# Patient Record
Sex: MALE | Race: WHITE | NOT HISPANIC OR LATINO | Employment: FULL TIME | URBAN - METROPOLITAN AREA
[De-identification: names, ages, dates, MRNs, and addresses within clinical notes are randomized per-mention and may not be internally consistent; named-entity substitution may affect disease eponyms.]

---

## 2017-08-24 ENCOUNTER — ALLSCRIPTS OFFICE VISIT (OUTPATIENT)
Dept: OTHER | Facility: OTHER | Age: 47
End: 2017-08-24

## 2017-08-24 DIAGNOSIS — R82.90 ABNORMAL FINDING IN URINE: ICD-10-CM

## 2017-08-24 DIAGNOSIS — R30.0 DYSURIA: ICD-10-CM

## 2017-08-24 LAB
BILIRUB UR QL STRIP: NEGATIVE
CLARITY UR: ABNORMAL
COLOR UR: ABNORMAL
GLUCOSE (HISTORICAL): NEGATIVE
HGB UR QL STRIP.AUTO: NEGATIVE
KETONES UR STRIP-MCNC: ABNORMAL MG/DL
LEUKOCYTE ESTERASE UR QL STRIP: NEGATIVE
NITRITE UR QL STRIP: POSITIVE
PH UR STRIP.AUTO: 6 [PH]
PROT UR STRIP-MCNC: ABNORMAL MG/DL
SP GR UR STRIP.AUTO: 1.03
UROBILINOGEN UR QL STRIP.AUTO: 0.2

## 2017-08-25 ENCOUNTER — APPOINTMENT (OUTPATIENT)
Dept: LAB | Facility: HOSPITAL | Age: 47
End: 2017-08-25
Payer: COMMERCIAL

## 2017-08-25 DIAGNOSIS — R30.0 DYSURIA: ICD-10-CM

## 2017-08-25 PROCEDURE — 87186 SC STD MICRODIL/AGAR DIL: CPT

## 2017-08-25 PROCEDURE — 87077 CULTURE AEROBIC IDENTIFY: CPT

## 2017-08-25 PROCEDURE — 87086 URINE CULTURE/COLONY COUNT: CPT

## 2017-08-26 ENCOUNTER — LAB CONVERSION - ENCOUNTER (OUTPATIENT)
Dept: OTHER | Facility: OTHER | Age: 47
End: 2017-08-26

## 2017-08-26 LAB
A/G RATIO (HISTORICAL): 1.8 (CALC) (ref 1–2.5)
ALBUMIN SERPL BCP-MCNC: 4.6 G/DL (ref 3.6–5.1)
ALP SERPL-CCNC: 46 U/L (ref 40–115)
ALT SERPL W P-5'-P-CCNC: 15 U/L (ref 9–46)
AST SERPL W P-5'-P-CCNC: 15 U/L (ref 10–40)
BASOPHILS # BLD AUTO: 0.4 %
BASOPHILS # BLD AUTO: 21 CELLS/UL (ref 0–200)
BILIRUB SERPL-MCNC: 0.6 MG/DL (ref 0.2–1.2)
BUN SERPL-MCNC: 13 MG/DL (ref 7–25)
BUN/CREA RATIO (HISTORICAL): ABNORMAL (CALC) (ref 6–22)
CALCIUM SERPL-MCNC: 9.5 MG/DL (ref 8.6–10.3)
CHLORIDE SERPL-SCNC: 105 MMOL/L (ref 98–110)
CO2 SERPL-SCNC: 26 MMOL/L (ref 20–31)
CREAT SERPL-MCNC: 0.98 MG/DL (ref 0.6–1.35)
DEPRECATED RDW RBC AUTO: 12.7 % (ref 11–15)
EGFR AFRICAN AMERICAN (HISTORICAL): 108 ML/MIN/1.73M2
EGFR-AMERICAN CALC (HISTORICAL): 93 ML/MIN/1.73M2
EOSINOPHIL # BLD AUTO: 1.3 %
EOSINOPHIL # BLD AUTO: 69 CELLS/UL (ref 15–500)
GAMMA GLOBULIN (HISTORICAL): 2.6 G/DL (CALC) (ref 1.9–3.7)
GLUCOSE (HISTORICAL): 100 MG/DL (ref 65–99)
HCT VFR BLD AUTO: 45.7 % (ref 38.5–50)
HGB BLD-MCNC: 15.6 G/DL (ref 13.2–17.1)
LYMPHOCYTES # BLD AUTO: 1431 CELLS/UL (ref 850–3900)
LYMPHOCYTES # BLD AUTO: 27 %
MCH RBC QN AUTO: 33.1 PG (ref 27–33)
MCHC RBC AUTO-ENTMCNC: 34.1 G/DL (ref 32–36)
MCV RBC AUTO: 96.8 FL (ref 80–100)
MONOCYTES # BLD AUTO: 477 CELLS/UL (ref 200–950)
MONOCYTES (HISTORICAL): 9 %
NEUTROPHILS # BLD AUTO: 3302 CELLS/UL (ref 1500–7800)
NEUTROPHILS # BLD AUTO: 62.3 %
PLATELET # BLD AUTO: 201 THOUSAND/UL (ref 140–400)
PMV BLD AUTO: 11.3 FL (ref 7.5–12.5)
POTASSIUM SERPL-SCNC: 4.3 MMOL/L (ref 3.5–5.3)
RBC # BLD AUTO: 4.72 MILLION/UL (ref 4.2–5.8)
SODIUM SERPL-SCNC: 140 MMOL/L (ref 135–146)
TOTAL PROTEIN (HISTORICAL): 7.2 G/DL (ref 6.1–8.1)
WBC # BLD AUTO: 5.3 THOUSAND/UL (ref 3.8–10.8)

## 2017-08-27 LAB — BACTERIA UR CULT: NORMAL

## 2017-08-28 ENCOUNTER — GENERIC CONVERSION - ENCOUNTER (OUTPATIENT)
Dept: OTHER | Facility: OTHER | Age: 47
End: 2017-08-28

## 2017-09-01 ENCOUNTER — GENERIC CONVERSION - ENCOUNTER (OUTPATIENT)
Dept: OTHER | Facility: OTHER | Age: 47
End: 2017-09-01

## 2017-09-05 ENCOUNTER — GENERIC CONVERSION - ENCOUNTER (OUTPATIENT)
Dept: OTHER | Facility: OTHER | Age: 47
End: 2017-09-05

## 2017-09-07 ENCOUNTER — ALLSCRIPTS OFFICE VISIT (OUTPATIENT)
Dept: OTHER | Facility: OTHER | Age: 47
End: 2017-09-07

## 2017-12-01 ENCOUNTER — HOSPITAL ENCOUNTER (EMERGENCY)
Facility: HOSPITAL | Age: 47
Discharge: HOME | End: 2017-12-01
Attending: EMERGENCY MEDICINE | Admitting: EMERGENCY MEDICINE
Payer: COMMERCIAL

## 2017-12-01 ENCOUNTER — APPOINTMENT (EMERGENCY)
Dept: RADIOLOGY | Facility: HOSPITAL | Age: 47
End: 2017-12-01
Payer: COMMERCIAL

## 2017-12-01 VITALS
BODY MASS INDEX: 23.07 KG/M2 | OXYGEN SATURATION: 99 % | RESPIRATION RATE: 16 BRPM | WEIGHT: 147 LBS | TEMPERATURE: 98.4 F | SYSTOLIC BLOOD PRESSURE: 126 MMHG | HEART RATE: 81 BPM | DIASTOLIC BLOOD PRESSURE: 66 MMHG | HEIGHT: 67 IN
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
## 2017-12-01 DIAGNOSIS — S50.12XA CONTUSION OF LEFT FOREARM, INITIAL ENCOUNTER: ICD-10-CM

## 2017-12-01 DIAGNOSIS — S05.01XA ABRASION OF RIGHT CORNEA, INITIAL ENCOUNTER: Primary | ICD-10-CM

## 2017-12-01 DIAGNOSIS — S00.81XA FACIAL ABRASION, INITIAL ENCOUNTER: ICD-10-CM

## 2017-12-01 LAB
ANION GAP BLD CALC-SCNC: 16 MMOL/L (ref 4–13)
BUN BLD-MCNC: 18 MG/DL (ref 5–25)
CA-I BLD-SCNC: 1.2 MMOL/L (ref 1.12–1.32)
CHLORIDE BLD-SCNC: 106 MMOL/L (ref 100–108)
CREAT BLD-MCNC: 0.7 MG/DL (ref 0.6–1.3)
GFR SERPL CREATININE-BSD FRML MDRD: 114 ML/MIN/1.73SQ M
GLUCOSE SERPL-MCNC: 124 MG/DL (ref 65–140)
HCT VFR BLD CALC: 44 % (ref 36.5–49.3)
HGB BLDA-MCNC: 15 G/DL (ref 12–17)
PCO2 BLD: 24 MMOL/L (ref 21–32)
POTASSIUM BLD-SCNC: 3.7 MMOL/L (ref 3.5–5.3)
SODIUM BLD-SCNC: 141 MMOL/L (ref 136–145)
SPECIMEN SOURCE: ABNORMAL

## 2017-12-01 PROCEDURE — 72125 CT NECK SPINE W/O DYE: CPT

## 2017-12-01 PROCEDURE — 96372 THER/PROPH/DIAG INJ SC/IM: CPT

## 2017-12-01 PROCEDURE — 70450 CT HEAD/BRAIN W/O DYE: CPT

## 2017-12-01 PROCEDURE — 90471 IMMUNIZATION ADMIN: CPT

## 2017-12-01 PROCEDURE — 73090 X-RAY EXAM OF FOREARM: CPT

## 2017-12-01 PROCEDURE — 71260 CT THORAX DX C+: CPT

## 2017-12-01 PROCEDURE — 90715 TDAP VACCINE 7 YRS/> IM: CPT | Performed by: EMERGENCY MEDICINE

## 2017-12-01 PROCEDURE — 74177 CT ABD & PELVIS W/CONTRAST: CPT

## 2017-12-01 PROCEDURE — 99285 EMERGENCY DEPT VISIT HI MDM: CPT

## 2017-12-01 PROCEDURE — 85014 HEMATOCRIT: CPT

## 2017-12-01 PROCEDURE — 80047 BASIC METABLC PNL IONIZED CA: CPT

## 2017-12-01 RX ORDER — PROPARACAINE HYDROCHLORIDE 5 MG/ML
1 SOLUTION/ DROPS OPHTHALMIC ONCE
Status: COMPLETED | OUTPATIENT
Start: 2017-12-01 | End: 2017-12-01

## 2017-12-01 RX ORDER — KETOROLAC TROMETHAMINE 30 MG/ML
30 INJECTION, SOLUTION INTRAMUSCULAR; INTRAVENOUS ONCE
Status: COMPLETED | OUTPATIENT
Start: 2017-12-01 | End: 2017-12-01

## 2017-12-01 RX ORDER — KETOROLAC TROMETHAMINE 30 MG/ML
15 INJECTION, SOLUTION INTRAMUSCULAR; INTRAVENOUS ONCE
Status: DISCONTINUED | OUTPATIENT
Start: 2017-12-01 | End: 2017-12-01

## 2017-12-01 RX ORDER — SULFACETAMIDE SODIUM 100 MG/ML
1 SOLUTION/ DROPS OPHTHALMIC EVERY 4 HOURS
Qty: 5 ML | Refills: 0 | Status: ON HOLD | OUTPATIENT
Start: 2017-12-01 | End: 2018-01-25 | Stop reason: ALTCHOICE

## 2017-12-01 RX ADMIN — PROPARACAINE HYDROCHLORIDE 1 DROP: 5 SOLUTION/ DROPS OPHTHALMIC at 07:31

## 2017-12-01 RX ADMIN — KETOROLAC TROMETHAMINE 30 MG: 30 INJECTION, SOLUTION INTRAMUSCULAR at 10:40

## 2017-12-01 RX ADMIN — IOHEXOL 100 ML: 350 INJECTION, SOLUTION INTRAVENOUS at 09:49

## 2017-12-01 RX ADMIN — TETANUS TOXOID, REDUCED DIPHTHERIA TOXOID AND ACELLULAR PERTUSSIS VACCINE, ADSORBED 0.5 ML: 5; 2.5; 8; 8; 2.5 SUSPENSION INTRAMUSCULAR at 09:51

## 2017-12-01 RX ADMIN — FLUORESCEIN SODIUM 1 STRIP: 1 STRIP OPHTHALMIC at 07:31
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
## 2017-12-01 NOTE — DISCHARGE INSTRUCTIONS
Your CT Scan results reports:  1 ) Indeterminate hepatic lesions, further characterization via hepatic protocol MRI is recommended  2 ) Subcentimeter pulmonary nodules measuring up to 4 mm  Based on current Fleischner Society 2017 Guidelines on incidental pulmonary nodule, no routine follow-up is needed if the patient is considered low risk for lung cancer  If the patient is considered high risk for lung cancer, 12 month follow-up non-contrast chest CT is recommended  **You should follow up with your family doctor to review these findings**    Corneal Abrasion   WHAT YOU NEED TO KNOW:   A corneal abrasion is a scratch on the cornea of your eye  The cornea is the clear layer that covers the front of your eye  A small scratch may heal in 1 to 2 days  Deeper or larger scratches may take longer to heal         DISCHARGE INSTRUCTIONS:   Contact your healthcare provider if:   · Your eye pain or vision gets worse  · You have yellow or green drainage from your eye  · You have questions or concerns about your condition or care  Medicines:   · Medicines  may be given in the form of eyedrops or ointment to help prevent an eye infection  You may also be given eye drops to decrease pain  Ask how to take this medicine safely  · Take your medicine as directed  Contact your healthcare provider if you think your medicine is not helping or if you have side effects  Tell him or her if you are allergic to any medicine  Keep a list of the medicines, vitamins, and herbs you take  Include the amounts, and when and why you take them  Bring the list or the pill bottles to follow-up visits  Carry your medicine list with you in case of an emergency  Follow up with your healthcare provider as directed:  Write down your questions so you remember to ask them during your visits  Self-care:   · Do not touch or rub your eye  · Ask your healthcare provider when you can start your normal activities       · Ask your healthcare provider when you can wear your contact lenses  · Wear sunglasses in bright light until your eyes feel better  Help prevent corneal abrasions:   · Remove your contact lenses if your eyes feel dry or irritated  · Wash your hands if you need to touch your eyes or your face  · Trim your child's fingernails so he cannot scratch his eye  · Wear protective eyewear when you work with chemicals, wood, dust, or metal      Wear protective eyewear when you play sports  · Do not wear your contacts for longer than you should  · Do not wear colored lenses or lenses with shapes on them  These lenses may cause eye damage and vision loss  · Do not wear glitter makeup  Glitter can easily get into your eyes and under contact lenses  · Do not sleep with your contacts in your eyes  © 2017 [employer] Information is for End User's use only and may not be sold, redistributed or otherwise used for commercial purposes  All illustrations and images included in CareNotes® are the copyrighted property of A D A M , Inc  or Cameron Jerez  The above information is an  only  It is not intended as medical advice for individual conditions or treatments  Talk to your doctor, nurse or pharmacist before following any medical regimen to see if it is safe and effective for you  Motor Vehicle Accident   WHAT YOU NEED TO KNOW:   A motor vehicle accident (MVA) can cause injury from the impact or from being thrown around inside the car  You may have a bruise on your abdomen, chest, or neck from the seatbelt  You may also have pain in your face, neck, or back  You may have pain in your knee, hip, or thigh if your body hits the dash or the steering wheel  Muscle pain is commonly worse 1 to 2 days after an MVA  DISCHARGE INSTRUCTIONS:   Call 911 if:   · You have new or worsening chest pain or shortness of breath      Return to the emergency department if:   · You have new or worsening pain in your abdomen  · You have nausea and vomiting that does not get better  · You have a severe headache  · You have weakness, tingling, or numbness in your arms or legs  · You have new or worsening pain that makes it hard for you to move  Contact your healthcare provider if:   · You have pain that develops 2 to 3 days after the MVA  · You have questions or concerns about your condition or care  Medicines:   · Pain medicine: You may be given medicine to take away or decrease pain  Do not wait until the pain is severe before you take your medicine  · NSAIDs , such as ibuprofen, help decrease swelling, pain, and fever  This medicine is available with or without a doctor's order  NSAIDs can cause stomach bleeding or kidney problems in certain people  If you take blood thinner medicine, always ask if NSAIDs are safe for you  Always read the medicine label and follow directions  Do not give these medicines to children under 10months of age without direction from your child's healthcare provider  · Take your medicine as directed  Contact your healthcare provider if you think your medicine is not helping or if you have side effects  Tell him of her if you are allergic to any medicine  Keep a list of the medicines, vitamins, and herbs you take  Include the amounts, and when and why you take them  Bring the list or the pill bottles to follow-up visits  Carry your medicine list with you in case of an emergency  Follow up with your healthcare provider as directed:  Write down your questions so you remember to ask them during your visits  Safety tips:   · Always wear your seatbelt  This will help reduce serious injury from an MVA  · Use child safety seats  Your child needs to ride in a child safety seat made for his age, height, and weight  Ask your healthcare provider for more information about child safety seats  · Decrease speed    Drive the speed limit to reduce your risk for an MVA  · Do not drive if you are tired  You will react more slowly when you are tired  The slowed reaction time will increase your risk for an MVA  · Do not talk or text on your cell phone while you drive  You cannot respond fast enough in an emergency if you are distracted by texts or conversations  · Do not drink and drive  Use a designated   Call a taxi or get a ride home with someone if you have been drinking  Do not let your friends drive if they have been drinking alcohol  · Do not use illegal drugs and drive  You may be more tired or take risks that you normally would not take  Do not drive after you take prescription medicines that make you sleepy  Self-care:   · Use ice and heat  Ice helps decrease swelling and pain  Ice may also help prevent tissue damage  Use an ice pack, or put crushed ice in a plastic bag  Cover it with a towel and apply to your injured area for 15 to 20 minutes every hour, or as directed  After 2 days, use a heating pad on your injured area  Use heat as directed  · Gently stretch  Use gentle exercises to stretch your muscles after an MVA  Ask your healthcare provider for exercises you can do  © 2017 [employer]  Information is for End User's use only and may not be sold, redistributed or otherwise used for commercial purposes  All illustrations and images included in CareNotes® are the copyrighted property of A D A [employer]  or Cameron Jerez  The above information is an  only  It is not intended as medical advice for individual conditions or treatments  Talk to your doctor, nurse or pharmacist before following any medical regimen to see if it is safe and effective for you

## 2017-12-04 ENCOUNTER — OFFICE VISIT (OUTPATIENT)
Dept: URGENT CARE | Facility: MEDICAL CENTER | Age: 47
End: 2017-12-04
Payer: COMMERCIAL

## 2017-12-04 PROCEDURE — G0382 LEV 3 HOSP TYPE B ED VISIT: HCPCS
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
## 2017-12-11 ENCOUNTER — GENERIC CONVERSION - ENCOUNTER (OUTPATIENT)
Dept: INTERNAL MEDICINE CLINIC | Age: 47
End: 2017-12-11

## 2017-12-11 ENCOUNTER — GENERIC CONVERSION - ENCOUNTER (OUTPATIENT)
Dept: OTHER | Facility: OTHER | Age: 47
End: 2017-12-11

## 2017-12-11 DIAGNOSIS — K76.89 OTHER SPECIFIED DISEASES OF LIVER (CODE): ICD-10-CM

## 2017-12-14 ENCOUNTER — TRANSCRIBE ORDERS (OUTPATIENT)
Dept: ADMINISTRATIVE | Facility: HOSPITAL | Age: 47
End: 2017-12-14

## 2017-12-14 DIAGNOSIS — IMO0001 LIVER REGENERATION: Primary | ICD-10-CM

## 2017-12-27 ENCOUNTER — HOSPITAL ENCOUNTER (OUTPATIENT)
Dept: RADIOLOGY | Facility: HOSPITAL | Age: 47
Discharge: HOME | End: 2017-12-27
Attending: INTERNAL MEDICINE
Payer: COMMERCIAL

## 2017-12-27 DIAGNOSIS — K76.89 OTHER SPECIFIED DISEASES OF LIVER (CODE): ICD-10-CM

## 2017-12-27 PROCEDURE — A9585 GADOBUTROL INJECTION: HCPCS | Performed by: INTERNAL MEDICINE

## 2017-12-27 PROCEDURE — 74183 MRI ABD W/O CNTR FLWD CNTR: CPT

## 2017-12-27 RX ADMIN — GADOBUTROL 6 ML: 604.72 INJECTION INTRAVENOUS at 21:25

## 2017-12-28 ENCOUNTER — GENERIC CONVERSION - ENCOUNTER (OUTPATIENT)
Dept: OTHER | Facility: OTHER | Age: 47
End: 2017-12-28

## 2018-01-03 ENCOUNTER — TRANSCRIBE ORDERS (OUTPATIENT)
Dept: ADMINISTRATIVE | Facility: HOSPITAL | Age: 48
End: 2018-01-03

## 2018-01-03 DIAGNOSIS — IMO0001 LIVER REGENERATION: Primary | ICD-10-CM

## 2018-01-11 ENCOUNTER — GENERIC CONVERSION - ENCOUNTER (OUTPATIENT)
Dept: OTHER | Facility: OTHER | Age: 48
End: 2018-01-11

## 2018-01-11 ENCOUNTER — HOSPITAL ENCOUNTER (OUTPATIENT)
Dept: RADIOLOGY | Age: 48
Discharge: HOME | End: 2018-01-11
Payer: COMMERCIAL

## 2018-01-11 VITALS — BODY MASS INDEX: 23.05 KG/M2 | WEIGHT: 147.2 LBS

## 2018-01-11 DIAGNOSIS — K76.9 LESION OF RIGHT LOBE OF LIVER: ICD-10-CM

## 2018-01-11 LAB — GLUCOSE SERPL-MCNC: 98 MG/DL (ref 65–140)

## 2018-01-11 PROCEDURE — A9552 F18 FDG: HCPCS

## 2018-01-11 PROCEDURE — 82948 REAGENT STRIP/BLOOD GLUCOSE: CPT

## 2018-01-11 PROCEDURE — 78815 PET IMAGE W/CT SKULL-THIGH: CPT

## 2018-01-11 RX ADMIN — IOHEXOL 5 ML: 240 INJECTION, SOLUTION INTRATHECAL; INTRAVASCULAR; INTRAVENOUS; ORAL at 08:15

## 2018-01-12 ENCOUNTER — GENERIC CONVERSION - ENCOUNTER (OUTPATIENT)
Dept: OTHER | Facility: OTHER | Age: 48
End: 2018-01-12

## 2018-01-12 NOTE — MISCELLANEOUS
Message   Recorded as Task   Date: 08/28/2017 09:21 AM, Created By: Jarod Garg   Task Name: Call Patient with results   Assigned To: Fercho Saucedo   Regarding Patient: Edvin Taylor, Status: Active   CommentEliane Patient - 28 Aug 2017 9:21 AM     Patient Phone: [phone]    spoke with patient, his urinary sx are resolved, to complete abx course and pt instructed to sched physical exam and recheck urine at that time in 1 week or so   Da Morillo - 05 Sep 2017 6:40 AM     TASK REASSIGNED: Previously Assigned To Armond Julien - 05 Sep 2017 2:56 PM     TASK EDITED   Fercho Saucedo - 05 Sep 2017 3:17 PM     TASK EDITED  Appt scheduled on 9/7/17 @ 1130 with Dr Lorena oCllier  Active Problems    1  Bad odor of urine (791 9) (R82 90)   2  Dysuria (788 1) (R30 0)   3  GERD (gastroesophageal reflux disease) (530 81) (K21 9)   4  Urine incontinence (788 30) (R32)   5  UTI symptoms (788 99) (R39 9)    Current Meds   1  Ciprofloxacin HCl - 500 MG Oral Tablet; TAKE 1 TABLET TWICE DAILY; Therapy: 10Pgb3213 to (Evaluate:46Ilt8647)  Requested for: 33Vyw8383; Last   Rx:[id number] Ordered    Allergies    1  No Known Drug Allergies    2  No Known Environmental Allergies   3   No Known Food Allergies    Signatures   Electronically signed by : Garima Becerra RN; Sep  5 2017  3:18PM EST                       (Author)

## 2018-01-13 VITALS
DIASTOLIC BLOOD PRESSURE: 78 MMHG | SYSTOLIC BLOOD PRESSURE: 118 MMHG | HEART RATE: 70 BPM | WEIGHT: 147.13 LBS | BODY MASS INDEX: 23.65 KG/M2 | OXYGEN SATURATION: 98 % | TEMPERATURE: 98.1 F | HEIGHT: 66 IN

## 2018-01-13 VITALS
HEART RATE: 79 BPM | HEIGHT: 66 IN | SYSTOLIC BLOOD PRESSURE: 118 MMHG | WEIGHT: 146 LBS | DIASTOLIC BLOOD PRESSURE: 90 MMHG | TEMPERATURE: 98.7 F | OXYGEN SATURATION: 99 % | BODY MASS INDEX: 23.46 KG/M2

## 2018-01-15 NOTE — RESULT NOTES
Verified Results  (1) URINE CULTURE 13Tyh3284 06:21PM Sherryle Pillow    Order Number: [id number]     Test Name Result Flag Reference   CLINICAL REPORT (Report)     Test:        Urine culture  Specimen Type:   Urine  Specimen Date:   8/25/2017 6:21 PM  Result Date:    8/27/2017 11:49 AM  Result Status:   Final result  Resulting Lab:   [facility] [address] [place] [id number]            Tel: [phone]      CULTURE                                       ------------------                                   >100,000 cfu/ml Escherichia coli      SUSCEPTIBILITY                                   ------------------                                                       Escherichia coli  METHOD                 AROLDO  -------------------------------------  -------------------------  AMPICILLIN ($$)             <=8 00 ug/ml Susceptible  AZTREONAM ($$$)             <=8 ug/ml   Susceptible  CEFAZOLIN ($)              <=8 00 ug/ml Susceptible  CIPROFLOXACIN ($)            <=1 00 ug/ml Susceptible  GENTAMICIN ($$)             <=4 ug/ml   Susceptible  LEVOFLOXACIN ($)            <=2 00 ug/ml Susceptible  NITROFURANTOIN             <=32 ug/ml  Susceptible  PIPERACILLIN + TAZOBACTAM ($$$)     <=16 ug/ml  Susceptible  TETRACYCLINE              <=4 ug/ml   Susceptible  TOBRAMYCIN ($)             <=4 ug/ml   Susceptible  TRIMETHOPRIM + SULFAMETHOXAZOLE ($$$)  <=2/38 ug/ml Susceptible

## 2018-01-17 ENCOUNTER — ANESTHESIA EVENT (OUTPATIENT)
Dept: PERIOP | Facility: AMBULARY SURGERY CENTER | Age: 48
End: 2018-01-17

## 2018-01-17 NOTE — RESULT NOTES
Verified Results  (1) COMPREHENSIVE METABOLIC PANEL [id number] 06:89YO Anita Butt     Test Name Result Flag Reference   GLUCOSE 100 mg/dL H 65-99   Fasting reference interval     For someone without known diabetes, a glucose value  between 100 and 125 mg/dL is consistent with  prediabetes and should be confirmed with a  follow-up test    UREA NITROGEN (BUN) 13 mg/dL  7-25   CREATININE 0 98 mg/dL  0 60-1 35   eGFR NON-AFR  AMERICAN 93 mL/min/1 73m2  > OR = 60   eGFR AFRICAN AMERICAN 108 mL/min/1 73m2  > OR = 60   BUN/CREATININE RATIO   4-97   NOT APPLICABLE (calc)   SODIUM 140 mmol/L  135-146   POTASSIUM 4 3 mmol/L  3 5-5 3   CHLORIDE 105 mmol/L     CARBON DIOXIDE 26 mmol/L  20-31   CALCIUM 9 5 mg/dL  8 6-10 3   PROTEIN, TOTAL 7 2 g/dL  6 1-8 1   ALBUMIN 4 6 g/dL  3 6-5 1   GLOBULIN 2 6 g/dL (calc)  1 9-3 7   ALBUMIN/GLOBULIN RATIO 1 8 (calc)  1 0-2 5   BILIRUBIN, TOTAL 0 6 mg/dL  0 2-1 2   ALKALINE PHOSPHATASE 46 U/L     AST 15 U/L  10-40   ALT 15 U/L  9-46     (1) CBC/PLT/DIFF [id number] 07:00AM Anita Butt     Test Name Result Flag Reference   WHITE BLOOD CELL COUNT 5 3 Thousand/uL  3 8-10 8   RED BLOOD CELL COUNT 4 72 Million/uL  4 20-5 80   HEMOGLOBIN 15 6 g/dL  13 2-17 1   HEMATOCRIT 45 7 %  38 5-50 0   MCV 96 8 fL  80 0-100 0   MCH 33 1 pg H 27 0-33 0   MCHC 34 1 g/dL  32 0-36 0   RDW 12 7 %  11 0-15 0   PLATELET COUNT 600 Thousand/uL  140-400   ABSOLUTE NEUTROPHILS 3302 cells/uL  5596-9823   ABSOLUTE LYMPHOCYTES 1431 cells/uL  850-3900   ABSOLUTE MONOCYTES 477 cells/uL  200-950   ABSOLUTE EOSINOPHILS 69 cells/uL     ABSOLUTE BASOPHILS 21 cells/uL  0-200   NEUTROPHILS 62 3 %     LYMPHOCYTES 27 0 %     MONOCYTES 9 0 %     EOSINOPHILS 1 3 %     BASOPHILS 0 4 %     NO COLLECTION DATE RECEIVED  WE HAVE USED  THE DATE THE SPECIMEN WAS RECEIVED BY THIS  LABORATORY AS THE COLLECTION DATE  IF THIS  IS INCORRECT, PLEASE CONTACT CLIENT SERVICES    PHONE NUMBER: [phone]   MPV 11 3 fL 7  5-12 5

## 2018-01-19 ENCOUNTER — TRANSCRIBE ORDERS (OUTPATIENT)
Dept: LAB | Facility: CLINIC | Age: 48
End: 2018-01-19

## 2018-01-19 ENCOUNTER — APPOINTMENT (OUTPATIENT)
Dept: LAB | Facility: CLINIC | Age: 48
End: 2018-01-19
Payer: COMMERCIAL

## 2018-01-19 DIAGNOSIS — R93.5 ABNORMAL ABDOMINAL ULTRASOUND: ICD-10-CM

## 2018-01-19 DIAGNOSIS — C18.9 MALIGNANT NEOPLASM OF COLON, UNSPECIFIED PART OF COLON (HCC): ICD-10-CM

## 2018-01-19 DIAGNOSIS — C18.9 MALIGNANT NEOPLASM OF COLON, UNSPECIFIED PART OF COLON (HCC): Primary | ICD-10-CM

## 2018-01-19 LAB
ALBUMIN SERPL BCP-MCNC: 4 G/DL (ref 3.5–5)
ALP SERPL-CCNC: 59 U/L (ref 46–116)
ALT SERPL W P-5'-P-CCNC: 25 U/L (ref 12–78)
ANION GAP SERPL CALCULATED.3IONS-SCNC: 7 MMOL/L (ref 4–13)
AST SERPL W P-5'-P-CCNC: 16 U/L (ref 5–45)
BASOPHILS # BLD AUTO: 0.02 THOUSANDS/ΜL (ref 0–0.1)
BASOPHILS NFR BLD AUTO: 0 % (ref 0–1)
BILIRUB SERPL-MCNC: 0.2 MG/DL (ref 0.2–1)
BUN SERPL-MCNC: 19 MG/DL (ref 5–25)
CALCIUM SERPL-MCNC: 9.2 MG/DL (ref 8.3–10.1)
CEA SERPL-MCNC: 3 NG/ML (ref 0–3)
CHLORIDE SERPL-SCNC: 103 MMOL/L (ref 100–108)
CO2 SERPL-SCNC: 29 MMOL/L (ref 21–32)
CREAT SERPL-MCNC: 0.98 MG/DL (ref 0.6–1.3)
EOSINOPHIL # BLD AUTO: 0.11 THOUSAND/ΜL (ref 0–0.61)
EOSINOPHIL NFR BLD AUTO: 2 % (ref 0–6)
ERYTHROCYTE [DISTWIDTH] IN BLOOD BY AUTOMATED COUNT: 12.8 % (ref 11.6–15.1)
GFR SERPL CREATININE-BSD FRML MDRD: 93 ML/MIN/1.73SQ M
GLUCOSE SERPL-MCNC: 86 MG/DL (ref 65–140)
HCT VFR BLD AUTO: 39.7 % (ref 36.5–49.3)
HGB BLD-MCNC: 13.7 G/DL (ref 12–17)
LYMPHOCYTES # BLD AUTO: 2.01 THOUSANDS/ΜL (ref 0.6–4.47)
LYMPHOCYTES NFR BLD AUTO: 29 % (ref 14–44)
MCH RBC QN AUTO: 32.2 PG (ref 26.8–34.3)
MCHC RBC AUTO-ENTMCNC: 34.5 G/DL (ref 31.4–37.4)
MCV RBC AUTO: 93 FL (ref 82–98)
MONOCYTES # BLD AUTO: 0.58 THOUSAND/ΜL (ref 0.17–1.22)
MONOCYTES NFR BLD AUTO: 9 % (ref 4–12)
NEUTROPHILS # BLD AUTO: 4.11 THOUSANDS/ΜL (ref 1.85–7.62)
NEUTS SEG NFR BLD AUTO: 60 % (ref 43–75)
PLATELET # BLD AUTO: 218 THOUSANDS/UL (ref 149–390)
PMV BLD AUTO: 10.9 FL (ref 8.9–12.7)
POTASSIUM SERPL-SCNC: 4.3 MMOL/L (ref 3.5–5.3)
PROT SERPL-MCNC: 7.8 G/DL (ref 6.4–8.2)
RBC # BLD AUTO: 4.26 MILLION/UL (ref 3.88–5.62)
SODIUM SERPL-SCNC: 139 MMOL/L (ref 136–145)
WBC # BLD AUTO: 6.83 THOUSAND/UL (ref 4.31–10.16)

## 2018-01-19 PROCEDURE — 82378 CARCINOEMBRYONIC ANTIGEN: CPT

## 2018-01-19 PROCEDURE — 80053 COMPREHEN METABOLIC PANEL: CPT

## 2018-01-19 PROCEDURE — 36415 COLL VENOUS BLD VENIPUNCTURE: CPT

## 2018-01-19 PROCEDURE — 85025 COMPLETE CBC W/AUTO DIFF WBC: CPT

## 2018-01-22 ENCOUNTER — ANESTHESIA EVENT (OUTPATIENT)
Dept: GASTROENTEROLOGY | Facility: HOSPITAL | Age: 48
End: 2018-01-22
Payer: COMMERCIAL

## 2018-01-22 ENCOUNTER — HOSPITAL ENCOUNTER (OUTPATIENT)
Facility: HOSPITAL | Age: 48
Setting detail: OUTPATIENT SURGERY
Discharge: HOME | End: 2018-01-22
Attending: COLON & RECTAL SURGERY | Admitting: COLON & RECTAL SURGERY
Payer: COMMERCIAL

## 2018-01-22 ENCOUNTER — ANESTHESIA (OUTPATIENT)
Dept: GASTROENTEROLOGY | Facility: HOSPITAL | Age: 48
End: 2018-01-22
Payer: COMMERCIAL

## 2018-01-22 VITALS
WEIGHT: 149 LBS | DIASTOLIC BLOOD PRESSURE: 57 MMHG | BODY MASS INDEX: 23.95 KG/M2 | SYSTOLIC BLOOD PRESSURE: 107 MMHG | HEIGHT: 66 IN | RESPIRATION RATE: 16 BRPM | HEART RATE: 75 BPM | OXYGEN SATURATION: 96 % | TEMPERATURE: 98.6 F

## 2018-01-22 PROCEDURE — 88341 IMHCHEM/IMCYTCHM EA ADD ANTB: CPT | Performed by: COLON & RECTAL SURGERY

## 2018-01-22 PROCEDURE — 88342 IMHCHEM/IMCYTCHM 1ST ANTB: CPT | Performed by: COLON & RECTAL SURGERY

## 2018-01-22 PROCEDURE — 88305 TISSUE EXAM BY PATHOLOGIST: CPT | Performed by: COLON & RECTAL SURGERY

## 2018-01-22 RX ORDER — OMEPRAZOLE 40 MG/1
40 CAPSULE, DELAYED RELEASE ORAL DAILY
COMMUNITY
End: 2020-06-10 | Stop reason: HOSPADM

## 2018-01-22 RX ORDER — PROPOFOL 10 MG/ML
INJECTION, EMULSION INTRAVENOUS AS NEEDED
Status: DISCONTINUED | OUTPATIENT
Start: 2018-01-22 | End: 2018-01-22 | Stop reason: SURG

## 2018-01-22 RX ORDER — SILDENAFIL CITRATE 20 MG/1
20 TABLET ORAL 3 TIMES DAILY
COMMUNITY
End: 2018-02-28 | Stop reason: SDUPTHER

## 2018-01-22 RX ORDER — ONDANSETRON 2 MG/ML
INJECTION INTRAMUSCULAR; INTRAVENOUS AS NEEDED
Status: DISCONTINUED | OUTPATIENT
Start: 2018-01-22 | End: 2018-01-22 | Stop reason: SURG

## 2018-01-22 RX ORDER — PROPOFOL 10 MG/ML
INJECTION, EMULSION INTRAVENOUS CONTINUOUS PRN
Status: DISCONTINUED | OUTPATIENT
Start: 2018-01-22 | End: 2018-01-22 | Stop reason: SURG

## 2018-01-22 RX ORDER — SODIUM CHLORIDE, SODIUM LACTATE, POTASSIUM CHLORIDE, CALCIUM CHLORIDE 600; 310; 30; 20 MG/100ML; MG/100ML; MG/100ML; MG/100ML
125 INJECTION, SOLUTION INTRAVENOUS CONTINUOUS
Status: DISCONTINUED | OUTPATIENT
Start: 2018-01-22 | End: 2018-01-22 | Stop reason: HOSPADM

## 2018-01-22 RX ADMIN — PROPOFOL 80 MCG/KG/MIN: 10 INJECTION, EMULSION INTRAVENOUS at 10:17

## 2018-01-22 RX ADMIN — PROPOFOL 70 MG: 10 INJECTION, EMULSION INTRAVENOUS at 10:17

## 2018-01-22 RX ADMIN — ONDANSETRON 4 MG: 2 INJECTION INTRAMUSCULAR; INTRAVENOUS at 10:17

## 2018-01-22 RX ADMIN — SODIUM CHLORIDE, SODIUM LACTATE, POTASSIUM CHLORIDE, AND CALCIUM CHLORIDE: .6; .31; .03; .02 INJECTION, SOLUTION INTRAVENOUS at 10:10

## 2018-01-22 NOTE — ANESTHESIA POSTPROCEDURE EVALUATION
Post-Op Assessment Note      CV Status:  Stable    Mental Status:  Alert and awake    Hydration Status:  Stable    PONV Controlled:  None    Post Op Vitals Reviewed: Yes          Staff: Anesthesiologist           BP      Temp      Pulse     Resp      SpO2

## 2018-01-22 NOTE — ANESTHESIA PREPROCEDURE EVALUATION
Review of Systems/Medical History  Patient summary reviewed        Cardiovascular   Pulmonary       GI/Hepatic      Comment: Colorectal CA, BRBPR          Endo/Other     GYN       Hematology   Musculoskeletal       Neurology   Psychology           Physical Exam    Airway    Mallampati score: II  TM Distance: >3 FB  Neck ROM: full     Dental       Cardiovascular      Pulmonary      Other Findings        Anesthesia Plan  ASA Score- 2     Anesthesia Type- IV sedation with anesthesia with ASA Monitors  Additional Monitors:   Airway Plan:         Plan Factors-    Induction- intravenous  Postoperative Plan-     Informed Consent- Anesthetic plan and risks discussed with patient
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
## 2018-01-23 VITALS
WEIGHT: 147 LBS | OXYGEN SATURATION: 98 % | RESPIRATION RATE: 16 BRPM | HEART RATE: 76 BPM | HEIGHT: 66 IN | TEMPERATURE: 98.9 F | SYSTOLIC BLOOD PRESSURE: 120 MMHG | BODY MASS INDEX: 23.63 KG/M2 | DIASTOLIC BLOOD PRESSURE: 70 MMHG

## 2018-01-24 ENCOUNTER — ANESTHESIA EVENT (OUTPATIENT)
Dept: PERIOP | Facility: HOSPITAL | Age: 48
End: 2018-01-24
Payer: COMMERCIAL

## 2018-01-24 ENCOUNTER — ANESTHESIA (OUTPATIENT)
Dept: PERIOP | Facility: AMBULARY SURGERY CENTER | Age: 48
End: 2018-01-24

## 2018-01-24 VITALS
TEMPERATURE: 98.8 F | HEIGHT: 66 IN | SYSTOLIC BLOOD PRESSURE: 124 MMHG | HEART RATE: 80 BPM | DIASTOLIC BLOOD PRESSURE: 78 MMHG | WEIGHT: 148 LBS | OXYGEN SATURATION: 98 % | BODY MASS INDEX: 23.78 KG/M2

## 2018-01-24 VITALS
WEIGHT: 146 LBS | BODY MASS INDEX: 23.46 KG/M2 | DIASTOLIC BLOOD PRESSURE: 82 MMHG | HEIGHT: 66 IN | SYSTOLIC BLOOD PRESSURE: 130 MMHG | HEART RATE: 86 BPM

## 2018-01-24 VITALS
WEIGHT: 144.5 LBS | SYSTOLIC BLOOD PRESSURE: 110 MMHG | DIASTOLIC BLOOD PRESSURE: 70 MMHG | BODY MASS INDEX: 23.22 KG/M2 | TEMPERATURE: 98.8 F | OXYGEN SATURATION: 98 % | HEIGHT: 66 IN | HEART RATE: 81 BPM

## 2018-01-24 NOTE — H&P
No changes to H&P as listed, plan for port a cath after biopsies colon adenocarcinoma, liver metastatic disease supected on staging imaging  We discussed Port-A-Cath and risks including not limited to bleeding infection/bacteremia requiring removal/delay of therapy, risks of anesthesia, damage to great vessels, pneumothorax requiring chest tube he understood these risks and wishes to proceed  He signed informed consent

## 2018-01-24 NOTE — MISCELLANEOUS
Message  Return to work or school:   Cornelia Garner is under my professional care  He was seen in my office on 12/4/17   He is able to return to work on  12/4/17    He can perform work without limitations           Future Appointments    Signatures   Electronically signed by : Nicci Wild, [facility]; Dec  4 2017  3:28PM EST                       (Author)

## 2018-01-24 NOTE — ANESTHESIA PREPROCEDURE EVALUATION
Review of Systems/Medical History  Patient summary reviewed  Chart reviewed  No history of anesthetic complications     Cardiovascular  Negative cardio ROS Exercise tolerance: good,     Pulmonary  Smoker (Quit 3-4 yrs ago) ex-smoker  ,        GI/Hepatic    GERD well controlled, GI malignancy (colorectal ca w/ liver mets),        Negative  ROS        Endo/Other  Negative endo/other ROS      GYN       Hematology  Negative hematology ROS      Musculoskeletal  Negative musculoskeletal ROS        Neurology  Negative neurology ROS      Psychology   Negative psychology ROS              Physical Exam    Airway    Mallampati score: II  TM Distance: >3 FB  Neck ROM: full     Dental   No notable dental hx     Cardiovascular  Comment: Negative ROS, Rhythm: regular, Rate: normal, Cardiovascular exam normal    Pulmonary  Pulmonary exam normal Breath sounds clear to auscultation,     Other Findings        Anesthesia Plan  ASA Score- 3     Anesthesia Type- IV sedation with anesthesia with ASA Monitors  Additional Monitors:   Airway Plan:         Plan Factors-    Induction- intravenous  Postoperative Plan-     Informed Consent- Anesthetic plan and risks discussed with patient  NPO verified  NKDA  Pt took omperazole this AM    Plan: IV sedation/MAC; GA as backup    Risks and benefits discussed with pt  Questions answered  Pt consented

## 2018-01-25 ENCOUNTER — APPOINTMENT (OUTPATIENT)
Dept: RADIOLOGY | Facility: HOSPITAL | Age: 48
End: 2018-01-25
Attending: COLON & RECTAL SURGERY
Payer: COMMERCIAL

## 2018-01-25 ENCOUNTER — ANESTHESIA (OUTPATIENT)
Dept: PERIOP | Facility: HOSPITAL | Age: 48
End: 2018-01-25
Payer: COMMERCIAL

## 2018-01-25 ENCOUNTER — HOSPITAL ENCOUNTER (OUTPATIENT)
Facility: HOSPITAL | Age: 48
Setting detail: OUTPATIENT SURGERY
Discharge: HOME | End: 2018-01-25
Attending: COLON & RECTAL SURGERY | Admitting: COLON & RECTAL SURGERY
Payer: COMMERCIAL

## 2018-01-25 ENCOUNTER — HOSPITAL ENCOUNTER (OUTPATIENT)
Dept: RADIOLOGY | Facility: HOSPITAL | Age: 48
Setting detail: OUTPATIENT SURGERY
Discharge: HOME | End: 2018-01-25
Payer: COMMERCIAL

## 2018-01-25 VITALS
HEIGHT: 66 IN | BODY MASS INDEX: 23.95 KG/M2 | OXYGEN SATURATION: 100 % | DIASTOLIC BLOOD PRESSURE: 60 MMHG | RESPIRATION RATE: 20 BRPM | TEMPERATURE: 98.6 F | WEIGHT: 149 LBS | SYSTOLIC BLOOD PRESSURE: 117 MMHG | HEART RATE: 60 BPM

## 2018-01-25 DIAGNOSIS — C18.7 CANCER OF SIGMOID COLON (HCC): Primary | ICD-10-CM

## 2018-01-25 DIAGNOSIS — C18.9 MALIGNANT NEOPLASM OF COLON (HCC): ICD-10-CM

## 2018-01-25 PROCEDURE — 71045 X-RAY EXAM CHEST 1 VIEW: CPT

## 2018-01-25 PROCEDURE — C1788 PORT, INDWELLING, IMP: HCPCS | Performed by: COLON & RECTAL SURGERY

## 2018-01-25 PROCEDURE — 77001 FLUOROGUIDE FOR VEIN DEVICE: CPT

## 2018-01-25 DEVICE — PORT HEMODIAL INTERMED PROFILE 8FR KIT: Type: IMPLANTABLE DEVICE | Site: CHEST | Status: FUNCTIONAL

## 2018-01-25 RX ORDER — PROPOFOL 10 MG/ML
INJECTION, EMULSION INTRAVENOUS AS NEEDED
Status: DISCONTINUED | OUTPATIENT
Start: 2018-01-25 | End: 2018-01-25 | Stop reason: SURG

## 2018-01-25 RX ORDER — HYDROCODONE BITARTRATE AND ACETAMINOPHEN 5; 325 MG/1; MG/1
1 TABLET ORAL EVERY 6 HOURS PRN
Qty: 8 TABLET | Refills: 0 | Status: SHIPPED | OUTPATIENT
Start: 2018-01-25 | End: 2018-02-04

## 2018-01-25 RX ORDER — FENTANYL CITRATE/PF 50 MCG/ML
25 SYRINGE (ML) INJECTION
Status: DISCONTINUED | OUTPATIENT
Start: 2018-01-25 | End: 2018-01-25 | Stop reason: HOSPADM

## 2018-01-25 RX ORDER — PROPOFOL 10 MG/ML
INJECTION, EMULSION INTRAVENOUS CONTINUOUS PRN
Status: DISCONTINUED | OUTPATIENT
Start: 2018-01-25 | End: 2018-01-25 | Stop reason: SURG

## 2018-01-25 RX ORDER — SODIUM CHLORIDE, SODIUM LACTATE, POTASSIUM CHLORIDE, CALCIUM CHLORIDE 600; 310; 30; 20 MG/100ML; MG/100ML; MG/100ML; MG/100ML
100 INJECTION, SOLUTION INTRAVENOUS CONTINUOUS
Status: DISCONTINUED | OUTPATIENT
Start: 2018-01-25 | End: 2018-01-25 | Stop reason: HOSPADM

## 2018-01-25 RX ORDER — ONDANSETRON 2 MG/ML
4 INJECTION INTRAMUSCULAR; INTRAVENOUS ONCE AS NEEDED
Status: DISCONTINUED | OUTPATIENT
Start: 2018-01-25 | End: 2018-01-25 | Stop reason: HOSPADM

## 2018-01-25 RX ORDER — LIDOCAINE HYDROCHLORIDE 10 MG/ML
INJECTION, SOLUTION INFILTRATION; PERINEURAL AS NEEDED
Status: DISCONTINUED | OUTPATIENT
Start: 2018-01-25 | End: 2018-01-25 | Stop reason: HOSPADM

## 2018-01-25 RX ADMIN — PROPOFOL 30 MG: 10 INJECTION, EMULSION INTRAVENOUS at 13:26

## 2018-01-25 RX ADMIN — CEFAZOLIN SODIUM 1000 MG: 1 SOLUTION INTRAVENOUS at 13:35

## 2018-01-25 RX ADMIN — SODIUM CHLORIDE, SODIUM LACTATE, POTASSIUM CHLORIDE, AND CALCIUM CHLORIDE 100 ML/HR: .6; .31; .03; .02 INJECTION, SOLUTION INTRAVENOUS at 13:04

## 2018-01-25 RX ADMIN — PROPOFOL 150 MCG/KG/MIN: 10 INJECTION, EMULSION INTRAVENOUS at 13:26

## 2018-01-25 RX ADMIN — PROPOFOL 30 MG: 10 INJECTION, EMULSION INTRAVENOUS at 13:44

## 2018-01-25 RX ADMIN — PROPOFOL 30 MG: 10 INJECTION, EMULSION INTRAVENOUS at 13:32

## 2018-01-25 NOTE — ANESTHESIA POSTPROCEDURE EVALUATION
Post-Op Assessment Note      CV Status:  Stable    Post-procedure mental status: Sleepy  Hydration Status:  Euvolemic and stable    PONV Controlled:  None    Airway Patency:  Patent    Post Op Vitals Reviewed: Yes          Staff: Anesthesiologist           BP 97/60 (01/25/18 1432)    Temp (!) 96 9 °F (36 1 °C) (01/25/18 1432)    Pulse (!) 54 (01/25/18 1432)   Resp 16 (01/25/18 1432)    SpO2 98 % (01/25/18 1432)      Pt transported to PACU on supplemental O2  VSS  Signout given to PACU RN

## 2018-01-25 NOTE — OP NOTE
OPERATIVE REPORT  PATIENT NAME: Joaquina Raphael    :  1970  MRN: [id number]  Pt Location:  OR ROOM 09    SURGERY DATE: 2018    Surgeon(s) and Role:     * Leonie Cristobal MD - Primary     * Collin Corey MD - Assisting    Preop Diagnosis:  Malignant neoplasm of colon ([facility] [address] ) [C18 9]    Post-Op Diagnosis Codes:     * Malignant neoplasm of colon ([facility] [address] ) [C18 9]    Procedure(s) (LRB):  PORT-A-CATHETER PLACEMENT  Fluoroscopy for performance/interpretation (N/A)    Specimen(s):  * No specimens in log *    Estimated Blood Loss:   Minimal    Anesthesia Type:   IV Sedation with Anesthesia    Operative Indications:  Malignant neoplasm of colon ([facility] [address] ) [C18 9]    Operative Findings:  Right subclavian 8F midsize Dignity CT port placed without event  Complications:   None    Procedure and Technique:  Hai Ceballos is a 41yo male with rectosigmoid adenocarcinoma, PET/CT concerning for liver metastatic disease, recommended for chemotherapy requiring Port-A-Cath placement  We discussed Port-A-Cath and risks including not limited to bleeding infection/bacteremia requiring removal/delay of therapy, risks of anesthesia, damage to great vessels, pneumothorax requiring chest tube he understood these risks and wishes to proceed  He signed informed consent  He was brought to the operating room where Mac anesthesia was induced without event  He received cefazolin prior to skin incision  He was placed in Trendelenburg position with right arm tucked  He was chlorhexidine sterile prepped  After a full and appropriate drying time he was sterile draped and with Ramiro Gramajo as well  After timeout was taken per protocol procedure began with local infiltration with 1% lidocaine at the periosteum of the right clavicle as well as over the proposed port site and tunneling area   The large gauge finder needle was then used to access the right subclavian vein at the lateral genu of clavicle towards the sternal notch and a venous flash with no arterial sticks was performed  The wire was placed through this and easily without resistance was placed via Seldinger technique and confirmed by fluoroscopy, initially traveling cephalad, easily maneuvered into the SVC under fluoro  After this the wire was clamped off to the side, the sternal angle had been previously marked with x-ray for the length of tubing  The proposed Port-A-Cath site over the right chest wall was incised with 15 blade and using electrocautery to proceed through the subcutaneous tissues to the clavipectoral fascia  Weitlaner retraction was used for exposure  Hemostasis was assured  3 x 2-0 Prolene sutures were placed for later securing of the port  The tubing was tunneled from that pocket to the site of stick  Immediately prior to this a dilator had been placed over top with dilating sheath which was easily placed and dilating the vein under fluoroscopic views  The tubing was measured out to approximately 22 5 cm  The proximal end of the tubing was attached with locking clip to port and the port was secured with the Prolene sutures and secured off to the side prior to tying them down  The port was flushed through to eliminate any bubbles  The wire was removed engaging the one-way valve to prevent any air embolus  The tubing was then placed with 2 DeBakey forceps through the crack away sheath and once this was through without redundancy the crack away sheath was then engaged and pulled away  There was no kinking or additional slack in the tubing  A final shot showed good placement without any kinking of the tube and the distal end at the cavoatrial junction  The Prolene sutures were tied down a final aspiration of venous blood and easy flush through a 10 mL of saline was performed with Deng [place] needle  Wound was irrigated hemostasis was assured and the wound was closed with interrupted 3-0 Vicryl ×3 followed by 4-0 Monocryl and histocryl glue   The stick site was also closed with a U stitch 4-0 Monocryl avoiding the tubing  This was also dressed with histocryl glue and both of these incisions were covered with Tegaderm  All sponge needle and instrument/RF Wand counts were correct I was present and scrubbed for the entirety of the procedure  Patient was awakened and returned to recovery with chest x-ray pending prior to discharge      Patient Disposition:  PACU     SIGNATURE: Selina Alba MD  DATE: January 25, 2018  TIME: 2:31 PM

## 2018-01-25 NOTE — DISCHARGE INSTRUCTIONS
May shower beginning Sunday, leave plastic dressings on until curl up or fall off, no tub bathing  Call if any fevers, redness, increasing pain, drainage or other concerns  Pain medication as prescribed for pain not to be combined with Tylenol  Continue oncology follow-up as instructed  Office followup Dr Pushpa Vela [phone], 6 weeks

## 2018-02-01 ENCOUNTER — OFFICE VISIT (OUTPATIENT)
Dept: HEMATOLOGY ONCOLOGY | Facility: CLINIC | Age: 48
End: 2018-02-01
Payer: COMMERCIAL

## 2018-02-01 VITALS
DIASTOLIC BLOOD PRESSURE: 86 MMHG | WEIGHT: 147 LBS | TEMPERATURE: 97.8 F | HEIGHT: 66 IN | BODY MASS INDEX: 23.63 KG/M2 | RESPIRATION RATE: 16 BRPM | HEART RATE: 81 BPM | SYSTOLIC BLOOD PRESSURE: 126 MMHG

## 2018-02-01 DIAGNOSIS — C18.7 MALIGNANT NEOPLASM OF SIGMOID COLON (HCC): Primary | ICD-10-CM

## 2018-02-01 PROCEDURE — 99205 OFFICE O/P NEW HI 60 MIN: CPT | Performed by: INTERNAL MEDICINE
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
## 2018-02-02 ENCOUNTER — TELEPHONE (OUTPATIENT)
Dept: HEMATOLOGY ONCOLOGY | Facility: CLINIC | Age: 48
End: 2018-02-02

## 2018-02-02 NOTE — TELEPHONE ENCOUNTER
Please inform patient he is to come to infusion as scheduled to have nurses disconnect his pump and flush his port

## 2018-02-02 NOTE — TELEPHONE ENCOUNTER
Could you please let patient know he can come to infusion for his CADD disconnect as scheduled  [facility] will be taking care of his pump

## 2018-02-02 NOTE — TELEPHONE ENCOUNTER
PT SUPPOSED TO HAVE HIS PUMP REMOVED NEXT Thursday AFTER HIS TREATMENT NEXT Tuesday     PLEASE CALL TO ADVISE, ALFREDO
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
## 2018-02-05 ENCOUNTER — APPOINTMENT (OUTPATIENT)
Dept: LAB | Facility: CLINIC | Age: 48
End: 2018-02-05
Payer: COMMERCIAL

## 2018-02-05 LAB
ALBUMIN SERPL BCP-MCNC: 3.7 G/DL (ref 3.5–5)
ALP SERPL-CCNC: 63 U/L (ref 46–116)
ALT SERPL W P-5'-P-CCNC: 22 U/L (ref 12–78)
ANION GAP SERPL CALCULATED.3IONS-SCNC: 10 MMOL/L (ref 4–13)
AST SERPL W P-5'-P-CCNC: 17 U/L (ref 5–45)
BASOPHILS # BLD AUTO: 0.02 THOUSANDS/ΜL (ref 0–0.1)
BASOPHILS NFR BLD AUTO: 0 % (ref 0–1)
BILIRUB SERPL-MCNC: 0.1 MG/DL (ref 0.2–1)
BUN SERPL-MCNC: 19 MG/DL (ref 5–25)
CALCIUM SERPL-MCNC: 9 MG/DL (ref 8.3–10.1)
CHLORIDE SERPL-SCNC: 105 MMOL/L (ref 100–108)
CO2 SERPL-SCNC: 26 MMOL/L (ref 21–32)
CREAT SERPL-MCNC: 1.02 MG/DL (ref 0.6–1.3)
EOSINOPHIL # BLD AUTO: 0.17 THOUSAND/ΜL (ref 0–0.61)
EOSINOPHIL NFR BLD AUTO: 2 % (ref 0–6)
ERYTHROCYTE [DISTWIDTH] IN BLOOD BY AUTOMATED COUNT: 12.6 % (ref 11.6–15.1)
GFR SERPL CREATININE-BSD FRML MDRD: 88 ML/MIN/1.73SQ M
GLUCOSE SERPL-MCNC: 116 MG/DL (ref 65–140)
HCT VFR BLD AUTO: 39.5 % (ref 36.5–49.3)
HGB BLD-MCNC: 13.5 G/DL (ref 12–17)
LYMPHOCYTES # BLD AUTO: 2.01 THOUSANDS/ΜL (ref 0.6–4.47)
LYMPHOCYTES NFR BLD AUTO: 28 % (ref 14–44)
MCH RBC QN AUTO: 31.9 PG (ref 26.8–34.3)
MCHC RBC AUTO-ENTMCNC: 34.2 G/DL (ref 31.4–37.4)
MCV RBC AUTO: 93 FL (ref 82–98)
MONOCYTES # BLD AUTO: 0.56 THOUSAND/ΜL (ref 0.17–1.22)
MONOCYTES NFR BLD AUTO: 8 % (ref 4–12)
NEUTROPHILS # BLD AUTO: 4.39 THOUSANDS/ΜL (ref 1.85–7.62)
NEUTS SEG NFR BLD AUTO: 62 % (ref 43–75)
PLATELET # BLD AUTO: 215 THOUSANDS/UL (ref 149–390)
PMV BLD AUTO: 10.5 FL (ref 8.9–12.7)
POTASSIUM SERPL-SCNC: 3.8 MMOL/L (ref 3.5–5.3)
PROT SERPL-MCNC: 7.4 G/DL (ref 6.4–8.2)
RBC # BLD AUTO: 4.23 MILLION/UL (ref 3.88–5.62)
SODIUM SERPL-SCNC: 141 MMOL/L (ref 136–145)
WBC # BLD AUTO: 7.15 THOUSAND/UL (ref 4.31–10.16)

## 2018-02-05 PROCEDURE — 85025 COMPLETE CBC W/AUTO DIFF WBC: CPT | Performed by: INTERNAL MEDICINE

## 2018-02-05 PROCEDURE — 80053 COMPREHEN METABOLIC PANEL: CPT | Performed by: INTERNAL MEDICINE

## 2018-02-05 PROCEDURE — 36415 COLL VENOUS BLD VENIPUNCTURE: CPT | Performed by: INTERNAL MEDICINE

## 2018-02-05 RX ORDER — FLUOROURACIL 50 MG/ML
704 INJECTION, SOLUTION INTRAVENOUS ONCE
Status: COMPLETED | OUTPATIENT
Start: 2018-02-06 | End: 2018-02-06

## 2018-02-05 RX ORDER — DEXTROSE MONOHYDRATE 50 MG/ML
20 INJECTION, SOLUTION INTRAVENOUS CONTINUOUS
Status: DISCONTINUED | OUTPATIENT
Start: 2018-02-06 | End: 2018-02-09 | Stop reason: HOSPADM

## 2018-02-06 ENCOUNTER — HOSPITAL ENCOUNTER (OUTPATIENT)
Dept: INFUSION CENTER | Facility: CLINIC | Age: 48
Discharge: HOME | End: 2018-02-06
Payer: COMMERCIAL

## 2018-02-06 VITALS
WEIGHT: 152.56 LBS | BODY MASS INDEX: 24.52 KG/M2 | TEMPERATURE: 98.9 F | RESPIRATION RATE: 18 BRPM | SYSTOLIC BLOOD PRESSURE: 122 MMHG | HEART RATE: 74 BPM | HEIGHT: 66 IN | DIASTOLIC BLOOD PRESSURE: 76 MMHG

## 2018-02-06 PROCEDURE — 96411 CHEMO IV PUSH ADDL DRUG: CPT

## 2018-02-06 PROCEDURE — 96368 THER/DIAG CONCURRENT INF: CPT

## 2018-02-06 PROCEDURE — 96367 TX/PROPH/DG ADDL SEQ IV INF: CPT

## 2018-02-06 PROCEDURE — 96413 CHEMO IV INFUSION 1 HR: CPT

## 2018-02-06 PROCEDURE — 96415 CHEMO IV INFUSION ADDL HR: CPT

## 2018-02-06 PROCEDURE — G0498 CHEMO EXTEND IV INFUS W/PUMP: HCPCS

## 2018-02-06 RX ADMIN — DEXTROSE 20 ML/HR: 5 SOLUTION INTRAVENOUS at 10:23

## 2018-02-06 RX ADMIN — LEUCOVORIN CALCIUM 704 MG: 200 INJECTION, POWDER, LYOPHILIZED, FOR SOLUTION INTRAMUSCULAR; INTRAVENOUS at 10:47

## 2018-02-06 RX ADMIN — OXALIPLATIN 150 MG: 5 INJECTION, SOLUTION, CONCENTRATE INTRAVENOUS at 10:46

## 2018-02-06 RX ADMIN — FLUOROURACIL 704 MG: 50 INJECTION, SOLUTION INTRAVENOUS at 12:44

## 2018-02-06 RX ADMIN — DEXAMETHASONE SODIUM PHOSPHATE: 10 INJECTION, SOLUTION INTRAMUSCULAR; INTRAVENOUS at 09:53

## 2018-02-06 NOTE — PROGRESS NOTES
Pt tolerated tx well today, offered no complaints  CADD connected per protocol, pt to be d/c'd at [facility]  Appt already made

## 2018-02-06 NOTE — PLAN OF CARE
Problem: Potential for Falls  Goal: Patient will remain free of falls  INTERVENTIONS:  - Assess patient frequently for physical needs  -  Identify cognitive and physical deficits and behaviors that affect risk of falls    -  [place] fall precautions as indicated by assessment   - Educate patient/family on patient safety including physical limitations  - Instruct patient to call for assistance with activity based on assessment  - Modify environment to reduce risk of injury  - Consider OT/PT consult to assist with strengthening/mobility   Outcome: Progressing

## 2018-02-08 ENCOUNTER — HOSPITAL ENCOUNTER (OUTPATIENT)
Dept: INFUSION CENTER | Facility: CLINIC | Age: 48
Discharge: HOME | End: 2018-02-08
Payer: COMMERCIAL

## 2018-02-08 DIAGNOSIS — C18.9 MALIGNANT NEOPLASM OF COLON, UNSPECIFIED PART OF COLON (HCC): Primary | ICD-10-CM

## 2018-02-08 RX ORDER — PROCHLORPERAZINE MALEATE 10 MG
10 TABLET ORAL EVERY 6 HOURS PRN
Qty: 30 TABLET | Refills: 6 | Status: SHIPPED | OUTPATIENT
Start: 2018-02-08 | End: 2020-06-10 | Stop reason: HOSPADM

## 2018-02-08 RX ADMIN — Medication 300 UNITS: at 16:20

## 2018-02-08 NOTE — PROGRESS NOTES
Pt tolerated treatment well  Res vol 0mls  Cadd d/c'd and port hep-locked without any issue   Pt aware of next appt; declined AVS

## 2018-02-09 ENCOUNTER — TELEPHONE (OUTPATIENT)
Dept: HEMATOLOGY ONCOLOGY | Facility: HOSPITAL | Age: 48
End: 2018-02-09

## 2018-02-09 NOTE — TELEPHONE ENCOUNTER
Pt called and indicated he saw you yesterday had some additional questions that he want to speak to you about regarding the Immunity Boost Therapy and would like you to call    [phone]

## 2018-02-15 ENCOUNTER — OFFICE VISIT (OUTPATIENT)
Dept: HEMATOLOGY ONCOLOGY | Facility: CLINIC | Age: 48
End: 2018-02-15
Payer: COMMERCIAL

## 2018-02-15 VITALS
WEIGHT: 151.5 LBS | SYSTOLIC BLOOD PRESSURE: 114 MMHG | BODY MASS INDEX: 24.35 KG/M2 | HEIGHT: 66 IN | OXYGEN SATURATION: 98 % | DIASTOLIC BLOOD PRESSURE: 80 MMHG | TEMPERATURE: 98.4 F | RESPIRATION RATE: 14 BRPM | HEART RATE: 78 BPM

## 2018-02-15 DIAGNOSIS — C18.7 MALIGNANT NEOPLASM OF SIGMOID COLON (HCC): Primary | ICD-10-CM

## 2018-02-15 PROCEDURE — 99215 OFFICE O/P EST HI 40 MIN: CPT | Performed by: PHYSICIAN ASSISTANT
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
## 2018-02-19 RX ORDER — SODIUM CHLORIDE 9 MG/ML
20 INJECTION, SOLUTION INTRAVENOUS ONCE
Status: COMPLETED | OUTPATIENT
Start: 2018-02-20 | End: 2018-02-20

## 2018-02-19 RX ORDER — DEXTROSE MONOHYDRATE 50 MG/ML
20 INJECTION, SOLUTION INTRAVENOUS CONTINUOUS
Status: DISCONTINUED | OUTPATIENT
Start: 2018-02-20 | End: 2018-02-23 | Stop reason: HOSPADM

## 2018-02-19 RX ORDER — FLUOROURACIL 50 MG/ML
712 INJECTION, SOLUTION INTRAVENOUS ONCE
Status: COMPLETED | OUTPATIENT
Start: 2018-02-20 | End: 2018-02-20

## 2018-02-20 ENCOUNTER — HOSPITAL ENCOUNTER (OUTPATIENT)
Dept: INFUSION CENTER | Facility: HOSPITAL | Age: 48
Discharge: HOME | End: 2018-02-20
Payer: COMMERCIAL

## 2018-02-20 VITALS
WEIGHT: 152.78 LBS | DIASTOLIC BLOOD PRESSURE: 61 MMHG | TEMPERATURE: 97.9 F | RESPIRATION RATE: 18 BRPM | HEIGHT: 67 IN | HEART RATE: 91 BPM | BODY MASS INDEX: 23.98 KG/M2 | SYSTOLIC BLOOD PRESSURE: 117 MMHG

## 2018-02-20 LAB
ALBUMIN SERPL BCP-MCNC: 3.9 G/DL (ref 3.5–5)
ALP SERPL-CCNC: 64 U/L (ref 46–116)
ALT SERPL W P-5'-P-CCNC: 28 U/L (ref 12–78)
ANION GAP SERPL CALCULATED.3IONS-SCNC: 8 MMOL/L (ref 4–13)
AST SERPL W P-5'-P-CCNC: 17 U/L (ref 5–45)
BASOPHILS # BLD AUTO: 0.02 THOUSANDS/ΜL (ref 0–0.1)
BASOPHILS NFR BLD AUTO: 0 % (ref 0–1)
BILIRUB SERPL-MCNC: 0.25 MG/DL (ref 0.2–1)
BUN SERPL-MCNC: 15 MG/DL (ref 5–25)
CALCIUM SERPL-MCNC: 8.9 MG/DL (ref 8.3–10.1)
CHLORIDE SERPL-SCNC: 108 MMOL/L (ref 100–108)
CO2 SERPL-SCNC: 25 MMOL/L (ref 21–32)
CREAT SERPL-MCNC: 0.98 MG/DL (ref 0.6–1.3)
EOSINOPHIL # BLD AUTO: 0.09 THOUSAND/ΜL (ref 0–0.61)
EOSINOPHIL NFR BLD AUTO: 2 % (ref 0–6)
ERYTHROCYTE [DISTWIDTH] IN BLOOD BY AUTOMATED COUNT: 13.3 % (ref 11.6–15.1)
GFR SERPL CREATININE-BSD FRML MDRD: 93 ML/MIN/1.73SQ M
GLUCOSE SERPL-MCNC: 100 MG/DL (ref 65–140)
HCT VFR BLD AUTO: 40.2 % (ref 36.5–49.3)
HGB BLD-MCNC: 14.5 G/DL (ref 12–17)
LYMPHOCYTES # BLD AUTO: 1.39 THOUSANDS/ΜL (ref 0.6–4.47)
LYMPHOCYTES NFR BLD AUTO: 31 % (ref 14–44)
MCH RBC QN AUTO: 33.3 PG (ref 26.8–34.3)
MCHC RBC AUTO-ENTMCNC: 36.1 G/DL (ref 31.4–37.4)
MCV RBC AUTO: 92 FL (ref 82–98)
MONOCYTES # BLD AUTO: 0.44 THOUSAND/ΜL (ref 0.17–1.22)
MONOCYTES NFR BLD AUTO: 10 % (ref 4–12)
NEUTROPHILS # BLD AUTO: 2.53 THOUSANDS/ΜL (ref 1.85–7.62)
NEUTS SEG NFR BLD AUTO: 57 % (ref 43–75)
NRBC BLD AUTO-RTO: 0 /100 WBCS
PLATELET # BLD AUTO: 153 THOUSANDS/UL (ref 149–390)
PMV BLD AUTO: 10.7 FL (ref 8.9–12.7)
POTASSIUM SERPL-SCNC: 4.2 MMOL/L (ref 3.5–5.3)
PROT SERPL-MCNC: 8 G/DL (ref 6.4–8.2)
RBC # BLD AUTO: 4.36 MILLION/UL (ref 3.88–5.62)
SODIUM SERPL-SCNC: 141 MMOL/L (ref 136–145)
WBC # BLD AUTO: 4.47 THOUSAND/UL (ref 4.31–10.16)

## 2018-02-20 PROCEDURE — 96411 CHEMO IV PUSH ADDL DRUG: CPT

## 2018-02-20 PROCEDURE — 96368 THER/DIAG CONCURRENT INF: CPT

## 2018-02-20 PROCEDURE — 96415 CHEMO IV INFUSION ADDL HR: CPT

## 2018-02-20 PROCEDURE — 80053 COMPREHEN METABOLIC PANEL: CPT | Performed by: INTERNAL MEDICINE

## 2018-02-20 PROCEDURE — 85025 COMPLETE CBC W/AUTO DIFF WBC: CPT | Performed by: INTERNAL MEDICINE

## 2018-02-20 PROCEDURE — G0498 CHEMO EXTEND IV INFUS W/PUMP: HCPCS

## 2018-02-20 PROCEDURE — 96413 CHEMO IV INFUSION 1 HR: CPT

## 2018-02-20 PROCEDURE — 96367 TX/PROPH/DG ADDL SEQ IV INF: CPT

## 2018-02-20 RX ADMIN — DEXTROSE MONOHYDRATE 150 MG: 50 INJECTION, SOLUTION INTRAVENOUS at 13:19

## 2018-02-20 RX ADMIN — DEXAMETHASONE SODIUM PHOSPHATE: 10 INJECTION, SOLUTION INTRAMUSCULAR; INTRAVENOUS at 12:47

## 2018-02-20 RX ADMIN — FLUOROURACIL 712 MG: 50 INJECTION, SOLUTION INTRAVENOUS at 15:20

## 2018-02-20 RX ADMIN — SODIUM CHLORIDE 20 ML/HR: 0.9 INJECTION, SOLUTION INTRAVENOUS at 12:46

## 2018-02-20 RX ADMIN — DEXTROSE MONOHYDRATE 700 MG: 50 INJECTION, SOLUTION INTRAVENOUS at 13:18

## 2018-02-20 RX ADMIN — DEXTROSE 20 ML/HR: 5 SOLUTION INTRAVENOUS at 13:07

## 2018-02-20 NOTE — PLAN OF CARE
Problem: Potential for Falls  Goal: Patient will remain free of falls  INTERVENTIONS:  - Assess patient frequently for physical needs  -  Identify cognitive and physical deficits and behaviors that affect risk of falls    -  [place] fall precautions as indicated by assessment   - Educate patient/family on patient safety including physical limitations  - Instruct patient to call for assistance with activity based on assessment  - Modify environment to reduce risk of injury  - Consider OT/PT consult to assist with strengthening/mobility   Outcome: Progressing

## 2018-02-22 ENCOUNTER — HOSPITAL ENCOUNTER (OUTPATIENT)
Dept: INFUSION CENTER | Facility: CLINIC | Age: 48
Discharge: HOME | End: 2018-02-22
Payer: COMMERCIAL

## 2018-02-22 PROCEDURE — 96372 THER/PROPH/DIAG INJ SC/IM: CPT

## 2018-02-22 RX ADMIN — HEPARIN 300 UNITS: 100 SYRINGE at 13:40

## 2018-02-22 RX ADMIN — PEGFILGRASTIM 6 MG: KIT SUBCUTANEOUS at 13:51

## 2018-02-22 NOTE — PROGRESS NOTES
Pt arrived for CADD d/c, res vol=0  Good blood return noted, flushed per protocol  Neulasta on-pro placed on AMELIA  Video showed prior to placement  Pt had no further question  Offers no complaints   Left ambulatory in stable condition

## 2018-02-28 ENCOUNTER — OFFICE VISIT (OUTPATIENT)
Dept: UROLOGY | Facility: AMBULATORY SURGERY CENTER | Age: 48
End: 2018-02-28
Payer: COMMERCIAL

## 2018-02-28 VITALS
BODY MASS INDEX: 24.49 KG/M2 | WEIGHT: 152.4 LBS | SYSTOLIC BLOOD PRESSURE: 130 MMHG | DIASTOLIC BLOOD PRESSURE: 78 MMHG | HEART RATE: 60 BPM | HEIGHT: 66 IN

## 2018-02-28 DIAGNOSIS — N52.8 OTHER MALE ERECTILE DYSFUNCTION: Primary | ICD-10-CM

## 2018-02-28 PROBLEM — N52.9 ED (ERECTILE DYSFUNCTION): Status: ACTIVE | Noted: 2018-02-28

## 2018-02-28 PROCEDURE — 99213 OFFICE O/P EST LOW 20 MIN: CPT | Performed by: PHYSICIAN ASSISTANT

## 2018-02-28 RX ORDER — SILDENAFIL CITRATE 20 MG/1
20 TABLET ORAL 3 TIMES DAILY
Qty: 90 TABLET | Refills: 3 | Status: SHIPPED | OUTPATIENT
Start: 2018-02-28 | End: 2018-11-29 | Stop reason: SDDI

## 2018-02-28 NOTE — PROGRESS NOTES
2/28/2018      Chief Complaint   Patient presents with    Erectile Dysfunction       Assessment and Plan    41 y o  male managed by Dr Nova Zaragoza    1  Erectile dysfunction  - patient doing well on sildenafil 20mg x 3 tabs as needed  - refills sent to [facility]  - FU 1 year      History of Present Illness  Maria De Jesus Ang is a 41 y o  male here for follow up evaluation of erectile dysfunction  Was started on sildenafil 20 mg  Is doing well on this and states the medication is working well  Has no lower urinary tract symptoms or urinary complaints today  Review of Systems   Constitutional: Negative for activity change, chills and fever  Gastrointestinal: Negative for abdominal distention and abdominal pain  Musculoskeletal: Negative for back pain and gait problem  Psychiatric/Behavioral: Negative for behavioral problems and confusion  Urinary Incontinence Screening    Flowsheet Row Most Recent Value   Urinary Incontinence   Urinary Incontinence? No   Incomplete emptying? No   Urinary frequency? No   Urinary urgency? No   Urinary hesitancy? No   Dysuria (painful difficult urination)? No   Nocturia (waking up to use the bathroom)? No   Straining (having to push to go)? No   Weak stream?  No   Intermittent stream?  No   Post void dribbling? No        Past Medical History  Past Medical History:   Diagnosis Date    Cancer [facility])        Past Social History  Past Surgical History:   Procedure Laterality Date    COLONOSCOPY N/A 1/22/2018    Procedure: COLONOSCOPY;  Surgeon: Corrina Real MD;  [facility];   Service: Colorectal    [place] INSERT PICC W/ SUB-Q PORT N/A 1/25/2018    Procedure: PORT-A-CATHETER PLACEMENT  Fluoroscopy for performance/interpretation;  Surgeon: Corrina Real MD;  [facility];  Service: Colorectal     History   Smoking Status    Former Smoker    Types: E-Cigarettes   Smokeless Tobacco    Never Used     Comment: quit 4 years ago / smoked for 20 years        Past Family History  Family History   Problem Relation Age of Onset    Cancer Mother        Past Social history  Social History     Social History    Marital status: Single     Spouse name: N/A    Number of children: N/A    Years of education: N/A     Occupational History    Not on file  Social History Main Topics    Smoking status: Former Smoker     Types: E-Cigarettes    Smokeless tobacco: Never Used      Comment: quit 4 years ago / smoked for 20 years     Alcohol use Yes      Comment: socially     Drug use: No    Sexual activity: Not on file     Other Topics Concern    Not on file     Social History Narrative    No narrative on file       Current Medications  Current Outpatient Prescriptions   Medication Sig Dispense Refill    omeprazole (PriLOSEC) 40 MG capsule Take 40 mg by mouth daily      prochlorperazine (COMPAZINE) 10 mg tablet Take 1 tablet (10 mg total) by mouth every 6 (six) hours as needed for nausea or vomiting 30 tablet 6    sildenafil (REVATIO) 20 mg tablet Take 20 mg by mouth 3 (three) times a day       No current facility-administered medications for this visit  Allergies  No Known Allergies      The following portions of the patient's history were reviewed and updated as appropriate: allergies, current medications, past medical history, past social history, past surgical history and problem list       Vitals  Vitals:    02/28/18 1410   BP: 130/78   Pulse: 60   Weight: 69 1 kg (152 lb 6 4 oz)   Height: 5' 6" (1 676 m)         Physical Exam    Constitutional   General appearance: Patient is seated and in no acute distress, well appearing and well nourished  Head and Face   Head and face: Normal     Eyes   Conjunctiva and lids: No erythema, swelling or discharge  Ears, Nose, Mouth, and Throat   Hearing: Normal     Pulmonary   Respiratory effort: No increased work of breathing or signs of respiratory distress      Cardiovascular   Examination of extremities for edema and/or varicosities: Normal     Abdomen   Abdomen: Non-tender, no masses  Musculoskeletal   Gait and station: Normal   Skin   Skin and subcutaneous tissue: Warm, dry, and intact  No visible lesions or rashes  Psychiatric   Judgment and insight: Normal  Recent and remote memory:  Normal  Mood and affect: Normal        Results  No results found for this or any previous visit (from the past 1 hour(s))  ]  No results found for: PSA  Lab Results   Component Value Date    GLUCOSE 100 02/20/2018    CALCIUM 8 9 02/20/2018     02/20/2018    K 4 2 02/20/2018    CO2 25 02/20/2018     02/20/2018    BUN 15 02/20/2018    CREATININE 0 98 02/20/2018     Lab Results   Component Value Date    WBC 4 47 02/20/2018    HGB 14 5 02/20/2018    HCT 40 2 02/20/2018    MCV 92 02/20/2018     02/20/2018           Orders  No orders of the defined types were placed in this encounter

## 2018-03-02 LAB — SCAN RESULT: NORMAL

## 2018-03-05 ENCOUNTER — APPOINTMENT (OUTPATIENT)
Dept: LAB | Facility: CLINIC | Age: 48
End: 2018-03-05
Payer: COMMERCIAL

## 2018-03-05 PROCEDURE — 85025 COMPLETE CBC W/AUTO DIFF WBC: CPT | Performed by: INTERNAL MEDICINE

## 2018-03-05 PROCEDURE — 36415 COLL VENOUS BLD VENIPUNCTURE: CPT | Performed by: INTERNAL MEDICINE

## 2018-03-05 PROCEDURE — 80053 COMPREHEN METABOLIC PANEL: CPT | Performed by: INTERNAL MEDICINE

## 2018-03-05 RX ORDER — SODIUM CHLORIDE 9 MG/ML
20 INJECTION, SOLUTION INTRAVENOUS CONTINUOUS
Status: DISCONTINUED | OUTPATIENT
Start: 2018-03-06 | End: 2018-03-09 | Stop reason: HOSPADM

## 2018-03-05 RX ORDER — FLUOROURACIL 50 MG/ML
712 INJECTION, SOLUTION INTRAVENOUS ONCE
Status: COMPLETED | OUTPATIENT
Start: 2018-03-06 | End: 2018-03-06

## 2018-03-05 RX ORDER — DEXTROSE MONOHYDRATE 50 MG/ML
20 INJECTION, SOLUTION INTRAVENOUS CONTINUOUS
Status: DISCONTINUED | OUTPATIENT
Start: 2018-03-06 | End: 2018-03-09 | Stop reason: HOSPADM

## 2018-03-06 ENCOUNTER — HOSPITAL ENCOUNTER (OUTPATIENT)
Dept: INFUSION CENTER | Facility: CLINIC | Age: 48
Discharge: HOME | End: 2018-03-06
Payer: COMMERCIAL

## 2018-03-06 VITALS
RESPIRATION RATE: 18 BRPM | SYSTOLIC BLOOD PRESSURE: 120 MMHG | HEART RATE: 80 BPM | HEIGHT: 66 IN | DIASTOLIC BLOOD PRESSURE: 72 MMHG | OXYGEN SATURATION: 99 % | WEIGHT: 151.5 LBS | TEMPERATURE: 97.6 F | BODY MASS INDEX: 24.35 KG/M2

## 2018-03-06 PROCEDURE — 96367 TX/PROPH/DG ADDL SEQ IV INF: CPT

## 2018-03-06 PROCEDURE — 96368 THER/DIAG CONCURRENT INF: CPT

## 2018-03-06 PROCEDURE — 96415 CHEMO IV INFUSION ADDL HR: CPT

## 2018-03-06 PROCEDURE — G0498 CHEMO EXTEND IV INFUS W/PUMP: HCPCS

## 2018-03-06 PROCEDURE — 96413 CHEMO IV INFUSION 1 HR: CPT

## 2018-03-06 PROCEDURE — 96411 CHEMO IV PUSH ADDL DRUG: CPT

## 2018-03-06 RX ADMIN — OXALIPLATIN 151 MG: 5 INJECTION, SOLUTION, CONCENTRATE INTRAVENOUS at 13:06

## 2018-03-06 RX ADMIN — FLUOROURACIL 712 MG: 50 INJECTION, SOLUTION INTRAVENOUS at 15:14

## 2018-03-06 RX ADMIN — DEXTROSE 20 ML/HR: 5 SOLUTION INTRAVENOUS at 12:54

## 2018-03-06 RX ADMIN — DEXAMETHASONE SODIUM PHOSPHATE: 10 INJECTION, SOLUTION INTRAMUSCULAR; INTRAVENOUS at 12:30

## 2018-03-06 RX ADMIN — SODIUM CHLORIDE 20 ML/HR: 0.9 INJECTION, SOLUTION INTRAVENOUS at 12:30

## 2018-03-06 RX ADMIN — LEUCOVORIN CALCIUM 712 MG: 200 INJECTION, POWDER, LYOPHILIZED, FOR SOLUTION INTRAMUSCULAR; INTRAVENOUS at 13:09

## 2018-03-06 NOTE — PROGRESS NOTES
Pt here for chemotherapy  Offers no complaints  Port accessed without difficulty  Positive blood return  Labs checked  Within parameters to treat

## 2018-03-06 NOTE — PROGRESS NOTES
Pt tolerated infusion well  Offers no complaints  Cadd pump connected  All connections clamp protected  Positive blood return and patency confirmed to port  Pt aware of equipment use and scheduled disconnection of pump in 46 hours  Cadd pump noted infusing    Pt declined AVS

## 2018-03-08 ENCOUNTER — HOSPITAL ENCOUNTER (OUTPATIENT)
Dept: INFUSION CENTER | Facility: CLINIC | Age: 48
Discharge: HOME | End: 2018-03-08
Payer: COMMERCIAL

## 2018-03-08 VITALS — TEMPERATURE: 98 F

## 2018-03-08 RX ADMIN — PEGFILGRASTIM 6 MG: KIT SUBCUTANEOUS at 14:05

## 2018-03-08 RX ADMIN — Medication 300 UNITS: at 14:04

## 2018-03-08 NOTE — PROGRESS NOTES
Pt has no c/o -- feels well  Cadd DC / Medical Equipment Request Check    pt res volume is zero  Pump disconnected without incident    Pt's port flushed and de accessed after heparinized  Pt tolerated Neulasta ON PRO application on AMELIA per orders , pt verb understanding of the indications and delivery time, pt verb understaninf of removal   pt has AVS  dc'd stable and ambulatory   Confirmed return appt

## 2018-03-15 ENCOUNTER — OFFICE VISIT (OUTPATIENT)
Dept: HEMATOLOGY ONCOLOGY | Facility: CLINIC | Age: 48
End: 2018-03-15
Payer: COMMERCIAL

## 2018-03-15 VITALS
TEMPERATURE: 98.7 F | WEIGHT: 150 LBS | SYSTOLIC BLOOD PRESSURE: 110 MMHG | DIASTOLIC BLOOD PRESSURE: 70 MMHG | HEIGHT: 66 IN | HEART RATE: 92 BPM | BODY MASS INDEX: 24.11 KG/M2 | OXYGEN SATURATION: 99 %

## 2018-03-15 DIAGNOSIS — C18.7 MALIGNANT NEOPLASM OF SIGMOID COLON (HCC): Primary | ICD-10-CM

## 2018-03-15 DIAGNOSIS — T50.905A DRUG SIDE EFFECTS, INITIAL ENCOUNTER: ICD-10-CM

## 2018-03-15 PROCEDURE — 99215 OFFICE O/P EST HI 40 MIN: CPT | Performed by: PHYSICIAN ASSISTANT

## 2018-03-15 RX ORDER — MINOCYCLINE HYDROCHLORIDE 100 MG/1
100 TABLET ORAL 2 TIMES DAILY
Qty: 60 TABLET | Refills: 3 | Status: SHIPPED | OUTPATIENT
Start: 2018-03-15 | End: 2018-04-14

## 2018-03-15 RX ORDER — CLINDAMYCIN PHOSPHATE 10 MG/G
GEL TOPICAL 2 TIMES DAILY
Qty: 30 G | Refills: 0 | Status: SHIPPED | OUTPATIENT
Start: 2018-03-15 | End: 2018-05-24 | Stop reason: SDUPTHER
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
## 2018-03-19 ENCOUNTER — APPOINTMENT (OUTPATIENT)
Dept: LAB | Facility: CLINIC | Age: 48
End: 2018-03-19
Payer: COMMERCIAL

## 2018-03-19 LAB
ALBUMIN SERPL BCP-MCNC: 4 G/DL (ref 3.5–5)
ALP SERPL-CCNC: 130 U/L (ref 46–116)
ALT SERPL W P-5'-P-CCNC: 101 U/L (ref 12–78)
ANION GAP SERPL CALCULATED.3IONS-SCNC: 7 MMOL/L (ref 4–13)
AST SERPL W P-5'-P-CCNC: 46 U/L (ref 5–45)
BASOPHILS # BLD AUTO: 0.03 THOUSANDS/ΜL (ref 0–0.1)
BASOPHILS NFR BLD AUTO: 0 % (ref 0–1)
BILIRUB SERPL-MCNC: 0.2 MG/DL (ref 0.2–1)
BUN SERPL-MCNC: 12 MG/DL (ref 5–25)
CALCIUM SERPL-MCNC: 9 MG/DL (ref 8.3–10.1)
CHLORIDE SERPL-SCNC: 105 MMOL/L (ref 100–108)
CO2 SERPL-SCNC: 29 MMOL/L (ref 21–32)
CREAT SERPL-MCNC: 1.09 MG/DL (ref 0.6–1.3)
EOSINOPHIL # BLD AUTO: 0.09 THOUSAND/ΜL (ref 0–0.61)
EOSINOPHIL NFR BLD AUTO: 1 % (ref 0–6)
ERYTHROCYTE [DISTWIDTH] IN BLOOD BY AUTOMATED COUNT: 14.8 % (ref 11.6–15.1)
GFR SERPL CREATININE-BSD FRML MDRD: 82 ML/MIN/1.73SQ M
GLUCOSE SERPL-MCNC: 110 MG/DL (ref 65–140)
HCT VFR BLD AUTO: 39.1 % (ref 36.5–49.3)
HGB BLD-MCNC: 13.8 G/DL (ref 12–17)
LYMPHOCYTES # BLD AUTO: 2.13 THOUSANDS/ΜL (ref 0.6–4.47)
LYMPHOCYTES NFR BLD AUTO: 23 % (ref 14–44)
MAGNESIUM SERPL-MCNC: 2 MG/DL (ref 1.6–2.6)
MCH RBC QN AUTO: 33 PG (ref 26.8–34.3)
MCHC RBC AUTO-ENTMCNC: 35.3 G/DL (ref 31.4–37.4)
MCV RBC AUTO: 94 FL (ref 82–98)
MONOCYTES # BLD AUTO: 0.83 THOUSAND/ΜL (ref 0.17–1.22)
MONOCYTES NFR BLD AUTO: 9 % (ref 4–12)
NEUTROPHILS # BLD AUTO: 6.22 THOUSANDS/ΜL (ref 1.85–7.62)
NEUTS SEG NFR BLD AUTO: 67 % (ref 43–75)
PLATELET # BLD AUTO: 146 THOUSANDS/UL (ref 149–390)
PMV BLD AUTO: 10.8 FL (ref 8.9–12.7)
POTASSIUM SERPL-SCNC: 4.1 MMOL/L (ref 3.5–5.3)
PROT SERPL-MCNC: 7.8 G/DL (ref 6.4–8.2)
RBC # BLD AUTO: 4.18 MILLION/UL (ref 3.88–5.62)
SODIUM SERPL-SCNC: 141 MMOL/L (ref 136–145)
WBC # BLD AUTO: 9.3 THOUSAND/UL (ref 4.31–10.16)

## 2018-03-19 PROCEDURE — 83735 ASSAY OF MAGNESIUM: CPT | Performed by: PHYSICIAN ASSISTANT

## 2018-03-19 PROCEDURE — 85025 COMPLETE CBC W/AUTO DIFF WBC: CPT | Performed by: PHYSICIAN ASSISTANT

## 2018-03-19 PROCEDURE — 80053 COMPREHEN METABOLIC PANEL: CPT | Performed by: PHYSICIAN ASSISTANT

## 2018-03-19 PROCEDURE — 36415 COLL VENOUS BLD VENIPUNCTURE: CPT | Performed by: PHYSICIAN ASSISTANT

## 2018-03-19 RX ORDER — DEXTROSE MONOHYDRATE 50 MG/ML
20 INJECTION, SOLUTION INTRAVENOUS CONTINUOUS
Status: DISCONTINUED | OUTPATIENT
Start: 2018-03-20 | End: 2018-03-23 | Stop reason: HOSPADM

## 2018-03-19 RX ORDER — FLUOROURACIL 50 MG/ML
708 INJECTION, SOLUTION INTRAVENOUS ONCE
Status: COMPLETED | OUTPATIENT
Start: 2018-03-20 | End: 2018-03-20

## 2018-03-19 RX ORDER — SODIUM CHLORIDE 9 MG/ML
20 INJECTION, SOLUTION INTRAVENOUS CONTINUOUS
Status: DISCONTINUED | OUTPATIENT
Start: 2018-03-20 | End: 2018-03-23 | Stop reason: HOSPADM

## 2018-03-20 ENCOUNTER — HOSPITAL ENCOUNTER (OUTPATIENT)
Dept: INFUSION CENTER | Facility: CLINIC | Age: 48
Discharge: HOME | End: 2018-03-20
Payer: COMMERCIAL

## 2018-03-20 VITALS
RESPIRATION RATE: 18 BRPM | DIASTOLIC BLOOD PRESSURE: 58 MMHG | BODY MASS INDEX: 24.8 KG/M2 | OXYGEN SATURATION: 97 % | TEMPERATURE: 98.3 F | HEIGHT: 66 IN | SYSTOLIC BLOOD PRESSURE: 97 MMHG | HEART RATE: 70 BPM | WEIGHT: 154.32 LBS

## 2018-03-20 PROCEDURE — 96375 TX/PRO/DX INJ NEW DRUG ADDON: CPT

## 2018-03-20 PROCEDURE — G0498 CHEMO EXTEND IV INFUS W/PUMP: HCPCS

## 2018-03-20 PROCEDURE — 96411 CHEMO IV PUSH ADDL DRUG: CPT

## 2018-03-20 PROCEDURE — 96368 THER/DIAG CONCURRENT INF: CPT

## 2018-03-20 PROCEDURE — 96415 CHEMO IV INFUSION ADDL HR: CPT

## 2018-03-20 PROCEDURE — 96413 CHEMO IV INFUSION 1 HR: CPT

## 2018-03-20 PROCEDURE — 96417 CHEMO IV INFUS EACH ADDL SEQ: CPT

## 2018-03-20 RX ADMIN — DEXAMETHASONE SODIUM PHOSPHATE: 10 INJECTION, SOLUTION INTRAMUSCULAR; INTRAVENOUS at 12:01

## 2018-03-20 RX ADMIN — SODIUM CHLORIDE 20 ML/HR: 0.9 INJECTION, SOLUTION INTRAVENOUS at 11:55

## 2018-03-20 RX ADMIN — DEXTROSE 20 ML/HR: 5 SOLUTION INTRAVENOUS at 14:26

## 2018-03-20 RX ADMIN — Medication 885 MG: at 12:30

## 2018-03-20 RX ADMIN — LEUCOVORIN CALCIUM 708 MG: 500 INJECTION, POWDER, LYOPHILIZED, FOR SOLUTION INTRAMUSCULAR; INTRAVENOUS at 14:30

## 2018-03-20 RX ADMIN — DIPHENHYDRAMINE HYDROCHLORIDE 50 MG: 50 INJECTION, SOLUTION INTRAMUSCULAR; INTRAVENOUS at 12:15

## 2018-03-20 RX ADMIN — FLUOROURACIL 708 MG: 50 INJECTION, SOLUTION INTRAVENOUS at 16:21

## 2018-03-20 RX ADMIN — OXALIPLATIN 150 MG: 5 INJECTION, SOLUTION, CONCENTRATE INTRAVENOUS at 14:30

## 2018-03-20 NOTE — PROGRESS NOTES
Tolerated treatment today  Printed AVS and reviewed  Connected cadd pump after 2 Rn's checked dosages and settings  Reviewed plan to return thurs at 14:30

## 2018-03-22 ENCOUNTER — HOSPITAL ENCOUNTER (OUTPATIENT)
Dept: INFUSION CENTER | Facility: CLINIC | Age: 48
Discharge: HOME | End: 2018-03-22
Payer: COMMERCIAL

## 2018-03-22 PROCEDURE — 96372 THER/PROPH/DIAG INJ SC/IM: CPT

## 2018-03-22 RX ADMIN — PEGFILGRASTIM 6 MG: KIT SUBCUTANEOUS at 15:39

## 2018-03-22 RX ADMIN — Medication 300 UNITS: at 15:40

## 2018-03-22 NOTE — PROGRESS NOTES
Patient offers no complaints  Cadd pump at [facility]  Discontinued cadd pump and flushed per protocol  Neulasta on pro applied to right arm, applied jolantatte   Reviewed AVS

## 2018-04-02 ENCOUNTER — APPOINTMENT (OUTPATIENT)
Dept: LAB | Facility: CLINIC | Age: 48
End: 2018-04-02
Payer: COMMERCIAL

## 2018-04-02 PROCEDURE — 36415 COLL VENOUS BLD VENIPUNCTURE: CPT | Performed by: PHYSICIAN ASSISTANT

## 2018-04-02 PROCEDURE — 85025 COMPLETE CBC W/AUTO DIFF WBC: CPT | Performed by: PHYSICIAN ASSISTANT

## 2018-04-02 PROCEDURE — 83735 ASSAY OF MAGNESIUM: CPT | Performed by: PHYSICIAN ASSISTANT

## 2018-04-02 PROCEDURE — 80053 COMPREHEN METABOLIC PANEL: CPT | Performed by: PHYSICIAN ASSISTANT

## 2018-04-02 RX ORDER — DEXTROSE MONOHYDRATE 50 MG/ML
20 INJECTION, SOLUTION INTRAVENOUS CONTINUOUS
Status: DISCONTINUED | OUTPATIENT
Start: 2018-04-03 | End: 2018-04-06 | Stop reason: HOSPADM

## 2018-04-02 RX ORDER — SODIUM CHLORIDE 9 MG/ML
20 INJECTION, SOLUTION INTRAVENOUS CONTINUOUS
Status: DISCONTINUED | OUTPATIENT
Start: 2018-04-03 | End: 2018-04-06 | Stop reason: HOSPADM

## 2018-04-02 RX ORDER — FLUOROURACIL 50 MG/ML
708 INJECTION, SOLUTION INTRAVENOUS ONCE
Status: COMPLETED | OUTPATIENT
Start: 2018-04-03 | End: 2018-04-03

## 2018-04-03 ENCOUNTER — HOSPITAL ENCOUNTER (OUTPATIENT)
Dept: INFUSION CENTER | Facility: CLINIC | Age: 48
Discharge: HOME | End: 2018-04-03
Payer: COMMERCIAL

## 2018-04-03 VITALS
HEIGHT: 67 IN | BODY MASS INDEX: 23.81 KG/M2 | TEMPERATURE: 98.6 F | OXYGEN SATURATION: 95 % | HEART RATE: 71 BPM | DIASTOLIC BLOOD PRESSURE: 72 MMHG | RESPIRATION RATE: 18 BRPM | WEIGHT: 151.68 LBS | SYSTOLIC BLOOD PRESSURE: 104 MMHG

## 2018-04-03 PROCEDURE — 96413 CHEMO IV INFUSION 1 HR: CPT

## 2018-04-03 PROCEDURE — 96367 TX/PROPH/DG ADDL SEQ IV INF: CPT

## 2018-04-03 PROCEDURE — 96415 CHEMO IV INFUSION ADDL HR: CPT

## 2018-04-03 PROCEDURE — G0498 CHEMO EXTEND IV INFUS W/PUMP: HCPCS

## 2018-04-03 PROCEDURE — 96368 THER/DIAG CONCURRENT INF: CPT

## 2018-04-03 PROCEDURE — 96411 CHEMO IV PUSH ADDL DRUG: CPT

## 2018-04-03 PROCEDURE — 96417 CHEMO IV INFUS EACH ADDL SEQ: CPT

## 2018-04-03 RX ADMIN — DEXAMETHASONE SODIUM PHOSPHATE: 10 INJECTION, SOLUTION INTRAMUSCULAR; INTRAVENOUS at 10:49

## 2018-04-03 RX ADMIN — DEXTROSE 20 ML/HR: 5 SOLUTION INTRAVENOUS at 10:48

## 2018-04-03 RX ADMIN — Medication 885 MG: at 11:42

## 2018-04-03 RX ADMIN — FLUOROURACIL 708 MG: 50 INJECTION, SOLUTION INTRAVENOUS at 15:34

## 2018-04-03 RX ADMIN — OXALIPLATIN 150 MG: 5 INJECTION, SOLUTION, CONCENTRATE INTRAVENOUS at 13:06

## 2018-04-03 RX ADMIN — DEXTROSE 20 ML/HR: 5 SOLUTION INTRAVENOUS at 12:58

## 2018-04-03 RX ADMIN — LEUCOVORIN CALCIUM 708 MG: 500 INJECTION, POWDER, LYOPHILIZED, FOR SOLUTION INTRAMUSCULAR; INTRAVENOUS at 13:06

## 2018-04-03 RX ADMIN — DIPHENHYDRAMINE HYDROCHLORIDE 50 MG: 50 INJECTION, SOLUTION INTRAMUSCULAR; INTRAVENOUS at 11:14

## 2018-04-03 RX ADMIN — SODIUM CHLORIDE 20 ML/HR: 0.9 INJECTION, SOLUTION INTRAVENOUS at 11:40

## 2018-04-03 NOTE — PROGRESS NOTES
Pt tolerated all chemotherapy infusions well without any complications  cadd pump patent at discharge

## 2018-04-05 ENCOUNTER — HOSPITAL ENCOUNTER (OUTPATIENT)
Dept: INFUSION CENTER | Facility: CLINIC | Age: 48
Discharge: HOME | End: 2018-04-05
Payer: COMMERCIAL

## 2018-04-05 PROCEDURE — 96372 THER/PROPH/DIAG INJ SC/IM: CPT

## 2018-04-05 RX ADMIN — Medication 300 UNITS: at 14:14

## 2018-04-05 RX ADMIN — PEGFILGRASTIM 6 MG: KIT SUBCUTANEOUS at 14:17

## 2018-04-05 NOTE — PLAN OF CARE
Problem: Potential for Falls  Goal: Patient will remain free of falls  INTERVENTIONS:  - Assess patient frequently for physical needs  -  Identify cognitive and physical deficits and behaviors that affect risk of falls    -  Scott fall precautions as indicated by assessment   - Educate patient/family on patient safety including physical limitations  - Instruct patient to call for assistance with activity based on assessment  - Modify environment to reduce risk of injury  - Consider OT/PT consult to assist with strengthening/mobility   Outcome: Progressing

## 2018-04-05 NOTE — PROGRESS NOTES
To clinic for CADD pump d/c and neulasta OBI placement  Pt reports that he has some dry mouth and some irritation at the corners of his mouth  He has started to experience some acne-like lesions on his face and chest likely secondary to EGFR treatment  He was educated to keep these areas cleaned and use emollient creams to the affected areas  He states comfort in working with the [address] [place] [address]  He will return to clinic as scheduled

## 2018-04-16 ENCOUNTER — APPOINTMENT (OUTPATIENT)
Dept: LAB | Facility: CLINIC | Age: 48
End: 2018-04-16
Payer: COMMERCIAL

## 2018-04-16 PROCEDURE — 36415 COLL VENOUS BLD VENIPUNCTURE: CPT | Performed by: PHYSICIAN ASSISTANT

## 2018-04-16 PROCEDURE — 85025 COMPLETE CBC W/AUTO DIFF WBC: CPT | Performed by: PHYSICIAN ASSISTANT

## 2018-04-16 PROCEDURE — 80053 COMPREHEN METABOLIC PANEL: CPT | Performed by: PHYSICIAN ASSISTANT

## 2018-04-16 PROCEDURE — 83735 ASSAY OF MAGNESIUM: CPT | Performed by: PHYSICIAN ASSISTANT

## 2018-04-16 RX ORDER — DEXTROSE MONOHYDRATE 50 MG/ML
20 INJECTION, SOLUTION INTRAVENOUS CONTINUOUS
Status: DISCONTINUED | OUTPATIENT
Start: 2018-04-17 | End: 2018-04-20 | Stop reason: HOSPADM

## 2018-04-16 RX ORDER — SODIUM CHLORIDE 9 MG/ML
20 INJECTION, SOLUTION INTRAVENOUS CONTINUOUS
Status: DISCONTINUED | OUTPATIENT
Start: 2018-04-17 | End: 2018-04-20 | Stop reason: HOSPADM

## 2018-04-16 RX ORDER — FLUOROURACIL 50 MG/ML
708 INJECTION, SOLUTION INTRAVENOUS ONCE
Status: COMPLETED | OUTPATIENT
Start: 2018-04-17 | End: 2018-04-17

## 2018-04-17 ENCOUNTER — HOSPITAL ENCOUNTER (OUTPATIENT)
Dept: INFUSION CENTER | Facility: CLINIC | Age: 48
Discharge: HOME | End: 2018-04-17
Payer: COMMERCIAL

## 2018-04-17 VITALS
BODY MASS INDEX: 23.23 KG/M2 | DIASTOLIC BLOOD PRESSURE: 60 MMHG | OXYGEN SATURATION: 96 % | SYSTOLIC BLOOD PRESSURE: 104 MMHG | WEIGHT: 148 LBS | HEART RATE: 76 BPM | TEMPERATURE: 98.1 F | HEIGHT: 67 IN | RESPIRATION RATE: 16 BRPM

## 2018-04-17 DIAGNOSIS — E87.6 HYPOKALEMIA: Primary | ICD-10-CM

## 2018-04-17 PROCEDURE — G0498 CHEMO EXTEND IV INFUS W/PUMP: HCPCS

## 2018-04-17 PROCEDURE — 96415 CHEMO IV INFUSION ADDL HR: CPT

## 2018-04-17 PROCEDURE — 96367 TX/PROPH/DG ADDL SEQ IV INF: CPT

## 2018-04-17 PROCEDURE — 96411 CHEMO IV PUSH ADDL DRUG: CPT

## 2018-04-17 PROCEDURE — 96417 CHEMO IV INFUS EACH ADDL SEQ: CPT

## 2018-04-17 PROCEDURE — 96375 TX/PRO/DX INJ NEW DRUG ADDON: CPT

## 2018-04-17 PROCEDURE — 96368 THER/DIAG CONCURRENT INF: CPT

## 2018-04-17 PROCEDURE — 96413 CHEMO IV INFUSION 1 HR: CPT

## 2018-04-17 RX ORDER — POTASSIUM CHLORIDE 20 MEQ/1
20 TABLET, EXTENDED RELEASE ORAL ONCE
Status: COMPLETED | OUTPATIENT
Start: 2018-04-17 | End: 2018-04-17

## 2018-04-17 RX ADMIN — OXALIPLATIN 150 MG: 5 INJECTION, SOLUTION, CONCENTRATE INTRAVENOUS at 13:03

## 2018-04-17 RX ADMIN — LEUCOVORIN CALCIUM 708 MG: 500 INJECTION, POWDER, LYOPHILIZED, FOR SOLUTION INTRAMUSCULAR; INTRAVENOUS at 13:03

## 2018-04-17 RX ADMIN — FLUOROURACIL 708 MG: 50 INJECTION, SOLUTION INTRAVENOUS at 15:13

## 2018-04-17 RX ADMIN — Medication 885 MG: at 11:43

## 2018-04-17 RX ADMIN — POTASSIUM CHLORIDE 20 MEQ: 1500 TABLET, EXTENDED RELEASE ORAL at 12:57

## 2018-04-17 RX ADMIN — DEXAMETHASONE SODIUM PHOSPHATE: 10 INJECTION, SOLUTION INTRAMUSCULAR; INTRAVENOUS at 10:58

## 2018-04-17 RX ADMIN — DEXTROSE 20 ML/HR: 5 SOLUTION INTRAVENOUS at 10:41

## 2018-04-17 RX ADMIN — DIPHENHYDRAMINE HYDROCHLORIDE 50 MG: 50 INJECTION, SOLUTION INTRAMUSCULAR; INTRAVENOUS at 11:19

## 2018-04-17 RX ADMIN — POTASSIUM CHLORIDE 20 MEQ: 1500 TABLET, EXTENDED RELEASE ORAL at 15:12

## 2018-04-17 RX ADMIN — SODIUM CHLORIDE 20 ML/HR: 0.9 INJECTION, SOLUTION INTRAVENOUS at 11:38

## 2018-04-17 NOTE — PROGRESS NOTES
Pt arrived for chemo infusion  K+=2 9 on 4/16/19  Pt c/o diarrhea for 2days which ended yesterday  Notified Mikayla Quintanilla, RN  Dr Goyo Ordaz ordered oral K+ 20mEq X2  Given to pt 2 hrs apart while here  Tolerated infusion w/out any adverse   Returning Thursday for cadd d/c

## 2018-04-19 ENCOUNTER — HOSPITAL ENCOUNTER (OUTPATIENT)
Dept: INFUSION CENTER | Facility: CLINIC | Age: 48
Discharge: HOME | End: 2018-04-19
Payer: COMMERCIAL

## 2018-04-19 DIAGNOSIS — E87.6 HYPOKALEMIA: ICD-10-CM

## 2018-04-19 LAB — POTASSIUM SERPL-SCNC: 3.2 MMOL/L (ref 3.5–5.3)

## 2018-04-19 PROCEDURE — 96372 THER/PROPH/DIAG INJ SC/IM: CPT

## 2018-04-19 PROCEDURE — 84132 ASSAY OF SERUM POTASSIUM: CPT

## 2018-04-19 RX ADMIN — Medication 300 UNITS: at 13:54

## 2018-04-19 RX ADMIN — PEGFILGRASTIM 6 MG: KIT SUBCUTANEOUS at 13:58

## 2018-04-20 ENCOUNTER — HOSPITAL ENCOUNTER (OUTPATIENT)
Dept: CT IMAGING | Facility: HOSPITAL | Age: 48
Discharge: HOME | End: 2018-04-20
Payer: COMMERCIAL

## 2018-04-20 DIAGNOSIS — C18.7 MALIGNANT NEOPLASM OF SIGMOID COLON (HCC): ICD-10-CM

## 2018-04-20 PROCEDURE — 74177 CT ABD & PELVIS W/CONTRAST: CPT

## 2018-04-20 PROCEDURE — 71260 CT THORAX DX C+: CPT

## 2018-04-20 RX ADMIN — IOHEXOL 100 ML: 350 INJECTION, SOLUTION INTRAVENOUS at 08:28

## 2018-04-26 ENCOUNTER — OFFICE VISIT (OUTPATIENT)
Dept: HEMATOLOGY ONCOLOGY | Facility: CLINIC | Age: 48
End: 2018-04-26
Payer: COMMERCIAL

## 2018-04-26 VITALS
HEART RATE: 81 BPM | HEIGHT: 66 IN | RESPIRATION RATE: 16 BRPM | TEMPERATURE: 98.5 F | BODY MASS INDEX: 23.3 KG/M2 | SYSTOLIC BLOOD PRESSURE: 112 MMHG | OXYGEN SATURATION: 98 % | DIASTOLIC BLOOD PRESSURE: 72 MMHG | WEIGHT: 145 LBS

## 2018-04-26 DIAGNOSIS — C18.7 MALIGNANT NEOPLASM OF SIGMOID COLON (HCC): Primary | ICD-10-CM

## 2018-04-26 PROCEDURE — 99214 OFFICE O/P EST MOD 30 MIN: CPT | Performed by: INTERNAL MEDICINE
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
## 2018-04-30 ENCOUNTER — APPOINTMENT (OUTPATIENT)
Dept: LAB | Facility: CLINIC | Age: 48
End: 2018-04-30
Payer: COMMERCIAL

## 2018-04-30 ENCOUNTER — TRANSCRIBE ORDERS (OUTPATIENT)
Dept: LAB | Facility: CLINIC | Age: 48
End: 2018-04-30

## 2018-04-30 LAB
ALBUMIN SERPL BCP-MCNC: 3.4 G/DL (ref 3.5–5)
ALP SERPL-CCNC: 162 U/L (ref 46–116)
ALT SERPL W P-5'-P-CCNC: 54 U/L (ref 12–78)
ANION GAP SERPL CALCULATED.3IONS-SCNC: 10 MMOL/L (ref 4–13)
AST SERPL W P-5'-P-CCNC: 47 U/L (ref 5–45)
BASOPHILS # BLD AUTO: 0.03 THOUSANDS/ΜL (ref 0–0.1)
BASOPHILS NFR BLD AUTO: 0 % (ref 0–1)
BILIRUB SERPL-MCNC: 0.5 MG/DL (ref 0.2–1)
BUN SERPL-MCNC: 5 MG/DL (ref 5–25)
CALCIUM SERPL-MCNC: 8.7 MG/DL (ref 8.3–10.1)
CHLORIDE SERPL-SCNC: 105 MMOL/L (ref 100–108)
CO2 SERPL-SCNC: 23 MMOL/L (ref 21–32)
CREAT SERPL-MCNC: 0.85 MG/DL (ref 0.6–1.3)
EOSINOPHIL # BLD AUTO: 0.17 THOUSAND/ΜL (ref 0–0.61)
EOSINOPHIL NFR BLD AUTO: 1 % (ref 0–6)
ERYTHROCYTE [DISTWIDTH] IN BLOOD BY AUTOMATED COUNT: 19.1 % (ref 11.6–15.1)
GFR SERPL CREATININE-BSD FRML MDRD: 105 ML/MIN/1.73SQ M
GLUCOSE SERPL-MCNC: 113 MG/DL (ref 65–140)
HCT VFR BLD AUTO: 38.8 % (ref 36.5–49.3)
HGB BLD-MCNC: 13.5 G/DL (ref 12–17)
LYMPHOCYTES # BLD AUTO: 1.63 THOUSANDS/ΜL (ref 0.6–4.47)
LYMPHOCYTES NFR BLD AUTO: 13 % (ref 14–44)
MAGNESIUM SERPL-MCNC: 1.6 MG/DL (ref 1.6–2.6)
MCH RBC QN AUTO: 34.4 PG (ref 26.8–34.3)
MCHC RBC AUTO-ENTMCNC: 34.8 G/DL (ref 31.4–37.4)
MCV RBC AUTO: 99 FL (ref 82–98)
MONOCYTES # BLD AUTO: 0.78 THOUSAND/ΜL (ref 0.17–1.22)
MONOCYTES NFR BLD AUTO: 6 % (ref 4–12)
NEUTROPHILS # BLD AUTO: 10.11 THOUSANDS/ΜL (ref 1.85–7.62)
NEUTS SEG NFR BLD AUTO: 80 % (ref 43–75)
PLATELET # BLD AUTO: 63 THOUSANDS/UL (ref 149–390)
PMV BLD AUTO: 12.9 FL (ref 8.9–12.7)
POTASSIUM SERPL-SCNC: 3 MMOL/L (ref 3.5–5.3)
PROT SERPL-MCNC: 7.2 G/DL (ref 6.4–8.2)
RBC # BLD AUTO: 3.93 MILLION/UL (ref 3.88–5.62)
SODIUM SERPL-SCNC: 138 MMOL/L (ref 136–145)
WBC # BLD AUTO: 12.72 THOUSAND/UL (ref 4.31–10.16)

## 2018-04-30 PROCEDURE — 85025 COMPLETE CBC W/AUTO DIFF WBC: CPT | Performed by: INTERNAL MEDICINE

## 2018-04-30 PROCEDURE — 80053 COMPREHEN METABOLIC PANEL: CPT | Performed by: INTERNAL MEDICINE

## 2018-04-30 PROCEDURE — 83735 ASSAY OF MAGNESIUM: CPT | Performed by: INTERNAL MEDICINE

## 2018-04-30 PROCEDURE — 36415 COLL VENOUS BLD VENIPUNCTURE: CPT | Performed by: INTERNAL MEDICINE

## 2018-04-30 RX ORDER — FLUOROURACIL 50 MG/ML
708 INJECTION, SOLUTION INTRAVENOUS ONCE
Status: DISCONTINUED | OUTPATIENT
Start: 2018-05-01 | End: 2018-05-01

## 2018-04-30 RX ORDER — SODIUM CHLORIDE 9 MG/ML
20 INJECTION, SOLUTION INTRAVENOUS CONTINUOUS
Status: DISCONTINUED | OUTPATIENT
Start: 2018-05-01 | End: 2018-05-04 | Stop reason: HOSPADM

## 2018-05-01 ENCOUNTER — HOSPITAL ENCOUNTER (OUTPATIENT)
Dept: INFUSION CENTER | Facility: CLINIC | Age: 48
Discharge: HOME | End: 2018-05-01
Payer: COMMERCIAL

## 2018-05-01 VITALS
WEIGHT: 148 LBS | TEMPERATURE: 97.9 F | OXYGEN SATURATION: 96 % | HEART RATE: 72 BPM | HEIGHT: 67 IN | DIASTOLIC BLOOD PRESSURE: 60 MMHG | BODY MASS INDEX: 23.23 KG/M2 | RESPIRATION RATE: 18 BRPM | SYSTOLIC BLOOD PRESSURE: 110 MMHG

## 2018-05-01 PROCEDURE — 96413 CHEMO IV INFUSION 1 HR: CPT

## 2018-05-01 PROCEDURE — 96367 TX/PROPH/DG ADDL SEQ IV INF: CPT

## 2018-05-01 RX ADMIN — Medication 300 UNITS: at 11:36

## 2018-05-01 RX ADMIN — SODIUM CHLORIDE 20 ML/HR: 0.9 INJECTION, SOLUTION INTRAVENOUS at 09:33

## 2018-05-01 RX ADMIN — Medication 885 MG: at 10:29

## 2018-05-01 RX ADMIN — DIPHENHYDRAMINE HYDROCHLORIDE 50 MG: 50 INJECTION, SOLUTION INTRAMUSCULAR; INTRAVENOUS at 09:50

## 2018-05-01 NOTE — PROGRESS NOTES
Pt offers no new complaints, resting comfortably  Vitals stable  Labs reviewed  Plt 63  Pt will only be getting benadryl and Erbitux today  Pt educated about what to look for with a low platelet count, he states understanding  Call bell in reach, will continue to monitor

## 2018-05-07 ENCOUNTER — RADIATION ONCOLOGY CONSULT (OUTPATIENT)
Dept: RADIATION ONCOLOGY | Facility: HOSPITAL | Age: 48
End: 2018-05-07

## 2018-05-07 ENCOUNTER — APPOINTMENT (OUTPATIENT)
Dept: RADIATION ONCOLOGY | Facility: HOSPITAL | Age: 48
End: 2018-05-07
Attending: RADIOLOGY
Payer: COMMERCIAL

## 2018-05-07 VITALS
BODY MASS INDEX: 23.37 KG/M2 | SYSTOLIC BLOOD PRESSURE: 102 MMHG | DIASTOLIC BLOOD PRESSURE: 64 MMHG | RESPIRATION RATE: 18 BRPM | HEART RATE: 68 BPM | TEMPERATURE: 97.5 F | OXYGEN SATURATION: 97 % | WEIGHT: 145.4 LBS | HEIGHT: 66 IN

## 2018-05-07 DIAGNOSIS — C78.7 LIVER METASTASIS (HCC): Primary | ICD-10-CM

## 2018-05-07 PROCEDURE — 99215 OFFICE O/P EST HI 40 MIN: CPT | Performed by: RADIOLOGY
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
## 2018-05-08 ENCOUNTER — TRANSCRIBE ORDERS (OUTPATIENT)
Dept: LAB | Facility: CLINIC | Age: 48
End: 2018-05-08

## 2018-05-08 ENCOUNTER — LAB (OUTPATIENT)
Dept: LAB | Facility: CLINIC | Age: 48
End: 2018-05-08
Payer: COMMERCIAL

## 2018-05-08 ENCOUNTER — OFFICE VISIT (OUTPATIENT)
Dept: SURGICAL ONCOLOGY | Facility: CLINIC | Age: 48
End: 2018-05-08
Payer: COMMERCIAL

## 2018-05-08 VITALS
BODY MASS INDEX: 23.54 KG/M2 | HEART RATE: 77 BPM | TEMPERATURE: 98.5 F | HEIGHT: 66 IN | RESPIRATION RATE: 18 BRPM | WEIGHT: 146.5 LBS | DIASTOLIC BLOOD PRESSURE: 78 MMHG | SYSTOLIC BLOOD PRESSURE: 118 MMHG

## 2018-05-08 DIAGNOSIS — C78.7 METASTATIC COLON CANCER TO LIVER (HCC): ICD-10-CM

## 2018-05-08 DIAGNOSIS — C78.7 METASTATIC COLON CANCER TO LIVER (HCC): Primary | ICD-10-CM

## 2018-05-08 DIAGNOSIS — C78.7 LIVER METASTASIS (HCC): ICD-10-CM

## 2018-05-08 DIAGNOSIS — C18.9 METASTATIC COLON CANCER TO LIVER (HCC): ICD-10-CM

## 2018-05-08 DIAGNOSIS — C18.9 METASTATIC COLON CANCER TO LIVER (HCC): Primary | ICD-10-CM

## 2018-05-08 PROBLEM — R07.81 RIB PAIN ON LEFT SIDE: Status: ACTIVE | Noted: 2017-12-04

## 2018-05-08 PROBLEM — R91.1 PULMONARY NODULE, RIGHT: Status: ACTIVE | Noted: 2017-12-11

## 2018-05-08 PROBLEM — R39.9 UTI SYMPTOMS: Status: ACTIVE | Noted: 2017-08-24
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
## 2018-05-08 PROBLEM — R07.81 RIB PAIN ON LEFT SIDE: Status: RESOLVED | Noted: 2017-12-04 | Resolved: 2018-05-08

## 2018-05-08 PROBLEM — K21.9 GERD (GASTROESOPHAGEAL REFLUX DISEASE): Status: ACTIVE | Noted: 2017-08-24

## 2018-05-08 PROBLEM — R39.9 UTI SYMPTOMS: Status: RESOLVED | Noted: 2017-08-24 | Resolved: 2018-05-08
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
## 2018-05-08 LAB
BUN SERPL-MCNC: 9 MG/DL (ref 5–25)
CREAT SERPL-MCNC: 0.8 MG/DL (ref 0.6–1.3)
GFR SERPL CREATININE-BSD FRML MDRD: 108 ML/MIN/1.73SQ M

## 2018-05-08 PROCEDURE — 82565 ASSAY OF CREATININE: CPT

## 2018-05-08 PROCEDURE — 84520 ASSAY OF UREA NITROGEN: CPT

## 2018-05-08 PROCEDURE — 99245 OFF/OP CONSLTJ NEW/EST HI 55: CPT | Performed by: SURGERY

## 2018-05-08 PROCEDURE — 36415 COLL VENOUS BLD VENIPUNCTURE: CPT

## 2018-05-08 NOTE — LETTER
May 8, 2018     Krissy Barrow MD  [address]  3rd [address]  [address] [facility]    Patient: Zoltan Shipley   YOB: 1970   Date of Visit: 5/8/2018       Dear   [facility]:    Thank you for referring Haider Len to me for evaluation  Below are my notes for this consultation  If you have questions, please do not hesitate to call me  I look forward to following your patient along with you  Sincerely,        Susie Joe MD        CC:  MD Charito Feldman MD Luane Hanger, MD
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
## 2018-05-14 ENCOUNTER — APPOINTMENT (OUTPATIENT)
Dept: LAB | Facility: CLINIC | Age: 48
End: 2018-05-14
Payer: COMMERCIAL

## 2018-05-14 RX ORDER — SODIUM CHLORIDE 9 MG/ML
20 INJECTION, SOLUTION INTRAVENOUS CONTINUOUS
Status: DISCONTINUED | OUTPATIENT
Start: 2018-05-15 | End: 2018-05-18 | Stop reason: HOSPADM

## 2018-05-14 RX ORDER — FLUOROURACIL 50 MG/ML
696 INJECTION, SOLUTION INTRAVENOUS ONCE
Status: COMPLETED | OUTPATIENT
Start: 2018-05-15 | End: 2018-05-15

## 2018-05-15 ENCOUNTER — HOSPITAL ENCOUNTER (OUTPATIENT)
Dept: INFUSION CENTER | Facility: CLINIC | Age: 48
Discharge: HOME | End: 2018-05-15
Payer: COMMERCIAL

## 2018-05-15 VITALS
HEART RATE: 81 BPM | OXYGEN SATURATION: 97 % | HEIGHT: 66 IN | BODY MASS INDEX: 23.78 KG/M2 | TEMPERATURE: 98.7 F | WEIGHT: 148 LBS | DIASTOLIC BLOOD PRESSURE: 60 MMHG | SYSTOLIC BLOOD PRESSURE: 98 MMHG | RESPIRATION RATE: 18 BRPM

## 2018-05-15 PROCEDURE — 96411 CHEMO IV PUSH ADDL DRUG: CPT

## 2018-05-15 PROCEDURE — 96413 CHEMO IV INFUSION 1 HR: CPT

## 2018-05-15 PROCEDURE — 96367 TX/PROPH/DG ADDL SEQ IV INF: CPT

## 2018-05-15 PROCEDURE — G0498 CHEMO EXTEND IV INFUS W/PUMP: HCPCS

## 2018-05-15 PROCEDURE — 96366 THER/PROPH/DIAG IV INF ADDON: CPT

## 2018-05-15 RX ADMIN — SODIUM CHLORIDE 20 ML/HR: 0.9 INJECTION, SOLUTION INTRAVENOUS at 10:07

## 2018-05-15 RX ADMIN — FLUOROURACIL 696 MG: 50 INJECTION, SOLUTION INTRAVENOUS at 14:27

## 2018-05-15 RX ADMIN — Medication 900 MG: at 11:07

## 2018-05-15 RX ADMIN — DIPHENHYDRAMINE HYDROCHLORIDE 50 MG: 50 INJECTION, SOLUTION INTRAMUSCULAR; INTRAVENOUS at 10:30

## 2018-05-15 RX ADMIN — DEXAMETHASONE SODIUM PHOSPHATE: 10 INJECTION, SOLUTION INTRAMUSCULAR; INTRAVENOUS at 10:00

## 2018-05-15 RX ADMIN — LEUCOVORIN CALCIUM 696 MG: 500 INJECTION, POWDER, LYOPHILIZED, FOR SOLUTION INTRAMUSCULAR; INTRAVENOUS at 12:17

## 2018-05-15 NOTE — PROGRESS NOTES
Patient tolerated infusion without incident  No adverse effects noted  Patient connected to CADD pump as per MD order  Patient aware of return time for Thursday  AVS given to patient

## 2018-05-15 NOTE — PROGRESS NOTES
Patient here for chemo infusion  Currently without complaints  Labs within parameters to treat  Port accessed with 20g needle without incident  Pre-meds infusing

## 2018-05-17 ENCOUNTER — HOSPITAL ENCOUNTER (OUTPATIENT)
Dept: INFUSION CENTER | Facility: CLINIC | Age: 48
Discharge: HOME | End: 2018-05-17
Payer: COMMERCIAL

## 2018-05-17 RX ADMIN — HEPARIN 300 UNITS: 100 SYRINGE at 13:27

## 2018-05-18 ENCOUNTER — HOSPITAL ENCOUNTER (OUTPATIENT)
Dept: RADIOLOGY | Facility: HOSPITAL | Age: 48
Discharge: HOME | End: 2018-05-18
Payer: COMMERCIAL

## 2018-05-18 DIAGNOSIS — C18.9 METASTATIC COLON CANCER TO LIVER (HCC): ICD-10-CM

## 2018-05-18 DIAGNOSIS — C78.7 METASTATIC COLON CANCER TO LIVER (HCC): ICD-10-CM

## 2018-05-18 PROCEDURE — A9585 GADOBUTROL INJECTION: HCPCS | Performed by: SURGERY

## 2018-05-18 PROCEDURE — 74183 MRI ABD W/O CNTR FLWD CNTR: CPT

## 2018-05-18 RX ADMIN — GADOBUTROL 7 ML: 604.72 INJECTION INTRAVENOUS at 11:09
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
## 2018-05-24 ENCOUNTER — OFFICE VISIT (OUTPATIENT)
Dept: HEMATOLOGY ONCOLOGY | Facility: CLINIC | Age: 48
End: 2018-05-24
Payer: COMMERCIAL

## 2018-05-24 VITALS
RESPIRATION RATE: 18 BRPM | SYSTOLIC BLOOD PRESSURE: 110 MMHG | HEART RATE: 93 BPM | HEIGHT: 66 IN | BODY MASS INDEX: 23.63 KG/M2 | WEIGHT: 147 LBS | TEMPERATURE: 98.4 F | OXYGEN SATURATION: 97 % | DIASTOLIC BLOOD PRESSURE: 70 MMHG

## 2018-05-24 DIAGNOSIS — C18.9 METASTATIC COLON CANCER TO LIVER (HCC): Primary | ICD-10-CM

## 2018-05-24 DIAGNOSIS — T50.905A DRUG SIDE EFFECTS, INITIAL ENCOUNTER: ICD-10-CM

## 2018-05-24 DIAGNOSIS — C78.7 METASTATIC COLON CANCER TO LIVER (HCC): Primary | ICD-10-CM

## 2018-05-24 DIAGNOSIS — C18.7 MALIGNANT NEOPLASM OF SIGMOID COLON (HCC): ICD-10-CM

## 2018-05-24 PROCEDURE — 99214 OFFICE O/P EST MOD 30 MIN: CPT | Performed by: PHYSICIAN ASSISTANT

## 2018-05-25 ENCOUNTER — OFFICE VISIT (OUTPATIENT)
Dept: SURGICAL ONCOLOGY | Facility: CLINIC | Age: 48
End: 2018-05-25
Payer: COMMERCIAL

## 2018-05-25 VITALS
BODY MASS INDEX: 23.46 KG/M2 | TEMPERATURE: 98.4 F | WEIGHT: 146 LBS | DIASTOLIC BLOOD PRESSURE: 76 MMHG | RESPIRATION RATE: 14 BRPM | HEIGHT: 66 IN | HEART RATE: 74 BPM | SYSTOLIC BLOOD PRESSURE: 112 MMHG

## 2018-05-25 DIAGNOSIS — C18.9 METASTATIC COLON CANCER TO LIVER (HCC): Primary | ICD-10-CM

## 2018-05-25 DIAGNOSIS — C78.7 METASTATIC COLON CANCER TO LIVER (HCC): Primary | ICD-10-CM

## 2018-05-25 PROCEDURE — 99215 OFFICE O/P EST HI 40 MIN: CPT | Performed by: SURGERY

## 2018-05-25 RX ORDER — SODIUM CHLORIDE 9 MG/ML
20 INJECTION, SOLUTION INTRAVENOUS CONTINUOUS
Status: DISCONTINUED | OUTPATIENT
Start: 2018-05-29 | End: 2018-06-01 | Stop reason: HOSPADM

## 2018-05-25 RX ORDER — FLUOROURACIL 50 MG/ML
696 INJECTION, SOLUTION INTRAVENOUS ONCE
Status: COMPLETED | OUTPATIENT
Start: 2018-05-29 | End: 2018-05-29

## 2018-05-25 NOTE — LETTER
May 25, 2018     [address]  [address] [address]    Patient: Kiko Winston   YOB: 1970   Date of Visit: 5/25/2018       Dear Dr Keyshawn Tan:    Thank you for referring Jd Tijerina to me for evaluation  Below are my notes for this consultation  If you have questions, please do not hesitate to call me  I look forward to following your patient along with you  Sincerely,        Yulissa Del Real MD        CC: MD Annita Monique MD Merceda Lacy, MD Ronold Perkins, MD  5/25/2018 10:41 AM  Sign at close encounter               Surgical Oncology Follow Up       [address] [place] [address] 98  Parveenmarian Alexandraritt  1970  [id number]  [address] [facility],[address]  [facility] [place] [id number]    Diagnoses and all orders for this visit:    Metastatic colon cancer to liver [facility])        Chief Complaint   Patient presents with    Follow-up     Pt is here after MRI to discuss treatment plan  No Follow-up on file  Metastatic colon cancer to liver ([facility] [address] )    1/2018 Initial Diagnosis     Malignant neoplasm of sigmoid colon ([facility] [address] )         1/22/2018 Biopsy     Large Intestine, Sigmoid Colon, Recto-sigmoid tumor bx:    - Invasive colonic adenocarcinoma, moderately differentiated         2/6/2018 -  Chemotherapy     Modified FOLFOX6   Added Erbitux on 3/20/18             Staging:  Metastatic rectosigmoid cancer  Treatment history:   Modified FOLFOX 6   Erbitux added 03/20/2018   Current treatment:  Modified FOLFOX 6   Erbitux added 03/20/2018   Disease status:   Stable    History of Present Illness:   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  MRI from May 18, 2018 revealed that there was normalization of diffusion restriction on his MRI  There were no new lesion seen    The dominant lesion in segment 7 near the dome measures 1 4 x 0 9 cm  The additional lesion seen on PET in segment 5 was not visualized  Post treatment changes were seen  I personally reviewed the films  He comes in now to discuss further therapy  He is feeling well  Review of Systems  Complete ROS Surg Onc:   Complete ROS Surg Onc:   Constitutional: The patient denies new or recent history of general fatigue, no recent weight loss, no change in appetite  Eyes: No complaints of visual problems, no scleral icterus  ENT: no complaints of ear pain, no hoarseness, no difficulty swallowing,  no tinnitus and no new masses in head, oral cavity, or neck  Cardiovascular: No complaints of chest pain, no palpitations, no ankle edema  Respiratory: No complaints of shortness of breath, no cough  Gastrointestinal: No complaints of jaundice, no bloody stools, no pale stools  Genitourinary: No complaints of dysuria, no hematuria, no nocturia, no frequent urination, no urethral discharge  Musculoskeletal: No complaints of weakness, paralysis, joint stiffness or arthralgias  Integumentary: No complaints of rash, no new lesions  Neurological: No complaints of convulsions, no seizures, no dizziness  Hematologic/Lymphatic: No complaints of easy bruising  Endocrine:  No hot or cold intolerance  No polydipsia, polyphagia, or polyuria  Allergy/immunology:  No environmental allergies  No food allergies  Not immunocompromised  Skin:  No pallor or rash  No wound          Patient Active Problem List   Diagnosis    ED (erectile dysfunction)    Metastatic colon cancer to liver (HCC)    GERD (gastroesophageal reflux disease)    Pulmonary nodule, right     Past Medical History:   Diagnosis Date    Colorectal cancer, stage IV ([address] )     Dysuria     Last Assessed:8/24/17    GERD (gastroesophageal reflux disease)     Liver nodule     Pulmonary nodule, right      Past Surgical History:   Procedure Laterality Date    COLONOSCOPY N/A 1/22/2018 Procedure: COLONOSCOPY;  Surgeon: Waqar Owens MD;  Location: [facility]; Service: Colorectal   Macel Samuel DENTAL SURGERY  2016    Crown with bridge work    IA INSERT PICC W/ SUB-Q PORT N/A 1/25/2018    Procedure: PORT-A-CATHETER PLACEMENT  Fluoroscopy for performance/interpretation;  Surgeon: Waqar Owens MD;  Location: [facility];  Service: Colorectal    ROOT CANAL  2015    TESTICLE SURGERY      Exploration of Undescended Testis rIght, age 8 had surgery for undescended testicle; Last Assessed:8/24/17    TOOTH EXTRACTION  2015     Family History   Problem Relation Age of Onset    No Known Problems Father     Cancer Mother     Vaginal cancer Mother      malignant neoplasm of vagina    Cancer Brother      pt  reports brother was diagnosed with stage 4 throat cancer     Social History     Social History    Marital status: Single     Spouse name: N/A    Number of children: N/A    Years of education: N/A     Occupational History    Not on file       Social History Main Topics    Smoking status: Former Smoker     Types: E-Cigarettes    Smokeless tobacco: Current User      Comment: quit 4 years ago / smoked for 20 years     Alcohol use Yes      Comment: socially     Drug use: No      Comment: per [employer]: occasional recreation drug use    Sexual activity: Not on file     Other Topics Concern    Not on file     Social History Narrative    No narrative on file       Current Outpatient Prescriptions:     clindamycin (CLINDAGEL) 1 % gel, Apply topically 2 (two) times a day PRN skin lesions, Disp: 30 g, Rfl: 0    omeprazole (PriLOSEC) 40 MG capsule, Take 40 mg by mouth daily, Disp: , Rfl:     prochlorperazine (COMPAZINE) 10 mg tablet, Take 1 tablet (10 mg total) by mouth every 6 (six) hours as needed for nausea or vomiting, Disp: 30 tablet, Rfl: 6    sildenafil (REVATIO) 20 mg tablet, Take 1 tablet (20 mg total) by mouth 3 (three) times a day, Disp: 90 tablet, Rfl: 3  No current facility-administered medications for this visit  Facility-Administered Medications Ordered in Other Visits:     [START ON 5/29/2018] alteplase (CATHFLO) injection 2 mg, 2 mg, Intracatheter, PRN, MD Sandra Alfredo  [START ON 5/29/2018] cetuximab (ERBITUX) 900 mg in IVPB 450 mL, 900 mg, Intravenous, Once, MD Sandra Alfredo  [START ON 5/29/2018] diphenhydrAMINE (BENADRYL) 50 mg in sodium chloride 0 9 % 50 mL IVPB, 50 mg, Intravenous, Once, MD Sandra Alfredo  [START ON 5/29/2018] fluorouracil (ADRUCIL) injection 696 mg, 696 mg, Intravenous, Once, MD Sandra Alfredo  [START ON 5/29/2018] heparin lock flush 100 units/mL injection 300 Units, 300 Units, Intracatheter, PRN, MD Sandra Alfredo  [START ON 5/29/2018] leucovorin 700 mg in dextrose 5 % 250 mL IVPB, 700 mg, Intravenous, Once, MD Sandra Alfredo  [START ON 5/29/2018] ondansetron (ZOFRAN) 16 mg, dexamethasone (DECADRON) 10 mg in sodium chloride 0 9 % 50 mL IVPB, , Intravenous, Once, MD Sandra Alfredo  [START ON 5/29/2018] sodium chloride 0 9 % infusion, 20 mL/hr, Intravenous, Continuous, Jacinto Russell MD  No Known Allergies  Vitals:    05/25/18 1014   BP: 112/76   Pulse: 74   Resp: 14   Temp: 98 4 °F (36 9 °C)       Physical Exam  Constitutional: General appearance: The Patient is well-developed and well-nourished who appears the stated age in no acute distress  Patient is pleasant and talkative  HEENT:  Normocephalic  Sclerae are anicteric  Mucous membranes are moist  Neck is supple without adenopathy  No JVD  Chest: The lungs are clear to auscultation  Cardiac: Heart is regular rate  Abdomen: Abdomen is soft, non-tender, non-distended and without masses  Extremities: There is no clubbing or cyanosis  There is no edema  Symmetric  Neuro: Grossly nonfocal  Gait is normal      Lymphatic: No evidence of cervical adenopathy bilaterally  No evidence of axillary adenopathy bilaterally  No evidence of inguinal adenopathy bilaterally  Skin: Warm, anicteric  Psych:  Patient is pleasant and talkative  Breasts:        Pathology:      Labs:      Imaging  Mri Abdomen W Wo Contrast    Result Date: 5/18/2018  Narrative: MRI - ABDOMEN - WITH AND WITHOUT CONTRAST INDICATION:  Liver lesion COMPARISON: Previous MRI from December 27, 2017, previous CT from April 20, 2018 and previous PET scan TECHNIQUE:  The following pulse sequences were obtained on a 1 5 T scanner prior to and following the administration of intravenous contrast:     Coronal and axial T2 with TE of 90 and 180 respectively, axial T2 with fat saturation, axial FIESTA fat-sat,  axial T1-weighted in-and-out-of phase, axial DWI/ADC, precontrast axial T1 with fat saturation, post-contrast dynamic axial T1 with fat saturation at 20, 70, and 180 seconds, coronal T1  with fat saturation and 7 minute delayed axial T1 with fat saturation  IV Contrast:  7 mL of Gadobutrol injection (SINGLE-DOSE) FINDINGS: LIVER: The previously noted lesion in the right hepatic lobe near the dome of the liver has become smaller and is seen in segment 7  The hyperintensity noted in this lesion has markedly decreased with the normalization of the diffusion restriction  The  residual lesion is faintly seen measuring about 1 4 x 0 9 cm  On the previous study this lesion was measuring 3 x 2 8 cm Additional small lesions are seen on the previous study  A lesion was seen in segment 4 in image 39 of series 9 on the previous study  This lesion is not identified on the current study  There is visualization of a new T2 hyperintense area within the right hepatic lobe at the junction of the segment 7 and 8 in image 45  These foci do not not demonstrate any diffusion restriction and does not demonstrate any contrast enhancement Additional  T2 hyperintense focus seen in the segment 6 measuring about 3 mm in image 46 of series 8 without diffusion restriction   A lesion seen in segment 6 laterally in image 43 series 9 on the previous study is demonstrating normalization of diffusion  The lesion which was hypermetabolic on the PET scan, seen in image 181, seen in segment 5 has resolved A small focus of hyperintensity noted in image 111 of series 8, not seen on the previous study, measuring about 6 mm likely artifactual  BILIARY TREE:  There is no intra-hepatic biliary ductal dilatation  The common bile duct is normal in caliber  There is no gross evidence of mass or choledocholithiasis  GALLBLADDER:  The gallbladder is normal in size and morphology  There is no evidence of sludge or stones  There is no gallbladder wall thickening or pericholecystic fluid  No gallbladder mass is identified  PANCREAS: The pancreas is normal in morphology and signal intensity  No cystic or solid mass is identified  The pancreatic duct is normal in caliber  ADRENAL GLANDS:  The adrenal glands are normal in morphology without thickening or focal mass  SPLEEN:  The spleen is normal in size, morphology and signal intensity  No focal mass is identified  KIDNEYS:  The kidneys enhance symmetrically and are normal in morphology  There is no hydronephrosis  Parapelvic cyst is seen on the left site LOWER CHEST:   No gross evidence of mass or infiltrate  ABDOMINAL CAVITY: No lymphadenopathy or mass  No Ascites  Mesenteric fat is normal in signal without evidence of stranding or edema  No mesenteric nodularity or focal mass is identified  ABDOMINAL WALL:  No mass or hernia identified  BOWEL:   Limited evaluation of the hollow viscera demonstrates no gross obstruction or mass  OSSEOUS STRUCTURES:  There is no evidence of destructive process  VASCULAR STRUCTURES:  The visualized vascular structures are patent without evidence of thrombosis or external compression  No aneurysm is identified  Impression: Treatment response On the previous study there were multiple small diffusion restricting liver lesions within the liver parenchyma involving the left and right hepatic lobe  These have resolved and demonstrate normalization of diffusion restriction There is no new definite diffusion restricting lesion seen A dominant lesion which is seen in segment 7 near the dome of the diaphragm is markedly smaller and measures 1 4 x 0 9 cm  Additional lesion which was hypermetabolic on the PET scan in segment 5 is not visualized  Additional smaller subcentimeter the mildly hyperintense foci are noted in the liver parenchyma on the DWI images without diffusion restriction seen in image 38,  image 42  and in image 46 of series 8, not seen on the previous study  These are probably sequela of posttreatment changes The study was marked in EPIC for significant notification  Workstation performed: [id number]     I reviewed the above laboratory and imaging data  Discussion/Summary:   68-year-old male with metastatic rectosigmoid cancer  I suspect the lung nodule is benign since this is not changed with chemotherapy  His most recent MRI shows only 1 lesion and post treatment changes  I have recommended that he consider intraoperative ultrasound of the liver with ablation/resection of any lesions that are seen  I explained the risks including bleeding, infection, recurrence, need for further surgery, wound complications and bile leak  Informed consent was obtained  Would also recommend that he have his primary site removed at the same sitting  We will try to coordinate this with Dr Anders Simental  He will need to be off chemotherapy for at least 2 weeks  We will schedule this at our earliest mutual convenience  He is agreeable to this  All his questions were answered

## 2018-05-25 NOTE — PROGRESS NOTES
Surgical Oncology Follow Up       [address] [facility],[address] [place] [address]  [facility] [address]  [address]  Fco Gallego  1970  [id number]  [address] [place] [address],[address] [place] [address]  [facility] SURGICAL ONCOLOGY [place] [phone]    Diagnoses and all orders for this visit:    Metastatic colon cancer to liver [facility])  -     Ambulatory referral to Colorectal Surgery; Future        Chief Complaint   Patient presents with    Follow-up     Pt is here after MRI to discuss treatment plan  No Follow-up on file  Metastatic colon cancer to liver ([facility] [address] )    1/2018 Initial Diagnosis     Malignant neoplasm of sigmoid colon ([facility] [address] )         1/22/2018 Biopsy     Large Intestine, Sigmoid Colon, Recto-sigmoid tumor bx:    - Invasive colonic adenocarcinoma, moderately differentiated         2/6/2018 -  Chemotherapy     Modified FOLFOX6   Added Erbitux on 3/20/18             Staging:  Metastatic rectosigmoid cancer  Treatment history:   Modified FOLFOX 6   Erbitux added 03/20/2018   Current treatment:  Modified FOLFOX 6   Erbitux added 03/20/2018   Disease status:   Stable    History of Present Illness:   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  MRI from May 18, 2018 revealed that there was normalization of diffusion restriction on his MRI  There were no new lesion seen  The dominant lesion in segment 7 near the dome measures 1 4 x 0 9 cm  The additional lesion seen on PET in segment 5 was not visualized  Post treatment changes were seen  I personally reviewed the films  He comes in now to discuss further therapy  He is feeling well  Review of Systems  Complete ROS Surg Onc:   Complete ROS Surg Onc:   Constitutional: The patient denies new or recent history of general fatigue, no recent weight loss, no change in appetite  Eyes: No complaints of visual problems, no scleral icterus     ENT: no complaints of ear pain, no hoarseness, no difficulty swallowing,  no tinnitus and no new masses in head, oral cavity, or neck  Cardiovascular: No complaints of chest pain, no palpitations, no ankle edema  Respiratory: No complaints of shortness of breath, no cough  Gastrointestinal: No complaints of jaundice, no bloody stools, no pale stools  Genitourinary: No complaints of dysuria, no hematuria, no nocturia, no frequent urination, no urethral discharge  Musculoskeletal: No complaints of weakness, paralysis, joint stiffness or arthralgias  Integumentary: No complaints of rash, no new lesions  Neurological: No complaints of convulsions, no seizures, no dizziness  Hematologic/Lymphatic: No complaints of easy bruising  Endocrine:  No hot or cold intolerance  No polydipsia, polyphagia, or polyuria  Allergy/immunology:  No environmental allergies  No food allergies  Not immunocompromised  Skin:  No pallor or rash  No wound  Patient Active Problem List   Diagnosis    ED (erectile dysfunction)    Metastatic colon cancer to liver (HCC)    GERD (gastroesophageal reflux disease)    Pulmonary nodule, right     Past Medical History:   Diagnosis Date    Colorectal cancer, stage IV ([address] )     Dysuria     Last Assessed:8/24/17    GERD (gastroesophageal reflux disease)     Liver nodule     Pulmonary nodule, right      Past Surgical History:   Procedure Laterality Date    COLONOSCOPY N/A 1/22/2018    Procedure: COLONOSCOPY;  Surgeon: lAessia Henao MD;  Location: [facility]; Service: Colorectal   [facility] DENTAL SURGERY  2016    Crown with bridge work    MT INSERT PICC W/ SUB-Q PORT N/A 1/25/2018    Procedure: PORT-A-CATHETER PLACEMENT  Fluoroscopy for performance/interpretation;  Surgeon: Alessia eHnao MD;  Location: [facility];  Service: Colorectal    ROOT CANAL  2015    TESTICLE SURGERY      Exploration of Undescended Testis rIght, age 8 had surgery for undescended testicle;  Last Assessed:8/24/17    TOOTH EXTRACTION  2015     Family History   Problem Relation Age of Onset    No Known Problems Father     Cancer Mother     Vaginal cancer Mother      malignant neoplasm of vagina    Cancer Brother      pt  reports brother was diagnosed with stage 4 throat cancer     Social History     Social History    Marital status: Single     Spouse name: N/A    Number of children: N/A    Years of education: N/A     Occupational History    Not on file  Social History Main Topics    Smoking status: Former Smoker     Types: E-Cigarettes    Smokeless tobacco: Current User      Comment: quit 4 years ago / smoked for 20 years     Alcohol use Yes      Comment: socially     Drug use: No      Comment: per [employer]: occasional recreation drug use    Sexual activity: Not on file     Other Topics Concern    Not on file     Social History Narrative    No narrative on file       Current Outpatient Prescriptions:     clindamycin (CLINDAGEL) 1 % gel, Apply topically 2 (two) times a day PRN skin lesions, Disp: 30 g, Rfl: 0    omeprazole (PriLOSEC) 40 MG capsule, Take 40 mg by mouth daily, Disp: , Rfl:     prochlorperazine (COMPAZINE) 10 mg tablet, Take 1 tablet (10 mg total) by mouth every 6 (six) hours as needed for nausea or vomiting, Disp: 30 tablet, Rfl: 6    sildenafil (REVATIO) 20 mg tablet, Take 1 tablet (20 mg total) by mouth 3 (three) times a day, Disp: 90 tablet, Rfl: 3  No current facility-administered medications for this visit       Facility-Administered Medications Ordered in Other Visits:     [START ON 5/29/2018] alteplase (CATHFLO) injection 2 mg, 2 mg, Intracatheter, PRN, MD Agustín Albrecht  [START ON 5/29/2018] cetuximab (ERBITUX) 900 mg in IVPB 450 mL, 900 mg, Intravenous, Once, MD Agustín Albrecht  [START ON 5/29/2018] diphenhydrAMINE (BENADRYL) 50 mg in sodium chloride 0 9 % 50 mL IVPB, 50 mg, Intravenous, Once, MD Agustín Albrecht  [START ON 5/29/2018] fluorouracil (ADRUCIL) injection 696 mg, 696 mg, Intravenous, Once, MD Naveed Urban  [START ON 5/29/2018] heparin lock flush 100 units/mL injection 300 Units, 300 Units, Intracatheter, PRN, MD Naveed Urban  [START ON 5/29/2018] leucovorin 700 mg in dextrose 5 % 250 mL IVPB, 700 mg, Intravenous, Once, MD Naveed Urban  [START ON 5/29/2018] ondansetron (ZOFRAN) 16 mg, dexamethasone (DECADRON) 10 mg in sodium chloride 0 9 % 50 mL IVPB, , Intravenous, Once, MD Naveed Urban  [START ON 5/29/2018] sodium chloride 0 9 % infusion, 20 mL/hr, Intravenous, Continuous, Radha Daily MD  No Known Allergies  Vitals:    05/25/18 1014   BP: 112/76   Pulse: 74   Resp: 14   Temp: 98 4 °F (36 9 °C)       Physical Exam  Constitutional: General appearance: The Patient is well-developed and well-nourished who appears the stated age in no acute distress  Patient is pleasant and talkative  HEENT:  Normocephalic  Sclerae are anicteric  Mucous membranes are moist  Neck is supple without adenopathy  No JVD  Chest: The lungs are clear to auscultation  Cardiac: Heart is regular rate  Abdomen: Abdomen is soft, non-tender, non-distended and without masses  Extremities: There is no clubbing or cyanosis  There is no edema  Symmetric  Neuro: Grossly nonfocal  Gait is normal      Lymphatic: No evidence of cervical adenopathy bilaterally  No evidence of axillary adenopathy bilaterally  No evidence of inguinal adenopathy bilaterally  Skin: Warm, anicteric  Psych:  Patient is pleasant and talkative    Breasts:        Pathology:      Labs:      Imaging  Mri Abdomen W Wo Contrast    Result Date: 5/18/2018  Narrative: MRI - ABDOMEN - WITH AND WITHOUT CONTRAST INDICATION:  Liver lesion COMPARISON: Previous MRI from December 27, 2017, previous CT from April 20, 2018 and previous PET scan TECHNIQUE:  The following pulse sequences were obtained on a 1 5 T scanner prior to and following the administration of intravenous contrast:     Coronal and axial T2 with TE of 90 and 180 respectively, axial T2 with fat saturation, axial FIESTA fat-sat,  axial T1-weighted in-and-out-of phase, axial DWI/ADC, precontrast axial T1 with fat saturation, post-contrast dynamic axial T1 with fat saturation at 20, 70, and 180 seconds, coronal T1  with fat saturation and 7 minute delayed axial T1 with fat saturation  IV Contrast:  7 mL of Gadobutrol injection (SINGLE-DOSE) FINDINGS: LIVER: The previously noted lesion in the right hepatic lobe near the dome of the liver has become smaller and is seen in segment 7  The hyperintensity noted in this lesion has markedly decreased with the normalization of the diffusion restriction  The  residual lesion is faintly seen measuring about 1 4 x 0 9 cm  On the previous study this lesion was measuring 3 x 2 8 cm Additional small lesions are seen on the previous study  A lesion was seen in segment 4 in image 39 of series 9 on the previous study  This lesion is not identified on the current study  There is visualization of a new T2 hyperintense area within the right hepatic lobe at the junction of the segment 7 and 8 in image 45  These foci do not not demonstrate any diffusion restriction and does not demonstrate any contrast enhancement Additional  T2 hyperintense focus seen in the segment 6 measuring about 3 mm in image 46 of series 8 without diffusion restriction  A lesion seen in segment 6 laterally in image 43 series 9 on the previous study is demonstrating normalization of diffusion  The lesion which was hypermetabolic on the PET scan, seen in image 181, seen in segment 5 has resolved A small focus of hyperintensity noted in image 111 of series 8, not seen on the previous study, measuring about 6 mm likely artifactual  BILIARY TREE:  There is no intra-hepatic biliary ductal dilatation  The common bile duct is normal in caliber  There is no gross evidence of mass or choledocholithiasis  GALLBLADDER:  The gallbladder is normal in size and morphology    There is no evidence of sludge or stones  There is no gallbladder wall thickening or pericholecystic fluid  No gallbladder mass is identified  PANCREAS: The pancreas is normal in morphology and signal intensity  No cystic or solid mass is identified  The pancreatic duct is normal in caliber  ADRENAL GLANDS:  The adrenal glands are normal in morphology without thickening or focal mass  SPLEEN:  The spleen is normal in size, morphology and signal intensity  No focal mass is identified  KIDNEYS:  The kidneys enhance symmetrically and are normal in morphology  There is no hydronephrosis  Parapelvic cyst is seen on the left site LOWER CHEST:   No gross evidence of mass or infiltrate  ABDOMINAL CAVITY: No lymphadenopathy or mass  No Ascites  Mesenteric fat is normal in signal without evidence of stranding or edema  No mesenteric nodularity or focal mass is identified  ABDOMINAL WALL:  No mass or hernia identified  BOWEL:   Limited evaluation of the hollow viscera demonstrates no gross obstruction or mass  OSSEOUS STRUCTURES:  There is no evidence of destructive process  VASCULAR STRUCTURES:  The visualized vascular structures are patent without evidence of thrombosis or external compression  No aneurysm is identified  Impression: Treatment response On the previous study there were multiple small diffusion restricting liver lesions within the liver parenchyma involving the left and right hepatic lobe  These have resolved and demonstrate normalization of diffusion restriction There is no new definite diffusion restricting lesion seen A dominant lesion which is seen in segment 7 near the dome of the diaphragm is markedly smaller and measures 1 4 x 0 9 cm   Additional lesion which was hypermetabolic on the PET scan in segment 5 is not visualized  Additional smaller subcentimeter the mildly hyperintense foci are noted in the liver parenchyma on the DWI images without diffusion restriction seen in image 38,  image 42  and in image 46 of series 8, not seen on the previous study  These are probably sequela of posttreatment changes The study was marked in EPIC for significant notification  Workstation performed: [id number]     I reviewed the above laboratory and imaging data  Discussion/Summary:   61-year-old male with metastatic rectosigmoid cancer  I suspect the lung nodule is benign since this is not changed with chemotherapy  His most recent MRI shows only 1 lesion and post treatment changes  I have recommended that he consider intraoperative ultrasound of the liver with ablation/resection of any lesions that are seen  I explained the risks including bleeding, infection, recurrence, need for further surgery, wound complications and bile leak  Informed consent was obtained  Would also recommend that he have his primary site removed at the same sitting  We will try to coordinate this with Dr Brandi Guerra  He will need to be off chemotherapy for at least 2 weeks  We will schedule this at our earliest mutual convenience  He is agreeable to this  All his questions were answered

## 2018-05-29 ENCOUNTER — APPOINTMENT (OUTPATIENT)
Dept: LAB | Facility: CLINIC | Age: 48
End: 2018-05-29
Payer: COMMERCIAL

## 2018-05-29 ENCOUNTER — OFFICE VISIT (OUTPATIENT)
Dept: LAB | Facility: CLINIC | Age: 48
End: 2018-05-29
Payer: COMMERCIAL

## 2018-05-29 ENCOUNTER — HOSPITAL ENCOUNTER (OUTPATIENT)
Dept: INFUSION CENTER | Facility: CLINIC | Age: 48
Discharge: HOME | End: 2018-05-29
Payer: COMMERCIAL

## 2018-05-29 VITALS
BODY MASS INDEX: 23.63 KG/M2 | WEIGHT: 147 LBS | HEIGHT: 66 IN | OXYGEN SATURATION: 98 % | DIASTOLIC BLOOD PRESSURE: 70 MMHG | HEART RATE: 76 BPM | TEMPERATURE: 98.2 F | RESPIRATION RATE: 16 BRPM | SYSTOLIC BLOOD PRESSURE: 118 MMHG

## 2018-05-29 DIAGNOSIS — C18.9 METASTATIC COLON CANCER TO LIVER (HCC): ICD-10-CM

## 2018-05-29 DIAGNOSIS — C78.7 METASTATIC COLON CANCER TO LIVER (HCC): ICD-10-CM

## 2018-05-29 LAB
APTT PPP: 27 SECONDS (ref 24–36)
ATRIAL RATE: 66 BPM
EST. AVERAGE GLUCOSE BLD GHB EST-MCNC: 100 MG/DL
HBA1C MFR BLD: 5.1 % (ref 4.2–6.3)
INR PPP: 0.99 (ref 0.86–1.17)
P AXIS: 38 DEGREES
PR INTERVAL: 120 MS
PROTHROMBIN TIME: 12.8 SECONDS (ref 11.8–14.2)
QRS AXIS: 46 DEGREES
QRSD INTERVAL: 86 MS
QT INTERVAL: 390 MS
QTC INTERVAL: 408 MS
T WAVE AXIS: 39 DEGREES
VENTRICULAR RATE: 66 BPM

## 2018-05-29 PROCEDURE — 83036 HEMOGLOBIN GLYCOSYLATED A1C: CPT | Performed by: SURGERY

## 2018-05-29 PROCEDURE — 85610 PROTHROMBIN TIME: CPT | Performed by: SURGERY

## 2018-05-29 PROCEDURE — 96413 CHEMO IV INFUSION 1 HR: CPT

## 2018-05-29 PROCEDURE — 85730 THROMBOPLASTIN TIME PARTIAL: CPT | Performed by: SURGERY

## 2018-05-29 PROCEDURE — 96366 THER/PROPH/DIAG IV INF ADDON: CPT

## 2018-05-29 PROCEDURE — 96411 CHEMO IV PUSH ADDL DRUG: CPT

## 2018-05-29 PROCEDURE — 93010 ELECTROCARDIOGRAM REPORT: CPT | Performed by: INTERNAL MEDICINE

## 2018-05-29 PROCEDURE — 96367 TX/PROPH/DG ADDL SEQ IV INF: CPT

## 2018-05-29 PROCEDURE — G0498 CHEMO EXTEND IV INFUS W/PUMP: HCPCS

## 2018-05-29 PROCEDURE — 93005 ELECTROCARDIOGRAM TRACING: CPT

## 2018-05-29 RX ORDER — CLINDAMYCIN PHOSPHATE 10 MG/G
GEL TOPICAL
Qty: 30 G | Refills: 0 | Status: SHIPPED | OUTPATIENT
Start: 2018-05-29 | End: 2018-08-07 | Stop reason: SDUPTHER

## 2018-05-29 RX ADMIN — LEUCOVORIN CALCIUM 700 MG: 500 INJECTION, POWDER, LYOPHILIZED, FOR SOLUTION INTRAMUSCULAR; INTRAVENOUS at 11:40

## 2018-05-29 RX ADMIN — SODIUM CHLORIDE 20 ML/HR: 9 INJECTION, SOLUTION INTRAVENOUS at 09:30

## 2018-05-29 RX ADMIN — FLUOROURACIL 696 MG: 50 INJECTION, SOLUTION INTRAVENOUS at 13:48

## 2018-05-29 RX ADMIN — Medication 900 MG: at 10:33

## 2018-05-29 RX ADMIN — DIPHENHYDRAMINE HYDROCHLORIDE 50 MG: 50 INJECTION, SOLUTION INTRAMUSCULAR; INTRAVENOUS at 09:59

## 2018-05-29 RX ADMIN — DEXAMETHASONE SODIUM PHOSPHATE: 10 INJECTION, SOLUTION INTRAMUSCULAR; INTRAVENOUS at 09:40

## 2018-05-29 NOTE — PROGRESS NOTES
Pt offers no complaints, pt had labs drawn at outpatient lab this am, awaiting results for treatment clearance

## 2018-05-29 NOTE — PROGRESS NOTES
Treatment completed without adverse event,  5 FU CADD pump verified and connected per protocol    Pt to return Thursday at 12N for Disconnect

## 2018-05-31 ENCOUNTER — HOSPITAL ENCOUNTER (OUTPATIENT)
Dept: INFUSION CENTER | Facility: CLINIC | Age: 48
Discharge: HOME | End: 2018-05-31
Payer: COMMERCIAL

## 2018-05-31 RX ADMIN — Medication 300 UNITS: at 13:10

## 2018-05-31 NOTE — PROGRESS NOTES
[facility] [place] / Catheter maintenance: Offers no complaints at present time: Tolerated infusion without incident: Res volume - 0 0 ml: No adverse reactions noted: Verified follow up appt with patient: Declined AVS

## 2018-06-06 PROBLEM — C78.7 COLON CANCER METASTASIZED TO LIVER (HCC): Status: ACTIVE | Noted: 2018-06-06

## 2018-06-06 PROBLEM — C18.9 COLON CANCER METASTASIZED TO LIVER (HCC): Status: ACTIVE | Noted: 2018-06-06

## 2018-06-12 ENCOUNTER — HOSPITAL ENCOUNTER (OUTPATIENT)
Dept: INFUSION CENTER | Facility: CLINIC | Age: 48
End: 2018-06-12

## 2018-06-15 ENCOUNTER — TRANSCRIBE ORDERS (OUTPATIENT)
Dept: LAB | Facility: CLINIC | Age: 48
End: 2018-06-15

## 2018-06-15 ENCOUNTER — HOSPITAL ENCOUNTER (OUTPATIENT)
Facility: HOSPITAL | Age: 48
Setting detail: OUTPATIENT SURGERY
Discharge: HOME | End: 2018-06-15
Attending: COLON & RECTAL SURGERY | Admitting: COLON & RECTAL SURGERY
Payer: COMMERCIAL

## 2018-06-15 ENCOUNTER — APPOINTMENT (OUTPATIENT)
Dept: LAB | Facility: CLINIC | Age: 48
End: 2018-06-15
Payer: COMMERCIAL

## 2018-06-15 VITALS
WEIGHT: 147 LBS | DIASTOLIC BLOOD PRESSURE: 66 MMHG | BODY MASS INDEX: 23.63 KG/M2 | HEART RATE: 62 BPM | RESPIRATION RATE: 16 BRPM | OXYGEN SATURATION: 99 % | SYSTOLIC BLOOD PRESSURE: 112 MMHG | HEIGHT: 66 IN | TEMPERATURE: 97.4 F

## 2018-06-15 DIAGNOSIS — Z01.818 PREOP EXAMINATION: ICD-10-CM

## 2018-06-15 DIAGNOSIS — Z01.818 PREOP EXAMINATION: Primary | ICD-10-CM

## 2018-06-15 PROCEDURE — 36415 COLL VENOUS BLD VENIPUNCTURE: CPT | Performed by: SURGERY

## 2018-06-15 PROCEDURE — 86850 RBC ANTIBODY SCREEN: CPT | Performed by: SURGERY

## 2018-06-15 PROCEDURE — 86901 BLOOD TYPING SEROLOGIC RH(D): CPT | Performed by: SURGERY

## 2018-06-15 PROCEDURE — 86900 BLOOD TYPING SEROLOGIC ABO: CPT | Performed by: SURGERY

## 2018-06-15 NOTE — OP NOTE
OPERATIVE REPORT  PATIENT NAME: Jose Huerta    :  1970  MRN: [id number]  Pt Location: [facility] 01    SURGERY DATE: 6/15/2018    Surgeon(s) and Role:     * Ilana Benoit MD - Primary    Operative Findings:  -Sigmoid/rectosigmoid tumor site at ~17-20cm as previous with dramatic reduction to only scar/telangiectasia, tattooed 4 positions distally for operative identification  Recommendations:   Continue plan for combined primary colon and liver resection with myself and Dr Graciela Montero next week  Preop Diagnosis:  Malignant neoplasm of colon, unspecified part of colon ([address] ) [C18 9]    Post-Op Diagnosis Codes:     * Malignant neoplasm of colon, unspecified part of colon ([address] ) [C18 9]    Procedure(s) (LRB):  SIGMOIDOSCOPY FLEXIBLE w/o sedation w/ tattoo (N/A)    Specimen(s):  * No specimens in log *    Estimated Blood Loss:   None    Anesthesia Type:   None    Operative Indications:  Malignant neoplasm of colon, unspecified part of colon ([address] ) [I55 8]    Complications:   None    Procedure and Technique:  After appropriate identification, patient was placed in left lateral decubitus position, timeout was taken per protocol and digital rectal examination revealed normal rectal examination  Scope was introduced and advanced without difficulty descending/sigmoid junction, ~40cm  Scope was then removed with careful mucosal evaluation and visualization  Prep was adequate       I was present for the entire procedure    Patient Disposition:  PACU     SIGNATURE: Ilana Benoit MD  DATE: Sangeeta 15, 2018  TIME: 8:44 AM

## 2018-06-16 LAB
ABO GROUP BLD: NORMAL
BLD GP AB SCN SERPL QL: NEGATIVE
RH BLD: NEGATIVE
SPECIMEN EXPIRATION DATE: NORMAL

## 2018-06-18 NOTE — PRE-PROCEDURE INSTRUCTIONS
Pre-Surgery Instructions:   Medication Instructions    clindamycin (CLINDAGEL) 1 % gel Instructed patient per Anesthesia Guidelines   omeprazole (PriLOSEC) 40 MG capsule Instructed patient per Anesthesia Guidelines   prochlorperazine (COMPAZINE) 10 mg tablet Instructed patient per Anesthesia Guidelines   sildenafil (REVATIO) 20 mg tablet Instructed patient per Anesthesia Guidelines  REVIEWED  PRINTED SURGICAL INSTRUCTIONS WITH PATIENT , PATIENT VERBALIZED UNDERSTANDING   MEDICATIONS REVIEWED

## 2018-06-20 ENCOUNTER — ANESTHESIA EVENT (OUTPATIENT)
Dept: PERIOP | Facility: HOSPITAL | Age: 48
DRG: 330 | End: 2018-06-20
Payer: COMMERCIAL

## 2018-06-21 ENCOUNTER — ANESTHESIA (OUTPATIENT)
Dept: PERIOP | Facility: HOSPITAL | Age: 48
DRG: 330 | End: 2018-06-21
Payer: COMMERCIAL

## 2018-06-21 ENCOUNTER — HOSPITAL ENCOUNTER (INPATIENT)
Facility: HOSPITAL | Age: 48
LOS: 7 days | Discharge: HOME | DRG: 330 | End: 2018-06-28
Attending: SURGERY | Admitting: COLON & RECTAL SURGERY
Payer: COMMERCIAL

## 2018-06-21 DIAGNOSIS — C18.9 COLON CANCER METASTASIZED TO LIVER (HCC): ICD-10-CM

## 2018-06-21 DIAGNOSIS — C78.7 COLON CANCER METASTASIZED TO LIVER (HCC): ICD-10-CM

## 2018-06-21 LAB
GLUCOSE SERPL-MCNC: 119 MG/DL (ref 65–140)
GLUCOSE SERPL-MCNC: 125 MG/DL (ref 65–140)
PLATELET # BLD AUTO: 116 THOUSANDS/UL (ref 149–390)
PMV BLD AUTO: 11.3 FL (ref 8.9–12.7)

## 2018-06-21 PROCEDURE — 88342 IMHCHEM/IMCYTCHM 1ST ANTB: CPT | Performed by: PATHOLOGY

## 2018-06-21 PROCEDURE — 0FB10ZZ EXCISION OF RIGHT LOBE LIVER, OPEN APPROACH: ICD-10-PCS | Performed by: SURGERY

## 2018-06-21 PROCEDURE — 88307 TISSUE EXAM BY PATHOLOGIST: CPT | Performed by: PATHOLOGY

## 2018-06-21 PROCEDURE — 88304 TISSUE EXAM BY PATHOLOGIST: CPT | Performed by: PATHOLOGY

## 2018-06-21 PROCEDURE — 88309 TISSUE EXAM BY PATHOLOGIST: CPT | Performed by: PATHOLOGY

## 2018-06-21 PROCEDURE — 0DJD8ZZ INSPECTION OF LOWER INTESTINAL TRACT, VIA NATURAL OR ARTIFICIAL OPENING ENDOSCOPIC: ICD-10-PCS | Performed by: COLON & RECTAL SURGERY

## 2018-06-21 PROCEDURE — C1886 CATHETER, ABLATION: HCPCS | Performed by: SURGERY

## 2018-06-21 PROCEDURE — 85049 AUTOMATED PLATELET COUNT: CPT | Performed by: SURGERY

## 2018-06-21 PROCEDURE — 0DBP0ZZ EXCISION OF RECTUM, OPEN APPROACH: ICD-10-PCS | Performed by: COLON & RECTAL SURGERY

## 2018-06-21 PROCEDURE — 94762 N-INVAS EAR/PLS OXIMTRY CONT: CPT

## 2018-06-21 PROCEDURE — 4A1BXSH MONITORING OF GASTROINTESTINAL VASCULAR PERFUSION USING INDOCYANINE GREEN DYE, EXTERNAL APPROACH: ICD-10-PCS | Performed by: COLON & RECTAL SURGERY

## 2018-06-21 PROCEDURE — 76998 US GUIDE INTRAOP: CPT | Performed by: SURGERY

## 2018-06-21 PROCEDURE — 88341 IMHCHEM/IMCYTCHM EA ADD ANTB: CPT | Performed by: PATHOLOGY

## 2018-06-21 PROCEDURE — 0DTG0ZZ RESECTION OF LEFT LARGE INTESTINE, OPEN APPROACH: ICD-10-PCS | Performed by: COLON & RECTAL SURGERY

## 2018-06-21 PROCEDURE — 86920 COMPATIBILITY TEST SPIN: CPT

## 2018-06-21 PROCEDURE — 47120 PARTIAL REMOVAL OF LIVER: CPT | Performed by: SURGERY

## 2018-06-21 PROCEDURE — 82948 REAGENT STRIP/BLOOD GLUCOSE: CPT

## 2018-06-21 RX ORDER — NEOMYCIN SULFATE 500 MG/1
TABLET ORAL
COMMUNITY
Start: 2018-06-06 | End: 2018-06-28 | Stop reason: HOSPADM

## 2018-06-21 RX ORDER — METOCLOPRAMIDE HYDROCHLORIDE 5 MG/ML
5 INJECTION INTRAMUSCULAR; INTRAVENOUS EVERY 6 HOURS PRN
Status: DISCONTINUED | OUTPATIENT
Start: 2018-06-21 | End: 2018-06-28 | Stop reason: HOSPADM

## 2018-06-21 RX ORDER — SODIUM CHLORIDE 9 MG/ML
INJECTION, SOLUTION INTRAVENOUS CONTINUOUS PRN
Status: DISCONTINUED | OUTPATIENT
Start: 2018-06-21 | End: 2018-06-21 | Stop reason: SURG

## 2018-06-21 RX ORDER — ROCURONIUM BROMIDE 10 MG/ML
INJECTION, SOLUTION INTRAVENOUS AS NEEDED
Status: DISCONTINUED | OUTPATIENT
Start: 2018-06-21 | End: 2018-06-21 | Stop reason: SURG

## 2018-06-21 RX ORDER — DOCUSATE SODIUM 100 MG/1
100 CAPSULE, LIQUID FILLED ORAL 2 TIMES DAILY PRN
Status: DISCONTINUED | OUTPATIENT
Start: 2018-06-21 | End: 2018-06-25

## 2018-06-21 RX ORDER — ONDANSETRON 2 MG/ML
4 INJECTION INTRAMUSCULAR; INTRAVENOUS ONCE AS NEEDED
Status: DISCONTINUED | OUTPATIENT
Start: 2018-06-21 | End: 2018-06-21 | Stop reason: HOSPADM

## 2018-06-21 RX ORDER — ONDANSETRON 2 MG/ML
INJECTION INTRAMUSCULAR; INTRAVENOUS AS NEEDED
Status: DISCONTINUED | OUTPATIENT
Start: 2018-06-21 | End: 2018-06-21 | Stop reason: SURG

## 2018-06-21 RX ORDER — ONDANSETRON 2 MG/ML
4 INJECTION INTRAMUSCULAR; INTRAVENOUS EVERY 4 HOURS PRN
Status: DISCONTINUED | OUTPATIENT
Start: 2018-06-21 | End: 2018-06-21

## 2018-06-21 RX ORDER — SODIUM CHLORIDE, SODIUM LACTATE, POTASSIUM CHLORIDE, CALCIUM CHLORIDE 600; 310; 30; 20 MG/100ML; MG/100ML; MG/100ML; MG/100ML
INJECTION, SOLUTION INTRAVENOUS CONTINUOUS PRN
Status: DISCONTINUED | OUTPATIENT
Start: 2018-06-21 | End: 2018-06-21 | Stop reason: SURG

## 2018-06-21 RX ORDER — METRONIDAZOLE 500 MG/1
TABLET ORAL
COMMUNITY
Start: 2018-06-06 | End: 2018-06-28 | Stop reason: HOSPADM

## 2018-06-21 RX ORDER — FENTANYL CITRATE 50 UG/ML
INJECTION, SOLUTION INTRAMUSCULAR; INTRAVENOUS AS NEEDED
Status: DISCONTINUED | OUTPATIENT
Start: 2018-06-21 | End: 2018-06-21 | Stop reason: SURG

## 2018-06-21 RX ORDER — FENTANYL CITRATE/PF 50 MCG/ML
50 SYRINGE (ML) INJECTION
Status: DISCONTINUED | OUTPATIENT
Start: 2018-06-21 | End: 2018-06-21 | Stop reason: HOSPADM

## 2018-06-21 RX ORDER — EPHEDRINE SULFATE 50 MG/ML
INJECTION, SOLUTION INTRAVENOUS AS NEEDED
Status: DISCONTINUED | OUTPATIENT
Start: 2018-06-21 | End: 2018-06-21 | Stop reason: SURG

## 2018-06-21 RX ORDER — METOCLOPRAMIDE HYDROCHLORIDE 5 MG/ML
10 INJECTION INTRAMUSCULAR; INTRAVENOUS ONCE AS NEEDED
Status: DISCONTINUED | OUTPATIENT
Start: 2018-06-21 | End: 2018-06-21 | Stop reason: HOSPADM

## 2018-06-21 RX ORDER — DIPHENHYDRAMINE HYDROCHLORIDE 50 MG/ML
12.5 INJECTION INTRAMUSCULAR; INTRAVENOUS ONCE AS NEEDED
Status: DISCONTINUED | OUTPATIENT
Start: 2018-06-21 | End: 2018-06-21 | Stop reason: HOSPADM

## 2018-06-21 RX ORDER — HEPARIN SODIUM 5000 [USP'U]/ML
5000 INJECTION, SOLUTION INTRAVENOUS; SUBCUTANEOUS ONCE
Status: DISCONTINUED | OUTPATIENT
Start: 2018-06-21 | End: 2018-06-21

## 2018-06-21 RX ORDER — SODIUM CHLORIDE, SODIUM LACTATE, POTASSIUM CHLORIDE, CALCIUM CHLORIDE 600; 310; 30; 20 MG/100ML; MG/100ML; MG/100ML; MG/100ML
75 INJECTION, SOLUTION INTRAVENOUS CONTINUOUS
Status: DISCONTINUED | OUTPATIENT
Start: 2018-06-21 | End: 2018-06-21

## 2018-06-21 RX ORDER — BUPIVACAINE HYDROCHLORIDE 2.5 MG/ML
INJECTION, SOLUTION INFILTRATION; PERINEURAL AS NEEDED
Status: DISCONTINUED | OUTPATIENT
Start: 2018-06-21 | End: 2018-06-21 | Stop reason: SURG

## 2018-06-21 RX ORDER — ONDANSETRON 2 MG/ML
4 INJECTION INTRAMUSCULAR; INTRAVENOUS EVERY 4 HOURS PRN
Status: DISCONTINUED | OUTPATIENT
Start: 2018-06-21 | End: 2018-06-28 | Stop reason: HOSPADM

## 2018-06-21 RX ORDER — CLONIDINE 100 UG/ML
INJECTION, SOLUTION EPIDURAL AS NEEDED
Status: DISCONTINUED | OUTPATIENT
Start: 2018-06-21 | End: 2018-06-21 | Stop reason: SURG

## 2018-06-21 RX ORDER — GLYCOPYRROLATE 0.2 MG/ML
INJECTION INTRAMUSCULAR; INTRAVENOUS AS NEEDED
Status: DISCONTINUED | OUTPATIENT
Start: 2018-06-21 | End: 2018-06-21 | Stop reason: SURG

## 2018-06-21 RX ORDER — PROPOFOL 10 MG/ML
INJECTION, EMULSION INTRAVENOUS AS NEEDED
Status: DISCONTINUED | OUTPATIENT
Start: 2018-06-21 | End: 2018-06-21 | Stop reason: SURG

## 2018-06-21 RX ORDER — PANTOPRAZOLE SODIUM 20 MG/1
20 TABLET, DELAYED RELEASE ORAL
Status: DISCONTINUED | OUTPATIENT
Start: 2018-06-22 | End: 2018-06-25

## 2018-06-21 RX ORDER — MIDAZOLAM HYDROCHLORIDE 1 MG/ML
INJECTION INTRAMUSCULAR; INTRAVENOUS AS NEEDED
Status: DISCONTINUED | OUTPATIENT
Start: 2018-06-21 | End: 2018-06-21 | Stop reason: SURG

## 2018-06-21 RX ORDER — HYDROMORPHONE HYDROCHLORIDE 2 MG/ML
INJECTION, SOLUTION INTRAMUSCULAR; INTRAVENOUS; SUBCUTANEOUS AS NEEDED
Status: DISCONTINUED | OUTPATIENT
Start: 2018-06-21 | End: 2018-06-21 | Stop reason: SURG

## 2018-06-21 RX ORDER — SODIUM CHLORIDE 9 MG/ML
125 INJECTION, SOLUTION INTRAVENOUS CONTINUOUS
Status: DISCONTINUED | OUTPATIENT
Start: 2018-06-21 | End: 2018-06-22

## 2018-06-21 RX ORDER — HEPARIN SODIUM 5000 [USP'U]/ML
5000 INJECTION, SOLUTION INTRAVENOUS; SUBCUTANEOUS EVERY 12 HOURS SCHEDULED
Status: DISCONTINUED | OUTPATIENT
Start: 2018-06-21 | End: 2018-06-25

## 2018-06-21 RX ORDER — LIDOCAINE HYDROCHLORIDE AND EPINEPHRINE 15; 5 MG/ML; UG/ML
INJECTION, SOLUTION EPIDURAL AS NEEDED
Status: DISCONTINUED | OUTPATIENT
Start: 2018-06-21 | End: 2018-06-21 | Stop reason: SURG

## 2018-06-21 RX ORDER — DIPHENHYDRAMINE HYDROCHLORIDE 50 MG/ML
25 INJECTION INTRAMUSCULAR; INTRAVENOUS EVERY 6 HOURS PRN
Status: DISCONTINUED | OUTPATIENT
Start: 2018-06-21 | End: 2018-06-28 | Stop reason: HOSPADM

## 2018-06-21 RX ADMIN — FENTANYL CITRATE 50 MCG: 50 INJECTION, SOLUTION INTRAMUSCULAR; INTRAVENOUS at 12:50

## 2018-06-21 RX ADMIN — ROPIVACAINE HYDROCHLORIDE: 5 INJECTION, SOLUTION EPIDURAL; INFILTRATION; PERINEURAL at 16:30

## 2018-06-21 RX ADMIN — DEXAMETHASONE SODIUM PHOSPHATE 10 MG: 10 INJECTION INTRAMUSCULAR; INTRAVENOUS at 13:33

## 2018-06-21 RX ADMIN — SODIUM CHLORIDE: 0.9 INJECTION, SOLUTION INTRAVENOUS at 12:10

## 2018-06-21 RX ADMIN — ROCURONIUM BROMIDE 50 MG: 10 INJECTION INTRAVENOUS at 12:02

## 2018-06-21 RX ADMIN — FENTANYL CITRATE 50 MCG: 50 INJECTION, SOLUTION INTRAMUSCULAR; INTRAVENOUS at 12:40

## 2018-06-21 RX ADMIN — GLYCOPYRROLATE 0.8 MG: 0.2 INJECTION, SOLUTION INTRAMUSCULAR; INTRAVENOUS at 15:10

## 2018-06-21 RX ADMIN — FENTANYL CITRATE 100 MCG: 50 INJECTION, SOLUTION INTRAMUSCULAR; INTRAVENOUS at 12:02

## 2018-06-21 RX ADMIN — METRONIDAZOLE 500 MG: 500 INJECTION, SOLUTION INTRAVENOUS at 12:20

## 2018-06-21 RX ADMIN — NEOSTIGMINE METHYLSULFATE 4 MG: 1 INJECTION, SOLUTION INTRAMUSCULAR; INTRAVENOUS; SUBCUTANEOUS at 15:10

## 2018-06-21 RX ADMIN — EPHEDRINE SULFATE 5 MG: 50 INJECTION, SOLUTION INTRAMUSCULAR; INTRAVENOUS; SUBCUTANEOUS at 12:02

## 2018-06-21 RX ADMIN — BUPIVACAINE HYDROCHLORIDE 6 ML: 2.5 INJECTION, SOLUTION INFILTRATION; PERINEURAL at 15:50

## 2018-06-21 RX ADMIN — SODIUM CHLORIDE, SODIUM LACTATE, POTASSIUM CHLORIDE, AND CALCIUM CHLORIDE: .6; .31; .03; .02 INJECTION, SOLUTION INTRAVENOUS at 11:30

## 2018-06-21 RX ADMIN — SODIUM CHLORIDE 125 ML/HR: 0.9 INJECTION, SOLUTION INTRAVENOUS at 18:33

## 2018-06-21 RX ADMIN — PROPOFOL 200 MG: 10 INJECTION, EMULSION INTRAVENOUS at 12:02

## 2018-06-21 RX ADMIN — ONDANSETRON 4 MG: 2 INJECTION INTRAMUSCULAR; INTRAVENOUS at 14:58

## 2018-06-21 RX ADMIN — CLONIDINE 100 MCG: 100 INJECTION, SOLUTION EPIDURAL at 15:30

## 2018-06-21 RX ADMIN — BUPIVACAINE HYDROCHLORIDE 5 ML: 2.5 INJECTION, SOLUTION INFILTRATION; PERINEURAL at 15:30

## 2018-06-21 RX ADMIN — MIDAZOLAM 2 MG: 1 INJECTION INTRAMUSCULAR; INTRAVENOUS at 11:56

## 2018-06-21 RX ADMIN — SODIUM CHLORIDE: 0.9 INJECTION, SOLUTION INTRAVENOUS at 12:59

## 2018-06-21 RX ADMIN — CEFAZOLIN SODIUM 2000 MG: 1 SOLUTION INTRAVENOUS at 12:15

## 2018-06-21 RX ADMIN — SODIUM CHLORIDE, SODIUM LACTATE, POTASSIUM CHLORIDE, AND CALCIUM CHLORIDE: .6; .31; .03; .02 INJECTION, SOLUTION INTRAVENOUS at 12:58

## 2018-06-21 RX ADMIN — LIDOCAINE HYDROCHLORIDE AND EPINEPHRINE 5 ML: 15; 5 INJECTION, SOLUTION EPIDURAL at 11:42

## 2018-06-21 RX ADMIN — HYDROMORPHONE HYDROCHLORIDE 1 MG: 2 INJECTION, SOLUTION INTRAMUSCULAR; INTRAVENOUS; SUBCUTANEOUS at 15:42

## 2018-06-21 RX ADMIN — HEPARIN SODIUM 5000 UNITS: 5000 INJECTION, SOLUTION INTRAVENOUS; SUBCUTANEOUS at 22:35

## 2018-06-21 NOTE — ANESTHESIA POSTPROCEDURE EVALUATION
Post-Op Assessment Note      CV Status:  Stable    Mental Status:  Alert and awake    Hydration Status:  Euvolemic    PONV Controlled:  Controlled    Airway Patency:  Patent    Post Op Vitals Reviewed: Yes          Staff: Anesthesiologist, CRNA           /57 (06/21/18 1548)    Temp (!) 97 3 °F (36 3 °C) (06/21/18 1548)    Pulse 77 (06/21/18 1548)   Resp 14 (06/21/18 1548)    SpO2 100 % (06/21/18 1548)

## 2018-06-21 NOTE — OR NURSING
Pt has red rash noted on B/L arms, chest, back, face, B/L lower legs, and small amt on thighs    Dr Brii Garcia aware

## 2018-06-21 NOTE — H&P (VIEW-ONLY)
Surgical Oncology Follow Up       [address] [facility]  [facility] [address]  Iraj Hint  1970  [id number]  [address]  [facility] [address]    Diagnoses and all orders for this visit:    Metastatic colon cancer to liver [facility])  -     Ambulatory referral to Colorectal Surgery; Future        Chief Complaint   Patient presents with    Follow-up     Pt is here after MRI to discuss treatment plan  No Follow-up on file  Metastatic colon cancer to liver ([facility] [address] )    1/2018 Initial Diagnosis     Malignant neoplasm of sigmoid colon ([facility] [address] )         1/22/2018 Biopsy     Large Intestine, Sigmoid Colon, Recto-sigmoid tumor bx:    - Invasive colonic adenocarcinoma, moderately differentiated         2/6/2018 -  Chemotherapy     Modified FOLFOX6   Added Erbitux on 3/20/18             Staging:  Metastatic rectosigmoid cancer  Treatment history:   Modified FOLFOX 6   Erbitux added 03/20/2018   Current treatment:  Modified FOLFOX 6   Erbitux added 03/20/2018   Disease status:   Stable    History of Present Illness:   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  MRI from May 18, 2018 revealed that there was normalization of diffusion restriction on his MRI  There were no new lesion seen  The dominant lesion in segment 7 near the dome measures 1 4 x 0 9 cm  The additional lesion seen on PET in segment 5 was not visualized  Post treatment changes were seen  I personally reviewed the films  He comes in now to discuss further therapy  He is feeling well  Review of Systems  Complete ROS Surg Onc:   Complete ROS Surg Onc:   Constitutional: The patient denies new or recent history of general fatigue, no recent weight loss, no change in appetite  Eyes: No complaints of visual problems, no scleral icterus     ENT: no complaints of ear pain, no hoarseness, no difficulty swallowing,  no tinnitus and no new masses in head, oral cavity, or neck  Cardiovascular: No complaints of chest pain, no palpitations, no ankle edema  Respiratory: No complaints of shortness of breath, no cough  Gastrointestinal: No complaints of jaundice, no bloody stools, no pale stools  Genitourinary: No complaints of dysuria, no hematuria, no nocturia, no frequent urination, no urethral discharge  Musculoskeletal: No complaints of weakness, paralysis, joint stiffness or arthralgias  Integumentary: No complaints of rash, no new lesions  Neurological: No complaints of convulsions, no seizures, no dizziness  Hematologic/Lymphatic: No complaints of easy bruising  Endocrine:  No hot or cold intolerance  No polydipsia, polyphagia, or polyuria  Allergy/immunology:  No environmental allergies  No food allergies  Not immunocompromised  Skin:  No pallor or rash  No wound  Patient Active Problem List   Diagnosis    ED (erectile dysfunction)    Metastatic colon cancer to liver (HCC)    GERD (gastroesophageal reflux disease)    Pulmonary nodule, right     Past Medical History:   Diagnosis Date    Colorectal cancer, stage IV ([address] )     Dysuria     Last Assessed:8/24/17    GERD (gastroesophageal reflux disease)     Liver nodule     Pulmonary nodule, right      Past Surgical History:   Procedure Laterality Date    COLONOSCOPY N/A 1/22/2018    Procedure: COLONOSCOPY;  Surgeon: Rick Davila MD;  Location: [facility]; Service: Colorectal   [facility] DENTAL SURGERY  2016    Crown with bridge work    TX INSERT PICC W/ SUB-Q PORT N/A 1/25/2018    Procedure: PORT-A-CATHETER PLACEMENT  Fluoroscopy for performance/interpretation;  Surgeon: Rick Davila MD;  Location: [facility];  Service: Colorectal    ROOT CANAL  2015    TESTICLE SURGERY      Exploration of Undescended Testis rIght, age 8 had surgery for undescended testicle;  Last Assessed:8/24/17    TOOTH EXTRACTION  2015     Family History   Problem Relation Age of Onset    No Known Problems Father     Cancer Mother     Vaginal cancer Mother      malignant neoplasm of vagina    Cancer Brother      pt  reports brother was diagnosed with stage 4 throat cancer     Social History     Social History    Marital status: Single     Spouse name: N/A    Number of children: N/A    Years of education: N/A     Occupational History    Not on file  Social History Main Topics    Smoking status: Former Smoker     Types: E-Cigarettes    Smokeless tobacco: Current User      Comment: quit 4 years ago / smoked for 20 years     Alcohol use Yes      Comment: socially     Drug use: No      Comment: per [employer]: occasional recreation drug use    Sexual activity: Not on file     Other Topics Concern    Not on file     Social History Narrative    No narrative on file       Current Outpatient Prescriptions:     clindamycin (CLINDAGEL) 1 % gel, Apply topically 2 (two) times a day PRN skin lesions, Disp: 30 g, Rfl: 0    omeprazole (PriLOSEC) 40 MG capsule, Take 40 mg by mouth daily, Disp: , Rfl:     prochlorperazine (COMPAZINE) 10 mg tablet, Take 1 tablet (10 mg total) by mouth every 6 (six) hours as needed for nausea or vomiting, Disp: 30 tablet, Rfl: 6    sildenafil (REVATIO) 20 mg tablet, Take 1 tablet (20 mg total) by mouth 3 (three) times a day, Disp: 90 tablet, Rfl: 3  No current facility-administered medications for this visit       Facility-Administered Medications Ordered in Other Visits:     [START ON 5/29/2018] alteplase (CATHFLO) injection 2 mg, 2 mg, Intracatheter, PRN, Ronal Owen MD  [facility]  [START ON 5/29/2018] cetuximab (ERBITUX) 900 mg in IVPB 450 mL, 900 mg, Intravenous, Once, Ronal Owen MD  [facility]  [START ON 5/29/2018] diphenhydrAMINE (BENADRYL) 50 mg in sodium chloride 0 9 % 50 mL IVPB, 50 mg, Intravenous, Once, Ronal Owen MD  [facility]  [START ON 5/29/2018] fluorouracil (ADRUCIL) injection 696 mg, 696 mg, Intravenous, Once, MD Clara Cheatham  [START ON 5/29/2018] heparin lock flush 100 units/mL injection 300 Units, 300 Units, Intracatheter, PRN, MD Clara Cheatham  [START ON 5/29/2018] leucovorin 700 mg in dextrose 5 % 250 mL IVPB, 700 mg, Intravenous, Once, MD Clara Cheatham  [START ON 5/29/2018] ondansetron (ZOFRAN) 16 mg, dexamethasone (DECADRON) 10 mg in sodium chloride 0 9 % 50 mL IVPB, , Intravenous, Once, MD Clara Cheatham  [START ON 5/29/2018] sodium chloride 0 9 % infusion, 20 mL/hr, Intravenous, Continuous, John Thakkar MD  No Known Allergies  Vitals:    05/25/18 1014   BP: 112/76   Pulse: 74   Resp: 14   Temp: 98 4 °F (36 9 °C)       Physical Exam  Constitutional: General appearance: The Patient is well-developed and well-nourished who appears the stated age in no acute distress  Patient is pleasant and talkative  HEENT:  Normocephalic  Sclerae are anicteric  Mucous membranes are moist  Neck is supple without adenopathy  No JVD  Chest: The lungs are clear to auscultation  Cardiac: Heart is regular rate  Abdomen: Abdomen is soft, non-tender, non-distended and without masses  Extremities: There is no clubbing or cyanosis  There is no edema  Symmetric  Neuro: Grossly nonfocal  Gait is normal      Lymphatic: No evidence of cervical adenopathy bilaterally  No evidence of axillary adenopathy bilaterally  No evidence of inguinal adenopathy bilaterally  Skin: Warm, anicteric  Psych:  Patient is pleasant and talkative    Breasts:        Pathology:      Labs:      Imaging  Mri Abdomen W Wo Contrast    Result Date: 5/18/2018  Narrative: MRI - ABDOMEN - WITH AND WITHOUT CONTRAST INDICATION:  Liver lesion COMPARISON: Previous MRI from December 27, 2017, previous CT from April 20, 2018 and previous PET scan TECHNIQUE:  The following pulse sequences were obtained on a 1 5 T scanner prior to and following the administration of intravenous contrast:     Coronal and axial T2 with TE of 90 and 180 respectively, axial T2 with fat saturation, axial FIESTA fat-sat,  axial T1-weighted in-and-out-of phase, axial DWI/ADC, precontrast axial T1 with fat saturation, post-contrast dynamic axial T1 with fat saturation at 20, 70, and 180 seconds, coronal T1  with fat saturation and 7 minute delayed axial T1 with fat saturation  IV Contrast:  7 mL of Gadobutrol injection (SINGLE-DOSE) FINDINGS: LIVER: The previously noted lesion in the right hepatic lobe near the dome of the liver has become smaller and is seen in segment 7  The hyperintensity noted in this lesion has markedly decreased with the normalization of the diffusion restriction  The  residual lesion is faintly seen measuring about 1 4 x 0 9 cm  On the previous study this lesion was measuring 3 x 2 8 cm Additional small lesions are seen on the previous study  A lesion was seen in segment 4 in image 39 of series 9 on the previous study  This lesion is not identified on the current study  There is visualization of a new T2 hyperintense area within the right hepatic lobe at the junction of the segment 7 and 8 in image 45  These foci do not not demonstrate any diffusion restriction and does not demonstrate any contrast enhancement Additional  T2 hyperintense focus seen in the segment 6 measuring about 3 mm in image 46 of series 8 without diffusion restriction  A lesion seen in segment 6 laterally in image 43 series 9 on the previous study is demonstrating normalization of diffusion  The lesion which was hypermetabolic on the PET scan, seen in image 181, seen in segment 5 has resolved A small focus of hyperintensity noted in image 111 of series 8, not seen on the previous study, measuring about 6 mm likely artifactual  BILIARY TREE:  There is no intra-hepatic biliary ductal dilatation  The common bile duct is normal in caliber  There is no gross evidence of mass or choledocholithiasis  GALLBLADDER:  The gallbladder is normal in size and morphology    There is no evidence of sludge or stones  There is no gallbladder wall thickening or pericholecystic fluid  No gallbladder mass is identified  PANCREAS: The pancreas is normal in morphology and signal intensity  No cystic or solid mass is identified  The pancreatic duct is normal in caliber  ADRENAL GLANDS:  The adrenal glands are normal in morphology without thickening or focal mass  SPLEEN:  The spleen is normal in size, morphology and signal intensity  No focal mass is identified  KIDNEYS:  The kidneys enhance symmetrically and are normal in morphology  There is no hydronephrosis  Parapelvic cyst is seen on the left site LOWER CHEST:   No gross evidence of mass or infiltrate  ABDOMINAL CAVITY: No lymphadenopathy or mass  No Ascites  Mesenteric fat is normal in signal without evidence of stranding or edema  No mesenteric nodularity or focal mass is identified  ABDOMINAL WALL:  No mass or hernia identified  BOWEL:   Limited evaluation of the hollow viscera demonstrates no gross obstruction or mass  OSSEOUS STRUCTURES:  There is no evidence of destructive process  VASCULAR STRUCTURES:  The visualized vascular structures are patent without evidence of thrombosis or external compression  No aneurysm is identified  Impression: Treatment response On the previous study there were multiple small diffusion restricting liver lesions within the liver parenchyma involving the left and right hepatic lobe  These have resolved and demonstrate normalization of diffusion restriction There is no new definite diffusion restricting lesion seen A dominant lesion which is seen in segment 7 near the dome of the diaphragm is markedly smaller and measures 1 4 x 0 9 cm   Additional lesion which was hypermetabolic on the PET scan in segment 5 is not visualized  Additional smaller subcentimeter the mildly hyperintense foci are noted in the liver parenchyma on the DWI images without diffusion restriction seen in image 38,  image 42  and in image 46 of series 8, not seen on the previous study  These are probably sequela of posttreatment changes The study was marked in EPIC for significant notification  Workstation performed: [id number]     I reviewed the above laboratory and imaging data  Discussion/Summary:   63-year-old male with metastatic rectosigmoid cancer  I suspect the lung nodule is benign since this is not changed with chemotherapy  His most recent MRI shows only 1 lesion and post treatment changes  I have recommended that he consider intraoperative ultrasound of the liver with ablation/resection of any lesions that are seen  I explained the risks including bleeding, infection, recurrence, need for further surgery, wound complications and bile leak  Informed consent was obtained  Would also recommend that he have his primary site removed at the same sitting  We will try to coordinate this with Dr Cristina Null  He will need to be off chemotherapy for at least 2 weeks  We will schedule this at our earliest mutual convenience  He is agreeable to this  All his questions were answered

## 2018-06-21 NOTE — ANESTHESIA PROCEDURE NOTES
Arterial Line Insertion  Date/Time: 6/21/2018 12:09 PM  Performed by: Tacho Maharaj  Authorized by: Christy RAWLS   Consent: Written consent obtained  Risks and benefits: risks, benefits and alternatives were discussed  Consent given by: patient  Patient understanding: patient states understanding of the procedure being performed  Patient consent: the patient's understanding of the procedure matches consent given  Procedure consent: procedure consent matches procedure scheduled  Relevant documents: relevant documents present and verified  Test results: test results available and properly labeled  Site marked: the operative site was marked  Radiology Images: Radiology Images displayed and confirmed  If images not available, report reviewed  Required items: required blood products, implants, devices, and special equipment available  Patient identity confirmed: hospital-assigned identification number  Time out: Immediately prior to procedure a "time out" was called to verify the correct patient, procedure, equipment, support staff and site/side marked as required  Preparation: Patient was prepped and draped in the usual sterile fashion  Indications: multiple ABGs and hemodynamic monitoring  Orientation:  Right  Location: radial artery  Anesthesia: see MAR for details  Sedation:  Patient sedated: yes  Sedation type: (GA)  Analgesia: see MAR for details  Vitals: Vital signs were monitored during sedation      Procedure Details:  Needle gauge: 20  Number of attempts: 1    Post-procedure:  Post-procedure: dressing applied  Waveform: good waveform  Patient tolerance: Patient tolerated the procedure well with no immediate complications

## 2018-06-21 NOTE — OP NOTE
OPERATIVE REPORT  PATIENT NAME: Kristin Gilliam    :  1970  MRN: [id number]  Pt Location: [facility] OR ROOM 07    SURGERY DATE: 2018    Surgeon(s) and Role:  Panel 1:     * Parveen Parker MD - Primary    Panel 2:     * Rao Thao MD - Primary    Preop Diagnosis:  Colon cancer metastasized to liver ([address] ) [C18 9, C78 7]    Post-Op Diagnosis Codes:     * Colon cancer metastasized to liver ([address] ) [C18 9, C78 7]    Procedure(s) (LRB):  liver resection/ablation, intraoperative ultrasound of liver (N/A)  hemicolectomy, possible low anterior resection (open) (Left)    Specimen(s):  ID Type Source Tests Collected by Time Destination   1 : Segment 7 lesion  Tissue Liver TISSUE EXAM Shikha Chavez MD 2018 1334        Estimated Blood Loss:   Minimal    Drains:  Urethral Catheter Latex 16 Fr  (Active)   Number of days: 0       Anesthesia Type:   General w/ Epidural    Operative Indications:  Colon cancer metastasized to liver ([address] ) [C18 9, C667]  79-year-old male with metastatic colon cancer  It was recommended that he undergo resection/ablation of his liver lesions  Risks and benefits were explained  Informed consent was obtained  Patient was brought to the operating room  Operative Findings:  Single lesion seen with intraoperative ultrasound in segment 7  Gross margins were negative  Complications:   None    Procedure and Technique:  After identifying the patient, general anesthesia was achieved  A Nickerson catheter was placed  Lines were placed by Anesthesia  Patient was placed in the lithotomy position and then prepped and draped in the usual sterile fashion  Time-out was performed  An upper midline incision was made  Using sharp dissection this was taken down through the skin, subcutaneous tissue and through the fascia and into the peritoneal cavity  Peritoneal cavity was explored and there was no obvious peritoneal carcinomatosis    Falciform ligament was identified, and then clamped, divided and ligated with 2 0 silk suture  This was then divided down to the diaphragm to the level of the cava  We now rotated the liver away from the diaphragm by dividing the triangular ligament  This way we were able to visualize the 1 lesion seen and segment 7  Intraoperative ultrasound was now performed  The initial lesions that were seen on his pre chemotherapy in segment 5 as well as in segment 6 laterally in the liver were not palpated nor visualized by intraoperative ultrasound  The only lesion seen by ultrasound was in segment 7  Consequently, we elected to resect the lesion that was seen in segment 7  Using microwave energy we pre coagulated around this tumor using intraoperative ultrasound  Once this was performed, a 0 chromic liver suture was then placed through the the tumor for traction  Using a combination of sharp dissection and the Bovie electrocautery we were able to completely dissect around this  Once we were sure that we were deep to the tumor, we used microwave energy again to ablate the posterior margin and the mass was completely excised  There was excellent hemostasis achieved with the Bovie electrocautery  And direct pressure was held  Ultimately the wound was irrigated there was excellent hemostasis  There was no evidence of bile leak  FloSeal was placed on the resection bed there continued to be excellent hemostasis  The liver was then returned to its normal anatomic position  Sponge and needle counts were correct  Gross margins the tumor were negative  The procedure was now turned over to Dr Arturo Thakkar for colectomy in hemodynamically stable condition  I was present and participated in all aspects of the above procedure  I was present for the entire procedure    Patient Disposition:   Hemodynamically stable condition to Dr Arturo Thakkar of Colorectal surgery      SIGNATURE: Timbo Morgan MD  DATE: June 21, 2018  TIME: 1:56 PM

## 2018-06-21 NOTE — ANESTHESIA PREPROCEDURE EVALUATION
Review of Systems/Medical History  Patient summary reviewed  Chart reviewed  No history of anesthetic complications     Cardiovascular  Negative cardio ROS    Pulmonary  Negative pulmonary ROS        GI/Hepatic    GERD , Liver disease , GI malignancy,   Comment: Metastatic colon cancer with liver metastases     Negative  ROS        Endo/Other  Negative endo/other ROS      GYN       Hematology  Negative hematology ROS      Musculoskeletal  Negative musculoskeletal ROS        Neurology  Negative neurology ROS      Psychology   Negative psychology ROS              Physical Exam    Airway    Mallampati score: II  TM Distance: >3 FB  Neck ROM: full     Dental   No notable dental hx     Cardiovascular  Comment: Negative ROS, Rhythm: regular, Rate: normal, Cardiovascular exam normal    Pulmonary  Pulmonary exam normal Breath sounds clear to auscultation,     Other Findings        Anesthesia Plan  ASA Score- 3     Anesthesia Type- general and epidural with ASA Monitors  Additional Monitors: arterial line  Airway Plan: ETT  Plan Factors-    Induction- intravenous  Postoperative Plan- Plan for postoperative opioid use  Informed Consent- Anesthetic plan and risks discussed with patient  I personally reviewed this patient with the CRNA  Discussed and agreed on the Anesthesia Plan with the CRNA  Tammy Osborne Recent labs personally reviewed:  Lab Results   Component Value Date    WBC 4 48 06/15/2018    HGB 13 5 06/15/2018     (L) 06/15/2018     Lab Results   Component Value Date     06/15/2018    K 4 1 06/15/2018    BUN 10 06/15/2018    CREATININE 0 88 06/15/2018    GLUCOSE 109 05/29/2018     Lab Results   Component Value Date    PTT 27 05/29/2018      Lab Results   Component Value Date    INR 0 99 05/29/2018       Blood type A    Lab Results   Component Value Date    HGBA1C 5 1 05/29/2018       I, Gilberto Jha MD, have personally seen and evaluated the patient prior to anesthetic care  I have reviewed the pre-anesthetic record, and other medical records if appropriate to the anesthetic care  If a CRNA is involved in the case, I have reviewed the CRNA assessment, if present, and agree  Risks/benefits and alternatives discussed with patient including possible PONV, sore throat, and possibility of rare anesthetic and surgical emergencies

## 2018-06-21 NOTE — PERIOPERATIVE NURSING NOTE
Dr Kelby Bull md, aware of patient's epidural dressing having a moderate amount of sero-sang fluid underneath the dressing  md at pacu bedside to evaluate  Dressing ok as per md   Dr Kelby Bull md, also aware that patient c/o arm numbness and right shoulder pain  As per md, findings are to be expected post-procedure due to positioning and due to the surgery performed  As per md, feelings will resolve over time  Will continue to monitor

## 2018-06-21 NOTE — OP NOTE
OPERATIVE REPORT  PATIENT NAME: Kiko Winston    :  1970  MRN: [id number]  Pt Location: BE OR ROOM 07    SURGERY DATE: 2018    Surgeon(s) and Role:  Panel 1-  liver resection/ablation, intraoperative ultrasound of liver (N/A)       * Zion Francisco MD - Primary    Panel 2-  hemicolectomy, low anterior resection (open) SPY fluorescence angiography (Left)  SIGMOIDOSCOPY FLEXIBLE (N/A)  LAPAROTOMY EXPLORATORY (N/A)  Partial proctectomy  (N/A)      * Barbie Short MD - Primary     * Hugh Syed - Assisting    Preop Diagnosis:  Colon cancer metastasized to liver ([address] ) [C18 9, C78 7]    Post-Op Diagnosis Codes:     * Colon cancer metastasized to liver ([address] ) [C18 9, C78 7]    Procedure(s) (LRB):  liver resection/ablation, intraoperative ultrasound of liver (N/A)  hemicolectomy, low anterior resection (open) SPY fluorescence angiography (Left)  SIGMOIDOSCOPY FLEXIBLE (N/A)  LAPAROTOMY EXPLORATORY (N/A)  Partial proctectomy  (N/A)     Specimen(s):  ID Type Source Tests Collected by Time Destination   1 : Segment 7 lesion  Tissue Liver TISSUE Dinora Bland MD 2018 1334    2 : Left hemicolectomy partial proctectomy  Tissue Colon TISSUE EXAM Yulissa Del Real MD 2018 1434    3 : anastamotic donut (proximal)  Tissue Colon TISSUE Dinora Bland MD 2018 1455    4 : anastamotic donut (DISTAL) Tissue Colon TISSUE Dinora Bland MD 2018 1501        Estimated Blood Loss:   250 mL    Drains:  Closed/Suction Drain Right RLQ Bulb 19 Fr  (Active)   Number of days: 0       Urethral Catheter Latex 14 Fr  (Active)   Number of days: 0       Anesthesia Type:   General w/ Epidural    Operative Indications:  Colon cancer metastasized to liver ([address] ) [C18 9, C78 7]    Operative Findings:  -EEA 29 colorectal anastomosis to ~10cm low anterior resection/partial proctectomy    -Excellent SPY fluorescence angiography, intact anastomotic rings(thinner on rectal side), negative bubble leak test with sigmoidoscopy  Complications:   None    Procedure and Technique:  Enrique Alarcon is a 41yo with 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, good response to chemotherapy at liver and primary site, discussed combined procedure after adjuvant therapy hold per Dr Edgardo Driver      We discussed left hemicolectomy, possible anterior resection and risks including not limited to bleeding, infection, risks of anesthesia, open surgery, DVT/PE, heart attack, stroke, death, damage to local structures including ureter and duodenum, and anastomotic leak requiring reoperation, temporary versus permanent stoma  He understood these in a face-to-face, personal, informed consent process detailing the benefits, alternatives, and risks, signed informed consent wished to proceed      He was brought to the operating room where general endotracheal anesthesia was induced without event  Nickerson was placed under sterile conditions,placed in modified lithotomy position with attention to joints and bony extremities,subcutaneous heparin held due to epidural placement and per liver team request for liver resection, Venodynes were on and running throughout the procedure,received cefazolin and Flagyl prior to skin incision      Abdomen chlorhexidine sterile prepped after electric clipping, and after appropriate drying time sterile draped  See Dr Samir Galindo report for liver resection which began his procedure  I was scrubbed for the resection portion of the liver procedure prior to the case being handed back to me by Dr Samir Galindo  After timeout was taken per protocol my procedure began with extension of the upper midline incision to suprapubic to complete laparotomy incision  Exploration did not reveal any additional abdominal lesions obvious  [facility] wound protector was placed      Bookwalter retractor was placed to give retraction, small bowel was packed cephalad    Sigmoid was raised and bilateral ureters were identified prior to dissection of the sigmoid off the left abdominal wall  The dissection was continued cephalad along the white line of Toldt, tumor was noted at the tattooed rectosigmoid in the pelvis  Pelvic dissection was undertaken with attention to bilateral ureters  Superior rectal artery was raised and skeletonized at its margin and after again bilateral ureters identified clamped with Villasenor Carmalt clamps, double tied on the stay side, in a high/central ligation  Presacral plane was entered posteriorly and taken in a tailored mesorectal excision to approximately 5 cm below the distal tattoo, proximal side at the descending/sigmoid junction was taken with blue load of contour followed by partial proctectomy with contour green load       The SPY fluorescence angiography was also brought onto the field after being sterilely prepped  The descending colon for proposed anastomosis was prepped with focused lasers and indocyanine green injected by anesthesia  Fluorescence angiography was then performed with lights off and showed good blood flow to the distal segment  This was incised 5cm proximal to the distal staple line which had expected perfusion defect, good bleeding points      The 2-0 Prolene pursestring suture was then placed and the EEA 29 anvil was placed and before being doubly tied      I then went below as below perineal  placed the EEA 29 handle after placing sizers to the proximal rectum  Prep was fair here and dilute Betadine irrigation was undertaken until the rectum was clean  There was some bleeding at the corners of the staple line and I rescrubbed to over sew the corners with 2-0 Vicryl figure of 8 this was hemostatic  Went back below to continue placement of stapler      Once this was deployed with pin through the middle of the staple line with orange flash showing, above  with hand assistance placed anvil with audible click and without twist   Closed and fired after appropriate compression time, anastomotic rings passed off as specimen, intact proximal and distal thin on the rectal side  The above  then placed irrigation and proximal colon finger clamp in pelvis while I placed flexible sigmoidoscope while pelvis was filled with irrigation  The anastomosis was visualized at approximately 10 cm with good circumferential bleeding points and no hemorrhage  This was insufflated and desufflated and 3 cycles with no bubbles and good insufflation and desufflation on laparoscopic view  I then went above after changing gown and gloves as per the colon bundle, irrigation undertaken and ran clean  A 19 round drain placed through the right lower quadrant, looped in pelvis, secured with 3-0 nylon      Redraped the bottom, changed gown and gloves to provide a clean field for closure which was undertaken      Fascia was then closed after correct counts by #1 PDS from either side of the fascial closure   4-0 Monocryl on the skin with Histoacryl for the incision after additional glove change      All sponge needle instrument, RF Wand counts were correct I was present and scrubbed for the entirety of the procedure     Patient Disposition:  PACU     SIGNATURE: Waqar Owens MD  DATE: June 21, 2018  TIME: 3:39 PM

## 2018-06-21 NOTE — ANESTHESIA PROCEDURE NOTES
Epidural Block    Patient location during procedure: holding area  Start time: 6/21/2018 11:41 AM  Reason for block: procedure for pain, at surgeon's request and post-op pain management  Staffing  Anesthesiologist: Ashkan Parker  Performed: anesthesiologist   Preanesthetic Checklist  Completed: patient identified, site marked, surgical consent, pre-op evaluation, timeout performed, IV checked, risks and benefits discussed and monitors and equipment checked  Epidural  Patient position: sitting  Prep: ChloraPrep  Patient monitoring: cardiac monitor and frequent blood pressure checks  Approach: midline  Location: thoracic (1-12)  Injection technique: SHONDA saline  Needle  Needle type: Tuohy   Needle gauge: 18 G  Catheter type: side hole  Catheter size: 20 G  Catheter at skin depth: 12 cm  Test dose: negative and lidocaine 1 5% with epinephrine 1-to-200,000negative aspiration for CSF, negative aspiration for heme and no paresthesia on injection  patient tolerated the procedure well with no immediate complications

## 2018-06-22 LAB
ABO GROUP BLD BPU: NORMAL
ABO GROUP BLD BPU: NORMAL
ANION GAP SERPL CALCULATED.3IONS-SCNC: 9 MMOL/L (ref 4–13)
BASOPHILS # BLD AUTO: 0.01 THOUSANDS/ΜL (ref 0–0.1)
BASOPHILS NFR BLD AUTO: 0 % (ref 0–1)
BPU ID: NORMAL
BPU ID: NORMAL
BUN SERPL-MCNC: 7 MG/DL (ref 5–25)
CALCIUM SERPL-MCNC: 8.1 MG/DL (ref 8.3–10.1)
CHLORIDE SERPL-SCNC: 108 MMOL/L (ref 100–108)
CO2 SERPL-SCNC: 23 MMOL/L (ref 21–32)
CREAT SERPL-MCNC: 0.91 MG/DL (ref 0.6–1.3)
CROSSMATCH: NORMAL
CROSSMATCH: NORMAL
EOSINOPHIL # BLD AUTO: 0 THOUSAND/ΜL (ref 0–0.61)
EOSINOPHIL NFR BLD AUTO: 0 % (ref 0–6)
ERYTHROCYTE [DISTWIDTH] IN BLOOD BY AUTOMATED COUNT: 12.6 % (ref 11.6–15.1)
GFR SERPL CREATININE-BSD FRML MDRD: 101 ML/MIN/1.73SQ M
GLUCOSE SERPL-MCNC: 142 MG/DL (ref 65–140)
HCT VFR BLD AUTO: 36.8 % (ref 36.5–49.3)
HGB BLD-MCNC: 12.6 G/DL (ref 12–17)
IMM GRANULOCYTES # BLD AUTO: 0.05 THOUSAND/UL (ref 0–0.2)
IMM GRANULOCYTES NFR BLD AUTO: 0 % (ref 0–2)
LYMPHOCYTES # BLD AUTO: 0.69 THOUSANDS/ΜL (ref 0.6–4.47)
LYMPHOCYTES NFR BLD AUTO: 6 % (ref 14–44)
MAGNESIUM SERPL-MCNC: 1.7 MG/DL (ref 1.6–2.6)
MCH RBC QN AUTO: 35.9 PG (ref 26.8–34.3)
MCHC RBC AUTO-ENTMCNC: 34.2 G/DL (ref 31.4–37.4)
MCV RBC AUTO: 105 FL (ref 82–98)
MONOCYTES # BLD AUTO: 0.8 THOUSAND/ΜL (ref 0.17–1.22)
MONOCYTES NFR BLD AUTO: 6 % (ref 4–12)
NEUTROPHILS # BLD AUTO: 11.11 THOUSANDS/ΜL (ref 1.85–7.62)
NEUTS SEG NFR BLD AUTO: 88 % (ref 43–75)
NRBC BLD AUTO-RTO: 0 /100 WBCS
PHOSPHATE SERPL-MCNC: 3.6 MG/DL (ref 2.7–4.5)
PLATELET # BLD AUTO: 115 THOUSANDS/UL (ref 149–390)
PMV BLD AUTO: 11.6 FL (ref 8.9–12.7)
POTASSIUM SERPL-SCNC: 4.1 MMOL/L (ref 3.5–5.3)
RBC # BLD AUTO: 3.51 MILLION/UL (ref 3.88–5.62)
SODIUM SERPL-SCNC: 140 MMOL/L (ref 136–145)
UNIT DISPENSE STATUS: NORMAL
UNIT DISPENSE STATUS: NORMAL
UNIT PRODUCT CODE: NORMAL
UNIT PRODUCT CODE: NORMAL
UNIT RH: NORMAL
UNIT RH: NORMAL
WBC # BLD AUTO: 12.66 THOUSAND/UL (ref 4.31–10.16)

## 2018-06-22 PROCEDURE — 99024 POSTOP FOLLOW-UP VISIT: CPT | Performed by: SURGERY

## 2018-06-22 PROCEDURE — 97163 PT EVAL HIGH COMPLEX 45 MIN: CPT

## 2018-06-22 PROCEDURE — 85025 COMPLETE CBC W/AUTO DIFF WBC: CPT | Performed by: SURGERY

## 2018-06-22 PROCEDURE — 80048 BASIC METABOLIC PNL TOTAL CA: CPT | Performed by: SURGERY

## 2018-06-22 PROCEDURE — G8978 MOBILITY CURRENT STATUS: HCPCS

## 2018-06-22 PROCEDURE — 94762 N-INVAS EAR/PLS OXIMTRY CONT: CPT

## 2018-06-22 PROCEDURE — G8979 MOBILITY GOAL STATUS: HCPCS

## 2018-06-22 PROCEDURE — G8988 SELF CARE GOAL STATUS: HCPCS

## 2018-06-22 PROCEDURE — 83735 ASSAY OF MAGNESIUM: CPT | Performed by: SURGERY

## 2018-06-22 PROCEDURE — 84100 ASSAY OF PHOSPHORUS: CPT | Performed by: SURGERY

## 2018-06-22 PROCEDURE — 97166 OT EVAL MOD COMPLEX 45 MIN: CPT

## 2018-06-22 PROCEDURE — G8987 SELF CARE CURRENT STATUS: HCPCS

## 2018-06-22 PROCEDURE — G8989 SELF CARE D/C STATUS: HCPCS

## 2018-06-22 RX ORDER — CALCIUM CARBONATE 200(500)MG
500 TABLET,CHEWABLE ORAL 2 TIMES DAILY PRN
Status: DISCONTINUED | OUTPATIENT
Start: 2018-06-22 | End: 2018-06-23

## 2018-06-22 RX ORDER — DEXTROSE, SODIUM CHLORIDE, AND POTASSIUM CHLORIDE 5; .45; .15 G/100ML; G/100ML; G/100ML
75 INJECTION INTRAVENOUS CONTINUOUS
Status: DISCONTINUED | OUTPATIENT
Start: 2018-06-22 | End: 2018-06-24

## 2018-06-22 RX ORDER — NALBUPHINE HCL 10 MG/ML
5 AMPUL (ML) INJECTION
Status: DISCONTINUED | OUTPATIENT
Start: 2018-06-22 | End: 2018-06-28 | Stop reason: HOSPADM

## 2018-06-22 RX ORDER — MAGNESIUM SULFATE HEPTAHYDRATE 40 MG/ML
2 INJECTION, SOLUTION INTRAVENOUS ONCE
Status: COMPLETED | OUTPATIENT
Start: 2018-06-22 | End: 2018-06-22

## 2018-06-22 RX ADMIN — MAGNESIUM SULFATE HEPTAHYDRATE 2 G: 40 INJECTION, SOLUTION INTRAVENOUS at 10:17

## 2018-06-22 RX ADMIN — HEPARIN SODIUM 5000 UNITS: 5000 INJECTION, SOLUTION INTRAVENOUS; SUBCUTANEOUS at 21:02

## 2018-06-22 RX ADMIN — ROPIVACAINE HYDROCHLORIDE: 5 INJECTION, SOLUTION EPIDURAL; INFILTRATION; PERINEURAL at 11:36

## 2018-06-22 RX ADMIN — PANTOPRAZOLE SODIUM 20 MG: 20 TABLET, DELAYED RELEASE ORAL at 05:26

## 2018-06-22 RX ADMIN — NICOTINE 1 PATCH: 7 PATCH, EXTENDED RELEASE TRANSDERMAL at 10:15

## 2018-06-22 RX ADMIN — HEPARIN SODIUM 5000 UNITS: 5000 INJECTION, SOLUTION INTRAVENOUS; SUBCUTANEOUS at 10:15

## 2018-06-22 RX ADMIN — DEXTROSE, SODIUM CHLORIDE, AND POTASSIUM CHLORIDE 75 ML/HR: 5; .45; .15 INJECTION INTRAVENOUS at 21:01

## 2018-06-22 RX ADMIN — DEXTROSE, SODIUM CHLORIDE, AND POTASSIUM CHLORIDE 75 ML/HR: 5; .45; .15 INJECTION INTRAVENOUS at 07:51

## 2018-06-22 RX ADMIN — SODIUM CHLORIDE 125 ML/HR: 0.9 INJECTION, SOLUTION INTRAVENOUS at 02:38

## 2018-06-22 RX ADMIN — NALBUPHINE HYDROCHLORIDE 5 MG: 10 INJECTION, SOLUTION INTRAMUSCULAR; INTRAVENOUS; SUBCUTANEOUS at 10:29

## 2018-06-22 RX ADMIN — NALBUPHINE HYDROCHLORIDE 5 MG: 10 INJECTION, SOLUTION INTRAMUSCULAR; INTRAVENOUS; SUBCUTANEOUS at 21:09

## 2018-06-22 NOTE — PROGRESS NOTES
Post-op check - Colorectal Surgery/Surgical Oncology  Jaclyn Aviles 41 y o  male MRN: [id number]  Unit/Bed#: [facility] 615-42 Encounter: [id number]    Assessment:  41 M with rectal cancer and liver metastasis s/p LAR and segment 7 liver resection    Plan:  NPO/IVF  Epidural/ Nickerson  SQH    Subjective/Objective     Subjective: Feels well  Complaining of some right shoulder pain but tolerable  No abdominal pain  Objective:    Blood pressure 117/58, pulse 78, temperature (!) 97 4 °F (36 3 °C), temperature source Oral, resp  rate 18, height 5' 6" (1 676 m), weight 66 7 kg (147 lb), SpO2 100 %  ,Body mass index is 23 73 kg/m²        Intake/Output Summary (Last 24 hours) at 06/21/18 2023  Last data filed at 06/21/18 1918   Gross per 24 hour   Intake             4200 ml   Output             1075 ml   Net             3125 ml       Invasive Devices     Central Venous Catheter Line            Port A Cath 01/25/18 Right Chest 147 days    Port A Cath 03/06/18 Right Chest 107 days          Peripheral Intravenous Line            Peripheral IV 06/21/18 Left Arm less than 1 day    Peripheral IV 06/21/18 Right Hand less than 1 day          Epidural Line            Epidural Catheter 06/21/18 less than 1 day          Line            Pump Device Continuous ambulatory delivery device Right Chest 107 days    Pump Device Continuous ambulatory delivery device Right Chest 93 days          Drain            Closed/Suction Drain Right RLQ Bulb 19 Fr  less than 1 day    Urethral Catheter Latex 14 Fr  less than 1 day                Physical Exam:   General: NAD, AAOx3  CV: RRR +S1/S2  Chest: breath sounds bilaterally  Abdomen: soft, NT ND incision c/d/i, YVAN serosang  Extremities: atraumatic, no edema

## 2018-06-22 NOTE — PROGRESS NOTES
Patient ambulated approximately 400 feet around hallways, tolerating oob to chair, encouraged incentive spirometer 10x an hour

## 2018-06-22 NOTE — CONSULTS
Progress Note - Anesthesia Acute Pain Management    Ferny Rojas 41 y o  male MRN: [id number]  Unit/Bed#: [facility] 079-18 Encounter: [id number]      SURGERY DATE: 6/21/2018  Post-Op Diagnosis Codes:     * Colon cancer metastasized to liver ([address] ) [C18 9, C78 7]    Assessment:   41 y o  male status post Procedure(s):  liver resection/ablation, intraoperative ultrasound of liver  hemicolectomy, low anterior resection (open) SPY fluorescence angiography  SIGMOIDOSCOPY FLEXIBLE  LAPAROTOMY EXPLORATORY  Partial proctectomy  POD# 1    Principal Problem:    Colon cancer metastasized to liver [facility])    Patient has a thoracic epidural for postop pain control (0 1% ropiv with 2 mcg/cc fentanyl at 6 cc/hr, 4 cc bolus, 10 min lockout, 4 doses max/hr  Overnight, he admits to having good pain control with the epidural  He uses the PCEA button for severe breakthrough pain with good relief  He denies HA, N/V, paresthesias of lower extremities or upper extremities  Upon inspection of backside, epidural is well secured with statlock, tegaderm, and two inch tape  Patient does c/o pruritis, he has a rash of her trunk and arms which he attributes to chemotherapy agents, but I mentioned that his pruritis may be related to epidural fentanyl  I will order nubain for pruritis PRN  Plan:   1  Continue epidural at its current settings  2  Order IV nubain PRN pruritis    Will discuss with surgical team, may plan for epidural in for 3-4 days time           Pain Course:  Current pain location(s): no pain at surgical incision site at rest  Pain Scale:  0/10  24 hour history:  Patient states (see above assessment section)    Meds/Allergies   all current active meds have been reviewed and current meds:   Current Facility-Administered Medications   Medication Dose Route Frequency    dextrose 5 % and sodium chloride 0 45 % with KCl 20 mEq/L infusion  75 mL/hr Intravenous Continuous    diphenhydrAMINE (BENADRYL) injection 25 mg  25 mg Intravenous Q6H PRN    docusate sodium (COLACE) capsule 100 mg  100 mg Oral BID PRN    heparin (porcine) subcutaneous injection 5,000 Units  5,000 Units Subcutaneous Q12H [facility]    metoclopramide (REGLAN) injection 5 mg  5 mg Intravenous Q6H PRN    nalbuphine (NUBAIN) injection 5 mg  5 mg Intravenous Q3H PRN    nicotine (NICODERM CQ) 7 mg/24hr TD 24 hr patch 1 patch  1 patch Transdermal Daily    ondansetron (ZOFRAN) injection 4 mg  4 mg Intravenous Q4H PRN    pantoprazole (PROTONIX) EC tablet 20 mg  20 mg Oral Early Morning    ropivacaine 0 1% and fentaNYL 2 mcg/mL PCEA   Epidural Continuous       No Known Allergies    Objective     Physical Exam: BP 90/53   Pulse 62   Temp 99 4 °F (37 4 °C)   Resp 18   Ht 5' 6" (1 676 m)   Wt 68 1 kg (150 lb 2 1 oz)   SpO2 99%   BMI 24 23 kg/m²   Gen: Well appearing and well nourished, NAD  Skin: Warm, Dry   HEENT:  PERRLA, EOMI  Pul: Lungs CTAB  Ext:  No cyanosis or edema  Neuro: AAOx3; CN II-XII grossly intact; no gross focal neurologic deficits      Labs:  CBC - WBC/Hgb/Hct/Plts: 12 66*/12 6/36 8/115* (06/22 0533)  CHEM - BUN/Cr/glu/ALT/AST/amyl/lip:7/0 91/--/--/--/--/-- (06/22 0533)     LYTES - Na/K/Cl/CO2: 140/4 1/108/23 (06/22 0533)      SIGNATURE: Pretty Narayan MD  DATE: June 22, 2018  TIME: 11:25 AM    Please note that the [facility] provides consultative services regarding pain management only  With the exception of ketamine and epidural infusions and except when indicated, final decisions regarding starting or changing doses of analgesic medications are at the discretion of the consulting service  Off hours consultation and/or medication management is generally not available

## 2018-06-22 NOTE — PROGRESS NOTES
Progress Note - Colorectal Surgery   Joanie Zamora 41 y o  male MRN: [id number]  Unit/Bed#: [facility] 614-01 Encounter: [id number]    Assessment:  41 M with sigmoid cancer with liver metastasis, s/p ex-lap LAR, segment 7 liver resection    Plan:  Clears  IV fluids  Continue Nickerson  Continue epidural  OOB and ambulate  SQH    Subjective/Objective     Subjective: No acute events  Pain is controlled  Feels well  Objective:    Blood pressure 90/53, pulse 62, temperature 99 4 °F (37 4 °C), resp  rate 18, height 5' 6" (1 676 m), weight 68 1 kg (150 lb 2 1 oz), SpO2 99 %  ,Body mass index is 24 23 kg/m²        Intake/Output Summary (Last 24 hours) at 06/22/18 0820  Last data filed at 06/22/18 0730   Gross per 24 hour   Intake          6288 02 ml   Output             3930 ml   Net          2358 02 ml       Invasive Devices     Central Venous Catheter Line            Port A Cath 01/25/18 Right Chest 147 days    Port A Cath 03/06/18 Right Chest 107 days          Peripheral Intravenous Line            Peripheral IV 06/21/18 Left Arm less than 1 day    Peripheral IV 06/21/18 Right Hand less than 1 day          Epidural Line            Epidural Catheter 06/21/18 less than 1 day          Line            Pump Device Continuous ambulatory delivery device Right Chest 107 days    Pump Device Continuous ambulatory delivery device Right Chest 93 days          Drain            Closed/Suction Drain Right RLQ Bulb 19 Fr  less than 1 day    Urethral Catheter Latex 14 Fr  less than 1 day                Physical Exam:   General: NAD, AAOx3  CV: RRR +S1/S2  Chest: breath sounds bilaterally  Abdomen: soft, NT ND  Incision c/d/i  YVAN serosang  Extremities: atraumatic, no edema        Results from last 7 days  Lab Units 06/22/18  0533 06/21/18  2204 06/15/18  1019   WBC Thousand/uL 12 66*  --  4 48   HEMOGLOBIN g/dL 12 6  --  13 5   HEMATOCRIT % 36 8  --  39 7   PLATELETS Thousands/uL 115* 116* 132*       Results from last 7 days  Lab Units 06/22/18  0533 06/15/18  1019   SODIUM mmol/L 140 138   POTASSIUM mmol/L 4 1 4 1   CHLORIDE mmol/L 108 105   CO2 mmol/L 23 22   BUN mg/dL 7 10   CREATININE mg/dL 0 91 0 88   GLUCOSE RANDOM mg/dL 142*  --    CALCIUM mg/dL 8 1* 8 9

## 2018-06-22 NOTE — PHYSICAL THERAPY NOTE
PHYSICAL THERAPY EVALUATION  NAME: Delfino Holden  AGE:   41 y o  MRN:  [id number]  ADMIT DX: Colon cancer metastasized to liver ([address] ) [C18 9, C78 7]    PMH:   Past Medical History:   Diagnosis Date    [facility]     chemo rx scheduled for surgery next week    Dysuria     Last Assessed:17    GERD (gastroesophageal reflux disease)     Liver nodule     Pulmonary nodule, right      LENGTH OF STAY: 1     18 1054   Pain Assessment   Pain Assessment 0-10   Pain Score 4   Pain Type Acute pain   Pain Location Abdomen   Hospital Pain Intervention(s) Ambulation/increased activity;Repositioned   Response to Interventions tolerated   Home Living   Type of Home House   Home Layout Multi-level   Additional Comments Pt reports he will be staying with his girlfriend in a 1 SH with 4 MARGOT  Prior Function   Level of [place] Independent with ADLs and functional mobility   Lives With Alone  (plans to stay with girlfriend at time of discharge)   Receives Help From Family;Friend(s)   ADL Assistance Independent   IADLs Independent   Falls in the last 6 months 0   Comments Reports he will have support to assist as needed  Restrictions/Precautions   Weight Bearing Precautions Per Order No   Other Precautions Multiple lines;Telemetry;O2;Fall Risk;Pain  (YVAN drain x1; epidural)   General   Family/Caregiver Present Yes   Cognition   Overall Cognitive Status WFL   Arousal/Participation Cooperative   Orientation Level Oriented X4   Memory Within functional limits   Following Commands Follows multistep commands with increased time or repetition   Comments Pt identified by name and   RLE Assessment   RLE Assessment X   Strength RLE   RLE Overall Strength 4/5  (functionally )   LLE Assessment   LLE Assessment X   Strength LLE   LLE Overall Strength 4/5  (functionally)   Bed Mobility   Supine to Sit 4  Minimal assistance   Additional items Assist x 1; Increased time required;Verbal cues   Sit to Supine Unable to assess  (left OOB in chair post session with all needs met)   Transfers   Sit to Stand 4  Minimal assistance   Additional items Assist x 1; Increased time required;Verbal cues   Stand to Sit 4  Minimal assistance   Additional items Assist x 1; Increased time required;Verbal cues   Stand pivot 4  Minimal assistance   Additional items Assist x 1; Increased time required;Verbal cues  (with hand held assist)   Ambulation/Elevation   Gait pattern Improper Weight shift;Decreased foot clearance;Narrow RAJESH; Short stride; Excessively slow   Gait Assistance 4  Minimal assist   Additional items Assist x 1;Verbal cues  (2nd assist for line management)   Assistive Device None  (hand held assist)   Distance 130` x1   Balance   Static Sitting Fair +   Dynamic Sitting Fair   Static Standing Fair -   Dynamic Standing Fair -   Ambulatory Poor +   Endurance Deficit   Endurance Deficit Yes   Endurance Deficit Description limited ambulation distance   Activity Tolerance   Activity Tolerance Patient limited by fatigue   Nurse Made Aware Per RN Jayla Rodriguez, pt appropriate to evaluate   Assessment   Prognosis Good   Problem List Decreased strength;Decreased endurance; Impaired balance;Decreased mobility; Decreased safety awareness;Pain   Goals   Patient Goals Pt reports he wants to go home and get moving  STG Expiration Date 07/01/18   Short Term Goal #1 Pt will be able to: (1) perform bed mobility with mod I (2) perform sit to stand with mod I (3) ambulate at least 300` with mod I and least restrictive AD (4) initiate HEP (5) increase standing balance by 1 grade (6) negotiate at least 4 stairs with SBA and use of hand rail   Treatment Day 0   Plan   Treatment/Interventions Functional transfer training;LE strengthening/ROM; Elevations; Therapeutic exercise; Endurance training;Patient/family training;Equipment eval/education; Bed mobility;Gait training   PT Frequency (3-5x/week)   Recommendation   Recommendation Home with family support  (home PT pending progress)   Equipment Recommended (trial with RW next session if needed)   Barthel Index   Feeding 10   Bathing 0   Grooming Score 5   Dressing Score 5   Bladder Score 0   Bowels Score 10   Toilet Use Score 5   Transfers (Bed/Chair) Score 10   Mobility (Level Surface) Score 10   Stairs Score 0   Barthel Index Score 55       Assessment: Pt is a 41 y o  male seen for PT evaluation s/p admit to [facility] on 6/21/2018 w/ Colon cancer metastasized to liver [facility])  Order placed for PT  Comorbidities affecting pt's physical performance at time of assessment listed above  Personal factors affecting pt at time of IE include: inability to perform IADLs and inability to perform ADLs  Prior to admission, pt was was independent w/ all functional mobility w/ out AD, lived in one floor environment, had 4 MARGOT 1 railing and plans to stay with girlfriend after discharge  Upon evaluation: Pt requires min A for bed mobility, min A for sit to stand, and min A for ambulation with hand held assist    (Please find full objective findings from PT assessment regarding body systems outlined above)  Impairments and limitations also listed above, especially due to  weakness, impaired balance, decreased endurance, gait deviations, pain, decreased activity tolerance, decreased safety awareness and fall risk  The following objective measures performed on IE also reveal limitations: Barthel Index 55/100  Pt's clinical presentation is currently unstable/unpredictable seen in pt's presentation of decreased safety awareness, poor line management, and fall risk  Pt to benefit from continued skilled PT tx while in hospital and upon DC to address deficits as defined above and maximize level of functional mobility   From PT/mobility standpoint, recommendation at time of d/c would be home with family support and possible home PT pending progress in order to maximize pt's functional independence and consistency w/ mobility in order to facilitate return to PLOF  Recommend trial with AD if needed next session        Wilver November, PT,DPT

## 2018-06-22 NOTE — SOCIAL WORK
CM met with patient independent lives alone in a apartment 1 step to entrance  No dme's  Fills prescription at [facility] in [place]  No advance directives  Denies ETOH, mental health dx or any mental health  inpatient stays  PCP Rosalind Davenport MD   Still drives and is employed Girlfriend or girlfriend daughter will  patient pending discharge readiness  CM reviewed d/c planning process including the following: identifying help at home, patient preference for d/c planning needs, Discharge Lounge, [facility] Meds to Bed program, availability of treatment team to discuss questions or concerns patient and/or family may have regarding understanding medications and recognizing signs and symptoms once discharged  CM also encouraged patient to follow up with all recommended appointments after discharge  Patient advised of importance for patient and family to participate in managing patients medical well being  Patient/caregiver received discharge checklist  Content reviewed  Patient/caregiver encouraged to participate in discharge plan of care prior to discharge home

## 2018-06-22 NOTE — PLAN OF CARE
Problem: PHYSICAL THERAPY ADULT  Goal: Performs mobility at highest level of function for planned discharge setting  See evaluation for individualized goals  Treatment/Interventions: Functional transfer training, LE strengthening/ROM, Elevations, Therapeutic exercise, Endurance training, Patient/family training, Equipment eval/education, Bed mobility, Gait training  Equipment Recommended:  (trial with RW next session if needed)       See flowsheet documentation for full assessment, interventions and recommendations  Prognosis: Good  Problem List: Decreased strength, Decreased endurance, Impaired balance, Decreased mobility, Decreased safety awareness, Pain  Assessment: Pt is a 41 y o  male seen for PT evaluation s/p admit to [facility] on 6/21/2018 w/ Colon cancer metastasized to liver [facility])  Order placed for PT  Comorbidities affecting pt's physical performance at time of assessment listed above  Personal factors affecting pt at time of IE include: inability to perform IADLs and inability to perform ADLs  Prior to admission, pt was was independent w/ all functional mobility w/ out AD, lived in one floor environment, had 4 MARGOT 1 railing and plans to stay with girlfriend after discharge  Upon evaluation: Pt requires min A for bed mobility, min A for sit to stand, and min A for ambulation with hand held assist    (Please find full objective findings from PT assessment regarding body systems outlined above)  Impairments and limitations also listed above, especially due to  weakness, impaired balance, decreased endurance, gait deviations, pain, decreased activity tolerance, decreased safety awareness and fall risk  The following objective measures performed on IE also reveal limitations: Barthel Index 55/100  Pt's clinical presentation is currently unstable/unpredictable seen in pt's presentation of decreased safety awareness, poor line management, and fall risk    Pt to benefit from continued skilled PT tx while in hospital and upon DC to address deficits as defined above and maximize level of functional mobility  From PT/mobility standpoint, recommendation at time of d/c would be home with family support and possible home PT pending progress in order to maximize pt's functional independence and consistency w/ mobility in order to facilitate return to PLOF  Recommend trial with AD if needed next session  Recommendation: Home with family support (home PT pending progress)          See flowsheet documentation for full assessment

## 2018-06-22 NOTE — PROGRESS NOTES
Notified Dr Celis Current c/o acid reflux requesting medication  Per physician will order medication  Advised scant amount of drainage from incision, covered with dry dressing   No new orders continue to monitor

## 2018-06-22 NOTE — PLAN OF CARE
CARDIOVASCULAR - ADULT     Absence of cardiac dysrhythmias or at baseline rhythm Progressing        GASTROINTESTINAL - ADULT     Minimal or absence of nausea and/or vomiting Progressing     Maintains or returns to baseline bowel function Progressing     Maintains adequate nutritional intake Progressing        GENITOURINARY - ADULT     Maintains or returns to baseline urinary function Progressing     Absence of urinary retention Progressing     Urinary catheter remains patent Progressing        METABOLIC, FLUID AND ELECTROLYTES - ADULT     Electrolytes maintained within normal limits Progressing     Fluid balance maintained Progressing        MUSCULOSKELETAL - ADULT     Maintain or return mobility to safest level of function Progressing        Potential for Falls     Patient will remain free of falls Progressing        Prexisting or High Potential for Compromised Skin Integrity     Skin integrity is maintained or improved Progressing        SKIN/TISSUE INTEGRITY - ADULT     Skin integrity remains intact Progressing     Incision(s), wounds(s) or drain site(s) healing without S/S of infection Progressing

## 2018-06-22 NOTE — OCCUPATIONAL THERAPY NOTE
[address] Evaluation     Patient Name: Renny COLE Date: 6/22/2018  Problem List  Patient Active Problem List   Diagnosis    ED (erectile dysfunction)    Metastatic colon cancer to liver (HCC)    GERD (gastroesophageal reflux disease)    Pulmonary nodule, right    Colon cancer metastasized to liver [facility])     Past Medical History  Past Medical History:   Diagnosis Date    Colorectal cancer, stage IV ([address] )     chemo rx scheduled for surgery next week    Dysuria     Last Assessed:8/24/17    GERD (gastroesophageal reflux disease)     Liver nodule     Pulmonary nodule, right      Past Surgical History  Past Surgical History:   Procedure Laterality Date    COLONOSCOPY N/A 1/22/2018    Procedure: COLONOSCOPY;  Surgeon: Marcela Gusman MD;  [facility]: [facility]; Service: Colorectal   [facility] DENTAL SURGERY  2016    [place] with bridge work    FLEXIBLE SIGMOIDOSCOPY N/A 6/15/2018    Procedure: SIGMOIDOSCOPY FLEXIBLE w/o sedation w/ tattoo;  Surgeon: Marcela Gusman MD;  [facility];   Service: Colorectal    LIVER LOBECTOMY N/A 6/21/2018    Procedure: liver resection/ablation, intraoperative ultrasound of liver;  Surgeon: Luigi Rice MD;  [facility];  Service: Surgical Oncology    NE EXPLORATORY OF ABDOMEN N/A 6/21/2018    Procedure: LAPAROTOMY EXPLORATORY;  Surgeon: Marcela Gusman MD;  [facility];  Service: Colorectal    NE INSERT PICC W/ SUB-Q PORT N/A 1/25/2018    Procedure: PORT-A-CATHETER PLACEMENT  Fluoroscopy for performance/interpretation;  Surgeon: Marcela Gusman MD;  [facility];  Service: Colorectal    NE PART REMOVAL COLON W ANASTOMOSIS Left 6/21/2018    Procedure: hemicolectomy, low anterior resection (open) SPY fluorescence angiography;  Surgeon: Marcela Gusman MD;  [facility];  Service: Colorectal    NE PROCTECTOMY,PARTIAL N/A 6/21/2018    Procedure: Partial proctectomy ;  Surgeon: Marcela Gusman MD; [facility];  Service: Colorectal    OK SIGMOIDOSCOPY FLX DX W/COLLJ SPEC BR/WA IF PFRMD N/A 6/21/2018    Procedure: Jonathan Amaya;  Surgeon: Selvin Cormier MD;  [facility];  Service: Colorectal    ROOT CANAL  2015    TESTICLE SURGERY      Exploration of Undescended Testis rIght, age 8 had surgery for undescended testicle; Last Assessed:8/24/17    TOOTH EXTRACTION  2015 06/22/18 1055   Note Type   Note type Eval only   Restrictions/Precautions   Weight Bearing Precautions Per Order No   Other Precautions O2;Multiple lines;Telemetry;Pain   Pain Assessment   Pain Assessment 0-10   Pain Score 4   Pain Type Acute pain   Pain Location Abdomen   Hospital Pain Intervention(s) Repositioned; Ambulation/increased activity; Emotional support   Home Living   Type of Home House   Home Layout Multi-level  (PLANS TO STAY WITH GF POST D/C IN 1 STORY HOME)   Prior Function   Level of [place] Independent with ADLs and functional mobility   Lives With Alone   Receives Help From Family;Friend(s)   ADL Assistance Independent   IADLs Independent   Falls in the last 6 months 0   Vocational (NOT WORKING SINCE FEBRUARY)   Lifestyle   Autonomy I ADLS AND MOBILIT - I IADLS    Reciprocal Relationships SUPPORTIVE FAMILY   Service to Others NOT PRESENTLY WORKING   Intrinsic Gratification ACTIVE PTA - ENJOYS GARDENING   Subjective   Subjective OFFERS NO C/O    ADL   Eating Assistance 7  Independent   Grooming Assistance 5  Supervision/Setup   UB Bathing Assistance 5  Supervision/Setup   LB Bathing Assistance 4  Minimal Assistance   UB Dressing Assistance 5  Supervision/Setup   LB Dressing Assistance 4  Minimal Assistance   Bed Mobility   Supine to Sit 4  Minimal assistance   Transfers   Sit to Stand 4  Minimal assistance   Stand to Sit 4  Minimal assistance   Stand pivot 4  Minimal assistance   Functional Mobility   Functional Mobility 4  Minimal assistance   Additional items Hand hold assistance Balance   Static Sitting Fair +   Dynamic Sitting Fair   Static Standing Fair -   Dynamic Standing Fair -   Ambulatory Fair -   Activity Tolerance   Activity Tolerance Patient limited by fatigue;Patient limited by pain;Treatment limited secondary to medical complications (Comment)   RUE Assessment   RUE Assessment WFL   LUE Assessment   LUE Assessment WFL   Cognition   Overall Cognitive Status WFL   Assessment   Limitation Decreased ADL status; Decreased endurance;Decreased self-care trans;Decreased high-level ADLs   Prognosis Good   Assessment Pt is a 41 y o  male who was admitted to [facility] on 6/21/2018 with Colon cancer metastasized to liver [facility]) s/p liver resection/ablation, hemicolectomy, low anterior resection, exp lap, flexible sigmoidoscopy, partial proctectomy   Pt's problem list also includes PMH of stg iv colorectal cancer, dysuria, gerd, liver nodule, pulmonary nodule  At baseline pt was completing adls and mobility independently w/o ad  Pt lives alone however plans to stay with GF and her dtr who will be available to assist t/o the day  Currently pt requires min assist for overall ADLS and min assist for functional mobility/transfers  Pt currently presents with impairments in the following categories -difficulty performing ADLS and difficulty performing IADLS  activity tolerance, endurance and standing balance/tolerance  These impairments, as well as pt's fatigue and pain  limit pt's ability to safely engage in all baseline areas of occupation, includingbathing, dressing, toileting, functional mobility/transfers, community mobility, house maintenance, meal prep, social participation  and leisure activities  however has supportive family/friends who are able to provide assist prn From OT standpoint, recommend home with family support  upon D/C   No further acute OT needs indicated at this time- recommend continued OOB for meals, ambulate to/from br prn, setup for adls/self care tasks and ambulation on unit with RN/restorative - d/c from caseload with above recommendations   Goals   Patient Goals go home    Plan   Treatment Interventions ADL retraining;Functional transfer training; Endurance training;Patient/family training;Equipment evaluation/education; Compensatory technique education; Activityengagement   OT Frequency Eval only   Recommendation   OT Discharge Recommendation Home with family support   OT - OK to Discharge Yes   Barthel Index   Feeding 10   Bathing 0   Grooming Score 5   Dressing Score 5   Bladder Score 0   Bowels Score 10   Toilet Use Score 5   Transfers (Bed/Chair) Score 10   Mobility (Level Surface) Score 10   Stairs Score 0   Barthel Index Score 55     [place], [place]

## 2018-06-22 NOTE — PROGRESS NOTES
Progress Note - Surgical Oncology   Lizabeth Vee 41 y o  male MRN: [id number]  Unit/Bed#: [facility] 614-01 Encounter: [id number]    Assessment:  41 M with sigmoid cancer and liver mets, s/p ex-lap LAR and segment 7 liver resection    Plan:  Clear liquids  IV fluids  Continue Nickerson and epidural  Continue YVAN  OOB and ambulate  SQH    Subjective/Objective     Subjective: No acute events  Feels well  Pain is controlled  Objective:    Blood pressure 90/53, pulse 62, temperature 99 4 °F (37 4 °C), resp  rate 18, height 5' 6" (1 676 m), weight 68 1 kg (150 lb 2 1 oz), SpO2 99 %  ,Body mass index is 24 23 kg/m²        Intake/Output Summary (Last 24 hours) at 06/22/18 4655  Last data filed at 06/22/18 0730   Gross per 24 hour   Intake          6288 02 ml   Output             3930 ml   Net          2358 02 ml       Invasive Devices     Central Venous Catheter Line            Port A Cath 01/25/18 Right Chest 147 days    Port A Cath 03/06/18 Right Chest 107 days          Peripheral Intravenous Line            Peripheral IV 06/21/18 Left Arm less than 1 day    Peripheral IV 06/21/18 Right Hand less than 1 day          Epidural Line            Epidural Catheter 06/21/18 less than 1 day          Line            Pump Device Continuous ambulatory delivery device Right Chest 107 days    Pump Device Continuous ambulatory delivery device Right Chest 93 days          Drain            Closed/Suction Drain Right RLQ Bulb 19 Fr  less than 1 day    Urethral Catheter Latex 14 Fr  less than 1 day                Physical Exam:   General: NAD, AAOx3  CV: RRR +S1/S2  Chest: breath sounds bilaterally  Abdomen: soft, NT ND  Incision c/d/i  YVAN serosanguinous  Extremities: atraumatic, no edema        Results from last 7 days  Lab Units 06/22/18  0533 06/21/18  2204 06/15/18  1019   WBC Thousand/uL 12 66*  --  4 48   HEMOGLOBIN g/dL 12 6  --  13 5   HEMATOCRIT % 36 8  --  39 7   PLATELETS Thousands/uL 115* 116* 132*       Results from last 7 days  Lab Units 06/22/18  0533 06/15/18  1019   SODIUM mmol/L 140 138   POTASSIUM mmol/L 4 1 4 1   CHLORIDE mmol/L 108 105   CO2 mmol/L 23 22   BUN mg/dL 7 10   CREATININE mg/dL 0 91 0 88   GLUCOSE RANDOM mg/dL 142*  --    CALCIUM mg/dL 8 1* 8 9

## 2018-06-22 NOTE — RESPIRATORY THERAPY NOTE
RT Protocol Note  Paxton Esparza 41 y o  male MRN: [id number]  Unit/Bed#: [facility] 146-10 Encounter: [id number]    Assessment    Principal Problem:    Colon cancer metastasized to liver [facility])      Home Pulmonary Medications:  n/a       Past Medical History:   Diagnosis Date    [address], stage IV [address] [address] )     chemo rx scheduled for surgery next week    Dysuria     Last Assessed:8/24/17    GERD (gastroesophageal reflux disease)     Liver nodule     Pulmonary nodule, right      Social History     Social History    Marital status: Single     Spouse name: N/A    Number of children: N/A    Years of education: N/A     Social History Main Topics    Smoking status: Former Smoker     Types: E-Cigarettes    Smokeless tobacco: Current User      Comment: quit 4 years ago / smoked for 20 years     Alcohol use Yes      Comment: socially - 2 month    Drug use: No      Comment: per [employer]: occasional recreation drug use    Sexual activity: Not Asked     Other Topics Concern    None     Social History Narrative    None       Subjective         Objective    Physical Exam:   Assessment Type: (P) Assess only  General Appearance: (P) Alert, Awake  Respiratory Pattern: (P) Normal  Chest Assessment: (P) Chest expansion symmetrical  Bilateral Breath Sounds: (P) Clear    Vitals:  Blood pressure 117/58, pulse 78, temperature (!) 97 4 °F (36 3 °C), temperature source Oral, resp  rate 18, height 5' 6" (1 676 m), weight 66 7 kg (147 lb), SpO2 99 %  Imaging and other studies: I have personally reviewed pertinent reports  Plan    Respiratory Plan: (P) Discontinue Protocol        Resp Comments: (P) Pt in no distress at this time  Pt has no pulmonary hx and takes no meds at home  Protocol d/cd

## 2018-06-23 LAB
ANION GAP SERPL CALCULATED.3IONS-SCNC: 7 MMOL/L (ref 4–13)
BASOPHILS # BLD AUTO: 0.01 THOUSANDS/ΜL (ref 0–0.1)
BASOPHILS NFR BLD AUTO: 0 % (ref 0–1)
BUN SERPL-MCNC: 7 MG/DL (ref 5–25)
CALCIUM SERPL-MCNC: 8.4 MG/DL (ref 8.3–10.1)
CHLORIDE SERPL-SCNC: 106 MMOL/L (ref 100–108)
CO2 SERPL-SCNC: 26 MMOL/L (ref 21–32)
CREAT SERPL-MCNC: 0.73 MG/DL (ref 0.6–1.3)
EOSINOPHIL # BLD AUTO: 0.02 THOUSAND/ΜL (ref 0–0.61)
EOSINOPHIL NFR BLD AUTO: 0 % (ref 0–6)
ERYTHROCYTE [DISTWIDTH] IN BLOOD BY AUTOMATED COUNT: 12.7 % (ref 11.6–15.1)
GFR SERPL CREATININE-BSD FRML MDRD: 112 ML/MIN/1.73SQ M
GLUCOSE SERPL-MCNC: 132 MG/DL (ref 65–140)
HCT VFR BLD AUTO: 40.1 % (ref 36.5–49.3)
HGB BLD-MCNC: 13.7 G/DL (ref 12–17)
IMM GRANULOCYTES # BLD AUTO: 0.01 THOUSAND/UL (ref 0–0.2)
IMM GRANULOCYTES NFR BLD AUTO: 0 % (ref 0–2)
LYMPHOCYTES # BLD AUTO: 0.78 THOUSANDS/ΜL (ref 0.6–4.47)
LYMPHOCYTES NFR BLD AUTO: 10 % (ref 14–44)
MCH RBC QN AUTO: 35.7 PG (ref 26.8–34.3)
MCHC RBC AUTO-ENTMCNC: 34.2 G/DL (ref 31.4–37.4)
MCV RBC AUTO: 104 FL (ref 82–98)
MONOCYTES # BLD AUTO: 0.68 THOUSAND/ΜL (ref 0.17–1.22)
MONOCYTES NFR BLD AUTO: 9 % (ref 4–12)
NEUTROPHILS # BLD AUTO: 6.43 THOUSANDS/ΜL (ref 1.85–7.62)
NEUTS SEG NFR BLD AUTO: 81 % (ref 43–75)
NRBC BLD AUTO-RTO: 0 /100 WBCS
PLATELET # BLD AUTO: 130 THOUSANDS/UL (ref 149–390)
PMV BLD AUTO: 11.9 FL (ref 8.9–12.7)
POTASSIUM SERPL-SCNC: 3.9 MMOL/L (ref 3.5–5.3)
RBC # BLD AUTO: 3.84 MILLION/UL (ref 3.88–5.62)
SODIUM SERPL-SCNC: 139 MMOL/L (ref 136–145)
WBC # BLD AUTO: 7.93 THOUSAND/UL (ref 4.31–10.16)

## 2018-06-23 PROCEDURE — 99024 POSTOP FOLLOW-UP VISIT: CPT | Performed by: SURGERY

## 2018-06-23 PROCEDURE — 80048 BASIC METABOLIC PNL TOTAL CA: CPT | Performed by: PHYSICIAN ASSISTANT

## 2018-06-23 PROCEDURE — 85025 COMPLETE CBC W/AUTO DIFF WBC: CPT | Performed by: PHYSICIAN ASSISTANT

## 2018-06-23 RX ORDER — POTASSIUM CHLORIDE 20 MEQ/1
20 TABLET, EXTENDED RELEASE ORAL ONCE
Status: COMPLETED | OUTPATIENT
Start: 2018-06-23 | End: 2018-06-23

## 2018-06-23 RX ORDER — CALCIUM CARBONATE 200(500)MG
500 TABLET,CHEWABLE ORAL 4 TIMES DAILY PRN
Status: DISCONTINUED | OUTPATIENT
Start: 2018-06-23 | End: 2018-06-25

## 2018-06-23 RX ADMIN — PANTOPRAZOLE SODIUM 20 MG: 20 TABLET, DELAYED RELEASE ORAL at 06:22

## 2018-06-23 RX ADMIN — DEXTROSE, SODIUM CHLORIDE, AND POTASSIUM CHLORIDE 75 ML/HR: 5; .45; .15 INJECTION INTRAVENOUS at 21:19

## 2018-06-23 RX ADMIN — Medication 500 MG: at 21:37

## 2018-06-23 RX ADMIN — ONDANSETRON 4 MG: 2 INJECTION, SOLUTION INTRAMUSCULAR; INTRAVENOUS at 06:55

## 2018-06-23 RX ADMIN — HEPARIN SODIUM 5000 UNITS: 5000 INJECTION, SOLUTION INTRAVENOUS; SUBCUTANEOUS at 21:17

## 2018-06-23 RX ADMIN — DEXTROSE, SODIUM CHLORIDE, AND POTASSIUM CHLORIDE 75 ML/HR: 5; .45; .15 INJECTION INTRAVENOUS at 08:43

## 2018-06-23 RX ADMIN — ROPIVACAINE HYDROCHLORIDE: 5 INJECTION, SOLUTION EPIDURAL; INFILTRATION; PERINEURAL at 01:31

## 2018-06-23 RX ADMIN — DIPHENHYDRAMINE HYDROCHLORIDE 25 MG: 50 INJECTION, SOLUTION INTRAMUSCULAR; INTRAVENOUS at 08:43

## 2018-06-23 RX ADMIN — ROPIVACAINE HYDROCHLORIDE: 5 INJECTION, SOLUTION EPIDURAL; INFILTRATION; PERINEURAL at 19:41

## 2018-06-23 RX ADMIN — HEPARIN SODIUM 5000 UNITS: 5000 INJECTION, SOLUTION INTRAVENOUS; SUBCUTANEOUS at 08:43

## 2018-06-23 RX ADMIN — POTASSIUM CHLORIDE 20 MEQ: 1500 TABLET, EXTENDED RELEASE ORAL at 08:43

## 2018-06-23 RX ADMIN — Medication 500 MG: at 18:19

## 2018-06-23 NOTE — PROGRESS NOTES
Progress Note - Anesthesia Acute Pain Management    Bala Berger 41 y o  male MRN: [id number]  Unit/Bed#: [facility] 660-68 Encounter: [id number]      SURGERY DATE: 6/21/2018  Post-Op Diagnosis Codes:     * Colon cancer metastasized to liver ([address] ) [C18 9, C78 7]    Assessment:   41 y o  male status post Procedure(s):  liver resection/ablation, intraoperative ultrasound of liver  hemicolectomy, low anterior resection (open) SPY fluorescence angiography  SIGMOIDOSCOPY FLEXIBLE  LAPAROTOMY EXPLORATORY  Partial proctectomy  POD# 2    Principal Problem:    Colon cancer metastasized to liver [facility])      Nursing staff called regarding dressing over epidural site and appearance of heme  Upon inspection of backside, there is dry heme present and part of the statlock (left upper corner) folded inward  Epidural catheter still intact, 10-11 cm at skin  I removed the dry heme around the epidural catheter site and redressed it with tegaderm and two inch tape  Part of statlock still folded inward and cannot unfold it  statlock area reinforced with tegaderm and two inch tape  Patient states he is still receiving great pain control overnight and this morning  He pushes the PCEA button for breakthrough pain  He has pruritis this morning, and would like to try the nubain again for relief  He was able to ambulate well yesterday and looking forward to getting up again today  Plan:   1  Continue epidural at its current settings  2  Will order dilaudid 0 5 mg IV Q 2 hours for severe breakthrough pain  3  Continue nubain for pruritis      Anesthesia will continue to follow      Pain Course:  Current pain location(s): abdomen  Pain Scale:   3/10 at rest  24 hour history:  Patient states he continues to receive good pain control with epidural      Meds/Allergies   all current active meds have been reviewed, current meds:   Current Facility-Administered Medications   Medication Dose Route Frequency    calcium carbonate (TUMS) chewable tablet 500 mg  500 mg Oral BID PRN    dextrose 5 % and sodium chloride 0 45 % with KCl 20 mEq/L infusion  75 mL/hr Intravenous Continuous    diphenhydrAMINE (BENADRYL) injection 25 mg  25 mg Intravenous Q6H PRN    docusate sodium (COLACE) capsule 100 mg  100 mg Oral BID PRN    heparin (porcine) subcutaneous injection 5,000 Units  5,000 Units Subcutaneous Q12H [address]    HYDROmorphone (DILAUDID) injection 0 5 mg  0 5 mg Intravenous Q2H PRN    metoclopramide (REGLAN) injection 5 mg  5 mg Intravenous Q6H PRN    nalbuphine (NUBAIN) injection 5 mg  5 mg Intravenous Q3H PRN    nicotine (NICODERM CQ) 7 mg/24hr TD 24 hr patch 1 patch  1 patch Transdermal Daily    ondansetron (ZOFRAN) injection 4 mg  4 mg Intravenous Q4H PRN    pantoprazole (PROTONIX) EC tablet 20 mg  20 mg Oral Early Morning    potassium chloride (K-DUR,KLOR-CON) CR tablet 20 mEq  20 mEq Oral Once    ropivacaine 0 1% and fentaNYL 2 mcg/mL PCEA   Epidural Continuous    and PTA meds:   Prior to Admission Medications   Prescriptions Last Dose Informant Patient Reported? Taking?    clindamycin (CLINDAGEL) 1 % gel 6/19/2018  No Yes   Sig: apply TOPICALLY to SKIN LESIONS twice a day if needed   metroNIDAZOLE (FLAGYL) 500 mg tablet 6/20/2018 at Unknown time  Yes Yes   neomycin (MYCIFRADIN) 500 mg tablet 6/20/2018 at Unknown time  Yes Yes   omeprazole (PriLOSEC) 40 MG capsule Past Week at Unknown time Self Yes Yes   Sig: Take 40 mg by mouth daily   prochlorperazine (COMPAZINE) 10 mg tablet More than a month at Unknown time Self No No   Sig: Take 1 tablet (10 mg total) by mouth every 6 (six) hours as needed for nausea or vomiting   sildenafil (REVATIO) 20 mg tablet 6/20/2018 at Unknown time Self No Yes   Sig: Take 1 tablet (20 mg total) by mouth 3 (three) times a day      Facility-Administered Medications: None       No Known Allergies    Objective     Physical Exam: /76   Pulse 71   Temp 97 9 °F (36 6 °C)   Resp 20   Ht 5' 6" (1 676 m)   Wt 71 6 kg (157 lb 12 8 oz)   SpO2 96%   BMI 25 47 kg/m²   Gen: Well appearing and well nourished, NAD  Skin: Warm, Dry   HEENT:  PERRLA, EOMI  Pul: Lungs CTAB  Ext:  No cyanosis or edema  Neuro: AAOx3; CN II-XII grossly intact; no gross focal neurologic deficits    Labs:  CBC - WBC/Hgb/Hct/Plts: 7 93/13 7/40 1/130* (06/23 0630)  CHEM - BUN/Cr/glu/ALT/AST/amyl/lip:7/0 73/--/--/--/--/-- (06/23 0630)     LYTES - Na/K/Cl/CO2: 139/3 9/106/26 (06/23 0630)      SIGNATURE: Ladonna Link MD  DATE: June 23, 2018  TIME: 8:06 AM    Please note that the APS provides consultative services regarding pain management only  With the exception of ketamine and epidural infusions and except when indicated, final decisions regarding starting or changing doses of analgesic medications are at the discretion of the consulting service  Off hours consultation and/or medication management is generally not available

## 2018-06-23 NOTE — PLAN OF CARE
CARDIOVASCULAR - ADULT     Absence of cardiac dysrhythmias or at baseline rhythm Progressing        DISCHARGE PLANNING - CARE MANAGEMENT     Discharge to post-acute care or home with appropriate resources Progressing        GASTROINTESTINAL - ADULT     Minimal or absence of nausea and/or vomiting Progressing     Maintains or returns to baseline bowel function Progressing     Maintains adequate nutritional intake Progressing        GENITOURINARY - ADULT     Maintains or returns to baseline urinary function Progressing     Absence of urinary retention Progressing     Urinary catheter remains patent Progressing        METABOLIC, FLUID AND ELECTROLYTES - ADULT     Electrolytes maintained within normal limits Progressing     Fluid balance maintained Progressing        MUSCULOSKELETAL - ADULT     Maintain or return mobility to safest level of function Progressing        Potential for Falls     Patient will remain free of falls Progressing        Prexisting or High Potential for Compromised Skin Integrity     Skin integrity is maintained or improved Progressing        SKIN/TISSUE INTEGRITY - ADULT     Skin integrity remains intact Progressing     Incision(s), wounds(s) or drain site(s) healing without S/S of infection Progressing

## 2018-06-23 NOTE — PROGRESS NOTES
Progress Note - Surgical Oncology   Robert Colindres 41 y o  male MRN: [id number]  Unit/Bed#: [facility] 614-01 Encounter: [id number]    Assessment:  41 M with sigmoid cancer and liver mets, s/p ex-lap LAR and segment 7 liver resection    Plan:  Clear liquids, toast/crackers  IV fluids  epidural  IS/OOB/ambulate  YVAN  SQH/SCDs  PT/OT-->home w/ family    Subjective/Objective     Subjective: OMEGA  No BF  Having some burping  No N/V  Objective:    Blood pressure 100/60, pulse 70, temperature 98 9 °F (37 2 °C), temperature source Oral, resp  rate 18, height 5' 6" (1 676 m), weight 68 1 kg (150 lb 2 1 oz), SpO2 98 %  ,Body mass index is 24 23 kg/m²        Intake/Output Summary (Last 24 hours) at 06/23/18 0026  Last data filed at 06/22/18 2100   Gross per 24 hour   Intake          3216 44 ml   Output             2803 ml   Net           413 44 ml       Invasive Devices     Central Venous Catheter Line            Port A Cath 01/25/18 Right Chest 148 days    Port A Cath 03/06/18 Right Chest 108 days          Peripheral Intravenous Line            Peripheral IV 06/21/18 Left Arm 1 day    Peripheral IV 06/21/18 Right Hand 1 day          Epidural Line            Epidural Catheter 06/21/18 1 day          Line            Pump Device Continuous ambulatory delivery device Right Chest 108 days    Pump Device Continuous ambulatory delivery device Right Chest 94 days          Drain            Closed/Suction Drain Right RLQ Bulb 19 Fr  1 day                Physical Exam:   NAD  AAOx3  normal respiratory effort  soft, NT, mildly distended  Incision c/d/i minimal midline drainage  no c/c/e        Results from last 7 days  Lab Units 06/22/18  0533 06/21/18  2204   WBC Thousand/uL 12 66*  --    HEMOGLOBIN g/dL 12 6  --    HEMATOCRIT % 36 8  --    PLATELETS Thousands/uL 115* 116*       Results from last 7 days  Lab Units 06/22/18  0533   SODIUM mmol/L 140   POTASSIUM mmol/L 4 1   CHLORIDE mmol/L 108   CO2 mmol/L 23   BUN mg/dL 7   CREATININE mg/dL 0 91   GLUCOSE RANDOM mg/dL 142*   CALCIUM mg/dL 8 1*

## 2018-06-23 NOTE — PROGRESS NOTES
Progress Note - Surgical Oncology   Marina Grief 41 y o  male MRN: [id number]  Unit/Bed#: [facility] 614-01 Encounter: [id number]    Assessment:  41 M with sigmoid cancer and liver mets, s/p ex-lap LAR and segment 7 liver resection    Plan:  Diet per colorectal sx  IV fluids  epidural  Continue YVAN  IS/OOB/ambulate  SQH/SCDs  PT/OT    Subjective/Objective     Subjective: OMEGA  Pain controlled  No N/V  No flatus/BM  Objective:    Blood pressure 100/60, pulse 70, temperature 98 9 °F (37 2 °C), temperature source Oral, resp  rate 18, height 5' 6" (1 676 m), weight 68 1 kg (150 lb 2 1 oz), SpO2 98 %  ,Body mass index is 24 23 kg/m²        Intake/Output Summary (Last 24 hours) at 06/23/18 0028  Last data filed at 06/22/18 2100   Gross per 24 hour   Intake          3216 44 ml   Output             2803 ml   Net           413 44 ml       Invasive Devices     Central Venous Catheter Line            Port A Cath 01/25/18 Right Chest 148 days    Port A Cath 03/06/18 Right Chest 108 days          Peripheral Intravenous Line            Peripheral IV 06/21/18 Left Arm 1 day    Peripheral IV 06/21/18 Right Hand 1 day          Epidural Line            Epidural Catheter 06/21/18 1 day          Line            Pump Device Continuous ambulatory delivery device Right Chest 108 days    Pump Device Continuous ambulatory delivery device Right Chest 94 days          Drain            Closed/Suction Drain Right RLQ Bulb 19 Fr  1 day                Physical Exam:   NAD  AAOx3  normal respiratory effort  soft, NT, mildly distended  Incision c/d/i except for minimal midline drainage  no c/c/e        Results from last 7 days  Lab Units 06/22/18  0533 06/21/18  2204   WBC Thousand/uL 12 66*  --    HEMOGLOBIN g/dL 12 6  --    HEMATOCRIT % 36 8  --    PLATELETS Thousands/uL 115* 116*       Results from last 7 days  Lab Units 06/22/18  0533   SODIUM mmol/L 140   POTASSIUM mmol/L 4 1   CHLORIDE mmol/L 108   CO2 mmol/L 23   BUN mg/dL 7   CREATININE mg/dL 0 91   GLUCOSE RANDOM mg/dL 142*   CALCIUM mg/dL 8 1*

## 2018-06-24 LAB
ANION GAP SERPL CALCULATED.3IONS-SCNC: 6 MMOL/L (ref 4–13)
BASOPHILS # BLD AUTO: 0.01 THOUSANDS/ΜL (ref 0–0.1)
BASOPHILS NFR BLD AUTO: 0 % (ref 0–1)
BUN SERPL-MCNC: 6 MG/DL (ref 5–25)
CALCIUM SERPL-MCNC: 8.5 MG/DL (ref 8.3–10.1)
CHLORIDE SERPL-SCNC: 105 MMOL/L (ref 100–108)
CO2 SERPL-SCNC: 25 MMOL/L (ref 21–32)
CREAT SERPL-MCNC: 0.8 MG/DL (ref 0.6–1.3)
EOSINOPHIL # BLD AUTO: 0.13 THOUSAND/ΜL (ref 0–0.61)
EOSINOPHIL NFR BLD AUTO: 3 % (ref 0–6)
ERYTHROCYTE [DISTWIDTH] IN BLOOD BY AUTOMATED COUNT: 12.6 % (ref 11.6–15.1)
GFR SERPL CREATININE-BSD FRML MDRD: 108 ML/MIN/1.73SQ M
GLUCOSE SERPL-MCNC: 108 MG/DL (ref 65–140)
HCT VFR BLD AUTO: 35.1 % (ref 36.5–49.3)
HGB BLD-MCNC: 12 G/DL (ref 12–17)
IMM GRANULOCYTES # BLD AUTO: 0.01 THOUSAND/UL (ref 0–0.2)
IMM GRANULOCYTES NFR BLD AUTO: 0 % (ref 0–2)
LYMPHOCYTES # BLD AUTO: 0.95 THOUSANDS/ΜL (ref 0.6–4.47)
LYMPHOCYTES NFR BLD AUTO: 24 % (ref 14–44)
MCH RBC QN AUTO: 35.5 PG (ref 26.8–34.3)
MCHC RBC AUTO-ENTMCNC: 34.2 G/DL (ref 31.4–37.4)
MCV RBC AUTO: 104 FL (ref 82–98)
MONOCYTES # BLD AUTO: 0.59 THOUSAND/ΜL (ref 0.17–1.22)
MONOCYTES NFR BLD AUTO: 15 % (ref 4–12)
NEUTROPHILS # BLD AUTO: 2.29 THOUSANDS/ΜL (ref 1.85–7.62)
NEUTS SEG NFR BLD AUTO: 58 % (ref 43–75)
NRBC BLD AUTO-RTO: 0 /100 WBCS
PLATELET # BLD AUTO: 117 THOUSANDS/UL (ref 149–390)
PMV BLD AUTO: 11.4 FL (ref 8.9–12.7)
POTASSIUM SERPL-SCNC: 3.9 MMOL/L (ref 3.5–5.3)
RBC # BLD AUTO: 3.38 MILLION/UL (ref 3.88–5.62)
SODIUM SERPL-SCNC: 136 MMOL/L (ref 136–145)
WBC # BLD AUTO: 3.98 THOUSAND/UL (ref 4.31–10.16)

## 2018-06-24 PROCEDURE — 99024 POSTOP FOLLOW-UP VISIT: CPT | Performed by: SURGERY

## 2018-06-24 PROCEDURE — 80048 BASIC METABOLIC PNL TOTAL CA: CPT | Performed by: STUDENT IN AN ORGANIZED HEALTH CARE EDUCATION/TRAINING PROGRAM

## 2018-06-24 PROCEDURE — 85025 COMPLETE CBC W/AUTO DIFF WBC: CPT | Performed by: STUDENT IN AN ORGANIZED HEALTH CARE EDUCATION/TRAINING PROGRAM

## 2018-06-24 RX ORDER — POTASSIUM CHLORIDE 20 MEQ/1
20 TABLET, EXTENDED RELEASE ORAL ONCE
Status: COMPLETED | OUTPATIENT
Start: 2018-06-24 | End: 2018-06-24

## 2018-06-24 RX ADMIN — HEPARIN SODIUM 5000 UNITS: 5000 INJECTION, SOLUTION INTRAVENOUS; SUBCUTANEOUS at 09:04

## 2018-06-24 RX ADMIN — HEPARIN SODIUM 5000 UNITS: 5000 INJECTION, SOLUTION INTRAVENOUS; SUBCUTANEOUS at 20:01

## 2018-06-24 RX ADMIN — PANTOPRAZOLE SODIUM 20 MG: 20 TABLET, DELAYED RELEASE ORAL at 05:34

## 2018-06-24 RX ADMIN — POTASSIUM CHLORIDE 20 MEQ: 1500 TABLET, EXTENDED RELEASE ORAL at 09:04

## 2018-06-24 RX ADMIN — DEXTROSE, SODIUM CHLORIDE, AND POTASSIUM CHLORIDE 75 ML/HR: 5; .45; .15 INJECTION INTRAVENOUS at 11:29

## 2018-06-24 RX ADMIN — ROPIVACAINE HYDROCHLORIDE: 5 INJECTION, SOLUTION EPIDURAL; INFILTRATION; PERINEURAL at 12:35

## 2018-06-24 NOTE — PROGRESS NOTES
Anesthesia acute pain  POD#3  Pt is awake and alert  He is satisfied with epidural pain control  He is able to ambulate in hallway  Epidural site is dry and intact with no swelling or erythema        Plan: Continue current Rx        Bhupinder MATTHEW

## 2018-06-24 NOTE — PROGRESS NOTES
Progress Note - Colorectal Surgical   Jose Huerta 41 y o  male MRN: [id number]  Unit/Bed#: [facility] 380-10 Encounter: [id number]    Assessment:  41 M with sigmoid cancer and liver mets, s/p ex-lap LAR and segment 7 liver resection    Plan:  Diet per colorectal surgery  D/c IVF  Epidural- d/c tomorrow  SQH/SCDs-hold heparin tonight  Continue YVAN  Is/OOB/ambulate  PT/OT-->home w/ family    Subjective/Objective     Subjective: OMEGA  No flatus, BM  Some burping  Tolerating clears  Objective:    Blood pressure 116/63, pulse 103, temperature 98 9 °F (37 2 °C), temperature source Oral, resp  rate 16, height 5' 6" (1 676 m), weight 71 6 kg (157 lb 12 8 oz), SpO2 94 %  ,Body mass index is 25 47 kg/m²        Intake/Output Summary (Last 24 hours) at 06/24/18 0617  Last data filed at 06/24/18 0533   Gross per 24 hour   Intake          1781 35 ml   Output             2415 ml   Net          -633 65 ml       Invasive Devices     Central Venous Catheter Line            Port A Cath 03/06/18 Right Chest 109 days          Peripheral Intravenous Line            Peripheral IV 06/21/18 Left Arm 2 days    Peripheral IV 06/21/18 Right Hand 2 days          Epidural Line            Epidural Catheter 06/21/18 2 days          Line            Pump Device Continuous ambulatory delivery device Right Chest 109 days    Pump Device Continuous ambulatory delivery device Right Chest 95 days          Drain            Closed/Suction Drain Right RLQ Bulb 19 Fr  2 days                Physical Exam:   NAD  AAOx3  YVAN serosang  normal respiratory effort  soft, NT, mildly distended  Incision c/d/i  no c/c/e          Results from last 7 days  Lab Units 06/23/18  0630 06/22/18  0533 06/21/18  2204   WBC Thousand/uL 7 93 12 66*  --    HEMOGLOBIN g/dL 13 7 12 6  --    HEMATOCRIT % 40 1 36 8  --    PLATELETS Thousands/uL 130* 115* 116*       Results from last 7 days  Lab Units 06/23/18  0630 06/22/18  0533   SODIUM mmol/L 139 140   POTASSIUM mmol/L 3 9 4 1 CHLORIDE mmol/L 106 108   CO2 mmol/L 26 23   BUN mg/dL 7 7   CREATININE mg/dL 0 73 0 91   GLUCOSE RANDOM mg/dL 132 142*   CALCIUM mg/dL 8 4 8 1*

## 2018-06-24 NOTE — NURSING NOTE
Patient alert and oriented times four  IV sites patent and flushed  PCEA site at back intact  Up walking with family during the evening  Patient continues to burp and have epigastric discomfort  Tums given to patient  Chest decreased  Bowl sounds audible  YVAN draining serosanguinous drainage  Abdominal incision open to air and intact  Voiding in urinal  Urine yellow and clear

## 2018-06-24 NOTE — PROGRESS NOTES
Progress Note - Colorectal Surgical   Radha  41 y o  male MRN: [id number]  Unit/Bed#: [facility] 614-01 Encounter: [id number]    Assessment:  41 M with sigmoid cancer and liver mets, s/p ex-lap LAR and segment 7 liver resection    Plan:  Clears, toast/crackers  D/c IVF  Epidural- d/c tomorrow  IS/OOB/ambulate  YVAN  SQH/SCDs-hold heparin tonight  PT/OT-->home w/ family    Subjective/Objective     Subjective: No flatus, BM  Tolerating clears  Having some burping still  Objective:    Blood pressure 116/63, pulse 103, temperature 98 9 °F (37 2 °C), temperature source Oral, resp  rate 16, height 5' 6" (1 676 m), weight 71 6 kg (157 lb 12 8 oz), SpO2 94 %  ,Body mass index is 25 47 kg/m²        Intake/Output Summary (Last 24 hours) at 06/24/18 0614  Last data filed at 06/24/18 0533   Gross per 24 hour   Intake          1781 35 ml   Output             2415 ml   Net          -633 65 ml       Invasive Devices     Central Venous Catheter Line            Port A Cath 03/06/18 Right Chest 109 days          Peripheral Intravenous Line            Peripheral IV 06/21/18 Left Arm 2 days    Peripheral IV 06/21/18 Right Hand 2 days          Epidural Line            Epidural Catheter 06/21/18 2 days          Line            Pump Device Continuous ambulatory delivery device Right Chest 109 days    Pump Device Continuous ambulatory delivery device Right Chest 95 days          Drain            Closed/Suction Drain Right RLQ Bulb 19 Fr  2 days                Physical Exam:   NAD  AAOx3  normal respiratory effort  soft, NT, mildly distended  Incision c/d/i  YVAN 273 serosang  no c/c/e          Results from last 7 days  Lab Units 06/23/18  0630 06/22/18  0533 06/21/18  2204   WBC Thousand/uL 7 93 12 66*  --    HEMOGLOBIN g/dL 13 7 12 6  --    HEMATOCRIT % 40 1 36 8  --    PLATELETS Thousands/uL 130* 115* 116*       Results from last 7 days  Lab Units 06/23/18  0630 06/22/18  0533   SODIUM mmol/L 139 140   POTASSIUM mmol/L 3 9 4 1   CHLORIDE mmol/L 106 108   CO2 mmol/L 26 23   BUN mg/dL 7 7   CREATININE mg/dL 0 73 0 91   GLUCOSE RANDOM mg/dL 132 142*   CALCIUM mg/dL 8 4 8 1*

## 2018-06-25 LAB
ANION GAP SERPL CALCULATED.3IONS-SCNC: 8 MMOL/L (ref 4–13)
BASOPHILS # BLD AUTO: 0.01 THOUSANDS/ΜL (ref 0–0.1)
BASOPHILS NFR BLD AUTO: 0 % (ref 0–1)
BUN SERPL-MCNC: 8 MG/DL (ref 5–25)
CALCIUM SERPL-MCNC: 8.4 MG/DL (ref 8.3–10.1)
CHLORIDE SERPL-SCNC: 101 MMOL/L (ref 100–108)
CO2 SERPL-SCNC: 24 MMOL/L (ref 21–32)
CREAT SERPL-MCNC: 0.7 MG/DL (ref 0.6–1.3)
EOSINOPHIL # BLD AUTO: 0.02 THOUSAND/ΜL (ref 0–0.61)
EOSINOPHIL NFR BLD AUTO: 0 % (ref 0–6)
ERYTHROCYTE [DISTWIDTH] IN BLOOD BY AUTOMATED COUNT: 12.3 % (ref 11.6–15.1)
GFR SERPL CREATININE-BSD FRML MDRD: 114 ML/MIN/1.73SQ M
GLUCOSE SERPL-MCNC: 102 MG/DL (ref 65–140)
HCT VFR BLD AUTO: 36.9 % (ref 36.5–49.3)
HGB BLD-MCNC: 12.7 G/DL (ref 12–17)
IMM GRANULOCYTES # BLD AUTO: 0.01 THOUSAND/UL (ref 0–0.2)
IMM GRANULOCYTES NFR BLD AUTO: 0 % (ref 0–2)
LYMPHOCYTES # BLD AUTO: 0.82 THOUSANDS/ΜL (ref 0.6–4.47)
LYMPHOCYTES NFR BLD AUTO: 17 % (ref 14–44)
MCH RBC QN AUTO: 35 PG (ref 26.8–34.3)
MCHC RBC AUTO-ENTMCNC: 34.4 G/DL (ref 31.4–37.4)
MCV RBC AUTO: 102 FL (ref 82–98)
MONOCYTES # BLD AUTO: 0.63 THOUSAND/ΜL (ref 0.17–1.22)
MONOCYTES NFR BLD AUTO: 13 % (ref 4–12)
NEUTROPHILS # BLD AUTO: 3.36 THOUSANDS/ΜL (ref 1.85–7.62)
NEUTS SEG NFR BLD AUTO: 70 % (ref 43–75)
NRBC BLD AUTO-RTO: 0 /100 WBCS
PLATELET # BLD AUTO: 147 THOUSANDS/UL (ref 149–390)
PMV BLD AUTO: 11.4 FL (ref 8.9–12.7)
POTASSIUM SERPL-SCNC: 3.8 MMOL/L (ref 3.5–5.3)
RBC # BLD AUTO: 3.63 MILLION/UL (ref 3.88–5.62)
SODIUM SERPL-SCNC: 133 MMOL/L (ref 136–145)
WBC # BLD AUTO: 4.85 THOUSAND/UL (ref 4.31–10.16)

## 2018-06-25 PROCEDURE — 80048 BASIC METABOLIC PNL TOTAL CA: CPT | Performed by: STUDENT IN AN ORGANIZED HEALTH CARE EDUCATION/TRAINING PROGRAM

## 2018-06-25 PROCEDURE — 99024 POSTOP FOLLOW-UP VISIT: CPT | Performed by: SURGERY

## 2018-06-25 PROCEDURE — 85025 COMPLETE CBC W/AUTO DIFF WBC: CPT | Performed by: STUDENT IN AN ORGANIZED HEALTH CARE EDUCATION/TRAINING PROGRAM

## 2018-06-25 RX ORDER — POTASSIUM CHLORIDE 14.9 MG/ML
20 INJECTION INTRAVENOUS
Status: COMPLETED | OUTPATIENT
Start: 2018-06-25 | End: 2018-06-25

## 2018-06-25 RX ORDER — HEPARIN SODIUM 5000 [USP'U]/ML
5000 INJECTION, SOLUTION INTRAVENOUS; SUBCUTANEOUS EVERY 8 HOURS SCHEDULED
Status: DISCONTINUED | OUTPATIENT
Start: 2018-06-25 | End: 2018-06-28 | Stop reason: HOSPADM

## 2018-06-25 RX ORDER — PANTOPRAZOLE SODIUM 40 MG/1
40 INJECTION, POWDER, FOR SOLUTION INTRAVENOUS
Status: DISCONTINUED | OUTPATIENT
Start: 2018-06-26 | End: 2018-06-28

## 2018-06-25 RX ORDER — DEXTROSE, SODIUM CHLORIDE, AND POTASSIUM CHLORIDE 5; .45; .15 G/100ML; G/100ML; G/100ML
100 INJECTION INTRAVENOUS CONTINUOUS
Status: DISCONTINUED | OUTPATIENT
Start: 2018-06-25 | End: 2018-06-27

## 2018-06-25 RX ADMIN — ONDANSETRON 4 MG: 2 INJECTION, SOLUTION INTRAMUSCULAR; INTRAVENOUS at 02:34

## 2018-06-25 RX ADMIN — DEXTROSE, SODIUM CHLORIDE, AND POTASSIUM CHLORIDE 100 ML/HR: 5; .45; .15 INJECTION INTRAVENOUS at 07:49

## 2018-06-25 RX ADMIN — ONDANSETRON 4 MG: 2 INJECTION, SOLUTION INTRAMUSCULAR; INTRAVENOUS at 07:49

## 2018-06-25 RX ADMIN — HYDROMORPHONE HYDROCHLORIDE 0.5 MG: 1 INJECTION, SOLUTION INTRAMUSCULAR; INTRAVENOUS; SUBCUTANEOUS at 13:35

## 2018-06-25 RX ADMIN — ROPIVACAINE HYDROCHLORIDE: 5 INJECTION, SOLUTION EPIDURAL; INFILTRATION; PERINEURAL at 05:52

## 2018-06-25 RX ADMIN — HYDROMORPHONE HYDROCHLORIDE 0.5 MG: 1 INJECTION, SOLUTION INTRAMUSCULAR; INTRAVENOUS; SUBCUTANEOUS at 21:48

## 2018-06-25 RX ADMIN — HYDROMORPHONE HYDROCHLORIDE 0.5 MG: 1 INJECTION, SOLUTION INTRAMUSCULAR; INTRAVENOUS; SUBCUTANEOUS at 17:49

## 2018-06-25 RX ADMIN — POTASSIUM CHLORIDE 20 MEQ: 200 INJECTION, SOLUTION INTRAVENOUS at 13:02

## 2018-06-25 RX ADMIN — HEPARIN SODIUM 5000 UNITS: 5000 INJECTION, SOLUTION INTRAVENOUS; SUBCUTANEOUS at 13:02

## 2018-06-25 RX ADMIN — POTASSIUM CHLORIDE 20 MEQ: 200 INJECTION, SOLUTION INTRAVENOUS at 10:12

## 2018-06-25 RX ADMIN — HYDROMORPHONE HYDROCHLORIDE 1 MG: 1 INJECTION, SOLUTION INTRAMUSCULAR; INTRAVENOUS; SUBCUTANEOUS at 14:31

## 2018-06-25 RX ADMIN — DEXTROSE, SODIUM CHLORIDE, AND POTASSIUM CHLORIDE 100 ML/HR: 5; .45; .15 INJECTION INTRAVENOUS at 18:16

## 2018-06-25 RX ADMIN — PANTOPRAZOLE SODIUM 20 MG: 20 TABLET, DELAYED RELEASE ORAL at 05:01

## 2018-06-25 RX ADMIN — HYDROMORPHONE HYDROCHLORIDE 0.5 MG: 1 INJECTION, SOLUTION INTRAMUSCULAR; INTRAVENOUS; SUBCUTANEOUS at 10:11

## 2018-06-25 RX ADMIN — HEPARIN SODIUM 5000 UNITS: 5000 INJECTION, SOLUTION INTRAVENOUS; SUBCUTANEOUS at 21:42

## 2018-06-25 RX ADMIN — METOCLOPRAMIDE 5 MG: 5 INJECTION, SOLUTION INTRAMUSCULAR; INTRAVENOUS at 05:01

## 2018-06-25 RX ADMIN — HYDROMORPHONE HYDROCHLORIDE 1 MG: 1 INJECTION, SOLUTION INTRAMUSCULAR; INTRAVENOUS; SUBCUTANEOUS at 11:38

## 2018-06-25 RX ADMIN — Medication 500 MG: at 01:43

## 2018-06-25 NOTE — PROGRESS NOTES
Anesthesia Progress Note - Epidural Pain Management    Jaleesa Foster MRN: [id number]  Unit/Bed#: [facility] 920-20 Encounter: [id number]      Pertinent labs and anticoagulants reviewed  Epidural catheter removed and catheter tip intact  Site of epidural clean, dry without erythema or pus  No DVT PPX for 4 hours after removal  RN notified      Primary service to resume pain control   (please call  with any questions)      SIGNATURE: Russ Jackson MD  DATE: June 25, 2018  TIME: 9:57 AM

## 2018-06-25 NOTE — PROGRESS NOTES
Progress Note - Oncologic Surgical   Eliu Oconnor 41 y o  male MRN: [id number]  Unit/Bed#: [facility] 614-01 Encounter: [id number]    Assessment:  41 M with sigmoid cancer and liver mets, s/p ex-lap LAR and segment 7 liver resection    Plan:  Diet per colorectal surgery  Epidural- d/c today  SQH/SCDs-hold heparin this am  Continue YVAN-poss d/c  Is/OOB/ambulate  PT/OT-->home w/ family    Subjective/Objective     Subjective: Vomited, more distended, hiccups/burping  OOB/ambulating  Objective:    Blood pressure 107/77, pulse 89, temperature 98 2 °F (36 8 °C), temperature source Oral, resp  rate 18, height 5' 6" (1 676 m), weight 71 6 kg (157 lb 12 8 oz), SpO2 90 %  ,Body mass index is 25 47 kg/m²        Intake/Output Summary (Last 24 hours) at 06/25/18 0300  Last data filed at 06/25/18 0245   Gross per 24 hour   Intake          2869 65 ml   Output             2425 ml   Net           444 65 ml       Invasive Devices     Central Venous Catheter Line            Port A Cath 03/06/18 Right Chest 110 days          Peripheral Intravenous Line            Peripheral IV 06/21/18 Left Arm 3 days          Epidural Line            Epidural Catheter 06/21/18 3 days          Line            Pump Device Continuous ambulatory delivery device Right Chest 110 days    Pump Device Continuous ambulatory delivery device Right Chest 96 days          Drain            Closed/Suction Drain Right RLQ Bulb 19 Fr  3 days                Physical Exam:   NAD  AAOx3  normal respiratory effort  soft, TTP over incision distended  YVAN serosang  Incision c/d/i  no c/c/e          Results from last 7 days  Lab Units 06/24/18  0613 06/23/18  0630 06/22/18  0533   WBC Thousand/uL 3 98* 7 93 12 66*   HEMOGLOBIN g/dL 12 0 13 7 12 6   HEMATOCRIT % 35 1* 40 1 36 8   PLATELETS Thousands/uL 117* 130* 115*       Results from last 7 days  Lab Units 06/24/18  0613 06/23/18  0630 06/22/18  0533   SODIUM mmol/L 136 139 140   POTASSIUM mmol/L 3 9 3 9 4 1   CHLORIDE mmol/L 105 106 108   CO2 mmol/L 25 26 23   BUN mg/dL 6 7 7   CREATININE mg/dL 0 80 0 73 0 91   GLUCOSE RANDOM mg/dL 108 132 142*   CALCIUM mg/dL 8 5 8 4 8 1*

## 2018-06-25 NOTE — PROGRESS NOTES
Progress Note - Colorectal Surgical   Sebastien Fleeting 41 y o  male MRN: [id number]  Unit/Bed#: [facility] 614-01 Encounter: [id number]    Assessment:  41 M with sigmoid cancer and liver mets, s/p ex-lap LAR and segment 7 liver resection    Plan:  Back diet down to clears  Epidural- d/c today  IS/OOB/ambulate  YVAN-poss d/c today  SQH/SCDs-hold heparin this am  PT/OT-->home w/ family    Subjective/Objective     Subjective: Vomited  More distended  Hiccuping and burping  OOB/ambulating  Objective:    Blood pressure 107/77, pulse 89, temperature 98 2 °F (36 8 °C), temperature source Oral, resp  rate 18, height 5' 6" (1 676 m), weight 71 6 kg (157 lb 12 8 oz), SpO2 90 %  ,Body mass index is 25 47 kg/m²        Intake/Output Summary (Last 24 hours) at 06/25/18 0302  Last data filed at 06/25/18 0245   Gross per 24 hour   Intake          2869 65 ml   Output             2425 ml   Net           444 65 ml       Invasive Devices     Central Venous Catheter Line            Port A Cath 03/06/18 Right Chest 110 days          Peripheral Intravenous Line            Peripheral IV 06/21/18 Left Arm 3 days          Epidural Line            Epidural Catheter 06/21/18 3 days          Line            Pump Device Continuous ambulatory delivery device Right Chest 110 days    Pump Device Continuous ambulatory delivery device Right Chest 96 days          Drain            Closed/Suction Drain Right RLQ Bulb 19 Fr  3 days                Physical Exam:   NAD  AAOx3  normal respiratory effort  soft, TTP over incision, distended  YVAN serosang  Incision c/d/i  no c/c/e        Results from last 7 days  Lab Units 06/24/18  0613 06/23/18  0630 06/22/18  0533   WBC Thousand/uL 3 98* 7 93 12 66*   HEMOGLOBIN g/dL 12 0 13 7 12 6   HEMATOCRIT % 35 1* 40 1 36 8   PLATELETS Thousands/uL 117* 130* 115*       Results from last 7 days  Lab Units 06/24/18  0613 06/23/18  0630 06/22/18  0533   SODIUM mmol/L 136 139 140   POTASSIUM mmol/L 3 9 3 9 4 1   CHLORIDE mmol/L 105 106 108   CO2 mmol/L 25 26 23   BUN mg/dL 6 7 7   CREATININE mg/dL 0 80 0 73 0 91   GLUCOSE RANDOM mg/dL 108 132 142*   CALCIUM mg/dL 8 5 8 4 8 1*

## 2018-06-26 LAB
ANION GAP SERPL CALCULATED.3IONS-SCNC: 7 MMOL/L (ref 4–13)
BUN SERPL-MCNC: 4 MG/DL (ref 5–25)
CALCIUM SERPL-MCNC: 8 MG/DL (ref 8.3–10.1)
CHLORIDE SERPL-SCNC: 103 MMOL/L (ref 100–108)
CO2 SERPL-SCNC: 26 MMOL/L (ref 21–32)
CREAT SERPL-MCNC: 0.64 MG/DL (ref 0.6–1.3)
GFR SERPL CREATININE-BSD FRML MDRD: 118 ML/MIN/1.73SQ M
GLUCOSE SERPL-MCNC: 121 MG/DL (ref 65–140)
MAGNESIUM SERPL-MCNC: 1.8 MG/DL (ref 1.6–2.6)
POTASSIUM SERPL-SCNC: 3.8 MMOL/L (ref 3.5–5.3)
SODIUM SERPL-SCNC: 136 MMOL/L (ref 136–145)

## 2018-06-26 PROCEDURE — 99024 POSTOP FOLLOW-UP VISIT: CPT | Performed by: SURGERY

## 2018-06-26 PROCEDURE — 80048 BASIC METABOLIC PNL TOTAL CA: CPT | Performed by: PHYSICIAN ASSISTANT

## 2018-06-26 PROCEDURE — C9113 INJ PANTOPRAZOLE SODIUM, VIA: HCPCS | Performed by: PHYSICIAN ASSISTANT

## 2018-06-26 PROCEDURE — 83735 ASSAY OF MAGNESIUM: CPT | Performed by: PHYSICIAN ASSISTANT

## 2018-06-26 RX ORDER — POTASSIUM CHLORIDE 14.9 MG/ML
20 INJECTION INTRAVENOUS
Status: COMPLETED | OUTPATIENT
Start: 2018-06-26 | End: 2018-06-26

## 2018-06-26 RX ORDER — MAGNESIUM SULFATE 1 G/100ML
1 INJECTION INTRAVENOUS ONCE
Status: COMPLETED | OUTPATIENT
Start: 2018-06-26 | End: 2018-06-26

## 2018-06-26 RX ADMIN — MAGNESIUM SULFATE HEPTAHYDRATE 1 G: 1 INJECTION, SOLUTION INTRAVENOUS at 10:41

## 2018-06-26 RX ADMIN — DEXTROSE, SODIUM CHLORIDE, AND POTASSIUM CHLORIDE 100 ML/HR: 5; .45; .15 INJECTION INTRAVENOUS at 04:29

## 2018-06-26 RX ADMIN — POTASSIUM CHLORIDE 20 MEQ: 200 INJECTION, SOLUTION INTRAVENOUS at 10:34

## 2018-06-26 RX ADMIN — POTASSIUM CHLORIDE 20 MEQ: 200 INJECTION, SOLUTION INTRAVENOUS at 12:58

## 2018-06-26 RX ADMIN — DEXTROSE, SODIUM CHLORIDE, AND POTASSIUM CHLORIDE 100 ML/HR: 5; .45; .15 INJECTION INTRAVENOUS at 23:59

## 2018-06-26 RX ADMIN — PANTOPRAZOLE SODIUM 40 MG: 40 INJECTION, POWDER, FOR SOLUTION INTRAVENOUS at 05:01

## 2018-06-26 RX ADMIN — HYDROMORPHONE HYDROCHLORIDE 0.5 MG: 1 INJECTION, SOLUTION INTRAMUSCULAR; INTRAVENOUS; SUBCUTANEOUS at 04:30

## 2018-06-26 RX ADMIN — DEXTROSE, SODIUM CHLORIDE, AND POTASSIUM CHLORIDE 100 ML/HR: 5; .45; .15 INJECTION INTRAVENOUS at 14:14

## 2018-06-26 RX ADMIN — HEPARIN SODIUM 5000 UNITS: 5000 INJECTION, SOLUTION INTRAVENOUS; SUBCUTANEOUS at 14:16

## 2018-06-26 RX ADMIN — HEPARIN SODIUM 5000 UNITS: 5000 INJECTION, SOLUTION INTRAVENOUS; SUBCUTANEOUS at 22:47

## 2018-06-26 RX ADMIN — HEPARIN SODIUM 5000 UNITS: 5000 INJECTION, SOLUTION INTRAVENOUS; SUBCUTANEOUS at 05:00

## 2018-06-26 NOTE — RESTORATIVE TECHNICIAN NOTE
Restorative Specialist Mobility Note       Activity: Ambulate in cordon, Ambulate in room, Bathroom privileges, Dangle, Stand at bedside (Educated/encouraged pt to ambulate with assistance 3-4 x's/day  Pt callbell, phone/tray within reach )     Assistive Device: None           Anti-Embolism Device On:  Bilateral, Sequential compression devices, below knee       Augusto GARZA, Restorative Technician, [employer]

## 2018-06-26 NOTE — PLAN OF CARE

## 2018-06-26 NOTE — PROGRESS NOTES
Progress Note - Colorectal Surgical   Noman Ohs 41 y o  male MRN: [id number]  Unit/Bed#: [facility] 614-01 Encounter: [id number]    Assessment:  41 M with sigmoid cancer and liver mets, s/p ex-lap LAR and segment 7 liver resection    Plan:  Hemoglobin stable  Pain control  Diet as per Colorectal surgery     Subjective/Objective     Subjective: OMEGA  No longer nauseous, no vomiting  Ambulating  No flatus/BF  Objective:    Blood pressure 116/78, pulse 84, temperature 98 3 °F (36 8 °C), temperature source Oral, resp  rate 18, height 5' 6" (1 676 m), weight 66 9 kg (147 lb 7 8 oz), SpO2 97 %  ,Body mass index is 23 81 kg/m²        Intake/Output Summary (Last 24 hours) at 06/26/18 1727  Last data filed at 06/26/18 1500   Gross per 24 hour   Intake          3338 33 ml   Output             4120 ml   Net          -781 67 ml       Invasive Devices     Central Venous Catheter Line            Port A Cath 03/06/18 Right Chest 112 days    Port A Cath 06/25/18 Right Chest 1 day          Line            Pump Device Continuous ambulatory delivery device Right Chest 112 days    Pump Device Continuous ambulatory delivery device Right Chest 98 days          Drain            Closed/Suction Drain Right RLQ Bulb 19 Fr  5 days                Physical Exam: NAD  AAOx3  Normal respiratory effort  soft, NT, distended  Incision c/d/i  No c/c/e        Results from last 7 days  Lab Units 06/25/18  0750 06/24/18  0613 06/23/18  0630   WBC Thousand/uL 4 85 3 98* 7 93   HEMOGLOBIN g/dL 12 7 12 0 13 7   HEMATOCRIT % 36 9 35 1* 40 1   PLATELETS Thousands/uL 147* 117* 130*       Results from last 7 days  Lab Units 06/26/18  0507 06/25/18  0750 06/24/18  0613   SODIUM mmol/L 136 133* 136   POTASSIUM mmol/L 3 8 3 8 3 9   CHLORIDE mmol/L 103 101 105   CO2 mmol/L 26 24 25   BUN mg/dL 4* 8 6   CREATININE mg/dL 0 64 0 70 0 80   GLUCOSE RANDOM mg/dL 121 102 108   CALCIUM mg/dL 8 0* 8 4 8 5

## 2018-06-26 NOTE — PROGRESS NOTES
Progress Note - Colorectal Surgical   Betha Gum 41 y o  male MRN: [id number]  Unit/Bed#: [facility] 433-05 Encounter: [id number]    Assessment:  41 M with sigmoid cancer and liver mets, s/p ex-lap LAR and segment 7 liver resection    Plan:  NPO, could try clears   Prn pain control  IS/OOB/ambulate  YVAN  SQH/SCDs  PT/OT-->home w/ family    Subjective/Objective     Subjective: OMEGA  No longer nauseous, no vomiting  Ambulating  No flatus/BF  Objective:    Blood pressure 116/62, pulse 68, temperature 98 4 °F (36 9 °C), temperature source Oral, resp  rate 20, height 5' 6" (1 676 m), weight 71 6 kg (157 lb 12 8 oz), SpO2 94 %  ,Body mass index is 25 47 kg/m²        Intake/Output Summary (Last 24 hours) at 06/26/18 0518  Last data filed at 06/26/18 0428   Gross per 24 hour   Intake             2065 ml   Output             1565 ml   Net              500 ml       Invasive Devices     Central Venous Catheter Line            Port A Cath 03/06/18 Right Chest 111 days    Port A Cath 06/25/18 Right Chest less than 1 day          Line            Pump Device Continuous ambulatory delivery device Right Chest 111 days    Pump Device Continuous ambulatory delivery device Right Chest 97 days          Drain            Closed/Suction Drain Right RLQ Bulb 19 Fr  4 days                Physical Exam: NAD  AAOx3  Normal respiratory effort  soft, NT, distended  Incision c/d/i  No c/c/e        Results from last 7 days  Lab Units 06/25/18  0750 06/24/18  0613 06/23/18  0630   WBC Thousand/uL 4 85 3 98* 7 93   HEMOGLOBIN g/dL 12 7 12 0 13 7   HEMATOCRIT % 36 9 35 1* 40 1   PLATELETS Thousands/uL 147* 117* 130*       Results from last 7 days  Lab Units 06/25/18  0750 06/24/18  0613 06/23/18  0630   SODIUM mmol/L 133* 136 139   POTASSIUM mmol/L 3 8 3 9 3 9   CHLORIDE mmol/L 101 105 106   CO2 mmol/L 24 25 26   BUN mg/dL 8 6 7   CREATININE mg/dL 0 70 0 80 0 73   GLUCOSE RANDOM mg/dL 102 108 132   CALCIUM mg/dL 8 4 8 5 8 4

## 2018-06-27 ENCOUNTER — TELEPHONE (OUTPATIENT)
Dept: HEMATOLOGY ONCOLOGY | Facility: CLINIC | Age: 48
End: 2018-06-27

## 2018-06-27 LAB
ANION GAP SERPL CALCULATED.3IONS-SCNC: 7 MMOL/L (ref 4–13)
BASOPHILS # BLD AUTO: 0.02 THOUSANDS/ΜL (ref 0–0.1)
BASOPHILS NFR BLD AUTO: 0 % (ref 0–1)
BUN SERPL-MCNC: 3 MG/DL (ref 5–25)
CALCIUM SERPL-MCNC: 8.7 MG/DL (ref 8.3–10.1)
CHLORIDE SERPL-SCNC: 102 MMOL/L (ref 100–108)
CO2 SERPL-SCNC: 26 MMOL/L (ref 21–32)
CREAT SERPL-MCNC: 0.68 MG/DL (ref 0.6–1.3)
EOSINOPHIL # BLD AUTO: 0.2 THOUSAND/ΜL (ref 0–0.61)
EOSINOPHIL NFR BLD AUTO: 3 % (ref 0–6)
ERYTHROCYTE [DISTWIDTH] IN BLOOD BY AUTOMATED COUNT: 11.9 % (ref 11.6–15.1)
GFR SERPL CREATININE-BSD FRML MDRD: 115 ML/MIN/1.73SQ M
GLUCOSE SERPL-MCNC: 120 MG/DL (ref 65–140)
HCT VFR BLD AUTO: 36.3 % (ref 36.5–49.3)
HGB BLD-MCNC: 12.6 G/DL (ref 12–17)
IMM GRANULOCYTES # BLD AUTO: 0.02 THOUSAND/UL (ref 0–0.2)
IMM GRANULOCYTES NFR BLD AUTO: 0 % (ref 0–2)
LYMPHOCYTES # BLD AUTO: 1.22 THOUSANDS/ΜL (ref 0.6–4.47)
LYMPHOCYTES NFR BLD AUTO: 17 % (ref 14–44)
MAGNESIUM SERPL-MCNC: 2 MG/DL (ref 1.6–2.6)
MCH RBC QN AUTO: 35.1 PG (ref 26.8–34.3)
MCHC RBC AUTO-ENTMCNC: 34.7 G/DL (ref 31.4–37.4)
MCV RBC AUTO: 101 FL (ref 82–98)
MONOCYTES # BLD AUTO: 0.65 THOUSAND/ΜL (ref 0.17–1.22)
MONOCYTES NFR BLD AUTO: 9 % (ref 4–12)
NEUTROPHILS # BLD AUTO: 5.03 THOUSANDS/ΜL (ref 1.85–7.62)
NEUTS SEG NFR BLD AUTO: 71 % (ref 43–75)
NRBC BLD AUTO-RTO: 0 /100 WBCS
PLATELET # BLD AUTO: 183 THOUSANDS/UL (ref 149–390)
PMV BLD AUTO: 10.6 FL (ref 8.9–12.7)
POTASSIUM SERPL-SCNC: 3.8 MMOL/L (ref 3.5–5.3)
RBC # BLD AUTO: 3.59 MILLION/UL (ref 3.88–5.62)
SODIUM SERPL-SCNC: 135 MMOL/L (ref 136–145)
WBC # BLD AUTO: 7.14 THOUSAND/UL (ref 4.31–10.16)

## 2018-06-27 PROCEDURE — 83735 ASSAY OF MAGNESIUM: CPT | Performed by: PHYSICIAN ASSISTANT

## 2018-06-27 PROCEDURE — 85025 COMPLETE CBC W/AUTO DIFF WBC: CPT | Performed by: PHYSICIAN ASSISTANT

## 2018-06-27 PROCEDURE — 80048 BASIC METABOLIC PNL TOTAL CA: CPT | Performed by: PHYSICIAN ASSISTANT

## 2018-06-27 PROCEDURE — C9113 INJ PANTOPRAZOLE SODIUM, VIA: HCPCS | Performed by: PHYSICIAN ASSISTANT

## 2018-06-27 RX ORDER — DEXTROSE, SODIUM CHLORIDE, AND POTASSIUM CHLORIDE 5; .45; .15 G/100ML; G/100ML; G/100ML
50 INJECTION INTRAVENOUS CONTINUOUS
Status: DISCONTINUED | OUTPATIENT
Start: 2018-06-27 | End: 2018-06-27

## 2018-06-27 RX ORDER — POTASSIUM CHLORIDE 20 MEQ/1
20 TABLET, EXTENDED RELEASE ORAL ONCE
Status: COMPLETED | OUTPATIENT
Start: 2018-06-27 | End: 2018-06-27

## 2018-06-27 RX ADMIN — HEPARIN SODIUM 5000 UNITS: 5000 INJECTION, SOLUTION INTRAVENOUS; SUBCUTANEOUS at 05:08

## 2018-06-27 RX ADMIN — HEPARIN SODIUM 5000 UNITS: 5000 INJECTION, SOLUTION INTRAVENOUS; SUBCUTANEOUS at 22:50

## 2018-06-27 RX ADMIN — PANTOPRAZOLE SODIUM 40 MG: 40 INJECTION, POWDER, FOR SOLUTION INTRAVENOUS at 05:08

## 2018-06-27 RX ADMIN — HEPARIN SODIUM 5000 UNITS: 5000 INJECTION, SOLUTION INTRAVENOUS; SUBCUTANEOUS at 13:55

## 2018-06-27 RX ADMIN — POTASSIUM CHLORIDE 20 MEQ: 1500 TABLET, EXTENDED RELEASE ORAL at 10:27

## 2018-06-27 NOTE — TELEPHONE ENCOUNTER
Calling about his disability paperwork that was brought in to [facility] on 6/15/2018  Employer has not received approval yet, so he is not getting paid  Please call to let him know a progress update

## 2018-06-27 NOTE — CASE MANAGEMENT
Continued Stay Review    Thank you,  [address] [facility] in the [facility] [address] for 2017  Network Utilization Review Department  Phone: [phone]; Fax [phone]  ATTENTION: The Network Utilization Review Department is now centralized for our 7 Facilities  Make a note that we have a new phone and fax numbers for our Department  Please call with any questions or concerns to [phone] and carefully follow the prompts so that you are directed to the right person  All voicemails are confidential  Fax any determinations, approvals, denials, and requests for initial or continue stay review clinical to [phone]  Due to HIGH CALL volume, it would be easier if you could please send faxed requests to expedite your requests and in part, help us provide discharge notifications faster      Date: 6/27/2018    Vital Signs: /63 (BP Location: Right arm)   Pulse 65   Temp 99 °F (37 2 °C) (Oral)   Resp 14   Ht 5' 6" (1 676 m)   Wt 65 5 kg (144 lb 6 4 oz)   SpO2 97%   BMI 23 31 kg/m²     Medications:   Scheduled Meds:   Current Facility-Administered Medications:  diphenhydrAMINE 25 mg Intravenous Q6H PRN    heparin (porcine) 5,000 Units Subcutaneous Q8H [facility]    HYDROmorphone 0 5 mg Intravenous Q2H PRN 6/25 x 5  6/26 x 1    HYDROmorphone 1 mg Intravenous Q3H PRN 6/25 x 2    metoclopramide 5 mg Intravenous Q6H PRN 6/25 x 1    nalbuphine 5 mg Intravenous Q3H PRN    ondansetron 4 mg Intravenous Q4H PRN 6/25 x 2    pantoprazole 40 mg Intravenous Early Morning      D5 0 45 NS w/ 20 meq KCL @ 100 ml/hr - d/c''d on 6/27 @ 08:53    Abnormal Labs/Diagnostic Results:       Ref Range & Units 6/27/18 0523 6/25/18 0750 6/24/18 0613 6/23/18 0630 6/22/18 0533   WBC 4 31 - 10 16 Thousand/uL 7 14  4 85  3 98   7 93  12 66     RBC 3 88 - 5 62 Million/uL 3 59   3 63   3 38   3 84   3 51     Hemoglobin 12 0 - 17 0 g/dL 12 6  12 7  12 0  13 7  12 6    Hematocrit 36 5 - 49 3 % 36 3 36 9  35 1   40 1  36 8    MCV 82 - 98 fL 101   102   104   104   105     MCH 26 8 - 34 3 pg 35 1   35 0   35 5   35 7   35 9     MCHC 31 4 - 37 4 g/dL 34 7  34 4  34 2  34 2  34 2    RDW 11 6 - 15 1 % 11 9  12 3  12 6  12 7  12 6    MPV 8 9 - 12 7 fL 10 6  11 4  11 4  11 9  11 6    Platelets 199 - 625 Thousands/uL 183  147   117   130   115     nRBC /100 WBCs 0  0  0  0  0    Neutrophils Relative 43 - 75 % 71  70  58  81   88     Immat GRANS % 0 - 2 % 0  0  0  0  0    Lymphocytes Relative 14 - 44 % 17  17  24  10   6     Monocytes Relative 4 - 12 % 9  13   15   9  6    Eosinophils Relative 0 - 6 % 3  0  3  0  0    Basophils Relative 0 - 1 % 0  0  0  0  0    Neutrophils Absolute 1 85 - 7 62 Thousands/µL 5 03  3 36  2 29  6 43  11 11     Immature Grans Absolute 0 00 - 0 20 Thousand/uL 0 02  0 01  0 01  0 01  0 05    Lymphocytes Absolute 0 60 - 4 47 Thousands/µL 1 22  0 82  0 95  0 78  0 69    Monocytes Absolute 0 17 - 1 22 Thousand/µL 0 65  0 63  0 59  0 68  0 80    Eosinophils Absolute 0 00 - 0 61 Thousand/µL 0 20  0 02  0 13  0 02  0 00    Basophils Absolute 0 00 - 0 10 Thousands/µL 0 02  0 01  0 01  0 01  0 01               Ref Range & Units 6/27/18 0523 6/26/18 0507 6/25/18 0750 6/24/18 0613 6/23/18 0630   Sodium 136 - 145 mmol/L 135   136  133   136  139    Potassium 3 5 - 5 3 mmol/L 3 8  3 8  3 8  3 9  3 9    Chloride 100 - 108 mmol/L 102  103  101  105  106    CO2 21 - 32 mmol/L 26  26  24  25  26    Anion Gap 4 - 13 mmol/L 7  7  8  6  7    BUN 5 - 25 mg/dL 3   4   8  6  7    Creatinine 0 60 - 1 30 mg/dL 0 68  0 64CM  0 70CM  0 80CM  0 73CM    Glucose 65 - 140 mg/dL 120  121CM  102CM  108CM  132CM    Calcium 8 3 - 10 1 mg/dL 8 7  8 0   8 4  8 5  8 4    eGFR ml/min/1 73sq m 115  118  114  108  112              Age/Sex: 41 y o  male     Assessment/Plan:   Progress note 6/27  Assessment:  POD # 6 Exploratory laparotomy, low anterior resection, segment 7 liver resection     Plan:  1   Continue ambulating the halls 5-6 times daily, OOB for most of the day  2  Continue incentive spirometry  IV fluids to 50 ml/hr since urine output excellent  3 ? Clear/toast  diet  4   Check am labs       Discharge Plan: TBD

## 2018-06-28 VITALS
OXYGEN SATURATION: 97 % | SYSTOLIC BLOOD PRESSURE: 114 MMHG | WEIGHT: 145.28 LBS | BODY MASS INDEX: 23.35 KG/M2 | TEMPERATURE: 98.3 F | DIASTOLIC BLOOD PRESSURE: 55 MMHG | RESPIRATION RATE: 20 BRPM | HEIGHT: 66 IN | HEART RATE: 70 BPM

## 2018-06-28 LAB
ANION GAP SERPL CALCULATED.3IONS-SCNC: 8 MMOL/L (ref 4–13)
BASOPHILS # BLD AUTO: 0.02 THOUSANDS/ΜL (ref 0–0.1)
BASOPHILS NFR BLD AUTO: 0 % (ref 0–1)
BUN SERPL-MCNC: 7 MG/DL (ref 5–25)
CALCIUM SERPL-MCNC: 8.9 MG/DL (ref 8.3–10.1)
CHLORIDE SERPL-SCNC: 102 MMOL/L (ref 100–108)
CO2 SERPL-SCNC: 27 MMOL/L (ref 21–32)
CREAT SERPL-MCNC: 0.68 MG/DL (ref 0.6–1.3)
EOSINOPHIL # BLD AUTO: 0.24 THOUSAND/ΜL (ref 0–0.61)
EOSINOPHIL NFR BLD AUTO: 3 % (ref 0–6)
ERYTHROCYTE [DISTWIDTH] IN BLOOD BY AUTOMATED COUNT: 11.9 % (ref 11.6–15.1)
GFR SERPL CREATININE-BSD FRML MDRD: 115 ML/MIN/1.73SQ M
GLUCOSE SERPL-MCNC: 94 MG/DL (ref 65–140)
HCT VFR BLD AUTO: 36.7 % (ref 36.5–49.3)
HGB BLD-MCNC: 12.8 G/DL (ref 12–17)
IMM GRANULOCYTES # BLD AUTO: 0.03 THOUSAND/UL (ref 0–0.2)
IMM GRANULOCYTES NFR BLD AUTO: 0 % (ref 0–2)
LYMPHOCYTES # BLD AUTO: 1.74 THOUSANDS/ΜL (ref 0.6–4.47)
LYMPHOCYTES NFR BLD AUTO: 22 % (ref 14–44)
MCH RBC QN AUTO: 35.3 PG (ref 26.8–34.3)
MCHC RBC AUTO-ENTMCNC: 34.9 G/DL (ref 31.4–37.4)
MCV RBC AUTO: 101 FL (ref 82–98)
MONOCYTES # BLD AUTO: 0.75 THOUSAND/ΜL (ref 0.17–1.22)
MONOCYTES NFR BLD AUTO: 9 % (ref 4–12)
NEUTROPHILS # BLD AUTO: 5.16 THOUSANDS/ΜL (ref 1.85–7.62)
NEUTS SEG NFR BLD AUTO: 66 % (ref 43–75)
NRBC BLD AUTO-RTO: 0 /100 WBCS
PLATELET # BLD AUTO: 218 THOUSANDS/UL (ref 149–390)
PMV BLD AUTO: 10.6 FL (ref 8.9–12.7)
POTASSIUM SERPL-SCNC: 3.7 MMOL/L (ref 3.5–5.3)
RBC # BLD AUTO: 3.63 MILLION/UL (ref 3.88–5.62)
SODIUM SERPL-SCNC: 137 MMOL/L (ref 136–145)
WBC # BLD AUTO: 7.94 THOUSAND/UL (ref 4.31–10.16)

## 2018-06-28 PROCEDURE — C9113 INJ PANTOPRAZOLE SODIUM, VIA: HCPCS | Performed by: PHYSICIAN ASSISTANT

## 2018-06-28 PROCEDURE — 80048 BASIC METABOLIC PNL TOTAL CA: CPT | Performed by: STUDENT IN AN ORGANIZED HEALTH CARE EDUCATION/TRAINING PROGRAM

## 2018-06-28 PROCEDURE — 85025 COMPLETE CBC W/AUTO DIFF WBC: CPT | Performed by: STUDENT IN AN ORGANIZED HEALTH CARE EDUCATION/TRAINING PROGRAM

## 2018-06-28 RX ORDER — POTASSIUM CHLORIDE 20 MEQ/1
40 TABLET, EXTENDED RELEASE ORAL ONCE
Status: DISCONTINUED | OUTPATIENT
Start: 2018-06-28 | End: 2018-06-28

## 2018-06-28 RX ORDER — HYDROCODONE BITARTRATE AND ACETAMINOPHEN 5; 325 MG/1; MG/1
1 TABLET ORAL EVERY 6 HOURS PRN
Status: DISCONTINUED | OUTPATIENT
Start: 2018-06-28 | End: 2018-06-28

## 2018-06-28 RX ORDER — POTASSIUM CHLORIDE 20 MEQ/1
20 TABLET, EXTENDED RELEASE ORAL ONCE
Status: COMPLETED | OUTPATIENT
Start: 2018-06-28 | End: 2018-06-28

## 2018-06-28 RX ORDER — HYDROCODONE BITARTRATE AND ACETAMINOPHEN 5; 325 MG/1; MG/1
1 TABLET ORAL EVERY 6 HOURS PRN
Status: DISCONTINUED | OUTPATIENT
Start: 2018-06-28 | End: 2018-06-28 | Stop reason: HOSPADM

## 2018-06-28 RX ORDER — PANTOPRAZOLE SODIUM 40 MG/1
40 TABLET, DELAYED RELEASE ORAL
Status: DISCONTINUED | OUTPATIENT
Start: 2018-06-28 | End: 2018-06-28 | Stop reason: HOSPADM

## 2018-06-28 RX ORDER — HYDROCODONE BITARTRATE AND ACETAMINOPHEN 5; 325 MG/1; MG/1
2 TABLET ORAL EVERY 6 HOURS PRN
Status: DISCONTINUED | OUTPATIENT
Start: 2018-06-28 | End: 2018-06-28 | Stop reason: HOSPADM

## 2018-06-28 RX ORDER — HYDROCODONE BITARTRATE AND ACETAMINOPHEN 5; 325 MG/1; MG/1
1 TABLET ORAL EVERY 6 HOURS PRN
Qty: 26 TABLET | Refills: 0 | Status: SHIPPED | OUTPATIENT
Start: 2018-06-28 | End: 2018-07-08

## 2018-06-28 RX ADMIN — POTASSIUM CHLORIDE 20 MEQ: 1500 TABLET, EXTENDED RELEASE ORAL at 12:37

## 2018-06-28 RX ADMIN — PANTOPRAZOLE SODIUM 40 MG: 40 INJECTION, POWDER, FOR SOLUTION INTRAVENOUS at 05:05

## 2018-06-28 RX ADMIN — HEPARIN SODIUM 5000 UNITS: 5000 INJECTION, SOLUTION INTRAVENOUS; SUBCUTANEOUS at 14:48

## 2018-06-28 RX ADMIN — Medication 300 UNITS: at 14:51

## 2018-06-28 RX ADMIN — HEPARIN SODIUM 5000 UNITS: 5000 INJECTION, SOLUTION INTRAVENOUS; SUBCUTANEOUS at 05:05

## 2018-06-28 RX ADMIN — PANTOPRAZOLE SODIUM 40 MG: 40 TABLET, DELAYED RELEASE ORAL at 09:38

## 2018-06-28 NOTE — TELEPHONE ENCOUNTER
Spoke with pt, he stated that his HR department called him today to let him know that he was approved for his time off  Form is no longer needed

## 2018-06-28 NOTE — DISCHARGE SUMMARY
Discharge Summary - Josefa Alberto 41 y o  male MRN: [id number]    Unit/Bed#: [facility] 397-95 Encounter: [id number]    Admission Date:   Admission Orders     Ordered        06/21/18 1552  Inpatient Admission  Once               Admitting Diagnosis: Colon cancer metastasized to liver ([facility] [address] ) [C18 9, C78 7]    HPI: Pt is a 47y o   M admitted for a joint operation with colorectal surgery and surgical oncology for sigmoid colorectal cancer with liver metastasis  Procedures Performed:   6/21/18 Exploratory laparotomy, low anterior resection, partial proctectomy,SPY angiography, flexible sigmoidoscopy - Dr Amisha Parsons  6/21/18 Segment seven liver resection - Dr Astrid Yarbrough    Summary of Hospital Course:  Patient went to the OR 6/21 for an exploratory laparotomy, segment seven liver wedge resection, low anterior resection with spy angiography with Dr Astrid Yarbrough and Dr Amisha Parsons  Postop day one his Nickerson was discontinued, his advanced to clear liquid diet, subdural remained in place  Epidural was removed POD 3  He did develop an ileus, is made NPO and did not require an NG tube  He is again slowly advance to a regular diet, and regain bowel function  His YVAN drain remained serosanguineous and was removed before discharge  PT OT cleared him for home  He is now tolerating regular diet, able to ambulate, pain is controlled oral medication he was fit for discharge home  A script for Vicodin 5/325 every 6-8 hours was given to the patient  # 26 dispensed and no refills  He will follow up with Dr Amisha Parsons in 4 weeks  He will see Dr Astrid Yarbrough at the [facility] on 7/10/18 at 9:45 am       Significant Findings, Care, Treatment and Services Provided: 6/21 Exploratory laparotomy, segment 7 liver wedge resection, LAR with SPY angiography   TIFF Justice/ Maryana     Complications: none    Discharge Diagnosis: Colon cancer metastasized to liver ([facility] [address] ) [C18 9, C78 7]    Resolved Problems  Date Reviewed: 6/21/2018    None Condition at Discharge: good       Discharge instructions/Information to patient and family:   See after visit summary for information provided to patient and family  Provisions for Follow-Up Care:  See after visit summary for information related to follow-up care and any pertinent home health orders  PCP: Lashonda Bauer MD    Disposition: Home    Planned Readmission: No    Discharge Statement   I spent 30 minutes discharging the patient  This time was spent on the day of discharge  I had direct contact with the patient on the day of discharge  Additional documentation is required if more than 30 minutes were spent on discharge  Discharge Medications:  See after visit summary for reconciled discharge medications provided to patient and family

## 2018-06-28 NOTE — RESTORATIVE TECHNICIAN NOTE
Restorative Specialist Mobility Note       Activity: Other (Comment) (Educated/encouraged pt to ambulate with assistance 3-4 x's/day, pt states he is ambulating Independently in the halls nursing aware   Pt callbell, phone/tray within reach )        Aidan GARZA, Restorative Technician, [employer]

## 2018-06-28 NOTE — PROGRESS NOTES
06/28/18 1100   Plan of Care   Comments Introduced self and began to establish a caring relationship with pt  Provided a safe space for pt to express his thoughts and  feelings  Pt expressed excitment for possible discharge tomorrow     Assessment Completed by: Unit visit

## 2018-06-28 NOTE — PROGRESS NOTES
Progress Note - General Surgery   Macho Coker 41 y o  male MRN: [id number]  Unit/Bed#: [facility] 614-01 Encounter: [id number]       Assessment:  41 M with sigmoid cancer and liver mets, s/p ex-lap LAR and segment 7 liver resection    - passing gas and bowel movements   Feeling improved   YVAN drain discontinued yesterday      Plan:  Advance to a regular diet   Prn pain control  IS/OOB/ambulate  SQH/SCDs  PT/OT-->home w/ family    Subjective/Objective   Chief Complaint:     Subjective: no overnight events  Passing gas and bowel movements     Objective:     Blood pressure 110/59, pulse 84, temperature 98 2 °F (36 8 °C), temperature source Oral, resp  rate 16, height 5' 6" (1 676 m), weight 65 9 kg (145 lb 4 5 oz), SpO2 97 %  ,Body mass index is 23 45 kg/m²  Intake/Output Summary (Last 24 hours) at 06/28/18 0604  Last data filed at 06/28/18 0510   Gross per 24 hour   Intake           609 17 ml   Output              860 ml   Net          -250 83 ml       Invasive Devices     Central Venous Catheter Line            Port A Cath 06/25/18 Right Chest 2 days          Line            Pump Device Continuous ambulatory delivery device Right Chest 113 days    Pump Device Continuous ambulatory delivery device Right Chest 99 days                Physical Exam: General: AAOx3  Respiratory: BS b/l  Abdomen: Soft, NT, no rebound/guarding  Incision c/d/i  Heart: RRR, S1s2  Ext: Warm no cyanosis       Lab, Imaging and other studies:  I have personally reviewed pertinent lab results    , CBC:   Lab Results   Component Value Date    WBC 7 94 06/28/2018    HGB 12 8 06/28/2018    HCT 36 7 06/28/2018     (H) 06/28/2018     06/28/2018    MCH 35 3 (H) 06/28/2018    MCHC 34 9 06/28/2018    RDW 11 9 06/28/2018    MPV 10 6 06/28/2018    NRBC 0 06/28/2018   , CMP: No results found for: NA, K, CL, CO2, ANIONGAP, BUN, CREATININE, GLUCOSE, CALCIUM, AST, ALT, ALKPHOS, PROT, ALBUMIN, BILITOT, EGFR  VTE Pharmacologic Prophylaxis: Sequential compression device (Venodyne)  and Heparin  VTE Mechanical Prophylaxis: sequential compression device

## 2018-06-29 ENCOUNTER — DOCUMENTATION (OUTPATIENT)
Dept: INTERNAL MEDICINE CLINIC | Facility: CLINIC | Age: 48
End: 2018-06-29

## 2018-06-29 ENCOUNTER — TELEPHONE (OUTPATIENT)
Dept: INTERNAL MEDICINE CLINIC | Facility: CLINIC | Age: 48
End: 2018-06-29

## 2018-06-29 ENCOUNTER — TRANSITIONAL CARE MANAGEMENT (OUTPATIENT)
Dept: INTERNAL MEDICINE CLINIC | Facility: CLINIC | Age: 48
End: 2018-06-29

## 2018-06-29 NOTE — TELEPHONE ENCOUNTER
Patient was admitted to [facility] on 6/21/18 for colon cancer metastasized to liver  He had an exploratory laparotomy, low anterior resection, partial proctectomy, SPY angiography, flex Sigmoid by Dr Africa Wilson and segment seven liver resection by Dr Manuela Paris on 6/21/18  He was discharged on 6/28/18 to home and he reports that he is "doing well "  He does not wish to schedule a TCM appt at this time  He will call for any needs  He has many follow up appts with his specialists and cancer treatments  No TCM scheduled

## 2018-07-09 ENCOUNTER — TELEPHONE (OUTPATIENT)
Dept: HEMATOLOGY ONCOLOGY | Facility: CLINIC | Age: 48
End: 2018-07-09

## 2018-07-09 RX ORDER — MINOCYCLINE HYDROCHLORIDE 100 MG/1
TABLET ORAL
COMMUNITY
Start: 2018-05-24 | End: 2018-08-08 | Stop reason: SDUPTHER

## 2018-07-09 NOTE — TELEPHONE ENCOUNTER
Pt had surgery on the 21st of June and wondering when he should start Chemo up again  If you can call pt

## 2018-07-09 NOTE — TELEPHONE ENCOUNTER
Could you please call patient and confirm that he is aware he has a f/u appt this Thursday to discuss restarting treatment

## 2018-07-10 ENCOUNTER — OFFICE VISIT (OUTPATIENT)
Dept: SURGICAL ONCOLOGY | Facility: CLINIC | Age: 48
End: 2018-07-10

## 2018-07-10 VITALS
RESPIRATION RATE: 18 BRPM | HEART RATE: 74 BPM | SYSTOLIC BLOOD PRESSURE: 122 MMHG | WEIGHT: 147 LBS | TEMPERATURE: 98.6 F | BODY MASS INDEX: 23.07 KG/M2 | HEIGHT: 67 IN | DIASTOLIC BLOOD PRESSURE: 78 MMHG

## 2018-07-10 DIAGNOSIS — C78.7 METASTATIC COLON CANCER TO LIVER (HCC): Primary | ICD-10-CM

## 2018-07-10 DIAGNOSIS — C18.9 METASTATIC COLON CANCER TO LIVER (HCC): Primary | ICD-10-CM

## 2018-07-10 PROCEDURE — 99024 POSTOP FOLLOW-UP VISIT: CPT | Performed by: SURGERY

## 2018-07-10 NOTE — PROGRESS NOTES
Surgical Oncology Follow Up       [address]  [facility] [address]  Evelin Joyner  1970  [id number]  [address] [facility]  [facility] [address] [id number]    Diagnoses and all orders for this visit:    Metastatic colon cancer to liver [facility])    Other orders  -     minocycline (DYNACIN) 100 MG tablet; Chief Complaint   Patient presents with    Post-op     Pt is here for post-op visit following recent resection of bowel and liver  Incision is healing well, pt has no complaints  Return in about 3 months (around 10/10/2018)  Metastatic colon cancer to liver ([facility] [address] )    1/2018 Initial Diagnosis     Malignant neoplasm of sigmoid colon ([facility] [address] )         1/22/2018 Biopsy     Large Intestine, Sigmoid Colon, Recto-sigmoid tumor bx:    - Invasive colonic adenocarcinoma, moderately differentiated         2/6/2018 -  Chemotherapy     Modified FOLFOX6   Added Erbitux on 3/20/18             Staging:  Metastatic rectosigmoid cancer  [id number]  Treatment history:   Modified FOLFOX 6   Erbitux added 03/20/2018   Segment 7 liver resection, June 2018  Current treatment:  Modified FOLFOX 6   Erbitux added 03/20/2018   Disease status:   Stable    History of Present Illness:   Patient returns in follow-up of his segment 7 liver resection and colon resection  He is doing well at this time with no complaints  His appetite is good  No nausea or vomiting  No fevers or chills  Margins on the liver resection were negative  No residual tumor was seen in his colon  Review of Systems  Complete ROS Surg Onc:   Complete ROS Surg Onc:   Constitutional: The patient denies new or recent history of general fatigue, no recent weight loss, no change in appetite  Eyes: No complaints of visual problems, no scleral icterus     ENT: no complaints of ear pain, no hoarseness, no difficulty swallowing,  no tinnitus and no new masses in head, oral cavity, or neck  Cardiovascular: No complaints of chest pain, no palpitations, no ankle edema  Respiratory: No complaints of shortness of breath, no cough  Gastrointestinal: No complaints of jaundice, no bloody stools, no pale stools  Genitourinary: No complaints of dysuria, no hematuria, no nocturia, no frequent urination, no urethral discharge  Musculoskeletal: No complaints of weakness, paralysis, joint stiffness or arthralgias  Integumentary: No complaints of rash, no new lesions  Neurological: No complaints of convulsions, no seizures, no dizziness  Hematologic/Lymphatic: No complaints of easy bruising  Endocrine:  No hot or cold intolerance  No polydipsia, polyphagia, or polyuria  Allergy/immunology:  No environmental allergies  No food allergies  Not immunocompromised  Skin:  No pallor or rash  No wound  Patient Active Problem List   Diagnosis    ED (erectile dysfunction)    Metastatic colon cancer to liver (HCC)    GERD (gastroesophageal reflux disease)    Pulmonary nodule, right     Past Medical History:   Diagnosis Date    Dysuria     Last Assessed:8/24/17    GERD (gastroesophageal reflux disease)     Liver nodule     Pulmonary nodule, right      Past Surgical History:   Procedure Laterality Date    COLONOSCOPY N/A 1/22/2018    Procedure: COLONOSCOPY;  Surgeon: Marcela Gusman MD;  [facility]; Service: Colorectal   [place] [facility]ENT[facility]GERY  2016    Crown with bridge work    FLEXIBLE SIGMOIDOSCOPY N/A 6/15/2018    Procedure: SIGMOIDOSCOPY FLEXIBLE w/o sedation w/ tattoo;  Surgeon: Marcela Gusman MD;  [facility];   Service: Colorectal    LIVER LOBECTOMY N/A 6/21/2018    Procedure: liver resection/ablation, intraoperative ultrasound of liver;  Surgeon: Luigi Rice MD;  [facility];  Service: Surgical Oncology    [place] EXPLORATORY OF ABDOMEN N/A 6/21/2018    Procedure: LAPAROTOMY EXPLORATORY;  Surgeon: Luis Eduardo Corona MD;  [facility];  Service: Colorectal    MS INSERT PICC W/ SUB-Q PORT N/A 1/25/2018    Procedure: PORT-A-CATHETER PLACEMENT  Fluoroscopy for performance/interpretation;  Surgeon: Luis Eduardo Corona MD;  [facility];  Service: Colorectal    MS PART REMOVAL COLON W ANASTOMOSIS Left 6/21/2018    Procedure: hemicolectomy, low anterior resection (open) SPY fluorescence angiography;  Surgeon: Luis Eduardo Corona MD;  [facility];  Service: Colorectal    MS PROCTECTOMY,PARTIAL N/A 6/21/2018    Procedure: Partial proctectomy ;  Surgeon: Luis Eduardo Corona MD;  [facility];  Service: Colorectal    MS SIGMOIDOSCOPY FLX DX W/COLLJ SPEC BR/WA IF PFRMD N/A 6/21/2018    Procedure: Maybelle Matter;  Surgeon: Luis Eduardo Corona MD;  [facility];  Service: Colorectal    ROOT CANAL  2015    TESTICLE SURGERY      Exploration of Undescended Testis rIght, age 8 had surgery for undescended testicle; Last Assessed:8/24/17    TOOTH EXTRACTION  2015     Family History   Problem Relation Age of Onset    No Known Problems Father     Cancer Mother     Vaginal cancer Mother         malignant neoplasm of vagina    Cancer Brother         pt  reports brother was diagnosed with stage 4 throat cancer     Social History     Social History    Marital status: Single     Spouse name: N/A    Number of children: N/A    Years of education: N/A     Occupational History    Not on file       Social History Main Topics    Smoking status: Former Smoker     Types: E-Cigarettes    Smokeless tobacco: Current User      Comment: quit 4 years ago / smoked for 20 years     Alcohol use Yes      Comment: socially - 2 month    Drug use: No      Comment: per Allscripts: occasional recreation drug use    Sexual activity: Not on file     Other Topics Concern    Not on file     Social History Narrative    No narrative on file       Current Outpatient Prescriptions:     clindamycin (CLINDAGEL) 1 % gel, apply TOPICALLY to SKIN LESIONS twice a day if needed, Disp: 30 g, Rfl: 0    minocycline (DYNACIN) 100 MG tablet, , Disp: , Rfl:     omeprazole (PriLOSEC) 40 MG capsule, Take 40 mg by mouth daily, Disp: , Rfl:     prochlorperazine (COMPAZINE) 10 mg tablet, Take 1 tablet (10 mg total) by mouth every 6 (six) hours as needed for nausea or vomiting, Disp: 30 tablet, Rfl: 6    sildenafil (REVATIO) 20 mg tablet, Take 1 tablet (20 mg total) by mouth 3 (three) times a day, Disp: 90 tablet, Rfl: 3  No Known Allergies  Vitals:    07/10/18 0948   BP: 122/78   Pulse: 74   Resp: 18   Temp: 98 6 °F (37 °C)       Physical Exam  Constitutional: General appearance: The Patient is well-developed and well-nourished who appears the stated age in no acute distress  Patient is pleasant and talkative  HEENT:  Normocephalic  Sclerae are anicteric  Mucous membranes are moist    Abdomen: Abdomen is soft, non-tender, non-distended and without masses  incision is C/D/I  Extremities: There is no clubbing or cyanosis  There is no edema  Symmetric  Neuro: Grossly nonfocal  Gait is normal       Skin: Warm, anicteric  Psych:  Patient is pleasant and talkative  Breasts:        Pathology:  Addendum   A  CDX2 stains tumor cells which display weak focal CK20 expression with absent CK7 staining  The original diagnosis is UNCHANGED  Addendum electronically signed by Harini Benjamin MD on 6/28/2018 at  7:08 AM   Final Diagnosis   A  Liver, segment 7 (excision):  - Metastatic adenocarcinoma with clear margins (ypM1a)    B  Left colon (left hemicolectomy and partial proctectomy):  - No residual adenocarcinoma in bowel wall  - Tattoo ink identified  - Twenty-five (25) lymph nodes negative for carcinoma (0/25)  - Margins are negative for carcinoma ([id number])     Comment: The entire area of tattoo ink was submitted for microscopic examination       C   Colon, proximal donut (excision):  - Benign colon  - No carcinoma identified     D  Colon, distal donut (excision):   - Benign colon  - No carcinoma identified     MOLECULAR TESTING AND CONSULT SLIDES:      Molecular testing:  Blocks A1-A2 are available for molecular testing      Consult slides:  Slide A1 is available for consultation           Electronically signed by Kristin Osborne MD on 6/27/2018 at  9:53 AM         Labs:      Imaging  No results found  I reviewed the above laboratory and imaging data  Discussion/Summary:   79-year-old male with metastatic rectosigmoid cancer  Now he tolerated segment 7 liver resection well  No other lesions were seen  I would recommend repeating his MRI in the future  The timing of this will be based on further chemotherapy  If he has further chemotherapy I would obtain his MRI once the chemotherapy has been completed  If he does not have further chemotherapy, I will repeat this in 3 months  We will await the further chemotherapy recommendations from Dr Matthew Juarez  I will tentatively see him in 3 months  He is agreeable to this  All his questions were answered

## 2018-07-10 NOTE — LETTER
July 10, 2018     [address]  [address] [address]    Patient: Bala Berger   YOB: 1970   Date of Visit: 7/10/2018       Dear Dr Lucas Marker:    Thank you for referring Bindu Cousins to me for evaluation  Below are my notes for this consultation  If you have questions, please do not hesitate to call me  I look forward to following your patient along with you  Sincerely,        Elsa De Oliveira MD        CC: MD Alen Douglass MD Nikki Shearer, MD  7/10/2018 10:05 AM  Sign at close encounter               Surgical Oncology Follow Up       [address] [place] [address]  Jacqualin Every  1970  [id number]  [address] [facility],[address]  [facility] [address] [address]    Diagnoses and all orders for this visit:    Metastatic colon cancer to liver [facility])    Other orders  -     minocycline (DYNACIN) 100 MG tablet; Chief Complaint   Patient presents with    Post-op     Pt is here for post-op visit following recent resection of bowel and liver  Incision is healing well, pt has no complaints  Return in about 3 months (around 10/10/2018)           Metastatic colon cancer to liver ([facility] [address] )    1/2018 Initial Diagnosis     Malignant neoplasm of sigmoid colon ([facility] [address] )         1/22/2018 Biopsy     Large Intestine, Sigmoid Colon, Recto-sigmoid tumor bx:    - Invasive colonic adenocarcinoma, moderately differentiated         2/6/2018 -  Chemotherapy     Modified FOLFOX6   Added Erbitux on 3/20/18             Staging:  Metastatic rectosigmoid cancer  [id number]  Treatment history:   Modified FOLFOX 6   Erbitux added 03/20/2018    Segment 7 liver resection, June 2018  Current treatment:  Modified FOLFOX 6   Erbitux added 03/20/2018   Disease status:   Stable    History of Present Illness:   Patient returns in follow-up of his segment 7 liver resection and colon resection  He is doing well at this time with no complaints  His appetite is good  No nausea or vomiting  No fevers or chills  Margins on the liver resection were negative  No residual tumor was seen in his colon  Review of Systems  Complete ROS Surg Onc:   Complete ROS Surg Onc:   Constitutional: The patient denies new or recent history of general fatigue, no recent weight loss, no change in appetite  Eyes: No complaints of visual problems, no scleral icterus  ENT: no complaints of ear pain, no hoarseness, no difficulty swallowing,  no tinnitus and no new masses in head, oral cavity, or neck  Cardiovascular: No complaints of chest pain, no palpitations, no ankle edema  Respiratory: No complaints of shortness of breath, no cough  Gastrointestinal: No complaints of jaundice, no bloody stools, no pale stools  Genitourinary: No complaints of dysuria, no hematuria, no nocturia, no frequent urination, no urethral discharge  Musculoskeletal: No complaints of weakness, paralysis, joint stiffness or arthralgias  Integumentary: No complaints of rash, no new lesions  Neurological: No complaints of convulsions, no seizures, no dizziness  Hematologic/Lymphatic: No complaints of easy bruising  Endocrine:  No hot or cold intolerance  No polydipsia, polyphagia, or polyuria  Allergy/immunology:  No environmental allergies  No food allergies  Not immunocompromised  Skin:  No pallor or rash  No wound          Patient Active Problem List   Diagnosis    ED (erectile dysfunction)    Metastatic colon cancer to liver (HCC)    GERD (gastroesophageal reflux disease)    Pulmonary nodule, right     Past Medical History:   Diagnosis Date    Dysuria     Last Assessed:8/24/17    GERD (gastroesophageal reflux disease)     Liver nodule     Pulmonary nodule, right      Past Surgical History:   Procedure Laterality Date    COLONOSCOPY N/A 1/22/2018    Procedure: COLONOSCOPY;  Surgeon: Claribel Vasques MD;  [facility]; Service: Colorectal   [facility] DENTAL SURGERY  2016    Crown with bridge work    FLEXIBLE SIGMOIDOSCOPY N/A 6/15/2018    Procedure: SIGMOIDOSCOPY FLEXIBLE w/o sedation w/ tattoo;  Surgeon: Claribel Vasques MD;  [facility]; Service: Colorectal    LIVER LOBECTOMY N/A 6/21/2018    Procedure: liver resection/ablation, intraoperative ultrasound of liver;  Surgeon: Gurmeet Bello MD;  [facility];  Service: Surgical Oncology    [place] EXPLORATORY OF ABDOMEN N/A 6/21/2018    Procedure: LAPAROTOMY EXPLORATORY;  Surgeon: Claribel Vasques MD;  [facility];  Service: Colorectal    [place] INSERT PICC W/ SUB-Q PORT N/A 1/25/2018    Procedure: PORT-A-CATHETER PLACEMENT  Fluoroscopy for performance/interpretation;  Surgeon: Claribel Vasques MD;  [facility];  Service: Colorectal    [place] PART REMOVAL COLON W ANASTOMOSIS Left 6/21/2018    Procedure: hemicolectomy, low anterior resection (open) SPY fluorescence angiography;  Surgeon: Claribel Vasques MD;  [facility];  Service: Colorectal    [place] PROCTECTOMY,PARTIAL N/A 6/21/2018    Procedure: Partial proctectomy ;  Surgeon: Claribel Vasques MD;  [facility];  Service: Colorectal    [place] SIGMOIDOSCOPY FLX DX W/COLLJ SPEC BR/WA IF PFRMD N/A 6/21/2018    Procedure: Camelia Dos Santos;  Surgeon: Claribel Vasques MD;  [facility];  Service: Colorectal    ROOT CANAL  2015    TESTICLE SURGERY      Exploration of Undescended Testis rIght, age 8 had surgery for undescended testicle;  Last Assessed:8/24/17    TOOTH EXTRACTION  2015     Family History   Problem Relation Age of Onset    No Known Problems Father     Cancer Mother     Vaginal cancer Mother         malignant neoplasm of vagina    Cancer Brother         pt  reports brother was diagnosed with stage 4 throat cancer     Social History     Social History    Marital status: Single     Spouse name: N/A    Number of children: N/A    Years of education: N/A     Occupational History    Not on file  Social History Main Topics    Smoking status: Former Smoker     Types: E-Cigarettes    Smokeless tobacco: Current User      Comment: quit 4 years ago / smoked for 20 years     Alcohol use Yes      Comment: socially - 2 month    Drug use: No      Comment: per [employer]: occasional recreation drug use    Sexual activity: Not on file     Other Topics Concern    Not on file     Social History Narrative    No narrative on file       Current Outpatient Prescriptions:     clindamycin (CLINDAGEL) 1 % gel, apply TOPICALLY to SKIN LESIONS twice a day if needed, Disp: 30 g, Rfl: 0    minocycline (DYNACIN) 100 MG tablet, , Disp: , Rfl:     omeprazole (PriLOSEC) 40 MG capsule, Take 40 mg by mouth daily, Disp: , Rfl:     prochlorperazine (COMPAZINE) 10 mg tablet, Take 1 tablet (10 mg total) by mouth every 6 (six) hours as needed for nausea or vomiting, Disp: 30 tablet, Rfl: 6    sildenafil (REVATIO) 20 mg tablet, Take 1 tablet (20 mg total) by mouth 3 (three) times a day, Disp: 90 tablet, Rfl: 3  No Known Allergies  Vitals:    07/10/18 0948   BP: 122/78   Pulse: 74   Resp: 18   Temp: 98 6 °F (37 °C)       Physical Exam  Constitutional: General appearance: The Patient is well-developed and well-nourished who appears the stated age in no acute distress  Patient is pleasant and talkative  HEENT:  Normocephalic  Sclerae are anicteric  Mucous membranes are moist    Abdomen: Abdomen is soft, non-tender, non-distended and without masses  incision is C/D/I  Extremities: There is no clubbing or cyanosis  There is no edema  Symmetric  Neuro: Grossly nonfocal  Gait is normal       Skin: Warm, anicteric  Psych:  Patient is pleasant and talkative  Breasts:        Pathology:  Addendum   A  CDX2 stains tumor cells which display weak focal CK20 expression with absent CK7 staining   The original diagnosis is UNCHANGED  Addendum electronically signed by Ottis Sever, MD on 6/28/2018 at  7:08 AM   Final Diagnosis   A  Liver, segment 7 (excision):  - Metastatic adenocarcinoma with clear margins (ypM1a)    B  Left colon (left hemicolectomy and partial proctectomy):  - No residual adenocarcinoma in bowel wall  - Tattoo ink identified  - Twenty-five (25) lymph nodes negative for carcinoma (0/25)  - Margins are negative for carcinoma ([id number])     Comment: The entire area of tattoo ink was submitted for microscopic examination       C  Colon, proximal donut (excision):  - Benign colon  - No carcinoma identified     D  Colon, distal donut (excision):   - Benign colon  - No carcinoma identified     MOLECULAR TESTING AND CONSULT SLIDES:      Molecular testing:  Blocks A1-A2 are available for molecular testing      Consult slides:  Slide A1 is available for consultation           Electronically signed by Ottis Sever, MD on 6/27/2018 at  9:53 AM         Labs:      Imaging  No results found  I reviewed the above laboratory and imaging data  Discussion/Summary:   45-year-old male with metastatic rectosigmoid cancer  Now he tolerated segment 7 liver resection well  No other lesions were seen  I would recommend repeating his MRI in the future  The timing of this will be based on further chemotherapy  If he has further chemotherapy I would obtain his MRI once the chemotherapy has been completed  If he does not have further chemotherapy, I will repeat this in 3 months  We will await the further chemotherapy recommendations from Dr Lor Duarte  I will tentatively see him in 3 months  He is agreeable to this  All his questions were answered

## 2018-07-12 ENCOUNTER — OFFICE VISIT (OUTPATIENT)
Dept: HEMATOLOGY ONCOLOGY | Facility: CLINIC | Age: 48
End: 2018-07-12
Payer: COMMERCIAL

## 2018-07-12 VITALS
DIASTOLIC BLOOD PRESSURE: 68 MMHG | HEIGHT: 67 IN | OXYGEN SATURATION: 98 % | RESPIRATION RATE: 14 BRPM | WEIGHT: 144 LBS | SYSTOLIC BLOOD PRESSURE: 104 MMHG | BODY MASS INDEX: 22.6 KG/M2 | HEART RATE: 76 BPM | TEMPERATURE: 100.3 F

## 2018-07-12 DIAGNOSIS — C18.9 METASTATIC COLON CANCER TO LIVER (HCC): Primary | ICD-10-CM

## 2018-07-12 DIAGNOSIS — C78.7 METASTATIC COLON CANCER TO LIVER (HCC): Primary | ICD-10-CM

## 2018-07-12 DIAGNOSIS — C18.7 MALIGNANT NEOPLASM OF SIGMOID COLON (HCC): ICD-10-CM

## 2018-07-12 PROCEDURE — 99214 OFFICE O/P EST MOD 30 MIN: CPT | Performed by: INTERNAL MEDICINE
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
## 2018-07-23 ENCOUNTER — APPOINTMENT (OUTPATIENT)
Dept: LAB | Facility: CLINIC | Age: 48
End: 2018-07-23
Payer: COMMERCIAL

## 2018-07-23 ENCOUNTER — TRANSCRIBE ORDERS (OUTPATIENT)
Dept: LAB | Facility: CLINIC | Age: 48
End: 2018-07-23

## 2018-07-23 RX ORDER — SODIUM CHLORIDE 9 MG/ML
20 INJECTION, SOLUTION INTRAVENOUS CONTINUOUS
Status: DISCONTINUED | OUTPATIENT
Start: 2018-07-24 | End: 2018-07-27 | Stop reason: HOSPADM

## 2018-07-23 RX ORDER — FLUOROURACIL 50 MG/ML
696 INJECTION, SOLUTION INTRAVENOUS ONCE
Status: COMPLETED | OUTPATIENT
Start: 2018-07-24 | End: 2018-07-24

## 2018-07-24 ENCOUNTER — HOSPITAL ENCOUNTER (OUTPATIENT)
Dept: INFUSION CENTER | Facility: CLINIC | Age: 48
Discharge: HOME | End: 2018-07-24
Payer: COMMERCIAL

## 2018-07-24 VITALS
HEART RATE: 89 BPM | DIASTOLIC BLOOD PRESSURE: 73 MMHG | RESPIRATION RATE: 18 BRPM | OXYGEN SATURATION: 97 % | TEMPERATURE: 98.3 F | SYSTOLIC BLOOD PRESSURE: 127 MMHG | WEIGHT: 143 LBS | BODY MASS INDEX: 22.44 KG/M2 | HEIGHT: 67 IN

## 2018-07-24 PROCEDURE — G0498 CHEMO EXTEND IV INFUS W/PUMP: HCPCS

## 2018-07-24 PROCEDURE — 96366 THER/PROPH/DIAG IV INF ADDON: CPT

## 2018-07-24 PROCEDURE — 96367 TX/PROPH/DG ADDL SEQ IV INF: CPT

## 2018-07-24 PROCEDURE — 96411 CHEMO IV PUSH ADDL DRUG: CPT

## 2018-07-24 PROCEDURE — 96413 CHEMO IV INFUSION 1 HR: CPT

## 2018-07-24 PROCEDURE — 96375 TX/PRO/DX INJ NEW DRUG ADDON: CPT

## 2018-07-24 RX ADMIN — SODIUM CHLORIDE 20 ML/HR: 0.9 INJECTION, SOLUTION INTRAVENOUS at 11:13

## 2018-07-24 RX ADMIN — DIPHENHYDRAMINE HYDROCHLORIDE 50 MG: 50 INJECTION, SOLUTION INTRAMUSCULAR; INTRAVENOUS at 11:33

## 2018-07-24 RX ADMIN — Medication 900 MG: at 11:49

## 2018-07-24 RX ADMIN — DEXAMETHASONE SODIUM PHOSPHATE: 10 INJECTION, SOLUTION INTRAMUSCULAR; INTRAVENOUS at 11:13

## 2018-07-24 RX ADMIN — LEUCOVORIN CALCIUM 700 MG: 500 INJECTION, POWDER, LYOPHILIZED, FOR SOLUTION INTRAMUSCULAR; INTRAVENOUS at 12:51

## 2018-07-24 RX ADMIN — FLUOROURACIL 696 MG: 50 INJECTION, SOLUTION INTRAVENOUS at 14:46

## 2018-07-24 NOTE — PROGRESS NOTES
Pt tolerated infusion well  Offers no complaints  Cadd pump connected with 5FU infusing  Pt verbalized understanding of use of device  Aware of future appointments  AVS given

## 2018-07-24 NOTE — PROGRESS NOTES
Pt here for chemo modified FOLFOX, ERBITUX  Offers no complaints  Labs checked, within parameters  Port accessed without complication  Brisk blood return, flushes well

## 2018-07-26 ENCOUNTER — HOSPITAL ENCOUNTER (OUTPATIENT)
Dept: INFUSION CENTER | Facility: CLINIC | Age: 48
Discharge: HOME | End: 2018-07-26
Payer: COMMERCIAL

## 2018-07-26 RX ADMIN — HEPARIN 300 UNITS: 100 SYRINGE at 13:12

## 2018-08-02 ENCOUNTER — OFFICE VISIT (OUTPATIENT)
Dept: HEMATOLOGY ONCOLOGY | Facility: CLINIC | Age: 48
End: 2018-08-02
Payer: COMMERCIAL

## 2018-08-02 VITALS
HEART RATE: 78 BPM | BODY MASS INDEX: 23.63 KG/M2 | OXYGEN SATURATION: 98 % | SYSTOLIC BLOOD PRESSURE: 106 MMHG | WEIGHT: 147 LBS | DIASTOLIC BLOOD PRESSURE: 68 MMHG | HEIGHT: 66 IN | RESPIRATION RATE: 16 BRPM | TEMPERATURE: 97 F

## 2018-08-02 DIAGNOSIS — C18.7 MALIGNANT NEOPLASM OF SIGMOID COLON (HCC): ICD-10-CM

## 2018-08-02 DIAGNOSIS — C18.9 MALIGNANT NEOPLASM OF COLON, UNSPECIFIED PART OF COLON (HCC): ICD-10-CM

## 2018-08-02 DIAGNOSIS — C18.9 METASTATIC COLON CANCER TO LIVER (HCC): Primary | ICD-10-CM

## 2018-08-02 DIAGNOSIS — C78.7 METASTATIC COLON CANCER TO LIVER (HCC): Primary | ICD-10-CM

## 2018-08-02 PROCEDURE — 99214 OFFICE O/P EST MOD 30 MIN: CPT | Performed by: INTERNAL MEDICINE
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
## 2018-08-06 ENCOUNTER — APPOINTMENT (OUTPATIENT)
Dept: LAB | Facility: CLINIC | Age: 48
End: 2018-08-06
Payer: COMMERCIAL

## 2018-08-06 DIAGNOSIS — C78.7 METASTATIC COLON CANCER TO LIVER (HCC): ICD-10-CM

## 2018-08-06 DIAGNOSIS — C18.7 MALIGNANT NEOPLASM OF SIGMOID COLON (HCC): ICD-10-CM

## 2018-08-06 DIAGNOSIS — C18.9 METASTATIC COLON CANCER TO LIVER (HCC): ICD-10-CM

## 2018-08-06 DIAGNOSIS — C18.9 MALIGNANT NEOPLASM OF COLON, UNSPECIFIED PART OF COLON (HCC): ICD-10-CM

## 2018-08-06 LAB — CEA SERPL-MCNC: <0.5 NG/ML (ref 0–3)

## 2018-08-06 PROCEDURE — 82378 CARCINOEMBRYONIC ANTIGEN: CPT

## 2018-08-06 RX ORDER — FLUOROURACIL 50 MG/ML
696 INJECTION, SOLUTION INTRAVENOUS ONCE
Status: COMPLETED | OUTPATIENT
Start: 2018-08-07 | End: 2018-08-07

## 2018-08-06 RX ORDER — SODIUM CHLORIDE 9 MG/ML
20 INJECTION, SOLUTION INTRAVENOUS CONTINUOUS
Status: DISCONTINUED | OUTPATIENT
Start: 2018-08-07 | End: 2018-08-07

## 2018-08-07 ENCOUNTER — HOSPITAL ENCOUNTER (OUTPATIENT)
Dept: INFUSION CENTER | Facility: CLINIC | Age: 48
Discharge: HOME | End: 2018-08-07
Payer: COMMERCIAL

## 2018-08-07 VITALS
BODY MASS INDEX: 22.44 KG/M2 | WEIGHT: 143 LBS | RESPIRATION RATE: 14 BRPM | TEMPERATURE: 98.5 F | HEART RATE: 71 BPM | DIASTOLIC BLOOD PRESSURE: 72 MMHG | HEIGHT: 67 IN | SYSTOLIC BLOOD PRESSURE: 104 MMHG

## 2018-08-07 DIAGNOSIS — T50.905A DRUG SIDE EFFECTS, INITIAL ENCOUNTER: ICD-10-CM

## 2018-08-07 DIAGNOSIS — C18.7 MALIGNANT NEOPLASM OF SIGMOID COLON (HCC): ICD-10-CM

## 2018-08-07 PROCEDURE — G0498 CHEMO EXTEND IV INFUS W/PUMP: HCPCS

## 2018-08-07 PROCEDURE — 96411 CHEMO IV PUSH ADDL DRUG: CPT

## 2018-08-07 PROCEDURE — 96366 THER/PROPH/DIAG IV INF ADDON: CPT

## 2018-08-07 PROCEDURE — 96367 TX/PROPH/DG ADDL SEQ IV INF: CPT

## 2018-08-07 PROCEDURE — 96413 CHEMO IV INFUSION 1 HR: CPT

## 2018-08-07 RX ORDER — SODIUM CHLORIDE 9 MG/ML
20 INJECTION, SOLUTION INTRAVENOUS CONTINUOUS
Status: DISCONTINUED | OUTPATIENT
Start: 2018-08-07 | End: 2018-08-10 | Stop reason: HOSPADM

## 2018-08-07 RX ORDER — CLINDAMYCIN PHOSPHATE 10 MG/G
GEL TOPICAL 2 TIMES DAILY
Qty: 30 G | Refills: 0 | Status: SHIPPED | OUTPATIENT
Start: 2018-08-07 | End: 2020-06-10 | Stop reason: HOSPADM

## 2018-08-07 RX ADMIN — Medication 900 MG: at 09:54

## 2018-08-07 RX ADMIN — LEUCOVORIN CALCIUM 700 MG: 500 INJECTION, POWDER, LYOPHILIZED, FOR SOLUTION INTRAMUSCULAR; INTRAVENOUS at 11:09

## 2018-08-07 RX ADMIN — FLUOROURACIL 696 MG: 50 INJECTION, SOLUTION INTRAVENOUS at 13:16

## 2018-08-07 RX ADMIN — DEXAMETHASONE SODIUM PHOSPHATE: 10 INJECTION, SOLUTION INTRAMUSCULAR; INTRAVENOUS at 08:39

## 2018-08-07 RX ADMIN — DIPHENHYDRAMINE HYDROCHLORIDE 50 MG: 50 INJECTION, SOLUTION INTRAMUSCULAR; INTRAVENOUS at 09:01

## 2018-08-07 RX ADMIN — SODIUM CHLORIDE 20 ML/HR: 0.9 INJECTION, SOLUTION INTRAVENOUS at 08:35

## 2018-08-07 NOTE — PLAN OF CARE
Problem: Potential for Falls  Goal: Patient will remain free of falls  INTERVENTIONS:  - Assess patient frequently for physical needs  -  Identify cognitive and physical deficits and behaviors that affect risk of falls    -  [place] fall precautions as indicated by assessment   - Educate patient/family on patient safety including physical limitations  - Instruct patient to call for assistance with activity based on assessment  - Modify environment to reduce risk of injury  - Consider OT/PT consult to assist with strengthening/mobility   Outcome: Progressing

## 2018-08-07 NOTE — PROGRESS NOTES
Pt  Tolerated treatment w/out adverse reaction  Pt  Connected to cadd pump & scheduled for pump disconnect on 8/9/18 @ 1124  Pt  Aware & verbalized understanding  Confirmed pts  Next appt   Pt  Declined AVS

## 2018-08-08 DIAGNOSIS — L27.0 DRUG-INDUCED SKIN RASH: Primary | ICD-10-CM

## 2018-08-08 RX ORDER — MINOCYCLINE HYDROCHLORIDE 100 MG/1
100 TABLET ORAL 2 TIMES DAILY
Qty: 60 TABLET | Refills: 2 | OUTPATIENT
Start: 2018-08-08 | End: 2018-09-07

## 2018-08-08 NOTE — TELEPHONE ENCOUNTER
Pt requested refill of clindamycin gel and minocycline yesterday  At pharmacy but only clindamycin refilled  Called in minocycline

## 2018-08-09 ENCOUNTER — HOSPITAL ENCOUNTER (OUTPATIENT)
Dept: INFUSION CENTER | Facility: CLINIC | Age: 48
Discharge: HOME | End: 2018-08-09
Payer: COMMERCIAL

## 2018-08-09 RX ADMIN — HEPARIN 300 UNITS: 100 SYRINGE at 12:04

## 2018-08-20 ENCOUNTER — APPOINTMENT (OUTPATIENT)
Dept: LAB | Facility: CLINIC | Age: 48
End: 2018-08-20
Payer: COMMERCIAL

## 2018-08-20 DIAGNOSIS — C78.7 METASTATIC COLON CANCER TO LIVER (HCC): ICD-10-CM

## 2018-08-20 DIAGNOSIS — C18.9 METASTATIC COLON CANCER TO LIVER (HCC): ICD-10-CM

## 2018-08-20 DIAGNOSIS — C18.9 MALIGNANT NEOPLASM OF COLON, UNSPECIFIED PART OF COLON (HCC): ICD-10-CM

## 2018-08-20 DIAGNOSIS — C18.7 MALIGNANT NEOPLASM OF SIGMOID COLON (HCC): ICD-10-CM

## 2018-08-20 LAB — CEA SERPL-MCNC: <0.5 NG/ML (ref 0–3)

## 2018-08-20 PROCEDURE — 82378 CARCINOEMBRYONIC ANTIGEN: CPT

## 2018-08-20 RX ORDER — SODIUM CHLORIDE 9 MG/ML
20 INJECTION, SOLUTION INTRAVENOUS CONTINUOUS
Status: DISCONTINUED | OUTPATIENT
Start: 2018-08-21 | End: 2018-08-24 | Stop reason: HOSPADM

## 2018-08-20 RX ORDER — FLUOROURACIL 50 MG/ML
696 INJECTION, SOLUTION INTRAVENOUS ONCE
Status: COMPLETED | OUTPATIENT
Start: 2018-08-21 | End: 2018-08-21

## 2018-08-21 ENCOUNTER — HOSPITAL ENCOUNTER (OUTPATIENT)
Dept: INFUSION CENTER | Facility: CLINIC | Age: 48
Discharge: HOME | End: 2018-08-21
Payer: COMMERCIAL

## 2018-08-21 VITALS
HEART RATE: 78 BPM | OXYGEN SATURATION: 98 % | TEMPERATURE: 98.2 F | WEIGHT: 144 LBS | DIASTOLIC BLOOD PRESSURE: 70 MMHG | RESPIRATION RATE: 16 BRPM | SYSTOLIC BLOOD PRESSURE: 106 MMHG | HEIGHT: 67 IN | BODY MASS INDEX: 22.6 KG/M2

## 2018-08-21 PROCEDURE — 96367 TX/PROPH/DG ADDL SEQ IV INF: CPT

## 2018-08-21 PROCEDURE — 96411 CHEMO IV PUSH ADDL DRUG: CPT

## 2018-08-21 PROCEDURE — 96366 THER/PROPH/DIAG IV INF ADDON: CPT

## 2018-08-21 PROCEDURE — 96413 CHEMO IV INFUSION 1 HR: CPT

## 2018-08-21 PROCEDURE — G0498 CHEMO EXTEND IV INFUS W/PUMP: HCPCS

## 2018-08-21 RX ADMIN — DEXAMETHASONE SODIUM PHOSPHATE: 10 INJECTION, SOLUTION INTRAMUSCULAR; INTRAVENOUS at 10:20

## 2018-08-21 RX ADMIN — LEUCOVORIN CALCIUM 700 MG: 500 INJECTION, POWDER, LYOPHILIZED, FOR SOLUTION INTRAMUSCULAR; INTRAVENOUS at 12:17

## 2018-08-21 RX ADMIN — SODIUM CHLORIDE 20 ML/HR: 0.9 INJECTION, SOLUTION INTRAVENOUS at 10:16

## 2018-08-21 RX ADMIN — DIPHENHYDRAMINE HYDROCHLORIDE 50 MG: 50 INJECTION, SOLUTION INTRAMUSCULAR; INTRAVENOUS at 10:42

## 2018-08-21 RX ADMIN — Medication 900 MG: at 11:06

## 2018-08-21 RX ADMIN — FLUOROURACIL 696 MG: 50 INJECTION, SOLUTION INTRAVENOUS at 14:20

## 2018-08-21 NOTE — PROGRESS NOTES
Call placed to jr Abreu again today to make aware of pending transport back to [facility], message left with information with number to return to call if questions, made aware pt will return to [facility]iltiy by [facility] at 1230 today, pt will be returning to room [address] with report number [phone]. Patient here for cycle 8 modified folfox and erbitux  He is doing well post surgery, no c/o offered

## 2018-08-21 NOTE — PROGRESS NOTES
Patient tolerated treatment without incident and discharged with 5FU running via CADD over 46 hours  All clamps ensured open and connections taped secure prior to discharge  Good blood return prior to CADD connect and also prior to, during and post 5FU bolus  Patient offers no c/o and will RTO 8/23/18 for CADD disconnect

## 2018-08-21 NOTE — PLAN OF CARE
Problem: Potential for Falls  Goal: Patient will remain free of falls  INTERVENTIONS:  - Assess patient frequently for physical needs  -  Identify cognitive and physical deficits and behaviors that affect risk of falls    -  [place] fall precautions as indicated by assessment   - Educate patient/family on patient safety including physical limitations  - Instruct patient to call for assistance with activity based on assessment  - Modify environment to reduce risk of injury  - Consider OT/PT consult to assist with strengthening/mobility   Outcome: Progressing      Problem: PAIN - ADULT  Goal: Verbalizes/displays adequate comfort level or baseline comfort level  Interventions:  - Encourage patient to monitor pain and request assistance  - Assess pain using appropriate pain scale  - Administer analgesics based on type and severity of pain and evaluate response  - Implement non-pharmacological measures as appropriate and evaluate response  - Consider cultural and social influences on pain and pain management  - Notify physician/advanced practitioner if interventions unsuccessful or patient reports new pain  Outcome: Progressing      Problem: INFECTION - ADULT  Goal: Absence or prevention of progression during hospitalization  INTERVENTIONS:  - Assess and monitor for signs and symptoms of infection  - Monitor lab/diagnostic results  - Monitor all insertion sites, i e  indwelling lines, tubes, and drains  - Monitor endotracheal (as able) and nasal secretions for changes in amount and color  - [place] appropriate cooling/warming therapies per order  - Administer medications as ordered  - Instruct and encourage patient and family to use good hand hygiene technique  - Identify and instruct in appropriate isolation precautions for identified infection/condition  Outcome: Progressing

## 2018-08-23 ENCOUNTER — HOSPITAL ENCOUNTER (OUTPATIENT)
Dept: INFUSION CENTER | Facility: CLINIC | Age: 48
Discharge: HOME | End: 2018-08-23
Payer: COMMERCIAL

## 2018-08-23 RX ADMIN — HEPARIN 300 UNITS: 100 SYRINGE at 12:31

## 2018-08-23 NOTE — PROGRESS NOTES
To clinic for  CADD pump d/c  Pt feeling well and tolerated this cycle without ill effect  Pump d/c and port a cath flushed as per protocol  Pt confirms next appointments
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
## 2018-08-30 ENCOUNTER — OFFICE VISIT (OUTPATIENT)
Dept: HEMATOLOGY ONCOLOGY | Facility: CLINIC | Age: 48
End: 2018-08-30
Payer: COMMERCIAL

## 2018-08-30 VITALS
HEART RATE: 85 BPM | OXYGEN SATURATION: 95 % | RESPIRATION RATE: 16 BRPM | DIASTOLIC BLOOD PRESSURE: 72 MMHG | HEIGHT: 66 IN | WEIGHT: 142 LBS | SYSTOLIC BLOOD PRESSURE: 112 MMHG | TEMPERATURE: 98.2 F | BODY MASS INDEX: 22.82 KG/M2

## 2018-08-30 DIAGNOSIS — C18.9 METASTATIC COLON CANCER TO LIVER (HCC): Primary | ICD-10-CM

## 2018-08-30 DIAGNOSIS — C78.7 METASTATIC COLON CANCER TO LIVER (HCC): Primary | ICD-10-CM

## 2018-08-30 PROCEDURE — 99214 OFFICE O/P EST MOD 30 MIN: CPT | Performed by: PHYSICIAN ASSISTANT

## 2018-08-30 RX ORDER — MINOCYCLINE HYDROCHLORIDE 100 MG/1
CAPSULE ORAL
COMMUNITY
Start: 2018-08-08 | End: 2020-06-10 | Stop reason: HOSPADM

## 2018-08-31 RX ORDER — SODIUM CHLORIDE 9 MG/ML
20 INJECTION, SOLUTION INTRAVENOUS CONTINUOUS
Status: DISCONTINUED | OUTPATIENT
Start: 2018-09-04 | End: 2018-09-07 | Stop reason: HOSPADM

## 2018-08-31 RX ORDER — FLUOROURACIL 50 MG/ML
696 INJECTION, SOLUTION INTRAVENOUS ONCE
Status: COMPLETED | OUTPATIENT
Start: 2018-09-04 | End: 2018-09-04

## 2018-09-04 ENCOUNTER — APPOINTMENT (OUTPATIENT)
Dept: LAB | Facility: CLINIC | Age: 48
End: 2018-09-04
Payer: COMMERCIAL

## 2018-09-04 ENCOUNTER — HOSPITAL ENCOUNTER (OUTPATIENT)
Dept: INFUSION CENTER | Facility: CLINIC | Age: 48
Discharge: HOME | End: 2018-09-04
Payer: COMMERCIAL

## 2018-09-04 VITALS
BODY MASS INDEX: 23.15 KG/M2 | TEMPERATURE: 97.7 F | DIASTOLIC BLOOD PRESSURE: 64 MMHG | OXYGEN SATURATION: 97 % | SYSTOLIC BLOOD PRESSURE: 106 MMHG | HEIGHT: 67 IN | WEIGHT: 147.5 LBS | HEART RATE: 60 BPM | RESPIRATION RATE: 16 BRPM

## 2018-09-04 DIAGNOSIS — C18.9 MALIGNANT NEOPLASM OF COLON, UNSPECIFIED PART OF COLON (HCC): ICD-10-CM

## 2018-09-04 DIAGNOSIS — C18.9 METASTATIC COLON CANCER TO LIVER (HCC): ICD-10-CM

## 2018-09-04 DIAGNOSIS — C78.7 METASTATIC COLON CANCER TO LIVER (HCC): ICD-10-CM

## 2018-09-04 DIAGNOSIS — C18.7 MALIGNANT NEOPLASM OF SIGMOID COLON (HCC): ICD-10-CM

## 2018-09-04 LAB — CEA SERPL-MCNC: 0.6 NG/ML (ref 0–3)

## 2018-09-04 PROCEDURE — 96375 TX/PRO/DX INJ NEW DRUG ADDON: CPT

## 2018-09-04 PROCEDURE — 96413 CHEMO IV INFUSION 1 HR: CPT

## 2018-09-04 PROCEDURE — 96367 TX/PROPH/DG ADDL SEQ IV INF: CPT

## 2018-09-04 PROCEDURE — 96366 THER/PROPH/DIAG IV INF ADDON: CPT

## 2018-09-04 PROCEDURE — 96411 CHEMO IV PUSH ADDL DRUG: CPT

## 2018-09-04 PROCEDURE — 82378 CARCINOEMBRYONIC ANTIGEN: CPT

## 2018-09-04 PROCEDURE — G0498 CHEMO EXTEND IV INFUS W/PUMP: HCPCS

## 2018-09-04 RX ADMIN — DEXAMETHASONE SODIUM PHOSPHATE: 10 INJECTION, SOLUTION INTRAMUSCULAR; INTRAVENOUS at 12:36

## 2018-09-04 RX ADMIN — DIPHENHYDRAMINE HYDROCHLORIDE 50 MG: 50 INJECTION, SOLUTION INTRAMUSCULAR; INTRAVENOUS at 12:22

## 2018-09-04 RX ADMIN — SODIUM CHLORIDE 20 ML/HR: 0.9 INJECTION, SOLUTION INTRAVENOUS at 12:00

## 2018-09-04 RX ADMIN — LEUCOVORIN CALCIUM 700 MG: 500 INJECTION, POWDER, LYOPHILIZED, FOR SOLUTION INTRAMUSCULAR; INTRAVENOUS at 14:02

## 2018-09-04 RX ADMIN — FLUOROURACIL 696 MG: 50 INJECTION, SOLUTION INTRAVENOUS at 15:54

## 2018-09-04 RX ADMIN — Medication 900 MG: at 12:55

## 2018-09-04 NOTE — PROGRESS NOTES
To clinic for cycle 9 mFOLFOX (oxal d/c) with Erbitux  Pt tolerating this very well  He continues with the acneiform rash on his face/chest/back and arms  He feels that it is slightly improved since last visit  He confirms using the PO antibiotic as well as the topical application

## 2018-09-05 ENCOUNTER — TELEPHONE (OUTPATIENT)
Dept: HEMATOLOGY ONCOLOGY | Facility: CLINIC | Age: 48
End: 2018-09-05

## 2018-09-05 NOTE — TELEPHONE ENCOUNTER
Tyshawn Carmichael brought in the return to work note today  Per Tyshawn Carmichael, his return date is to be 10/1/2018  That is when the majority of his chemo will be finished  The note he brought does not have a date and looks like he is released to return now  Please send a note with the date of 10/1/2018 as the date released to return to work  Fax number [phone]  Call with questions

## 2018-09-06 ENCOUNTER — TELEPHONE (OUTPATIENT)
Dept: HEMATOLOGY ONCOLOGY | Facility: CLINIC | Age: 48
End: 2018-09-06

## 2018-09-06 ENCOUNTER — HOSPITAL ENCOUNTER (OUTPATIENT)
Dept: INFUSION CENTER | Facility: CLINIC | Age: 48
Discharge: HOME | End: 2018-09-06
Payer: COMMERCIAL

## 2018-09-06 RX ADMIN — HEPARIN 300 UNITS: 100 SYRINGE at 14:10

## 2018-09-06 NOTE — TELEPHONE ENCOUNTER
Faxed new letter to Brooke waite @[phone] on 9/6/2018   It was James Yung letter saying he can return to work 10/1

## 2018-09-17 ENCOUNTER — APPOINTMENT (OUTPATIENT)
Dept: LAB | Facility: CLINIC | Age: 48
End: 2018-09-17
Payer: COMMERCIAL

## 2018-09-17 ENCOUNTER — TRANSCRIBE ORDERS (OUTPATIENT)
Dept: LAB | Facility: CLINIC | Age: 48
End: 2018-09-17

## 2018-09-17 DIAGNOSIS — C18.9 MALIGNANT NEOPLASM OF COLON, UNSPECIFIED PART OF COLON (HCC): ICD-10-CM

## 2018-09-17 DIAGNOSIS — C18.9 METASTATIC COLON CANCER TO LIVER (HCC): ICD-10-CM

## 2018-09-17 DIAGNOSIS — C78.7 METASTATIC COLON CANCER TO LIVER (HCC): ICD-10-CM

## 2018-09-17 DIAGNOSIS — C18.7 MALIGNANT NEOPLASM OF SIGMOID COLON (HCC): ICD-10-CM

## 2018-09-17 LAB — CEA SERPL-MCNC: <0.5 NG/ML (ref 0–3)

## 2018-09-17 PROCEDURE — 82378 CARCINOEMBRYONIC ANTIGEN: CPT

## 2018-09-17 RX ORDER — FLUOROURACIL 50 MG/ML
700 INJECTION, SOLUTION INTRAVENOUS ONCE
Status: COMPLETED | OUTPATIENT
Start: 2018-09-18 | End: 2018-09-18

## 2018-09-17 RX ORDER — SODIUM CHLORIDE 9 MG/ML
20 INJECTION, SOLUTION INTRAVENOUS CONTINUOUS
Status: DISCONTINUED | OUTPATIENT
Start: 2018-09-18 | End: 2018-09-21 | Stop reason: HOSPADM

## 2018-09-18 ENCOUNTER — HOSPITAL ENCOUNTER (OUTPATIENT)
Dept: INFUSION CENTER | Facility: CLINIC | Age: 48
Discharge: HOME | End: 2018-09-18
Payer: COMMERCIAL

## 2018-09-18 VITALS
HEIGHT: 67 IN | RESPIRATION RATE: 16 BRPM | TEMPERATURE: 97.9 F | BODY MASS INDEX: 23.07 KG/M2 | DIASTOLIC BLOOD PRESSURE: 78 MMHG | WEIGHT: 147 LBS | SYSTOLIC BLOOD PRESSURE: 106 MMHG | OXYGEN SATURATION: 97 % | HEART RATE: 82 BPM

## 2018-09-18 PROCEDURE — 96411 CHEMO IV PUSH ADDL DRUG: CPT

## 2018-09-18 PROCEDURE — 96367 TX/PROPH/DG ADDL SEQ IV INF: CPT

## 2018-09-18 PROCEDURE — 96375 TX/PRO/DX INJ NEW DRUG ADDON: CPT

## 2018-09-18 PROCEDURE — 96413 CHEMO IV INFUSION 1 HR: CPT

## 2018-09-18 PROCEDURE — 96366 THER/PROPH/DIAG IV INF ADDON: CPT

## 2018-09-18 PROCEDURE — G0498 CHEMO EXTEND IV INFUS W/PUMP: HCPCS

## 2018-09-18 RX ADMIN — DEXAMETHASONE SODIUM PHOSPHATE: 10 INJECTION, SOLUTION INTRAMUSCULAR; INTRAVENOUS at 09:32

## 2018-09-18 RX ADMIN — FLUOROURACIL 700 MG: 50 INJECTION, SOLUTION INTRAVENOUS at 13:20

## 2018-09-18 RX ADMIN — Medication 900 MG: at 10:21

## 2018-09-18 RX ADMIN — DIPHENHYDRAMINE HYDROCHLORIDE 50 MG: 50 INJECTION, SOLUTION INTRAMUSCULAR; INTRAVENOUS at 09:48

## 2018-09-18 RX ADMIN — LEUCOVORIN CALCIUM 700 MG: 500 INJECTION, POWDER, LYOPHILIZED, FOR SOLUTION INTRAMUSCULAR; INTRAVENOUS at 11:27

## 2018-09-18 RX ADMIN — SODIUM CHLORIDE 20 ML/HR: 0.9 INJECTION, SOLUTION INTRAVENOUS at 09:25

## 2018-09-20 ENCOUNTER — HOSPITAL ENCOUNTER (OUTPATIENT)
Dept: INFUSION CENTER | Facility: CLINIC | Age: 48
Discharge: HOME | End: 2018-09-20
Payer: COMMERCIAL

## 2018-09-20 RX ADMIN — HEPARIN 300 UNITS: 100 SYRINGE at 12:10

## 2018-09-20 NOTE — PROGRESS NOTES
Patient tolerated 46 hour infusion of 5FU without issue  CADD D/C'd and port flushed per protocol  Verified patient upcoming appointments   Declined AVS

## 2018-10-01 ENCOUNTER — APPOINTMENT (OUTPATIENT)
Dept: LAB | Facility: CLINIC | Age: 48
End: 2018-10-01
Payer: COMMERCIAL

## 2018-10-01 DIAGNOSIS — C18.9 MALIGNANT NEOPLASM OF COLON, UNSPECIFIED PART OF COLON (HCC): ICD-10-CM

## 2018-10-01 DIAGNOSIS — C18.7 MALIGNANT NEOPLASM OF SIGMOID COLON (HCC): ICD-10-CM

## 2018-10-01 DIAGNOSIS — C78.7 METASTATIC COLON CANCER TO LIVER (HCC): ICD-10-CM

## 2018-10-01 DIAGNOSIS — C18.9 METASTATIC COLON CANCER TO LIVER (HCC): ICD-10-CM

## 2018-10-01 LAB — CEA SERPL-MCNC: 0.7 NG/ML (ref 0–3)

## 2018-10-01 PROCEDURE — 82378 CARCINOEMBRYONIC ANTIGEN: CPT

## 2018-10-01 RX ORDER — SODIUM CHLORIDE 9 MG/ML
20 INJECTION, SOLUTION INTRAVENOUS CONTINUOUS
Status: DISCONTINUED | OUTPATIENT
Start: 2018-10-02 | End: 2018-10-05 | Stop reason: HOSPADM

## 2018-10-01 RX ORDER — FLUOROURACIL 50 MG/ML
700 INJECTION, SOLUTION INTRAVENOUS ONCE
Status: COMPLETED | OUTPATIENT
Start: 2018-10-02 | End: 2018-10-02

## 2018-10-02 ENCOUNTER — HOSPITAL ENCOUNTER (OUTPATIENT)
Dept: INFUSION CENTER | Facility: CLINIC | Age: 48
Discharge: HOME | End: 2018-10-02
Payer: COMMERCIAL

## 2018-10-02 VITALS
HEIGHT: 66 IN | TEMPERATURE: 98.2 F | DIASTOLIC BLOOD PRESSURE: 56 MMHG | BODY MASS INDEX: 23.14 KG/M2 | OXYGEN SATURATION: 98 % | HEART RATE: 71 BPM | RESPIRATION RATE: 18 BRPM | WEIGHT: 144 LBS | SYSTOLIC BLOOD PRESSURE: 96 MMHG

## 2018-10-02 PROCEDURE — 96411 CHEMO IV PUSH ADDL DRUG: CPT

## 2018-10-02 PROCEDURE — 96366 THER/PROPH/DIAG IV INF ADDON: CPT

## 2018-10-02 PROCEDURE — G0498 CHEMO EXTEND IV INFUS W/PUMP: HCPCS

## 2018-10-02 PROCEDURE — 96413 CHEMO IV INFUSION 1 HR: CPT

## 2018-10-02 PROCEDURE — 96367 TX/PROPH/DG ADDL SEQ IV INF: CPT

## 2018-10-02 RX ADMIN — DEXAMETHASONE SODIUM PHOSPHATE: 10 INJECTION, SOLUTION INTRAMUSCULAR; INTRAVENOUS at 11:26

## 2018-10-02 RX ADMIN — FLUOROURACIL 700 MG: 50 INJECTION, SOLUTION INTRAVENOUS at 15:37

## 2018-10-02 RX ADMIN — DIPHENHYDRAMINE HYDROCHLORIDE 50 MG: 50 INJECTION, SOLUTION INTRAMUSCULAR; INTRAVENOUS at 11:45

## 2018-10-02 RX ADMIN — LEUCOVORIN CALCIUM 700 MG: 200 INJECTION, POWDER, LYOPHILIZED, FOR SOLUTION INTRAMUSCULAR; INTRAVENOUS at 13:26

## 2018-10-02 RX ADMIN — Medication 900 MG: at 12:16

## 2018-10-02 RX ADMIN — SODIUM CHLORIDE 20 ML/HR: 0.9 INJECTION, SOLUTION INTRAVENOUS at 11:10

## 2018-10-02 NOTE — PROGRESS NOTES
Pt tolerated all chemotherapy infusions well, cadd pump patent on discharge home  Pt aware of d/c date for pump and time

## 2018-10-04 ENCOUNTER — HOSPITAL ENCOUNTER (OUTPATIENT)
Dept: INFUSION CENTER | Facility: CLINIC | Age: 48
Discharge: HOME | End: 2018-10-04
Payer: COMMERCIAL

## 2018-10-04 RX ADMIN — HEPARIN 300 UNITS: 100 SYRINGE at 14:04

## 2018-10-08 ENCOUNTER — HOSPITAL ENCOUNTER (OUTPATIENT)
Dept: CT IMAGING | Facility: HOSPITAL | Age: 48
Discharge: HOME | End: 2018-10-08
Payer: COMMERCIAL

## 2018-10-08 DIAGNOSIS — C18.9 METASTATIC COLON CANCER TO LIVER (HCC): ICD-10-CM

## 2018-10-08 DIAGNOSIS — C78.7 METASTATIC COLON CANCER TO LIVER (HCC): ICD-10-CM

## 2018-10-08 PROCEDURE — 74177 CT ABD & PELVIS W/CONTRAST: CPT

## 2018-10-08 PROCEDURE — 71260 CT THORAX DX C+: CPT

## 2018-10-08 RX ADMIN — IOHEXOL 100 ML: 350 INJECTION, SOLUTION INTRAVENOUS at 18:17

## 2018-10-15 ENCOUNTER — APPOINTMENT (OUTPATIENT)
Dept: LAB | Facility: CLINIC | Age: 48
End: 2018-10-15
Payer: COMMERCIAL

## 2018-10-15 DIAGNOSIS — C18.9 METASTATIC COLON CANCER TO LIVER (HCC): ICD-10-CM

## 2018-10-15 DIAGNOSIS — C18.9 MALIGNANT NEOPLASM OF COLON, UNSPECIFIED PART OF COLON (HCC): ICD-10-CM

## 2018-10-15 DIAGNOSIS — C78.7 METASTATIC COLON CANCER TO LIVER (HCC): ICD-10-CM

## 2018-10-15 DIAGNOSIS — C18.7 MALIGNANT NEOPLASM OF SIGMOID COLON (HCC): ICD-10-CM

## 2018-10-15 LAB — CEA SERPL-MCNC: 0.8 NG/ML (ref 0–3)

## 2018-10-15 PROCEDURE — 82378 CARCINOEMBRYONIC ANTIGEN: CPT

## 2018-10-15 RX ORDER — SODIUM CHLORIDE 9 MG/ML
20 INJECTION, SOLUTION INTRAVENOUS CONTINUOUS
Status: DISCONTINUED | OUTPATIENT
Start: 2018-10-16 | End: 2018-10-19 | Stop reason: HOSPADM

## 2018-10-15 RX ORDER — FLUOROURACIL 50 MG/ML
700 INJECTION, SOLUTION INTRAVENOUS ONCE
Status: COMPLETED | OUTPATIENT
Start: 2018-10-16 | End: 2018-10-16

## 2018-10-16 ENCOUNTER — HOSPITAL ENCOUNTER (OUTPATIENT)
Dept: INFUSION CENTER | Facility: CLINIC | Age: 48
Discharge: HOME | End: 2018-10-16
Payer: COMMERCIAL

## 2018-10-16 ENCOUNTER — OFFICE VISIT (OUTPATIENT)
Dept: SURGICAL ONCOLOGY | Facility: CLINIC | Age: 48
End: 2018-10-16
Payer: COMMERCIAL

## 2018-10-16 VITALS
HEART RATE: 64 BPM | WEIGHT: 149 LBS | SYSTOLIC BLOOD PRESSURE: 106 MMHG | BODY MASS INDEX: 23.95 KG/M2 | HEIGHT: 66 IN | TEMPERATURE: 98.2 F | DIASTOLIC BLOOD PRESSURE: 74 MMHG | RESPIRATION RATE: 12 BRPM

## 2018-10-16 VITALS
SYSTOLIC BLOOD PRESSURE: 108 MMHG | TEMPERATURE: 97.7 F | WEIGHT: 146 LBS | DIASTOLIC BLOOD PRESSURE: 77 MMHG | HEIGHT: 66 IN | OXYGEN SATURATION: 100 % | HEART RATE: 66 BPM | RESPIRATION RATE: 18 BRPM | BODY MASS INDEX: 23.46 KG/M2

## 2018-10-16 DIAGNOSIS — C78.7 METASTATIC COLON CANCER TO LIVER (HCC): Primary | ICD-10-CM

## 2018-10-16 DIAGNOSIS — C18.9 METASTATIC COLON CANCER TO LIVER (HCC): Primary | ICD-10-CM

## 2018-10-16 PROCEDURE — 99215 OFFICE O/P EST HI 40 MIN: CPT | Performed by: SURGERY

## 2018-10-16 PROCEDURE — 96413 CHEMO IV INFUSION 1 HR: CPT

## 2018-10-16 PROCEDURE — 96366 THER/PROPH/DIAG IV INF ADDON: CPT

## 2018-10-16 PROCEDURE — 96367 TX/PROPH/DG ADDL SEQ IV INF: CPT

## 2018-10-16 PROCEDURE — G0498 CHEMO EXTEND IV INFUS W/PUMP: HCPCS

## 2018-10-16 PROCEDURE — 96411 CHEMO IV PUSH ADDL DRUG: CPT

## 2018-10-16 RX ADMIN — FLUOROURACIL 700 MG: 50 INJECTION, SOLUTION INTRAVENOUS at 15:57

## 2018-10-16 RX ADMIN — LEUCOVORIN CALCIUM 700 MG: 500 INJECTION, POWDER, LYOPHILIZED, FOR SOLUTION INTRAMUSCULAR; INTRAVENOUS at 13:53

## 2018-10-16 RX ADMIN — DIPHENHYDRAMINE HYDROCHLORIDE 50 MG: 50 INJECTION, SOLUTION INTRAMUSCULAR; INTRAVENOUS at 12:19

## 2018-10-16 RX ADMIN — DEXAMETHASONE SODIUM PHOSPHATE: 10 INJECTION, SOLUTION INTRAMUSCULAR; INTRAVENOUS at 11:55

## 2018-10-16 RX ADMIN — Medication 900 MG: at 12:39

## 2018-10-16 RX ADMIN — SODIUM CHLORIDE 20 ML/HR: 0.9 INJECTION, SOLUTION INTRAVENOUS at 11:27

## 2018-10-16 NOTE — PROGRESS NOTES
Surgical Oncology Follow Up       [address],[address] [place] [address]  [facility] [address]  Laura Stephens  1970  [id number]  [address] [place] [address],[address] [place] [address]  [facility] [address] [address]    Diagnoses and all orders for this visit:    Metastatic colon cancer to liver [facility])  -     MRI abdomen w wo contrast; Future        Chief Complaint   Patient presents with    Follow-up     Pt is here for 3 mo colon ca f/u  Return in about 2 weeks (around 10/30/2018)  Metastatic colon cancer to liver ([facility] [address] )    1/2018 Initial Diagnosis     Malignant neoplasm of sigmoid colon ([facility] [address] )         1/22/2018 Biopsy     Large Intestine, Sigmoid Colon, Recto-sigmoid tumor bx:    - Invasive colonic adenocarcinoma, moderately differentiated         2/6/2018 -  Chemotherapy     Modified FOLFOX6   Added Erbitux on 3/20/18             Staging:  Metastatic rectosigmoid cancer  [id number]  Treatment history:   Modified FOLFOX 6   Erbitux added 03/20/2018   Segment 7 liver resection, June 2018  Current treatment:  Modified FOLFOX 6   Erbitux added 03/20/2018   Disease status:   Stable    History of Present Illness:   Patient returns in follow-up of his metastatic colon cancer  He is doing well at this time with no complaints  He is still continuing chemotherapy  CT from October 8, 2018 revealed an increase in the size of a metastatic lesion that measured 3 8 cm  Previously measured 1 3 cm  Pulmonary nodules were stable  I personally reviewed the films  Review of Systems  Complete ROS Surg Onc:   Complete ROS Surg Onc:   Constitutional: The patient denies new or recent history of general fatigue, no recent weight loss, no change in appetite  Eyes: No complaints of visual problems, no scleral icterus     ENT: no complaints of ear pain, no hoarseness, no difficulty swallowing,  no tinnitus and no new masses in head, oral cavity, or neck  Cardiovascular: No complaints of chest pain, no palpitations, no ankle edema  Respiratory: No complaints of shortness of breath, no cough  Gastrointestinal: No complaints of jaundice, no bloody stools, no pale stools  Genitourinary: No complaints of dysuria, no hematuria, no nocturia, no frequent urination, no urethral discharge  Musculoskeletal: No complaints of weakness, paralysis, joint stiffness or arthralgias  Integumentary: No complaints of rash, no new lesions  Neurological: No complaints of convulsions, no seizures, no dizziness  Hematologic/Lymphatic: No complaints of easy bruising  Endocrine:  No hot or cold intolerance  No polydipsia, polyphagia, or polyuria  Allergy/immunology:  No environmental allergies  No food allergies  Not immunocompromised  Skin:  No pallor or rash  No wound  Patient Active Problem List   Diagnosis    ED (erectile dysfunction)    Metastatic colon cancer to liver (HCC)    GERD (gastroesophageal reflux disease)    Pulmonary nodule, right     Past Medical History:   Diagnosis Date    Dysuria     Last Assessed:8/24/17    GERD (gastroesophageal reflux disease)     Liver nodule     Pulmonary nodule, right      Past Surgical History:   Procedure Laterality Date    COLONOSCOPY N/A 1/22/2018    Procedure: COLONOSCOPY;  Surgeon: Evi Govea MD;  [facility]; Service: Colorectal   [facility] DENTAL SURGERY  2016    Crown with bridge work    FLEXIBLE SIGMOIDOSCOPY N/A 6/15/2018    Procedure: SIGMOIDOSCOPY FLEXIBLE w/o sedation w/ tattoo;  Surgeon: Evi Govea MD;  [facility];   Service: Colorectal    LIVER LOBECTOMY N/A 6/21/2018    Procedure: liver resection/ablation, intraoperative ultrasound of liver;  Surgeon: Js Patrick MD;  [facility];  Service: Surgical Oncology    UT EXPLORATORY OF ABDOMEN N/A 6/21/2018    Procedure: LAPAROTOMY EXPLORATORY;  Surgeon: Evi Govea MD; [facility];  Service: Colorectal    PA INSERT PICC W/ SUB-Q PORT N/A 1/25/2018    Procedure: PORT-A-CATHETER PLACEMENT  Fluoroscopy for performance/interpretation;  Surgeon: Ham Menard MD;  [facility];  Service: Colorectal    PA PART REMOVAL COLON W ANASTOMOSIS Left 6/21/2018    Procedure: hemicolectomy, low anterior resection (open) SPY fluorescence angiography;  Surgeon: Ham Menard MD;  [facility];  Service: Colorectal    PA PROCTECTOMY,PARTIAL N/A 6/21/2018    Procedure: Partial proctectomy ;  Surgeon: Ham Menard MD;  [facility];  Service: Colorectal    PA SIGMOIDOSCOPY FLX DX W/COLLJ SPEC BR/WA IF PFRMD N/A 6/21/2018    Procedure: Guerry Deal;  Surgeon: Ham Meanrd MD;  [facility];  Service: Colorectal    ROOT CANAL  2015    TESTICLE SURGERY      Exploration of Undescended Testis rIght, age 8 had surgery for undescended testicle; Last Assessed:8/24/17    TOOTH EXTRACTION  2015     Family History   Problem Relation Age of Onset    No Known Problems Father     Cancer Mother     Vaginal cancer Mother         malignant neoplasm of vagina    Cancer Brother         pt  reports brother was diagnosed with stage 4 throat cancer     Social History     Social History    Marital status: Single     Spouse name: N/A    Number of children: N/A    Years of education: N/A     Occupational History    Not on file       Social History Main Topics    Smoking status: Former Smoker     Types: E-Cigarettes    Smokeless tobacco: Current User      Comment: quit 4 years ago / smoked for 20 years     Alcohol use Yes      Comment: socially - 2 month    Drug use: No      Comment: per [employer]: occasional recreation drug use    Sexual activity: Not on file     Other Topics Concern    Not on file     Social History Narrative    No narrative on file       Current Outpatient Prescriptions:     clindamycin (CLINDAGEL) 1 % gel, Apply topically 2 (two) times a day, Disp: 30 g, Rfl: 0    minocycline (MINOCIN) 100 mg capsule, , Disp: , Rfl:     omeprazole (PriLOSEC) 40 MG capsule, Take 40 mg by mouth daily, Disp: , Rfl:     prochlorperazine (COMPAZINE) 10 mg tablet, Take 1 tablet (10 mg total) by mouth every 6 (six) hours as needed for nausea or vomiting, Disp: 30 tablet, Rfl: 6    sildenafil (REVATIO) 20 mg tablet, Take 1 tablet (20 mg total) by mouth 3 (three) times a day, Disp: 90 tablet, Rfl: 3  No current facility-administered medications for this visit  Facility-Administered Medications Ordered in Other Visits:     alteplase (CATHFLO) injection 2 mg, 2 mg, Intracatheter, PRN, Sabine Clark MD    heparin lock flush 100 units/mL injection 300 Units, 300 Units, Intracatheter, PRN, Sabine Clark MD    sodium chloride 0 9 % infusion, 20 mL/hr, Intravenous, Continuous, Sabine Clark MD, Stopped at 10/16/18 1610  No Known Allergies  Vitals:    10/16/18 1619   BP: 106/74   Pulse: 64   Resp: 12   Temp: 98 2 °F (36 8 °C)       Physical Exam  Constitutional: General appearance: The Patient is well-developed and well-nourished who appears the stated age in no acute distress  Patient is pleasant and talkative  HEENT:  Normocephalic  Sclerae are anicteric  Mucous membranes are moist  Neck is supple without adenopathy  No JVD  Chest: The lungs are clear to auscultation  Cardiac: Heart is regular rate  Abdomen: Abdomen is soft, non-tender, non-distended and without masses  Extremities: There is no clubbing or cyanosis  There is no edema  Symmetric  Neuro: Grossly nonfocal  Gait is normal      Lymphatic: No evidence of cervical adenopathy bilaterally  No evidence of axillary adenopathy bilaterally  No evidence of inguinal adenopathy bilaterally  Skin: Warm, anicteric  Psych:  Patient is pleasant and talkative    Breasts:        Pathology:  [unfilled]    Labs:  Lab Results   Component Value Date    CEA 0 8 10/15/2018 Imaging  Ct Chest Abdomen Pelvis W Contrast    Result Date: 10/11/2018  Narrative: CT CHEST, ABDOMEN AND PELVIS WITH IV CONTRAST INDICATION:   C18 9: Malignant neoplasm of colon, unspecified C78 7: Secondary malignant neoplasm of liver and intrahepatic bile duct  21-year-old male with metastatic colon cancer diagnosed in January 2018, status post rectosigmoid resection followed by chemotherapy  COMPARISON:  CT chest abdomen pelvis dated April 20, 2018 and the baseline CT of December 1, 2017  TECHNIQUE: CT examination of the chest, abdomen and pelvis was performed  Axial, sagittal, and coronal 2D reformatted images were created from the source data and submitted for interpretation  Radiation dose length product (DLP) for this visit:  584 mGy-cm   This examination, like all CT scans performed in the [facility], was performed utilizing techniques to minimize radiation dose exposure, including the use of iterative reconstruction and automated exposure control  IV Contrast:  100 mL of iohexol (OMNIPAQUE) Enteric Contrast: Enteric contrast was administered  FINDINGS: CHEST LUNGS/PLEURA:  The central airways are patent  There is mild biapical paraseptal emphysema  No airspace consolidation, pleural effusion or pneumothorax  There are stable scattered pulmonary nodules including: -Stable 3 mm right middle lobe nodule (series 4 image 25) -Stable 4 mm right lower lobe subpleural nodule (series 4 image 48) -3 mm right lower lobe nodule (series 4 image 42) -Stable 3 mm right lower lobe nodule (series 4 image 39) -Stable 2 mm left lower lobe pleural-based nodule (series 4 image 47) -Stable 3 mm left lower lobe nodule (series 4 image 45) -Stable 3 mm left lower lobe nodule (series 4 image 40)  No new or enlarging pulmonary nodules are seen  HEART/GREAT VESSELS:  Normal cardiac size  No pericardial effusion  Coronary artery calcifications are present  Normal caliber and course of the great vessels  MEDIASTINUM AND MAHENDRA:  Unremarkable  CHEST WALL AND LOWER NECK:   Stable 3 mm right thyroid lobe nodule  Right subclavian chest port in place with its tip at the cavoatrial junction  ABDOMEN LIVER/BILIARY TREE:  Normal size and configuration  There has been interval increase in the size of the metastatic liver lesion at the dome of segment 7/8, currently measuring 3 8 x 3 1 cm (series 2 image 54, previously 1 3 x 1 0 cm  No intrahepatic or  extrahepatic bile duct dilation  The portal and hepatic veins are patent  GALLBLADDER:  No calcified gallstones  No pericholecystic inflammatory change  SPLEEN:  Unremarkable  PANCREAS:  Unremarkable  ADRENAL GLANDS:  Unremarkable  KIDNEYS/URETERS:  No hydronephrosis  No suspicious mass  Redemonstrated are left side renal sinus cysts, unchanged from the prior exam  STOMACH AND BOWEL:  Staple lines from prior rectosigmoid resection and anastomosis  No mass or enhancing soft tissue is seen to suggest locally recurrent tumor  Moderate volume retained stool in the left colon  No bowel obstruction  APPENDIX:  A normal appendix was visualized  ABDOMINOPELVIC CAVITY:  No ascites or free intraperitoneal air  No lymphadenopathy  VESSELS:  Unremarkable for patient's age  PELVIS REPRODUCTIVE ORGANS:  Unremarkable for patient's age  URINARY BLADDER:  Unremarkable  ABDOMINAL WALL/INGUINAL REGIONS:  Postsurgical scarring in the midline anterior abdominal wall  OSSEOUS STRUCTURES:  No acute fracture or destructive osseous lesion  Scattered small sclerotic foci in the bony pelvis, likely small bone islands  Impression: 1  Interval increase in the size of metastatic liver lesion currently measuring 3 8 cm, previously 1 3 cm  2   Stable pulmonary nodules as described  3   Stable postsurgical changes from prior rectosigmoid resection and anastomosis  No findings of locally recurrent tumor  The study was marked in EPIC for significant notification   Workstation performed: [id number] I reviewed the above laboratory and imaging data  Discussion/Summary: 42-year-old male with metastatic rectosigmoid cancer  He tolerated segment 7 liver resection well  No other lesions were seen  His CEA is normal    I suspect the imaging is just postsurgical changes related to his segment 7 resection  I will obtain an MRI now to make sure there is no enhancement in this region  If this is negative, we will repeat his imaging in the next 3 or 4 months  I will see him again in 2 weeks after the MRI  He is agreeable to this  All his questions were answered

## 2018-10-16 NOTE — PLAN OF CARE
Problem: Knowledge Deficit  Goal: Patient/family/caregiver demonstrates understanding of disease process, treatment plan, medications, and discharge instructions  Complete learning assessment and assess knowledge base    Interventions:  - Provide teaching at level of understanding  - Provide teaching via preferred learning methods  Outcome: Adequate for Discharge

## 2018-10-16 NOTE — PROGRESS NOTES
Patient tolerated treatment without difficulty  Offers no complaints upon discharge  Refused AVS at discharge  CADD pump infusing upon discharge

## 2018-10-16 NOTE — PROGRESS NOTES
Patient states that he thought he was done with 5-FU treatments  Patient is scheduled for this regimen  today  Spoke with eB Barraza at Dr Gibran De Souza office  At this time we are going to continue with currently treatment plan until patient see's Dr Tej Donis  Patient is agreeable to plan of care

## 2018-10-16 NOTE — LETTER
October 16, 2018     Donovankaitlin Kohli, [facility]    Patient: Sherin Thompson   YOB: 1970   Date of Visit: 10/16/2018       Dear Dr Robin Barrera:    Thank you for referring Alex Agustin to me for evaluation  Below are my notes for this consultation  If you have questions, please do not hesitate to call me  I look forward to following your patient along with you           Sincerely,        Vernon Watt MD        CC: Fortino Litten, MD Kennth Overland, MD

## 2018-10-18 ENCOUNTER — HOSPITAL ENCOUNTER (OUTPATIENT)
Dept: INFUSION CENTER | Facility: CLINIC | Age: 48
Discharge: HOME | End: 2018-10-18
Payer: COMMERCIAL

## 2018-10-18 ENCOUNTER — OFFICE VISIT (OUTPATIENT)
Dept: HEMATOLOGY ONCOLOGY | Facility: CLINIC | Age: 48
End: 2018-10-18
Payer: COMMERCIAL

## 2018-10-18 VITALS
HEART RATE: 74 BPM | TEMPERATURE: 97 F | RESPIRATION RATE: 16 BRPM | BODY MASS INDEX: 23.3 KG/M2 | HEIGHT: 66 IN | SYSTOLIC BLOOD PRESSURE: 122 MMHG | WEIGHT: 145 LBS | DIASTOLIC BLOOD PRESSURE: 74 MMHG | OXYGEN SATURATION: 98 %

## 2018-10-18 DIAGNOSIS — C18.9 METASTATIC COLON CANCER TO LIVER (HCC): Primary | ICD-10-CM

## 2018-10-18 DIAGNOSIS — C78.7 METASTATIC COLON CANCER TO LIVER (HCC): Primary | ICD-10-CM

## 2018-10-18 PROCEDURE — 99214 OFFICE O/P EST MOD 30 MIN: CPT | Performed by: INTERNAL MEDICINE

## 2018-10-18 RX ADMIN — HEPARIN 300 UNITS: 100 SYRINGE at 14:50
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
## 2018-10-18 NOTE — PROGRESS NOTES
Patient arrived for CADD pump D/C  Offers no complaints  Patient tolerated 46 hour infusion of 5FU without complications  CADD d/c'd and port flushed per protocol  Verified patients upcoming appointment   Patient declined AVS

## 2018-10-19 ENCOUNTER — HOSPITAL ENCOUNTER (OUTPATIENT)
Dept: RADIOLOGY | Facility: HOSPITAL | Age: 48
Discharge: HOME | End: 2018-10-19
Attending: SURGERY
Payer: COMMERCIAL

## 2018-10-19 DIAGNOSIS — C78.7 METASTATIC COLON CANCER TO LIVER (HCC): ICD-10-CM

## 2018-10-19 DIAGNOSIS — C18.9 METASTATIC COLON CANCER TO LIVER (HCC): ICD-10-CM

## 2018-10-19 PROCEDURE — A9581 GADOXETATE DISODIUM INJ: HCPCS | Performed by: SURGERY

## 2018-10-19 PROCEDURE — 74183 MRI ABD W/O CNTR FLWD CNTR: CPT

## 2018-10-19 RX ADMIN — GADOXETATE DISODIUM 9 ML: 181.43 INJECTION, SOLUTION INTRAVENOUS at 18:27

## 2018-10-22 ENCOUNTER — TELEPHONE (OUTPATIENT)
Dept: HEMATOLOGY ONCOLOGY | Facility: CLINIC | Age: 48
End: 2018-10-22

## 2018-10-29 ENCOUNTER — APPOINTMENT (OUTPATIENT)
Dept: LAB | Facility: CLINIC | Age: 48
End: 2018-10-29
Payer: COMMERCIAL

## 2018-10-29 RX ORDER — SODIUM CHLORIDE 9 MG/ML
20 INJECTION, SOLUTION INTRAVENOUS CONTINUOUS
Status: DISCONTINUED | OUTPATIENT
Start: 2018-10-30 | End: 2018-11-02 | Stop reason: HOSPADM

## 2018-10-30 ENCOUNTER — OFFICE VISIT (OUTPATIENT)
Dept: SURGICAL ONCOLOGY | Facility: CLINIC | Age: 48
End: 2018-10-30
Payer: COMMERCIAL

## 2018-10-30 ENCOUNTER — HOSPITAL ENCOUNTER (OUTPATIENT)
Dept: INFUSION CENTER | Facility: CLINIC | Age: 48
Discharge: HOME | End: 2018-10-30
Payer: COMMERCIAL

## 2018-10-30 VITALS
TEMPERATURE: 98.2 F | HEIGHT: 66 IN | RESPIRATION RATE: 20 BRPM | HEART RATE: 87 BPM | WEIGHT: 144.5 LBS | OXYGEN SATURATION: 96 % | SYSTOLIC BLOOD PRESSURE: 96 MMHG | DIASTOLIC BLOOD PRESSURE: 60 MMHG | BODY MASS INDEX: 23.22 KG/M2

## 2018-10-30 VITALS
SYSTOLIC BLOOD PRESSURE: 108 MMHG | DIASTOLIC BLOOD PRESSURE: 80 MMHG | RESPIRATION RATE: 14 BRPM | WEIGHT: 147 LBS | HEIGHT: 66 IN | BODY MASS INDEX: 23.63 KG/M2 | HEART RATE: 64 BPM | TEMPERATURE: 97.4 F

## 2018-10-30 DIAGNOSIS — C78.7 METASTATIC COLON CANCER TO LIVER (HCC): Primary | ICD-10-CM

## 2018-10-30 DIAGNOSIS — C18.9 METASTATIC COLON CANCER TO LIVER (HCC): Primary | ICD-10-CM

## 2018-10-30 PROCEDURE — 99214 OFFICE O/P EST MOD 30 MIN: CPT | Performed by: SURGERY

## 2018-10-30 PROCEDURE — 96413 CHEMO IV INFUSION 1 HR: CPT

## 2018-10-30 PROCEDURE — 96367 TX/PROPH/DG ADDL SEQ IV INF: CPT

## 2018-10-30 RX ADMIN — SODIUM CHLORIDE 20 ML/HR: 0.9 INJECTION, SOLUTION INTRAVENOUS at 10:46

## 2018-10-30 RX ADMIN — Medication 900 MG: at 11:28

## 2018-10-30 RX ADMIN — DIPHENHYDRAMINE HYDROCHLORIDE 50 MG: 50 INJECTION, SOLUTION INTRAMUSCULAR; INTRAVENOUS at 10:55

## 2018-10-30 RX ADMIN — HEPARIN 300 UNITS: 100 SYRINGE at 12:33

## 2018-10-30 NOTE — PLAN OF CARE
Problem: Potential for Falls  Goal: Patient will remain free of falls  INTERVENTIONS:  - Assess patient frequently for physical needs  -  Identify cognitive and physical deficits and behaviors that affect risk of falls    -  [place] fall precautions as indicated by assessment   - Educate patient/family on patient safety including physical limitations  - Instruct patient to call for assistance with activity based on assessment  - Modify environment to reduce risk of injury  - Consider OT/PT consult to assist with strengthening/mobility   Outcome: Progressing      Problem: PAIN - ADULT  Goal: Verbalizes/displays adequate comfort level or baseline comfort level  Interventions:  - Encourage patient to monitor pain and request assistance  - Assess pain using appropriate pain scale  - Administer analgesics based on type and severity of pain and evaluate response  - Implement non-pharmacological measures as appropriate and evaluate response  - Consider cultural and social influences on pain and pain management  - Notify physician/advanced practitioner if interventions unsuccessful or patient reports new pain  Outcome: Progressing      Problem: INFECTION - ADULT  Goal: Absence or prevention of progression during hospitalization  INTERVENTIONS:  - Assess and monitor for signs and symptoms of infection  - Monitor lab/diagnostic results  - Monitor all insertion sites, i e  indwelling lines, tubes, and drains  - Monitor endotracheal (as able) and nasal secretions for changes in amount and color  - [place] appropriate cooling/warming therapies per order  - Administer medications as ordered  - Instruct and encourage patient and family to use good hand hygiene technique  - Identify and instruct in appropriate isolation precautions for identified infection/condition  Outcome: Progressing

## 2018-10-30 NOTE — PROGRESS NOTES
Surgical Oncology Follow Up       [address]  [facility] [address]  Solange Day  1970  [id number]  [address] [facility]  [facility] [address] [address]    Diagnoses and all orders for this visit:    Metastatic colon cancer to liver [facility])  -     CT abdomen pelvis w contrast; Future  -     BUN; Future  -     Creatinine, serum; Future        Chief Complaint   Patient presents with    Follow-up     Pt is here to f/u after imaging  Return in about 3 months (around 1/30/2019) for Office Visit, Imaging - See orders  Metastatic colon cancer to liver ([facility] [address] )    1/2018 Initial Diagnosis     Malignant neoplasm of sigmoid colon ([facility] [address] )         1/22/2018 Biopsy     Large Intestine, Sigmoid Colon, Recto-sigmoid tumor bx:    - Invasive colonic adenocarcinoma, moderately differentiated         2/6/2018 -  Chemotherapy     Modified FOLFOX6   Added Erbitux on 3/20/18             Staging:  Metastatic rectosigmoid cancer  [id number]  Treatment history:   Modified FOLFOX 6   Erbitux added 03/20/2018   Segment 7 liver resection, June 2018  Current treatment:  Modified FOLFOX 6   Erbitux added 03/20/2018   Disease status:   Stable    History of Present Illness:  Patient returns in follow-up of his MRI  This was performed on October 19, 2018  This revealed a 3 8 cm hepatic dome lesion  This was called metastatic  I personally reviewed the films  Patient is feeling well and he continues his chemotherapy  Review of Systems  Complete ROS Surg Onc:   Complete ROS Surg Onc:   Constitutional: The patient denies new or recent history of general fatigue, no recent weight loss, no change in appetite  Eyes: No complaints of visual problems, no scleral icterus     ENT: no complaints of ear pain, no hoarseness, no difficulty swallowing,  no tinnitus and no new masses in head, oral cavity, or neck  Cardiovascular: No complaints of chest pain, no palpitations, no ankle edema  Respiratory: No complaints of shortness of breath, no cough  Gastrointestinal: No complaints of jaundice, no bloody stools, no pale stools  Genitourinary: No complaints of dysuria, no hematuria, no nocturia, no frequent urination, no urethral discharge  Musculoskeletal: No complaints of weakness, paralysis, joint stiffness or arthralgias  Integumentary: No complaints of rash, no new lesions  Neurological: No complaints of convulsions, no seizures, no dizziness  Hematologic/Lymphatic: No complaints of easy bruising  Endocrine:  No hot or cold intolerance  No polydipsia, polyphagia, or polyuria  Allergy/immunology:  No environmental allergies  No food allergies  Not immunocompromised  Skin:  No pallor or rash  No wound  Patient Active Problem List   Diagnosis    ED (erectile dysfunction)    Metastatic colon cancer to liver (HCC)    GERD (gastroesophageal reflux disease)    Pulmonary nodule, right     Past Medical History:   Diagnosis Date    Dysuria     Last Assessed:8/24/17    GERD (gastroesophageal reflux disease)     Liver nodule     Pulmonary nodule, right      Past Surgical History:   Procedure Laterality Date    COLONOSCOPY N/A 1/22/2018    Procedure: COLONOSCOPY;  Surgeon: Kathy Riojas MD;  [facility]; Service: Colorectal   [facility] DENTAL SURGERY  2016    Crown with bridge work    FLEXIBLE SIGMOIDOSCOPY N/A 6/15/2018    Procedure: SIGMOIDOSCOPY FLEXIBLE w/o sedation w/ tattoo;  Surgeon: Kathy Riojas MD;  [facility];   Service: Colorectal    LIVER LOBECTOMY N/A 6/21/2018    Procedure: liver resection/ablation, intraoperative ultrasound of liver;  Surgeon: Slade Rodriguez MD;  [facility];  Service: Surgical Oncology    MD EXPLORATORY OF ABDOMEN N/A 6/21/2018    Procedure: LAPAROTOMY EXPLORATORY;  Surgeon: Kathy Riojas MD;  [facility];  Service: Colorectal    OR INSERT PICC W/ SUB-Q PORT N/A 1/25/2018    Procedure: PORT-A-CATHETER PLACEMENT  Fluoroscopy for performance/interpretation;  Surgeon: Zhao Guerrero MD;  [facility];  Service: Colorectal    OR PART REMOVAL COLON W ANASTOMOSIS Left 6/21/2018    Procedure: hemicolectomy, low anterior resection (open) SPY fluorescence angiography;  Surgeon: Zaho Guerrero MD;  [facility];  Service: Colorectal    OR PROCTECTOMY,PARTIAL N/A 6/21/2018    Procedure: Partial proctectomy ;  Surgeon: Zhao Guerrero MD;  [facility];  Service: Colorectal    OR SIGMOIDOSCOPY FLX DX W/COLLJ SPEC BR/WA IF PFRMD N/A 6/21/2018    Procedure: Andrés Zhong;  Surgeon: Zhao Guerrero MD;  [facility];  Service: Colorectal    ROOT CANAL  2015    TESTICLE SURGERY      Exploration of Undescended Testis rIght, age 8 had surgery for undescended testicle; Last Assessed:8/24/17    TOOTH EXTRACTION  2015     Family History   Problem Relation Age of Onset    No Known Problems Father     Cancer Mother     Vaginal cancer Mother         malignant neoplasm of vagina    Cancer Brother         pt  reports brother was diagnosed with stage 4 throat cancer     Social History     Social History    Marital status: Single     Spouse name: N/A    Number of children: N/A    Years of education: N/A     Occupational History    Not on file       Social History Main Topics    Smoking status: Former Smoker     Types: E-Cigarettes    Smokeless tobacco: Current User      Comment: quit 4 years ago / smoked for 20 years     Alcohol use Yes      Comment: socially - 2 month    Drug use: No      Comment: per [employer]: occasional recreation drug use    Sexual activity: Not on file     Other Topics Concern    Not on file     Social History Narrative    No narrative on file       Current Outpatient Prescriptions:     clindamycin (CLINDAGEL) 1 % gel, Apply topically 2 (two) times a day, Disp: 30 g, Rfl: 0    minocycline (MINOCIN) 100 mg capsule, , Disp: , Rfl:     omeprazole (PriLOSEC) 40 MG capsule, Take 40 mg by mouth daily, Disp: , Rfl:     prochlorperazine (COMPAZINE) 10 mg tablet, Take 1 tablet (10 mg total) by mouth every 6 (six) hours as needed for nausea or vomiting, Disp: 30 tablet, Rfl: 6    sildenafil (REVATIO) 20 mg tablet, Take 1 tablet (20 mg total) by mouth 3 (three) times a day, Disp: 90 tablet, Rfl: 3  No current facility-administered medications for this visit  Facility-Administered Medications Ordered in Other Visits:     alteplase (CATHFLO) injection 2 mg, 2 mg, Intracatheter, Once PRN, Pro Wells MD    sodium chloride 0 9 % infusion, 20 mL/hr, Intravenous, Continuous, Pro Wells MD, Stopped at 10/30/18 1233  No Known Allergies  Vitals:    10/30/18 1253   BP: 108/80   Pulse: 64   Resp: 14   Temp: (!) 97 4 °F (36 3 °C)       Physical Exam  Constitutional: General appearance: The Patient is well-developed and well-nourished who appears the stated age in no acute distress  Patient is pleasant and talkative  HEENT:  Normocephalic  Sclerae are anicteric  Mucous membranes are moist  Neck is supple without adenopathy  No JVD  Chest: The lungs are clear to auscultation  Cardiac: Heart is regular rate  Abdomen: Abdomen is soft, non-tender, non-distended and without masses  Extremities: There is no clubbing or cyanosis  There is no edema  Symmetric  Neuro: Grossly nonfocal  Gait is normal      Lymphatic: No evidence of cervical adenopathy bilaterally  Skin: Warm, anicteric  Psych:  Patient is pleasant and talkative  Breasts:        Pathology:  [unfilled]    Labs:      Imaging  Mri Abdomen W Wo Contrast    Addendum Date: 10/23/2018 Addendum:   ADDENDUM: The above-described 38 mm hepatic dome lesion is at the site of recent surgery/ablation and is likely entirely be on a postsurgical basis    Close follow-up is recommended to exclude residual tumor  Result Date: 10/23/2018  Narrative: MRI OF THE ABDOMEN (LIVER) WITH AND WITHOUT CONTRAST INDICATION: Excerpt from Surgical Oncology progress note dated 10/16/2018: "Patient returns in follow-up of his metastatic colon cancer  He is doing well at this time with no complaints  He is still continuing chemotherapy  CT from October 8, 2018 revealed an increase in the size of a metastatic lesion that measured 3 8 cm  Previously measured 1 3 cm  Pulmonary nodules were stable " COMPARISON:  MRI abdomen 5/18/2018  CT chest on pelvis 10/8/2018  TECHNIQUE:  The following pulse sequences were obtained on a 1 5 T scanner:  Coronal and axial T2 with TE of 90 and 180 respectively, axial T2 with fat saturation, axial FIESTA fat-sat, axial T1-weighted in-and-out-of phase, axial DWI/ADC, pre-contrast axial T1 with fat saturation, post-contrast dynamic axial T1 with fat saturation at 20, 70, and 180 seconds, followed by coronal and 7 minute delayed axial T1 with fat saturation  IV Contrast:  9 mL of gadoxetate disodium SOLN FINDINGS: LIVER:  General:  Normal in size and contour  No loss of signal on out-of-phase images to suggest hepatic steatosis  Lesions:  Peripherally enhancing hepatic dome lesion measuring 38 x 34 mm with central high T2, necrotic component unchanged from the prior CT but increased in size from the prior MRI in keeping with this patient's known metastatic lesion  Vasculature:  Portal and hepatic veins patent without evidence of thrombosis  BILIARY TREE:  Normal   GALLBLADDER:  [Normal ] PANCREAS:  Normal  ADRENAL GLANDS:  Normal  SPLEEN:  Normal  KIDNEYS:  No hydronephrosis  Stable simple left renal parapelvic cyst  ABDOMINAL CAVITY:  No lymphadenopathy or mass  No ascites  BOWEL:  Unremarkable MRI appearance  OSSEOUS STRUCTURES:  No osseous destruction   EXTRAHEPATIC VASCULAR STRUCTURES:  Visualized vasculature is normal  ABDOMINAL WALL:  Normal  LOWER CHEST:  Unremarkable MRI appearance  Impression: 38 mm hepatic dome metastasis unchanged from the recent CT abdomen increased in size from the prior MRI from 5/18/2018 when it measured 14 mm  No new hepatic lesions  Workstation performed: [id number]     Ct Chest Abdomen Pelvis W Contrast    Result Date: 10/11/2018  Narrative: CT CHEST, ABDOMEN AND PELVIS WITH IV CONTRAST INDICATION:   C18 9: Malignant neoplasm of colon, unspecified C78 7: Secondary malignant neoplasm of liver and intrahepatic bile duct  30-year-old male with metastatic colon cancer diagnosed in January 2018, status post rectosigmoid resection followed by chemotherapy  COMPARISON:  CT chest abdomen pelvis dated April 20, 2018 and the baseline CT of December 1, 2017  TECHNIQUE: CT examination of the chest, abdomen and pelvis was performed  Axial, sagittal, and coronal 2D reformatted images were created from the source data and submitted for interpretation  Radiation dose length product (DLP) for this visit:  584 mGy-cm   This examination, like all CT scans performed in the [facility], was performed utilizing techniques to minimize radiation dose exposure, including the use of iterative reconstruction and automated exposure control  IV Contrast:  100 mL of iohexol (OMNIPAQUE) Enteric Contrast: Enteric contrast was administered  FINDINGS: CHEST LUNGS/PLEURA:  The central airways are patent  There is mild biapical paraseptal emphysema  No airspace consolidation, pleural effusion or pneumothorax    There are stable scattered pulmonary nodules including: -Stable 3 mm right middle lobe nodule (series 4 image 25) -Stable 4 mm right lower lobe subpleural nodule (series 4 image 48) -3 mm right lower lobe nodule (series 4 image 42) -Stable 3 mm right lower lobe nodule (series 4 image 39) -Stable 2 mm left lower lobe pleural-based nodule (series 4 image 47) -Stable 3 mm left lower lobe nodule (series 4 image 45) -Stable 3 mm left lower lobe nodule (series 4 image 40)  No new or enlarging pulmonary nodules are seen  HEART/GREAT VESSELS:  Normal cardiac size  No pericardial effusion  Coronary artery calcifications are present  Normal caliber and course of the great vessels  MEDIASTINUM AND MAHENDRA:  Unremarkable  CHEST WALL AND LOWER NECK:   Stable 3 mm right thyroid lobe nodule  Right subclavian chest port in place with its tip at the cavoatrial junction  ABDOMEN LIVER/BILIARY TREE:  Normal size and configuration  There has been interval increase in the size of the metastatic liver lesion at the dome of segment 7/8, currently measuring 3 8 x 3 1 cm (series 2 image 54, previously 1 3 x 1 0 cm  No intrahepatic or  extrahepatic bile duct dilation  The portal and hepatic veins are patent  GALLBLADDER:  No calcified gallstones  No pericholecystic inflammatory change  SPLEEN:  Unremarkable  PANCREAS:  Unremarkable  ADRENAL GLANDS:  Unremarkable  KIDNEYS/URETERS:  No hydronephrosis  No suspicious mass  Redemonstrated are left side renal sinus cysts, unchanged from the prior exam  STOMACH AND BOWEL:  Staple lines from prior rectosigmoid resection and anastomosis  No mass or enhancing soft tissue is seen to suggest locally recurrent tumor  Moderate volume retained stool in the left colon  No bowel obstruction  APPENDIX:  A normal appendix was visualized  ABDOMINOPELVIC CAVITY:  No ascites or free intraperitoneal air  No lymphadenopathy  VESSELS:  Unremarkable for patient's age  PELVIS REPRODUCTIVE ORGANS:  Unremarkable for patient's age  URINARY BLADDER:  Unremarkable  ABDOMINAL WALL/INGUINAL REGIONS:  Postsurgical scarring in the midline anterior abdominal wall  OSSEOUS STRUCTURES:  No acute fracture or destructive osseous lesion  Scattered small sclerotic foci in the bony pelvis, likely small bone islands  Impression: 1    Interval increase in the size of metastatic liver lesion currently measuring 3 8 cm, previously 1 3 cm  2   Stable pulmonary nodules as described  3   Stable postsurgical changes from prior rectosigmoid resection and anastomosis  No findings of locally recurrent tumor  The study was marked in EPIC for significant notification  Workstation performed: [id number]     I reviewed the above laboratory and imaging data  Discussion/Summary: 16-year-old male with metastatic rectosigmoid cancer   He tolerated segment 7 liver resection well  No other lesions were seen  His CEA is normal    I suspect the imaging is just postsurgical changes related to his segment 7 resection  The MRI shows the lesion is stable  I did discuss the findings with Radiology and it is likely that these are postsurgical   A repeat CT was recommended by Radiology in 3 months to make sure this is not otherwise worrisome  We will schedule this  I will see him again at that time for another clinical exam   He is agreeable to this    All his questions were answered

## 2018-10-30 NOTE — LETTER
October 30, 2018     Raquel Pena, [facility]    Patient: Alexandria Diaz   YOB: 1970   Date of Visit: 10/30/2018       Dear Dr Lieutenant Booker:    Thank you for referring Torisonal Paula to me for evaluation  Below are my notes for this consultation  If you have questions, please do not hesitate to call me  I look forward to following your patient along with you  Sincerely,        Ruthann Echols MD        CC: MD Nella Green MD Rosebud Genera, MD  10/30/2018  1:05 PM  Sign at close encounter               Surgical Oncology Follow Up       [address] [place] [address] 98  Darian Lyle  1970  [id number]  [address] [facility],[address]  [facility] [address] [address]    Diagnoses and all orders for this visit:    Metastatic colon cancer to liver [facility])  -     CT abdomen pelvis w contrast; Future  -     BUN; Future  -     Creatinine, serum; Future        Chief Complaint   Patient presents with    Follow-up     Pt is here to f/u after imaging  Return in about 3 months (around 1/30/2019) for Office Visit, Imaging - See orders  Metastatic colon cancer to liver ([facility] [address] )    1/2018 Initial Diagnosis     Malignant neoplasm of sigmoid colon ([facility] [address] )         1/22/2018 Biopsy     Large Intestine, Sigmoid Colon, Recto-sigmoid tumor bx:    - Invasive colonic adenocarcinoma, moderately differentiated         2/6/2018 -  Chemotherapy     Modified FOLFOX6   Added Erbitux on 3/20/18             Staging:  Metastatic rectosigmoid cancer  [id number]  Treatment history:   Modified FOLFOX 6   Erbitux added 03/20/2018   Segment 7 liver resection, June 2018  Current treatment:  Modified FOLFOX 6   Erbitux added 03/20/2018   Disease status:   Stable    History of Present Illness:  Patient returns in follow-up of his MRI    This was performed on October 19, 2018  This revealed a 3 8 cm hepatic dome lesion  This was called metastatic  I personally reviewed the films  Patient is feeling well and he continues his chemotherapy  Review of Systems  Complete ROS Surg Onc:   Complete ROS Surg Onc:   Constitutional: The patient denies new or recent history of general fatigue, no recent weight loss, no change in appetite  Eyes: No complaints of visual problems, no scleral icterus  ENT: no complaints of ear pain, no hoarseness, no difficulty swallowing,  no tinnitus and no new masses in head, oral cavity, or neck  Cardiovascular: No complaints of chest pain, no palpitations, no ankle edema  Respiratory: No complaints of shortness of breath, no cough  Gastrointestinal: No complaints of jaundice, no bloody stools, no pale stools  Genitourinary: No complaints of dysuria, no hematuria, no nocturia, no frequent urination, no urethral discharge  Musculoskeletal: No complaints of weakness, paralysis, joint stiffness or arthralgias  Integumentary: No complaints of rash, no new lesions  Neurological: No complaints of convulsions, no seizures, no dizziness  Hematologic/Lymphatic: No complaints of easy bruising  Endocrine:  No hot or cold intolerance  No polydipsia, polyphagia, or polyuria  Allergy/immunology:  No environmental allergies  No food allergies  Not immunocompromised  Skin:  No pallor or rash  No wound  Patient Active Problem List   Diagnosis    ED (erectile dysfunction)    Metastatic colon cancer to liver (HCC)    GERD (gastroesophageal reflux disease)    Pulmonary nodule, right     Past Medical History:   Diagnosis Date    Dysuria     Last Assessed:8/24/17    GERD (gastroesophageal reflux disease)     Liver nodule     Pulmonary nodule, right      Past Surgical History:   Procedure Laterality Date    COLONOSCOPY N/A 1/22/2018    Procedure: COLONOSCOPY;  Surgeon: Pushpa Vela MD;  [facility];   Service: Colorectal   Jermaine Lemme DENTAL SURGERY  2016    Crown with bridge work    FLEXIBLE SIGMOIDOSCOPY N/A 6/15/2018    Procedure: SIGMOIDOSCOPY FLEXIBLE w/o sedation w/ tattoo;  Surgeon: Alexis Frank MD;  [facility]; Service: Colorectal    LIVER LOBECTOMY N/A 6/21/2018    Procedure: liver resection/ablation, intraoperative ultrasound of liver;  Surgeon: Alta Koroma MD;  [facility];  Service: Surgical Oncology    NM EXPLORATORY OF ABDOMEN N/A 6/21/2018    Procedure: LAPAROTOMY EXPLORATORY;  Surgeon: Alexis Frank MD;  [facility];  Service: Colorectal    NM INSERT PICC W/ SUB-Q PORT N/A 1/25/2018    Procedure: PORT-A-CATHETER PLACEMENT  Fluoroscopy for performance/interpretation;  Surgeon: Alexis Frank MD;  [facility];  Service: Colorectal    NM PART REMOVAL COLON W ANASTOMOSIS Left 6/21/2018    Procedure: hemicolectomy, low anterior resection (open) SPY fluorescence angiography;  Surgeon: Alexis Frank MD;  [facility];  Service: Colorectal    NM PROCTECTOMY,PARTIAL N/A 6/21/2018    Procedure: Partial proctectomy ;  Surgeon: Alexis Frank MD;  [facility];  Service: Colorectal    NM SIGMOIDOSCOPY FLX DX W/COLLJ SPEC BR/WA IF PFRMD N/A 6/21/2018    Procedure: [facility];  Surgeon: Alexis Frank MD;  [facility];  Service: Colorectal    ROOT CANAL  2015    TESTICLE SURGERY      Exploration of Undescended Testis rIght, age 8 had surgery for undescended testicle;  Last Assessed:8/24/17    TOOTH EXTRACTION  2015     Family History   Problem Relation Age of Onset    No Known Problems Father     Cancer Mother     Vaginal cancer Mother         malignant neoplasm of vagina    Cancer Brother         pt  reports brother was diagnosed with stage 4 throat cancer     Social History     Social History    Marital status: Single     Spouse name: N/A    Number of children: N/A    Years of education: N/A     Occupational History    Not on file  Social History Main Topics    Smoking status: Former Smoker     Types: E-Cigarettes    Smokeless tobacco: Current User      Comment: quit 4 years ago / smoked for 20 years     Alcohol use Yes      Comment: socially - 2 month    Drug use: No      Comment: per [employer]: occasional recreation drug use    Sexual activity: Not on file     Other Topics Concern    Not on file     Social History Narrative    No narrative on file       Current Outpatient Prescriptions:     clindamycin (CLINDAGEL) 1 % gel, Apply topically 2 (two) times a day, Disp: 30 g, Rfl: 0    minocycline (MINOCIN) 100 mg capsule, , Disp: , Rfl:     omeprazole (PriLOSEC) 40 MG capsule, Take 40 mg by mouth daily, Disp: , Rfl:     prochlorperazine (COMPAZINE) 10 mg tablet, Take 1 tablet (10 mg total) by mouth every 6 (six) hours as needed for nausea or vomiting, Disp: 30 tablet, Rfl: 6    sildenafil (REVATIO) 20 mg tablet, Take 1 tablet (20 mg total) by mouth 3 (three) times a day, Disp: 90 tablet, Rfl: 3  No current facility-administered medications for this visit  Facility-Administered Medications Ordered in Other Visits:     alteplase (CATHFLO) injection 2 mg, 2 mg, Intracatheter, Once PRN, Sylvie Joy MD    sodium chloride 0 9 % infusion, 20 mL/hr, Intravenous, Continuous, Sylvie Joy MD, Stopped at 10/30/18 1233  No Known Allergies  Vitals:    10/30/18 1253   BP: 108/80   Pulse: 64   Resp: 14   Temp: (!) 97 4 °F (36 3 °C)       Physical Exam  Constitutional: General appearance: The Patient is well-developed and well-nourished who appears the stated age in no acute distress  Patient is pleasant and talkative  HEENT:  Normocephalic  Sclerae are anicteric  Mucous membranes are moist  Neck is supple without adenopathy  No JVD  Chest: The lungs are clear to auscultation  Cardiac: Heart is regular rate  Abdomen: Abdomen is soft, non-tender, non-distended and without masses  Extremities:  There is no clubbing or cyanosis  There is no edema  Symmetric  Neuro: Grossly nonfocal  Gait is normal      Lymphatic: No evidence of cervical adenopathy bilaterally  Skin: Warm, anicteric  Psych:  Patient is pleasant and talkative  Breasts:        Pathology:  [unfilled]    Labs:      Imaging  Mri Abdomen W Wo Contrast    Addendum Date: 10/23/2018 Addendum:   ADDENDUM: The above-described 38 mm hepatic dome lesion is at the site of recent surgery/ablation and is likely entirely be on a postsurgical basis  Close follow-up is recommended to exclude residual tumor  Result Date: 10/23/2018  Narrative: MRI OF THE ABDOMEN (LIVER) WITH AND WITHOUT CONTRAST INDICATION: Excerpt from Surgical Oncology progress note dated 10/16/2018: "Patient returns in follow-up of his metastatic colon cancer  He is doing well at this time with no complaints  He is still continuing chemotherapy  CT from October 8, 2018 revealed an increase in the size of a metastatic lesion that measured 3 8 cm  Previously measured 1 3 cm  Pulmonary nodules were stable " COMPARISON:  MRI abdomen 5/18/2018  CT chest on pelvis 10/8/2018  TECHNIQUE:  The following pulse sequences were obtained on a 1 5 T scanner:  Coronal and axial T2 with TE of 90 and 180 respectively, axial T2 with fat saturation, axial FIESTA fat-sat, axial T1-weighted in-and-out-of phase, axial DWI/ADC, pre-contrast axial T1 with fat saturation, post-contrast dynamic axial T1 with fat saturation at 20, 70, and 180 seconds, followed by coronal and 7 minute delayed axial T1 with fat saturation  IV Contrast:  9 mL of gadoxetate disodium SOLN FINDINGS: LIVER:  General:  Normal in size and contour  No loss of signal on out-of-phase images to suggest hepatic steatosis   Lesions:  Peripherally enhancing hepatic dome lesion measuring 38 x 34 mm with central high T2, necrotic component unchanged from the prior CT but increased in size from the prior MRI in keeping with this patient's known metastatic lesion  Vasculature:  Portal and hepatic veins patent without evidence of thrombosis  BILIARY TREE:  Normal   GALLBLADDER:  [Normal ] PANCREAS:  Normal  ADRENAL GLANDS:  Normal  SPLEEN:  Normal  KIDNEYS:  No hydronephrosis  Stable simple left renal parapelvic cyst  ABDOMINAL CAVITY:  No lymphadenopathy or mass  No ascites  BOWEL:  Unremarkable MRI appearance  OSSEOUS STRUCTURES:  No osseous destruction  EXTRAHEPATIC VASCULAR STRUCTURES:  Visualized vasculature is normal  ABDOMINAL WALL:  Normal  LOWER CHEST:  Unremarkable MRI appearance  Impression: 38 mm hepatic dome metastasis unchanged from the recent CT abdomen increased in size from the prior MRI from 5/18/2018 when it measured 14 mm  No new hepatic lesions  Workstation performed: [id number]     Ct Chest Abdomen Pelvis W Contrast    Result Date: 10/11/2018  Narrative: CT CHEST, ABDOMEN AND PELVIS WITH IV CONTRAST INDICATION:   C18 9: Malignant neoplasm of colon, unspecified C78 7: Secondary malignant neoplasm of liver and intrahepatic bile duct  42-year-old male with metastatic colon cancer diagnosed in January 2018, status post rectosigmoid resection followed by chemotherapy  COMPARISON:  CT chest abdomen pelvis dated April 20, 2018 and the baseline CT of December 1, 2017  TECHNIQUE: CT examination of the chest, abdomen and pelvis was performed  Axial, sagittal, and coronal 2D reformatted images were created from the source data and submitted for interpretation  Radiation dose length product (DLP) for this visit:  584 mGy-cm   This examination, like all CT scans performed in the [facility], was performed utilizing techniques to minimize radiation dose exposure, including the use of iterative reconstruction and automated exposure control  IV Contrast:  100 mL of iohexol (OMNIPAQUE) Enteric Contrast: Enteric contrast was administered  FINDINGS: CHEST LUNGS/PLEURA:  The central airways are patent    There is mild biapical paraseptal emphysema  No airspace consolidation, pleural effusion or pneumothorax  There are stable scattered pulmonary nodules including: -Stable 3 mm right middle lobe nodule (series 4 image 25) -Stable 4 mm right lower lobe subpleural nodule (series 4 image 48) -3 mm right lower lobe nodule (series 4 image 42) -Stable 3 mm right lower lobe nodule (series 4 image 39) -Stable 2 mm left lower lobe pleural-based nodule (series 4 image 47) -Stable 3 mm left lower lobe nodule (series 4 image 45) -Stable 3 mm left lower lobe nodule (series 4 image 40)  No new or enlarging pulmonary nodules are seen  HEART/GREAT VESSELS:  Normal cardiac size  No pericardial effusion  Coronary artery calcifications are present  Normal caliber and course of the great vessels  MEDIASTINUM AND MAHENDRA:  Unremarkable  CHEST WALL AND LOWER NECK:   Stable 3 mm right thyroid lobe nodule  Right subclavian chest port in place with its tip at the cavoatrial junction  ABDOMEN LIVER/BILIARY TREE:  Normal size and configuration  There has been interval increase in the size of the metastatic liver lesion at the dome of segment 7/8, currently measuring 3 8 x 3 1 cm (series 2 image 54, previously 1 3 x 1 0 cm  No intrahepatic or  extrahepatic bile duct dilation  The portal and hepatic veins are patent  GALLBLADDER:  No calcified gallstones  No pericholecystic inflammatory change  SPLEEN:  Unremarkable  PANCREAS:  Unremarkable  ADRENAL GLANDS:  Unremarkable  KIDNEYS/URETERS:  No hydronephrosis  No suspicious mass  Redemonstrated are left side renal sinus cysts, unchanged from the prior exam  STOMACH AND BOWEL:  Staple lines from prior rectosigmoid resection and anastomosis  No mass or enhancing soft tissue is seen to suggest locally recurrent tumor  Moderate volume retained stool in the left colon  No bowel obstruction  APPENDIX:  A normal appendix was visualized  ABDOMINOPELVIC CAVITY:  No ascites or free intraperitoneal air   No lymphadenopathy  VESSELS:  Unremarkable for patient's age  PELVIS REPRODUCTIVE ORGANS:  Unremarkable for patient's age  URINARY BLADDER:  Unremarkable  ABDOMINAL WALL/INGUINAL REGIONS:  Postsurgical scarring in the midline anterior abdominal wall  OSSEOUS STRUCTURES:  No acute fracture or destructive osseous lesion  Scattered small sclerotic foci in the bony pelvis, likely small bone islands  Impression: 1  Interval increase in the size of metastatic liver lesion currently measuring 3 8 cm, previously 1 3 cm  2   Stable pulmonary nodules as described  3   Stable postsurgical changes from prior rectosigmoid resection and anastomosis  No findings of locally recurrent tumor  The study was marked in EPIC for significant notification  Workstation performed: [id number]     I reviewed the above laboratory and imaging data  Discussion/Summary: 57-year-old male with metastatic rectosigmoid cancer   He tolerated segment 7 liver resection well  No other lesions were seen  His CEA is normal    I suspect the imaging is just postsurgical changes related to his segment 7 resection  The MRI shows the lesion is stable  I did discuss the findings with Radiology and it is likely that these are postsurgical   A repeat CT was recommended by Radiology in 3 months to make sure this is not otherwise worrisome  We will schedule this  I will see him again at that time for another clinical exam   He is agreeable to this    All his questions were answered

## 2018-11-12 ENCOUNTER — APPOINTMENT (OUTPATIENT)
Dept: LAB | Facility: CLINIC | Age: 48
End: 2018-11-12
Payer: COMMERCIAL

## 2018-11-12 DIAGNOSIS — C78.7 METASTATIC COLON CANCER TO LIVER (HCC): ICD-10-CM

## 2018-11-12 DIAGNOSIS — C18.9 MALIGNANT NEOPLASM OF COLON, UNSPECIFIED PART OF COLON (HCC): ICD-10-CM

## 2018-11-12 DIAGNOSIS — C18.9 METASTATIC COLON CANCER TO LIVER (HCC): ICD-10-CM

## 2018-11-12 DIAGNOSIS — C18.7 MALIGNANT NEOPLASM OF SIGMOID COLON (HCC): ICD-10-CM

## 2018-11-12 LAB — CEA SERPL-MCNC: 0.6 NG/ML (ref 0–3)

## 2018-11-12 PROCEDURE — 82378 CARCINOEMBRYONIC ANTIGEN: CPT | Performed by: INTERNAL MEDICINE

## 2018-11-12 PROCEDURE — 36415 COLL VENOUS BLD VENIPUNCTURE: CPT | Performed by: INTERNAL MEDICINE

## 2018-11-12 RX ORDER — SODIUM CHLORIDE 9 MG/ML
20 INJECTION, SOLUTION INTRAVENOUS CONTINUOUS
Status: DISCONTINUED | OUTPATIENT
Start: 2018-11-13 | End: 2018-11-16 | Stop reason: HOSPADM

## 2018-11-13 ENCOUNTER — HOSPITAL ENCOUNTER (OUTPATIENT)
Dept: INFUSION CENTER | Facility: CLINIC | Age: 48
Discharge: HOME | End: 2018-11-13
Payer: COMMERCIAL

## 2018-11-13 VITALS
HEART RATE: 75 BPM | SYSTOLIC BLOOD PRESSURE: 109 MMHG | HEIGHT: 66 IN | TEMPERATURE: 98.2 F | RESPIRATION RATE: 18 BRPM | DIASTOLIC BLOOD PRESSURE: 53 MMHG | OXYGEN SATURATION: 96 % | WEIGHT: 147 LBS | BODY MASS INDEX: 23.63 KG/M2

## 2018-11-13 PROCEDURE — 96413 CHEMO IV INFUSION 1 HR: CPT

## 2018-11-13 PROCEDURE — 96367 TX/PROPH/DG ADDL SEQ IV INF: CPT

## 2018-11-13 RX ADMIN — DIPHENHYDRAMINE HYDROCHLORIDE 50 MG: 50 INJECTION, SOLUTION INTRAMUSCULAR; INTRAVENOUS at 11:02

## 2018-11-13 RX ADMIN — SODIUM CHLORIDE 20 ML/HR: 0.9 INJECTION, SOLUTION INTRAVENOUS at 10:55

## 2018-11-13 RX ADMIN — Medication 900 MG: at 11:33

## 2018-11-13 RX ADMIN — Medication 300 UNITS: at 12:54

## 2018-11-26 ENCOUNTER — TRANSCRIBE ORDERS (OUTPATIENT)
Dept: LAB | Facility: CLINIC | Age: 48
End: 2018-11-26

## 2018-11-26 ENCOUNTER — APPOINTMENT (OUTPATIENT)
Dept: LAB | Facility: CLINIC | Age: 48
End: 2018-11-26
Payer: COMMERCIAL

## 2018-11-26 RX ORDER — SODIUM CHLORIDE 9 MG/ML
20 INJECTION, SOLUTION INTRAVENOUS CONTINUOUS
Status: DISCONTINUED | OUTPATIENT
Start: 2018-11-27 | End: 2018-11-30 | Stop reason: HOSPADM

## 2018-11-27 ENCOUNTER — HOSPITAL ENCOUNTER (OUTPATIENT)
Dept: INFUSION CENTER | Facility: CLINIC | Age: 48
Discharge: HOME | End: 2018-11-27
Payer: COMMERCIAL

## 2018-11-27 VITALS — WEIGHT: 148.5 LBS | HEIGHT: 66 IN | BODY MASS INDEX: 23.87 KG/M2

## 2018-11-27 PROCEDURE — 96375 TX/PRO/DX INJ NEW DRUG ADDON: CPT

## 2018-11-27 PROCEDURE — 96413 CHEMO IV INFUSION 1 HR: CPT

## 2018-11-27 RX ADMIN — HEPARIN 300 UNITS: 100 SYRINGE at 12:55

## 2018-11-27 RX ADMIN — SODIUM CHLORIDE 20 ML/HR: 0.9 INJECTION, SOLUTION INTRAVENOUS at 11:24

## 2018-11-27 RX ADMIN — Medication 900 MG: at 11:53

## 2018-11-27 RX ADMIN — ALTEPLASE 2 MG: 2.2 INJECTION, POWDER, LYOPHILIZED, FOR SOLUTION INTRAVENOUS at 10:55

## 2018-11-27 RX ADMIN — DIPHENHYDRAMINE HYDROCHLORIDE 50 MG: 50 INJECTION, SOLUTION INTRAMUSCULAR; INTRAVENOUS at 11:24

## 2018-11-27 NOTE — PROGRESS NOTES
Pt here for Erbitux for Colon CA  Vitals stable; labs within parameters for treatment  Pt has a generalized "Erbitux rash" that does not bother him  Pt does report having very dry, cracked skin on his feet  Instructed to use a thick, alcohol-free cream such as Eucerin  [place] within reach; will continue to monitor

## 2018-11-29 ENCOUNTER — OFFICE VISIT (OUTPATIENT)
Dept: HEMATOLOGY ONCOLOGY | Facility: CLINIC | Age: 48
End: 2018-11-29
Payer: COMMERCIAL

## 2018-11-29 VITALS
HEART RATE: 85 BPM | OXYGEN SATURATION: 99 % | HEIGHT: 66 IN | DIASTOLIC BLOOD PRESSURE: 88 MMHG | SYSTOLIC BLOOD PRESSURE: 124 MMHG | RESPIRATION RATE: 16 BRPM | WEIGHT: 146 LBS | BODY MASS INDEX: 23.46 KG/M2 | TEMPERATURE: 97.7 F

## 2018-11-29 DIAGNOSIS — R91.1 PULMONARY NODULE, RIGHT: ICD-10-CM

## 2018-11-29 DIAGNOSIS — C18.9 METASTATIC COLON CANCER TO LIVER (HCC): Primary | ICD-10-CM

## 2018-11-29 DIAGNOSIS — L70.8 ACNEIFORM RASH: ICD-10-CM

## 2018-11-29 DIAGNOSIS — C78.7 METASTATIC COLON CANCER TO LIVER (HCC): Primary | ICD-10-CM

## 2018-11-29 PROCEDURE — 99214 OFFICE O/P EST MOD 30 MIN: CPT | Performed by: PHYSICIAN ASSISTANT
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
## 2018-11-29 NOTE — PATIENT INSTRUCTIONS
Please follow with Blood work every 2 weeks prior to treatment  Will check tumor marker every month  Dr Julissa Goldsmith in 1 month    Chemo is scheduled through January 2019
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
## 2018-11-29 NOTE — PROGRESS NOTES
Patient did not start this med as diagnosed with cancer in interim  He will consider starting it when chemo complete  He will further discuss with Urology

## 2018-12-10 ENCOUNTER — APPOINTMENT (OUTPATIENT)
Dept: LAB | Facility: CLINIC | Age: 48
End: 2018-12-10
Payer: COMMERCIAL

## 2018-12-10 RX ORDER — SODIUM CHLORIDE 9 MG/ML
20 INJECTION, SOLUTION INTRAVENOUS CONTINUOUS
Status: DISCONTINUED | OUTPATIENT
Start: 2018-12-11 | End: 2018-12-14 | Stop reason: HOSPADM

## 2018-12-11 ENCOUNTER — HOSPITAL ENCOUNTER (OUTPATIENT)
Dept: INFUSION CENTER | Facility: CLINIC | Age: 48
Discharge: HOME | End: 2018-12-11
Payer: COMMERCIAL

## 2018-12-11 VITALS
HEART RATE: 80 BPM | SYSTOLIC BLOOD PRESSURE: 98 MMHG | TEMPERATURE: 98 F | RESPIRATION RATE: 20 BRPM | DIASTOLIC BLOOD PRESSURE: 62 MMHG | BODY MASS INDEX: 23.7 KG/M2 | HEIGHT: 66 IN | WEIGHT: 147.5 LBS

## 2018-12-11 PROCEDURE — 96375 TX/PRO/DX INJ NEW DRUG ADDON: CPT

## 2018-12-11 PROCEDURE — 96413 CHEMO IV INFUSION 1 HR: CPT

## 2018-12-11 RX ADMIN — SODIUM CHLORIDE 20 ML/HR: 0.9 INJECTION, SOLUTION INTRAVENOUS at 10:45

## 2018-12-11 RX ADMIN — Medication 900 MG: at 11:21

## 2018-12-11 RX ADMIN — HEPARIN 300 UNITS: 100 SYRINGE at 12:25

## 2018-12-11 RX ADMIN — DIPHENHYDRAMINE HYDROCHLORIDE 50 MG: 50 INJECTION, SOLUTION INTRAMUSCULAR; INTRAVENOUS at 11:01

## 2018-12-11 NOTE — PROGRESS NOTES
Pt here for Erbitux for treatment of her colon ca  Labs drawn prior to treatment  Vitals stable  [place] within reach; will continue to monitor

## 2018-12-21 ENCOUNTER — OFFICE VISIT (OUTPATIENT)
Dept: HEMATOLOGY ONCOLOGY | Facility: CLINIC | Age: 48
End: 2018-12-21
Payer: COMMERCIAL

## 2018-12-21 ENCOUNTER — APPOINTMENT (OUTPATIENT)
Dept: LAB | Facility: CLINIC | Age: 48
End: 2018-12-21
Payer: COMMERCIAL

## 2018-12-21 VITALS
BODY MASS INDEX: 23.95 KG/M2 | SYSTOLIC BLOOD PRESSURE: 124 MMHG | WEIGHT: 149 LBS | OXYGEN SATURATION: 98 % | RESPIRATION RATE: 19 BRPM | HEIGHT: 66 IN | HEART RATE: 97 BPM | DIASTOLIC BLOOD PRESSURE: 78 MMHG | TEMPERATURE: 97 F

## 2018-12-21 DIAGNOSIS — C78.7 METASTATIC COLON CANCER TO LIVER (HCC): Primary | ICD-10-CM

## 2018-12-21 DIAGNOSIS — C18.7 MALIGNANT NEOPLASM OF SIGMOID COLON (HCC): ICD-10-CM

## 2018-12-21 DIAGNOSIS — C18.9 METASTATIC COLON CANCER TO LIVER (HCC): Primary | ICD-10-CM

## 2018-12-21 DIAGNOSIS — C18.9 METASTATIC COLON CANCER TO LIVER (HCC): ICD-10-CM

## 2018-12-21 DIAGNOSIS — C78.7 METASTATIC COLON CANCER TO LIVER (HCC): ICD-10-CM

## 2018-12-21 DIAGNOSIS — C18.9 MALIGNANT NEOPLASM OF COLON, UNSPECIFIED PART OF COLON (HCC): ICD-10-CM

## 2018-12-21 PROCEDURE — 99214 OFFICE O/P EST MOD 30 MIN: CPT | Performed by: INTERNAL MEDICINE

## 2018-12-21 RX ORDER — SODIUM CHLORIDE 9 MG/ML
20 INJECTION, SOLUTION INTRAVENOUS CONTINUOUS
Status: DISCONTINUED | OUTPATIENT
Start: 2018-12-24 | End: 2018-12-27 | Stop reason: HOSPADM
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
## 2018-12-24 ENCOUNTER — HOSPITAL ENCOUNTER (OUTPATIENT)
Dept: INFUSION CENTER | Facility: CLINIC | Age: 48
Discharge: HOME | End: 2018-12-24
Payer: COMMERCIAL

## 2018-12-24 VITALS
HEART RATE: 81 BPM | DIASTOLIC BLOOD PRESSURE: 67 MMHG | BODY MASS INDEX: 22.84 KG/M2 | OXYGEN SATURATION: 96 % | SYSTOLIC BLOOD PRESSURE: 130 MMHG | TEMPERATURE: 97.6 F | RESPIRATION RATE: 19 BRPM | WEIGHT: 145.5 LBS | HEIGHT: 67 IN

## 2018-12-24 PROCEDURE — 96367 TX/PROPH/DG ADDL SEQ IV INF: CPT

## 2018-12-24 PROCEDURE — 96413 CHEMO IV INFUSION 1 HR: CPT

## 2018-12-24 RX ADMIN — DIPHENHYDRAMINE HYDROCHLORIDE 50 MG: 50 INJECTION, SOLUTION INTRAMUSCULAR; INTRAVENOUS at 11:02

## 2018-12-24 RX ADMIN — HEPARIN 300 UNITS: 100 SYRINGE at 12:42

## 2018-12-24 RX ADMIN — Medication 900 MG: at 11:35

## 2018-12-24 RX ADMIN — SODIUM CHLORIDE 20 ML/HR: 0.9 INJECTION, SOLUTION INTRAVENOUS at 10:50

## 2019-01-07 ENCOUNTER — APPOINTMENT (OUTPATIENT)
Dept: LAB | Facility: CLINIC | Age: 49
End: 2019-01-07
Payer: COMMERCIAL

## 2019-01-07 DIAGNOSIS — E83.42 HYPOMAGNESEMIA: Primary | ICD-10-CM

## 2019-01-07 RX ORDER — SODIUM CHLORIDE 9 MG/ML
20 INJECTION, SOLUTION INTRAVENOUS CONTINUOUS
Status: DISCONTINUED | OUTPATIENT
Start: 2019-01-08 | End: 2019-01-11 | Stop reason: HOSPADM

## 2019-01-08 ENCOUNTER — HOSPITAL ENCOUNTER (OUTPATIENT)
Dept: INFUSION CENTER | Facility: CLINIC | Age: 49
Discharge: HOME | End: 2019-01-08
Payer: COMMERCIAL

## 2019-01-08 VITALS
OXYGEN SATURATION: 94 % | RESPIRATION RATE: 18 BRPM | DIASTOLIC BLOOD PRESSURE: 69 MMHG | HEART RATE: 76 BPM | TEMPERATURE: 98.2 F | HEIGHT: 67 IN | SYSTOLIC BLOOD PRESSURE: 116 MMHG | WEIGHT: 149 LBS | BODY MASS INDEX: 23.39 KG/M2

## 2019-01-08 PROCEDURE — 96367 TX/PROPH/DG ADDL SEQ IV INF: CPT

## 2019-01-08 PROCEDURE — 96413 CHEMO IV INFUSION 1 HR: CPT

## 2019-01-08 PROCEDURE — 96375 TX/PRO/DX INJ NEW DRUG ADDON: CPT

## 2019-01-08 RX ORDER — MAGNESIUM SULFATE HEPTAHYDRATE 40 MG/ML
2 INJECTION, SOLUTION INTRAVENOUS ONCE
Status: COMPLETED | OUTPATIENT
Start: 2019-01-08 | End: 2019-01-08

## 2019-01-08 RX ADMIN — SODIUM CHLORIDE 20 ML/HR: 0.9 INJECTION, SOLUTION INTRAVENOUS at 10:53

## 2019-01-08 RX ADMIN — Medication 900 MG: at 12:29

## 2019-01-08 RX ADMIN — DIPHENHYDRAMINE HYDROCHLORIDE 50 MG: 50 INJECTION, SOLUTION INTRAMUSCULAR; INTRAVENOUS at 12:04

## 2019-01-08 RX ADMIN — MAGNESIUM SULFATE HEPTAHYDRATE 2 G: 40 INJECTION, SOLUTION INTRAVENOUS at 10:53

## 2019-01-08 NOTE — PROGRESS NOTES
Pt to clinic for last dose of erbitux, magnesium added on for today, pt offers no complaints at this time, will continue to monitor, pt aware of next appointment, declines avs

## 2019-01-10 ENCOUNTER — HOSPITAL ENCOUNTER (OUTPATIENT)
Dept: CT IMAGING | Facility: HOSPITAL | Age: 49
Discharge: HOME | End: 2019-01-10
Attending: SURGERY
Payer: COMMERCIAL

## 2019-01-10 DIAGNOSIS — C78.7 METASTATIC COLON CANCER TO LIVER (HCC): ICD-10-CM

## 2019-01-10 DIAGNOSIS — C18.9 METASTATIC COLON CANCER TO LIVER (HCC): ICD-10-CM

## 2019-01-10 PROCEDURE — 74177 CT ABD & PELVIS W/CONTRAST: CPT

## 2019-01-10 RX ADMIN — IOHEXOL 100 ML: 350 INJECTION, SOLUTION INTRAVENOUS at 19:59

## 2019-01-10 RX ADMIN — IOHEXOL 50 ML: 240 INJECTION, SOLUTION INTRATHECAL; INTRAVASCULAR; INTRAVENOUS; ORAL at 19:59

## 2019-01-15 ENCOUNTER — OFFICE VISIT (OUTPATIENT)
Dept: SURGICAL ONCOLOGY | Facility: CLINIC | Age: 49
End: 2019-01-15
Payer: COMMERCIAL

## 2019-01-15 ENCOUNTER — APPOINTMENT (OUTPATIENT)
Dept: LAB | Facility: CLINIC | Age: 49
End: 2019-01-15
Payer: COMMERCIAL

## 2019-01-15 ENCOUNTER — TRANSCRIBE ORDERS (OUTPATIENT)
Dept: LAB | Facility: CLINIC | Age: 49
End: 2019-01-15

## 2019-01-15 VITALS
HEART RATE: 72 BPM | SYSTOLIC BLOOD PRESSURE: 112 MMHG | TEMPERATURE: 98 F | HEIGHT: 67 IN | BODY MASS INDEX: 23.54 KG/M2 | RESPIRATION RATE: 16 BRPM | DIASTOLIC BLOOD PRESSURE: 78 MMHG | WEIGHT: 150 LBS

## 2019-01-15 DIAGNOSIS — C78.7 METASTATIC COLON CANCER TO LIVER (HCC): Primary | ICD-10-CM

## 2019-01-15 DIAGNOSIS — E83.42 HYPOMAGNESEMIA: ICD-10-CM

## 2019-01-15 DIAGNOSIS — C18.9 METASTATIC COLON CANCER TO LIVER (HCC): Primary | ICD-10-CM

## 2019-01-15 LAB — MAGNESIUM SERPL-MCNC: 1.6 MG/DL (ref 1.6–2.6)

## 2019-01-15 PROCEDURE — 99213 OFFICE O/P EST LOW 20 MIN: CPT | Performed by: SURGERY

## 2019-01-15 PROCEDURE — 36415 COLL VENOUS BLD VENIPUNCTURE: CPT

## 2019-01-15 PROCEDURE — 83735 ASSAY OF MAGNESIUM: CPT

## 2019-01-15 NOTE — LETTER
January 15, 2019     Su Phillips  [address]    Patient: Laurence Easton   YOB: 1970   Date of Visit: 1/15/2019       Dear Dr Bill Munoz:    Thank you for referring Abigail Caceres to me for evaluation  Below are my notes for this consultation  If you have questions, please do not hesitate to call me  I look forward to following your patient along with you  Sincerely,        Nick Kevin MD        CC: MD Marleny Ambrocio MD Elnor Bandy, MD Grey Cumber, MD  1/15/2019  4:21 PM  Sign at close encounter               Surgical Oncology Follow Up       [address] [place] [address] 98  Laura Stephens  1970  [id number]  [address] [facility],[address]  [facility] [address] [address]    Diagnoses and all orders for this visit:    Metastatic colon cancer to liver [facility])  -     CT abdomen pelvis w contrast; Future  -     BUN; Future  -     Creatinine, serum; Future        Chief Complaint   Patient presents with    Follow-up     Pt here for a 3 MO metastatic Colon Cancer to liver F/U  Return in about 6 months (around 7/15/2019) for Office Visit, Imaging - See orders  Metastatic colon cancer to liver ([facility] [address] )    1/2018 Initial Diagnosis     Malignant neoplasm of sigmoid colon ([facility] [address] )         1/22/2018 Biopsy     Large Intestine, Sigmoid Colon, Recto-sigmoid tumor bx:    - Invasive colonic adenocarcinoma, moderately differentiated         2/6/2018 - 10/16/2018 Chemotherapy     Modified FOLFOX6 x 12 cycles  Added Erbitux on 3/20/18          6/21/2018 Surgery     Segment 7 liver resection/ablation, hemicolectomy         10/30/2018 -  Chemotherapy     Continuation of Single agent Erbitux  With 10/30/18 chemo, it was Cycle #14  Plan was for 6 additional cycles of Erbitux alone              Staging:  Metastatic rectosigmoid cancer  [id number]  Treatment history:   Modified FOLFOX 6   Erbitux added 03/20/2018   Segment 7 liver resection, June 2018  Current treatment:  Observation  Disease status:   Stable    History of Present Illness:  Patient returns in follow-up of his CT  He is doing well at this time with no complaints  He has completed chemotherapy  The the lesion in segment 7 of his liver is stable measuring 3 7 cm  Previously was 3 8 cm  I personally reviewed the films  He is feeling well  No abdominal pain, nausea or vomiting  Review of Systems  Complete ROS Surg Onc:   Complete ROS Surg Onc:   Constitutional: The patient denies new or recent history of general fatigue, no recent weight loss, no change in appetite  Eyes: No complaints of visual problems, no scleral icterus  ENT: no complaints of ear pain, no hoarseness, no difficulty swallowing,  no tinnitus and no new masses in head, oral cavity, or neck  Cardiovascular: No complaints of chest pain, no palpitations, no ankle edema  Respiratory: No complaints of shortness of breath, no cough  Gastrointestinal: No complaints of jaundice, no bloody stools, no pale stools  Genitourinary: No complaints of dysuria, no hematuria, no nocturia, no frequent urination, no urethral discharge  Musculoskeletal: No complaints of weakness, paralysis, joint stiffness or arthralgias  Integumentary: No complaints of rash, no new lesions  Neurological: No complaints of convulsions, no seizures, no dizziness  Hematologic/Lymphatic: No complaints of easy bruising  Endocrine:  No hot or cold intolerance  No polydipsia, polyphagia, or polyuria  Allergy/immunology:  No environmental allergies  No food allergies  Not immunocompromised  Skin:  No pallor or rash  No wound          Patient Active Problem List   Diagnosis    ED (erectile dysfunction)    Metastatic colon cancer to liver (HCC)    GERD (gastroesophageal reflux disease)    Pulmonary nodule, right    Acneiform rash     Past Medical History:   Diagnosis Date    Dysuria     Last Assessed:8/24/17    GERD (gastroesophageal reflux disease)     Liver nodule     Pulmonary nodule, right      Past Surgical History:   Procedure Laterality Date    COLONOSCOPY N/A 1/22/2018    Procedure: COLONOSCOPY;  Surgeon: Artur Banks MD;  [facility]; Service: Colorectal   [facility] DENTAL SURGERY  2016    Crown with bridge work    FLEXIBLE SIGMOIDOSCOPY N/A 6/15/2018    Procedure: SIGMOIDOSCOPY FLEXIBLE w/o sedation w/ tattoo;  Surgeon: Artur Banks MD;  [facility]; Service: Colorectal    LIVER LOBECTOMY N/A 6/21/2018    Procedure: liver resection/ablation, intraoperative ultrasound of liver;  Surgeon: Dempsey Fabry, MD;  [facility];  Service: Surgical Oncology    ID EXPLORATORY OF ABDOMEN N/A 6/21/2018    Procedure: LAPAROTOMY EXPLORATORY;  Surgeon: Artur Banks MD;  [facility];  Service: Colorectal    ID INSERT PICC W/ SUB-Q PORT N/A 1/25/2018    Procedure: PORT-A-CATHETER PLACEMENT  Fluoroscopy for performance/interpretation;  Surgeon: Artur Banks MD;  [facility];  Service: Colorectal    ID PART REMOVAL COLON W ANASTOMOSIS Left 6/21/2018    Procedure: hemicolectomy, low anterior resection (open) SPY fluorescence angiography;  Surgeon: Artur Banks MD;  [facility];  Service: Colorectal    ID PROCTECTOMY,PARTIAL N/A 6/21/2018    Procedure: Partial proctectomy ;  Surgeon: Artur Banks MD;  [facility];  Service: Colorectal    ID SIGMOIDOSCOPY FLX DX W/COLLJ SPEC BR/WA IF PFRMD N/A 6/21/2018    Procedure: Manfred Junior;  Surgeon: Artur Banks MD;  [facility];  Service: Colorectal    ROOT CANAL  2015    TESTICLE SURGERY      Exploration of Undescended Testis rIght, age 8 had surgery for undescended testicle;  Last Assessed:8/24/17    TOOTH EXTRACTION  2015     Family History   Problem Relation Age of Onset    No Known Problems Father     Cancer Mother     Cancer Brother         pt  reports brother was diagnosed with stage 4 throat cancer     Social History     Social History    Marital status: Single     Spouse name: N/A    Number of children: N/A    Years of education: N/A     Occupational History    Not on file  Social History Main Topics    Smoking status: Former Smoker     Types: E-Cigarettes    Smokeless tobacco: Current User      Comment: quit 4 years ago / smoked for 20 years     Alcohol use Yes      Comment: socially - 2 month    Drug use: No      Comment: per Allscripts: occasional recreation drug use    Sexual activity: Not on file      Comment: Resides alone     Other Topics Concern    Not on file     Social History Narrative    No narrative on file       Current Outpatient Prescriptions:     clindamycin (CLINDAGEL) 1 % gel, Apply topically 2 (two) times a day, Disp: 30 g, Rfl: 0    minocycline (MINOCIN) 100 mg capsule, , Disp: , Rfl:     omeprazole (PriLOSEC) 40 MG capsule, Take 40 mg by mouth daily, Disp: , Rfl:     prochlorperazine (COMPAZINE) 10 mg tablet, Take 1 tablet (10 mg total) by mouth every 6 (six) hours as needed for nausea or vomiting, Disp: 30 tablet, Rfl: 6  No Known Allergies  Vitals:    01/15/19 1557   BP: 112/78   Pulse: 72   Resp: 16   Temp: 98 °F (36 7 °C)       Physical Exam  Constitutional: General appearance: The Patient is well-developed and well-nourished who appears the stated age in no acute distress  Patient is pleasant and talkative  HEENT:  Normocephalic  Sclerae are anicteric  Mucous membranes are moist  Neck is supple without adenopathy  No JVD  Chest: The lungs are clear to auscultation  Cardiac: Heart is regular rate  Abdomen: Abdomen is soft, non-tender, non-distended and without masses  Extremities: There is no clubbing or cyanosis  There is no edema  Symmetric    Neuro: Grossly nonfocal  Gait is normal      Lymphatic: No evidence of cervical adenopathy bilaterally  No evidence of axillary adenopathy bilaterally  No evidence of inguinal adenopathy bilaterally  Skin: Warm, anicteric  Psych:  Patient is pleasant and talkative  Breasts:        Pathology:  [unfilled]    Labs:  Lab Results   Component Value Date    CEA <0 5 12/21/2018         Imaging  Ct Abdomen Pelvis W Contrast    Result Date: 1/15/2019  Narrative: CT ABDOMEN AND PELVIS WITH IV CONTRAST INDICATION:   C18 9: Malignant neoplasm of colon, unspecified C78 7: Secondary malignant neoplasm of liver and intrahepatic bile duct  COMPARISON:  October 8, 2018 December 1, 2017 January 11, 2018  TECHNIQUE:  CT examination of the abdomen and pelvis was performed  Axial, sagittal, and coronal 2D reformatted images were created from the source data and submitted for interpretation  Radiation dose length product (DLP) for this visit:  547 mGy-cm   This examination, like all CT scans performed in the [facility], was performed utilizing techniques to minimize radiation dose exposure, including the use of iterative reconstruction and automated exposure control  IV Contrast:  100 mL of iohexol (OMNIPAQUE)  Enteric Contrast:  Enteric contrast was not administered  FINDINGS: ABDOMEN LOWER CHEST:  Stable 3 mm nodule in the right middle lobe image 2/1  Stable 3 mm nodule in the right lower lobe laterally image 2/5  Stable 3 mm nodule lateral left lower lobe image 2/1  No lobar consolidation or significant effusion  LIVER/BILIARY TREE:  Previous metastatic liver lesion in the dome of the right hepatic lobe posteriorly currently measures 3 7 x 3 0 cm, previously 3 8 x 3 1 cm  Additional scattered subcentimeter low-attenuation lesions in the liver are much too small to characterize but are stable   )  GALLBLADDER:  No calcified gallstones  No pericholecystic inflammatory change  SPLEEN:  Unremarkable  PANCREAS:  Unremarkable  ADRENAL GLANDS:  Unremarkable  KIDNEYS/URETERS:  Stable parapelvic cyst left kidney with dilatation of upper and lower pole calyces on delayed imaging likely due to mass effect from the hilum  No delay in the left-sided nephrogram STOMACH AND BOWEL:  Limited evaluation with segmental opacification  Postop changes sigmoid colon without obstruction  Moderate amounts of retained stool in the right colon  APPENDIX:  No findings to suggest appendicitis  ABDOMINOPELVIC CAVITY:  No ascites or free intraperitoneal air  No lymphadenopathy  VESSELS:  Unremarkable for patient's age  PELVIS REPRODUCTIVE ORGANS:  Unremarkable for patient's age  URINARY BLADDER:  Unremarkable  ABDOMINAL WALL/INGUINAL REGIONS:  Postop changes  Ventral hernia containing abdominal fat above the umbilicus  Umbilical hernia containing intra-abdominal fat measuring 2 4 cm  OSSEOUS STRUCTURES:  No acute fracture or destructive osseous lesion  Impression: 1  Diminishing dominant metastatic lesion in liver: currently 3 7 cm, previously 3 8 cm  Other subcentimeter hepatic lesions are stable compared to October (treated metastases based on prior CT, PET, and MRI examinations)  Continued Interval surveillance as clinically warranted  2   Stable pulmonary nodules compared to December 2017 -follow-up recommended in December 2019  3   Stable postop changes  No new metastatic lesions are seen  Workstation performed: [id number]     I reviewed the above laboratory and imaging data  Discussion/Summary: 57-year-old male with metastatic rectosigmoid cancer   He tolerated segment 7 liver resection well  No other lesions were seen    His CEA is normal    I suspect the imaging is just postsurgical changes related to his segment 7 resection  With the CT reveals this is stable  I have recommended observation  I will repeat his CT in 6 months  He is seeing Medical Oncology next week  If they wish to move the scans up sooner this can always be canceled if needed    I will see him again in 6 months     I will see him again at that time for another clinical exam  Kelly Paulson is agreeable to this   All his questions were answered

## 2019-01-15 NOTE — PROGRESS NOTES
Surgical Oncology Follow Up       [address]  [facility] [address]  98  Darryl Guillory  1970  [id number]  [address] [facility]  [facility] [address] [address]    Diagnoses and all orders for this visit:    Metastatic colon cancer to liver [facility])  -     CT abdomen pelvis w contrast; Future  -     BUN; Future  -     Creatinine, serum; Future        Chief Complaint   Patient presents with    Follow-up     Pt here for a 3 MO metastatic Colon Cancer to liver F/U  Return in about 6 months (around 7/15/2019) for Office Visit, Imaging - See orders  Metastatic colon cancer to liver ([facility] [address] )    1/2018 Initial Diagnosis     Malignant neoplasm of sigmoid colon ([facility] [address] )         1/22/2018 Biopsy     Large Intestine, Sigmoid Colon, Recto-sigmoid tumor bx:    - Invasive colonic adenocarcinoma, moderately differentiated         2/6/2018 - 10/16/2018 Chemotherapy     Modified FOLFOX6 x 12 cycles  Added Erbitux on 3/20/18          6/21/2018 Surgery     Segment 7 liver resection/ablation, hemicolectomy         10/30/2018 -  Chemotherapy     Continuation of Single agent Erbitux  With 10/30/18 chemo, it was Cycle #14  Plan was for 6 additional cycles of Erbitux alone  Staging:  Metastatic rectosigmoid cancer  [id number]  Treatment history:   Modified FOLFOX 6   Erbitux added 03/20/2018   Segment 7 liver resection, June 2018  Current treatment:  Observation  Disease status:   Stable    History of Present Illness:  Patient returns in follow-up of his CT  He is doing well at this time with no complaints  He has completed chemotherapy  The the lesion in segment 7 of his liver is stable measuring 3 7 cm  Previously was 3 8 cm  I personally reviewed the films  He is feeling well  No abdominal pain, nausea or vomiting      Review of Systems  Complete ROS Surg Onc: Complete ROS Surg Onc:   Constitutional: The patient denies new or recent history of general fatigue, no recent weight loss, no change in appetite  Eyes: No complaints of visual problems, no scleral icterus  ENT: no complaints of ear pain, no hoarseness, no difficulty swallowing,  no tinnitus and no new masses in head, oral cavity, or neck  Cardiovascular: No complaints of chest pain, no palpitations, no ankle edema  Respiratory: No complaints of shortness of breath, no cough  Gastrointestinal: No complaints of jaundice, no bloody stools, no pale stools  Genitourinary: No complaints of dysuria, no hematuria, no nocturia, no frequent urination, no urethral discharge  Musculoskeletal: No complaints of weakness, paralysis, joint stiffness or arthralgias  Integumentary: No complaints of rash, no new lesions  Neurological: No complaints of convulsions, no seizures, no dizziness  Hematologic/Lymphatic: No complaints of easy bruising  Endocrine:  No hot or cold intolerance  No polydipsia, polyphagia, or polyuria  Allergy/immunology:  No environmental allergies  No food allergies  Not immunocompromised  Skin:  No pallor or rash  No wound  Patient Active Problem List   Diagnosis    ED (erectile dysfunction)    Metastatic colon cancer to liver (HCC)    GERD (gastroesophageal reflux disease)    Pulmonary nodule, right    Acneiform rash     Past Medical History:   Diagnosis Date    Dysuria     Last Assessed:8/24/17    GERD (gastroesophageal reflux disease)     Liver nodule     Pulmonary nodule, right      Past Surgical History:   Procedure Laterality Date    COLONOSCOPY N/A 1/22/2018    Procedure: COLONOSCOPY;  Surgeon: Mj Foreman MD;  [facility];   Service: Colorectal   [facility] DENTAL SURGERY  2016    Crown with bridge work    FLEXIBLE SIGMOIDOSCOPY N/A 6/15/2018    Procedure: SIGMOIDOSCOPY FLEXIBLE w/o sedation w/ tattoo;  Surgeon: Mj Foreman MD;  [facility]; Service: Colorectal    LIVER LOBECTOMY N/A 6/21/2018    Procedure: liver resection/ablation, intraoperative ultrasound of liver;  Surgeon: Haydee Nunn MD;  [facility];  Service: Surgical Oncology    MS EXPLORATORY OF ABDOMEN N/A 6/21/2018    Procedure: LAPAROTOMY EXPLORATORY;  Surgeon: Hoa Lorenzo MD;  [facility];  Service: Colorectal    MS INSERT PICC W/ SUB-Q PORT N/A 1/25/2018    Procedure: PORT-A-CATHETER PLACEMENT  Fluoroscopy for performance/interpretation;  Surgeon: Hoa Lorenzo MD;  [facility];  Service: Colorectal    MS PART REMOVAL COLON W ANASTOMOSIS Left 6/21/2018    Procedure: hemicolectomy, low anterior resection (open) SPY fluorescence angiography;  Surgeon: Hoa Lorenzo MD;  [facility];  Service: Colorectal    MS PROCTECTOMY,PARTIAL N/A 6/21/2018    Procedure: Partial proctectomy ;  Surgeon: Hoa Lorenzo MD;  [facility];  Service: Colorectal    MS SIGMOIDOSCOPY FLX DX W/COLLJ SPEC BR/WA IF PFRMD N/A 6/21/2018    Procedure: Ml Marr;  Surgeon: Hoa Lorenzo MD;  [facility];  Service: Colorectal    ROOT CANAL  2015    TESTICLE SURGERY      Exploration of Undescended Testis rIght, age 8 had surgery for undescended testicle; Last Assessed:8/24/17    TOOTH EXTRACTION  2015     Family History   Problem Relation Age of Onset    No Known Problems Father     Cancer Mother     Cancer Brother         pt  reports brother was diagnosed with stage 4 throat cancer     Social History     Social History    Marital status: Single     Spouse name: N/A    Number of children: N/A    Years of education: N/A     Occupational History    Not on file  Social History Main Topics    Smoking status: Former Smoker     Types: E-Cigarettes    Smokeless tobacco: Current User      Comment: quit 4 years ago / smoked for 20 years     Alcohol use Yes      Comment: socially - 2 month    Drug use:  No Comment: per Allscripts: occasional recreation drug use    Sexual activity: Not on file      Comment: Resides alone     Other Topics Concern    Not on file     Social History Narrative    No narrative on file       Current Outpatient Prescriptions:     clindamycin (CLINDAGEL) 1 % gel, Apply topically 2 (two) times a day, Disp: 30 g, Rfl: 0    minocycline (MINOCIN) 100 mg capsule, , Disp: , Rfl:     omeprazole (PriLOSEC) 40 MG capsule, Take 40 mg by mouth daily, Disp: , Rfl:     prochlorperazine (COMPAZINE) 10 mg tablet, Take 1 tablet (10 mg total) by mouth every 6 (six) hours as needed for nausea or vomiting, Disp: 30 tablet, Rfl: 6  No Known Allergies  Vitals:    01/15/19 1557   BP: 112/78   Pulse: 72   Resp: 16   Temp: 98 °F (36 7 °C)       Physical Exam  Constitutional: General appearance: The Patient is well-developed and well-nourished who appears the stated age in no acute distress  Patient is pleasant and talkative  HEENT:  Normocephalic  Sclerae are anicteric  Mucous membranes are moist  Neck is supple without adenopathy  No JVD  Chest: The lungs are clear to auscultation  Cardiac: Heart is regular rate  Abdomen: Abdomen is soft, non-tender, non-distended and without masses  Extremities: There is no clubbing or cyanosis  There is no edema  Symmetric  Neuro: Grossly nonfocal  Gait is normal      Lymphatic: No evidence of cervical adenopathy bilaterally  No evidence of axillary adenopathy bilaterally  No evidence of inguinal adenopathy bilaterally  Skin: Warm, anicteric  Psych:  Patient is pleasant and talkative    Breasts:        Pathology:  [unfilled]    Labs:  Lab Results   Component Value Date    CEA <0 5 12/21/2018         Imaging  Ct Abdomen Pelvis W Contrast    Result Date: 1/15/2019  Narrative: CT ABDOMEN AND PELVIS WITH IV CONTRAST INDICATION:   C18 9: Malignant neoplasm of colon, unspecified C78 7: Secondary malignant neoplasm of liver and intrahepatic bile duct  COMPARISON:  October 8, 2018 December 1, 2017 January 11, 2018  TECHNIQUE:  CT examination of the abdomen and pelvis was performed  Axial, sagittal, and coronal 2D reformatted images were created from the source data and submitted for interpretation  Radiation dose length product (DLP) for this visit:  547 mGy-cm   This examination, like all CT scans performed in the [facility], was performed utilizing techniques to minimize radiation dose exposure, including the use of iterative reconstruction and automated exposure control  IV Contrast:  100 mL of iohexol (OMNIPAQUE)  Enteric Contrast:  Enteric contrast was not administered  FINDINGS: ABDOMEN LOWER CHEST:  Stable 3 mm nodule in the right middle lobe image 2/1  Stable 3 mm nodule in the right lower lobe laterally image 2/5  Stable 3 mm nodule lateral left lower lobe image 2/1  No lobar consolidation or significant effusion  LIVER/BILIARY TREE:  Previous metastatic liver lesion in the dome of the right hepatic lobe posteriorly currently measures 3 7 x 3 0 cm, previously 3 8 x 3 1 cm  Additional scattered subcentimeter low-attenuation lesions in the liver are much too small to characterize but are stable   )  GALLBLADDER:  No calcified gallstones  No pericholecystic inflammatory change  SPLEEN:  Unremarkable  PANCREAS:  Unremarkable  ADRENAL GLANDS:  Unremarkable  KIDNEYS/URETERS:  Stable parapelvic cyst left kidney with dilatation of upper and lower pole calyces on delayed imaging likely due to mass effect from the hilum  No delay in the left-sided nephrogram STOMACH AND BOWEL:  Limited evaluation with segmental opacification  Postop changes sigmoid colon without obstruction  Moderate amounts of retained stool in the right colon  APPENDIX:  No findings to suggest appendicitis  ABDOMINOPELVIC CAVITY:  No ascites or free intraperitoneal air  No lymphadenopathy  VESSELS:  Unremarkable for patient's age   PELVIS REPRODUCTIVE ORGANS: Unremarkable for patient's age  URINARY BLADDER:  Unremarkable  ABDOMINAL WALL/INGUINAL REGIONS:  Postop changes  Ventral hernia containing abdominal fat above the umbilicus  Umbilical hernia containing intra-abdominal fat measuring 2 4 cm  OSSEOUS STRUCTURES:  No acute fracture or destructive osseous lesion  Impression: 1  Diminishing dominant metastatic lesion in liver: currently 3 7 cm, previously 3 8 cm  Other subcentimeter hepatic lesions are stable compared to October (treated metastases based on prior CT, PET, and MRI examinations)  Continued Interval surveillance as clinically warranted  2   Stable pulmonary nodules compared to December 2017 -follow-up recommended in December 2019  3   Stable postop changes  No new metastatic lesions are seen  Workstation performed: [id number]     I reviewed the above laboratory and imaging data  Discussion/Summary: 53-year-old male with metastatic rectosigmoid cancer   He tolerated segment 7 liver resection well  No other lesions were seen    His CEA is normal    I suspect the imaging is just postsurgical changes related to his segment 7 resection  With the CT reveals this is stable  I have recommended observation  I will repeat his CT in 6 months  He is seeing Medical Oncology next week  If they wish to move the scans up sooner this can always be canceled if needed  I will see him again in 6 months     I will see him again at that time for another clinical exam  Saima Alvarez is agreeable to this   All his questions were answered

## 2019-01-17 ENCOUNTER — OFFICE VISIT (OUTPATIENT)
Dept: HEMATOLOGY ONCOLOGY | Facility: CLINIC | Age: 49
End: 2019-01-17
Payer: COMMERCIAL

## 2019-01-17 VITALS
WEIGHT: 150 LBS | BODY MASS INDEX: 23.54 KG/M2 | DIASTOLIC BLOOD PRESSURE: 78 MMHG | RESPIRATION RATE: 16 BRPM | TEMPERATURE: 97.7 F | SYSTOLIC BLOOD PRESSURE: 118 MMHG | HEIGHT: 67 IN | OXYGEN SATURATION: 98 % | HEART RATE: 73 BPM

## 2019-01-17 DIAGNOSIS — C78.7 METASTATIC COLON CANCER TO LIVER (HCC): Primary | ICD-10-CM

## 2019-01-17 DIAGNOSIS — C20 MALIGNANT NEOPLASM OF RECTUM (HCC): Primary | ICD-10-CM

## 2019-01-17 DIAGNOSIS — C18.9 METASTATIC COLON CANCER TO LIVER (HCC): Primary | ICD-10-CM

## 2019-01-17 PROCEDURE — 99214 OFFICE O/P EST MOD 30 MIN: CPT | Performed by: INTERNAL MEDICINE
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
## 2019-01-24 ENCOUNTER — HOSPITAL ENCOUNTER (EMERGENCY)
Facility: HOSPITAL | Age: 49
Discharge: HOME | End: 2019-01-24
Attending: EMERGENCY MEDICINE | Admitting: EMERGENCY MEDICINE
Payer: COMMERCIAL

## 2019-01-24 ENCOUNTER — TELEPHONE (OUTPATIENT)
Dept: HEMATOLOGY ONCOLOGY | Facility: CLINIC | Age: 49
End: 2019-01-24

## 2019-01-24 VITALS
OXYGEN SATURATION: 99 % | HEART RATE: 78 BPM | RESPIRATION RATE: 16 BRPM | BODY MASS INDEX: 23.89 KG/M2 | WEIGHT: 150.4 LBS | DIASTOLIC BLOOD PRESSURE: 75 MMHG | SYSTOLIC BLOOD PRESSURE: 116 MMHG | TEMPERATURE: 98.8 F

## 2019-01-24 DIAGNOSIS — N39.0 URINARY TRACT INFECTION: Primary | ICD-10-CM

## 2019-01-24 LAB
ALBUMIN SERPL BCP-MCNC: 3.5 G/DL (ref 3.5–5)
ALP SERPL-CCNC: 91 U/L (ref 46–116)
ALT SERPL W P-5'-P-CCNC: 32 U/L (ref 12–78)
ANION GAP SERPL CALCULATED.3IONS-SCNC: 9 MMOL/L (ref 4–13)
APTT PPP: 28 SECONDS (ref 26–38)
AST SERPL W P-5'-P-CCNC: 21 U/L (ref 5–45)
BACTERIA UR QL AUTO: ABNORMAL /HPF
BASOPHILS # BLD AUTO: 0.02 THOUSANDS/ΜL (ref 0–0.1)
BASOPHILS NFR BLD AUTO: 0 % (ref 0–1)
BILIRUB SERPL-MCNC: 0.3 MG/DL (ref 0.2–1)
BILIRUB UR QL STRIP: NEGATIVE
BUN SERPL-MCNC: 9 MG/DL (ref 5–25)
CALCIUM SERPL-MCNC: 8.6 MG/DL (ref 8.3–10.1)
CHLORIDE SERPL-SCNC: 106 MMOL/L (ref 100–108)
CLARITY UR: CLEAR
CO2 SERPL-SCNC: 26 MMOL/L (ref 21–32)
COLOR UR: YELLOW
CREAT SERPL-MCNC: 0.78 MG/DL (ref 0.6–1.3)
EOSINOPHIL # BLD AUTO: 0.09 THOUSAND/ΜL (ref 0–0.61)
EOSINOPHIL NFR BLD AUTO: 2 % (ref 0–6)
ERYTHROCYTE [DISTWIDTH] IN BLOOD BY AUTOMATED COUNT: 12.6 % (ref 11.6–15.1)
GFR SERPL CREATININE-BSD FRML MDRD: 108 ML/MIN/1.73SQ M
GLUCOSE SERPL-MCNC: 102 MG/DL (ref 65–140)
GLUCOSE UR STRIP-MCNC: NEGATIVE MG/DL
HCT VFR BLD AUTO: 40.9 % (ref 36.5–49.3)
HGB BLD-MCNC: 14.7 G/DL (ref 12–17)
HGB UR QL STRIP.AUTO: ABNORMAL
IMM GRANULOCYTES # BLD AUTO: 0.02 THOUSAND/UL (ref 0–0.2)
IMM GRANULOCYTES NFR BLD AUTO: 0 % (ref 0–2)
INR PPP: 1 (ref 0.86–1.17)
KETONES UR STRIP-MCNC: NEGATIVE MG/DL
LEUKOCYTE ESTERASE UR QL STRIP: ABNORMAL
LYMPHOCYTES # BLD AUTO: 1.81 THOUSANDS/ΜL (ref 0.6–4.47)
LYMPHOCYTES NFR BLD AUTO: 30 % (ref 14–44)
MCH RBC QN AUTO: 34.3 PG (ref 26.8–34.3)
MCHC RBC AUTO-ENTMCNC: 35.9 G/DL (ref 31.4–37.4)
MCV RBC AUTO: 95 FL (ref 82–98)
MONOCYTES # BLD AUTO: 0.4 THOUSAND/ΜL (ref 0.17–1.22)
MONOCYTES NFR BLD AUTO: 7 % (ref 4–12)
NEUTROPHILS # BLD AUTO: 3.73 THOUSANDS/ΜL (ref 1.85–7.62)
NEUTS SEG NFR BLD AUTO: 61 % (ref 43–75)
NITRITE UR QL STRIP: NEGATIVE
NON-SQ EPI CELLS URNS QL MICRO: ABNORMAL /HPF
NRBC BLD AUTO-RTO: 0 /100 WBCS
PH UR STRIP.AUTO: 7 [PH] (ref 4.5–8)
PLATELET # BLD AUTO: 164 THOUSANDS/UL (ref 149–390)
PMV BLD AUTO: 11.3 FL (ref 8.9–12.7)
POTASSIUM SERPL-SCNC: 3.7 MMOL/L (ref 3.5–5.3)
PROT SERPL-MCNC: 7 G/DL (ref 6.4–8.2)
PROT UR STRIP-MCNC: NEGATIVE MG/DL
PROTHROMBIN TIME: 12.9 SECONDS (ref 11.8–14.2)
RBC # BLD AUTO: 4.29 MILLION/UL (ref 3.88–5.62)
RBC #/AREA URNS AUTO: ABNORMAL /HPF
SODIUM SERPL-SCNC: 141 MMOL/L (ref 136–145)
SP GR UR STRIP.AUTO: 1.02 (ref 1–1.03)
UROBILINOGEN UR QL STRIP.AUTO: 0.2 E.U./DL
WBC # BLD AUTO: 6.07 THOUSAND/UL (ref 4.31–10.16)
WBC #/AREA URNS AUTO: ABNORMAL /HPF

## 2019-01-24 PROCEDURE — 87086 URINE CULTURE/COLONY COUNT: CPT | Performed by: EMERGENCY MEDICINE

## 2019-01-24 PROCEDURE — 80053 COMPREHEN METABOLIC PANEL: CPT | Performed by: EMERGENCY MEDICINE

## 2019-01-24 PROCEDURE — 36415 COLL VENOUS BLD VENIPUNCTURE: CPT | Performed by: EMERGENCY MEDICINE

## 2019-01-24 PROCEDURE — 85025 COMPLETE CBC W/AUTO DIFF WBC: CPT | Performed by: EMERGENCY MEDICINE

## 2019-01-24 PROCEDURE — 87591 N.GONORRHOEAE DNA AMP PROB: CPT | Performed by: EMERGENCY MEDICINE

## 2019-01-24 PROCEDURE — 87147 CULTURE TYPE IMMUNOLOGIC: CPT | Performed by: EMERGENCY MEDICINE

## 2019-01-24 PROCEDURE — 85730 THROMBOPLASTIN TIME PARTIAL: CPT | Performed by: EMERGENCY MEDICINE

## 2019-01-24 PROCEDURE — 87491 CHLMYD TRACH DNA AMP PROBE: CPT | Performed by: EMERGENCY MEDICINE

## 2019-01-24 PROCEDURE — 81001 URINALYSIS AUTO W/SCOPE: CPT

## 2019-01-24 PROCEDURE — 85610 PROTHROMBIN TIME: CPT | Performed by: EMERGENCY MEDICINE

## 2019-01-24 PROCEDURE — 99283 EMERGENCY DEPT VISIT LOW MDM: CPT

## 2019-01-24 RX ORDER — CEPHALEXIN 500 MG/1
500 CAPSULE ORAL 4 TIMES DAILY
Qty: 27 CAPSULE | Refills: 0 | Status: SHIPPED | OUTPATIENT
Start: 2019-01-24 | End: 2019-01-31

## 2019-01-24 RX ORDER — CEPHALEXIN 250 MG/1
500 CAPSULE ORAL ONCE
Status: COMPLETED | OUTPATIENT
Start: 2019-01-24 | End: 2019-01-24

## 2019-01-24 RX ADMIN — CEPHALEXIN 500 MG: 250 CAPSULE ORAL at 13:22

## 2019-01-24 NOTE — TELEPHONE ENCOUNTER
Patient states he has blood in urine and would like to speak to [facility] to find out what he should do       Patient can be reached at [phone]

## 2019-01-24 NOTE — DISCHARGE INSTRUCTIONS
Urinary Tract Infection in Men   WHAT YOU NEED TO KNOW:   A urinary tract infection (UTI) is caused by bacteria that get inside your urinary tract  Most bacteria that enter your urinary tract come out when you urinate  If the bacteria stay in your urinary tract, you may get an infection  Your urinary tract includes your kidneys, ureters, bladder, and urethra  Urine is made in your kidneys, and it flows from the ureters to the bladder  Urine leaves the bladder through the urethra  A UTI is more common in your lower urinary tract, which includes your bladder and urethra  DISCHARGE INSTRUCTIONS:   Seek care immediately if:   · You are urinating very little or not at all  · You have a high fever with shaking chills  · You have side or back pain that gets worse  Contact your healthcare provider if:   · You have a mild fever  · You do not feel better after 2 days of taking antibiotics  · You are vomiting  · You have new symptoms, such as blood or pus in your urine  · You have questions or concerns about your condition or care  Medicines:   · Antibiotics  help fight a bacterial infection  · Medicines  may be given to decrease pain and burning when you urinate  They will also help decrease the feeling that you need to urinate often  These medicines will make your urine orange or red  · Take your medicine as directed  Contact your healthcare provider if you think your medicine is not helping or if you have side effects  Tell him or her if you are allergic to any medicine  Keep a list of the medicines, vitamins, and herbs you take  Include the amounts, and when and why you take them  Bring the list or the pill bottles to follow-up visits  Carry your medicine list with you in case of an emergency  Prevent another UTI:   · Empty your bladder often  Urinate and empty your bladder as soon as you feel the need  Do not hold your urine for long periods of time      · Drink liquids as directed  Ask how much liquid to drink each day and which liquids are best for you  You may need to drink more liquids than usual to help flush out the bacteria  Do not drink alcohol, caffeine, or citrus juices  These can irritate your bladder and increase your symptoms  Your healthcare provider may recommend cranberry juice to help prevent a UTI  · Urinate after you have sex  This can help flush out bacteria passed during sex  · Do pelvic muscle exercises often  Pelvic muscle exercises may help you start and stop urinating  Strong pelvic muscles may help you empty your bladder easier  Squeeze these muscles tightly for 5 seconds like you are trying to hold back urine  Then relax for 5 seconds  Gradually work up to squeezing for 10 seconds  Do 3 sets of 15 repetitions a day, or as directed  Follow up with your healthcare provider as directed:  Write down your questions so you remember to ask them during your visits  © 2017 [employer]  Information is for End User's use only and may not be sold, redistributed or otherwise used for commercial purposes  All illustrations and images included in CareNotes® are the copyrighted property of [employer]  or Cameron Jerez  The above information is an  only  It is not intended as medical advice for individual conditions or treatments  Talk to your doctor, nurse or pharmacist before following any medical regimen to see if it is safe and effective for you

## 2019-01-24 NOTE — TELEPHONE ENCOUNTER
Blood in urine  He just noticed dripping blood at end of urination  He did not see it when it happens, but is seeing blood stains on underwear, with burning sensation  He denies fever or otherwise feeling ill  He had recent CT scan performed, with no issue with urinary bladder  We discussed as not treating right now, he should follow with PCP for possible UTI  We do not have ability to dip urine, but PCP may have ability in office for more instantaneous results  He notes he hasn't seen PCP in a year, and he plans on going to ED  At end of conversation, he stated he will follow first with PCP to see if any appointments available

## 2019-01-24 NOTE — ED PROVIDER NOTES
History  Chief Complaint   Patient presents with    Blood in Urine     Pt reports blood in urine starting this morning  Also notes buring with urination since yesterday  81-year-old male presents to the emergency department relating he saw blood dripping from the penis this morning following void  He relates that this occurred as he was having a bowel movement  He did not appreciate any blood in the urine as was passing  He did have significant urinary frequency yesterday as well as today and dysuria over this same time frame  He has not had any abdominal or back pain  No fevers, nausea or vomiting  He otherwise generally feels well  He has not appreciated any lesions on the penis  Does have history UTI couple of years ago  No history kidney stones  Last year patient was diagnosed with metastatic colon cancer  He had his last chemotherapy treatment on January 8th and relates that he came in with concern for possible infection in the setting of immune compromise  Prior to Admission Medications   Prescriptions Last Dose Informant Patient Reported? Taking? clindamycin (CLINDAGEL) 1 % gel  Self No No   Sig: Apply topically 2 (two) times a day   minocycline (MINOCIN) 100 mg capsule  Self Yes No   omeprazole (PriLOSEC) 40 MG capsule  Self Yes No   Sig: Take 40 mg by mouth daily   prochlorperazine (COMPAZINE) 10 mg tablet  Self No No   Sig: Take 1 tablet (10 mg total) by mouth every 6 (six) hours as needed for nausea or vomiting      Facility-Administered Medications: None       Past Medical History:   Diagnosis Date    [facility]     Dysuria     Last Assessed:8/24/17    GERD (gastroesophageal reflux disease)     Liver nodule     Pulmonary nodule, right        Past Surgical History:   Procedure Laterality Date    COLONOSCOPY N/A 1/22/2018    Procedure: COLONOSCOPY;  Surgeon: Iman Munoz MD;  [facility];   Service: Colorectal   [employer] DENTAL SURGERY  2016    [place] with bridge work    FLEXIBLE SIGMOIDOSCOPY N/A 6/15/2018    Procedure: SIGMOIDOSCOPY FLEXIBLE w/o sedation w/ tattoo;  Surgeon: Leonie Cristobal MD;  [facility]; Service: Colorectal    LIVER LOBECTOMY N/A 6/21/2018    Procedure: liver resection/ablation, intraoperative ultrasound of liver;  Surgeon: Mohini Bartlett MD;  [facility];  Service: Surgical Oncology    KS EXPLORATORY OF ABDOMEN N/A 6/21/2018    Procedure: LAPAROTOMY EXPLORATORY;  Surgeon: Leonie Cristobal MD;  [facility];  Service: Colorectal    KS INSERT PICC W/ SUB-Q PORT N/A 1/25/2018    Procedure: PORT-A-CATHETER PLACEMENT  Fluoroscopy for performance/interpretation;  Surgeon: Leonie Cristobal MD;  [facility];  Service: Colorectal    KS PART REMOVAL COLON W ANASTOMOSIS Left 6/21/2018    Procedure: hemicolectomy, low anterior resection (open) SPY fluorescence angiography;  Surgeon: Leonie Cristobal MD;  [facility];  Service: Colorectal    KS PROCTECTOMY,PARTIAL N/A 6/21/2018    Procedure: Partial proctectomy ;  Surgeon: Leonie Cristobal MD;  [facility];  Service: Colorectal    KS SIGMOIDOSCOPY FLX DX W/COLLJ SPEC BR/WA IF PFRMD N/A 6/21/2018    Procedure: Carlos Win;  Surgeon: Leonie Cristobal MD;  [facility];  Service: Colorectal    ROOT CANAL  2015    TESTICLE SURGERY      Exploration of Undescended Testis rIght, age 8 had surgery for undescended testicle; Last Assessed:8/24/17    TOOTH EXTRACTION  2015       Family History   Problem Relation Age of Onset    No Known Problems Father     Cancer Mother     Cancer Brother         pt  reports brother was diagnosed with stage 4 throat cancer     I have reviewed and agree with the history as documented      Social History   Substance Use Topics    Smoking status: Former Smoker     Types: E-Cigarettes    Smokeless tobacco: Current User      Comment: quit 4 years ago / smoked for 20 years     Alcohol use Yes Comment: socially - 2 month        Review of Systems   All other systems reviewed and are negative  Physical Exam  Physical Exam   Constitutional: He is oriented to person, place, and time  He appears well-developed and well-nourished  HENT:   Head: Normocephalic and atraumatic  Eyes: Conjunctivae are normal    Cardiovascular: Normal rate and regular rhythm  Pulmonary/Chest: Effort normal and breath sounds normal    Abdominal: Soft  Bowel sounds are normal  He exhibits no distension  There is no tenderness  No CVA tenderness   Musculoskeletal: Normal range of motion  Neurological: He is alert and oriented to person, place, and time  Skin: Skin is warm and dry  Psychiatric: He has a normal mood and affect  His behavior is normal    Nursing note and vitals reviewed        Vital Signs  ED Triage Vitals [01/24/19 1058]   Temperature Pulse Respirations Blood Pressure SpO2   98 8 °F (37 1 °C) 78 16 116/75 99 %      Temp Source Heart Rate Source Patient Position - Orthostatic VS BP Location FiO2 (%)   Oral -- -- -- --      Pain Score       No Pain           Vitals:    01/24/19 1058   BP: 116/75   Pulse: 78       Visual Acuity      ED Medications  Medications   cephalexin (KEFLEX) capsule 500 mg (500 mg Oral Given 1/24/19 1322)       Diagnostic Studies  Results Reviewed     Procedure Component Value Units Date/Time    Protime-INR [[id number]]  (Normal) Collected:  01/24/19 1214    Lab Status:  Final result Specimen:  Blood from Arm, Left Updated:  01/24/19 1241     Protime 12 9 seconds      INR 1 00    APTT [[id number]]  (Normal) Collected:  01/24/19 1214    Lab Status:  Final result Specimen:  Blood from Arm, Left Updated:  01/24/19 1241     PTT 28 seconds     Comprehensive metabolic panel [[id number]] Collected:  01/24/19 1214    Lab Status:  Final result Specimen:  Blood from Arm, Left Updated:  01/24/19 1239     Sodium 141 mmol/L      Potassium 3 7 mmol/L      Chloride 106 mmol/L      CO2 26 mmol/L ANION GAP 9 mmol/L      BUN 9 mg/dL      Creatinine 0 78 mg/dL      Glucose 102 mg/dL      Calcium 8 6 mg/dL      AST 21 U/L      ALT 32 U/L      Alkaline Phosphatase 91 U/L      Total Protein 7 0 g/dL      Albumin 3 5 g/dL      Total Bilirubin 0 30 mg/dL      eGFR 108 ml/min/1 73sq m     Narrative:         National Kidney Disease Education Program recommendations are as follows:  GFR calculation is accurate only with a steady state creatinine  Chronic Kidney disease less than 60 ml/min/1 73 sq  meters  Kidney failure less than 15 ml/min/1 73 sq  meters  CBC and differential [[id number]] Collected:  01/24/19 1214    Lab Status:  Final result Specimen:  Blood from Arm, Left Updated:  01/24/19 1227     WBC 6 07 Thousand/uL      RBC 4 29 Million/uL      Hemoglobin 14 7 g/dL      Hematocrit 40 9 %      MCV 95 fL      MCH 34 3 pg      MCHC 35 9 g/dL      RDW 12 6 %      MPV 11 3 fL      Platelets 924 Thousands/uL      nRBC 0 /100 WBCs      Neutrophils Relative 61 %      Immat GRANS % 0 %      Lymphocytes Relative 30 %      Monocytes Relative 7 %      Eosinophils Relative 2 %      Basophils Relative 0 %      Neutrophils Absolute 3 73 Thousands/µL      Immature Grans Absolute 0 02 Thousand/uL      Lymphocytes Absolute 1 81 Thousands/µL      Monocytes Absolute 0 40 Thousand/µL      Eosinophils Absolute 0 09 Thousand/µL      Basophils Absolute 0 02 Thousands/µL     Chlamydia/GC amplified DNA by PCR [[id number]] Collected:  01/24/19 1216    Lab Status: In process Specimen:  Urine from Urine, Other Updated:  01/24/19 1224    Urine culture [[id number]] Collected:  01/24/19 1107    Lab Status:   In process Specimen:  Urine from Urine, Clean Catch Updated:  01/24/19 1210    Urine Microscopic [[id number]]  (Abnormal) Collected:  01/24/19 1107    Lab Status:  Final result Specimen:  Urine from Urine, Clean Catch Updated:  01/24/19 1130     RBC, UA 4-10 (A) /hpf      WBC, UA 2-4 (A) /hpf      Epithelial Cells Occasional /hpf Bacteria, UA Occasional /hpf     ED Urine Macroscopic [[id number]]  (Abnormal) Collected:  01/24/19 1107    Lab Status:  Final result Specimen:  Urine Updated:  01/24/19 1107     Color, UA Yellow     Clarity, UA Clear     pH, UA 7 0     Leukocytes, UA Moderate (A)     Nitrite, UA Negative     Protein, UA Negative mg/dl      Glucose, UA Negative mg/dl      Ketones, UA Negative mg/dl      Urobilinogen, UA 0 2 E U /dl      Bilirubin, UA Negative     Blood, UA Moderate (A)     Specific [place], UA 1 020    Narrative:       CLINITEK RESULT                 No orders to display              Procedures  Procedures       Phone Contacts  ED Phone Contact    ED Course                               MDM  CritCare Time    Disposition  Final diagnoses:   Urinary tract infection     Time reflects when diagnosis was documented in both MDM as applicable and the Disposition within this note     Time User Action Codes Description Comment    1/24/2019  1:05 PM Ivelisse HEART Add [N39 0] Urinary tract infection       ED Disposition     ED Disposition Condition Comment    Discharge  [place] discharge to home/self care      Condition at discharge: Good        Follow-up Information     Follow up With Specialties Details Why Contact Info Additional Information    Sunny Sullivan MD Internal Medicine Schedule an appointment as soon as possible for a visit  [address] [place] [address] Emergency Department Emergency Medicine Go to As needed, If symptoms worsen [address]  [phone] [facility], [address], [place], [address] [facility], [address]          Discharge Medication List as of 1/24/2019  1:10 PM      START taking these medications    Details   cephalexin (KEFLEX) 500 mg capsule Take 1 capsule (500 mg total) by mouth 4 (four) times a day for 7 days, Starting Thu 1/24/2019, Until Thu 1/31/2019, Normal         CONTINUE these medications which have NOT CHANGED    Details   clindamycin (CLINDAGEL) 1 % gel Apply topically 2 (two) times a day, Starting Tue 8/7/2018, Normal      minocycline (MINOCIN) 100 mg capsule Starting Wed 8/8/2018, Historical Med      omeprazole (PriLOSEC) 40 MG capsule Take 40 mg by mouth daily, Historical Med      prochlorperazine (COMPAZINE) 10 mg tablet Take 1 tablet (10 mg total) by mouth every 6 (six) hours as needed for nausea or vomiting, Starting Thu 2/8/2018, Normal           No discharge procedures on file      ED Provider  Electronically Signed by           Isa Pleitez MD  01/24/19 2030

## 2019-01-25 LAB
BACTERIA UR CULT: ABNORMAL
C TRACH DNA SPEC QL NAA+PROBE: NEGATIVE
N GONORRHOEA DNA SPEC QL NAA+PROBE: NEGATIVE

## 2019-01-28 ENCOUNTER — TELEPHONE (OUTPATIENT)
Dept: INTERNAL MEDICINE CLINIC | Facility: CLINIC | Age: 49
End: 2019-01-28

## 2019-03-12 ENCOUNTER — OFFICE VISIT (OUTPATIENT)
Dept: UROLOGY | Facility: AMBULATORY SURGERY CENTER | Age: 49
End: 2019-03-12
Payer: COMMERCIAL

## 2019-03-12 VITALS
HEIGHT: 67 IN | BODY MASS INDEX: 23.23 KG/M2 | DIASTOLIC BLOOD PRESSURE: 76 MMHG | HEART RATE: 71 BPM | WEIGHT: 148 LBS | SYSTOLIC BLOOD PRESSURE: 128 MMHG

## 2019-03-12 DIAGNOSIS — N52.8 OTHER MALE ERECTILE DYSFUNCTION: Primary | ICD-10-CM

## 2019-03-12 PROCEDURE — 99214 OFFICE O/P EST MOD 30 MIN: CPT | Performed by: NURSE PRACTITIONER

## 2019-03-12 RX ORDER — SILDENAFIL CITRATE 20 MG/1
20 TABLET ORAL AS NEEDED
Qty: 90 TABLET | Refills: 2 | Status: SHIPPED | OUTPATIENT
Start: 2019-03-12 | End: 2020-07-21 | Stop reason: SDUPTHER

## 2019-03-12 NOTE — PROGRESS NOTES
3/12/2019    Valery Osuna  1970  [id number]      Assessment  -Erectile dysfunction    Discussion/Plan  Sepideh Bates is a 57 y o  male being managed by Dr Margi Meeks        -Provided patient with new script for sildenafil 20 mg  He was instructed to take 1-5 tablets as needed for ED  Prescription was electronically sent to [address] [place] [address]  We discussed starting routine prostate cancer screening in the next several years  Follow-up in 1 year for re-evaluation  He was instructed to call with any issues  -All questions answered, patient agrees with plan      History of Present Illness  57 y o  male with a history of ED presents today for follow up  Patient last seen in office 1 year ago  Since that time, he has been treated for colon cancer and is status post hemicolectomy and chemotherapy treatment  Patient is doing well postoperatively and continues to follow under hematology oncology  He states that since his diagnosis he has been unable to try sildenafil which was prescribed to him at last visit  He continues to report difficulties obtaining erection  Patient denies any lower urinary tract symptoms, gross hematuria, or dysuria  He does state that he was treated for a UTI on 01/24/2019  Patient had reported urinary frequency and dysuria  Final urine culture identified beta-hemolytic strep  He denies any episodes of gross hematuria  Patient denies any strong family history of prostate cancer  Review of Systems  Review of Systems   Constitutional: Negative  HENT: Negative  Respiratory: Negative  Cardiovascular: Negative  Gastrointestinal: Negative  Genitourinary: Negative for decreased urine volume, difficulty urinating, dysuria, flank pain, frequency, hematuria and urgency  Musculoskeletal: Negative  Skin: Negative  Neurological: Negative  Psychiatric/Behavioral: Negative          Past Medical History  Past Medical History:   Diagnosis Date    [facility] )     Dysuria Last Assessed:8/24/17    GERD (gastroesophageal reflux disease)     Liver nodule     Pulmonary nodule, right        Past Social History  Past Surgical History:   Procedure Laterality Date    COLONOSCOPY N/A 1/22/2018    Procedure: COLONOSCOPY;  Surgeon: Louis Bar MD;  [facility]; Service: Colorectal   [facility] DENT[facility] SURGERY  2016    [place] with bridge work    FLEXIBLE SIGMOIDOSCOPY N/A 6/15/2018    Procedure: SIGMOIDOSCOPY FLEXIBLE w/o sedation w/ tattoo;  Surgeon: Louis Bar MD;  [facility]; Service: Colorectal    LIVER LOBECTOMY N/A 6/21/2018    Procedure: liver resection/ablation, intraoperative ultrasound of liver;  Surgeon: Alex Jacobo MD;  [facility];  Service: Surgical Oncology    IN EXPLORATORY OF ABDOMEN N/A 6/21/2018    Procedure: LAPAROTOMY EXPLORATORY;  Surgeon: Louis Bar MD;  [facility];  Service: Colorectal    IN INSERT PICC W/ SUB-Q PORT N/A 1/25/2018    Procedure: PORT-A-CATHETER PLACEMENT  Fluoroscopy for performance/interpretation;  Surgeon: Louis Bar MD;  [facility];  Service: Colorectal    IN PART REMOVAL COLON W ANASTOMOSIS Left 6/21/2018    Procedure: hemicolectomy, low anterior resection (open) SPY fluorescence angiography;  Surgeon: Louis Bar MD;  [facility];  Service: Colorectal    IN PROCTECTOMY,PARTIAL N/A 6/21/2018    Procedure: Partial proctectomy ;  Surgeon: Louis Bar MD;  [facility];  Service: Colorectal    IN SIGMOIDOSCOPY FLX DX W/COLLJ SPEC BR/WA IF PFRMD N/A 6/21/2018    Procedure: Ermelinda Ruiz;  Surgeon: Louis Bar MD;  [facility];  Service: Colorectal    ROOT CANAL  2015    TESTICLE SURGERY      Exploration of Undescended Testis rIght, age 8 had surgery for undescended testicle;  Last Assessed:8/24/17    TOOTH EXTRACTION  2015       Past Family History  Family History   Problem Relation Age of Onset    No Known Problems Father     Cancer Mother     Cancer Brother         pt  reports brother was diagnosed with stage 4 throat cancer       Past Social history  Social History     Socioeconomic History    Marital status: Single     Spouse name: Not on file    Number of children: Not on file    Years of education: Not on file    Highest education level: Not on file   Occupational History    Not on file   Social Needs    Financial resource strain: Not on file    Food insecurity:     Worry: Not on file     Inability: Not on file    Transportation needs:     Medical: Not on file     Non-medical: Not on file   Tobacco Use    Smoking status: Former Smoker     Types: E-Cigarettes    Smokeless tobacco: Current User    Tobacco comment: quit 4 years ago / smoked for 20 years    Substance and Sexual Activity    Alcohol use: Yes     Comment: socially - 2 month    Drug use: Yes     Types: Marijuana     Comment: per [employer]: occasional recreation drug use    Sexual activity: Not on file     Comment: Resides alone   Lifestyle    Physical activity:     Days per week: Not on file     Minutes per session: Not on file    Stress: Not on file   Relationships    Social connections:     Talks on phone: Not on file     Gets together: Not on file     Attends Yazidi service: Not on file     Active member of club or organization: Not on file     Attends meetings of clubs or organizations: Not on file     Relationship status: Not on file    Intimate partner violence:     Fear of current or ex partner: Not on file     Emotionally abused: Not on file     Physically abused: Not on file     Forced sexual activity: Not on file   Other Topics Concern    Not on file   Social History Narrative    Not on file       Current Medications  Current Outpatient Medications   Medication Sig Dispense Refill    clindamycin (CLINDAGEL) 1 % gel Apply topically 2 (two) times a day 30 g 0    minocycline (MINOCIN) 100 mg capsule       omeprazole (PriLOSEC) 40 MG capsule Take 40 mg by mouth daily      prochlorperazine (COMPAZINE) 10 mg tablet Take 1 tablet (10 mg total) by mouth every 6 (six) hours as needed for nausea or vomiting 30 tablet 6     No current facility-administered medications for this visit  Allergies  No Known Allergies    Past Medical History, Social History, Family History, medications and allergies were reviewed  Vitals  There were no vitals filed for this visit  Physical Exam  Physical Exam   Constitutional: He is oriented to person, place, and time  He appears well-developed and well-nourished  HENT:   Head: Normocephalic  Eyes: Pupils are equal, round, and reactive to light  Neck: Normal range of motion  Cardiovascular: Normal rate and regular rhythm  Pulmonary/Chest: Effort normal    Abdominal: Soft  Normal appearance  There is no CVA tenderness  Musculoskeletal: Normal range of motion  Neurological: He is alert and oriented to person, place, and time  Skin: Skin is warm and dry  Psychiatric: He has a normal mood and affect  His behavior is normal  Judgment and thought content normal        Results    I have personally reviewed all pertinent lab results and reviewed with patient  No results found for: PSA  Lab Results   Component Value Date    GLUCOSE 124 12/01/2017    CALCIUM 8 6 01/24/2019     08/26/2017    K 3 7 01/24/2019    CO2 26 01/24/2019     01/24/2019    BUN 9 01/24/2019    CREATININE 0 78 01/24/2019     Lab Results   Component Value Date    WBC 6 07 01/24/2019    HGB 14 7 01/24/2019    HCT 40 9 01/24/2019    MCV 95 01/24/2019     01/24/2019     No results found for this or any previous visit (from the past 1 hour(s))

## 2019-03-15 ENCOUNTER — HOSPITAL ENCOUNTER (OUTPATIENT)
Dept: INFUSION CENTER | Facility: CLINIC | Age: 49
Discharge: HOME | End: 2019-03-15
Payer: COMMERCIAL

## 2019-03-15 PROCEDURE — 96523 IRRIG DRUG DELIVERY DEVICE: CPT

## 2019-03-15 NOTE — PROGRESS NOTES
Patient to [facility] for [facility] maintenance: Offers no complaints at present time: Right PAC accessed without difficulty: Good blood return noted

## 2019-04-16 ENCOUNTER — TRANSCRIBE ORDERS (OUTPATIENT)
Dept: LAB | Facility: CLINIC | Age: 49
End: 2019-04-16

## 2019-04-16 ENCOUNTER — APPOINTMENT (OUTPATIENT)
Dept: LAB | Facility: CLINIC | Age: 49
End: 2019-04-16
Payer: COMMERCIAL

## 2019-04-16 DIAGNOSIS — C78.7 METASTATIC COLON CANCER TO LIVER (HCC): ICD-10-CM

## 2019-04-16 DIAGNOSIS — C18.9 METASTATIC COLON CANCER TO LIVER (HCC): ICD-10-CM

## 2019-04-16 LAB
ALBUMIN SERPL BCP-MCNC: 3.9 G/DL (ref 3.5–5)
ALP SERPL-CCNC: 70 U/L (ref 46–116)
ALT SERPL W P-5'-P-CCNC: 22 U/L (ref 12–78)
ANION GAP SERPL CALCULATED.3IONS-SCNC: 9 MMOL/L (ref 4–13)
AST SERPL W P-5'-P-CCNC: 19 U/L (ref 5–45)
BASOPHILS # BLD AUTO: 0.02 THOUSANDS/ΜL (ref 0–0.1)
BASOPHILS NFR BLD AUTO: 0 % (ref 0–1)
BILIRUB SERPL-MCNC: 0.2 MG/DL (ref 0.2–1)
BUN SERPL-MCNC: 14 MG/DL (ref 5–25)
CALCIUM SERPL-MCNC: 8.7 MG/DL (ref 8.3–10.1)
CEA SERPL-MCNC: 0.7 NG/ML (ref 0–3)
CHLORIDE SERPL-SCNC: 106 MMOL/L (ref 100–108)
CO2 SERPL-SCNC: 26 MMOL/L (ref 21–32)
CREAT SERPL-MCNC: 0.96 MG/DL (ref 0.6–1.3)
EOSINOPHIL # BLD AUTO: 0.08 THOUSAND/ΜL (ref 0–0.61)
EOSINOPHIL NFR BLD AUTO: 2 % (ref 0–6)
ERYTHROCYTE [DISTWIDTH] IN BLOOD BY AUTOMATED COUNT: 12.8 % (ref 11.6–15.1)
GFR SERPL CREATININE-BSD FRML MDRD: 94 ML/MIN/1.73SQ M
GLUCOSE SERPL-MCNC: 103 MG/DL (ref 65–140)
HCT VFR BLD AUTO: 39.6 % (ref 36.5–49.3)
HGB BLD-MCNC: 14 G/DL (ref 12–17)
IMM GRANULOCYTES # BLD AUTO: 0.03 THOUSAND/UL (ref 0–0.2)
IMM GRANULOCYTES NFR BLD AUTO: 1 % (ref 0–2)
LYMPHOCYTES # BLD AUTO: 1.85 THOUSANDS/ΜL (ref 0.6–4.47)
LYMPHOCYTES NFR BLD AUTO: 38 % (ref 14–44)
MCH RBC QN AUTO: 33.2 PG (ref 26.8–34.3)
MCHC RBC AUTO-ENTMCNC: 35.4 G/DL (ref 31.4–37.4)
MCV RBC AUTO: 94 FL (ref 82–98)
MONOCYTES # BLD AUTO: 0.46 THOUSAND/ΜL (ref 0.17–1.22)
MONOCYTES NFR BLD AUTO: 9 % (ref 4–12)
NEUTROPHILS # BLD AUTO: 2.45 THOUSANDS/ΜL (ref 1.85–7.62)
NEUTS SEG NFR BLD AUTO: 50 % (ref 43–75)
NRBC BLD AUTO-RTO: 0 /100 WBCS
PLATELET # BLD AUTO: 161 THOUSANDS/UL (ref 149–390)
PMV BLD AUTO: 11.3 FL (ref 8.9–12.7)
POTASSIUM SERPL-SCNC: 3.9 MMOL/L (ref 3.5–5.3)
PROT SERPL-MCNC: 7.4 G/DL (ref 6.4–8.2)
RBC # BLD AUTO: 4.22 MILLION/UL (ref 3.88–5.62)
SODIUM SERPL-SCNC: 141 MMOL/L (ref 136–145)
WBC # BLD AUTO: 4.89 THOUSAND/UL (ref 4.31–10.16)

## 2019-04-16 PROCEDURE — 85025 COMPLETE CBC W/AUTO DIFF WBC: CPT | Performed by: PHYSICIAN ASSISTANT

## 2019-04-16 PROCEDURE — 36415 COLL VENOUS BLD VENIPUNCTURE: CPT | Performed by: PHYSICIAN ASSISTANT

## 2019-04-16 PROCEDURE — 80053 COMPREHEN METABOLIC PANEL: CPT | Performed by: PHYSICIAN ASSISTANT

## 2019-04-16 PROCEDURE — 82378 CARCINOEMBRYONIC ANTIGEN: CPT | Performed by: PHYSICIAN ASSISTANT

## 2019-04-18 ENCOUNTER — OFFICE VISIT (OUTPATIENT)
Dept: HEMATOLOGY ONCOLOGY | Facility: CLINIC | Age: 49
End: 2019-04-18
Payer: COMMERCIAL

## 2019-04-18 VITALS
OXYGEN SATURATION: 98 % | BODY MASS INDEX: 24.01 KG/M2 | RESPIRATION RATE: 16 BRPM | SYSTOLIC BLOOD PRESSURE: 108 MMHG | DIASTOLIC BLOOD PRESSURE: 70 MMHG | WEIGHT: 153 LBS | HEART RATE: 67 BPM | TEMPERATURE: 99 F | HEIGHT: 67 IN

## 2019-04-18 DIAGNOSIS — C78.7 METASTATIC COLON CANCER TO LIVER (HCC): Primary | ICD-10-CM

## 2019-04-18 DIAGNOSIS — C18.9 METASTATIC COLON CANCER TO LIVER (HCC): Primary | ICD-10-CM

## 2019-04-18 PROCEDURE — 99214 OFFICE O/P EST MOD 30 MIN: CPT | Performed by: INTERNAL MEDICINE

## 2019-04-30 ENCOUNTER — HOSPITAL ENCOUNTER (OUTPATIENT)
Dept: INFUSION CENTER | Facility: CLINIC | Age: 49
Discharge: HOME | End: 2019-04-30
Payer: COMMERCIAL

## 2019-04-30 PROCEDURE — 96523 IRRIG DRUG DELIVERY DEVICE: CPT

## 2019-07-15 ENCOUNTER — HOSPITAL ENCOUNTER (OUTPATIENT)
Dept: CT IMAGING | Facility: HOSPITAL | Age: 49
Discharge: HOME | End: 2019-07-15
Attending: SURGERY
Payer: COMMERCIAL

## 2019-07-15 DIAGNOSIS — C18.9 METASTATIC COLON CANCER TO LIVER (HCC): ICD-10-CM

## 2019-07-15 DIAGNOSIS — C78.7 METASTATIC COLON CANCER TO LIVER (HCC): ICD-10-CM

## 2019-07-15 DIAGNOSIS — C20 MALIGNANT NEOPLASM OF RECTUM (HCC): ICD-10-CM

## 2019-07-15 PROCEDURE — 71260 CT THORAX DX C+: CPT

## 2019-07-15 PROCEDURE — 74177 CT ABD & PELVIS W/CONTRAST: CPT

## 2019-07-15 RX ADMIN — IOHEXOL 100 ML: 350 INJECTION, SOLUTION INTRAVENOUS at 18:22

## 2019-07-22 PROBLEM — Z08 ENCOUNTER FOR FOLLOW-UP EXAMINATION AFTER COMPLETED TREATMENT FOR MALIGNANT NEOPLASM: Status: ACTIVE | Noted: 2019-07-22

## 2019-07-22 PROBLEM — Z85.038 PERSONAL HISTORY OF COLON CANCER, STAGE IV: Status: ACTIVE | Noted: 2018-03-15

## 2019-07-23 ENCOUNTER — OFFICE VISIT (OUTPATIENT)
Dept: SURGICAL ONCOLOGY | Facility: CLINIC | Age: 49
End: 2019-07-23
Payer: COMMERCIAL

## 2019-07-23 VITALS
HEART RATE: 65 BPM | TEMPERATURE: 97.9 F | SYSTOLIC BLOOD PRESSURE: 122 MMHG | BODY MASS INDEX: 22.66 KG/M2 | HEIGHT: 66 IN | WEIGHT: 141 LBS | DIASTOLIC BLOOD PRESSURE: 84 MMHG | RESPIRATION RATE: 15 BRPM

## 2019-07-23 DIAGNOSIS — Z08 ENCOUNTER FOR FOLLOW-UP EXAMINATION AFTER COMPLETED TREATMENT FOR MALIGNANT NEOPLASM: Primary | ICD-10-CM

## 2019-07-23 DIAGNOSIS — Z85.038 PERSONAL HISTORY OF COLON CANCER, STAGE IV: ICD-10-CM

## 2019-07-23 PROCEDURE — 99213 OFFICE O/P EST LOW 20 MIN: CPT | Performed by: SURGERY

## 2019-07-23 NOTE — LETTER
July 23, 2019     Kerrie Westbrook, [address]    Patient: Paxton Esparza   YOB: 1970   Date of Visit: 7/23/2019       Dear Dr Donnia Leyden:    Thank you for referring Radha Peña to me for evaluation  Below are my notes for this consultation  If you have questions, please do not hesitate to call me  I look forward to following your patient along with you  Sincerely,        Gregoria Lee MD        CC: MD Douglas Cheatham MD Steen Clutter, MD  7/23/2019  4:32 PM  Sign at close encounter               Surgical Oncology Follow Up       [address] [place] [address]  [address]  Trenda Aures  1970  [id number]  [address] [facility] SURGICAL ONCOLOGY [place]  [address] [place] [address]    Diagnoses and all orders for this visit:    Encounter for follow-up examination after completed treatment for malignant neoplasm  -     CT chest abdomen pelvis w contrast; Future  -     BUN; Future  -     Creatinine, serum; Future    Personal history of colon cancer, stage IV  -     CT chest abdomen pelvis w contrast; Future  -     BUN; Future  -     Creatinine, serum; Future        Chief Complaint   Patient presents with    Follow-up     6 MO Metastatic colon cancer to liver  F/U       Return in about 6 months (around 1/23/2020) for Office Visit, Imaging - See orders, with Shirley Toledo           Personal history of colon cancer, stage IV    1/2018 Initial Diagnosis     Malignant neoplasm of sigmoid colon ([facility] [address] )      1/22/2018 Biopsy     Large Intestine, Sigmoid Colon, Recto-sigmoid tumor bx:    - Invasive colonic adenocarcinoma, moderately differentiated      2/6/2018 - 10/16/2018 Chemotherapy     Modified FOLFOX6 x 12 cycles  Added Erbitux on 3/20/18       6/21/2018 Surgery     Segment 7 liver resection/ablation, hemicolectomy      10/30/2018 -  Chemotherapy Continuation of Single agent Erbitux  With 10/30/18 chemo, it was Cycle #14  Plan was for 6 additional cycles of Erbitux alone  Staging:  Metastatic rectosigmoid cancer  [id number]  Treatment history:   Modified FOLFOX 6   Erbitux added 03/20/2018   Segment 7 liver resection, June 2018  Current treatment:  Observation  Disease status:   Stable    History of Present Illness:  Patient returns in follow-up of his CT  He is doing well at this time with no complaints  He just had a negative colonoscopy  He is feeling well  He denies any abdominal pain, nausea or vomiting  His appetite is good  CT of his chest, abdomen and pelvis from July 15, 2019 reveals no evidence of recurrence  There are stable pulmonary nodules  The ablation site and now measures 3 4 cm from 3 8 cm  I personally reviewed the films  His CEA is normal at 0 7  Review of Systems  Complete ROS Surg Onc:   Complete ROS Surg Onc:   Constitutional: The patient denies new or recent history of general fatigue, no recent weight loss, no change in appetite  Eyes: No complaints of visual problems, no scleral icterus  ENT: no complaints of ear pain, no hoarseness, no difficulty swallowing,  no tinnitus and no new masses in head, oral cavity, or neck  Cardiovascular: No complaints of chest pain, no palpitations, no ankle edema  Respiratory: No complaints of shortness of breath, no cough  Gastrointestinal: No complaints of jaundice, no bloody stools, no pale stools  Genitourinary: No complaints of dysuria, no hematuria, no nocturia, no frequent urination, no urethral discharge  Musculoskeletal: No complaints of weakness, paralysis, joint stiffness or arthralgias  Integumentary: No complaints of rash, no new lesions  Neurological: No complaints of convulsions, no seizures, no dizziness  Hematologic/Lymphatic: No complaints of easy bruising  Endocrine:  No hot or cold intolerance    No polydipsia, polyphagia, or polyuria  Allergy/immunology:  No environmental allergies  No food allergies  Not immunocompromised  Skin:  No pallor or rash  No wound  Patient Active Problem List   Diagnosis    ED (erectile dysfunction)    Personal history of colon cancer, stage IV    GERD (gastroesophageal reflux disease)    Pulmonary nodule, right    Acneiform rash    Encounter for follow-up examination after completed treatment for malignant neoplasm     Past Medical History:   Diagnosis Date    Cancer ([facility] [address] )     Dysuria     Last Assessed:8/24/17    GERD (gastroesophageal reflux disease)     Liver nodule     Pulmonary nodule, right      Past Surgical History:   Procedure Laterality Date    COLONOSCOPY N/A 1/22/2018    Procedure: COLONOSCOPY;  Surgeon: Selvin Cormier MD;  [facility]: [facility]; Service: Colorectal   [facility] DENTAL SURGERY  2016    Crown with bridge work    FLEXIBLE SIGMOIDOSCOPY N/A 6/15/2018    Procedure: SIGMOIDOSCOPY FLEXIBLE w/o sedation w/ tattoo;  Surgeon: Selvin Cormier MD;  [facility];   Service: Colorectal    LIVER LOBECTOMY N/A 6/21/2018    Procedure: liver resection/ablation, intraoperative ultrasound of liver;  Surgeon: Reinaldo Hood MD;  [facility];  Service: Surgical Oncology    VT EXPLORATORY OF ABDOMEN N/A 6/21/2018    Procedure: LAPAROTOMY EXPLORATORY;  Surgeon: Selvin Cormier MD;  [facility];  Service: Colorectal    VT INSERT PICC W/ SUB-Q PORT N/A 1/25/2018    Procedure: PORT-A-CATHETER PLACEMENT  Fluoroscopy for performance/interpretation;  Surgeon: Selvin Cormier MD;  [facility];  Service: Colorectal    VT PART REMOVAL COLON W ANASTOMOSIS Left 6/21/2018    Procedure: hemicolectomy, low anterior resection (open) SPY fluorescence angiography;  Surgeon: Selvin Cormier MD;  [facility];  Service: Colorectal    VT PROCTECTOMY,PARTIAL N/A 6/21/2018    Procedure: Partial proctectomy ;  Surgeon: Selvin Cormier MD; [facility];  Service: Colorectal    IA SIGMOIDOSCOPY FLX DX W/COLLJ SPEC BR/WA IF PFRMD N/A 6/21/2018    Procedure: Parveen Plane;  Surgeon: Prateek Hall MD;  [facility];  Service: Colorectal    ROOT CANAL  2015    TESTICLE SURGERY      Exploration of Undescended Testis rIght, age 8 had surgery for undescended testicle;  Last Assessed:8/24/17    TOOTH EXTRACTION  2015     Family History   Problem Relation Age of Onset    No Known Problems Father     Cancer Mother     Cancer Brother         pt  reports brother was diagnosed with stage 4 throat cancer     Social History     Socioeconomic History    Marital status: Single     Spouse name: Not on file    Number of children: Not on file    Years of education: Not on file    Highest education level: Not on file   Occupational History    Not on file   Social Needs    Financial resource strain: Not on file    Food insecurity:     Worry: Not on file     Inability: Not on file    Transportation needs:     Medical: Not on file     Non-medical: Not on file   Tobacco Use    Smoking status: Former Smoker     Types: E-Cigarettes    Smokeless tobacco: Current User    Tobacco comment: quit 4 years ago / smoked for 20 years    Substance and Sexual Activity    Alcohol use: Yes     Comment: socially - 2 month    Drug use: Yes     Types: Marijuana     Comment: per [employer]: occasional recreation drug use    Sexual activity: Not on file     Comment: Resides alone   Lifestyle    Physical activity:     Days per week: Not on file     Minutes per session: Not on file    Stress: Not on file   Relationships    Social connections:     Talks on phone: Not on file     Gets together: Not on file     Attends Yarsanism service: Not on file     Active member of club or organization: Not on file     Attends meetings of clubs or organizations: Not on file     Relationship status: Not on file    Intimate partner violence:     Fear of current or ex partner: Not on file     Emotionally abused: Not on file     Physically abused: Not on file     Forced sexual activity: Not on file   Other Topics Concern    Not on file   Social History Narrative    Not on file       Current Outpatient Medications:     clindamycin (CLINDAGEL) 1 % gel, Apply topically 2 (two) times a day, Disp: 30 g, Rfl: 0    minocycline (MINOCIN) 100 mg capsule, , Disp: , Rfl:     omeprazole (PriLOSEC) 40 MG capsule, Take 40 mg by mouth daily, Disp: , Rfl:     prochlorperazine (COMPAZINE) 10 mg tablet, Take 1 tablet (10 mg total) by mouth every 6 (six) hours as needed for nausea or vomiting, Disp: 30 tablet, Rfl: 6    sildenafil (REVATIO) 20 mg tablet, Take 1 tablet (20 mg total) by mouth as needed (PRN) Take 1-5 tablets as needed, Disp: 90 tablet, Rfl: 2  No Known Allergies  Vitals:    07/23/19 1615   BP: 122/84   Pulse: 65   Resp: 15   Temp: 97 9 °F (36 6 °C)       Physical Exam  Constitutional: General appearance: The Patient is well-developed and well-nourished who appears the stated age in no acute distress  Patient is pleasant and talkative  HEENT:  Normocephalic  Sclerae are anicteric  Mucous membranes are moist  Neck is supple without adenopathy  No JVD  Chest: The lungs are clear to auscultation  Cardiac: Heart is regular rate  Abdomen: Abdomen is soft, non-tender, non-distended and without masses  Extremities: There is no clubbing or cyanosis  There is no edema  Symmetric  Neuro: Grossly nonfocal  Gait is normal      Lymphatic: No evidence of cervical adenopathy bilaterally  No evidence of axillary adenopathy bilaterally  No evidence of inguinal adenopathy bilaterally  Skin: Warm, anicteric  Psych:  Patient is pleasant and talkative    Breasts:        Pathology:  [unfilled]    Labs:  Lab Results   Component Value Date    CEA 0 7 04/16/2019         Imaging  Ct Chest Abdomen Pelvis W Contrast    Result Date: 7/18/2019  Narrative: CT CHEST, ABDOMEN AND PELVIS WITH IV CONTRAST INDICATION:   C18 9: Malignant neoplasm of colon, unspecified C78 7: Secondary malignant neoplasm of liver and intrahepatic bile duct  COMPARISON:  CT abdomen /pelvis 1/10/2019  MRI abdomen 10/19/2018  CT chest abdomen pelvis 10/8/2018  PET CT 1/11/2018  TECHNIQUE: CT examination of the chest, abdomen and pelvis was performed  Axial, sagittal, and coronal 2D reformatted images were created from the source data and submitted for interpretation  Radiation dose length product (DLP) for this visit:  798 mGy-cm   This examination, like all CT scans performed in the [facility], was performed utilizing techniques to minimize radiation dose exposure, including the use of iterative reconstruction and automated exposure control  IV Contrast:  100 mL of iohexol (OMNIPAQUE) Enteric Contrast: Enteric contrast was administered  FINDINGS: CHEST LUNGS:  Stable upper lobe paraseptal emphysema  Stable pulmonary nodules: 4 mm subpleural right lower lobe nodule #4/93  2 mm right lower lobe subpleural nodule #4/80  4 mm right lower lobe nodule #4/75  3 mm left lower lobe nodule #4/75  3 mm left lower lobe subpleural nodule #4/84  No new pulmonary findings  No endotracheal or endobronchial lesion  PLEURA:  Mild biapical pleural/parenchymal scarring  Otherwise unremarkable  HEART/GREAT VESSELS:  Coronary calcifications  MEDIASTINUM AND MAHENDRA:  Unremarkable  CHEST WALL AND LOWER NECK:   Known 3 mm right thyroid nodule  Right anterior chest wall port remains in place  ABDOMEN LIVER/BILIARY TREE:  The previously seen 38 mm hepatic dome lesion now measures 34 mm  GALLBLADDER:  No calcified gallstones  No pericholecystic inflammatory change  SPLEEN:  Unremarkable  PANCREAS:  Unremarkable  ADRENAL GLANDS:  Unremarkable  KIDNEYS/URETERS:  No hydronephrosis  Stable left renal cysts  STOMACH AND BOWEL:  Moderate fecal retention  No bowel obstruction  Distal colonic anastomosis   No apparent complications  APPENDIX:  Noninflamed  ABDOMINOPELVIC CAVITY:  No ascites or free intraperitoneal air  No lymphadenopathy  VESSELS:  Unremarkable for patient's age  PELVIS REPRODUCTIVE ORGANS:  Unremarkable for patient's age  URINARY BLADDER:  Unremarkable  ABDOMINAL WALL/INGUINAL REGIONS:  Stable postsurgical appearance OSSEOUS STRUCTURES:  No acute fracture or destructive osseous lesion  Stable scattered focal areas throughout the pelvis are likely bone islands     Impression: Stable scattered 4 mm and smaller pulmonary nodules  No new findings in the chest  Decreased size of the hepatic dome lesion which previously measured 38 mm, now measures 34 mm  Workstation performed: [id number]     I reviewed the above laboratory and imaging data  Discussion/Summary: 28-year-old male with metastatic rectosigmoid cancer  His imaging is stable  The ablation site continues to decrease in size slowly   His CEA is normal    I have recommended observation  I will repeat his CT in 6 months  I will see him again in 6 months   Tiburcio Funk will see him again at that time for another clinical exam  Marline Rudolph is agreeable to this   All his questions were answered

## 2019-07-23 NOTE — PROGRESS NOTES
Surgical Oncology Follow Up       [address] [facility] SURGICAL ONCOLOGY [place]  [address] [facility] [address]  [place] [address]    Surya Miller  1970  [id number]  [facility] SURGICAL ONCOLOGY [place]  [address]   [facility] [place] [address]    Diagnoses and all orders for this visit:    Encounter for follow-up examination after completed treatment for malignant neoplasm  -     CT chest abdomen pelvis w contrast; Future  -     BUN; Future  -     Creatinine, serum; Future    Personal history of colon cancer, stage IV  -     CT chest abdomen pelvis w contrast; Future  -     BUN; Future  -     Creatinine, serum; Future        Chief Complaint   Patient presents with    Follow-up     6 MO Metastatic colon cancer to liver  F/U       Return in about 6 months (around 1/23/2020) for Office Visit, Imaging - See orders, with Dylon Primus  Personal history of colon cancer, stage IV    1/2018 Initial Diagnosis     Malignant neoplasm of sigmoid colon ([facility] [address] )      1/22/2018 Biopsy     Large Intestine, Sigmoid Colon, Recto-sigmoid tumor bx:    - Invasive colonic adenocarcinoma, moderately differentiated      2/6/2018 - 10/16/2018 Chemotherapy     Modified FOLFOX6 x 12 cycles  Added Erbitux on 3/20/18       6/21/2018 Surgery     Segment 7 liver resection/ablation, hemicolectomy      10/30/2018 -  Chemotherapy     Continuation of Single agent Erbitux  With 10/30/18 chemo, it was Cycle #14  Plan was for 6 additional cycles of Erbitux alone  Staging:  Metastatic rectosigmoid cancer  [id number]  Treatment history:   Modified FOLFOX 6   Erbitux added 03/20/2018   Segment 7 liver resection, June 2018  Current treatment:  Observation  Disease status:   Stable    History of Present Illness:  Patient returns in follow-up of his CT  He is doing well at this time with no complaints  He just had a negative colonoscopy  He is feeling well    He denies any abdominal pain, nausea or vomiting  His appetite is good  CT of his chest, abdomen and pelvis from July 15, 2019 reveals no evidence of recurrence  There are stable pulmonary nodules  The ablation site and now measures 3 4 cm from 3 8 cm  I personally reviewed the films  His CEA is normal at 0 7  Review of Systems  Complete ROS Surg Onc:   Complete ROS Surg Onc:   Constitutional: The patient denies new or recent history of general fatigue, no recent weight loss, no change in appetite  Eyes: No complaints of visual problems, no scleral icterus  ENT: no complaints of ear pain, no hoarseness, no difficulty swallowing,  no tinnitus and no new masses in head, oral cavity, or neck  Cardiovascular: No complaints of chest pain, no palpitations, no ankle edema  Respiratory: No complaints of shortness of breath, no cough  Gastrointestinal: No complaints of jaundice, no bloody stools, no pale stools  Genitourinary: No complaints of dysuria, no hematuria, no nocturia, no frequent urination, no urethral discharge  Musculoskeletal: No complaints of weakness, paralysis, joint stiffness or arthralgias  Integumentary: No complaints of rash, no new lesions  Neurological: No complaints of convulsions, no seizures, no dizziness  Hematologic/Lymphatic: No complaints of easy bruising  Endocrine:  No hot or cold intolerance  No polydipsia, polyphagia, or polyuria  Allergy/immunology:  No environmental allergies  No food allergies  Not immunocompromised  Skin:  No pallor or rash  No wound          Patient Active Problem List   Diagnosis    ED (erectile dysfunction)    Personal history of colon cancer, stage IV    GERD (gastroesophageal reflux disease)    Pulmonary nodule, right    Acneiform rash    Encounter for follow-up examination after completed treatment for malignant neoplasm     Past Medical History:   Diagnosis Date    [facility]     Dysuria     Last Assessed:8/24/17    GERD (gastroesophageal reflux disease)     Liver nodule     Pulmonary nodule, right      Past Surgical History:   Procedure Laterality Date    COLONOSCOPY N/A 1/22/2018    Procedure: COLONOSCOPY;  Surgeon: Marcela Gusman MD;  [facility]: [facility]; Service: Colorectal   Tiana Sherri DENTAL SURGERY  2016    Crown with bridge work    FLEXIBLE SIGMOIDOSCOPY N/A 6/15/2018    Procedure: SIGMOIDOSCOPY FLEXIBLE w/o sedation w/ tattoo;  Surgeon: Marcela Gusman MD;  [facility]; Service: Colorectal    LIVER LOBECTOMY N/A 6/21/2018    Procedure: liver resection/ablation, intraoperative ultrasound of liver;  Surgeon: Luigi Rice MD;  [facility];  Service: Surgical Oncology    MO EXPLORATORY OF ABDOMEN N/A 6/21/2018    Procedure: LAPAROTOMY EXPLORATORY;  Surgeon: Marcela Gusman MD;  [facility];  Service: Colorectal    MO INSERT PICC W/ SUB-Q PORT N/A 1/25/2018    Procedure: PORT-A-CATHETER PLACEMENT  Fluoroscopy for performance/interpretation;  Surgeon: Marcela Gusman MD;  [facility];  Service: Colorectal    MO PART REMOVAL COLON W ANASTOMOSIS Left 6/21/2018    Procedure: hemicolectomy, low anterior resection (open) SPY fluorescence angiography;  Surgeon: Marcela Gusman MD;  [facility];  Service: Colorectal    MO PROCTECTOMY,PARTIAL N/A 6/21/2018    Procedure: Partial proctectomy ;  Surgeon: Marcela Gusman MD;  [facility];  Service: Colorectal    MO SIGMOIDOSCOPY FLX DX W/COLLJ SPEC BR/WA IF PFRMD N/A 6/21/2018    Procedure: Hugh Jha;  Surgeon: Marcela Gusman MD;  [facility];  Service: Colorectal    ROOT CANAL  2015    TESTICLE SURGERY      Exploration of Undescended Testis rIght, age 8 had surgery for undescended testicle;  Last Assessed:8/24/17    TOOTH EXTRACTION  2015     Family History   Problem Relation Age of Onset    No Known Problems Father     Cancer Mother     Cancer Brother         pt  reports brother was diagnosed with stage 4 throat cancer     Social History     Socioeconomic History    Marital status: Single     Spouse name: Not on file    Number of children: Not on file    Years of education: Not on file    Highest education level: Not on file   Occupational History    Not on file   Social Needs    Financial resource strain: Not on file    Food insecurity:     Worry: Not on file     Inability: Not on file    Transportation needs:     Medical: Not on file     Non-medical: Not on file   Tobacco Use    Smoking status: Former Smoker     Types: E-Cigarettes    Smokeless tobacco: Current User    Tobacco comment: quit 4 years ago / smoked for 20 years    Substance and Sexual Activity    Alcohol use: Yes     Comment: socially - 2 month    Drug use: Yes     Types: Marijuana     Comment: per [employer]: occasional recreation drug use    Sexual activity: Not on file     Comment: Resides alone   Lifestyle    Physical activity:     Days per week: Not on file     Minutes per session: Not on file    Stress: Not on file   Relationships    Social connections:     Talks on phone: Not on file     Gets together: Not on file     Attends Protestant service: Not on file     Active member of club or organization: Not on file     Attends meetings of clubs or organizations: Not on file     Relationship status: Not on file    Intimate partner violence:     Fear of current or ex partner: Not on file     Emotionally abused: Not on file     Physically abused: Not on file     Forced sexual activity: Not on file   Other Topics Concern    Not on file   Social History Narrative    Not on file       Current Outpatient Medications:     clindamycin (CLINDAGEL) 1 % gel, Apply topically 2 (two) times a day, Disp: 30 g, Rfl: 0    minocycline (MINOCIN) 100 mg capsule, , Disp: , Rfl:     omeprazole (PriLOSEC) 40 MG capsule, Take 40 mg by mouth daily, Disp: , Rfl:     prochlorperazine (COMPAZINE) 10 mg tablet, Take 1 tablet (10 mg total) by mouth every 6 (six) hours as needed for nausea or vomiting, Disp: 30 tablet, Rfl: 6    sildenafil (REVATIO) 20 mg tablet, Take 1 tablet (20 mg total) by mouth as needed (PRN) Take 1-5 tablets as needed, Disp: 90 tablet, Rfl: 2  No Known Allergies  Vitals:    07/23/19 1615   BP: 122/84   Pulse: 65   Resp: 15   Temp: 97 9 °F (36 6 °C)       Physical Exam  Constitutional: General appearance: The Patient is well-developed and well-nourished who appears the stated age in no acute distress  Patient is pleasant and talkative  HEENT:  Normocephalic  Sclerae are anicteric  Mucous membranes are moist  Neck is supple without adenopathy  No JVD  Chest: The lungs are clear to auscultation  Cardiac: Heart is regular rate  Abdomen: Abdomen is soft, non-tender, non-distended and without masses  Extremities: There is no clubbing or cyanosis  There is no edema  Symmetric  Neuro: Grossly nonfocal  Gait is normal      Lymphatic: No evidence of cervical adenopathy bilaterally  No evidence of axillary adenopathy bilaterally  No evidence of inguinal adenopathy bilaterally  Skin: Warm, anicteric  Psych:  Patient is pleasant and talkative  Breasts:        Pathology:  [unfilled]    Labs:  Lab Results   Component Value Date    CEA 0 7 04/16/2019         Imaging  Ct Chest Abdomen Pelvis W Contrast    Result Date: 7/18/2019  Narrative: CT CHEST, ABDOMEN AND PELVIS WITH IV CONTRAST INDICATION:   C18 9: Malignant neoplasm of colon, unspecified C78 7: Secondary malignant neoplasm of liver and intrahepatic bile duct  COMPARISON:  CT abdomen /pelvis 1/10/2019  MRI abdomen 10/19/2018  CT chest abdomen pelvis 10/8/2018  PET CT 1/11/2018  TECHNIQUE: CT examination of the chest, abdomen and pelvis was performed  Axial, sagittal, and coronal 2D reformatted images were created from the source data and submitted for interpretation  Radiation dose length product (DLP) for this visit:  798 mGy-cm     This examination, like all CT scans performed in the [facility], was performed utilizing techniques to minimize radiation dose exposure, including the use of iterative reconstruction and automated exposure control  IV Contrast:  100 mL of iohexol (OMNIPAQUE) Enteric Contrast: Enteric contrast was administered  FINDINGS: CHEST LUNGS:  Stable upper lobe paraseptal emphysema  Stable pulmonary nodules: 4 mm subpleural right lower lobe nodule #4/93  2 mm right lower lobe subpleural nodule #4/80  4 mm right lower lobe nodule #4/75  3 mm left lower lobe nodule #4/75  3 mm left lower lobe subpleural nodule #4/84  No new pulmonary findings  No endotracheal or endobronchial lesion  PLEURA:  Mild biapical pleural/parenchymal scarring  Otherwise unremarkable  HEART/GREAT VESSELS:  Coronary calcifications  MEDIASTINUM AND MAHENDRA:  Unremarkable  CHEST WALL AND LOWER NECK:   Known 3 mm right thyroid nodule  Right anterior chest wall port remains in place  ABDOMEN LIVER/BILIARY TREE:  The previously seen 38 mm hepatic dome lesion now measures 34 mm  GALLBLADDER:  No calcified gallstones  No pericholecystic inflammatory change  SPLEEN:  Unremarkable  PANCREAS:  Unremarkable  ADRENAL GLANDS:  Unremarkable  KIDNEYS/URETERS:  No hydronephrosis  Stable left renal cysts  STOMACH AND BOWEL:  Moderate fecal retention  No bowel obstruction  Distal colonic anastomosis  No apparent complications  APPENDIX:  Noninflamed  ABDOMINOPELVIC CAVITY:  No ascites or free intraperitoneal air  No lymphadenopathy  VESSELS:  Unremarkable for patient's age  PELVIS REPRODUCTIVE ORGANS:  Unremarkable for patient's age  URINARY BLADDER:  Unremarkable  ABDOMINAL WALL/INGUINAL REGIONS:  Stable postsurgical appearance OSSEOUS STRUCTURES:  No acute fracture or destructive osseous lesion  Stable scattered focal areas throughout the pelvis are likely bone islands     Impression: Stable scattered 4 mm and smaller pulmonary nodules   No new findings in the chest  Decreased size of the hepatic dome lesion which previously measured 38 mm, now measures 34 mm  Workstation performed: [id number]     I reviewed the above laboratory and imaging data  Discussion/Summary: 46-year-old male with metastatic rectosigmoid cancer  His imaging is stable  The ablation site continues to decrease in size slowly   His CEA is normal    I have recommended observation  I will repeat his CT in 6 months  I will see him again in 6 months   Mckayla Harrison will see him again at that time for another clinical exam  [facility] is agreeable to this   All his questions were answered

## 2019-07-25 ENCOUNTER — TELEPHONE (OUTPATIENT)
Dept: HEMATOLOGY ONCOLOGY | Facility: CLINIC | Age: 49
End: 2019-07-25

## 2019-07-25 NOTE — TELEPHONE ENCOUNTER
Called pt but there was no answer and the mailbox was full so I couldn't leave a vm  I will try him again later

## 2019-07-25 NOTE — TELEPHONE ENCOUNTER
Scheduled pt for tomorrow at 330pm at [facility]  Pt is aware  Pt is also aware to get labs done before he comes back in to see Dr Sebastián Abdul

## 2019-07-25 NOTE — TELEPHONE ENCOUNTER
----- Message from Ileana Burgess RN sent at 7/25/2019  2:10 PM EDT -----  Regarding: FW: Adama ZURITA  Contact: [phone]  Please schedule patient for port flush  Patient will need labs prior to seeing Dr Quiana Barker   ----- Message -----  From: Palmira Booker  Sent: 7/25/2019   1:20 PM EDT  To: Ileana Burgess RN  Subject: FW: Adama ZURITA                                          ----- Message -----  From: Michaela Potter  Sent: 7/23/2019   4:52 PM EDT  To: Darling Souza  Subject: Pt  K F  Pt  Is requesting a port flush to be scheduled ASAP  Was probably due the end of June  He was also wondering if his blood work should be done before his appt  With Dr Quiana Barker?

## 2019-07-26 ENCOUNTER — HOSPITAL ENCOUNTER (OUTPATIENT)
Dept: INFUSION CENTER | Facility: CLINIC | Age: 49
Discharge: HOME | End: 2019-07-26
Payer: COMMERCIAL

## 2019-07-26 DIAGNOSIS — Z85.038 PERSONAL HISTORY OF COLON CANCER, STAGE IV: Primary | ICD-10-CM

## 2019-07-26 PROCEDURE — 96523 IRRIG DRUG DELIVERY DEVICE: CPT

## 2019-07-26 NOTE — PROGRESS NOTES
Patient arrived for port flush  Offers no complaints  Port flushed per protocol  Patient verified upcoming appointment and schedule provided  Patient states he needs to have blood work drawn before his appointment with Dr Laure Cota in October  No lab orders in [facility]   Emailed Ector Wahl to have blood work entered into [facility]

## 2019-07-29 DIAGNOSIS — C18.9 MALIGNANT NEOPLASM OF COLON, UNSPECIFIED PART OF COLON (HCC): Primary | ICD-10-CM

## 2019-08-20 ENCOUNTER — HOSPITAL ENCOUNTER (OUTPATIENT)
Dept: INFUSION CENTER | Facility: CLINIC | Age: 49
Discharge: HOME | End: 2019-08-20
Payer: COMMERCIAL

## 2019-08-20 PROCEDURE — 96523 IRRIG DRUG DELIVERY DEVICE: CPT

## 2019-09-18 ENCOUNTER — HOSPITAL ENCOUNTER (OUTPATIENT)
Dept: INFUSION CENTER | Facility: CLINIC | Age: 49
Discharge: HOME | End: 2019-09-18
Payer: COMMERCIAL

## 2019-09-18 PROCEDURE — 96523 IRRIG DRUG DELIVERY DEVICE: CPT

## 2019-09-18 NOTE — PROGRESS NOTES
Pt resting with no complaints, Port accessed, flushed, saline-locked and deaccessed without issue  Declined AVS  Aware of next appt

## 2019-10-01 ENCOUNTER — TELEPHONE (OUTPATIENT)
Dept: HEMATOLOGY ONCOLOGY | Facility: CLINIC | Age: 49
End: 2019-10-01

## 2019-10-14 ENCOUNTER — APPOINTMENT (OUTPATIENT)
Dept: LAB | Facility: CLINIC | Age: 49
End: 2019-10-14
Payer: COMMERCIAL

## 2019-10-14 ENCOUNTER — TRANSCRIBE ORDERS (OUTPATIENT)
Dept: LAB | Facility: CLINIC | Age: 49
End: 2019-10-14

## 2019-10-14 DIAGNOSIS — C18.9 MALIGNANT NEOPLASM OF COLON, UNSPECIFIED PART OF COLON (HCC): ICD-10-CM

## 2019-10-14 LAB
ALBUMIN SERPL BCP-MCNC: 4 G/DL (ref 3.5–5)
ALP SERPL-CCNC: 49 U/L (ref 46–116)
ALT SERPL W P-5'-P-CCNC: 21 U/L (ref 12–78)
ANION GAP SERPL CALCULATED.3IONS-SCNC: 9 MMOL/L (ref 4–13)
AST SERPL W P-5'-P-CCNC: 19 U/L (ref 5–45)
BASOPHILS # BLD AUTO: 0.01 THOUSANDS/ΜL (ref 0–0.1)
BASOPHILS NFR BLD AUTO: 0 % (ref 0–1)
BILIRUB SERPL-MCNC: 0.4 MG/DL (ref 0.2–1)
BUN SERPL-MCNC: 15 MG/DL (ref 5–25)
CALCIUM SERPL-MCNC: 8.9 MG/DL (ref 8.3–10.1)
CEA SERPL-MCNC: 1.6 NG/ML (ref 0–3)
CHLORIDE SERPL-SCNC: 104 MMOL/L (ref 100–108)
CO2 SERPL-SCNC: 26 MMOL/L (ref 21–32)
CREAT SERPL-MCNC: 0.91 MG/DL (ref 0.6–1.3)
EOSINOPHIL # BLD AUTO: 0.04 THOUSAND/ΜL (ref 0–0.61)
EOSINOPHIL NFR BLD AUTO: 1 % (ref 0–6)
ERYTHROCYTE [DISTWIDTH] IN BLOOD BY AUTOMATED COUNT: 12.3 % (ref 11.6–15.1)
GFR SERPL CREATININE-BSD FRML MDRD: 101 ML/MIN/1.73SQ M
GLUCOSE SERPL-MCNC: 94 MG/DL (ref 65–140)
HCT VFR BLD AUTO: 40.7 % (ref 36.5–49.3)
HGB BLD-MCNC: 14.2 G/DL (ref 12–17)
IMM GRANULOCYTES # BLD AUTO: 0.01 THOUSAND/UL (ref 0–0.2)
IMM GRANULOCYTES NFR BLD AUTO: 0 % (ref 0–2)
LYMPHOCYTES # BLD AUTO: 1.75 THOUSANDS/ΜL (ref 0.6–4.47)
LYMPHOCYTES NFR BLD AUTO: 35 % (ref 14–44)
MCH RBC QN AUTO: 33.6 PG (ref 26.8–34.3)
MCHC RBC AUTO-ENTMCNC: 34.9 G/DL (ref 31.4–37.4)
MCV RBC AUTO: 96 FL (ref 82–98)
MONOCYTES # BLD AUTO: 0.43 THOUSAND/ΜL (ref 0.17–1.22)
MONOCYTES NFR BLD AUTO: 9 % (ref 4–12)
NEUTROPHILS # BLD AUTO: 2.74 THOUSANDS/ΜL (ref 1.85–7.62)
NEUTS SEG NFR BLD AUTO: 55 % (ref 43–75)
NRBC BLD AUTO-RTO: 0 /100 WBCS
PLATELET # BLD AUTO: 158 THOUSANDS/UL (ref 149–390)
PMV BLD AUTO: 11 FL (ref 8.9–12.7)
POTASSIUM SERPL-SCNC: 3.8 MMOL/L (ref 3.5–5.3)
PROT SERPL-MCNC: 7.6 G/DL (ref 6.4–8.2)
RBC # BLD AUTO: 4.23 MILLION/UL (ref 3.88–5.62)
SODIUM SERPL-SCNC: 139 MMOL/L (ref 136–145)
WBC # BLD AUTO: 4.98 THOUSAND/UL (ref 4.31–10.16)

## 2019-10-14 PROCEDURE — 82378 CARCINOEMBRYONIC ANTIGEN: CPT

## 2019-10-14 PROCEDURE — 85025 COMPLETE CBC W/AUTO DIFF WBC: CPT

## 2019-10-14 PROCEDURE — 36415 COLL VENOUS BLD VENIPUNCTURE: CPT

## 2019-10-14 PROCEDURE — 80053 COMPREHEN METABOLIC PANEL: CPT

## 2019-10-18 ENCOUNTER — OFFICE VISIT (OUTPATIENT)
Dept: HEMATOLOGY ONCOLOGY | Facility: CLINIC | Age: 49
End: 2019-10-18
Payer: COMMERCIAL

## 2019-10-18 VITALS
DIASTOLIC BLOOD PRESSURE: 76 MMHG | OXYGEN SATURATION: 97 % | WEIGHT: 152 LBS | HEIGHT: 67 IN | BODY MASS INDEX: 23.86 KG/M2 | TEMPERATURE: 97 F | RESPIRATION RATE: 16 BRPM | HEART RATE: 72 BPM | SYSTOLIC BLOOD PRESSURE: 112 MMHG

## 2019-10-18 DIAGNOSIS — C18.9 METASTATIC COLON CANCER TO LIVER (HCC): Primary | ICD-10-CM

## 2019-10-18 DIAGNOSIS — C78.7 METASTATIC COLON CANCER TO LIVER (HCC): Primary | ICD-10-CM

## 2019-10-18 PROCEDURE — 99214 OFFICE O/P EST MOD 30 MIN: CPT | Performed by: NURSE PRACTITIONER
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
## 2019-11-13 ENCOUNTER — HOSPITAL ENCOUNTER (OUTPATIENT)
Dept: INFUSION CENTER | Facility: CLINIC | Age: 49
Discharge: HOME | End: 2019-11-13
Payer: COMMERCIAL

## 2019-11-13 DIAGNOSIS — Z85.038 PERSONAL HISTORY OF COLON CANCER, STAGE IV: Primary | ICD-10-CM

## 2019-11-13 PROCEDURE — 96523 IRRIG DRUG DELIVERY DEVICE: CPT

## 2020-01-08 ENCOUNTER — HOSPITAL ENCOUNTER (OUTPATIENT)
Dept: INFUSION CENTER | Facility: CLINIC | Age: 50
Discharge: HOME | End: 2020-01-08

## 2020-01-08 NOTE — PROGRESS NOTES
Patient here for port flush  Port flushed without issue  Blood return noted  No issues with port at this time  Patient given AVS  Reviewed upcoming appointment with patient

## 2020-01-23 ENCOUNTER — HOSPITAL ENCOUNTER (OUTPATIENT)
Dept: CT IMAGING | Facility: HOSPITAL | Age: 50
Discharge: HOME | End: 2020-01-23
Attending: SURGERY
Payer: COMMERCIAL

## 2020-01-23 DIAGNOSIS — Z85.038 PERSONAL HISTORY OF COLON CANCER, STAGE IV: ICD-10-CM

## 2020-01-23 DIAGNOSIS — Z08 ENCOUNTER FOR FOLLOW-UP EXAMINATION AFTER COMPLETED TREATMENT FOR MALIGNANT NEOPLASM: ICD-10-CM

## 2020-01-23 PROCEDURE — 74177 CT ABD & PELVIS W/CONTRAST: CPT

## 2020-01-23 PROCEDURE — 71260 CT THORAX DX C+: CPT

## 2020-01-23 RX ADMIN — IOHEXOL 100 ML: 350 INJECTION, SOLUTION INTRAVENOUS at 18:18

## 2020-01-29 ENCOUNTER — OFFICE VISIT (OUTPATIENT)
Dept: SURGICAL ONCOLOGY | Facility: CLINIC | Age: 50
End: 2020-01-29
Payer: COMMERCIAL

## 2020-01-29 ENCOUNTER — TELEPHONE (OUTPATIENT)
Dept: UROLOGY | Facility: AMBULATORY SURGERY CENTER | Age: 50
End: 2020-01-29

## 2020-01-29 ENCOUNTER — TELEPHONE (OUTPATIENT)
Dept: HEMATOLOGY ONCOLOGY | Facility: CLINIC | Age: 50
End: 2020-01-29

## 2020-01-29 VITALS
RESPIRATION RATE: 16 BRPM | TEMPERATURE: 98.8 F | BODY MASS INDEX: 24.01 KG/M2 | HEART RATE: 78 BPM | WEIGHT: 153 LBS | OXYGEN SATURATION: 97 % | DIASTOLIC BLOOD PRESSURE: 88 MMHG | SYSTOLIC BLOOD PRESSURE: 122 MMHG | HEIGHT: 67 IN

## 2020-01-29 DIAGNOSIS — N13.39 OTHER HYDRONEPHROSIS: ICD-10-CM

## 2020-01-29 DIAGNOSIS — Z85.038 PERSONAL HISTORY OF COLON CANCER, STAGE IV: Primary | ICD-10-CM

## 2020-01-29 DIAGNOSIS — N13.30 HYDRONEPHROSIS, UNSPECIFIED HYDRONEPHROSIS TYPE: Primary | ICD-10-CM

## 2020-01-29 DIAGNOSIS — Z08 ENCOUNTER FOR FOLLOW-UP EXAMINATION AFTER COMPLETED TREATMENT FOR MALIGNANT NEOPLASM: ICD-10-CM

## 2020-01-29 PROBLEM — N13.5 URETEROPELVIC JUNCTION (UPJ) OBSTRUCTION: Status: ACTIVE | Noted: 2020-01-29

## 2020-01-29 PROCEDURE — 99214 OFFICE O/P EST MOD 30 MIN: CPT | Performed by: NURSE PRACTITIONER

## 2020-01-29 NOTE — TELEPHONE ENCOUNTER
Genetics New Patient Intake Form   Patient Details:     Brian Garner    1970    [id number]    Background Information:       Who is calling to schedule? If not self, relationship to patient? Provider office   Referring Provider Shaun Delatorre   Is this a personal or family history? personal   If personal history, what age were you at diagnosis? 41   What is the type of tumor? Colon ca   Did the patient schedule an appointment? Yes   Date and Provider Name  [facility] 2/28   Scheduling Information:   Preferred [place]:   [facility], located within [facility]   Are there any dates/time the patient cannot be seen? pref afternoon   Miscellaneous:    After completing the above information, please route to Oncology Genetics

## 2020-01-30 NOTE — TELEPHONE ENCOUNTER
Received message from [place], [address]  Patient found to have mild left sided hydronephrosis and left renal cyst   Image was reviewed at his office visit for colon cancer follow up  Recommend renal ultrasound prior to upcoming visit in March 2020

## 2020-01-30 NOTE — TELEPHONE ENCOUNTER
Call placed to patient, advised of recommendation for renal US prior to upcoming visit  Patient prefers to schedule himself, provided patient with central scheduling number  Patient verbalized understanding and agrees with plan

## 2020-01-30 NOTE — TELEPHONE ENCOUNTER
Patient managed by Dr Johnathon Browne seen in in [facility] with history of erectile dysfunction and colon cancer, stage IV  Received message from Danyelle Mason  Patient found to have mild left sided hydronephrosis and left renal cyst   Image was reviewed at his office visit for colon cancer follow up  Recommended renal ultrasound prior to upcoming visit in March 2020  Attempted to call Patient with no answer and unable to leave message due to "mail box full"  Will attempt to call again later

## 2020-01-30 NOTE — PROGRESS NOTES
Surgical Oncology Follow Up       [address] [facility],[address]  [facility]  [address] [address]    Hernesto Motley  1970  [id number]      Chief Complaint   Patient presents with    Follow-up     colon cancer       Assessment/Plan:  1  Personal history of colon cancer, stage IV  - Ambulatory Referral to Oncology Genetics; Future  - BUN; Future  - Creatinine, serum; Future  - CT chest abdomen pelvis w contrast; Future  - 6 mo f/u visit with Dr Narciso Ferrer  - CEA per medical oncology  - colonoscopy due 1/2022    2  Encounter for follow-up examination after completed treatment for malignant neoplasm    3  Other hydronephrosis  -f/u with Urology       Discussion/Summary: Patient is a 57-year-old male who presents today for a six-month follow-up visit for metastatic colon cancer  He was diagnosed with rectosigmoid cancer in January of 2018  He underwent neoadjuvant chemotherapy and then underwent a left hemicolectomy, low anterior resection, liver resection/ablation with Dr Narciso Ferrer and Dr Lionel Pepe in 6/2018  He completed chemoradiation therapy in January 2019  He had a colonoscopy in 2019- repeat recommended 2022  His CEA is normal at 1 6  He had a CT of the chest abdomen and pelvis performed 1/23/2020 which was essentially stable  There are tiny stable lung nodules and post treatment changes to the liver which are stable  There is a 13 mm liver lesion in the left lobe which is believed to represent an atypical hemangioma, will continue to monitor  There is mild left hydronephrosis secondary to a left parapelvic cyst- I instructed him to f/u with urology and he is already scheduled to see them in mid March  He has no new complaints today  He feels well, denies abdominal pain and his weight is stable  We did discuss genetic testing today   Given his young age at diagnosis and family history (mother with metastatic cancer at diagnosis- unknown primary- possible endometrial and brother with metastatic throat cancer at young age) I have encouraged him to meet with our genetics counselor for a discussion about testing  He is agreeable  We will plan to see him back in 6 months with a repeat CT scan  His medical oncologist has been following his CEA  I did instruct him to call with any new concerns or symptoms prior to his next visit  He will continue to follow with his other specialists as well  All of his questions were answered today  History of Present Illness:        Personal history of colon cancer, stage IV    1/2018 Initial Diagnosis     Malignant neoplasm of sigmoid colon ([facility] [address] )      1/22/2018 Biopsy     Large Intestine, Sigmoid Colon, Recto-sigmoid tumor bx:    - Invasive colonic adenocarcinoma, moderately differentiated      2/6/2018 - 10/16/2018 Chemotherapy     Modified FOLFOX6 x 12 cycles  Added Erbitux on 3/20/18       6/21/2018 Surgery     Segment 7 liver resection/ablation, hemicolectomy      10/30/2018 -  Chemotherapy     Continuation of Single agent Erbitux  With 10/30/18 chemo, it was Cycle #14  Plan was for 6 additional cycles of Erbitux alone           -Interval History: Patient presents today for a six-month follow-up visit from metastatic colon cancer  His CEA is normal at 1 6  He had a CT of the chest abdomen and pelvis performed 1/23/2020 which revealed small bilateral pulmonology nodules- largest 3 mm, a stable 33 mm lesion in the right hepatic dome consistent with post treatment changes  There is also a 13 mm hypodensity in the left lobe most likely representing a hemangioma  No other evidence of metastatic disease  There is mild left hydronephrosis secondary to a left parapelvic cyst  He denies abdominal pain, nausea, vomiting, weight loss  Denies headaches, back pain, bone pain, cough, SOB  He reports an occasional left sided mid back ache   He continues to follow with colorectal surgery and medical and radiation oncology        Review of Systems:  Review of Systems   Constitutional: Negative for appetite change, chills, diaphoresis, fever and unexpected weight change  Respiratory: Negative for cough, shortness of breath and wheezing  Cardiovascular: Negative for chest pain, palpitations and leg swelling  Gastrointestinal: Negative for abdominal pain, diarrhea, nausea and vomiting  Genitourinary: Positive for flank pain (occasional left flank ache)  Musculoskeletal: Negative for arthralgias, back pain and myalgias  Skin: Negative for rash  Neurological: Negative for light-headedness and headaches  Hematological: Negative for adenopathy  Psychiatric/Behavioral: Negative for agitation and confusion  Patient Active Problem List   Diagnosis    ED (erectile dysfunction)    Personal history of colon cancer, stage IV    GERD (gastroesophageal reflux disease)    Pulmonary nodule, right    Acneiform rash    Encounter for follow-up examination after completed treatment for malignant neoplasm    Other hydronephrosis     Past Medical History:   Diagnosis Date    Cancer ([facility] [address] )     Dysuria     Last Assessed:8/24/17    GERD (gastroesophageal reflux disease)     Liver nodule     Pulmonary nodule, right     Ureteropelvic junction (UPJ) obstruction 1/29/2020     Past Surgical History:   Procedure Laterality Date    COLONOSCOPY N/A 1/22/2018    Procedure: COLONOSCOPY;  Surgeon: Dot Benjamin MD;  [facility]; Service: Colorectal   Julieann Frankel DENTAL SURGERY  2016    Crown with bridge work    FLEXIBLE SIGMOIDOSCOPY N/A 6/15/2018    Procedure: SIGMOIDOSCOPY FLEXIBLE w/o sedation w/ tattoo;  Surgeon: Dot Benjamin MD;  [facility];   Service: Colorectal    LIVER LOBECTOMY N/A 6/21/2018    Procedure: liver resection/ablation, intraoperative ultrasound of liver;  Surgeon: Denver Romance, MD;  [facility];  Service: Surgical Oncology    IL EXPLORATORY OF ABDOMEN N/A 6/21/2018    Procedure: Luis Alberto Villanueva EXPLORATORY;  Surgeon: Ria Howard MD;  [facility];  Service: Colorectal    RI INSERT PICC W/ SUB-Q PORT N/A 1/25/2018    Procedure: PORT-A-CATHETER PLACEMENT  Fluoroscopy for performance/interpretation;  Surgeon: Ria Howard MD;  [facility];  Service: Colorectal    RI PART REMOVAL COLON W ANASTOMOSIS Left 6/21/2018    Procedure: hemicolectomy, low anterior resection (open) SPY fluorescence angiography;  Surgeon: Ria Howard MD;  [facility];  Service: Colorectal    RI PROCTECTOMY,PARTIAL N/A 6/21/2018    Procedure: Partial proctectomy ;  Surgeon: Ria Howard MD;  [facility];  Service: Colorectal    RI SIGMOIDOSCOPY FLX DX W/COLLJ SPEC BR/WA IF PFRMD N/A 6/21/2018    Procedure: Izabela Bogata;  Surgeon: Ria Howard MD;  [facility];  Service: Colorectal    ROOT CANAL  2015    TESTICLE SURGERY      Exploration of Undescended Testis rIght, age 8 had surgery for undescended testicle;  Last Assessed:8/24/17    TOOTH EXTRACTION  2015     Family History   Problem Relation Age of Onset    No Known Problems Father     Cancer Mother     Cancer Brother         pt  reports brother was diagnosed with stage 4 throat cancer     Social History     Socioeconomic History    Marital status: Single     Spouse name: Not on file    Number of children: Not on file    Years of education: Not on file    Highest education level: Not on file   Occupational History    Not on file   Social Needs    Financial resource strain: Not on file    Food insecurity:     Worry: Not on file     Inability: Not on file    Transportation needs:     Medical: Not on file     Non-medical: Not on file   Tobacco Use    Smoking status: Former Smoker     Types: E-Cigarettes    Smokeless tobacco: Current User    Tobacco comment: quit 4 years ago / smoked for 20 years    Substance and Sexual Activity    Alcohol use: Yes     Comment: socially - 2 month    Drug use: Yes     Types: Marijuana     Comment: per [employer]: occasional recreation drug use    Sexual activity: Not on file     Comment: Resides alone   Lifestyle    Physical activity:     Days per week: Not on file     Minutes per session: Not on file    Stress: Not on file   Relationships    Social connections:     Talks on phone: Not on file     Gets together: Not on file     Attends Tenriism service: Not on file     Active member of club or organization: Not on file     Attends meetings of clubs or organizations: Not on file     Relationship status: Not on file    Intimate partner violence:     Fear of current or ex partner: Not on file     Emotionally abused: Not on file     Physically abused: Not on file     Forced sexual activity: Not on file   Other Topics Concern    Not on file   Social History Narrative    Not on file       Current Outpatient Medications:     clindamycin (CLINDAGEL) 1 % gel, Apply topically 2 (two) times a day, Disp: 30 g, Rfl: 0    minocycline (MINOCIN) 100 mg capsule, , Disp: , Rfl:     omeprazole (PriLOSEC) 40 MG capsule, Take 40 mg by mouth daily, Disp: , Rfl:     prochlorperazine (COMPAZINE) 10 mg tablet, Take 1 tablet (10 mg total) by mouth every 6 (six) hours as needed for nausea or vomiting, Disp: 30 tablet, Rfl: 6    sildenafil (REVATIO) 20 mg tablet, Take 1 tablet (20 mg total) by mouth as needed (PRN) Take 1-5 tablets as needed, Disp: 90 tablet, Rfl: 2  No Known Allergies  Vitals:    01/29/20 1523   BP: 122/88   Pulse: 78   Resp: 16   Temp: 98 8 °F (37 1 °C)   SpO2: 97%       Physical Exam   Constitutional: He is oriented to person, place, and time  He appears well-developed and well-nourished  No distress  HENT:   Head: Normocephalic and atraumatic  Neck: Normal range of motion  Cardiovascular: Normal rate, regular rhythm and normal heart sounds  Pulmonary/Chest: Effort normal and breath sounds normal    Abdominal: Soft  Normal appearance   He exhibits no distension and no mass  There is no hepatosplenomegaly  There is no tenderness  Well healed abdominal incisions   Musculoskeletal: Normal range of motion  He exhibits no edema  Lymphadenopathy:     He has no axillary adenopathy  Right: No supraclavicular adenopathy present  Left: No supraclavicular adenopathy present  Neurological: He is alert and oriented to person, place, and time  Skin: Skin is warm and dry  No rash noted  Psychiatric: He has a normal mood and affect  Vitals reviewed  Results:    Last Colonoscopy: 7/2019- due 7/2022 (Dr Cristina Monson)    Labs  Lab Results   Component Value Date    CEA 1 6 10/14/2019       Imaging  Ct Chest Abdomen Pelvis W Contrast    Result Date: 1/29/2020  Narrative: CT CHEST, ABDOMEN AND PELVIS WITH IV CONTRAST INDICATION:   Z08: Encounter for follow-up examination after completed treatment for malignant neoplasm Z85 038: Personal history of other malignant neoplasm of large intestine  COMPARISON:  7/15/2019 TECHNIQUE: CT examination of the chest, abdomen and pelvis was performed  Axial, sagittal, and coronal 2D reformatted images were created from the source data and submitted for interpretation  Radiation dose length product (DLP) for this visit:  783 mGy-cm   This examination, like all CT scans performed in the [facility], was performed utilizing techniques to minimize radiation dose exposure, including the use of iterative reconstruction and automated exposure control  IV Contrast:  100 mL of iohexol (OMNIPAQUE) Enteric Contrast: Enteric contrast was administered  FINDINGS: CHEST LUNGS:  Left lower lobe 3 mm nodule on 4/69, stable  3 mm left lower lobe nodule on 4/78, stable  Right basilar 3 mm nodule on 4/89 appears stable  PLEURA:  Unremarkable  HEART/GREAT VESSELS:  Unremarkable for patient's age  MEDIASTINUM AND MAHENDRA:  Unremarkable  CHEST WALL AND LOWER NECK:   Unremarkable   ABDOMEN LIVER/BILIARY TREE:  33 mm hepatic dome hypoattenuating lesion, stable allowing for differences in measurement technique  Lateral segment left hepatic lobe 13 mm hypodensity is retrospectively stable seen on prior study series 2 image 60  This fills in  on delayed images and could reflect an atypical hemangioma  GALLBLADDER:  No calcified gallstones  No pericholecystic inflammatory change  SPLEEN:  Unremarkable  PANCREAS:  Unremarkable  ADRENAL GLANDS:  Unremarkable  KIDNEYS/URETERS:  Mild left hydronephrosis, slightly delayed nephrogram and prominent left parapelvic cyst also similar to prior study  The cyst may result in partial obstruction  UPJ obstruction could have a similar appearance  Right kidney unremarkable  STOMACH AND BOWEL:  Rectosigmoid anastomosis noted, grossly patent  APPENDIX:  No findings to suggest appendicitis  ABDOMINOPELVIC CAVITY:  No ascites or free intraperitoneal air  No lymphadenopathy  VESSELS:  Unremarkable for patient's age  PELVIS REPRODUCTIVE ORGANS:  Unremarkable for patient's age  URINARY BLADDER:  Unremarkable  ABDOMINAL WALL/INGUINAL REGIONS:  There is a small fat-containing umbilical hernia  OSSEOUS STRUCTURES:  No acute fracture or destructive osseous lesion  Impression: 1  Small bilateral pulmonary nodules measuring up to 3 mm  Based on current Fleischner Society 2017 Guidelines on incidental pulmonary nodule, patients with a known malignancy are at increased risk of metastasis and should receive followup CT at intervals appropriate for the type of cancer and its risk of pulmonary metastases  2   Right hepatic dome hypoattenuating 33 mm lesion, stable allowing for differences in measurement technique  Additional left hepatic lobe 12 mm hypodense nodule appears retrospectively stable and fills in on delayed images  Attention on follow-up  3   Stable mild left hydronephrosis, large parapelvic cyst and slightly delayed nephrogram with excretion noted on delayed images   The study was marked in [facility] for significant notification  Workstation performed: [id number]       I reviewed the above imaging data  Advance Care Planning/Advance Directives:  Discussed disease status, cancer treatment plans and/or cancer treatment goals with the patient

## 2020-02-26 NOTE — PROGRESS NOTES
Pre-Test Genetic Counseling Consult Note    Patient Name: Hilario Jay   /Age: 1970/49 y o  Referring Provider: STEPHANIE Powell     Date of Service: 2020  Genetic Counselor: Yadira Cunningham, [facility]    Interpretation Services: None    Service: Type: In-person consult at [facility] of Visit: 61 minutes      Tariq Nieto was referred to the [address] [place] [address] and Genetic Assessment Program due to his personal history of colon cancer and his family history of breast cancer  Cancer and Treatment History:  Personal history of colon cancer, stage IV at age 42     2018 Initial Diagnosis Malignant neoplasm of sigmoid colon ([address] )     2018 Biopsy  A  Large Intestine, Sigmoid Colon, Recto-sigmoid tumor bx:    - Invasive colonic adenocarcinoma, moderately differentiated    RESULTS OF IMMUNOHISTOCHEMICAL ANALYSIS FOR MISMATCH REPAIR PROTEIN LOSS     INTERPRETATION: No loss of nuclear expression of MMR proteins: low probability of MSI-H (SEE COMMENTS)  RESULTS:  Antibody          Clone               Description                                          Results  MLH1               [id number]        Mismatch repair protein                      Intact nuclear expression  MSH2              [id number]      Mismatch repair protein                      Intact nuclear expression  MSH6              44                    Mismatch repair protein                      Intact nuclear expression  PMS2              MRQ-28          Mismatch repair protein                      Intact nuclear expression       2018 - 10/16/2018 Chemotherapy  Modified FOLFOX6 x 12 cycles  Added Erbitux on 3/20/18      2018 Surgery Segment 7 liver resection/ablation, hemicolectomy  Final Diagnosis  A  Liver, segment 7 (excision):  - Metastatic adenocarcinoma with clear margins (ypM1a)  B   Left colon (left hemicolectomy and partial proctectomy):  - No residual adenocarcinoma in bowel wall  - Tattoo ink identified 10/30/2018 - Chemotherapy   Continuation of Single agent Erbitux  With 10/30/18 chemo, it was Cycle #14  Plan was for 6 additional cycles of Erbitux alone  Melvina Varela identified:  1  MRE11 c 1090C>T (p [id number]  2  PIK3R1 c 1699A>G (p K567E) [id number]  3  TP52 K 054-0B>H Intron splice variant [id number]    Cancer Screening:     Colon  7/9/19 Colonoscopy: WNL,; repeat recommended 2022    Skin   Skin cancer screening: None      Prostate  Prostate cancer screening: None      Medical and Surgical History  Pertinent surgical history:   Past Surgical History:   Procedure Laterality Date    COLONOSCOPY N/A 1/22/2018    Procedure: COLONOSCOPY;  Surgeon: Judy Stroud MD;  [facility]; Service: Colorectal   [facility] DENTAL SURGERY  2016    Crown with bridge work    FLEXIBLE SIGMOIDOSCOPY N/A 6/15/2018    Procedure: SIGMOIDOSCOPY FLEXIBLE w/o sedation w/ tattoo;  Surgeon: Judy Stroud MD;  [facility];   Service: Colorectal    LIVER LOBECTOMY N/A 6/21/2018    Procedure: liver resection/ablation, intraoperative ultrasound of liver;  Surgeon: Diane Helm MD;  [facility];  Service: Surgical Oncology    VT EXPLORATORY OF ABDOMEN N/A 6/21/2018    Procedure: LAPAROTOMY EXPLORATORY;  Surgeon: Judy Stroud MD;  [facility];  Service: Colorectal    VT INSERT PICC W/ SUB-Q PORT N/A 1/25/2018    Procedure: PORT-A-CATHETER PLACEMENT  Fluoroscopy for performance/interpretation;  Surgeon: Judy Stroud MD;  [facility];  Service: Colorectal    VT PART REMOVAL COLON W ANASTOMOSIS Left 6/21/2018    Procedure: hemicolectomy, low anterior resection (open) SPY fluorescence angiography;  Surgeon: Judy Stroud MD;  [facility];  Service: Colorectal    VT PROCTECTOMY,PARTIAL N/A 6/21/2018    Procedure: Partial proctectomy ;  Surgeon: Judy Stroud MD;  [facility];  Service: Colorectal    VT SIGMOIDOSCOPY FLX DX W/COLLJ SPEC BR/WA IF PFRMD N/A 2018    Procedure: Darlyn Flores;  Surgeon: Iron Maya MD;  [facility];  Service: Colorectal    ROOT CANAL      TESTICLE SURGERY      Exploration of Undescended Testis rIght, age 8 had surgery for undescended testicle; Last Assessed:17    TOOTH EXTRACTION        Pertinent medical history:  Past Medical History:   Diagnosis Date    Cancer ([address] )     Dysuria     Last Assessed:17    GERD (gastroesophageal reflux disease)     Liver nodule     Pulmonary nodule, right     Ureteropelvic junction (UPJ) obstruction 2020       Other History:  Height:   Ht Readings from Last 1 Encounters:   20 5' 6 5" (1 689 m)     Weight:   Wt Readings from Last 1 Encounters:   20 69 4 kg (153 lb)        Relevant Family History:  Please refer to the scanned pedigree in the Media Tab for a full pedigree    Brother:  age 39 with throat cancer age 40  Mother:  age 63's with unknown type of cancer believed to be breast or possibly uterine  Maternal Aunt: Age 61 with breast cancer, age of diagnosis not known    *All history is reported as provided by the patient; records are not available for review, except where indicated  Assessment:    Meets NCCN Genetic/Familial High-Risk Assessment: Colorectal Version 3 2019 Criteria for the Evaluation of Coronado Syndrome  - Personal history of colorectal cancer diagnosed under the age of 50    We discussed that Matthieu's IHC screening test (which showed normal protein expression) along with the [facility] result (which did not identify any variants in the genes associated with Coronado syndrome) significantly reduces that chance that he has Coronado syndrome  That being said he was only 42years old when he was diagnosed with colon cancer and genetic testing is still reasonable to rule out other genes associated with colon and breast cancer based on his personal and family histories         The risks, benefits, and limitations of genetic testing were reviewed with the patient, as well as genetic discrimination laws, and possible test results (positive, negative, variants of uncertain significance) and their clinical implications  Mohsen Valdivia decided to proceed with testing and provided consent  Plan:     Summary: Genetic testing is indicated for Mohsen Valdivia based on meeting NCCN testing criteria  Sample Collection: Mohsen Valdivia was provided with an [employer] Blood Kit and a requisition form  He will go to a [facility] laboratory to have his blood drawn  Genetic Testing Preformed: CancerNext + [employer] (34 genes): APC, ETHAN, BARD1, BRCA1, BRCA2, BRIP1, BMPR1A, CDH1, CDK4, CDKN2A, CHEK2, DICER1, EPCAM, GREM1, HOXB13, MLH1, MRE11A, MSH2, MSH6, MUTYH, NBN, NF1, PALB2, PMS2, POLD1, POLE, PTEN, RAD50, RAD51C, RAD51D, SMAD4, SMARCA4, STK11, TP53    Genetic Testing Lab: [facility]    Results take approximately 2-3 weeks to complete once test is started  We will contact Mohsen Valdivia once results are available  Additional recommendations for surveillance/medical management will be made pending genetic test results

## 2020-02-28 ENCOUNTER — CONSULT (OUTPATIENT)
Dept: GYNECOLOGIC ONCOLOGY | Facility: CLINIC | Age: 50
End: 2020-02-28

## 2020-02-28 DIAGNOSIS — Z85.038 PERSONAL HISTORY OF COLON CANCER, STAGE IV: ICD-10-CM

## 2020-02-28 PROCEDURE — NC001 PR NO CHARGE: Performed by: GENETIC COUNSELOR, MS

## 2020-02-28 NOTE — PATIENT INSTRUCTIONS
Plan:     Summary: Genetic testing is indicated for Cally Duran based on meeting NCCN testing criteria  Sample Collection: Cally Duran was provided with an [employer] Blood Kit and a requisition form  He will go to a [facility] laboratory to have his blood drawn  Genetic Testing Preformed: CancerNext + [employer] (34 genes): APC, ETHAN, BARD1, BRCA1, BRCA2, BRIP1, BMPR1A, CDH1, CDK4, CDKN2A, CHEK2, DICER1, EPCAM, GREM1, HOXB13, MLH1, MRE11A, MSH2, MSH6, MUTYH, NBN, NF1, PALB2, PMS2, POLD1, POLE, PTEN, RAD50, RAD51C, RAD51D, SMAD4, SMARCA4, STK11, TP53    Genetic Testing Lab: [facility]    Results take approximately 2-3 weeks to complete once test is started  We will contact Cally Duran once results are available  Additional recommendations for surveillance/medical management will be made pending genetic test results

## 2020-03-03 ENCOUNTER — HOSPITAL ENCOUNTER (OUTPATIENT)
Dept: RADIOLOGY | Age: 50
Discharge: HOME | End: 2020-03-03
Payer: COMMERCIAL

## 2020-03-03 DIAGNOSIS — N13.30 HYDRONEPHROSIS, UNSPECIFIED HYDRONEPHROSIS TYPE: ICD-10-CM

## 2020-03-03 PROCEDURE — 51798 US URINE CAPACITY MEASURE: CPT

## 2020-03-04 ENCOUNTER — TRANSCRIBE ORDERS (OUTPATIENT)
Dept: LAB | Facility: CLINIC | Age: 50
End: 2020-03-04

## 2020-03-04 ENCOUNTER — HOSPITAL ENCOUNTER (OUTPATIENT)
Dept: INFUSION CENTER | Facility: CLINIC | Age: 50
Discharge: HOME | End: 2020-03-04

## 2020-03-04 ENCOUNTER — APPOINTMENT (OUTPATIENT)
Dept: LAB | Facility: CLINIC | Age: 50
End: 2020-03-04
Payer: COMMERCIAL

## 2020-03-04 DIAGNOSIS — Z85.038 PERSONAL HISTORY OF OTHER MALIGNANT NEOPLASM OF LARGE INTESTINE: ICD-10-CM

## 2020-03-04 DIAGNOSIS — Z85.038 PERSONAL HISTORY OF OTHER MALIGNANT NEOPLASM OF LARGE INTESTINE: Primary | ICD-10-CM

## 2020-03-04 DIAGNOSIS — Z85.038 PERSONAL HISTORY OF COLON CANCER, STAGE IV: Primary | ICD-10-CM

## 2020-03-04 PROCEDURE — 36415 COLL VENOUS BLD VENIPUNCTURE: CPT

## 2020-03-04 NOTE — PROGRESS NOTES
Port flushed for catheter maintenance  Nurse attempted to draw genetic testing specimens from port, nurse unable to obtain enough blood for specimen  + blood return noted  Pt  Stated he will make his next appt prior to leaving infusion center today   Pt  Declined AVS

## 2020-03-05 LAB — MISCELLANEOUS LAB TEST RESULT: NORMAL

## 2020-03-11 NOTE — PROGRESS NOTES
3/12/2020    Trish Cirilo  1970  [id number]      Assessment  -Erectile dysfunction  -Renal cyst  -Hydronephrosis     Discussion/Plan  Cally Duran is a 54 y o  male being managed by Dr Campos Huizar        -We reviewed results of recent renal ultrasound which identified persistent left sided hydronephrosis and bladder wall thickening  This finding is not new and has been seen on previous imaging since at least 2018  His recent creatinine was stable at 0 91  He is asymptomatic and denies any flank pain  I recommend observation at this time  He will continue to follow with Oncology with routine CT abdomen with contrast and lab work  -follow up in 6 months with repeat renal ultrasound  Of findings remain stable, consider observation  He was instructed to call with any issues  -All questions answered, patient agrees with plan      History of Present Illness  54 y o  male with a history of ED and  presents today for follow up  Patient seen in office one year ago  Received message from patient's oncologist, that recent CT abdomen pelvis w contrast had identified mild left sided hydronephrosis and renal cyst   He has history of colon cancer, and underwent hemicolectomy and chemotherapy  Patient denies any flank pain, lower urinary tract symptoms, gross hematuria, or dysuria  His recent creatinine from 10/14/2019 was 0 91  He has had no recent urinary tract infections  Last UTI was in 2017  He denies any prior urologic history, surgical intervention, or instrumentation  Review of Systems  Review of Systems   Constitutional: Negative  HENT: Negative  Respiratory: Negative  Cardiovascular: Negative  Gastrointestinal: Negative  Genitourinary: Negative for decreased urine volume, difficulty urinating, dysuria, flank pain, frequency, hematuria and urgency  Musculoskeletal: Negative  Skin: Negative  Neurological: Negative  Psychiatric/Behavioral: Negative        AUA SYMPTOM SCORE Most Recent Value   AUA SYMPTOM SCORE   How often have you had a sensation of not emptying your bladder completely after you finished urinating? 0   How often have you had to urinate again less than two hours after you finished urinating? 1   How often have you found you stopped and started again several times when you urinate?  --   How often have you found it difficult to postpone urination? 0   How often have you had a weak urinary stream?  0   How often have you had to push or strain to begin urination? 0   How many times did you most typically get up to urinate from the time you went to bed at night until the time you got up in the morning? 2   Quality of Life: If you were to spend the rest of your life with your urinary condition just the way it is now, how would you feel about that?  1   AUA SYMPTOM SCORE  --          Past Medical History  Past Medical History:   Diagnosis Date    Cancer ([address] )     Dysuria     Last Assessed:8/24/17    GERD (gastroesophageal reflux disease)     Liver nodule     Pulmonary nodule, right     Ureteropelvic junction (UPJ) obstruction 1/29/2020       Past Social History  Past Surgical History:   Procedure Laterality Date    COLONOSCOPY N/A 1/22/2018    Procedure: COLONOSCOPY;  Surgeon: Jakob Mullins MD;  [facility]: [facility]; Service: Colorectal   [facility] DENTAL SURGERY  2016    [place] with bridge work    FLEXIBLE SIGMOIDOSCOPY N/A 6/15/2018    Procedure: SIGMOIDOSCOPY FLEXIBLE w/o sedation w/ tattoo;  Surgeon: Jakob Mullins MD;  [facility]: [facility];   Service: Colorectal    LIVER LOBECTOMY N/A 6/21/2018    Procedure: liver resection/ablation, intraoperative ultrasound of liver;  Surgeon: Micaela Carney MD;  [facility];  Service: Surgical Oncology    NM EXPLORATORY OF ABDOMEN N/A 6/21/2018    Procedure: LAPAROTOMY EXPLORATORY;  Surgeon: Jakob Mullins MD;  [facility];  Service: Colorectal    NM INSERT PICC W/ SUB-Q PORT N/A 1/25/2018 Procedure: PORT-A-CATHETER PLACEMENT  Fluoroscopy for performance/interpretation;  Surgeon: Suraj Mccarty MD;  [facility];  Service: Colorectal    FL PART REMOVAL COLON W ANASTOMOSIS Left 6/21/2018    Procedure: hemicolectomy, low anterior resection (open) SPY fluorescence angiography;  Surgeon: Suraj Mccarty MD;  [facility];  Service: Colorectal    FL PROCTECTOMY,PARTIAL N/A 6/21/2018    Procedure: Partial proctectomy ;  Surgeon: Suraj Mccarty MD;  [facility];  Service: Colorectal    FL SIGMOIDOSCOPY FLX DX W/COLLJ SPEC BR/WA IF PFRMD N/A 6/21/2018    Procedure: [place];  Surgeon: Suraj Mccarty MD;  [facility];  Service: Colorectal    ROOT CANAL  2015    TESTICLE SURGERY      Exploration of Undescended Testis rIght, age 8 had surgery for undescended testicle;  Last Assessed:8/24/17    TOOTH EXTRACTION  2015       Past Family History  Family History   Problem Relation Age of Onset    No Known Problems Father     Cancer Mother     Cancer Brother         pt  reports brother was diagnosed with stage 4 throat cancer       Past Social history  Social History     Socioeconomic History    Marital status: Single     Spouse name: Not on file    Number of children: Not on file    Years of education: Not on file    Highest education level: Not on file   Occupational History    Not on file   Social Needs    Financial resource strain: Not on file    Food insecurity:     Worry: Not on file     Inability: Not on file    Transportation needs:     Medical: Not on file     Non-medical: Not on file   Tobacco Use    Smoking status: Former Smoker     Types: E-Cigarettes    Smokeless tobacco: Current User    Tobacco comment: quit 4 years ago / smoked for 20 years    Substance and Sexual Activity    Alcohol use: Yes     Comment: socially - 2 month    Drug use: Yes     Types: Marijuana     Comment: per [employer]: occasional recreation drug use  Sexual activity: Not on file     Comment: Resides alone   Lifestyle    Physical activity:     Days per week: Not on file     Minutes per session: Not on file    Stress: Not on file   Relationships    Social connections:     Talks on phone: Not on file     Gets together: Not on file     Attends [employer]ars[employer] service: Not on file     Active member of club or organization: Not on file     Attends meetings of clubs or organizations: Not on file     Relationship status: Not on file    Intimate partner violence:     Fear of current or ex partner: Not on file     Emotionally abused: Not on file     Physically abused: Not on file     Forced sexual activity: Not on file   Other Topics Concern    Not on file   Social History Narrative    Not on file       Current Medications  Current Outpatient Medications   Medication Sig Dispense Refill    clindamycin (CLINDAGEL) 1 % gel Apply topically 2 (two) times a day 30 g 0    minocycline (MINOCIN) 100 mg capsule       omeprazole (PriLOSEC) 40 MG capsule Take 40 mg by mouth daily      prochlorperazine (COMPAZINE) 10 mg tablet Take 1 tablet (10 mg total) by mouth every 6 (six) hours as needed for nausea or vomiting 30 tablet 6    sildenafil (REVATIO) 20 mg tablet Take 1 tablet (20 mg total) by mouth as needed (PRN) Take 1-5 tablets as needed 90 tablet 2     No current facility-administered medications for this visit  Allergies  No Known Allergies    Past Medical History, Social History, Family History, medications and allergies were reviewed  Vitals  There were no vitals filed for this visit  Physical Exam  Physical Exam   Constitutional: He is oriented to person, place, and time  He appears well-developed and well-nourished  HENT:   Head: Normocephalic  Eyes: Pupils are equal, round, and reactive to light  Neck: Normal range of motion  Cardiovascular: Normal rate and regular rhythm  Pulmonary/Chest: Effort normal    Abdominal: Soft  Normal appearance  He exhibits no distension  There is no tenderness  There is no CVA tenderness  Genitourinary:   Genitourinary Comments: Negative CVA tenderness   Musculoskeletal: Normal range of motion  Neurological: He is alert and oriented to person, place, and time  Skin: Skin is warm and dry  Psychiatric: He has a normal mood and affect  His behavior is normal  Judgment and thought content normal        Results    I have personally reviewed all pertinent lab results and reviewed with patient  No results found for: PSA  Lab Results   Component Value Date    GLUCOSE 124 12/01/2017    CALCIUM 8 9 10/14/2019     08/26/2017    K 3 8 10/14/2019    CO2 26 10/14/2019     10/14/2019    BUN 15 10/14/2019    CREATININE 0 91 10/14/2019     Lab Results   Component Value Date    WBC 4 98 10/14/2019    HGB 14 2 10/14/2019    HCT 40 7 10/14/2019    MCV 96 10/14/2019     10/14/2019     No results found for this or any previous visit (from the past 1 hour(s))

## 2020-03-12 ENCOUNTER — OFFICE VISIT (OUTPATIENT)
Dept: UROLOGY | Facility: AMBULATORY SURGERY CENTER | Age: 50
End: 2020-03-12
Payer: COMMERCIAL

## 2020-03-12 VITALS
DIASTOLIC BLOOD PRESSURE: 78 MMHG | HEART RATE: 82 BPM | BODY MASS INDEX: 24.59 KG/M2 | HEIGHT: 66 IN | SYSTOLIC BLOOD PRESSURE: 122 MMHG | WEIGHT: 153 LBS

## 2020-03-12 DIAGNOSIS — N13.30 HYDRONEPHROSIS, UNSPECIFIED HYDRONEPHROSIS TYPE: Primary | ICD-10-CM

## 2020-03-12 PROCEDURE — 99213 OFFICE O/P EST LOW 20 MIN: CPT | Performed by: NURSE PRACTITIONER

## 2020-04-01 ENCOUNTER — TELEPHONE (OUTPATIENT)
Dept: GYNECOLOGIC ONCOLOGY | Facility: CLINIC | Age: 50
End: 2020-04-01

## 2020-04-17 ENCOUNTER — APPOINTMENT (OUTPATIENT)
Dept: LAB | Facility: CLINIC | Age: 50
End: 2020-04-17
Payer: COMMERCIAL

## 2020-04-17 ENCOUNTER — TRANSCRIBE ORDERS (OUTPATIENT)
Dept: LAB | Facility: CLINIC | Age: 50
End: 2020-04-17

## 2020-04-17 LAB
ALBUMIN SERPL BCP-MCNC: 4 G/DL (ref 3.5–5)
ALP SERPL-CCNC: 43 U/L (ref 46–116)
ALT SERPL W P-5'-P-CCNC: 32 U/L (ref 12–78)
ANION GAP SERPL CALCULATED.3IONS-SCNC: 7 MMOL/L (ref 4–13)
AST SERPL W P-5'-P-CCNC: 20 U/L (ref 5–45)
BASOPHILS # BLD AUTO: 0.03 THOUSANDS/ΜL (ref 0–0.1)
BASOPHILS NFR BLD AUTO: 1 % (ref 0–1)
BILIRUB SERPL-MCNC: 0.38 MG/DL (ref 0.2–1)
BUN SERPL-MCNC: 14 MG/DL (ref 5–25)
CALCIUM SERPL-MCNC: 9 MG/DL (ref 8.3–10.1)
CEA SERPL-MCNC: 13.6 NG/ML (ref 0–3)
CHLORIDE SERPL-SCNC: 105 MMOL/L (ref 100–108)
CO2 SERPL-SCNC: 27 MMOL/L (ref 21–32)
CREAT SERPL-MCNC: 1.08 MG/DL (ref 0.6–1.3)
EOSINOPHIL # BLD AUTO: 0.15 THOUSAND/ΜL (ref 0–0.61)
EOSINOPHIL NFR BLD AUTO: 3 % (ref 0–6)
ERYTHROCYTE [DISTWIDTH] IN BLOOD BY AUTOMATED COUNT: 13.1 % (ref 11.6–15.1)
GFR SERPL CREATININE-BSD FRML MDRD: 81 ML/MIN/1.73SQ M
GLUCOSE SERPL-MCNC: 107 MG/DL (ref 65–140)
HCT VFR BLD AUTO: 42.9 % (ref 36.5–49.3)
HGB BLD-MCNC: 15.1 G/DL (ref 12–17)
IMM GRANULOCYTES # BLD AUTO: 0.02 THOUSAND/UL (ref 0–0.2)
IMM GRANULOCYTES NFR BLD AUTO: 0 % (ref 0–2)
LYMPHOCYTES # BLD AUTO: 1.48 THOUSANDS/ΜL (ref 0.6–4.47)
LYMPHOCYTES NFR BLD AUTO: 29 % (ref 14–44)
MCH RBC QN AUTO: 34.3 PG (ref 26.8–34.3)
MCHC RBC AUTO-ENTMCNC: 35.2 G/DL (ref 31.4–37.4)
MCV RBC AUTO: 98 FL (ref 82–98)
MONOCYTES # BLD AUTO: 0.46 THOUSAND/ΜL (ref 0.17–1.22)
MONOCYTES NFR BLD AUTO: 9 % (ref 4–12)
NEUTROPHILS # BLD AUTO: 2.96 THOUSANDS/ΜL (ref 1.85–7.62)
NEUTS SEG NFR BLD AUTO: 58 % (ref 43–75)
NRBC BLD AUTO-RTO: 0 /100 WBCS
PLATELET # BLD AUTO: 173 THOUSANDS/UL (ref 149–390)
PMV BLD AUTO: 10.9 FL (ref 8.9–12.7)
POTASSIUM SERPL-SCNC: 4.5 MMOL/L (ref 3.5–5.3)
PROT SERPL-MCNC: 7.8 G/DL (ref 6.4–8.2)
RBC # BLD AUTO: 4.4 MILLION/UL (ref 3.88–5.62)
SODIUM SERPL-SCNC: 139 MMOL/L (ref 136–145)
WBC # BLD AUTO: 5.1 THOUSAND/UL (ref 4.31–10.16)

## 2020-04-17 PROCEDURE — 85025 COMPLETE CBC W/AUTO DIFF WBC: CPT | Performed by: INTERNAL MEDICINE

## 2020-04-17 PROCEDURE — 36415 COLL VENOUS BLD VENIPUNCTURE: CPT | Performed by: INTERNAL MEDICINE

## 2020-04-17 PROCEDURE — 80053 COMPREHEN METABOLIC PANEL: CPT | Performed by: INTERNAL MEDICINE

## 2020-04-17 PROCEDURE — 82378 CARCINOEMBRYONIC ANTIGEN: CPT | Performed by: INTERNAL MEDICINE

## 2020-04-23 ENCOUNTER — OFFICE VISIT (OUTPATIENT)
Dept: HEMATOLOGY ONCOLOGY | Facility: CLINIC | Age: 50
End: 2020-04-23
Payer: COMMERCIAL

## 2020-04-23 VITALS
TEMPERATURE: 98.3 F | OXYGEN SATURATION: 98 % | WEIGHT: 160 LBS | SYSTOLIC BLOOD PRESSURE: 132 MMHG | DIASTOLIC BLOOD PRESSURE: 82 MMHG | HEIGHT: 67 IN | BODY MASS INDEX: 25.11 KG/M2 | HEART RATE: 83 BPM | RESPIRATION RATE: 16 BRPM

## 2020-04-23 DIAGNOSIS — C20 MALIGNANT NEOPLASM OF RECTUM (HCC): ICD-10-CM

## 2020-04-23 DIAGNOSIS — C18.9 METASTATIC COLON CANCER TO LIVER (HCC): Primary | ICD-10-CM

## 2020-04-23 DIAGNOSIS — C78.7 METASTATIC COLON CANCER TO LIVER (HCC): Primary | ICD-10-CM

## 2020-04-23 PROCEDURE — 99214 OFFICE O/P EST MOD 30 MIN: CPT | Performed by: INTERNAL MEDICINE

## 2020-04-29 ENCOUNTER — HOSPITAL ENCOUNTER (OUTPATIENT)
Dept: INFUSION CENTER | Facility: CLINIC | Age: 50
Discharge: HOME | End: 2020-04-29
Payer: COMMERCIAL

## 2020-04-29 VITALS — TEMPERATURE: 98.8 F

## 2020-04-29 DIAGNOSIS — Z85.038 PERSONAL HISTORY OF COLON CANCER, STAGE IV: Primary | ICD-10-CM

## 2020-04-29 PROCEDURE — 96523 IRRIG DRUG DELIVERY DEVICE: CPT

## 2020-05-07 ENCOUNTER — HOSPITAL ENCOUNTER (OUTPATIENT)
Dept: CT IMAGING | Facility: HOSPITAL | Age: 50
Discharge: HOME | End: 2020-05-07
Payer: COMMERCIAL

## 2020-05-07 DIAGNOSIS — Z85.038 PERSONAL HISTORY OF COLON CANCER, STAGE IV: ICD-10-CM

## 2020-05-07 PROCEDURE — 71260 CT THORAX DX C+: CPT

## 2020-05-07 PROCEDURE — 74177 CT ABD & PELVIS W/CONTRAST: CPT

## 2020-05-07 RX ADMIN — IOHEXOL 100 ML: 350 INJECTION, SOLUTION INTRAVENOUS at 18:42

## 2020-05-11 ENCOUNTER — APPOINTMENT (OUTPATIENT)
Dept: LAB | Facility: CLINIC | Age: 50
End: 2020-05-11
Payer: COMMERCIAL

## 2020-05-11 DIAGNOSIS — C78.7 METASTATIC COLON CANCER TO LIVER (HCC): ICD-10-CM

## 2020-05-11 DIAGNOSIS — C20 MALIGNANT NEOPLASM OF RECTUM (HCC): ICD-10-CM

## 2020-05-11 DIAGNOSIS — C18.9 METASTATIC COLON CANCER TO LIVER (HCC): ICD-10-CM

## 2020-05-11 LAB
ALBUMIN SERPL BCP-MCNC: 4 G/DL (ref 3.5–5)
ALP SERPL-CCNC: 46 U/L (ref 46–116)
ALT SERPL W P-5'-P-CCNC: 30 U/L (ref 12–78)
ANION GAP SERPL CALCULATED.3IONS-SCNC: 9 MMOL/L (ref 4–13)
AST SERPL W P-5'-P-CCNC: 20 U/L (ref 5–45)
BASOPHILS # BLD AUTO: 0.01 THOUSANDS/ΜL (ref 0–0.1)
BASOPHILS NFR BLD AUTO: 0 % (ref 0–1)
BILIRUB SERPL-MCNC: 0.37 MG/DL (ref 0.2–1)
BUN SERPL-MCNC: 19 MG/DL (ref 5–25)
CALCIUM SERPL-MCNC: 9.1 MG/DL (ref 8.3–10.1)
CHLORIDE SERPL-SCNC: 105 MMOL/L (ref 100–108)
CO2 SERPL-SCNC: 27 MMOL/L (ref 21–32)
CREAT SERPL-MCNC: 1.03 MG/DL (ref 0.6–1.3)
EOSINOPHIL # BLD AUTO: 0.07 THOUSAND/ΜL (ref 0–0.61)
EOSINOPHIL NFR BLD AUTO: 1 % (ref 0–6)
ERYTHROCYTE [DISTWIDTH] IN BLOOD BY AUTOMATED COUNT: 13.1 % (ref 11.6–15.1)
GFR SERPL CREATININE-BSD FRML MDRD: 86 ML/MIN/1.73SQ M
GLUCOSE SERPL-MCNC: 104 MG/DL (ref 65–140)
HCT VFR BLD AUTO: 43.1 % (ref 36.5–49.3)
HGB BLD-MCNC: 14.7 G/DL (ref 12–17)
IMM GRANULOCYTES # BLD AUTO: 0.01 THOUSAND/UL (ref 0–0.2)
IMM GRANULOCYTES NFR BLD AUTO: 0 % (ref 0–2)
LYMPHOCYTES # BLD AUTO: 1.54 THOUSANDS/ΜL (ref 0.6–4.47)
LYMPHOCYTES NFR BLD AUTO: 30 % (ref 14–44)
MCH RBC QN AUTO: 32.7 PG (ref 26.8–34.3)
MCHC RBC AUTO-ENTMCNC: 34.1 G/DL (ref 31.4–37.4)
MCV RBC AUTO: 96 FL (ref 82–98)
MONOCYTES # BLD AUTO: 0.43 THOUSAND/ΜL (ref 0.17–1.22)
MONOCYTES NFR BLD AUTO: 9 % (ref 4–12)
NEUTROPHILS # BLD AUTO: 3.02 THOUSANDS/ΜL (ref 1.85–7.62)
NEUTS SEG NFR BLD AUTO: 60 % (ref 43–75)
NRBC BLD AUTO-RTO: 0 /100 WBCS
PLATELET # BLD AUTO: 179 THOUSANDS/UL (ref 149–390)
PMV BLD AUTO: 11.2 FL (ref 8.9–12.7)
POTASSIUM SERPL-SCNC: 4.3 MMOL/L (ref 3.5–5.3)
PROT SERPL-MCNC: 8 G/DL (ref 6.4–8.2)
RBC # BLD AUTO: 4.49 MILLION/UL (ref 3.88–5.62)
SODIUM SERPL-SCNC: 141 MMOL/L (ref 136–145)
WBC # BLD AUTO: 5.08 THOUSAND/UL (ref 4.31–10.16)

## 2020-05-11 PROCEDURE — 85025 COMPLETE CBC W/AUTO DIFF WBC: CPT

## 2020-05-11 PROCEDURE — 82378 CARCINOEMBRYONIC ANTIGEN: CPT

## 2020-05-11 PROCEDURE — 36415 COLL VENOUS BLD VENIPUNCTURE: CPT

## 2020-05-11 PROCEDURE — 80053 COMPREHEN METABOLIC PANEL: CPT

## 2020-05-12 ENCOUNTER — TELEPHONE (OUTPATIENT)
Dept: SURGICAL ONCOLOGY | Facility: CLINIC | Age: 50
End: 2020-05-12

## 2020-05-12 ENCOUNTER — HOSPITAL ENCOUNTER (OUTPATIENT)
Dept: MRI IMAGING | Facility: HOSPITAL | Age: 50
Discharge: HOME | End: 2020-05-12
Payer: COMMERCIAL

## 2020-05-12 DIAGNOSIS — R93.2 ABNORMAL LIVER CT: ICD-10-CM

## 2020-05-12 DIAGNOSIS — Z85.038 PERSONAL HISTORY OF COLON CANCER, STAGE IV: ICD-10-CM

## 2020-05-12 DIAGNOSIS — R97.0 ELEVATED CEA: ICD-10-CM

## 2020-05-12 DIAGNOSIS — Z85.038 PERSONAL HISTORY OF COLON CANCER, STAGE IV: Primary | ICD-10-CM

## 2020-05-12 LAB — CEA SERPL-MCNC: 16.2 NG/ML (ref 0–3)

## 2020-05-12 PROCEDURE — 74183 MRI ABD W/O CNTR FLWD CNTR: CPT

## 2020-05-12 PROCEDURE — A9585 GADOBUTROL INJECTION: HCPCS | Performed by: NURSE PRACTITIONER

## 2020-05-12 RX ADMIN — GADOBUTROL 7 ML: 604.72 INJECTION INTRAVENOUS at 15:09

## 2020-05-13 ENCOUNTER — INTERPROFESSIONAL CONSULTATION (OUTPATIENT)
Dept: VASCULAR SURGERY | Facility: CLINIC | Age: 50
End: 2020-05-13
Payer: COMMERCIAL

## 2020-05-13 DIAGNOSIS — C78.7 METASTATIC COLON CANCER TO LIVER (HCC): Primary | ICD-10-CM

## 2020-05-13 DIAGNOSIS — C18.9 METASTATIC COLON CANCER TO LIVER (HCC): Primary | ICD-10-CM

## 2020-05-13 PROCEDURE — 99447 NTRPROF PH1/NTRNET/EHR 11-20: CPT | Performed by: RADIOLOGY

## 2020-05-14 ENCOUNTER — OFFICE VISIT (OUTPATIENT)
Dept: HEMATOLOGY ONCOLOGY | Facility: CLINIC | Age: 50
End: 2020-05-14
Payer: COMMERCIAL

## 2020-05-14 VITALS
BODY MASS INDEX: 25.74 KG/M2 | WEIGHT: 164 LBS | DIASTOLIC BLOOD PRESSURE: 76 MMHG | RESPIRATION RATE: 16 BRPM | HEIGHT: 67 IN | OXYGEN SATURATION: 99 % | TEMPERATURE: 98.5 F | SYSTOLIC BLOOD PRESSURE: 126 MMHG | HEART RATE: 82 BPM

## 2020-05-14 DIAGNOSIS — C78.7 LIVER METASTASES (HCC): ICD-10-CM

## 2020-05-14 DIAGNOSIS — C18.9 MALIGNANT NEOPLASM OF COLON, UNSPECIFIED PART OF COLON (HCC): ICD-10-CM

## 2020-05-14 DIAGNOSIS — C20 MALIGNANT NEOPLASM OF RECTUM (HCC): Primary | ICD-10-CM

## 2020-05-14 DIAGNOSIS — C18.9 METASTATIC COLON CANCER TO LIVER (HCC): ICD-10-CM

## 2020-05-14 DIAGNOSIS — C78.7 METASTATIC COLON CANCER TO LIVER (HCC): ICD-10-CM

## 2020-05-14 PROCEDURE — 99214 OFFICE O/P EST MOD 30 MIN: CPT | Performed by: INTERNAL MEDICINE

## 2020-05-21 ENCOUNTER — HOSPITAL ENCOUNTER (OUTPATIENT)
Dept: RADIOLOGY | Age: 50
Discharge: HOME | End: 2020-05-21
Payer: COMMERCIAL

## 2020-05-21 DIAGNOSIS — C78.7 METASTATIC COLON CANCER TO LIVER (HCC): ICD-10-CM

## 2020-05-21 DIAGNOSIS — C78.7 LIVER METASTASES (HCC): ICD-10-CM

## 2020-05-21 DIAGNOSIS — C20 MALIGNANT NEOPLASM OF RECTUM (HCC): ICD-10-CM

## 2020-05-21 DIAGNOSIS — C18.9 METASTATIC COLON CANCER TO LIVER (HCC): ICD-10-CM

## 2020-05-21 LAB — GLUCOSE SERPL-MCNC: 89 MG/DL (ref 65–140)

## 2020-05-21 PROCEDURE — 78815 PET IMAGE W/CT SKULL-THIGH: CPT

## 2020-05-21 PROCEDURE — 82948 REAGENT STRIP/BLOOD GLUCOSE: CPT

## 2020-05-21 PROCEDURE — A9552 F18 FDG: HCPCS

## 2020-05-27 ENCOUNTER — TELEPHONE (OUTPATIENT)
Dept: SURGICAL ONCOLOGY | Facility: CLINIC | Age: 50
End: 2020-05-27

## 2020-05-27 ENCOUNTER — TELEPHONE (OUTPATIENT)
Dept: CARDIAC SURGERY | Facility: CLINIC | Age: 50
End: 2020-05-27

## 2020-05-29 ENCOUNTER — OFFICE VISIT (OUTPATIENT)
Dept: SURGICAL ONCOLOGY | Facility: CLINIC | Age: 50
End: 2020-05-29
Payer: COMMERCIAL

## 2020-05-29 ENCOUNTER — TRANSCRIBE ORDERS (OUTPATIENT)
Dept: LAB | Facility: CLINIC | Age: 50
End: 2020-05-29

## 2020-05-29 ENCOUNTER — APPOINTMENT (OUTPATIENT)
Dept: LAB | Facility: CLINIC | Age: 50
End: 2020-05-29
Payer: COMMERCIAL

## 2020-05-29 ENCOUNTER — LAB (OUTPATIENT)
Dept: LAB | Facility: CLINIC | Age: 50
End: 2020-05-29
Payer: COMMERCIAL

## 2020-05-29 VITALS
BODY MASS INDEX: 27 KG/M2 | WEIGHT: 168 LBS | HEART RATE: 70 BPM | SYSTOLIC BLOOD PRESSURE: 116 MMHG | DIASTOLIC BLOOD PRESSURE: 72 MMHG | HEIGHT: 66 IN | RESPIRATION RATE: 16 BRPM | TEMPERATURE: 97.9 F

## 2020-05-29 DIAGNOSIS — C78.7 COLON CANCER METASTASIZED TO LIVER (HCC): ICD-10-CM

## 2020-05-29 DIAGNOSIS — C18.9 COLON CANCER METASTASIZED TO LIVER (HCC): Primary | ICD-10-CM

## 2020-05-29 DIAGNOSIS — C18.9 COLON CANCER METASTASIZED TO LIVER (HCC): ICD-10-CM

## 2020-05-29 DIAGNOSIS — C78.7 COLON CANCER METASTASIZED TO LIVER (HCC): Primary | ICD-10-CM

## 2020-05-29 LAB
ABO GROUP BLD: NORMAL
APTT PPP: 27 SECONDS (ref 23–37)
ATRIAL RATE: 70 BPM
BLD GP AB SCN SERPL QL: NEGATIVE
EST. AVERAGE GLUCOSE BLD GHB EST-MCNC: 108 MG/DL
HBA1C MFR BLD: 5.4 %
INR PPP: 0.97 (ref 0.84–1.19)
P AXIS: 43 DEGREES
PR INTERVAL: 128 MS
PROTHROMBIN TIME: 12.3 SECONDS (ref 11.6–14.5)
QRS AXIS: 46 DEGREES
QRSD INTERVAL: 86 MS
QT INTERVAL: 380 MS
QTC INTERVAL: 410 MS
RH BLD: NEGATIVE
SPECIMEN EXPIRATION DATE: NORMAL
T WAVE AXIS: 44 DEGREES
VENTRICULAR RATE: 70 BPM

## 2020-05-29 PROCEDURE — 85730 THROMBOPLASTIN TIME PARTIAL: CPT

## 2020-05-29 PROCEDURE — 36415 COLL VENOUS BLD VENIPUNCTURE: CPT

## 2020-05-29 PROCEDURE — 83036 HEMOGLOBIN GLYCOSYLATED A1C: CPT

## 2020-05-29 PROCEDURE — 93010 ELECTROCARDIOGRAM REPORT: CPT | Performed by: INTERNAL MEDICINE

## 2020-05-29 PROCEDURE — 86901 BLOOD TYPING SEROLOGIC RH(D): CPT

## 2020-05-29 PROCEDURE — 86850 RBC ANTIBODY SCREEN: CPT

## 2020-05-29 PROCEDURE — 86900 BLOOD TYPING SEROLOGIC ABO: CPT

## 2020-05-29 PROCEDURE — 99215 OFFICE O/P EST HI 40 MIN: CPT | Performed by: SURGERY

## 2020-05-29 PROCEDURE — 85610 PROTHROMBIN TIME: CPT

## 2020-05-29 PROCEDURE — 93005 ELECTROCARDIOGRAM TRACING: CPT

## 2020-05-29 NOTE — H&P (VIEW-ONLY)
Surgical Oncology Follow Up       [address]  [facility]  [address] [facility]  [place] [address]    Shonda Mooretune  1970  [id number]  [address] [facility]  [facility]  [address] [facility]  [place] [address]    Diagnoses and all orders for this visit:    Colon cancer metastasized to liver [facility])  -     Case request operating room: LIVER RESECTION/ABLATION, INTRAOPERATIVE ULTRASOUND OF LIVER; Standing  -     Type and screen; Future  -     Prepare Leukoreduced RBC: 2 Units; Future  -     PAT Covid Screening; Future  -     APTT; Future  -     Protime-INR; Future  -     HEMOGLOBIN A1C W/ EAG ESTIMATION; Future  -     EKG 12 lead; Future  -     Case request operating room: LIVER RESECTION/ABLATION, INTRAOPERATIVE ULTRASOUND OF LIVER    Other orders  -     Incentive spirometry; Standing  -     Insert and maintain IV line; Standing  -     Void On-Call to O R ; Standing  -     Place sequential compression device; Standing  -     Nursing communication Please give pre-op Carbohydrate drink to patient 2-4 hours prior to surgery (Drink is provided by [address] [place] [address]); Standing  -     ceFAZolin (ANCEF) 1,000 mg in dextrose 5 % 100 mL IVPB        Chief Complaint   Patient presents with   • Follow-up     Pt here today to discuss surgery       No follow-ups on file  Colon cancer metastasized to liver ([facility] [address] )    1/2018 Initial Diagnosis     Malignant neoplasm of sigmoid colon ([facility] [address] )      1/22/2018 Biopsy     Large Intestine, Sigmoid Colon, Recto-sigmoid tumor bx:    - Invasive colonic adenocarcinoma, moderately differentiated      2/6/2018 - 10/16/2018 Chemotherapy     Modified FOLFOX6 x 12 cycles  Added Erbitux on 3/20/18       6/21/2018 Surgery     Segment 7 liver resection/ablation, hemicolectomy      10/30/2018 -  Chemotherapy     Continuation of Single agent Erbitux    With 10/30/18 chemo, it was Cycle #14  Plan was for 6 additional cycles of Erbitux alone  3/2020 Genetic Testing     NEGATIVE for genes tested:    Test(s): [employer] + [employer] (34 genes): APC, ETHAN, BARD1, BRCA1, BRCA2, BRIP1, BMPR1A, CDH1, CDK4, CDKN2A, CHEK2, DICER1, EPCAM, GREM1, HOXB13, MLH1, MRE11A, MSH2, MSH6, MUTYH, NBN, NF1, PALB2, PMS2, POLD1, POLE, PTEN, RAD50, RAD51C, RAD51D, SMAD4, SMARCA4, STK11, TP53          Staging:  Metastatic rectosigmoid cancer  [id number]  Treatment history:   Modified FOLFOX 6   Erbitux added 03/20/2018   Segment 7 liver resection, June 2018  Current treatment:  Observation  Disease status:   New left lateral segment liver metastasis    History of Present Illness:  Patient returns in follow-up of his metastatic rectosigmoid cancer  He is doing well  He denies any new abdominal pain, nausea or vomiting  His appetite is good  He is gaining weight  His CEA has risen to 16 2  His CT from May 5th revealed a stable right hepatic dome lesion  The left lateral segment has increased in size  Pulmonary nodules were stable  There was stable left hydronephrosis  Follow-up MRI on May 12th was obtained because the lesion in the liver appeared to be a metastasis rather than a hemangioma  This confirmed the lesion in the dome was stable with some delayed enhancement  There was a question of viable tumor  The left hepatic lobe lesion measured 2 5 cm and was felt to be metastatic  Follow-up PET-CT on May 21st revealed the left lateral liver metastasis had an FDG activity of 7 7 and measured 2 7 x 1 9 cm  Previously treated lesion in the right measures 3 2 x 2 6 cm with an SUV of 2 4  Residual viable tumor was not excluded  I personally reviewed the films, but could not see any significant FDG activity in the treated lesion  He comes in now to discuss further therapy      Review of Systems  Complete ROS Surg Onc:   Complete ROS Surg Onc:   Constitutional: The patient denies new or recent history of general fatigue, no recent weight loss, no change in appetite  Eyes: No complaints of visual problems, no scleral icterus  ENT: no complaints of ear pain, no hoarseness, no difficulty swallowing,  no tinnitus and no new masses in head, oral cavity, or neck  Cardiovascular: No complaints of chest pain, no palpitations, no ankle edema  Respiratory: No complaints of shortness of breath, no cough  Gastrointestinal: No complaints of jaundice, no bloody stools, no pale stools  Genitourinary: No complaints of dysuria, no hematuria, no nocturia, no frequent urination, no urethral discharge  Musculoskeletal: No complaints of weakness, paralysis, joint stiffness or arthralgias  Integumentary: No complaints of rash, no new lesions  Neurological: No complaints of convulsions, no seizures, no dizziness  Hematologic/Lymphatic: No complaints of easy bruising  Endocrine:  No hot or cold intolerance  No polydipsia, polyphagia, or polyuria  Allergy/immunology:  No environmental allergies  No food allergies  Not immunocompromised  Skin:  No pallor or rash  No wound  Patient Active Problem List   Diagnosis   • ED (erectile dysfunction)   • Colon cancer metastasized to liver [facility])   • GERD (gastroesophageal reflux disease)   • Pulmonary nodule, right   • Acneiform rash   • Encounter for follow-up examination after completed treatment for malignant neoplasm   • Other hydronephrosis     Past Medical History:   Diagnosis Date   • Cancer [facility])    • Dysuria     Last Assessed:8/24/17   • GERD (gastroesophageal reflux disease)    • Liver nodule    • Pulmonary nodule, right    • Ureteropelvic junction (UPJ) obstruction 1/29/2020     Past Surgical History:   Procedure Laterality Date   • COLONOSCOPY N/A 1/22/2018    Procedure: COLONOSCOPY;  Surgeon: Rajat Mena MD;  [facility];   Service: Colorectal   • DENTAL SURGERY  2016    Crown with bridge work   • FLEXIBLE SIGMOIDOSCOPY N/A 6/15/2018 Procedure: SIGMOIDOSCOPY FLEXIBLE w/o sedation w/ tattoo;  Surgeon: Nita Bellamy MD;  [facility]: [facility]; Service: Colorectal   • LIVER LOBECTOMY N/A 6/21/2018    Procedure: liver resection/ablation, intraoperative ultrasound of liver;  Surgeon: Yesica Danielle MD;  [facility];  Service: Surgical Oncology   • UT EXPLORATORY OF ABDOMEN N/A 6/21/2018    Procedure: LAPAROTOMY EXPLORATORY;  Surgeon: Nita Bellamy MD;  [facility];  Service: Colorectal   • UT INSERT PICC W/ SUB-Q PORT N/A 1/25/2018    Procedure: PORT-A-CATHETER PLACEMENT  Fluoroscopy for performance/interpretation;  Surgeon: Nita Bellamy MD;  [facility];  Service: Colorectal   • UT PART REMOVAL COLON W ANASTOMOSIS Left 6/21/2018    Procedure: hemicolectomy, low anterior resection (open) SPY fluorescence angiography;  Surgeon: Nita Bellamy MD;  [facility];  Service: Colorectal   • UT PROCTECTOMY,PARTIAL N/A 6/21/2018    Procedure: Partial proctectomy ;  Surgeon: Nita Bellamy MD;  [facility];  Service: Colorectal   • UT SIGMOIDOSCOPY FLX DX W/COLLJ SPEC BR/WA IF PFRMD N/A 6/21/2018    Procedure: [place];  Surgeon: Nita Bellamy MD;  [facility];  Service: Colorectal   • ROOT CANAL  2015   • TESTICLE SURGERY      Exploration of Undescended Testis rIght, age 8 had surgery for undescended testicle;  Last Assessed:8/24/17   • TOOTH EXTRACTION  2015     Family History   Problem Relation Age of Onset   • No Known Problems Father    • Cancer Mother    • Cancer Brother         pt  reports brother was diagnosed with stage 4 throat cancer     Social History     Socioeconomic History   • Marital status: Single     Spouse name: Not on file   • Number of children: Not on file   • Years of education: Not on file   • Highest education level: Not on file   Occupational History   • Not on file   Social Needs   • Financial resource strain: Not on file   • Food insecurity:     Worry: Not on file     Inability: Not on file   • Transportation needs:     Medical: Not on file     Non-medical: Not on file   Tobacco Use   • Smoking status: Former Smoker     Types: E-Cigarettes   • Smokeless tobacco: Current User   • Tobacco comment: quit 4 years ago / smoked for 20 years    Substance and Sexual Activity   • Alcohol use: Yes     Comment: socially - 2 month   • Drug use: Yes     Types: Marijuana     Comment: per [employer]: occasional recreation drug use   • Sexual activity: Not on file     Comment: Resides alone   Lifestyle   • Physical activity:     Days per week: Not on file     Minutes per session: Not on file   • Stress: Not on file   Relationships   • Social connections:     Talks on phone: Not on file     Gets together: Not on file     Attends [employer] service: Not on file     Active member of club or organization: Not on file     Attends meetings of clubs or organizations: Not on file     Relationship status: Not on file   • Intimate partner violence:     Fear of current or ex partner: Not on file     Emotionally abused: Not on file     Physically abused: Not on file     Forced sexual activity: Not on file   Other Topics Concern   • Not on file   Social History Narrative   • Not on file       Current Outpatient Medications:   •  minocycline (MINOCIN) 100 mg capsule, , Disp: , Rfl:   •  omeprazole (PriLOSEC) 40 MG capsule, Take 40 mg by mouth daily, Disp: , Rfl:   •  prochlorperazine (COMPAZINE) 10 mg tablet, Take 1 tablet (10 mg total) by mouth every 6 (six) hours as needed for nausea or vomiting, Disp: 30 tablet, Rfl: 6  •  clindamycin (CLINDAGEL) 1 % gel, Apply topically 2 (two) times a day (Patient not taking: Reported on 5/29/2020), Disp: 30 g, Rfl: 0  •  sildenafil (REVATIO) 20 mg tablet, Take 1 tablet (20 mg total) by mouth as needed (PRN) Take 1-5 tablets as needed, Disp: 90 tablet, Rfl: 2  No Known Allergies  Vitals:    05/29/20 0841   BP: 116/72   Pulse: 70 Resp: 16   Temp: 97 9 °F (36 6 °C)       Physical Exam  Constitutional: General appearance: The Patient is well-developed and well-nourished who appears the stated age in no acute distress  Patient is pleasant and talkative  HEENT:  Normocephalic  Sclerae are anicteric  Mucous membranes are moist  Neck is supple without adenopathy  No JVD  Chest: The lungs are clear to auscultation  Cardiac: Heart is regular rate  Abdomen: Abdomen is soft, non-tender, non-distended and without masses  Extremities: There is no clubbing or cyanosis  There is no edema  Symmetric  Neuro: Grossly nonfocal  Gait is normal      Lymphatic: No evidence of cervical adenopathy bilaterally  No evidence of axillary adenopathy bilaterally  No evidence of inguinal adenopathy bilaterally  Skin: Warm, anicteric  Psych:  Patient is pleasant and talkative  Breasts:        Pathology:  [unfilled]    Labs:  Lab Results   Component Value Date    CEA 16 2 (H) 05/11/2020         Imaging  Mri Abdomen W Wo Contrast    Result Date: 5/12/2020  Narrative: MRI - ABDOMEN - WITH AND WITHOUT CONTRAST INDICATION:  Evaluate liver for hemangioma versus metastatic lesion  History of colon cancer  COMPARISON: Prior CT dated May 7, 2020 TECHNIQUE:  The following pulse sequences were obtained prior to and following the administration of intravenous contrast:     Coronal and axial T2 with TE of 90 and 180 respectively, axial T2 with fat saturation, axial FIESTA fat-sat, axial T1-weighted in-and-out-of phase, axial DWI/ADC, precontrast axial T1 with fat saturation, post-contrast dynamic axial T1 with fat saturation at 20, 70, and 180 seconds, coronal T1  with fat saturation and 7 minute delayed axial T1 with fat saturation  IV Contrast:  7 mL of gadobutrol injection (MULTI-DOSE) FINDINGS: LOWER CHEST:   Unremarkable  LIVER: Liver measures 16 1 cm craniocaudally    No significant fatty infiltration is confirmed on T1 in phase and out of phase imaging  There is a lobular mass lesion in the posterior dome of the right hepatic lobe measuring up to 3 2 cm, this previously measured up to 3 8 cm in October 2018 and measured 1 2 cm in May 2018  The enhancement characteristics of this lesion are not typical of hemangioma or focal nodular hyperplasia  Given the increase in size between May and October 2018 and subsequent relative size stability since July 15, 2019: This is very likely at least partially treated metastatic lesion  Please note there continues to be some mild internal enhancement  Lesion in the lateral segment left hepatic lobe measures up to 2 5 cm and also demonstrates signal characteristics and enhancement pattern which is also NOT typical of a hemangioma or focal nodular hyperplasia  There is Slightly increased diffusion imaging peripherally  This was NOT present as recently as July 15 2019, first being visible on January 23 2020 measuring 1 4 cm  This sudden appearance and significant interval growth is by itself is suspicious for a metastatic lesion  Tiny 2 mm cyst in the right hepatic lobe image 5/11  The hepatic veins and portal veins are patent  BILE DUCTS: No intrahepatic or extrahepatic bile duct dilation  GALLBLADDER:  Normal  PANCREAS: Normal  No main pancreatic ductal dilation  ADRENAL GLANDS:  Normal  SPLEEN:  Normal  KIDNEYS/PROXIMAL URETERS: Left side hydronephrosis reidentified, again suggestive of a UPJ obstruction/stenosis  No suspicious renal mass  BOWEL:   Significant amounts of formed stool the colon suggesting constipation  PERITONEUM/RETROPERITONEUM: No ascites  LYMPH NODES: No abdominal lymphadenopathy  VASCULAR STRUCTURES:  No aneurysm  ABDOMINAL WALL:  Anterior abdominal wall diastases recti with protrusion of mesenteric fat at the level the umbilicus  OSSEOUS STRUCTURES:  No suspicious osseous lesion  Impression: 1  Both of the two liver lesions identified by CT are reidentified      Neither demonstrates the signal characteristics or enhancement pattern of a benign hemangioma or focal nodular hyperplasia  The larger 3 2 cm lesion in the right hepatic dome has been relatively stable in size since July 15, 2019 although it continues to have some delayed eccentric enhancement and also contains some internal hemorrhagic/proteinaceous material posterolaterally  There may still be some viable tumor within this lesion  The smaller 2 5 cm lesion in the left hepatic lobe however is also almost certainly a metastatic lesion based on its recent appearance in January 23, 2020 interval growth, precontrast signal characteristics, and postcontrast enhancement pattern  2   Known pulmonary nodules are not well seen on this exam  3   Stable left hydronephrosis likely on the basis of left UPJ obstruction/stenosis  The study was marked in EPIC for significant notification  Contact can be made on the next business day  Workstation performed: [id number]     Ct Chest Abdomen Pelvis W Contrast    Result Date: 5/8/2020  Narrative: CT CHEST, ABDOMEN AND PELVIS WITH IV CONTRAST INDICATION:   [id number]: Personal history of other malignant neoplasm of large intestine  COMPARISON:  1/23/2020 TECHNIQUE: CT examination of the chest, abdomen and pelvis was performed  In addition to portal venous phase postcontrast scanning through the abdomen and pelvis, delayed phase postcontrast scanning was performed through the upper abdominal viscera  Axial, sagittal, and coronal 2D reformatted images were created from the source data and submitted for interpretation  Radiation dose length product (DLP) for this visit:  847 mGy-cm   This examination, like all CT scans performed in the [facility], was performed utilizing techniques to minimize radiation dose exposure, including the use of iterative reconstruction and automated exposure control  IV Contrast:  100 mL of iohexol (OMNIPAQUE) Enteric Contrast: Enteric contrast was administered  FINDINGS: CHEST LUNGS:  Right middle lobe 3 mm nodule on 4/85, stable  Right basilar 4 mm nodule on 4/92, stable stable right basilar 4 mm nodule on 4/74 Left basilar 3 mm nodule on 4/71 is stable  Left basilar 3 mm nodule on 4/80 appears stable  PLEURA:  Unremarkable  HEART/GREAT VESSELS:  Unremarkable for patient's age  MEDIASTINUM AND MAHENDRA:  Unremarkable  CHEST WALL AND LOWER NECK:   Unremarkable  ABDOMEN LIVER/BILIARY TREE:  Enlarged fatty liver  Right and left hepatic hypodensities with the right measuring 3 2 x 2 5 cm and the left measuring 2 3 x 1 3 cm  The left-sided lesion is increased in size previously measuring 1 4 x 1 4 cm and located within the  lateral segment on series 2 image 59  There appears to be some fill-in on delayed images  GALLBLADDER:  No calcified gallstones  No pericholecystic inflammatory change  SPLEEN:  Unremarkable  PANCREAS:  Unremarkable  ADRENAL GLANDS:  Unremarkable  KIDNEYS/URETERS:  Stable suspected UPJ obstruction on the left with moderate left hydronephrosis  STOMACH AND BOWEL:  Unremarkable  APPENDIX:  No findings to suggest appendicitis  ABDOMINOPELVIC CAVITY:  No ascites  No pneumoperitoneum  No lymphadenopathy  VESSELS:  Unremarkable for patient's age  PELVIS REPRODUCTIVE ORGANS:  The prostate is enlarged  URINARY BLADDER:  Unremarkable  ABDOMINAL WALL/INGUINAL REGIONS:  There is a small fat-containing umbilical hernia  OSSEOUS STRUCTURES:  No acute fracture or destructive osseous lesion  Impression: 1  Stable right hepatic dome previously characterized metastatic lesion  A lateral segment left hepatic lobe lesion has increased in size, previously measuring 1 4 x 1 5 cm and now measuring 2 3 x 1 3 cm  There is some fill-in on delayed imaging more in keeping with hemangioma  Continued attention on follow-up to exclude metastatic lesion  2  Stable bilateral 3 to 4 mm pulmonary nodules as above   Based on current Fleischner Society 2017 Guidelines on incidental pulmonary nodule, patients with a known malignancy are at increased risk of metastasis and should receive followup CT at intervals  appropriate for the type of cancer and its risk of pulmonary metastases  3  Chronic left UPJ obstruction with moderate left hydronephrosis similar to prior exam  The study was marked in EPIC for significant notification  Workstation performed: [id number]     Nm Pet Ct Skull Base To Mid Thigh    Result Date: 5/21/2020  Narrative: PET/CT SCAN INDICATION:  C18 9: Malignant neoplasm of colon, unspecified C78 7: Secondary malignant neoplasm of liver and intrahepatic bile duct C20: Malignant neoplasm of rectum C78 7: Secondary malignant neoplasm of liver and intrahepatic bile duct MODIFIER: PS COMPARISON: MRI abdomen 5/12/2020, CT 5/17/2020 and priors, including PET CT 1/11/2018 CELL TYPE:  Adenocarcinoma, rectosigmoid colon biopsy 1/22/2018 TECHNIQUE:   8 6 mCi F-18-FDG administered IV  Multiplanar attenuation corrected and non attenuation corrected PET images were acquired 60 minutes post injection  Contiguous, low dose, axial CT sections were obtained from the vertex through the femurs   Intravenous contrast material was not utilized  This examination, like all CT scans performed in the [facility], was performed utilizing techniques to minimize radiation dose exposure, including the use of iterative reconstruction and automated exposure control  Fasting serum glucose: mg/dl FINDINGS: VISUALIZED BRAIN:   No acute abnormalities are seen  HEAD/NECK:   There is a physiologic distribution of FDG  No FDG avid cervical adenopathy is seen  CT images: Nodular right thyroid without change CHEST:   No FDG avid soft tissue lesions are seen  No hypermetabolic hilar or mediastinal adenopathy  CT images: Tiny lung nodules described on the prior CT are too small for accurate PET evaluation   ABDOMEN:   Right posterior hepatic dome lesion series 3 image 115 measuring 3 2 x 2 6 cm and demonstrates FDG activity similar to background liver activity, SUV 2 4  This is compatible with a treated metastasis, prior SUV in this region 3 8  Residual viable tumor  is not excluded at this time  For comparison, adjacent liver parenchyma has an SUV of 3  Lateral left liver lesion series 3 image 180 demonstrates intense FDG activity, most compatible with a metastasis  This measures 2 7 x 1 9 cm, SUV 7 7  CT images: Stable appearance of left kidney with pelvic fullness and parapelvic cyst   Small fat-containing umbilical hernia  PELVIS: No FDG avid soft tissue lesions are seen  No hypermetabolic pelvic adenopathy  No significant hypermetabolism at the rectosigmoid suture line  CT images: Stable  OSSEOUS STRUCTURES: No FDG avid lesions are seen  CT images: Stable  Impression: 1  Hypermetabolic left lateral liver metastasis  Minimal activity in the hepatic dome treated metastasis, for which viable tumor is not excluded  2   No new hypermetabolic metastases in the neck, chest, or pelvis  Workstation performed: [id number]     I reviewed the above laboratory and imaging data  Discussion/Summary: 82-year-old male with metastatic rectosigmoid cancer  he has a new lesion in the left lateral segment  This is under 3 cm  I have discussed this with IR  They do not think percutaneous ablation is possible secondary to the location adjacent to the stomach  The treated lesion appears stable and on my review has minimal to no FDG activity  This will continue to be observed  Have recommended that he undergo resection/ablation of the left lateral segment lesion with intraoperative ultrasound  The risks were explained including bleeding, infection, need for further surgery, wound complications, adjacent organ injury and bile leak  Informed consent was obtained    This is being scheduled for Monday June 8th  Jasmin Lynn is agreeable to this   All his questions were answered

## 2020-06-03 DIAGNOSIS — C18.9 COLON CANCER METASTASIZED TO LIVER (HCC): ICD-10-CM

## 2020-06-03 DIAGNOSIS — C78.7 COLON CANCER METASTASIZED TO LIVER (HCC): ICD-10-CM

## 2020-06-03 PROCEDURE — U0003 INFECTIOUS AGENT DETECTION BY NUCLEIC ACID (DNA OR RNA); SEVERE ACUTE RESPIRATORY SYNDROME CORONAVIRUS 2 (SARS-COV-2) (CORONAVIRUS DISEASE [COVID-19]), AMPLIFIED PROBE TECHNIQUE, MAKING USE OF HIGH THROUGHPUT TECHNOLOGIES AS DESCRIBED BY CMS-2020-01-R: HCPCS

## 2020-06-03 NOTE — PRE-PROCEDURE INSTRUCTIONS
No outpatient medications have been marked as taking for the 6/8/20 encounter [facility] Encounter)  Have you had / have a sore throat? NO  have you had / have a cough less than 1 week? NO  Have you had / have a fever greater than 100 0 - 100  4? NO  Are you experiencing any shortness of breath? NO    Pre Procedure instructions given and verbalized understanding

## 2020-06-03 NOTE — PRE-PROCEDURE INSTRUCTIONS
No outpatient medications have been marked as taking for the 6/8/20 encounter [facility] Encounter)  Have you had / have a sore throat? NO  have you had / have a cough less than 1 week? NO  Have you had / have a fever greater than 100 0 - 100  4? NO  Are you experiencing any shortness of breath? NO    Pre Procedure instructions given and verbalized understanding

## 2020-06-04 ENCOUNTER — OFFICE VISIT (OUTPATIENT)
Dept: HEMATOLOGY ONCOLOGY | Facility: CLINIC | Age: 50
End: 2020-06-04
Payer: COMMERCIAL

## 2020-06-04 ENCOUNTER — ANESTHESIA EVENT (OUTPATIENT)
Dept: PERIOP | Facility: HOSPITAL | Age: 50
DRG: 421 | End: 2020-06-04
Payer: COMMERCIAL

## 2020-06-04 VITALS
RESPIRATION RATE: 16 BRPM | WEIGHT: 167 LBS | HEART RATE: 78 BPM | TEMPERATURE: 98.2 F | HEIGHT: 67 IN | OXYGEN SATURATION: 98 % | DIASTOLIC BLOOD PRESSURE: 86 MMHG | SYSTOLIC BLOOD PRESSURE: 126 MMHG | BODY MASS INDEX: 26.21 KG/M2

## 2020-06-04 DIAGNOSIS — C78.7 METASTATIC COLON CANCER TO LIVER (HCC): Primary | ICD-10-CM

## 2020-06-04 DIAGNOSIS — C18.9 METASTATIC COLON CANCER TO LIVER (HCC): Primary | ICD-10-CM

## 2020-06-04 PROCEDURE — 99214 OFFICE O/P EST MOD 30 MIN: CPT | Performed by: INTERNAL MEDICINE

## 2020-06-05 LAB — SARS-COV-2 RNA SPEC QL NAA+PROBE: NOT DETECTED

## 2020-06-08 ENCOUNTER — ANESTHESIA (OUTPATIENT)
Dept: PERIOP | Facility: HOSPITAL | Age: 50
DRG: 421 | End: 2020-06-08
Payer: COMMERCIAL

## 2020-06-08 ENCOUNTER — HOSPITAL ENCOUNTER (INPATIENT)
Facility: HOSPITAL | Age: 50
LOS: 2 days | Discharge: HOME | DRG: 421 | End: 2020-06-10
Attending: SURGERY | Admitting: SURGERY
Payer: COMMERCIAL

## 2020-06-08 DIAGNOSIS — N35.911 STRICTURE OF URETHRAL MEATUS IN MALE, UNSPECIFIED STRICTURE TYPE: Primary | ICD-10-CM

## 2020-06-08 DIAGNOSIS — C78.7 COLON CANCER METASTASIZED TO LIVER (HCC): ICD-10-CM

## 2020-06-08 DIAGNOSIS — C18.9 COLON CANCER METASTASIZED TO LIVER (HCC): ICD-10-CM

## 2020-06-08 PROCEDURE — 88341 IMHCHEM/IMCYTCHM EA ADD ANTB: CPT | Performed by: PATHOLOGY

## 2020-06-08 PROCEDURE — 47120 PARTIAL REMOVAL OF LIVER: CPT | Performed by: SURGERY

## 2020-06-08 PROCEDURE — 76998 US GUIDE INTRAOP: CPT | Performed by: SURGERY

## 2020-06-08 PROCEDURE — 88307 TISSUE EXAM BY PATHOLOGIST: CPT | Performed by: PATHOLOGY

## 2020-06-08 PROCEDURE — 88342 IMHCHEM/IMCYTCHM 1ST ANTB: CPT | Performed by: PATHOLOGY

## 2020-06-08 PROCEDURE — 0FB00ZX EXCISION OF LIVER, OPEN APPROACH, DIAGNOSTIC: ICD-10-PCS | Performed by: SURGERY

## 2020-06-08 RX ORDER — ALBUMIN, HUMAN INJ 5% 5 %
SOLUTION INTRAVENOUS CONTINUOUS PRN
Status: DISCONTINUED | OUTPATIENT
Start: 2020-06-08 | End: 2020-06-08 | Stop reason: SURG

## 2020-06-08 RX ORDER — SODIUM CHLORIDE 9 MG/ML
INJECTION, SOLUTION INTRAVENOUS CONTINUOUS PRN
Status: DISCONTINUED | OUTPATIENT
Start: 2020-06-08 | End: 2020-06-08 | Stop reason: SURG

## 2020-06-08 RX ORDER — HEPARIN SODIUM 5000 [USP'U]/ML
5000 INJECTION, SOLUTION INTRAVENOUS; SUBCUTANEOUS EVERY 8 HOURS SCHEDULED
Status: DISCONTINUED | OUTPATIENT
Start: 2020-06-08 | End: 2020-06-10 | Stop reason: HOSPADM

## 2020-06-08 RX ORDER — PANTOPRAZOLE SODIUM 40 MG/1
40 TABLET, DELAYED RELEASE ORAL
Status: DISCONTINUED | OUTPATIENT
Start: 2020-06-08 | End: 2020-06-10 | Stop reason: HOSPADM

## 2020-06-08 RX ORDER — DIPHENHYDRAMINE HYDROCHLORIDE 50 MG/ML
25 INJECTION INTRAMUSCULAR; INTRAVENOUS EVERY 6 HOURS PRN
Status: DISCONTINUED | OUTPATIENT
Start: 2020-06-08 | End: 2020-06-10 | Stop reason: HOSPADM

## 2020-06-08 RX ORDER — HYDROMORPHONE HCL 110MG/55ML
PATIENT CONTROLLED ANALGESIA SYRINGE INTRAVENOUS AS NEEDED
Status: DISCONTINUED | OUTPATIENT
Start: 2020-06-08 | End: 2020-06-08 | Stop reason: SURG

## 2020-06-08 RX ORDER — PROPOFOL 10 MG/ML
INJECTION, EMULSION INTRAVENOUS AS NEEDED
Status: DISCONTINUED | OUTPATIENT
Start: 2020-06-08 | End: 2020-06-08 | Stop reason: SURG

## 2020-06-08 RX ORDER — DEXAMETHASONE SODIUM PHOSPHATE 10 MG/ML
INJECTION, SOLUTION INTRAMUSCULAR; INTRAVENOUS AS NEEDED
Status: DISCONTINUED | OUTPATIENT
Start: 2020-06-08 | End: 2020-06-08 | Stop reason: SURG

## 2020-06-08 RX ORDER — METOCLOPRAMIDE HYDROCHLORIDE 5 MG/ML
INJECTION INTRAMUSCULAR; INTRAVENOUS AS NEEDED
Status: DISCONTINUED | OUTPATIENT
Start: 2020-06-08 | End: 2020-06-08 | Stop reason: SURG

## 2020-06-08 RX ORDER — ONDANSETRON 2 MG/ML
INJECTION INTRAMUSCULAR; INTRAVENOUS AS NEEDED
Status: DISCONTINUED | OUTPATIENT
Start: 2020-06-08 | End: 2020-06-08 | Stop reason: SURG

## 2020-06-08 RX ORDER — LIDOCAINE HYDROCHLORIDE 10 MG/ML
INJECTION, SOLUTION EPIDURAL; INFILTRATION; INTRACAUDAL; PERINEURAL
Status: DISPENSED
Start: 2020-06-08 | End: 2020-06-08

## 2020-06-08 RX ORDER — HYDROMORPHONE HCL/PF 1 MG/ML
0.5 SYRINGE (ML) INJECTION EVERY 2 HOUR PRN
Status: DISCONTINUED | OUTPATIENT
Start: 2020-06-08 | End: 2020-06-10 | Stop reason: HOSPADM

## 2020-06-08 RX ORDER — SODIUM CHLORIDE, SODIUM LACTATE, POTASSIUM CHLORIDE, CALCIUM CHLORIDE 600; 310; 30; 20 MG/100ML; MG/100ML; MG/100ML; MG/100ML
125 INJECTION, SOLUTION INTRAVENOUS CONTINUOUS
Status: DISCONTINUED | OUTPATIENT
Start: 2020-06-08 | End: 2020-06-08

## 2020-06-08 RX ORDER — LIDOCAINE HYDROCHLORIDE 10 MG/ML
INJECTION, SOLUTION EPIDURAL; INFILTRATION; INTRACAUDAL; PERINEURAL AS NEEDED
Status: DISCONTINUED | OUTPATIENT
Start: 2020-06-08 | End: 2020-06-08 | Stop reason: SURG

## 2020-06-08 RX ORDER — GLYCOPYRROLATE 0.2 MG/ML
INJECTION INTRAMUSCULAR; INTRAVENOUS AS NEEDED
Status: DISCONTINUED | OUTPATIENT
Start: 2020-06-08 | End: 2020-06-08 | Stop reason: SURG

## 2020-06-08 RX ORDER — CEFAZOLIN SODIUM 1 G/50ML
1000 SOLUTION INTRAVENOUS ONCE
Status: COMPLETED | OUTPATIENT
Start: 2020-06-08 | End: 2020-06-08

## 2020-06-08 RX ORDER — SUCCINYLCHOLINE/SOD CL,ISO/PF 100 MG/5ML
SYRINGE (ML) INTRAVENOUS AS NEEDED
Status: DISCONTINUED | OUTPATIENT
Start: 2020-06-08 | End: 2020-06-08 | Stop reason: SURG

## 2020-06-08 RX ORDER — NEOSTIGMINE METHYLSULFATE 1 MG/ML
INJECTION INTRAVENOUS AS NEEDED
Status: DISCONTINUED | OUTPATIENT
Start: 2020-06-08 | End: 2020-06-08 | Stop reason: SURG

## 2020-06-08 RX ORDER — SODIUM CHLORIDE, SODIUM LACTATE, POTASSIUM CHLORIDE, CALCIUM CHLORIDE 600; 310; 30; 20 MG/100ML; MG/100ML; MG/100ML; MG/100ML
125 INJECTION, SOLUTION INTRAVENOUS CONTINUOUS
Status: DISCONTINUED | OUTPATIENT
Start: 2020-06-08 | End: 2020-06-09

## 2020-06-08 RX ORDER — ROCURONIUM BROMIDE 10 MG/ML
INJECTION, SOLUTION INTRAVENOUS AS NEEDED
Status: DISCONTINUED | OUTPATIENT
Start: 2020-06-08 | End: 2020-06-08 | Stop reason: SURG

## 2020-06-08 RX ORDER — NICOTINE 21 MG/24HR
1 PATCH, TRANSDERMAL 24 HOURS TRANSDERMAL DAILY
Status: DISCONTINUED | OUTPATIENT
Start: 2020-06-08 | End: 2020-06-10 | Stop reason: HOSPADM

## 2020-06-08 RX ORDER — MIDAZOLAM HYDROCHLORIDE 2 MG/2ML
INJECTION, SOLUTION INTRAMUSCULAR; INTRAVENOUS AS NEEDED
Status: DISCONTINUED | OUTPATIENT
Start: 2020-06-08 | End: 2020-06-08 | Stop reason: SURG

## 2020-06-08 RX ORDER — LIDOCAINE HYDROCHLORIDE AND EPINEPHRINE 15; 5 MG/ML; UG/ML
INJECTION, SOLUTION EPIDURAL
Status: COMPLETED | OUTPATIENT
Start: 2020-06-08 | End: 2020-06-08

## 2020-06-08 RX ORDER — ROPIVACAINE HYDROCHLORIDE 2 MG/ML
INJECTION, SOLUTION EPIDURAL; INFILTRATION; PERINEURAL CONTINUOUS PRN
Status: DISCONTINUED | OUTPATIENT
Start: 2020-06-08 | End: 2020-06-08 | Stop reason: SURG

## 2020-06-08 RX ORDER — FENTANYL CITRATE/PF 50 MCG/ML
25 SYRINGE (ML) INJECTION
Status: DISCONTINUED | OUTPATIENT
Start: 2020-06-08 | End: 2020-06-08 | Stop reason: HOSPADM

## 2020-06-08 RX ORDER — ONDANSETRON 2 MG/ML
4 INJECTION INTRAMUSCULAR; INTRAVENOUS EVERY 6 HOURS PRN
Status: DISCONTINUED | OUTPATIENT
Start: 2020-06-08 | End: 2020-06-10 | Stop reason: HOSPADM

## 2020-06-08 RX ORDER — ROPIVACAINE HYDROCHLORIDE 2 MG/ML
INJECTION, SOLUTION EPIDURAL; INFILTRATION; PERINEURAL AS NEEDED
Status: DISCONTINUED | OUTPATIENT
Start: 2020-06-08 | End: 2020-06-08 | Stop reason: SURG

## 2020-06-08 RX ORDER — MAGNESIUM HYDROXIDE 1200 MG/15ML
LIQUID ORAL AS NEEDED
Status: DISCONTINUED | OUTPATIENT
Start: 2020-06-08 | End: 2020-06-08 | Stop reason: HOSPADM

## 2020-06-08 RX ORDER — FENTANYL CITRATE 50 UG/ML
INJECTION, SOLUTION INTRAMUSCULAR; INTRAVENOUS AS NEEDED
Status: DISCONTINUED | OUTPATIENT
Start: 2020-06-08 | End: 2020-06-08 | Stop reason: SURG

## 2020-06-08 RX ADMIN — LIDOCAINE HYDROCHLORIDE 50 MG: 10 INJECTION, SOLUTION EPIDURAL; INFILTRATION; INTRACAUDAL; PERINEURAL at 07:54

## 2020-06-08 RX ADMIN — CEFAZOLIN SODIUM 1000 MG: 1 SOLUTION INTRAVENOUS at 07:50

## 2020-06-08 RX ADMIN — ALBUMIN (HUMAN): 12.5 SOLUTION INTRAVENOUS at 08:00

## 2020-06-08 RX ADMIN — ALBUMIN (HUMAN): 12.5 SOLUTION INTRAVENOUS at 08:26

## 2020-06-08 RX ADMIN — ROCURONIUM BROMIDE 45 MG: 10 INJECTION, SOLUTION INTRAVENOUS at 08:05

## 2020-06-08 RX ADMIN — SODIUM CHLORIDE, SODIUM LACTATE, POTASSIUM CHLORIDE, AND CALCIUM CHLORIDE: .6; .31; .03; .02 INJECTION, SOLUTION INTRAVENOUS at 07:09

## 2020-06-08 RX ADMIN — ROPIVACAINE HYDROCHLORIDE 5 ML/HR: 2 INJECTION, SOLUTION EPIDURAL; INFILTRATION at 08:48

## 2020-06-08 RX ADMIN — HEPARIN SODIUM 5000 UNITS: 5000 INJECTION INTRAVENOUS; SUBCUTANEOUS at 15:18

## 2020-06-08 RX ADMIN — SODIUM CHLORIDE, SODIUM LACTATE, POTASSIUM CHLORIDE, AND CALCIUM CHLORIDE 125 ML/HR: .6; .31; .03; .02 INJECTION, SOLUTION INTRAVENOUS at 14:00

## 2020-06-08 RX ADMIN — SODIUM CHLORIDE, SODIUM LACTATE, POTASSIUM CHLORIDE, AND CALCIUM CHLORIDE 1000 ML: .6; .31; .03; .02 INJECTION, SOLUTION INTRAVENOUS at 14:45

## 2020-06-08 RX ADMIN — NEOSTIGMINE METHYLSULFATE 3 MG: 1 INJECTION, SOLUTION INTRAVENOUS at 09:33

## 2020-06-08 RX ADMIN — HYDROMORPHONE HYDROCHLORIDE 0.5 MG: 2 INJECTION, SOLUTION INTRAMUSCULAR; INTRAVENOUS; SUBCUTANEOUS at 09:45

## 2020-06-08 RX ADMIN — SODIUM CHLORIDE, SODIUM LACTATE, POTASSIUM CHLORIDE, AND CALCIUM CHLORIDE: .6; .31; .03; .02 INJECTION, SOLUTION INTRAVENOUS at 09:34

## 2020-06-08 RX ADMIN — MIDAZOLAM 2 MG: 1 INJECTION INTRAMUSCULAR; INTRAVENOUS at 07:10

## 2020-06-08 RX ADMIN — SODIUM CHLORIDE: 0.9 INJECTION, SOLUTION INTRAVENOUS at 07:58

## 2020-06-08 RX ADMIN — ROPIVACAINE HYDROCHLORIDE 2 ML: 2 INJECTION, SOLUTION EPIDURAL; INFILTRATION at 09:55

## 2020-06-08 RX ADMIN — HEPARIN SODIUM 5000 UNITS: 5000 INJECTION INTRAVENOUS; SUBCUTANEOUS at 21:25

## 2020-06-08 RX ADMIN — FENTANYL CITRATE 50 MCG: 50 INJECTION, SOLUTION INTRAMUSCULAR; INTRAVENOUS at 07:54

## 2020-06-08 RX ADMIN — SODIUM CHLORIDE 1 MCG/MIN: 0.9 INJECTION, SOLUTION INTRAVENOUS at 07:58

## 2020-06-08 RX ADMIN — FENTANYL CITRATE 50 MCG: 50 INJECTION, SOLUTION INTRAMUSCULAR; INTRAVENOUS at 07:12

## 2020-06-08 RX ADMIN — GLYCOPYRROLATE 0.2 MG: 0.2 INJECTION, SOLUTION INTRAMUSCULAR; INTRAVENOUS at 07:45

## 2020-06-08 RX ADMIN — ROPIVACAINE HYDROCHLORIDE 5 ML: 2 INJECTION, SOLUTION EPIDURAL; INFILTRATION at 09:46

## 2020-06-08 RX ADMIN — SODIUM CHLORIDE, SODIUM LACTATE, POTASSIUM CHLORIDE, AND CALCIUM CHLORIDE 125 ML/HR: .6; .31; .03; .02 INJECTION, SOLUTION INTRAVENOUS at 21:28

## 2020-06-08 RX ADMIN — PROPOFOL 150 MG: 10 INJECTION, EMULSION INTRAVENOUS at 07:54

## 2020-06-08 RX ADMIN — ONDANSETRON 4 MG: 2 INJECTION INTRAMUSCULAR; INTRAVENOUS at 07:53

## 2020-06-08 RX ADMIN — LIDOCAINE HYDROCHLORIDE AND EPINEPHRINE 5 ML: 15; 5 INJECTION, SOLUTION EPIDURAL at 07:25

## 2020-06-08 RX ADMIN — METOCLOPRAMIDE 10 MG: 5 INJECTION, SOLUTION INTRAMUSCULAR; INTRAVENOUS at 08:33

## 2020-06-08 RX ADMIN — GLYCOPYRROLATE 0.5 MG: 0.2 INJECTION, SOLUTION INTRAMUSCULAR; INTRAVENOUS at 09:33

## 2020-06-08 RX ADMIN — Medication 100 MG: at 07:54

## 2020-06-08 RX ADMIN — ROPIVACAINE HYDROCHLORIDE: 5 INJECTION, SOLUTION EPIDURAL; INFILTRATION; PERINEURAL at 10:24

## 2020-06-08 RX ADMIN — ROPIVACAINE HYDROCHLORIDE 3 ML: 2 INJECTION, SOLUTION EPIDURAL; INFILTRATION at 09:50

## 2020-06-08 RX ADMIN — DEXAMETHASONE SODIUM PHOSPHATE 10 MG: 10 INJECTION, SOLUTION INTRAMUSCULAR; INTRAVENOUS at 08:00

## 2020-06-08 RX ADMIN — PROPOFOL 50 MG: 10 INJECTION, EMULSION INTRAVENOUS at 09:30

## 2020-06-08 RX ADMIN — ROCURONIUM BROMIDE 5 MG: 10 INJECTION, SOLUTION INTRAVENOUS at 07:54

## 2020-06-08 RX ADMIN — HYDROMORPHONE HYDROCHLORIDE 0.5 MG: 2 INJECTION, SOLUTION INTRAMUSCULAR; INTRAVENOUS; SUBCUTANEOUS at 09:48

## 2020-06-08 NOTE — OP NOTE
OPERATIVE REPORT  PATIENT NAME: Dilia Roe    :  1970  MRN: [id number]  Pt Location: [facility] OR ROOM 07    SURGERY DATE: 2020    Surgeon(s) and Role:     * Vinny Dodd MD - Primary     * Terry See MD - Assisting     * Janice Leonard MD - Co-surgeon    Preop Diagnosis:  Colon cancer metastasized to liver ([address] ) [C18 9, C78 7]    Post-Op Diagnosis Codes:     * Colon cancer metastasized to liver ([address] ) [C18 9, C78 7]    Procedure(s) (LRB):  LIVER RESECTION SEGMENT 3 WITH  INTRAOPERATIVE U/S OF LIVER (N/A)  LAPAROTOMY EXPLORATORY, LYSIS OF ADHESIONS (N/A)    Specimen(s):  ID Type Source Tests Collected by Time Destination   1 : SEGMENT 3 LIVER RESECTION Tissue Liver TISSUE Katia Locke MD 2020        Estimated Blood Loss:   Minimal    Drains:  NG/OG/Enteral Tube Nasogastric 18 Fr Right nares (Active)   Number of days: 0       Urethral Catheter Latex 12 Fr  (Active)   Number of days: 0       Anesthesia Type:   General w/ Epidural    Operative Indications:  Colon cancer metastasized to liver ([address] ) [C18 9, C667]  53-year-old male with metastatic colon cancer  Was recommended that he undergo liver resection  Risks and benefits were explained  Informed consent was obtained  Patient was brought to the operating  Operative Findings:  Single lesion seen on segment 3  Gross margin was negative by 1 cm     Complications:   None    Procedure and Technique:  After identifying the patient, general anesthesia was achieved  A Nickerson catheter was placed  Lines were placed by Anesthesia  Patient was prepped and draped in the usual sterile fashion  A time-out was performed  Upper midline incision was made  Using sharp dissection this was taken through the previous incision and down through the fascia  We now lysed adhesions of the stomach and liver off the anterior abdominal wall  The falciform ligament was ultimately identified    This was clamped, divided and ligated with 2 0 silk suture  At this point the entire left lateral segment was free  In order to obtain a negative margin we had to go into segment 4  So we re-clamped falciform ligament more proximally and divided this and ligated with 2 0 silk suture  At this point ultrasound was performed and there was no evidence of any lesion on the left lobe that was seen with intraoperative ultrasound  The right lobe was scarred in from his previous surgery  We now were able to grossly measure approximately 1 cm beyond the tumor  This area was scored  Now using the Bovie electrocautery we are able to divide the liver parenchyma  Ultimately any bleeding vessels were cauterized  At the base there was a feeding vessel that was clamped, divided and suture ligated with 3 0 Prolene suture  Specimen was now ultrasounded this appeared to be completely excised  This was sent to pathology for gross margins  I did orient the specimen for pathology and when the specimen was divided, the gross margins were approximately 1 cm from the main tumor mass  The wound was now copiously irrigated  There was excellent hemostasis  Surgiflo was placed in the resection bed and there continued to be excellent hemostasis  Sponge and needle counts were correct  The fascia was approximated with 1  PDS suture starting at either end of the incision and secured centrally  Subcutaneous tissue was irrigated  Skin was approximated with a running 4 Monocryl suture in a subcuticular fashion  Skin glue was used on the skin  Patient was then extubated and taken to the recovery room in stable condition having tolerated the procedure well  I was present and participated in all aspects of this procedure         I was present for the entire procedure    Patient Disposition:  PACU     SIGNATURE: Josiah Strauss MD  DATE: June 8, 2020  TIME: 9:35 AM

## 2020-06-08 NOTE — INTERVAL H&P NOTE
H&P reviewed  After examining the patient I find no changes in the patients condition since the H&P had been written      Vitals:    06/08/20 0602   BP: 111/71   Pulse: 63   Resp: 16   Temp: (!) 96 9 °F (36 1 °C)   SpO2: 98%

## 2020-06-08 NOTE — PERIOPERATIVE NURSING NOTE
Attempted to place 16fr roberson intraop, unable to pass catheter beyond urinary meatus  Dr Nina Orr attempted 16fr, but then was able to pass a 12fr roberson

## 2020-06-09 LAB
ANION GAP SERPL CALCULATED.3IONS-SCNC: 7 MMOL/L (ref 4–13)
BASOPHILS # BLD AUTO: 0.01 THOUSANDS/ÂΜL (ref 0–0.1)
BASOPHILS NFR BLD AUTO: 0 % (ref 0–1)
BUN SERPL-MCNC: 11 MG/DL (ref 5–25)
CALCIUM SERPL-MCNC: 8.1 MG/DL (ref 8.3–10.1)
CHLORIDE SERPL-SCNC: 109 MMOL/L (ref 100–108)
CO2 SERPL-SCNC: 25 MMOL/L (ref 21–32)
CREAT SERPL-MCNC: 0.95 MG/DL (ref 0.6–1.3)
EOSINOPHIL # BLD AUTO: 0 THOUSAND/ÂΜL (ref 0–0.61)
EOSINOPHIL NFR BLD AUTO: 0 % (ref 0–6)
ERYTHROCYTE [DISTWIDTH] IN BLOOD BY AUTOMATED COUNT: 13.4 % (ref 11.6–15.1)
GFR SERPL CREATININE-BSD FRML MDRD: 95 ML/MIN/1.73SQ M
GLUCOSE SERPL-MCNC: 114 MG/DL (ref 65–140)
HCT VFR BLD AUTO: 36.6 % (ref 36.5–49.3)
HGB BLD-MCNC: 12.4 G/DL (ref 12–17)
IMM GRANULOCYTES # BLD AUTO: 0.03 THOUSAND/UL (ref 0–0.2)
IMM GRANULOCYTES NFR BLD AUTO: 0 % (ref 0–2)
LYMPHOCYTES # BLD AUTO: 1.66 THOUSANDS/ÂΜL (ref 0.6–4.47)
LYMPHOCYTES NFR BLD AUTO: 15 % (ref 14–44)
MAGNESIUM SERPL-MCNC: 1.8 MG/DL (ref 1.6–2.6)
MCH RBC QN AUTO: 33.2 PG (ref 26.8–34.3)
MCHC RBC AUTO-ENTMCNC: 33.9 G/DL (ref 31.4–37.4)
MCV RBC AUTO: 98 FL (ref 82–98)
MONOCYTES # BLD AUTO: 0.79 THOUSAND/ÂΜL (ref 0.17–1.22)
MONOCYTES NFR BLD AUTO: 7 % (ref 4–12)
NEUTROPHILS # BLD AUTO: 8.79 THOUSANDS/ÂΜL (ref 1.85–7.62)
NEUTS SEG NFR BLD AUTO: 78 % (ref 43–75)
NRBC BLD AUTO-RTO: 0 /100 WBCS
PLATELET # BLD AUTO: 127 THOUSANDS/UL (ref 149–390)
PMV BLD AUTO: 11.4 FL (ref 8.9–12.7)
POTASSIUM SERPL-SCNC: 3.5 MMOL/L (ref 3.5–5.3)
RBC # BLD AUTO: 3.74 MILLION/UL (ref 3.88–5.62)
SODIUM SERPL-SCNC: 141 MMOL/L (ref 136–145)
WBC # BLD AUTO: 11.28 THOUSAND/UL (ref 4.31–10.16)

## 2020-06-09 PROCEDURE — 85025 COMPLETE CBC W/AUTO DIFF WBC: CPT | Performed by: SURGERY

## 2020-06-09 PROCEDURE — 97166 OT EVAL MOD COMPLEX 45 MIN: CPT

## 2020-06-09 PROCEDURE — 97162 PT EVAL MOD COMPLEX 30 MIN: CPT

## 2020-06-09 PROCEDURE — 83735 ASSAY OF MAGNESIUM: CPT | Performed by: SURGERY

## 2020-06-09 PROCEDURE — 80048 BASIC METABOLIC PNL TOTAL CA: CPT | Performed by: SURGERY

## 2020-06-09 PROCEDURE — 99024 POSTOP FOLLOW-UP VISIT: CPT | Performed by: SURGERY

## 2020-06-09 PROCEDURE — 99254 IP/OBS CNSLTJ NEW/EST MOD 60: CPT | Performed by: PHYSICIAN ASSISTANT

## 2020-06-09 RX ORDER — MAGNESIUM SULFATE 1 G/100ML
1 INJECTION INTRAVENOUS ONCE
Status: COMPLETED | OUTPATIENT
Start: 2020-06-09 | End: 2020-06-09

## 2020-06-09 RX ORDER — POTASSIUM CHLORIDE 20 MEQ/1
40 TABLET, EXTENDED RELEASE ORAL ONCE
Status: COMPLETED | OUTPATIENT
Start: 2020-06-09 | End: 2020-06-09

## 2020-06-09 RX ADMIN — HEPARIN SODIUM 5000 UNITS: 5000 INJECTION INTRAVENOUS; SUBCUTANEOUS at 13:46

## 2020-06-09 RX ADMIN — PANTOPRAZOLE SODIUM 40 MG: 40 TABLET, DELAYED RELEASE ORAL at 05:06

## 2020-06-09 RX ADMIN — SODIUM CHLORIDE, SODIUM LACTATE, POTASSIUM CHLORIDE, AND CALCIUM CHLORIDE 125 ML/HR: .6; .31; .03; .02 INJECTION, SOLUTION INTRAVENOUS at 05:06

## 2020-06-09 RX ADMIN — NICOTINE 1 PATCH: 21 PATCH, EXTENDED RELEASE TRANSDERMAL at 09:30

## 2020-06-09 RX ADMIN — POTASSIUM CHLORIDE 40 MEQ: 1500 TABLET, EXTENDED RELEASE ORAL at 09:42

## 2020-06-09 RX ADMIN — ROPIVACAINE HYDROCHLORIDE: 5 INJECTION, SOLUTION EPIDURAL; INFILTRATION; PERINEURAL at 13:44

## 2020-06-09 RX ADMIN — HEPARIN SODIUM 5000 UNITS: 5000 INJECTION INTRAVENOUS; SUBCUTANEOUS at 05:05

## 2020-06-09 RX ADMIN — HEPARIN SODIUM 5000 UNITS: 5000 INJECTION INTRAVENOUS; SUBCUTANEOUS at 21:25

## 2020-06-09 RX ADMIN — MAGNESIUM SULFATE HEPTAHYDRATE 1 G: 1 INJECTION, SOLUTION INTRAVENOUS at 09:43

## 2020-06-09 NOTE — PROGRESS NOTES
Progress Note - Acute Pain Service Regional Follow Up  Wanda Sanchez 54 y o  male MRN: [id number]  Unit/Bed#: [facility] 910-01 Encounter: [id number]      SURGERY DATE: 6/8/2020  Post-Op Diagnosis Codes:     * Colon cancer metastasized to liver ([address] ) [C18 9, C78 7]    Assessment:   54 y o  male with stage 4 rectal cancer mets to the liver, POD#1 from partial hepatectomy with epidural in place Day 1, doing very well    Plan:   1  Continue epidural infusion today, plan for removal tomorrow AM with transition to PO pain regimen   -hold AM SQH  2  Bowel regimen prn  3  Encouraged IS/OOB/ambulation as tolerated    APS will continue to follow; please contact APS ( btwn [phone]) with any further questions    Subjective:  Patient states pain is well controlled, up and out of bed without issue  Denies n/v, pruritus, LE weakness  Diet advanced to regular today, tolerating PO pills  No other issues or complaints, all questions answered  Pain 0-2 out of 10, no IV breakthrough's required  Meds/Allergies     all current active meds have been reviewed    No Known Allergies    Objective     Temp:  [81 °F (27 2 °C)-99 3 °F (37 4 °C)] 98 7 °F (37 1 °C)  HR:  [54-90] 65  Resp:  [18-20] 20  BP: ()/(54-68) 98/68    Physical Exam   Constitutional: He is oriented to person, place, and time  He appears well-developed and well-nourished  HENT:   Head: Normocephalic and atraumatic  Neck: Normal range of motion  Cardiovascular: Normal rate  Pulmonary/Chest: Effort normal    Abdominal: Soft  He exhibits no distension  There is no tenderness  Musculoskeletal: He exhibits no edema  Neurological: He is alert and oriented to person, place, and time  Skin: Skin is warm and dry  Epidural catheter site c/d/i  Appropriately numb over abdomen bilaterally, incision c/d/i   Psychiatric: He has a normal mood and affect  His behavior is normal    Nursing note and vitals reviewed

## 2020-06-09 NOTE — OCCUPATIONAL THERAPY NOTE
Occupational Therapy Evaluation     Patient Name: Wanda Sanchez  CIKWC'S Date: 6/9/2020  Problem List  Principal Problem:    Colon cancer metastasized to liver [facility])    Past Medical History  Past Medical History:   Diagnosis Date   • Cancer [facility])    • Dysuria     Last Assessed:8/24/17   • GERD (gastroesophageal reflux disease)    • Liver nodule    • Pulmonary nodule, right    • Ureteropelvic junction (UPJ) obstruction 1/29/2020     Past Surgical History  Past Surgical History:   Procedure Laterality Date   • ABDOMINAL SURGERY     • COLON SURGERY     • COLONOSCOPY N/A 1/22/2018    Procedure: COLONOSCOPY;  Surgeon: Jolene Bojorquez MD;  Location: [facility]; Service: Colorectal   • DENTAL SURGERY  2016    [place]rown with bridge work   • FLEXIBLE SIGMOIDOSCOPY N/A 6/15/2018    Procedure: SIGMOIDOSCOPY FLEXIBLE w/o sedation w/ tattoo;  Surgeon: Jolene Bojorquez MD;  Location: [facility];   Service: Colorectal   • LAPAROTOMY N/A 6/8/2020    Procedure: LAPAROTOMY EXPLORATORY, LYSIS OF ADHESIONS;  Surgeon: Jacques Martins MD;  Location: [facility];  Service: Surgical Oncology   • LIVER LOBECTOMY N/A 6/21/2018    Procedure: liver resection/ablation, intraoperative ultrasound of liver;  Surgeon: Jacques Martins MD;  Location: [facility];  Service: Surgical Oncology   • LIVER LOBECTOMY N/A 6/8/2020    Procedure: LIVER RESECTION SEGMENT 3 WITH  INTRAOPERATIVE U/S OF LIVER;  Surgeon: Jacques Martins MD;  Location: [facility];  Service: Surgical Oncology   • NE EXPLORATORY OF ABDOMEN N/A 6/21/2018    Procedure: LAPAROTOMY EXPLORATORY;  Surgeon: Jolene Bojorquez MD;  Location: [facility];  Service: Colorectal   • NE INSERT PICC W/ SUB-Q PORT N/A 1/25/2018    Procedure: PORT-A-CATHETER PLACEMENT  Fluoroscopy for performance/interpretation;  Surgeon: Jolene Bojorquez MD;  Location: [facility];  Service: Colorectal   • NE PART REMOVAL COLON W ANASTOMOSIS Left 6/21/2018    Procedure: hemicolectomy, low anterior resection (open) SPY fluorescence angiography;  Surgeon: Nael Veliz MD;  Location: [facility];  Service: Colorectal   • FL PROCTECTOMY,PARTIAL N/A 6/21/2018    Procedure: Partial proctectomy ;  Surgeon: Nael Veliz MD;  Location: [facility];  Service: Colorectal   • FL SIGMOIDOSCOPY FLX DX W/COLLJ SPEC BR/WA IF PFRMD N/A 6/21/2018    Procedure: Braindalia OroFernandez;  Surgeon: Nael Veliz MD;  Location: [facility];  Service: Colorectal   • ROOT CANAL  2015   • TESTICLE SURGERY      Exploration of Undescended Testis rIght, age 8 had surgery for undescended testicle; Last Assessed:8/24/17   • TOOTH EXTRACTION  2015 06/09/20 7030   Note Type   Note type Eval only   Restrictions/Precautions   Weight Bearing Precautions Per Order No   Other Precautions Multiple lines  (epidural )   Pain Assessment   Pain Assessment Tool Pain Assessment not indicated - pt denies pain   Pain Score No Pain   Home Living   Type of [address] [place] [address] One level;Stairs to enter with rails   Bathroom Shower/Tub Walk-in shower   Bathroom Toilet Standard   Bathroom Equipment   (denies)   [address]   (denies)   Additional Comments Pt will be staying at his girlfriends house upon d/c, which is a 1 story home wtih 2-3 MARGOT  Prior Function   Level of [place] Independent with ADLs and functional mobility   Lives With Significant other   ADL Assistance Independent   IADLs Independent   Falls in the last 6 months 0   Vocational Full time employment   Lifestyle   Autonomy Pt reports being I with ADLS, IADLS and mobility without device PTA  (+)     Reciprocal Relationships Pt will be staying with his girlfriend who is home and able to assist as needed upon d/c     Service to Others Pt works at [employer] Enjoys reading and Heather Mccrary   Subjective   Subjective Pt reports no concerns about returning home     ADL   Where Assessed Edge of bed   Eating Assistance 7 " Independent   Grooming Assistance 5  Supervision/Setup   UB Bathing Assistance 5  Supervision/Setup   LB Bathing Assistance 4  Minimal Assistance   UB Dressing Assistance 5  Supervision/Setup   LB Dressing Assistance 4  [facility]  5  Supervision/Setup   Bed Mobility   Supine to Sit 5  Supervision   Additional items Increased time required   Additional Comments After OT session pt seated in chair with all needs within reach  Transfers   Sit to Stand 5  Supervision   Additional items Increased time required   Stand to Sit 5  Supervision   Additional items Increased time required   Functional Mobility   Functional Mobility 5  Supervision   Additional Comments Pt demonstrated long household mobility with no device  Additional items   (IV pole )   Balance   Static Sitting Good   Dynamic Sitting Fair +   Static Standing Fair +   Dynamic Standing Fair   Ambulatory Fair   Activity Tolerance   Activity Tolerance Patient tolerated treatment well   Medical Staff Made Aware PT Sharri   Nurse Made Aware RN confirmed okay to see pt   RUE Assessment   RUE Assessment WFL   LUE Assessment   LUE Assessment WFL   Cognition   Overall Cognitive Status WFL   Arousal/Participation Alert; Cooperative   Attention Within functional limits   Orientation Level Oriented X4   Memory Within functional limits   Following Commands Follows all commands and directions without difficulty   Comments Pt is very pleasant and cooperative to work with therapy  Assessment   Limitation Decreased endurance;Decreased ADL status; Decreased high-level ADLs   Prognosis Good   Assessment Pt is a 54 y o  male admitted to [facility] on 6/8/2020 w/ colon cancer metastasized to liver s/p \"LIVER RESECTION SEGMENT 3 WITH INTRAOPERATIVE U/S OF LIVER, LAPAROTOMY EXPLORATORY, LYSIS OF ADHESIONS\" on 6/8/2020  Comorbidities include a h/o GERD, pulmonary nodule, and other hydronephrosis   Pt with active OT orders and activity as tolerated " orders  Pt will be staying at his girlfriends home which is 1 story with 2-3 MARGOT  Pt reports his significant other will be home and able to assist as needed upon d/c  Pt was I w/  ADLS and IADLS, (+) drove, & required no use of DME PTA  Currently pt is supervision for bed mobility, functional transfers and functional mobility, supervision for UB ADLS, and min A for LB ADLS  Pt is limited at this time 2*: pain, endurance, activity tolerance and decreased I w/ ADLS/IADLS  Based on the aforementioned OT evaluation, functional performance deficits, and assessments, pt has been identified as a moderate complexity evaluation  From OT standpoint, anticipate d/c home with family support  Recommend continued participation in [facility] and functional mobility with staff  No further acute OT needs, d/c OT      Goals   Patient Goals To return home   Recommendation   OT Discharge Recommendation Return to previous environment with social support   OT - OK to Discharge Yes  (When medically appropriate)   Modified Tj Scale   Modified Tj Scale 3      Darlyn Cornell, MOT, OTR/L

## 2020-06-09 NOTE — PROGRESS NOTES
Progress Note - Surgical Oncology   Larinda Galeazzi 54 y o  male MRN: [id number]  Unit/Bed#: [facility] 910-01 Encounter: [id number]    Assessment/Plan:  54 y o  male with stage 4 rectal cancer mets to the liver, POD#1 from partial hepatectomy      - ADAT  - d/c roberson  - d/c epidural + Roberson as able    Subjective/Objective     Subjective: OMEGA o/n      Objective:     Vitals: Temp:  [81 °F (27 2 °C)-99 3 °F (37 4 °C)] 81 °F (27 2 °C)  HR:  [54-90] 69  Resp:  [9-21] 18  BP: ()/(54-77) 107/66  Arterial Line BP: (112-130)/(52-68) 112/67  Body mass index is 26 55 kg/m²  I/O       06/07 0701 - 06/08 0700 06/08 0701 - 06/09 0700 06/09 0701 - 06/10 0700    P  O   120     I V  (mL/kg)  3874 1 (51 2)     IV Piggyback  500     Total Intake(mL/kg)  4494 1 (59 4)     Urine (mL/kg/hr)  3530 (1 9)     Blood  75     Total Output  3605     Net  +889 1                  Physical Exam:  GEN: NAD  HEENT: MMM  CV: RRR  Lung: Normal effort  Ab: Soft, NT/ND  Midline incision CDI  Extrem: No CCE  Neuro: A+Ox3    Lab, Imaging and other studies: I have personally reviewed pertinent reports      VTE Pharmacologic Prophylaxis: Sequential compression device (Venodyne)   VTE Mechanical Prophylaxis: sequential compression device

## 2020-06-09 NOTE — PLAN OF CARE
Problem: PAIN - ADULT  Goal: Verbalizes/displays adequate comfort level or baseline comfort level  Description  Interventions:  - Encourage patient to monitor pain and request assistance  - Assess pain using appropriate pain scale  - Administer analgesics based on type and severity of pain and evaluate response  - Implement non-pharmacological measures as appropriate and evaluate response  - Consider cultural and social influences on pain and pain management  - Notify physician/advanced practitioner if interventions unsuccessful or patient reports new pain  Outcome: Progressing     Problem: DISCHARGE PLANNING  Goal: Discharge to home or other facility with appropriate resources  Description  INTERVENTIONS:  - Identify barriers to discharge w/patient and caregiver  - Arrange for needed discharge resources and transportation as appropriate  - Identify discharge learning needs (meds, wound care, etc )  - Arrange for interpretive services to assist at discharge as needed  - Refer to Case Management Department for coordinating discharge planning if the patient needs post-hospital services based on physician/advanced practitioner order or complex needs related to functional status, cognitive ability, or social support system  Outcome: Progressing     Problem: INFECTION - ADULT  Goal: Absence or prevention of progression during hospitalization  Description  INTERVENTIONS:  - Assess and monitor for signs and symptoms of infection  - Monitor lab/diagnostic results  - Monitor all insertion sites, i e  indwelling lines, tubes, and drains  - Monitor endotracheal if appropriate and nasal secretions for changes in amount and color  - [place] appropriate cooling/warming therapies per order  - Administer medications as ordered  - Instruct and encourage patient and family to use good hand hygiene technique  - Identify and instruct in appropriate isolation precautions for identified infection/condition  Outcome: Progressing Problem: SAFETY ADULT  Goal: Patient will remain free of falls  Description  INTERVENTIONS:  - Assess patient frequently for physical needs  -  Identify cognitive and physical deficits and behaviors that affect risk of falls  -  [place] fall precautions as indicated by assessment   - Educate patient/family on patient safety including physical limitations  - Instruct patient to call for assistance with activity based on assessment  - Modify environment to reduce risk of injury  - Consider OT/PT consult to assist with strengthening/mobility  Outcome: Progressing  Goal: Maintain or return to baseline ADL function  Description  INTERVENTIONS:  -  Assess patient's ability to carry out ADLs; assess patient's baseline for ADL function and identify physical deficits which impact ability to perform ADLs (bathing, care of mouth/teeth, toileting, grooming, dressing, etc )  - Assess/evaluate cause of self-care deficits   - Assess range of motion  - Assess patient's mobility; develop plan if impaired  - Assess patient's need for assistive devices and provide as appropriate  - Encourage maximum independence but intervene and supervise when necessary  - Involve family in performance of ADLs  - Assess for home care needs following discharge   - Consider OT consult to assist with ADL evaluation and planning for discharge  - Provide patient education as appropriate  Outcome: Progressing     Problem: Knowledge Deficit  Goal: Patient/family/caregiver demonstrates understanding of disease process, treatment plan, medications, and discharge instructions  Description  Complete learning assessment and assess knowledge base    Interventions:  - Provide teaching at level of understanding  - Provide teaching via preferred learning methods  Outcome: Progressing

## 2020-06-09 NOTE — PHYSICAL THERAPY NOTE
Physical Therapy Evaluation     Patient's Name: Maggie Hall    Admitting Diagnosis  Colon cancer [facility] ([facility] ) [C18 9, C78 7]    Problem List  Patient Active Problem List   Diagnosis   • ED (erectile dysfunction)   • Colon cancer metastasized to liver [facility])   • GERD (gastroesophageal reflux disease)   • Pulmonary nodule, right   • Acneiform rash   • Encounter for follow-up examination after completed treatment for malignant neoplasm   • Other hydronephrosis       Past Medical History  Past Medical History:   Diagnosis Date   • Cancer [facility])    • Dysuria     Last Assessed:8/24/17   • GERD (gastroesophageal reflux disease)    • Liver nodule    • Pulmonary nodule, right    • Ureteropelvic junction (UPJ) obstruction 1/29/2020       Past Surgical History  Past Surgical History:   Procedure Laterality Date   • ABDOMINAL SURGERY     • COLON SURGERY     • COLONOSCOPY N/A 1/22/2018    Procedure: COLONOSCOPY;  Surgeon: Tia Salguero MD;  [facility]; Service: Colorectal   • DENTAL SURGERY  2016    Crown with bridge work   • FLEXIBLE SIGMOIDOSCOPY N/A 6/15/2018    Procedure: SIGMOIDOSCOPY FLEXIBLE w/o sedation w/ tattoo;  Surgeon: Tia Salguero MD;  [facility];   Service: Colorectal   • LIVER LOBECTOMY N/A 6/21/2018    Procedure: liver resection/ablation, intraoperative ultrasound of liver;  Surgeon: Justyn Brown MD;  [facility];  Service: Surgical Oncology   • AR EXPLORATORY OF ABDOMEN N/A 6/21/2018    Procedure: LAPAROTOMY EXPLORATORY;  Surgeon: Tia Salguero MD;  [facility];  Service: Colorectal   • AR INSERT PICC W/ SUB-Q PORT N/A 1/25/2018    Procedure: PORT-A-CATHETER PLACEMENT  Fluoroscopy for performance/interpretation;  Surgeon: Tia Salguero MD;  [facility];  Service: Colorectal   • AR PART REMOVAL COLON W ANASTOMOSIS Left 6/21/2018    Procedure: hemicolectomy, low anterior resection (open) SPY fluorescence angiography;  Surgeon: Cindy Mueller MD;  [facility];  Service: Colorectal   • NV PROCTECTOMY,PARTIAL N/A 6/21/2018    Procedure: Partial proctectomy ;  Surgeon: Cindy Mueller MD;  [facility];  Service: Colorectal   • NV SIGMOIDOSCOPY FLX DX W/COLLJ SPEC BR/WA IF PFRMD N/A 6/21/2018    Procedure: Luis Rhodes;  Surgeon: Cindy Mueller MD;  [facility];  Service: Colorectal   • ROOT CANAL  2015   • TESTICLE SURGERY      Exploration of Undescended Testis rIght, age 8 had surgery for undescended testicle; Last Assessed:8/24/17   • TOOTH EXTRACTION  2015 06/09/20 0677   Note Type   Note type Eval only   Pain Assessment   Pain Assessment Tool 0-10   Pain Score No Pain   Home Living   Type of [address] [place] [address] One level;Stairs to enter with rails   Bathroom Equipment   (denies DME)   Home Equipment   (denies DME)   Additional Comments Pt will be d/c to a [facility] w/ 3 MARGOT  Pt was ambulating w/out AD   Prior Function   Level of [place] Independent with ADLs and functional mobility   Lives With Significant other   Receives Help From Family   ADL Assistance Independent   IADLs Independent   Falls in the last 6 months 0   Comments Pt will be d/c to girlfriend's house who is currently home and can assist as needed upon d/c   Restrictions/Precautions   Weight Bearing Precautions Per Order No   Other Precautions Multiple lines; Fall Risk;Pain  (epidural)   General   Family/Caregiver Present No   Cognition   Overall Cognitive Status WFL   Arousal/Participation Alert   Orientation Level Oriented X4   Memory Within functional limits   Following Commands Follows all commands and directions without difficulty   Comments Pt very pleasant and cooperative to work w/ therapy   RLE Assessment   RLE Assessment WFL   LLE Assessment   LLE Assessment WFL   Coordination   Movements are Fluid and Coordinated 1   Bed Mobility   Supine to Sit 5  Supervision   Additional items Increased time required Sit to Supine Unable to assess   Additional Comments Pt lying supine upon PT arrival  Pt returned seated OOB at end of session w/ all needs within reach   Transfers   Sit to Stand 5  Supervision   Additional items Increased time required   Stand to Sit 5  Supervision   Additional items Increased time required   Additional Comments Transfers w/ IV pole   Ambulation/Elevation   Gait pattern Excessively slow; Short stride; Inconsistent leni   Gait Assistance 5  Supervision   Assistive Device Other (Comment)  (IV pole)   Distance 350ft   Stair Management Assistance 5  Supervision   Stair Management Technique One rail L;Step to pattern; Foreward   Number of Stairs 1  (x3 due to lines)   Balance   Static Sitting Good   Dynamic Sitting Fair +   Static Standing Fair +   Dynamic Standing Fair   Ambulatory Fair   Endurance Deficit   Endurance Deficit No   Activity Tolerance   Activity Tolerance Patient tolerated treatment well   Medical Staff Made Aware OT, CM   Nurse Made Aware RN cleared pt for therapy   Assessment   Prognosis Good   Problem List Decreased strength;Decreased endurance; Impaired balance;Decreased mobility;Pain   Assessment Pt is 54 y o  male seen for PT evaluation s/p admit to [facility] on 6/8/2020 w/ Colon cancer metastasized to liver [facility])  Pt is now POD #1 from partial hepatectomy  PT consulted to assess pt's functional mobility and d/c needs  Order placed for PT eval and tx, w/ up as tolerated order  Comorbidities affecting pt's physical performance at time of assessment include: colon cancer, liver mets, GERD, R pulmonary nodule  PTA, pt was independent w/ all functional mobility w/ no AD and will be d/c to s/o house who resides in [facility] w/ 3 MARGOT  Pt reports s/o is currently home and can assist upon d/c as needed  Personal factors affecting pt at time of IE include: stairs to enter home and inability to perform IADLs   Please find objective findings from PT assessment regarding body systems outlined above with impairments and limitations including weakness, impaired balance, decreased endurance, gait deviations, pain, decreased activity tolerance, decreased functional mobility tolerance and fall risk  Pt performed all bed mobility and functional transfers at supervision  Pt ambulated 350ft w/ IV pole at supervision  Pt performed 3 steps at supervision  The following objective measures performed on IE also reveal limitations: Modified [place]: 3 (moderate disability)  Pt's clinical presentation is currently evolving seen in pt's presentation of recent admission for colon cancer requiring medical attention, recent decline in function as compared to baseline, multiple lines, fall risk, pain  Pt appears to be functioning at baseline level with functional mobility; therefore, requires no immediate acute skilled PT services at this time  Pt with no further questions or concerns regarding PT services  Will D/C PT at this time  Please re-consult if pt's medical status changes  From PT/mobility standpoint, recommendation at time of d/c would be home w/ family support pending progress in order to facilitate return to PLOF  Pt continues to benefit from ambulation w/ nsing/restorative staff     Goals   Patient Goals to return home   Recommendation   PT Discharge Recommendation Return to previous environment with social support   PT - OK to Discharge Yes   Modified Sussex Scale   Modified Tj Scale 3       Jannie Oviedo, PT, DPT

## 2020-06-09 NOTE — CONSULTS
Consultation - Acute Pain Service     Please see consult note below      Inpatient consult to Acute Pain Service  Consult performed by: Emiliano Barrett MD  Consult ordered by: Gisella Ramos MD [phone]

## 2020-06-09 NOTE — RESTORATIVE TECHNICIAN NOTE
Restorative Specialist Mobility Note       Activity: Ambulate in cordon, Stand at bedside, Ambulate in room, Bathroom privileges, Chair, Dangle(Educated/encouraged pt to ambulate with assistance 3-4 x's/day   Pt callbell, PCA button, phone/tray within reach )     Assistive Device: None       Rashard GARZA, Restorative Technician, [employer]

## 2020-06-09 NOTE — CONSULTS
[address] NOTE   Admission Date: 6/8/2020    Patient Identifiers: Raquel Lemon (MRN: [id number])  Service Requesting Consultation: Cristopher Durham MD  Service Providing Consultation:  Urology, Chica Jeffries PA-C  Consults  Date of Service: 6/9/2020    Reason for Consultation:  Meatal stenosis    History of Present Illness:     Raquel Lemon is a 54 y o  male with a history of metastatic adenocarcinoma stage IV rectal cancer  He is postop day 1 from a partial hepatectomy  He is known to the Urology office for a left renal pelvic cyst as well as erectile dysfunction  They had slight difficulty in the operating Room placing a Nickerson catheter due to described meatal stenosis  He has a 12 [employer] Nickerson catheter in place draining clear yellow urine  He also has an epidural PCA in place  His pain is well controlled  Creatinine is 0 95  WBC 11 28   H&H 12 4 and 36 6  He denies any previous voiding dysfunction  He has no family history of prostate bladder or kidney diseases  Past Medical, Past Surgical History:     Past Medical History:   Diagnosis Date   • Cancer [facility])    • Dysuria     Last Assessed:8/24/17   • GERD (gastroesophageal reflux disease)    • Liver nodule    • Pulmonary nodule, right    • Ureteropelvic junction (UPJ) obstruction 1/29/2020   :    Past Surgical History:   Procedure Laterality Date   • ABDOMINAL SURGERY     • COLON SURGERY     • COLONOSCOPY N/A 1/22/2018    Procedure: COLONOSCOPY;  Surgeon: Purnima Daniel MD;  [facility]; Service: Colorectal   • DENTAL SURGERY  2016    Crown with bridge work   • FLEXIBLE SIGMOIDOSCOPY N/A 6/15/2018    Procedure: SIGMOIDOSCOPY FLEXIBLE w/o sedation w/ tattoo;  Surgeon: Purnima Daniel MD;  [facility];   Service: Colorectal   • LIVER LOBECTOMY N/A 6/21/2018    Procedure: liver resection/ablation, intraoperative ultrasound of liver;  Surgeon: Cristopher Durham MD;  [facility];  Service: Surgical Oncology   • AR EXPLORATORY OF ABDOMEN N/A 6/21/2018    Procedure: LAPAROTOMY EXPLORATORY;  Surgeon: Alexandru Pardo MD;  [facility];  Service: Colorectal   • AR INSERT PICC W/ SUB-Q PORT N/A 1/25/2018    Procedure: PORT-A-CATHETER PLACEMENT  Fluoroscopy for performance/interpretation;  Surgeon: Alexandru Pardo MD;  [facility];  Service: Colorectal   • AR PART REMOVAL COLON W ANASTOMOSIS Left 6/21/2018    Procedure: hemicolectomy, low anterior resection (open) SPY fluorescence angiography;  Surgeon: Alexandru Pardo MD;  [facility];  Service: Colorectal   • AR PROCTECTOMY,PARTIAL N/A 6/21/2018    Procedure: Partial proctectomy ;  Surgeon: Alexandru Pardo MD;  [facility];  Service: Colorectal   • AR SIGMOIDOSCOPY FLX DX W/COLLJ SPEC BR/WA IF PFRMD N/A 6/21/2018    Procedure: Moustapha Win;  Surgeon: Alexandru Pardo MD;  [facility];  Service: Colorectal   • ROOT CANAL  2015   • TESTICLE SURGERY      Exploration of Undescended Testis rIght, age 8 had surgery for undescended testicle;  Last Assessed:8/24/17   • TOOTH EXTRACTION  2015   :    Medications, Allergies:     Current Facility-Administered Medications:   •  diphenhydrAMINE (BENADRYL) injection 25 mg, 25 mg, Intravenous, Q6H PRN, Heron Valdovinos MD  •  heparin (porcine) subcutaneous injection 5,000 Units, 5,000 Units, Subcutaneous, Q8H [facility], 5,000 Units at 06/09/20 0505 **AND** [CANCELED] Platelet count, , , Once, Heron Valdovinos MD  •  HYDROmorphone (DILAUDID) injection 0 5 mg, 0 5 mg, Intravenous, Q2H PRN, Heron Valdovinos MD  •  lactated ringers bolus 1,000 mL, 1,000 mL, Intravenous, Once PRN, Stopped at 06/08/20 1615 **AND** lactated ringers bolus 1,000 mL, 1,000 mL, Intravenous, Once PRN, Heron Valdovinos MD  •  lactated ringers infusion, 125 mL/hr, Intravenous, Continuous, Heron Valdovinos MD, Last Rate: 125 mL/hr at 06/09/20 0506, 125 mL/hr at 06/09/20 "0506  •  nicotine (NICODERM CQ) 21 mg/24 hr TD 24 hr patch 1 patch, 1 patch, Transdermal, Daily, Lashay Mobley MD  •  ondansetron (ZOFRAN) injection 4 mg, 4 mg, Intravenous, Q6H PRN, Lashay Mobley MD  •  pantoprazole (PROTONIX) EC tablet 40 mg, 40 mg, Oral, Early Morning, Lashay Mobley MD, 40 mg at 06/09/20 0086  •  ropivacaine 0 1% and fentaNYL 2 mcg/mL PCEA, , Epidural, Continuous, Lashay Mobley MD  •  sodium chloride 0 9 % bolus 1,000 mL, 1,000 mL, Intravenous, Once PRN **AND** sodium chloride 0 9 % bolus 1,000 mL, 1,000 mL, Intravenous, Once PRN, Lashay Mobley MD    Allergies:  No Known Allergies:    Social and Family History:   Social History:   Social History     Tobacco Use   • Smoking status: Former Smoker     Types: E-Cigarettes   • Smokeless tobacco: Current User   • Tobacco comment: quit 4 years ago / smoked for 20 years    Substance Use Topics   • Alcohol use: Yes     Comment: socially - 2 month   • Drug use: Yes     Types: Marijuana     Comment: per [employer]: occasional recreation drug use     Social History     Tobacco Use   Smoking Status Former Smoker   • Types: E-Cigarettes   Smokeless Tobacco Current User   Tobacco Comment    quit 4 years ago / smoked for 20 years        Family History:  Family History   Problem Relation Age of Onset   • No Known Problems Father    • Cancer Mother    • Cancer Brother         pt  reports brother was diagnosed with stage 4 throat cancer   :     Review of Systems:     General: Fever, chills, or night sweats: negative  Cardiac: Negative for chest pain  Pulmonary: Negative for shortness of breath  Gastrointestinal: Abdominal pain positive  Nausea, vomiting, or diarrhea negative,  Genitourinary: See HPI above  Patient does not have hematuria  All other systems queried were negative  Physical Exam:   General: Patient is pleasant and in NAD   Awake and alert  /66   Pulse 69   Temp (!) 81 °F (27 2 °C)   Resp 18   Ht 5' 6 5\" (1 689 m)  " Wt 75 8 kg (167 lb)   SpO2 96%   BMI 26 55 kg/m²   Cardiac: Peripheral edema: negative  Pulmonary: Non-labored breathing  Abdomen: Soft, non-tender, non-distended  No surgical scars  No masses, tenderness, hernias noted  Genitourinary: Negative CVA tenderness, negative suprapubic tenderness  (Male): Penis circumcised, phallus normal, meatus patent  Mild urethritis   Testicles descended into scrotum bilaterally without masses nor tenderness  No inguinal hernias bilaterally  SOMMER: in place draining clear yellow urine  no clots and       Labs:     Lab Results   Component Value Date    HGB 12 4 06/09/2020    HCT 36 6 06/09/2020    WBC 11 28 (H) 06/09/2020     (L) 06/09/2020   ]    Lab Results   Component Value Date     08/26/2017    K 3 5 06/09/2020     (H) 06/09/2020    CO2 25 06/09/2020    BUN 11 06/09/2020    CREATININE 0 95 06/09/2020    CALCIUM 8 1 (L) 06/09/2020    GLUCOSE 124 12/01/2017   ]    Imaging:   I personally reviewed the images and report of the following studies, and reviewed them with the patient:     PET/CT SCAN  IMPRESSION:  1  Hypermetabolic left lateral liver metastasis  Minimal activity in the hepatic dome treated metastasis, for which viable tumor is not excluded  2   No new hypermetabolic metastases in the neck, chest, or pelvis  ASSESSMENT:     1  POD#1 partial hepatectomy  2  Stage IV rectal cancer with liver metastasis  3  Mild meatal stenosis  4  Left parapelvic renal cyst     PLAN:   -okay to discontinue Sommer catheter per the primary service routine  -will follow up in the office as scheduled  -he should call sooner if he becomes symptomatic in any way  -he may have a slight higher risk for re stenosis  with recent instrumentation  -he is aware of this and will follow-up as scheduled    Thank you for allowing me to participate in this patients’ care  Please do not hesitate to call with any additional questions    Umberto SantizokeALYSIA vela

## 2020-06-09 NOTE — UTILIZATION REVIEW
Initial Clinical Review    Elective IP surgical procedure    Age/Sex: 54 y o  male     Surgery Date: 6/8/20    Procedure:   Preop Diagnosis:  Colon cancer metastasized to liver ([address] ) [C18 9, C78 7]     Post-Op Diagnosis Codes:     * Colon cancer metastasized to liver ([address] ) [C18 9, C78 7]     Procedure(s) (LRB):  LIVER RESECTION SEGMENT 3 WITH  INTRAOPERATIVE U/S OF LIVER (N/A)  LAPAROTOMY EXPLORATORY, LYSIS OF ADHESIONS (N/A)     Anesthesia:   General w/ Epidural    Operative Findings:   Single lesion seen on segment 3  Gross margin was negative by 1 cm      POD#1 Progress Note: abds soft and dressing C/D/I, advance diet, keep epidural has good pain control, leave roberson in  Urology consult for stricture at meatus  Get OOB and ambulate  6/9 Urology consult - A 12 Upper sorbian stricture at the meatus should not affect voiding much, at least as per the literature  This can be fixed with meatotomy, or meatoplasty, given the patient's young age should he desire definitive correction we could also refer him to a reconstructive urologist for use of buccal mucosa for the best long-term results    Admission Orders: Date/Time/Statement: Admission Orders (From admission, onward)     Ordered        06/08/20 0959  Inpatient Admission  Once                   Orders Placed This Encounter   Procedures   • Inpatient Admission     Standing Status:   Standing     Number of Occurrences:   1     Order Specific Question:   Admitting Physician     Answer:   Bebeto Ayala     Order Specific Question:   Level of Care     Answer:   Level 2 Stepdown / HOT [14]     Order Specific Question:   Estimated length of stay     Answer:   More than 2 Midnights     Order Specific Question:   Certification     Answer:   I certify that inpatient services are medically necessary for this patient for a duration of greater than two midnights  See H&P and MD Progress Notes for additional information about the patient's course of treatment       Vital "Signs: BP 98/68   Pulse 65   Temp 98 7 °F (37 1 °C)   Resp 20   Ht 5' 6 5\" (1 689 m)   Wt 75 8 kg (167 lb)   SpO2 98%   BMI 26 55 kg/m²      Diet: advance as adrianna     Mobility: oob to chair and ambulate    DVT Prophylaxis: SCDs, heparin sq    Medications/Pain Control:   Scheduled Medications:    Medications:  heparin (porcine) 5,000 Units Subcutaneous Q8H [facility]   nicotine 1 patch Transdermal Daily   pantoprazole 40 mg Oral Early Morning     Continuous IV Infusions:    ropivacaine 0 1% and fentaNYL 2 mcg/mL  Epidural Continuous     PRN Meds:    diphenhydrAMINE 25 mg Intravenous Q6H PRN   HYDROmorphone 0 5 mg Intravenous Q2H PRN   ondansetron 4 mg Intravenous Q6H PRN         Network Utilization Review Department  [email or web address]  ATTENTION: Please call with any questions or concerns to [phone] and carefully listen to the prompts so that you are directed to the right person  All voicemails are confidential   Ashwini Goode all requests for admission clinical reviews, approved or denied determinations and any other requests to dedicated fax number below belonging to the campus where the patient is receiving treatment   List of dedicated fax numbers for the Facilities:  [address] DENIALS (Administrative/Medical Necessity) [phone]   [address] (Maternity/NICU/Pediatrics) [phone]   400 [facility] [phone]   [facility] [phone]   [address] [phone]   201 [facility] [phone]   [facility] 119 [phone]   [facility]  [phone]   4056 [facility] [phone]   412 [facility] [address] [place] [phone]       "

## 2020-06-10 VITALS
RESPIRATION RATE: 14 BRPM | BODY MASS INDEX: 26.21 KG/M2 | HEIGHT: 67 IN | OXYGEN SATURATION: 96 % | TEMPERATURE: 98.6 F | HEART RATE: 76 BPM | DIASTOLIC BLOOD PRESSURE: 73 MMHG | SYSTOLIC BLOOD PRESSURE: 113 MMHG | WEIGHT: 167 LBS

## 2020-06-10 LAB
ANION GAP SERPL CALCULATED.3IONS-SCNC: 5 MMOL/L (ref 4–13)
BASOPHILS # BLD AUTO: 0.02 THOUSANDS/ÂΜL (ref 0–0.1)
BASOPHILS NFR BLD AUTO: 0 % (ref 0–1)
BUN SERPL-MCNC: 12 MG/DL (ref 5–25)
CALCIUM SERPL-MCNC: 8.2 MG/DL (ref 8.3–10.1)
CHLORIDE SERPL-SCNC: 109 MMOL/L (ref 100–108)
CO2 SERPL-SCNC: 25 MMOL/L (ref 21–32)
CREAT SERPL-MCNC: 0.95 MG/DL (ref 0.6–1.3)
EOSINOPHIL # BLD AUTO: 0.07 THOUSAND/ÂΜL (ref 0–0.61)
EOSINOPHIL NFR BLD AUTO: 1 % (ref 0–6)
ERYTHROCYTE [DISTWIDTH] IN BLOOD BY AUTOMATED COUNT: 13.4 % (ref 11.6–15.1)
GFR SERPL CREATININE-BSD FRML MDRD: 95 ML/MIN/1.73SQ M
GLUCOSE SERPL-MCNC: 104 MG/DL (ref 65–140)
HCT VFR BLD AUTO: 35.8 % (ref 36.5–49.3)
HGB BLD-MCNC: 12.2 G/DL (ref 12–17)
IMM GRANULOCYTES # BLD AUTO: 0.03 THOUSAND/UL (ref 0–0.2)
IMM GRANULOCYTES NFR BLD AUTO: 0 % (ref 0–2)
LYMPHOCYTES # BLD AUTO: 2.09 THOUSANDS/ÂΜL (ref 0.6–4.47)
LYMPHOCYTES NFR BLD AUTO: 23 % (ref 14–44)
MCH RBC QN AUTO: 33.2 PG (ref 26.8–34.3)
MCHC RBC AUTO-ENTMCNC: 34.1 G/DL (ref 31.4–37.4)
MCV RBC AUTO: 97 FL (ref 82–98)
MONOCYTES # BLD AUTO: 0.77 THOUSAND/ÂΜL (ref 0.17–1.22)
MONOCYTES NFR BLD AUTO: 9 % (ref 4–12)
NEUTROPHILS # BLD AUTO: 5.98 THOUSANDS/ÂΜL (ref 1.85–7.62)
NEUTS SEG NFR BLD AUTO: 67 % (ref 43–75)
NRBC BLD AUTO-RTO: 0 /100 WBCS
PLATELET # BLD AUTO: 123 THOUSANDS/UL (ref 149–390)
PMV BLD AUTO: 11.8 FL (ref 8.9–12.7)
POTASSIUM SERPL-SCNC: 3.8 MMOL/L (ref 3.5–5.3)
RBC # BLD AUTO: 3.68 MILLION/UL (ref 3.88–5.62)
SODIUM SERPL-SCNC: 139 MMOL/L (ref 136–145)
WBC # BLD AUTO: 8.96 THOUSAND/UL (ref 4.31–10.16)

## 2020-06-10 PROCEDURE — 80048 BASIC METABOLIC PNL TOTAL CA: CPT | Performed by: PHYSICIAN ASSISTANT

## 2020-06-10 PROCEDURE — NC001 PR NO CHARGE: Performed by: SURGERY

## 2020-06-10 PROCEDURE — 85025 COMPLETE CBC W/AUTO DIFF WBC: CPT | Performed by: PHYSICIAN ASSISTANT

## 2020-06-10 PROCEDURE — 99024 POSTOP FOLLOW-UP VISIT: CPT | Performed by: SURGERY

## 2020-06-10 RX ORDER — HYDROCODONE BITARTRATE AND ACETAMINOPHEN 5; 325 MG/1; MG/1
1 TABLET ORAL EVERY 6 HOURS PRN
Qty: 26 TABLET | Refills: 0 | Status: SHIPPED | OUTPATIENT
Start: 2020-06-10 | End: 2020-06-20

## 2020-06-10 RX ORDER — HYDROCODONE BITARTRATE AND ACETAMINOPHEN 5; 325 MG/1; MG/1
1 TABLET ORAL EVERY 6 HOURS PRN
Status: DISCONTINUED | OUTPATIENT
Start: 2020-06-10 | End: 2020-06-10 | Stop reason: HOSPADM

## 2020-06-10 RX ORDER — HYDROCODONE BITARTRATE AND ACETAMINOPHEN 5; 325 MG/1; MG/1
2 TABLET ORAL EVERY 6 HOURS PRN
Status: DISCONTINUED | OUTPATIENT
Start: 2020-06-10 | End: 2020-06-10 | Stop reason: HOSPADM

## 2020-06-10 RX ADMIN — NICOTINE 1 PATCH: 21 PATCH, EXTENDED RELEASE TRANSDERMAL at 08:56

## 2020-06-10 RX ADMIN — PANTOPRAZOLE SODIUM 40 MG: 40 TABLET, DELAYED RELEASE ORAL at 05:17

## 2020-06-10 RX ADMIN — HYDROCODONE BITARTRATE AND ACETAMINOPHEN 1 TABLET: 5; 325 TABLET ORAL at 14:58

## 2020-06-10 NOTE — PROGRESS NOTES
Progress Note - Acute Pain Service Regional Follow Up  Tani Talbot 54 y o  male MRN: [id number]  Unit/Bed#: [facility] 910-01 Encounter: [id number]      SURGERY DATE: 6/8/2020  Post-Op Diagnosis Codes:     * Colon cancer metastasized to liver ([address] ) [C18 9, C78 7]    Assessment:   54 y  o  male with stage 4 rectal cancer mets to the liver, POD#2 from partial hepatectomy with epidural in place Day 2, doing very well, planned for discharge today vs tomorrow     Plan:   1  Epidural removed - tip intact              -labs appropriate, sqh held  2  Transition to PO regimen   - norco 5/325mg 1-2 tabs q6hrs PRN mod-severe pain   - IV dilaudid 0 5mg q2hr PRN breakthrough - none required in last 24hrs  3  Encouraged IS/OOB/ambulation as tolerated, bowel regimen prn    APS sign off  Thank you for the Consult  Please contact APS ( [phone]) with any further questions    Subjective:  Patient states pain is very well controlled, currently in 0/10 pain  Planned for discharge today vs tomorrow  Walking with assistance, in chair, moving bowels, eating regular diet  Denies n/v/pruritus/LE weakness  Epidural removed with no issues, tip intact  Will transition to PO regimen in anticipation of discharge    Meds/Allergies     all current active meds have been reviewed    No Known Allergies    Objective     Temp:  [98 6 °F (37 °C)-99 5 °F (37 5 °C)] 98 6 °F (37 °C)  HR:  [72-90] 76  Resp:  [14-18] 14  BP: ()/(61-75) 113/73    Physical Exam   Constitutional: He is oriented to person, place, and time  He appears well-developed and well-nourished  HENT:   Head: Normocephalic and atraumatic  Neck: Normal range of motion  Cardiovascular: Normal rate  Pulmonary/Chest: Effort normal    Abdominal: Soft  He exhibits no distension  There is no tenderness  Surgical incision c/d/i   Musculoskeletal: He exhibits no edema  Neurological: He is alert and oriented to person, place, and time  Skin: Skin is warm and dry  Appropriately numb over incision bilaterally  Epidural catheter site c/d/i, removed with tip intact, site cleaned   Psychiatric: He has a normal mood and affect  His behavior is normal    Vitals reviewed

## 2020-06-10 NOTE — PROGRESS NOTES
"Progress Note - Surgical Oncology  Abisai Mom 54 y o  male MRN: [id number]  Unit/Bed#: [facility] 910-01 Encounter: [id number]      Objective:  Patient doing well  Patient's Nickerson removed early this morning, tolerating a regular diet  No nausea, no emesis  Has been up and ambulating the halls, has been using his incentive spirometry  States he uses Vicodin for pain which works well  No flatus as of yet  8032 Nickerson    Blood pressure 99/67, pulse 80, temperature 99 3 °F (37 4 °C), resp  rate 18, height 5' 6 5\" (1 689 m), weight 75 8 kg (167 lb), SpO2 95 %  ,Body mass index is 26 55 kg/m²  Intake/Output Summary (Last 24 hours) at 6/10/2020 0530  Last data filed at 6/10/2020 0518  Gross per 24 hour   Intake 2979 92 ml   Output 2750 ml   Net 229 92 ml       Invasive Devices     Central Venous Catheter Line            Port A Cath 04/30/19 Right Chest 406 days          Peripheral Intravenous Line            Peripheral IV 06/08/20 Left Hand 1 day    Peripheral IV 06/08/20 Right Hand 1 day          Epidural Line            Epidural Catheter 06/08/20 1 day          Line            Pump Device Continuous ambulatory delivery device Right Chest 826 days    Pump Device Continuous ambulatory delivery device Right Chest 812 days                Physical Exam:   Abdomen:  Soft, does not appear distended, small midline wound is clean and dry, minimal incisional tenderness  Extremities:  No calf tenderness, no pedal edema    Lab, Imaging and other studies:  Pending  VTE Pharmacologic Prophylaxis: Heparin  VTE Mechanical Prophylaxis: sequential compression device    Assessment:  POD # 2 exploratory lap, lysis of adhesions, segment 3 liver resection  History stage IV rectal cancer    Plan:  1  Nickerson has been removed  2  Epidural to be removed this morning  3  Vicodin for pain  4  Continue house diet  5  Home later this afternoon if pain controlled and urinates on his own  6   Continue using incentive spirometry  "

## 2020-06-10 NOTE — DISCHARGE SUMMARY
Discharge Summary - Surgical Oncology   Sg Santiago 54 y o  male MRN: [id number]  Unit/Bed#: [facility] 910-01 Encounter: [id number]    Admission Date: 6/8/2020     Admitting Diagnosis: Colon cancer metastasized to liver [facility]) [C18 9, C78 7]    HPI:  [facility] is a pleasant 61-year-old gentleman with known metastatic rectosigmoid cancer  He denies any new abdominal pain, nausea or vomiting  His appetite is good  He is gaining weight  His CEA has risen to 16 2  His CT from May 5th revealed a stable right hepatic dome lesion  The left lateral segment has increased in size  Pulmonary nodules were stable  There was stable left hydronephrosis  Follow-up MRI on May 12th was obtained because the lesion in the liver appeared to be a metastasis rather than a hemangioma  This confirmed the lesion in the dome was stable with some delayed enhancement  There was a question of viable tumor  The left hepatic lobe lesion measured 2 5 cm and was felt to be metastatic  Follow-up PET-CT on May 21st revealed the left lateral liver metastasis had an FDG activity of 7 7 and measured 2 7 x 1 9 cm  Previously treated lesion in the right measures 3 2 x 2 6 cm with an SUV of 2 4  Residual viable tumor was not excluded  Procedures Performed:  June 8th, 2020 exploratory laparotomy, lysis of adhesions, segment 3 liver resection - Dr Remy Garcia Course:  Patient has done extremely well postoperatively  He was able to be placed on a regular diet on postop day 1  His pain has been controlled with the epidural placed preoperatively  Patient has been up and out of bed and ambulating the halls well  There has been no nausea or vomiting  His midline incisional wound is clean and dry without hematoma or erythema  He is passing flatus  His epidural was removed on postop day 2  and his pain is controlled with Vicodin 5/325  Patient will be discharged home today after lunch    A script was sent to [facility] pharmacy in [place] for Vicodin, Norco 5/325 every 4-6 hours as needed for pain  Twenty-six pills were dispensed with no refills  Patient may shower  All discharge instructions were given to the patient regarding lifting pushing and pulling  He has an appointment to see Dr Estefani Dailey in the [facility] office on June 23rd, 2020 at 10:30 a m  Pathology pending    Complications:  None    Discharge Diagnosis:  Metastatic rectal cancer    Discharge Date:  June 10th, 2020    Condition at Discharge:  Good     Discharge instructions/Information to patient and family:   See after visit summary for information provided to patient and family  Provisions for Follow-Up Care:  See after visit summary for information related to follow-up care and any pertinent home health orders  Disposition:  Home    Planned Readmission:  No    Discharge Statement   I spent 20 minutes discharging the patient  This time was spent on the day of discharge  I had direct contact with the patient on the day of discharge  Additional documentation is required if more than 30 minutes were spent on discharge  Discharge Medications:  See after visit summary for reconciled discharge medications provided to patient and family

## 2020-06-11 ENCOUNTER — TRANSITIONAL CARE MANAGEMENT (OUTPATIENT)
Dept: INTERNAL MEDICINE CLINIC | Facility: CLINIC | Age: 50
End: 2020-06-11

## 2020-06-19 ENCOUNTER — TELEPHONE (OUTPATIENT)
Dept: SURGICAL ONCOLOGY | Facility: CLINIC | Age: 50
End: 2020-06-19

## 2020-06-23 ENCOUNTER — OFFICE VISIT (OUTPATIENT)
Dept: SURGICAL ONCOLOGY | Facility: CLINIC | Age: 50
End: 2020-06-23

## 2020-06-23 VITALS
HEIGHT: 67 IN | BODY MASS INDEX: 25.9 KG/M2 | WEIGHT: 165 LBS | DIASTOLIC BLOOD PRESSURE: 80 MMHG | SYSTOLIC BLOOD PRESSURE: 114 MMHG | HEART RATE: 85 BPM | TEMPERATURE: 99.1 F

## 2020-06-23 DIAGNOSIS — C78.7 COLON CANCER METASTASIZED TO LIVER (HCC): Primary | ICD-10-CM

## 2020-06-23 DIAGNOSIS — C18.9 COLON CANCER METASTASIZED TO LIVER (HCC): Primary | ICD-10-CM

## 2020-06-23 PROCEDURE — 1111F DSCHRG MED/CURRENT MED MERGE: CPT | Performed by: SURGERY

## 2020-06-23 PROCEDURE — 3008F BODY MASS INDEX DOCD: CPT | Performed by: SURGERY

## 2020-06-23 PROCEDURE — 99024 POSTOP FOLLOW-UP VISIT: CPT | Performed by: SURGERY

## 2020-06-24 ENCOUNTER — HOSPITAL ENCOUNTER (OUTPATIENT)
Dept: INFUSION CENTER | Facility: CLINIC | Age: 50
Discharge: HOME | End: 2020-06-24
Payer: COMMERCIAL

## 2020-06-24 DIAGNOSIS — C18.9 COLON CANCER METASTASIZED TO LIVER (HCC): Primary | ICD-10-CM

## 2020-06-24 DIAGNOSIS — C78.7 COLON CANCER METASTASIZED TO LIVER (HCC): Primary | ICD-10-CM

## 2020-06-24 PROCEDURE — 96523 IRRIG DRUG DELIVERY DEVICE: CPT

## 2020-07-16 ENCOUNTER — OFFICE VISIT (OUTPATIENT)
Dept: HEMATOLOGY ONCOLOGY | Facility: CLINIC | Age: 50
End: 2020-07-16
Payer: COMMERCIAL

## 2020-07-16 VITALS
HEIGHT: 66 IN | BODY MASS INDEX: 26.76 KG/M2 | WEIGHT: 166.5 LBS | TEMPERATURE: 97.6 F | OXYGEN SATURATION: 98 % | RESPIRATION RATE: 18 BRPM | HEART RATE: 80 BPM | SYSTOLIC BLOOD PRESSURE: 140 MMHG | DIASTOLIC BLOOD PRESSURE: 88 MMHG

## 2020-07-16 DIAGNOSIS — C18.9 COLON CANCER METASTASIZED TO LIVER (HCC): Primary | ICD-10-CM

## 2020-07-16 DIAGNOSIS — C78.7 COLON CANCER METASTASIZED TO LIVER (HCC): Primary | ICD-10-CM

## 2020-07-16 PROCEDURE — 1111F DSCHRG MED/CURRENT MED MERGE: CPT | Performed by: INTERNAL MEDICINE

## 2020-07-16 PROCEDURE — 99214 OFFICE O/P EST MOD 30 MIN: CPT | Performed by: INTERNAL MEDICINE

## 2020-07-16 PROCEDURE — 3008F BODY MASS INDEX DOCD: CPT | Performed by: INTERNAL MEDICINE

## 2020-07-16 RX ORDER — ATROPINE SULFATE 1 MG/ML
0.25 INJECTION, SOLUTION INTRAMUSCULAR; INTRAVENOUS; SUBCUTANEOUS ONCE
Status: CANCELLED | OUTPATIENT
Start: 2020-07-27

## 2020-07-16 RX ORDER — PROCHLORPERAZINE MALEATE 10 MG
10 TABLET ORAL EVERY 6 HOURS PRN
Qty: 45 TABLET | Refills: 3 | Status: SHIPPED | OUTPATIENT
Start: 2020-07-16 | End: 2021-07-27

## 2020-07-16 RX ORDER — FLUOROURACIL 50 MG/ML
750 INJECTION, SOLUTION INTRAVENOUS ONCE
Status: CANCELLED | OUTPATIENT
Start: 2020-07-27

## 2020-07-16 RX ORDER — MAGNESIUM SULFATE HEPTAHYDRATE 40 MG/ML
2 INJECTION, SOLUTION INTRAVENOUS ONCE AS NEEDED
Status: CANCELLED | OUTPATIENT
Start: 2020-07-27

## 2020-07-16 RX ORDER — MAGNESIUM SULFATE HEPTAHYDRATE 40 MG/ML
4 INJECTION, SOLUTION INTRAVENOUS ONCE AS NEEDED
Status: CANCELLED | OUTPATIENT
Start: 2020-07-27

## 2020-07-16 RX ORDER — SODIUM CHLORIDE 9 MG/ML
20 INJECTION, SOLUTION INTRAVENOUS ONCE
Status: CANCELLED | OUTPATIENT
Start: 2020-07-27
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
## 2020-07-21 ENCOUNTER — OFFICE VISIT (OUTPATIENT)
Dept: INTERNAL MEDICINE CLINIC | Facility: CLINIC | Age: 50
End: 2020-07-21
Payer: COMMERCIAL

## 2020-07-21 VITALS
HEART RATE: 62 BPM | BODY MASS INDEX: 26.84 KG/M2 | TEMPERATURE: 98.9 F | SYSTOLIC BLOOD PRESSURE: 102 MMHG | OXYGEN SATURATION: 98 % | HEIGHT: 66 IN | DIASTOLIC BLOOD PRESSURE: 74 MMHG | WEIGHT: 167 LBS

## 2020-07-21 DIAGNOSIS — Z13.6 ENCOUNTER FOR LIPID SCREENING FOR CARDIOVASCULAR DISEASE: ICD-10-CM

## 2020-07-21 DIAGNOSIS — K21.9 GASTROESOPHAGEAL REFLUX DISEASE WITHOUT ESOPHAGITIS: ICD-10-CM

## 2020-07-21 DIAGNOSIS — Z72.0 VAPES NICOTINE CONTAINING SUBSTANCE: ICD-10-CM

## 2020-07-21 DIAGNOSIS — N52.8 OTHER MALE ERECTILE DYSFUNCTION: ICD-10-CM

## 2020-07-21 DIAGNOSIS — R91.1 PULMONARY NODULE, RIGHT: ICD-10-CM

## 2020-07-21 DIAGNOSIS — C78.7 COLON CANCER METASTASIZED TO LIVER (HCC): Primary | ICD-10-CM

## 2020-07-21 DIAGNOSIS — Z13.220 ENCOUNTER FOR LIPID SCREENING FOR CARDIOVASCULAR DISEASE: ICD-10-CM

## 2020-07-21 DIAGNOSIS — Z00.00 ANNUAL PHYSICAL EXAM: ICD-10-CM

## 2020-07-21 DIAGNOSIS — C18.9 COLON CANCER METASTASIZED TO LIVER (HCC): Primary | ICD-10-CM

## 2020-07-21 PROCEDURE — 99396 PREV VISIT EST AGE 40-64: CPT | Performed by: INTERNAL MEDICINE

## 2020-07-21 RX ORDER — SILDENAFIL CITRATE 20 MG/1
20 TABLET ORAL AS NEEDED
Qty: 90 TABLET | Refills: 1 | Status: SHIPPED | OUTPATIENT
Start: 2020-07-21 | End: 2022-01-24 | Stop reason: ALTCHOICE

## 2020-07-21 NOTE — ASSESSMENT & PLAN NOTE
He is to start chemotherapy this upcoming Monday  Continue follow-up with Oncology and Surgical Oncology

## 2020-07-21 NOTE — ASSESSMENT & PLAN NOTE
He is up-to-date on metabolic screening  He is due for lipid screening  Discuss continuing dietary modifications and exercise  Counseled patient on vape cessation

## 2020-07-21 NOTE — PROGRESS NOTES
Assessment/Plan:    Colon cancer metastasized to liver [facility])  He is to start chemotherapy this upcoming Monday  Continue follow-up with Oncology and Surgical Oncology  GERD (gastroesophageal reflux disease)  Controlled  Continue omeprazole  Pulmonary nodule, right  Follow-up with Oncology  ED (erectile dysfunction)  Will refill sildenafil  Annual physical exam  He is up-to-date on metabolic screening  He is due for lipid screening  Discuss continuing dietary modifications and exercise  Counseled patient on vape cessation  Diagnoses and all orders for this visit:    Colon cancer metastasized to liver [facility])    Other male erectile dysfunction  -     sildenafil (REVATIO) 20 mg tablet; Take 1 tablet (20 mg total) by mouth as needed (PRN)    Gastroesophageal reflux disease without esophagitis    Encounter for lipid screening for cardiovascular disease  -     Lipid panel; Future    Pulmonary nodule, right    Vapes nicotine containing substance          BMI Counseling: Body mass index is 26 75 kg/m²  The BMI is above normal  Nutrition recommendations include decreasing portion sizes, encouraging healthy choices of fruits and vegetables, decreasing fast food intake, consuming healthier snacks, limiting drinks that contain sugar, moderation in carbohydrate intake, increasing intake of lean protein, reducing intake of saturated and trans fat and reducing intake of cholesterol  Exercise recommendations include moderate physical activity 150 minutes/week and exercising 3-5 times per week  No pharmacotherapy was ordered  Patient referred to PCP due to patient being overweight  Depression Screening and Follow-up Plan: Patient's depression screening was positive with a PHQ-2 score of 0  Clincally patient does not have depression  No treatment is required  Patient advised to follow-up with PCP for further management  Tobacco Cessation Counseling: Tobacco cessation counseling was provided   The patient is sincerely urged to quit consumption of tobacco  He is not ready to quit tobacco  Medication options and side effects of medication discussed  Patient refused medication  Vape, plans on quitting  He has nicotine patches at home  Time spent during encounter: 25 minutes (counseling, reviewing medications, and discussing treatment and plan)    Subjective:      Patient ID: Jose Ramon Copeland is a 54 y o  male  Chief Complaint   Patient presents with    Follow-up     *BMI F/U DUE* HOSP F/U - [facility] 6/8-6/10 TCM colon cancer w/METS to liver, s/p exploratory laparotomy, lysis of adhesions, liver resection       82-year-old male is seen today for annual health examination  Unfortunately, he had recurrence and colorectal cancer after a hepatic lesion was visualized on PET/CT  He underwent tumor resection and is scheduled to restart chemotherapy this upcoming Monday  Otherwise, he has not had any active complaints or concerns  He follows up with his urologist regularly  Erectile dysfunction:  Controlled with sildenafil  He follows a healthy diet  Overall, has a good outlook  The following portions of the patient's history were reviewed and updated as appropriate: allergies, current medications, past family history, past medical history, past social history, past surgical history and problem list     Review of Systems   Constitutional: Negative for activity change, appetite change, chills, diaphoresis, fatigue and fever  HENT: Negative for congestion, postnasal drip, rhinorrhea, sinus pressure, sinus pain, sneezing and sore throat  Eyes: Negative for visual disturbance  Respiratory: Negative for apnea, cough, choking, chest tightness, shortness of breath and wheezing  Cardiovascular: Negative for chest pain, palpitations and leg swelling  Gastrointestinal: Negative for abdominal distention, abdominal pain, anal bleeding, blood in stool, constipation, diarrhea, nausea and vomiting  Endocrine: Negative for cold intolerance and heat intolerance  Genitourinary: Negative for difficulty urinating, dysuria and hematuria  Musculoskeletal: Negative  Skin: Negative  Neurological: Negative for dizziness, weakness, light-headedness, numbness and headaches  Hematological: Negative for adenopathy  Psychiatric/Behavioral: Negative for agitation, sleep disturbance and suicidal ideas  All other systems reviewed and are negative  Past Medical History:   Diagnosis Date    Cancer ([facility] [address] )     Dysuria     Last Assessed:8/24/17    GERD (gastroesophageal reflux disease)     Liver nodule     Pulmonary nodule, right     Ureteropelvic junction (UPJ) obstruction 1/29/2020         Current Outpatient Medications:     prochlorperazine (COMPAZINE) 10 mg tablet, Take 1 tablet (10 mg total) by mouth every 6 (six) hours as needed for nausea or vomiting, Disp: 45 tablet, Rfl: 3    sildenafil (REVATIO) 20 mg tablet, Take 1 tablet (20 mg total) by mouth as needed (PRN), Disp: 90 tablet, Rfl: 1    No Known Allergies    Social History   Past Surgical History:   Procedure Laterality Date    ABDOMINAL SURGERY      COLON SURGERY      COLONOSCOPY N/A 1/22/2018    Procedure: COLONOSCOPY;  Surgeon: Emperatriz Reyes MD;  [facility]; Service: Colorectal   [place] DENTAL SURGERY  2016    [place] with bridge work    FLEXIBLE SIGMOIDOSCOPY N/A 6/15/2018    Procedure: SIGMOIDOSCOPY FLEXIBLE w/o sedation w/ tattoo;  Surgeon: Emperatriz Reyes MD;  [facility];   Service: Colorectal    LAPAROTOMY N/A 6/8/2020    Procedure: LAPAROTOMY EXPLORATORY, LYSIS OF ADHESIONS;  Surgeon: Han Glass MD;  [facility];  Service: Surgical Oncology    LIVER LOBECTOMY N/A 6/21/2018    Procedure: liver resection/ablation, intraoperative ultrasound of liver;  Surgeon: Han Glass MD;  [facility];  Service: Surgical Oncology    LIVER LOBECTOMY N/A 6/8/2020    Procedure: LIVER RESECTION SEGMENT 3 WITH  INTRAOPERATIVE U/S OF LIVER;  Surgeon: Moses Frausto MD;  [facility];  Service: Surgical Oncology    AZ EXPLORATORY OF ABDOMEN N/A 6/21/2018    Procedure: LAPAROTOMY EXPLORATORY;  Surgeon: Gustavo Rodriguez MD;  [facility];  Service: Colorectal    AZ INSERT PICC W/ SUB-Q PORT N/A 1/25/2018    Procedure: PORT-A-CATHETER PLACEMENT  Fluoroscopy for performance/interpretation;  Surgeon: Gustavo Rodriguez MD;  [facility];  Service: Colorectal    AZ PART REMOVAL COLON W ANASTOMOSIS Left 6/21/2018    Procedure: hemicolectomy, low anterior resection (open) SPY fluorescence angiography;  Surgeon: Gustavo Rodriguez MD;  [facility];  Service: Colorectal    AZ PROCTECTOMY,PARTIAL N/A 6/21/2018    Procedure: Partial proctectomy ;  Surgeon: Gustavo Rodriguez MD;  [facility];  Service: Colorectal    AZ SIGMOIDOSCOPY FLX DX W/COLLJ SPEC BR/WA IF PFRMD N/A 6/21/2018    Procedure: [place];  Surgeon: Gustavo Rodriguez MD;  [facility];  Service: Colorectal    ROOT CANAL  2015    TESTICLE SURGERY      Exploration of Undescended Testis rIght, age 8 had surgery for undescended testicle;  Last Assessed:8/24/17    TOOTH EXTRACTION  2015     Family History   Problem Relation Age of Onset    No Known Problems Father     Cancer Mother         unknown    Cancer Brother         pt  reports brother was diagnosed with stage 4 throat cancer    Throat cancer Brother        Objective:  /74 (BP Location: Left arm, Patient Position: Sitting, Cuff Size: Adult)   Pulse 62   Temp 98 9 °F (37 2 °C) (Temporal)   Ht 5' 6 25" (1 683 m)   Wt 75 8 kg (167 lb) Comment: w sneakers  SpO2 98% Comment: RA  BMI 26 75 kg/m²     Recent Results (from the past 1344 hour(s))   APTT    Collection Time: 05/29/20  9:39 AM   Result Value Ref Range    PTT 27 23 - 37 seconds   Protime-INR    Collection Time: 05/29/20  9:39 AM   Result Value Ref Range    Protime 12 3 11 6 - 14 5 seconds    INR 0 97 0 84 - 1 19   HEMOGLOBIN A1C W/ EAG ESTIMATION    Collection Time: 05/29/20  9:39 AM   Result Value Ref Range    Hemoglobin A1C 5 4 Normal 3 8-5 6%; PreDiabetic 5 7-6 4%; Diabetic >=6 5%; Glycemic control for adults with diabetes <7 0% %     mg/dl   Type and screen    Collection Time: 05/29/20  9:39 AM   Result Value Ref Range    ABO Grouping A     Rh Factor Negative     Antibody Screen Negative     Specimen Expiration Date [id number]    ECG 12 lead    Collection Time: 05/29/20 10:20 AM   Result Value Ref Range    Ventricular Rate 70 BPM    Atrial Rate 70 BPM    KS Interval 128 ms    QRSD Interval 86 ms    QT Interval 380 ms    QTC Interval 410 ms    P Axis 43 degrees    QRS Axis 46 degrees    T Wave Axis 44 degrees   Novel Coronavirus (COVID-19), PCR [facility]    Collection Time: 06/03/20  1:01 PM   Result Value Ref Range    SARS-CoV-2  Not Detected Not Detected   Tissue Exam    Collection Time: 06/08/20  8:57 AM   Result Value Ref Range    Case Report       Surgical Pathology Report                         Case: [id number]                                   Authorizing Provider:  Timo Lee MD           Collected:           06/08/2020 4782              Ordering Location:     [address] [place] [address]      Received:            06/08/2020 [facility] Operating Room                                                      Pathologist:           Ayaka Jaramillo MD                                                               Specimen:    Liver, SEGMENT 3 LIVER RESECTION                                                           Final Diagnosis       A  Liver, segment 3 (wedge resection):  - Metastatic adenocarcinoma, consistent with the patient's known colon primary  - Margins negative for carcinoma     Comment:  - The tumor cells stain for CK20, CDX2, SATB2 consistent with a colon primary     - Intradepartmental consultation concurs with the diagnosis of adenocarcinoma  Additional Information       All reported additional testing was performed with appropriately reactive controls  These tests were developed and their performance characteristics determined by [facility] or appropriate performing facility, though some tests may be performed on tissues which have not been validated for performance characteristics (such as staining performed on alcohol exposed cell blocks and decalcified tissues)  Results should be interpreted with caution and in the context of the patients clinical condition  These tests may not be cleared or approved by the [place]  Food and Drug Administration, though the FDA has determined that such clearance or approval is not necessary  These tests are used for clinical purposes and they should not be regarded as investigational or for research  This laboratory has been approved by [facility], designated as a high-complexity laboratory and is qualified to perform these tests  Sandoval Silva Description       A  The specimen is received fresh, labeled with the patient's name and medical recor number, and is designated "segment 3 liver resection  The specimen consists of a segment of liver measuring 5 3 x 3 8 x 2 9 cm which weighs 21 g  The margin of resection is inked black  The specimen is sectioned revealing a well-circumscribed tan gritty lesion measuring 2 3 x 1 8 by 1 5 cm  The lesion is located 1 cm or greater from the margin of resection  No other abnormalities are noted  Representative sections  Four cassettes  1-3:  Representative sections of lesion showing nearest approach to inked margin  4:  Additional representative section of lesion    Note: The estimated total formalin fixation time based upon information provided by the submitting clinician and the standard processing schedule is under 72 hours      MCrites        CBC and differential    Collection Time: 06/09/20  6:07 AM   Result Value Ref Range WBC 11 28 (H) 4 31 - 10 16 Thousand/uL    RBC 3 74 (L) 3 88 - 5 62 Million/uL    Hemoglobin 12 4 12 0 - 17 0 g/dL    Hematocrit 36 6 36 5 - 49 3 %    MCV 98 82 - 98 fL    MCH 33 2 26 8 - 34 3 pg    MCHC 33 9 31 4 - 37 4 g/dL    RDW 13 4 11 6 - 15 1 %    MPV 11 4 8 9 - 12 7 fL    Platelets 821 (L) 912 - 390 Thousands/uL    nRBC 0 /100 WBCs    Neutrophils Relative 78 (H) 43 - 75 %    Immat GRANS % 0 0 - 2 %    Lymphocytes Relative 15 14 - 44 %    Monocytes Relative 7 4 - 12 %    Eosinophils Relative 0 0 - 6 %    Basophils Relative 0 0 - 1 %    Neutrophils Absolute 8 79 (H) 1 85 - 7 62 Thousands/µL    Immature Grans Absolute 0 03 0 00 - 0 20 Thousand/uL    Lymphocytes Absolute 1 66 0 60 - 4 47 Thousands/µL    Monocytes Absolute 0 79 0 17 - 1 22 Thousand/µL    Eosinophils Absolute 0 00 0 00 - 0 61 Thousand/µL    Basophils Absolute 0 01 0 00 - 0 10 Thousands/µL   Basic metabolic panel    Collection Time: 06/09/20  6:07 AM   Result Value Ref Range    Sodium 141 136 - 145 mmol/L    Potassium 3 5 3 5 - 5 3 mmol/L    Chloride 109 (H) 100 - 108 mmol/L    CO2 25 21 - 32 mmol/L    ANION GAP 7 4 - 13 mmol/L    BUN 11 5 - 25 mg/dL    Creatinine 0 95 0 60 - 1 30 mg/dL    Glucose 114 65 - 140 mg/dL    Calcium 8 1 (L) 8 3 - 10 1 mg/dL    eGFR 95 ml/min/1 73sq m   Magnesium    Collection Time: 06/09/20  6:07 AM   Result Value Ref Range    Magnesium 1 8 1 6 - 2 6 mg/dL   CBC and differential    Collection Time: 06/10/20  5:14 AM   Result Value Ref Range    WBC 8 96 4 31 - 10 16 Thousand/uL    RBC 3 68 (L) 3 88 - 5 62 Million/uL    Hemoglobin 12 2 12 0 - 17 0 g/dL    Hematocrit 35 8 (L) 36 5 - 49 3 %    MCV 97 82 - 98 fL    MCH 33 2 26 8 - 34 3 pg    MCHC 34 1 31 4 - 37 4 g/dL    RDW 13 4 11 6 - 15 1 %    MPV 11 8 8 9 - 12 7 fL    Platelets 930 (L) 256 - 390 Thousands/uL    nRBC 0 /100 WBCs    Neutrophils Relative 67 43 - 75 %    Immat GRANS % 0 0 - 2 %    Lymphocytes Relative 23 14 - 44 %    Monocytes Relative 9 4 - 12 % Eosinophils Relative 1 0 - 6 %    Basophils Relative 0 0 - 1 %    Neutrophils Absolute 5 98 1 85 - 7 62 Thousands/µL    Immature Grans Absolute 0 03 0 00 - 0 20 Thousand/uL    Lymphocytes Absolute 2 09 0 60 - 4 47 Thousands/µL    Monocytes Absolute 0 77 0 17 - 1 22 Thousand/µL    Eosinophils Absolute 0 07 0 00 - 0 61 Thousand/µL    Basophils Absolute 0 02 0 00 - 0 10 Thousands/µL   Basic metabolic panel    Collection Time: 06/10/20  5:14 AM   Result Value Ref Range    Sodium 139 136 - 145 mmol/L    Potassium 3 8 3 5 - 5 3 mmol/L    Chloride 109 (H) 100 - 108 mmol/L    CO2 25 21 - 32 mmol/L    ANION GAP 5 4 - 13 mmol/L    BUN 12 5 - 25 mg/dL    Creatinine 0 95 0 60 - 1 30 mg/dL    Glucose 104 65 - 140 mg/dL    Calcium 8 2 (L) 8 3 - 10 1 mg/dL    eGFR 95 ml/min/1 73sq m            Physical Exam   Constitutional: He is oriented to person, place, and time  He appears well-developed and well-nourished  No distress  HENT:   Head: Normocephalic and atraumatic  Eyes: Pupils are equal, round, and reactive to light  Conjunctivae and EOM are normal  Right eye exhibits no discharge  Left eye exhibits no discharge  No scleral icterus  Neck: Normal range of motion  Neck supple  No JVD present  No thyromegaly present  Cardiovascular: Normal rate, regular rhythm, normal heart sounds and intact distal pulses  Exam reveals no gallop and no friction rub  No murmur heard  Pulmonary/Chest: Effort normal and breath sounds normal  No respiratory distress  He has no wheezes  He has no rales  He exhibits no tenderness  Abdominal: Soft  Bowel sounds are normal  He exhibits no distension and no mass  There is no tenderness  There is no rebound and no guarding  Musculoskeletal: Normal range of motion  He exhibits no edema, tenderness or deformity  Lymphadenopathy:     He has no cervical adenopathy  Neurological: He is alert and oriented to person, place, and time  He has normal reflexes  No cranial nerve deficit  Coordination normal    Skin: Skin is warm and dry  No rash noted  He is not diaphoretic  No erythema  No pallor  Psychiatric: He has a normal mood and affect  His behavior is normal  Judgment and thought content normal    Nursing note and vitals reviewed

## 2020-07-24 ENCOUNTER — TRANSCRIBE ORDERS (OUTPATIENT)
Dept: LAB | Facility: CLINIC | Age: 50
End: 2020-07-24

## 2020-07-24 ENCOUNTER — APPOINTMENT (OUTPATIENT)
Dept: LAB | Facility: CLINIC | Age: 50
End: 2020-07-24
Payer: COMMERCIAL

## 2020-07-24 DIAGNOSIS — Z13.6 ENCOUNTER FOR LIPID SCREENING FOR CARDIOVASCULAR DISEASE: ICD-10-CM

## 2020-07-24 DIAGNOSIS — Z13.220 ENCOUNTER FOR LIPID SCREENING FOR CARDIOVASCULAR DISEASE: ICD-10-CM

## 2020-07-24 DIAGNOSIS — C18.9 COLON CANCER METASTASIZED TO LIVER (HCC): ICD-10-CM

## 2020-07-24 DIAGNOSIS — C78.7 COLON CANCER METASTASIZED TO LIVER (HCC): ICD-10-CM

## 2020-07-24 LAB
ALBUMIN SERPL BCP-MCNC: 3.9 G/DL (ref 3.5–5)
ALP SERPL-CCNC: 53 U/L (ref 46–116)
ALT SERPL W P-5'-P-CCNC: 25 U/L (ref 12–78)
ANION GAP SERPL CALCULATED.3IONS-SCNC: 9 MMOL/L (ref 4–13)
AST SERPL W P-5'-P-CCNC: 10 U/L (ref 5–45)
BASOPHILS # BLD AUTO: 0.03 THOUSANDS/ΜL (ref 0–0.1)
BASOPHILS NFR BLD AUTO: 1 % (ref 0–1)
BILIRUB SERPL-MCNC: 0.43 MG/DL (ref 0.2–1)
BUN SERPL-MCNC: 14 MG/DL (ref 5–25)
CALCIUM SERPL-MCNC: 8.7 MG/DL (ref 8.3–10.1)
CHLORIDE SERPL-SCNC: 104 MMOL/L (ref 100–108)
CHOLEST SERPL-MCNC: 146 MG/DL (ref 50–200)
CO2 SERPL-SCNC: 25 MMOL/L (ref 21–32)
CREAT SERPL-MCNC: 1.01 MG/DL (ref 0.6–1.3)
EOSINOPHIL # BLD AUTO: 0.26 THOUSAND/ΜL (ref 0–0.61)
EOSINOPHIL NFR BLD AUTO: 4 % (ref 0–6)
ERYTHROCYTE [DISTWIDTH] IN BLOOD BY AUTOMATED COUNT: 12.8 % (ref 11.6–15.1)
GFR SERPL CREATININE-BSD FRML MDRD: 88 ML/MIN/1.73SQ M
GLUCOSE P FAST SERPL-MCNC: 101 MG/DL (ref 65–99)
HCT VFR BLD AUTO: 42.5 % (ref 36.5–49.3)
HDLC SERPL-MCNC: 44 MG/DL
HGB BLD-MCNC: 14.8 G/DL (ref 12–17)
IMM GRANULOCYTES # BLD AUTO: 0.01 THOUSAND/UL (ref 0–0.2)
IMM GRANULOCYTES NFR BLD AUTO: 0 % (ref 0–2)
LDLC SERPL CALC-MCNC: 87 MG/DL (ref 0–100)
LYMPHOCYTES # BLD AUTO: 2.37 THOUSANDS/ΜL (ref 0.6–4.47)
LYMPHOCYTES NFR BLD AUTO: 39 % (ref 14–44)
MAGNESIUM SERPL-MCNC: 2.1 MG/DL (ref 1.6–2.6)
MCH RBC QN AUTO: 32.9 PG (ref 26.8–34.3)
MCHC RBC AUTO-ENTMCNC: 34.8 G/DL (ref 31.4–37.4)
MCV RBC AUTO: 94 FL (ref 82–98)
MONOCYTES # BLD AUTO: 0.49 THOUSAND/ΜL (ref 0.17–1.22)
MONOCYTES NFR BLD AUTO: 8 % (ref 4–12)
NEUTROPHILS # BLD AUTO: 2.85 THOUSANDS/ΜL (ref 1.85–7.62)
NEUTS SEG NFR BLD AUTO: 48 % (ref 43–75)
NONHDLC SERPL-MCNC: 102 MG/DL
NRBC BLD AUTO-RTO: 0 /100 WBCS
PLATELET # BLD AUTO: 186 THOUSANDS/UL (ref 149–390)
PMV BLD AUTO: 10.9 FL (ref 8.9–12.7)
POTASSIUM SERPL-SCNC: 3.9 MMOL/L (ref 3.5–5.3)
PROT SERPL-MCNC: 7.7 G/DL (ref 6.4–8.2)
RBC # BLD AUTO: 4.5 MILLION/UL (ref 3.88–5.62)
SODIUM SERPL-SCNC: 138 MMOL/L (ref 136–145)
TRIGL SERPL-MCNC: 74 MG/DL
WBC # BLD AUTO: 6.01 THOUSAND/UL (ref 4.31–10.16)

## 2020-07-24 PROCEDURE — 83735 ASSAY OF MAGNESIUM: CPT

## 2020-07-24 PROCEDURE — 36415 COLL VENOUS BLD VENIPUNCTURE: CPT

## 2020-07-24 PROCEDURE — 80053 COMPREHEN METABOLIC PANEL: CPT

## 2020-07-24 PROCEDURE — 80061 LIPID PANEL: CPT

## 2020-07-24 PROCEDURE — 85025 COMPLETE CBC W/AUTO DIFF WBC: CPT

## 2020-07-27 ENCOUNTER — HOSPITAL ENCOUNTER (OUTPATIENT)
Dept: INFUSION CENTER | Facility: CLINIC | Age: 50
Discharge: HOME | End: 2020-07-27
Payer: COMMERCIAL

## 2020-07-27 VITALS
HEIGHT: 67 IN | WEIGHT: 168.87 LBS | TEMPERATURE: 98.1 F | DIASTOLIC BLOOD PRESSURE: 80 MMHG | BODY MASS INDEX: 26.51 KG/M2 | RESPIRATION RATE: 18 BRPM | SYSTOLIC BLOOD PRESSURE: 117 MMHG | HEART RATE: 76 BPM

## 2020-07-27 DIAGNOSIS — C78.7 COLON CANCER METASTASIZED TO LIVER (HCC): Primary | ICD-10-CM

## 2020-07-27 DIAGNOSIS — C18.9 COLON CANCER METASTASIZED TO LIVER (HCC): Primary | ICD-10-CM

## 2020-07-27 PROCEDURE — 96417 CHEMO IV INFUS EACH ADDL SEQ: CPT

## 2020-07-27 PROCEDURE — G0498 CHEMO EXTEND IV INFUS W/PUMP: HCPCS

## 2020-07-27 PROCEDURE — 96411 CHEMO IV PUSH ADDL DRUG: CPT

## 2020-07-27 PROCEDURE — 96368 THER/DIAG CONCURRENT INF: CPT

## 2020-07-27 PROCEDURE — 96367 TX/PROPH/DG ADDL SEQ IV INF: CPT

## 2020-07-27 PROCEDURE — 96413 CHEMO IV INFUSION 1 HR: CPT

## 2020-07-27 PROCEDURE — 96375 TX/PRO/DX INJ NEW DRUG ADDON: CPT

## 2020-07-27 PROCEDURE — 96415 CHEMO IV INFUSION ADDL HR: CPT

## 2020-07-27 RX ORDER — FLUOROURACIL 50 MG/ML
750 INJECTION, SOLUTION INTRAVENOUS ONCE
Status: COMPLETED | OUTPATIENT
Start: 2020-07-27 | End: 2020-07-27

## 2020-07-27 RX ORDER — SODIUM CHLORIDE 9 MG/ML
20 INJECTION, SOLUTION INTRAVENOUS ONCE
Status: COMPLETED | OUTPATIENT
Start: 2020-07-27 | End: 2020-07-27

## 2020-07-27 RX ORDER — ATROPINE SULFATE 1 MG/ML
0.25 INJECTION, SOLUTION INTRAMUSCULAR; INTRAVENOUS; SUBCUTANEOUS ONCE
Status: COMPLETED | OUTPATIENT
Start: 2020-07-27 | End: 2020-07-27

## 2020-07-27 RX ADMIN — DIPHENHYDRAMINE HYDROCHLORIDE 25 MG: 50 INJECTION, SOLUTION INTRAMUSCULAR; INTRAVENOUS at 08:51

## 2020-07-27 RX ADMIN — DEXAMETHASONE SODIUM PHOSPHATE: 10 INJECTION, SOLUTION INTRAMUSCULAR; INTRAVENOUS at 09:13

## 2020-07-27 RX ADMIN — IRINOTECAN HYDROCHLORIDE 340 MG: 20 INJECTION, SOLUTION INTRAVENOUS at 11:51

## 2020-07-27 RX ADMIN — Medication 900 MG: at 09:45

## 2020-07-27 RX ADMIN — FLUOROURACIL 750 MG: 50 INJECTION, SOLUTION INTRAVENOUS at 13:27

## 2020-07-27 RX ADMIN — SODIUM CHLORIDE 20 ML/HR: 0.9 INJECTION, SOLUTION INTRAVENOUS at 08:51

## 2020-07-27 RX ADMIN — ATROPINE SULFATE 0.25 MG: 1 INJECTION, SOLUTION INTRAMUSCULAR; INTRAVENOUS; SUBCUTANEOUS at 11:46

## 2020-07-27 RX ADMIN — LEUCOVORIN CALCIUM 750 MG: 100 INJECTION, POWDER, LYOPHILIZED, FOR SUSPENSION INTRAMUSCULAR; INTRAVENOUS at 11:48

## 2020-07-27 NOTE — PLAN OF CARE
Problem: Potential for Falls  Goal: Patient will remain free of falls  Description  INTERVENTIONS:  - Assess patient frequently for physical needs  -  Identify cognitive and physical deficits and behaviors that affect risk of falls    -  [place] fall precautions as indicated by assessment   - Educate patient/family on patient safety including physical limitations  - Instruct patient to call for assistance with activity based on assessment  - Modify environment to reduce risk of injury  - Consider OT/PT consult to assist with strengthening/mobility  7/27/2020 0848 by Magali Naranjo RN  Outcome: Progressing  7/27/2020 0848 by Magali Naranjo RN  Outcome: Progressing

## 2020-07-27 NOTE — PROGRESS NOTES
Pt connected to CADD pump as ordered  Pt is aware to have CADD pump removed at [facility] on 7/29/20 at approximately 1140  Pt was provided with AVS and is aware of all future appts

## 2020-07-27 NOTE — SOCIAL WORK
ZULAYW went to meet with pt in infusion center, pt was sleeping  JENELLE left information card on contacting JENELLE

## 2020-07-29 ENCOUNTER — HOSPITAL ENCOUNTER (OUTPATIENT)
Dept: INFUSION CENTER | Facility: CLINIC | Age: 50
Discharge: HOME | End: 2020-07-29
Payer: COMMERCIAL

## 2020-07-29 VITALS — TEMPERATURE: 98.3 F

## 2020-07-29 DIAGNOSIS — C18.9 COLON CANCER METASTASIZED TO LIVER (HCC): Primary | ICD-10-CM

## 2020-07-29 DIAGNOSIS — C78.7 COLON CANCER METASTASIZED TO LIVER (HCC): Primary | ICD-10-CM

## 2020-07-29 PROCEDURE — 96372 THER/PROPH/DIAG INJ SC/IM: CPT

## 2020-07-29 RX ADMIN — PEGFILGRASTIM 6 MG: KIT SUBCUTANEOUS at 11:56

## 2020-07-29 NOTE — PROGRESS NOTES
Patient here for CADD d/c and neulasta onpro  He reports some mild nausea day of treatment or Monday 7/27/20 but took an antiemetic at home and had relief  He tolerated CADD d/c without incident and was discharged with neulasta onpro for injection tomorrow at 1500  Patient familiar with injection and removal of onpro  He will RTO 8/10/20 for his next cycle

## 2020-07-29 NOTE — PATIENT INSTRUCTIONS
July 2020 Sunday Monday Tuesday Wednesday Thursday Friday Saturday                  1     2     3     4                5     6     7     8     9     10     11                12     13     14     15     16    FOLLOW UP PG   8:45 AM   (20 min )   Cheryle Rodriguez MD   [facility] [place] 17     18                19     20     21    OFFICE VISIT LONG PG   1:15 PM   (30 min )   Jeff Pruett MD   [facility] 22     23     24    LAB WALK IN   8:20 AM   (5 min )   AN MOB LAB PSC CHAIR 1   [facility] [address] MOB 25                26     27    INF ONCOLOGY TX-TREATMENT PLAN   8:30 AM   (350 min )   AL INF CHAIR Bob 28     29    INF ONCOLOGY TX-TREATMENT PLAN  12:00 PM   (30 min )   AN INF QUICK CHAIR [address] 30     31             Cycle 1, Day 1  Cycle 1, Day 3           Treatment Details       7/27/2020 - Cycle 1, Day 1      Supportive Care: ONCBCN PROVIDER COMMUNICATION8, ONCBCN NURSE COMMUNICATION7, magnesium sulfate, magnesium sulfate      Chemotherapy: ONCBCN PROVIDER COMMUNICATION2, cetuximab (ERBITUX), LEUCOVORIN IVPB, IRINOTECAN INFUSION, fluorouracil (ADRUCIL)      Take-Home Chemo: ONCBCN PROVIDER COMMUNICATION8, FLUOROURACIL AMBULATORY INFUSION    7/29/2020 - Cycle 1, Day 3      Supportive Care: [facility], pegfilgrastim (Ely Perera)        August 2020 Sunday Monday Tuesday Wednesday Thursday Friday Saturday                                 1                2     3     4     5     6     7     8                9     10    INF ONCOLOGY TX-TREATMENT PLAN   8:30 AM   (290 min )   AL INF CHAIR Bob 11     12    INF ONCOLOGY TX-TREATMENT PLAN  12:00 PM   (30 min )   AN INF QUICK CHAIR 01   [address]    FOLLOW UP PG   7:45 AM   (20 min )   MD Medhat Ivey [facility] [place] 14     15        Cycle 2, Day 1  Cycle 2, Day 3      16     17     18     19     20     21     22                23     24    INF ONCOLOGY TX-TREATMENT PLAN  10:30 AM   (290 min )   AN INF CHAIR 12   [address] [place] [address] 25     26    INF ONCOLOGY TX-TREATMENT PLAN  12:00 PM   (30 min )   AN INF QUICK CHAIR [address] 27     28     29        Cycle 3, Day 1  Cycle 3, Day 3      30     31                                              Treatment Details       8/10/2020 - Cycle 2, Day 1      Supportive Care: ONCBCN PROVIDER COMMUNICATION8, ONCBCN NURSE COMMUNICATION7, magnesium sulfate, magnesium sulfate      Chemotherapy: ONCBCN PROVIDER COMMUNICATION2, cetuximab (ERBITUX), LEUCOVORIN IVPB, IRINOTECAN INFUSION, fluorouracil (ADRUCIL)      Take-Home Chemo: ONCBCN PROVIDER COMMUNICATION8, FLUOROURACIL AMBULATORY INFUSION    8/12/2020 - Cycle 2, Day 3      Supportive Care: ONCBCN PROVIDER COMMUNICATION7, pegfilgrastim (Ely Perera)    8/24/2020 - Cycle 3, Day 1      Supportive Care: ONCBCN PROVIDER COMMUNICATION8, ONCBCN NURSE COMMUNICATION7, magnesium sulfate, magnesium sulfate      Chemotherapy: ONCBCN PROVIDER COMMUNICATION2, cetuximab (ERBITUX), LEUCOVORIN IVPB, IRINOTECAN INFUSION, fluorouracil (ADRUCIL)      Take-Home Chemo: ONCBCN PROVIDER COMMUNICATION8, FLUOROURACIL AMBULATORY INFUSION    8/26/2020 - Cycle 3, Day 3      Supportive Care: [address] [place] [address], pegfilgrastim (Ely Perera)

## 2020-08-03 ENCOUNTER — TELEPHONE (OUTPATIENT)
Dept: INTERNAL MEDICINE CLINIC | Age: 50
End: 2020-08-03

## 2020-08-03 NOTE — TELEPHONE ENCOUNTER
----- Message from Jessica Mi MD sent at 8/3/2020 12:45 PM EDT -----  Please contact patient to inform him that I reviewed the results of laboratory studies  No concerning findings at this time

## 2020-08-04 RX ORDER — SODIUM CHLORIDE 9 MG/ML
20 INJECTION, SOLUTION INTRAVENOUS ONCE
Status: CANCELLED | OUTPATIENT
Start: 2020-08-10

## 2020-08-04 RX ORDER — MAGNESIUM SULFATE HEPTAHYDRATE 40 MG/ML
2 INJECTION, SOLUTION INTRAVENOUS ONCE AS NEEDED
Status: CANCELLED | OUTPATIENT
Start: 2020-08-10

## 2020-08-04 RX ORDER — MAGNESIUM SULFATE HEPTAHYDRATE 40 MG/ML
4 INJECTION, SOLUTION INTRAVENOUS ONCE AS NEEDED
Status: CANCELLED | OUTPATIENT
Start: 2020-08-10

## 2020-08-04 RX ORDER — FLUOROURACIL 50 MG/ML
750 INJECTION, SOLUTION INTRAVENOUS ONCE
Status: CANCELLED | OUTPATIENT
Start: 2020-08-10

## 2020-08-04 RX ORDER — ATROPINE SULFATE 1 MG/ML
0.25 INJECTION, SOLUTION INTRAMUSCULAR; INTRAVENOUS; SUBCUTANEOUS ONCE
Status: CANCELLED | OUTPATIENT
Start: 2020-08-10

## 2020-08-05 DIAGNOSIS — C78.7 COLON CANCER METASTASIZED TO LIVER (HCC): Primary | ICD-10-CM

## 2020-08-05 DIAGNOSIS — C18.9 COLON CANCER METASTASIZED TO LIVER (HCC): Primary | ICD-10-CM

## 2020-08-07 ENCOUNTER — APPOINTMENT (OUTPATIENT)
Dept: LAB | Facility: CLINIC | Age: 50
End: 2020-08-07
Payer: COMMERCIAL

## 2020-08-07 DIAGNOSIS — C78.7 COLON CANCER METASTASIZED TO LIVER (HCC): ICD-10-CM

## 2020-08-07 DIAGNOSIS — C18.9 COLON CANCER METASTASIZED TO LIVER (HCC): ICD-10-CM

## 2020-08-07 LAB
ALBUMIN SERPL BCP-MCNC: 3.9 G/DL (ref 3.5–5)
ALP SERPL-CCNC: 109 U/L (ref 46–116)
ALT SERPL W P-5'-P-CCNC: 28 U/L (ref 12–78)
ANION GAP SERPL CALCULATED.3IONS-SCNC: 8 MMOL/L (ref 4–13)
AST SERPL W P-5'-P-CCNC: 17 U/L (ref 5–45)
BASOPHILS # BLD AUTO: 0.05 THOUSANDS/ΜL (ref 0–0.1)
BASOPHILS NFR BLD AUTO: 1 % (ref 0–1)
BILIRUB SERPL-MCNC: 0.3 MG/DL (ref 0.2–1)
BUN SERPL-MCNC: 12 MG/DL (ref 5–25)
CALCIUM SERPL-MCNC: 8.7 MG/DL (ref 8.3–10.1)
CHLORIDE SERPL-SCNC: 103 MMOL/L (ref 100–108)
CO2 SERPL-SCNC: 26 MMOL/L (ref 21–32)
CREAT SERPL-MCNC: 0.97 MG/DL (ref 0.6–1.3)
EOSINOPHIL # BLD AUTO: 0.17 THOUSAND/ΜL (ref 0–0.61)
EOSINOPHIL NFR BLD AUTO: 2 % (ref 0–6)
ERYTHROCYTE [DISTWIDTH] IN BLOOD BY AUTOMATED COUNT: 13.7 % (ref 11.6–15.1)
GFR SERPL CREATININE-BSD FRML MDRD: 93 ML/MIN/1.73SQ M
GLUCOSE SERPL-MCNC: 110 MG/DL (ref 65–140)
HCT VFR BLD AUTO: 41.5 % (ref 36.5–49.3)
HGB BLD-MCNC: 14.4 G/DL (ref 12–17)
IMM GRANULOCYTES # BLD AUTO: 0.07 THOUSAND/UL (ref 0–0.2)
IMM GRANULOCYTES NFR BLD AUTO: 1 % (ref 0–2)
LYMPHOCYTES # BLD AUTO: 1.7 THOUSANDS/ΜL (ref 0.6–4.47)
LYMPHOCYTES NFR BLD AUTO: 16 % (ref 14–44)
MAGNESIUM SERPL-MCNC: 2 MG/DL (ref 1.6–2.6)
MCH RBC QN AUTO: 33.3 PG (ref 26.8–34.3)
MCHC RBC AUTO-ENTMCNC: 34.7 G/DL (ref 31.4–37.4)
MCV RBC AUTO: 96 FL (ref 82–98)
MONOCYTES # BLD AUTO: 0.68 THOUSAND/ΜL (ref 0.17–1.22)
MONOCYTES NFR BLD AUTO: 7 % (ref 4–12)
NEUTROPHILS # BLD AUTO: 7.7 THOUSANDS/ΜL (ref 1.85–7.62)
NEUTS SEG NFR BLD AUTO: 73 % (ref 43–75)
NRBC BLD AUTO-RTO: 0 /100 WBCS
PLATELET # BLD AUTO: 172 THOUSANDS/UL (ref 149–390)
PMV BLD AUTO: 11 FL (ref 8.9–12.7)
POTASSIUM SERPL-SCNC: 4 MMOL/L (ref 3.5–5.3)
PROT SERPL-MCNC: 8 G/DL (ref 6.4–8.2)
RBC # BLD AUTO: 4.33 MILLION/UL (ref 3.88–5.62)
SODIUM SERPL-SCNC: 137 MMOL/L (ref 136–145)
WBC # BLD AUTO: 10.37 THOUSAND/UL (ref 4.31–10.16)

## 2020-08-07 PROCEDURE — 83735 ASSAY OF MAGNESIUM: CPT

## 2020-08-07 PROCEDURE — 80053 COMPREHEN METABOLIC PANEL: CPT

## 2020-08-07 PROCEDURE — 85025 COMPLETE CBC W/AUTO DIFF WBC: CPT

## 2020-08-07 PROCEDURE — 36415 COLL VENOUS BLD VENIPUNCTURE: CPT

## 2020-08-10 ENCOUNTER — DOCUMENTATION (OUTPATIENT)
Dept: HEMATOLOGY ONCOLOGY | Facility: HOSPITAL | Age: 50
End: 2020-08-10

## 2020-08-10 ENCOUNTER — HOSPITAL ENCOUNTER (OUTPATIENT)
Dept: INFUSION CENTER | Facility: CLINIC | Age: 50
Discharge: HOME | End: 2020-08-10
Payer: COMMERCIAL

## 2020-08-10 ENCOUNTER — TELEPHONE (OUTPATIENT)
Dept: HEMATOLOGY ONCOLOGY | Facility: CLINIC | Age: 50
End: 2020-08-10

## 2020-08-10 VITALS
HEART RATE: 65 BPM | TEMPERATURE: 97.6 F | SYSTOLIC BLOOD PRESSURE: 115 MMHG | HEIGHT: 66 IN | BODY MASS INDEX: 27.46 KG/M2 | DIASTOLIC BLOOD PRESSURE: 75 MMHG | RESPIRATION RATE: 18 BRPM | WEIGHT: 170.86 LBS

## 2020-08-10 DIAGNOSIS — C78.7 COLON CANCER METASTASIZED TO LIVER (HCC): Primary | ICD-10-CM

## 2020-08-10 DIAGNOSIS — C18.9 COLON CANCER METASTASIZED TO LIVER (HCC): Primary | ICD-10-CM

## 2020-08-10 PROCEDURE — 96411 CHEMO IV PUSH ADDL DRUG: CPT

## 2020-08-10 PROCEDURE — 96413 CHEMO IV INFUSION 1 HR: CPT

## 2020-08-10 PROCEDURE — 96417 CHEMO IV INFUS EACH ADDL SEQ: CPT

## 2020-08-10 PROCEDURE — 96367 TX/PROPH/DG ADDL SEQ IV INF: CPT

## 2020-08-10 PROCEDURE — 96415 CHEMO IV INFUSION ADDL HR: CPT

## 2020-08-10 PROCEDURE — 96368 THER/DIAG CONCURRENT INF: CPT

## 2020-08-10 PROCEDURE — 96375 TX/PRO/DX INJ NEW DRUG ADDON: CPT

## 2020-08-10 PROCEDURE — G0498 CHEMO EXTEND IV INFUS W/PUMP: HCPCS

## 2020-08-10 RX ORDER — ATROPINE SULFATE 1 MG/ML
0.25 INJECTION, SOLUTION INTRAMUSCULAR; INTRAVENOUS; SUBCUTANEOUS ONCE
Status: COMPLETED | OUTPATIENT
Start: 2020-08-10 | End: 2020-08-10

## 2020-08-10 RX ORDER — FLUOROURACIL 50 MG/ML
750 INJECTION, SOLUTION INTRAVENOUS ONCE
Status: COMPLETED | OUTPATIENT
Start: 2020-08-10 | End: 2020-08-10

## 2020-08-10 RX ORDER — MAGNESIUM SULFATE HEPTAHYDRATE 40 MG/ML
2 INJECTION, SOLUTION INTRAVENOUS ONCE AS NEEDED
Status: DISCONTINUED | OUTPATIENT
Start: 2020-08-10 | End: 2020-08-13 | Stop reason: HOSPADM

## 2020-08-10 RX ORDER — SODIUM CHLORIDE 9 MG/ML
20 INJECTION, SOLUTION INTRAVENOUS ONCE
Status: COMPLETED | OUTPATIENT
Start: 2020-08-10 | End: 2020-08-10

## 2020-08-10 RX ORDER — MAGNESIUM SULFATE HEPTAHYDRATE 40 MG/ML
4 INJECTION, SOLUTION INTRAVENOUS ONCE AS NEEDED
Status: DISCONTINUED | OUTPATIENT
Start: 2020-08-10 | End: 2020-08-13 | Stop reason: HOSPADM

## 2020-08-10 RX ADMIN — LEUCOVORIN CALCIUM 750 MG: 200 INJECTION, POWDER, LYOPHILIZED, FOR SUSPENSION INTRAMUSCULAR; INTRAVENOUS at 11:42

## 2020-08-10 RX ADMIN — IRINOTECAN HYDROCHLORIDE 340 MG: 20 INJECTION, SOLUTION INTRAVENOUS at 11:46

## 2020-08-10 RX ADMIN — ATROPINE SULFATE: 1 INJECTION, SOLUTION INTRAMUSCULAR; INTRAVENOUS; SUBCUTANEOUS at 11:34

## 2020-08-10 RX ADMIN — SODIUM CHLORIDE 20 ML/HR: 0.9 INJECTION, SOLUTION INTRAVENOUS at 09:07

## 2020-08-10 RX ADMIN — FLUOROURACIL 750 MG: 50 INJECTION, SOLUTION INTRAVENOUS at 13:23

## 2020-08-10 RX ADMIN — DEXAMETHASONE SODIUM PHOSPHATE: 10 INJECTION, SOLUTION INTRAMUSCULAR; INTRAVENOUS at 09:32

## 2020-08-10 RX ADMIN — DIPHENHYDRAMINE HYDROCHLORIDE 25 MG: 50 INJECTION, SOLUTION INTRAMUSCULAR; INTRAVENOUS at 09:07

## 2020-08-10 RX ADMIN — Medication 900 MG: at 10:27

## 2020-08-10 NOTE — TELEPHONE ENCOUNTER
Spoke to Madi and informed him that I located the FMLA forms and that I will try and get a fax number from [phone] so I can fax over his forms   Madi expressed understanding

## 2020-08-10 NOTE — TELEPHONE ENCOUNTER
Patient  called stating Raisa Bergman has not received the [facility] paperwork from 7-   Please resend and call patient to confirm when completed at [phone]

## 2020-08-10 NOTE — PROGRESS NOTES
[employer] Forms were faxed to the fax number below    Phone number: [phone]  Fax number [phone]

## 2020-08-10 NOTE — PLAN OF CARE
Problem: Potential for Falls  Goal: Patient will remain free of falls  Description: INTERVENTIONS:  - Assess patient frequently for physical needs  -  Identify cognitive and physical deficits and behaviors that affect risk of falls  -  [place] fall precautions as indicated by assessment   - Educate patient/family on patient safety including physical limitations  - Instruct patient to call for assistance with activity based on assessment  - Modify environment to reduce risk of injury  - Consider OT/PT consult to assist with strengthening/mobility  Outcome: Progressing     Problem: PAIN - ADULT  Goal: Verbalizes/displays adequate comfort level or baseline comfort level  Description: Interventions:  - Encourage patient to monitor pain and request assistance  - Assess pain using appropriate pain scale  - Administer analgesics based on type and severity of pain and evaluate response  - Implement non-pharmacological measures as appropriate and evaluate response  - Consider cultural and social influences on pain and pain management  - Notify physician/advanced practitioner if interventions unsuccessful or patient reports new pain  Outcome: Progressing     Problem: SAFETY ADULT  Goal: Patient will remain free of falls  Description: INTERVENTIONS:  - Assess patient frequently for physical needs  -  Identify cognitive and physical deficits and behaviors that affect risk of falls    -  [place] fall precautions as indicated by assessment   - Educate patient/family on patient safety including physical limitations  - Instruct patient to call for assistance with activity based on assessment  - Modify environment to reduce risk of injury  - Consider OT/PT consult to assist with strengthening/mobility  Outcome: Progressing     Problem: DISCHARGE PLANNING  Goal: Discharge to home or other facility with appropriate resources  Description: INTERVENTIONS:  - Identify barriers to discharge w/patient and caregiver  - Arrange for needed discharge resources and transportation as appropriate  - Identify discharge learning needs (meds, wound care, etc )  - Arrange for interpretive services to assist at discharge as needed  - Refer to Case Management Department for coordinating discharge planning if the patient needs post-hospital services based on physician/advanced practitioner order or complex needs related to functional status, cognitive ability, or social support system  Outcome: Progressing     Problem: Knowledge Deficit  Goal: Patient/family/caregiver demonstrates understanding of disease process, treatment plan, medications, and discharge instructions  Description: Complete learning assessment and assess knowledge base    Interventions:  - Provide teaching at level of understanding  - Provide teaching via preferred learning methods  Outcome: Progressing

## 2020-08-10 NOTE — TELEPHONE ENCOUNTER
Patient has provided me with his email and would like to know if the [facility] paperwork can be emailed to him at [email or web address]

## 2020-08-10 NOTE — PROGRESS NOTES
Patient to the unit for chemotherapy and CADD pump connect  No magnesium needed today  Patient going to [facility] on 8/12/2020 approx  1335 for CADD pump removal   AVS given, aware of future appointments  Voices no questions or concerns at discharge

## 2020-08-10 NOTE — SOCIAL WORK
LSW met with pt in the infusion center to make introduction  Pt was given contact information for LSW and educated on the role of   Pt was pleasant and appeared calm and relaxed  Pt was encouraged to contact LSW in the event a need or question arises

## 2020-08-10 NOTE — TELEPHONE ENCOUNTER
Called and informed Kesha Pardo that we don't email patients from our personal emails but he is more than welcome to stop by the office and  his FMLA paperwork  Kesha Pardo expressed understanding

## 2020-08-12 ENCOUNTER — HOSPITAL ENCOUNTER (OUTPATIENT)
Dept: INFUSION CENTER | Facility: CLINIC | Age: 50
Discharge: HOME | End: 2020-08-12
Payer: COMMERCIAL

## 2020-08-12 VITALS — TEMPERATURE: 98.8 F

## 2020-08-12 DIAGNOSIS — C18.9 COLON CANCER METASTASIZED TO LIVER (HCC): Primary | ICD-10-CM

## 2020-08-12 DIAGNOSIS — C78.7 COLON CANCER METASTASIZED TO LIVER (HCC): Primary | ICD-10-CM

## 2020-08-12 PROCEDURE — 96372 THER/PROPH/DIAG INJ SC/IM: CPT

## 2020-08-12 RX ADMIN — PEGFILGRASTIM 6 MG: KIT SUBCUTANEOUS at 12:05

## 2020-08-12 NOTE — PROGRESS NOTES
Pt here for CADD pump disconnect  Pt tolerated at home infusion  Res volume is 0ml  Port flushed and blood return noted and was de accessed  Neulasta on pro placed on AMELIA  Green light noted  Pt aware of when to remove it tomorrow  Pt aware of future appts   Pt declined AVS

## 2020-08-12 NOTE — PROGRESS NOTES
Hematology/Oncology Outpatient Follow- up Note  Jeannette Abbott 1970, [id number]  8/13/2020        Chief Complaint   Patient presents with    Follow-up        HPI:  [place] Jeronimo a 53 yo male with stage IV colon cancer  He was diagnosed in January 2018  Rebecca Felix was treated with  modified FOLFox 6 and Erbitux  He then went for surgery on 06/21/2018 with a nice response   Liver resection was positive for residual malignancy   After surgery, treatment with 5 FU bolus, leucovorin, 5 FU pump and Erbitux was restarted on 07/24/2018 and he received 12 cycles of chemotherapy   He was switched to single agent Erbitux for 6 doses   This was completed in January 2019  He was placed on observation    PET CT completed 5/21/20 showed that there were hypermetabolic left lateral liver metastases with minimal activity in the hepatic dome treated metastases  He is s/p  Resection  on June 8, 2020  Pathology revealed metastatic adenocarcinoma consistent with the patient's known colon primary  Margins were negative for carcinoma  He was started on treatment with FOLFIRI on 7/27/20      Previous Hematologic/ Oncologic History:    Oncology History   Colon cancer metastasized to liver ([facility] [address] )   1/2018 Initial Diagnosis    Malignant neoplasm of sigmoid colon ([facility] [address] )     1/22/2018 Biopsy    Large Intestine, Sigmoid Colon, Recto-sigmoid tumor bx:    - Invasive colonic adenocarcinoma, moderately differentiated     2/6/2018 - 10/16/2018 Chemotherapy    Modified FOLFOX6 x 12 cycles  Added Erbitux on 3/20/18      6/21/2018 Surgery    Segment 7 liver resection/ablation, hemicolectomy     10/30/2018 -  Chemotherapy    Continuation of Single agent Erbitux  With 10/30/18 chemo, it was Cycle #14  Plan was for 6 additional cycles of Erbitux alone       3/2020 Genetic Testing    NEGATIVE for genes tested:    Test(s): [employer] + [employer] (34 genes): APC, ETHAN, BARD1, BRCA1, BRCA2, BRIP1, BMPR1A, CDH1, CDK4, CDKN2A, CHEK2, DICER1, EPCAM, GREM1, HOXB13, MLH1, MRE11A, MSH2, MSH6, MUTYH, NBN, NF1, PALB2, PMS2, POLD1, POLE, PTEN, RAD50, RAD51C, RAD51D, SMAD4, SMARCA4, STK11, TP53      2020 Surgery    Liver, segment 3 (wedge resection):  - Metastatic adenocarcinoma, consistent with the patient's known colon primary  - Margins negative for carcinoma      2020 -  Chemotherapy    fluorouracil (ADRUCIL) injection 750 mg, 745 mg, Intravenous, Once, 2 of 6 cycles  Administration: 750 mg (2020), 750 mg (8/10/2020)  pegfilgrastim (NEULASTA ONPRO) subcutaneous injection kit 6 mg, 6 mg, Subcutaneous, Once, 2 of 6 cycles  Administration: 6 mg (2020), 6 mg (2020)  cetuximab (ERBITUX) 900 mg in IVPB 450 mL, 930 mg, Intravenous, Once, 2 of 6 cycles  Administration: 900 mg (2020), 900 mg (8/10/2020)  irinotecan (CAMPTOSAR) 340 mg in sodium chloride 0 9 % 500 mL chemo infusion, 335 mg, Intravenous, Once, 2 of 6 cycles  Administration: 340 mg (2020), 340 mg (8/10/2020)  leucovorin 750 mg in sodium chloride 0 9 % 250 mL IVPB, 744 mg, Intravenous, Once, 2 of 6 cycles  Administration: 750 mg (2020), 750 mg (8/10/2020)     MFOLFOX6 (5-FU 2400 mg/m² over 46 hours, 5- mg meter squared bolus, leucovorin 400 mg meter squared bolus along with oxaliplatin at 85 mg/m²) with Neulasta Support  Dose #1 2018  CARIS testing performed   KRAS and NRAS WildType   Erbitux 500mg IV every 2 weeks  This was added on  (4th cycle)  In total he received 8 doses of modified FOLFOX 6, 5 of which he got with Erbitux      Patient received 5-FU 2400 mg/m², Erbitux 500 mg meter square, Leucovorin 400 mg meter square, 5- M square, Dose #12 in aggregate On 2019      Single agent Erbitux  Dose #6 was on 2019  Current Hematologic/ Oncologic Treatment:    FOLFIRI     ECO - Asymptomatic    Interval History:  The patient returns for a follow up visit   He has completed 2 cycles of FOLFIRI with cycle 3 scheduled to be given on 8/24  His most recent blood work completed on 8/7 was reviewed  His white count was mildly elevated at 10 37  He did receive Neulasta  Remainder of his blood counts were within normal limits  His magnesium was normal at 2 0    As far as symptoms are concerned,  He has some fatigue but remains active  He has nausea for a few days following treatment, antiemetics are helpful  Appetite is good, weight is stable  Has had some constipation but this is improved with use of Miralax  He has an Erbitux induced acneform rash most prevalent on his face, this is not causing any distress  Overall, he feels he is tolerating his treatment well  Cancer Staging:  Cancer Staging  Colon cancer metastasized to liver [facility]  Staging form: Colon and Rectum, AJCC 8th Edition  - Pathologic stage from 6/21/2018: Stage EWRO (ypT0, pN0, cM1a) - Unsigned  Stage prefix: Post-therapy  Total positive nodes: 0  Sites of metastasis: Liver      Molecular Testing:       Test Results:    Imaging: No results found  Labs:   Lab Results   Component Value Date    WBC 10 37 (H) 08/07/2020    HGB 14 4 08/07/2020    HCT 41 5 08/07/2020    MCV 96 08/07/2020     08/07/2020     Lab Results   Component Value Date     08/26/2017    K 4 0 08/07/2020     08/07/2020    CO2 26 08/07/2020    BUN 12 08/07/2020    CREATININE 0 97 08/07/2020    GLUCOSE 124 12/01/2017    GLUF 101 (H) 07/24/2020    CALCIUM 8 7 08/07/2020    AST 17 08/07/2020    ALT 28 08/07/2020    ALKPHOS 109 08/07/2020    PROT 7 2 08/26/2017    BILITOT 0 6 08/26/2017    EGFR 93 08/07/2020         Review of Systems   Constitutional: Positive for fatigue  Gastrointestinal: Positive for nausea (mild )  Skin: Positive for rash (acneform )  All other systems reviewed and are negative          Active Problems:   Patient Active Problem List   Diagnosis    ED (erectile dysfunction)    Colon cancer metastasized to liver ([address] )    GERD (gastroesophageal reflux disease)    Pulmonary nodule, right    Acneiform rash    Encounter for follow-up examination after completed treatment for malignant neoplasm    Other hydronephrosis    Annual physical exam    Vapes nicotine containing substance       Past Medical History:   Past Medical History:   Diagnosis Date    Cancer ([address] )     Dysuria     Last Assessed:8/24/17    GERD (gastroesophageal reflux disease)     Liver nodule     Pulmonary nodule, right     Ureteropelvic junction (UPJ) obstruction 1/29/2020       Surgical History:   Past Surgical History:   Procedure Laterality Date    ABDOMINAL SURGERY      COLON SURGERY      COLONOSCOPY N/A 1/22/2018    Procedure: COLONOSCOPY;  Surgeon: Janet Vergara MD;  [facility]; Service: Colorectal   [facility] DENTAL SURGERY  2016    Crown with bridge work    FLEXIBLE SIGMOIDOSCOPY N/A 6/15/2018    Procedure: SIGMOIDOSCOPY FLEXIBLE w/o sedation w/ tattoo;  Surgeon: Janet Vergara MD;  [facility];   Service: Colorectal    LAPAROTOMY N/A 6/8/2020    Procedure: LAPAROTOMY EXPLORATORY, LYSIS OF ADHESIONS;  Surgeon: Ruthann Echols MD;  [facility];  Service: Surgical Oncology    LIVER LOBECTOMY N/A 6/21/2018    Procedure: liver resection/ablation, intraoperative ultrasound of liver;  Surgeon: Ruthann Echols MD;  [facility];  Service: Surgical Oncology    LIVER LOBECTOMY N/A 6/8/2020    Procedure: LIVER RESECTION SEGMENT 3 WITH  INTRAOPERATIVE U/S OF LIVER;  Surgeon: Ruthann Echols MD;  [facility];  Service: Surgical Oncology    NJ EXPLORATORY OF ABDOMEN N/A 6/21/2018    Procedure: LAPAROTOMY EXPLORATORY;  Surgeon: Janet Vergara MD;  [facility];  Service: Colorectal    NJ INSERT PICC W/ SUB-Q PORT N/A 1/25/2018    Procedure: PORT-A-CATHETER PLACEMENT  Fluoroscopy for performance/interpretation;  Surgeon: Janet Vergara MD;  [facility];  Service: Colorectal    NJ PART REMOVAL COLON W ANASTOMOSIS Left 6/21/2018    Procedure: hemicolectomy, low anterior resection (open) SPY fluorescence angiography;  Surgeon: Roxanna Reyna MD;  [facility];  Service: Colorectal    VA PROCTECTOMY,PARTIAL N/A 6/21/2018    Procedure: Partial proctectomy ;  Surgeon: Roxanna Reyna MD;  [facility];  Service: Colorectal    VA SIGMOIDOSCOPY FLX DX W/COLLJ SPEC BR/WA IF PFRMD N/A 6/21/2018    Procedure: Therman Larger;  Surgeon: Roxanna Reyna MD;  [facility];  Service: Colorectal    ROOT CANAL  2015    TESTICLE SURGERY      Exploration of Undescended Testis rIght, age 8 had surgery for undescended testicle; Last Assessed:8/24/17    TOOTH EXTRACTION  2015       Family History:    Family History   Problem Relation Age of Onset    No Known Problems Father     Cancer Mother         unknown    Cancer Brother         pt  reports brother was diagnosed with stage 4 throat cancer    Throat cancer Brother        Cancer-related family history includes Cancer in his brother and mother      Social History:   Social History     Socioeconomic History    Marital status: Single     Spouse name: Not on file    Number of children: Not on file    Years of education: Not on file    Highest education level: Not on file   Occupational History    Not on file   Social Needs    Financial resource strain: Not on file    Food insecurity     Worry: Not on file     Inability: Not on file    Transportation needs     Medical: Not on file     Non-medical: Not on file   Tobacco Use    Smoking status: Former Smoker     Types: E-Cigarettes    Smokeless tobacco: Current User    Tobacco comment: quit 4 years ago / smoked for 20 years    Substance and Sexual Activity    Alcohol use: Yes     Comment: socially - 2 month    Drug use: Yes     Types: Marijuana     Comment: occasional recreation drug use    Sexual activity: Yes     Comment: Resides alone   Lifestyle    Physical activity     Days per week: Not on file     Minutes per session: Not on file    Stress: Not on file   Relationships    Social connections     Talks on phone: Not on file     Gets together: Not on file     Attends Restoration service: Not on file     Active member of club or organization: Not on file     Attends meetings of clubs or organizations: Not on file     Relationship status: Not on file    Intimate partner violence     Fear of current or ex partner: Not on file     Emotionally abused: Not on file     Physically abused: Not on file     Forced sexual activity: Not on file   Other Topics Concern    Not on file   Social History Narrative    Not on file       Current Medications:   Current Outpatient Medications   Medication Sig Dispense Refill    prochlorperazine (COMPAZINE) 10 mg tablet Take 1 tablet (10 mg total) by mouth every 6 (six) hours as needed for nausea or vomiting 45 tablet 3    sildenafil (REVATIO) 20 mg tablet Take 1 tablet (20 mg total) by mouth as needed (PRN) 90 tablet 1     No current facility-administered medications for this visit  Allergies: No Known Allergies    Physical Exam:  /80 (BP Location: Left arm, Patient Position: Sitting, Cuff Size: Adult)   Pulse 78   Temp 97 6 °F (36 4 °C) (Tympanic)   Resp 18   Ht 5' 6" (1 676 m)   Wt 76 7 kg (169 lb)   SpO2 98%   BMI 27 28 kg/m²   Body surface area is 1 86 meters squared  Wt Readings from Last 3 Encounters:   08/13/20 76 7 kg (169 lb)   08/10/20 77 5 kg (170 lb 13 7 oz)   07/27/20 76 6 kg (168 lb 14 oz)           Physical Exam  Constitutional:       General: He is not in acute distress  Appearance: Normal appearance  He is not ill-appearing  HENT:      Head: Normocephalic and atraumatic  Mouth/Throat:      Mouth: Mucous membranes are moist       Pharynx: Oropharynx is clear  Eyes:      General: No scleral icterus  Right eye: No discharge  Left eye: No discharge  Extraocular Movements: Extraocular movements intact  Neck:      Musculoskeletal: Normal range of motion and neck supple  Cardiovascular:      Rate and Rhythm: Normal rate and regular rhythm  Heart sounds: Normal heart sounds  Pulmonary:      Breath sounds: Normal breath sounds  Abdominal:      General: Bowel sounds are normal       Palpations: Abdomen is soft  Musculoskeletal: Normal range of motion  Right lower leg: No edema  Left lower leg: No edema  Lymphadenopathy:      Cervical: No cervical adenopathy  Skin:     General: Skin is warm and dry  Findings: Rash (acneform over face, forearms ) present  Neurological:      General: No focal deficit present  Mental Status: He is alert and oriented to person, place, and time  Psychiatric:         Mood and Affect: Mood normal          Behavior: Behavior normal          Assessment / Plan:    1  Colon cancer metastasized to liver [facility])      The patient is a very pleasant 82-year-old male with a history of stage IV colon cancer originally diagnosed in January 2018  CARIS testing performed  KRAS and NRAS WildType  He has been treated with modified FOLFOX 6 and Erbitux  He received a total of 8 cycles of chemotherapy and then went on to surgery on 06/21/2018  Liver resection was positive for residual malignancy  He was not treated with an additional 12 cycles of chemotherapy with 5 FU bolus, leucovorin, 5 FU pump and Erbitux  His disease was stable so he was switched to single agent Erbitux for additional 6 doses completing treatment in January of 2019  He was disease free until May 2020  PET-CT scan did show that there were hypermetabolic left lateral liver metastases with minimal activity in the hepatic dome treated metastases  He underwent a resection of  liver lesions on 06/08/2020  Pathology revealed metastatic adenocarcinoma consistent with his known colon primary  Margins were negative  He was restarted on chemotherapy with FOLFIRI    Plan is to give him 6 cycles and then hope  Patient has completed 2 cycles  His blood work remains in acceptable range  He reports he is tolerating treatment and clinically there are no concerns  He will proceed with his next cycle without change  He will have blood work repeated prior to next cycle  Patient was in agreement with plan of care  He will return for 4 week follow up  He was instructed to call with any questions prior to his next office visit  Goals and Barriers:  Current Goal:  Prolong Survival from metastatic colon cancer  Barriers: None  Patient's Capacity to Self Care:  Patient is able to self care  Portions of the record may have been created with voice recognition software  Occasional wrong word or "sound a like" substitutions may have occurred due to the inherent limitations of voice recognition software  Read the chart carefully and recognize, using context, where substitutions have occurred

## 2020-08-13 ENCOUNTER — OFFICE VISIT (OUTPATIENT)
Dept: HEMATOLOGY ONCOLOGY | Facility: CLINIC | Age: 50
End: 2020-08-13
Payer: COMMERCIAL

## 2020-08-13 VITALS
BODY MASS INDEX: 27.16 KG/M2 | TEMPERATURE: 97.6 F | DIASTOLIC BLOOD PRESSURE: 80 MMHG | HEART RATE: 78 BPM | OXYGEN SATURATION: 98 % | RESPIRATION RATE: 18 BRPM | WEIGHT: 169 LBS | HEIGHT: 66 IN | SYSTOLIC BLOOD PRESSURE: 114 MMHG

## 2020-08-13 DIAGNOSIS — C78.7 COLON CANCER METASTASIZED TO LIVER (HCC): Primary | ICD-10-CM

## 2020-08-13 DIAGNOSIS — C18.9 COLON CANCER METASTASIZED TO LIVER (HCC): Primary | ICD-10-CM

## 2020-08-13 PROCEDURE — 4004F PT TOBACCO SCREEN RCVD TLK: CPT | Performed by: NURSE PRACTITIONER

## 2020-08-13 PROCEDURE — 99214 OFFICE O/P EST MOD 30 MIN: CPT | Performed by: NURSE PRACTITIONER

## 2020-08-13 PROCEDURE — 3008F BODY MASS INDEX DOCD: CPT | Performed by: NURSE PRACTITIONER

## 2020-08-13 RX ORDER — MAGNESIUM SULFATE HEPTAHYDRATE 40 MG/ML
2 INJECTION, SOLUTION INTRAVENOUS ONCE AS NEEDED
Status: CANCELLED | OUTPATIENT
Start: 2020-08-24

## 2020-08-13 RX ORDER — ATROPINE SULFATE 1 MG/ML
0.25 INJECTION, SOLUTION INTRAMUSCULAR; INTRAVENOUS; SUBCUTANEOUS ONCE
Status: CANCELLED | OUTPATIENT
Start: 2020-08-24

## 2020-08-13 RX ORDER — MAGNESIUM SULFATE HEPTAHYDRATE 40 MG/ML
4 INJECTION, SOLUTION INTRAVENOUS ONCE AS NEEDED
Status: CANCELLED | OUTPATIENT
Start: 2020-08-24

## 2020-08-13 RX ORDER — SODIUM CHLORIDE 9 MG/ML
20 INJECTION, SOLUTION INTRAVENOUS ONCE
Status: CANCELLED | OUTPATIENT
Start: 2020-08-24

## 2020-08-13 RX ORDER — FLUOROURACIL 50 MG/ML
400 INJECTION, SOLUTION INTRAVENOUS ONCE
Status: CANCELLED | OUTPATIENT
Start: 2020-08-24

## 2020-08-18 DIAGNOSIS — C18.9 COLON CANCER METASTASIZED TO LIVER (HCC): Primary | ICD-10-CM

## 2020-08-18 DIAGNOSIS — C78.7 COLON CANCER METASTASIZED TO LIVER (HCC): Primary | ICD-10-CM

## 2020-08-21 ENCOUNTER — APPOINTMENT (OUTPATIENT)
Dept: LAB | Facility: CLINIC | Age: 50
End: 2020-08-21
Payer: COMMERCIAL

## 2020-08-21 DIAGNOSIS — C78.7 COLON CANCER METASTASIZED TO LIVER (HCC): ICD-10-CM

## 2020-08-21 DIAGNOSIS — C18.9 COLON CANCER METASTASIZED TO LIVER (HCC): ICD-10-CM

## 2020-08-21 LAB
ALBUMIN SERPL BCP-MCNC: 3.9 G/DL (ref 3.5–5)
ALP SERPL-CCNC: 146 U/L (ref 46–116)
ALT SERPL W P-5'-P-CCNC: 33 U/L (ref 12–78)
ANION GAP SERPL CALCULATED.3IONS-SCNC: 8 MMOL/L (ref 4–13)
AST SERPL W P-5'-P-CCNC: 22 U/L (ref 5–45)
BASOPHILS # BLD AUTO: 0.07 THOUSANDS/ΜL (ref 0–0.1)
BASOPHILS NFR BLD AUTO: 1 % (ref 0–1)
BILIRUB SERPL-MCNC: 0.25 MG/DL (ref 0.2–1)
BUN SERPL-MCNC: 10 MG/DL (ref 5–25)
CALCIUM SERPL-MCNC: 9.1 MG/DL (ref 8.3–10.1)
CHLORIDE SERPL-SCNC: 105 MMOL/L (ref 100–108)
CO2 SERPL-SCNC: 28 MMOL/L (ref 21–32)
CREAT SERPL-MCNC: 1.06 MG/DL (ref 0.6–1.3)
EOSINOPHIL # BLD AUTO: 0.17 THOUSAND/ΜL (ref 0–0.61)
EOSINOPHIL NFR BLD AUTO: 1 % (ref 0–6)
ERYTHROCYTE [DISTWIDTH] IN BLOOD BY AUTOMATED COUNT: 15.9 % (ref 11.6–15.1)
GFR SERPL CREATININE-BSD FRML MDRD: 83 ML/MIN/1.73SQ M
GLUCOSE SERPL-MCNC: 106 MG/DL (ref 65–140)
HCT VFR BLD AUTO: 41.2 % (ref 36.5–49.3)
HGB BLD-MCNC: 14.1 G/DL (ref 12–17)
IMM GRANULOCYTES # BLD AUTO: 0.2 THOUSAND/UL (ref 0–0.2)
IMM GRANULOCYTES NFR BLD AUTO: 1 % (ref 0–2)
LYMPHOCYTES # BLD AUTO: 2.19 THOUSANDS/ΜL (ref 0.6–4.47)
LYMPHOCYTES NFR BLD AUTO: 14 % (ref 14–44)
MAGNESIUM SERPL-MCNC: 2.1 MG/DL (ref 1.6–2.6)
MCH RBC QN AUTO: 33.2 PG (ref 26.8–34.3)
MCHC RBC AUTO-ENTMCNC: 34.2 G/DL (ref 31.4–37.4)
MCV RBC AUTO: 97 FL (ref 82–98)
MONOCYTES # BLD AUTO: 1.19 THOUSAND/ΜL (ref 0.17–1.22)
MONOCYTES NFR BLD AUTO: 8 % (ref 4–12)
NEUTROPHILS # BLD AUTO: 11.35 THOUSANDS/ΜL (ref 1.85–7.62)
NEUTS SEG NFR BLD AUTO: 75 % (ref 43–75)
NRBC BLD AUTO-RTO: 0 /100 WBCS
PLATELET # BLD AUTO: 202 THOUSANDS/UL (ref 149–390)
PMV BLD AUTO: 10.7 FL (ref 8.9–12.7)
POTASSIUM SERPL-SCNC: 3.8 MMOL/L (ref 3.5–5.3)
PROT SERPL-MCNC: 7.6 G/DL (ref 6.4–8.2)
RBC # BLD AUTO: 4.25 MILLION/UL (ref 3.88–5.62)
SODIUM SERPL-SCNC: 141 MMOL/L (ref 136–145)
WBC # BLD AUTO: 15.17 THOUSAND/UL (ref 4.31–10.16)

## 2020-08-21 PROCEDURE — 36415 COLL VENOUS BLD VENIPUNCTURE: CPT

## 2020-08-21 PROCEDURE — 80053 COMPREHEN METABOLIC PANEL: CPT

## 2020-08-21 PROCEDURE — 85025 COMPLETE CBC W/AUTO DIFF WBC: CPT

## 2020-08-21 PROCEDURE — 83735 ASSAY OF MAGNESIUM: CPT

## 2020-08-24 ENCOUNTER — HOSPITAL ENCOUNTER (OUTPATIENT)
Dept: INFUSION CENTER | Facility: CLINIC | Age: 50
Discharge: HOME | End: 2020-08-24
Payer: COMMERCIAL

## 2020-08-24 VITALS
BODY MASS INDEX: 27.32 KG/M2 | HEART RATE: 78 BPM | RESPIRATION RATE: 16 BRPM | HEIGHT: 66 IN | TEMPERATURE: 98.6 F | SYSTOLIC BLOOD PRESSURE: 123 MMHG | DIASTOLIC BLOOD PRESSURE: 66 MMHG | OXYGEN SATURATION: 96 % | WEIGHT: 170 LBS

## 2020-08-24 DIAGNOSIS — C18.9 COLON CANCER METASTASIZED TO LIVER (HCC): Primary | ICD-10-CM

## 2020-08-24 DIAGNOSIS — C78.7 COLON CANCER METASTASIZED TO LIVER (HCC): Primary | ICD-10-CM

## 2020-08-24 PROCEDURE — 96413 CHEMO IV INFUSION 1 HR: CPT

## 2020-08-24 PROCEDURE — 96411 CHEMO IV PUSH ADDL DRUG: CPT

## 2020-08-24 PROCEDURE — 96417 CHEMO IV INFUS EACH ADDL SEQ: CPT

## 2020-08-24 PROCEDURE — 96368 THER/DIAG CONCURRENT INF: CPT

## 2020-08-24 PROCEDURE — 96367 TX/PROPH/DG ADDL SEQ IV INF: CPT

## 2020-08-24 PROCEDURE — 96375 TX/PRO/DX INJ NEW DRUG ADDON: CPT

## 2020-08-24 PROCEDURE — 96415 CHEMO IV INFUSION ADDL HR: CPT

## 2020-08-24 PROCEDURE — G0498 CHEMO EXTEND IV INFUS W/PUMP: HCPCS

## 2020-08-24 RX ORDER — MAGNESIUM SULFATE HEPTAHYDRATE 40 MG/ML
4 INJECTION, SOLUTION INTRAVENOUS ONCE AS NEEDED
Status: DISCONTINUED | OUTPATIENT
Start: 2020-08-24 | End: 2020-08-27 | Stop reason: HOSPADM

## 2020-08-24 RX ORDER — MAGNESIUM SULFATE HEPTAHYDRATE 40 MG/ML
2 INJECTION, SOLUTION INTRAVENOUS ONCE AS NEEDED
Status: DISCONTINUED | OUTPATIENT
Start: 2020-08-24 | End: 2020-08-27 | Stop reason: HOSPADM

## 2020-08-24 RX ORDER — ATROPINE SULFATE 1 MG/ML
0.25 INJECTION, SOLUTION INTRAMUSCULAR; INTRAVENOUS; SUBCUTANEOUS ONCE
Status: COMPLETED | OUTPATIENT
Start: 2020-08-24 | End: 2020-08-24

## 2020-08-24 RX ORDER — SODIUM CHLORIDE 9 MG/ML
20 INJECTION, SOLUTION INTRAVENOUS ONCE
Status: COMPLETED | OUTPATIENT
Start: 2020-08-24 | End: 2020-08-24

## 2020-08-24 RX ORDER — FLUOROURACIL 50 MG/ML
400 INJECTION, SOLUTION INTRAVENOUS ONCE
Status: COMPLETED | OUTPATIENT
Start: 2020-08-24 | End: 2020-08-24

## 2020-08-24 RX ADMIN — DIPHENHYDRAMINE HYDROCHLORIDE 25 MG: 50 INJECTION, SOLUTION INTRAMUSCULAR; INTRAVENOUS at 11:28

## 2020-08-24 RX ADMIN — FLUOROURACIL 745 MG: 50 INJECTION, SOLUTION INTRAVENOUS at 14:50

## 2020-08-24 RX ADMIN — LEUCOVORIN CALCIUM 750 MG: 200 INJECTION, POWDER, LYOPHILIZED, FOR SUSPENSION INTRAMUSCULAR; INTRAVENOUS at 13:09

## 2020-08-24 RX ADMIN — SODIUM CHLORIDE 20 ML/HR: 0.9 INJECTION, SOLUTION INTRAVENOUS at 11:06

## 2020-08-24 RX ADMIN — DEXAMETHASONE SODIUM PHOSPHATE: 10 INJECTION, SOLUTION INTRAMUSCULAR; INTRAVENOUS at 11:06

## 2020-08-24 RX ADMIN — Medication 900 MG: at 11:58

## 2020-08-24 RX ADMIN — ATROPINE SULFATE 0.25 MG: 1 INJECTION, SOLUTION INTRAMUSCULAR; INTRAVENOUS; SUBCUTANEOUS at 13:06

## 2020-08-24 RX ADMIN — IRINOTECAN HYDROCHLORIDE 340 MG: 20 INJECTION, SOLUTION INTRAVENOUS at 13:09

## 2020-08-24 NOTE — PROGRESS NOTES
Patient presents to infusion center for chemo without complaints  Labs reviewed and are within parameters for treatment  Port accessed without difficulty  Call bell provided

## 2020-08-24 NOTE — PROGRESS NOTES
Patient tolerated treatment without incident  CADD pump set up with 2 RN check  Patient aware of disconnect time 8/26/20 at 1pm  AVS provided and reviewed

## 2020-08-26 ENCOUNTER — HOSPITAL ENCOUNTER (OUTPATIENT)
Dept: INFUSION CENTER | Facility: CLINIC | Age: 50
Discharge: HOME | End: 2020-08-26
Payer: COMMERCIAL

## 2020-08-26 VITALS — TEMPERATURE: 98.1 F

## 2020-08-26 DIAGNOSIS — C78.7 COLON CANCER METASTASIZED TO LIVER (HCC): Primary | ICD-10-CM

## 2020-08-26 DIAGNOSIS — C18.9 COLON CANCER METASTASIZED TO LIVER (HCC): Primary | ICD-10-CM

## 2020-08-26 PROCEDURE — 96372 THER/PROPH/DIAG INJ SC/IM: CPT

## 2020-08-26 RX ADMIN — PEGFILGRASTIM 6 MG: KIT SUBCUTANEOUS at 13:49

## 2020-08-26 NOTE — PROGRESS NOTES
Pt to clinic for cadd dc and neulasta on pro   Pt offered no complaints at this time, aware of when to remove neulasta, declined avs

## 2020-09-01 DIAGNOSIS — C78.7 COLON CANCER METASTASIZED TO LIVER (HCC): Primary | ICD-10-CM

## 2020-09-01 DIAGNOSIS — C18.9 COLON CANCER METASTASIZED TO LIVER (HCC): Primary | ICD-10-CM

## 2020-09-01 RX ORDER — ATROPINE SULFATE 1 MG/ML
0.25 INJECTION, SOLUTION INTRAMUSCULAR; INTRAVENOUS; SUBCUTANEOUS ONCE
Status: CANCELLED | OUTPATIENT
Start: 2020-09-08

## 2020-09-01 RX ORDER — SODIUM CHLORIDE 9 MG/ML
20 INJECTION, SOLUTION INTRAVENOUS ONCE
Status: CANCELLED | OUTPATIENT
Start: 2020-09-08

## 2020-09-01 RX ORDER — MAGNESIUM SULFATE HEPTAHYDRATE 40 MG/ML
2 INJECTION, SOLUTION INTRAVENOUS ONCE AS NEEDED
Status: CANCELLED | OUTPATIENT
Start: 2020-09-08

## 2020-09-01 RX ORDER — MAGNESIUM SULFATE HEPTAHYDRATE 40 MG/ML
4 INJECTION, SOLUTION INTRAVENOUS ONCE AS NEEDED
Status: CANCELLED | OUTPATIENT
Start: 2020-09-08

## 2020-09-01 RX ORDER — FLUOROURACIL 50 MG/ML
400 INJECTION, SOLUTION INTRAVENOUS ONCE
Status: CANCELLED | OUTPATIENT
Start: 2020-09-08

## 2020-09-02 DIAGNOSIS — C78.7 COLON CANCER METASTASIZED TO LIVER (HCC): Primary | ICD-10-CM

## 2020-09-02 DIAGNOSIS — C18.9 COLON CANCER METASTASIZED TO LIVER (HCC): Primary | ICD-10-CM

## 2020-09-04 ENCOUNTER — APPOINTMENT (OUTPATIENT)
Dept: LAB | Facility: CLINIC | Age: 50
End: 2020-09-04
Payer: COMMERCIAL

## 2020-09-04 ENCOUNTER — TELEPHONE (OUTPATIENT)
Dept: HEMATOLOGY ONCOLOGY | Facility: CLINIC | Age: 50
End: 2020-09-04

## 2020-09-04 ENCOUNTER — TRANSCRIBE ORDERS (OUTPATIENT)
Dept: LAB | Facility: CLINIC | Age: 50
End: 2020-09-04

## 2020-09-04 DIAGNOSIS — C78.7 COLON CANCER METASTASIZED TO LIVER (HCC): ICD-10-CM

## 2020-09-04 DIAGNOSIS — C18.9 COLON CANCER METASTASIZED TO LIVER (HCC): ICD-10-CM

## 2020-09-04 LAB
ALBUMIN SERPL BCP-MCNC: 3.8 G/DL (ref 3.5–5)
ALP SERPL-CCNC: 161 U/L (ref 46–116)
ALT SERPL W P-5'-P-CCNC: 30 U/L (ref 12–78)
ANION GAP SERPL CALCULATED.3IONS-SCNC: 9 MMOL/L (ref 4–13)
AST SERPL W P-5'-P-CCNC: 9 U/L (ref 5–45)
BASOPHILS # BLD AUTO: 0.07 THOUSANDS/ΜL (ref 0–0.1)
BASOPHILS NFR BLD AUTO: 1 % (ref 0–1)
BILIRUB SERPL-MCNC: 0.22 MG/DL (ref 0.2–1)
BUN SERPL-MCNC: 6 MG/DL (ref 5–25)
CALCIUM SERPL-MCNC: 8.9 MG/DL (ref 8.3–10.1)
CHLORIDE SERPL-SCNC: 107 MMOL/L (ref 100–108)
CO2 SERPL-SCNC: 24 MMOL/L (ref 21–32)
CREAT SERPL-MCNC: 1.03 MG/DL (ref 0.6–1.3)
EOSINOPHIL # BLD AUTO: 0.27 THOUSAND/ΜL (ref 0–0.61)
EOSINOPHIL NFR BLD AUTO: 2 % (ref 0–6)
ERYTHROCYTE [DISTWIDTH] IN BLOOD BY AUTOMATED COUNT: 16.5 % (ref 11.6–15.1)
GFR SERPL CREATININE-BSD FRML MDRD: 86 ML/MIN/1.73SQ M
GLUCOSE SERPL-MCNC: 128 MG/DL (ref 65–140)
HCT VFR BLD AUTO: 40.2 % (ref 36.5–49.3)
HGB BLD-MCNC: 13.8 G/DL (ref 12–17)
IMM GRANULOCYTES # BLD AUTO: 0.15 THOUSAND/UL (ref 0–0.2)
IMM GRANULOCYTES NFR BLD AUTO: 1 % (ref 0–2)
LYMPHOCYTES # BLD AUTO: 2.31 THOUSANDS/ΜL (ref 0.6–4.47)
LYMPHOCYTES NFR BLD AUTO: 17 % (ref 14–44)
MAGNESIUM SERPL-MCNC: 2 MG/DL (ref 1.6–2.6)
MCH RBC QN AUTO: 33.7 PG (ref 26.8–34.3)
MCHC RBC AUTO-ENTMCNC: 34.3 G/DL (ref 31.4–37.4)
MCV RBC AUTO: 98 FL (ref 82–98)
MONOCYTES # BLD AUTO: 1.01 THOUSAND/ΜL (ref 0.17–1.22)
MONOCYTES NFR BLD AUTO: 7 % (ref 4–12)
NEUTROPHILS # BLD AUTO: 10.16 THOUSANDS/ΜL (ref 1.85–7.62)
NEUTS SEG NFR BLD AUTO: 72 % (ref 43–75)
NRBC BLD AUTO-RTO: 0 /100 WBCS
PLATELET # BLD AUTO: 214 THOUSANDS/UL (ref 149–390)
PMV BLD AUTO: 10.5 FL (ref 8.9–12.7)
POTASSIUM SERPL-SCNC: 3.6 MMOL/L (ref 3.5–5.3)
PROT SERPL-MCNC: 7.9 G/DL (ref 6.4–8.2)
RBC # BLD AUTO: 4.09 MILLION/UL (ref 3.88–5.62)
SODIUM SERPL-SCNC: 140 MMOL/L (ref 136–145)
WBC # BLD AUTO: 13.97 THOUSAND/UL (ref 4.31–10.16)

## 2020-09-04 PROCEDURE — 83735 ASSAY OF MAGNESIUM: CPT

## 2020-09-04 PROCEDURE — 85025 COMPLETE CBC W/AUTO DIFF WBC: CPT

## 2020-09-04 PROCEDURE — 36415 COLL VENOUS BLD VENIPUNCTURE: CPT

## 2020-09-04 PROCEDURE — 80053 COMPREHEN METABOLIC PANEL: CPT

## 2020-09-08 ENCOUNTER — HOSPITAL ENCOUNTER (OUTPATIENT)
Dept: INFUSION CENTER | Facility: CLINIC | Age: 50
Discharge: HOME | End: 2020-09-08
Payer: COMMERCIAL

## 2020-09-08 VITALS
BODY MASS INDEX: 27.16 KG/M2 | SYSTOLIC BLOOD PRESSURE: 116 MMHG | TEMPERATURE: 97.6 F | HEIGHT: 66 IN | WEIGHT: 169 LBS | HEART RATE: 74 BPM | OXYGEN SATURATION: 98 % | DIASTOLIC BLOOD PRESSURE: 68 MMHG | RESPIRATION RATE: 16 BRPM

## 2020-09-08 DIAGNOSIS — C78.7 COLON CANCER METASTASIZED TO LIVER (HCC): Primary | ICD-10-CM

## 2020-09-08 DIAGNOSIS — C18.9 COLON CANCER METASTASIZED TO LIVER (HCC): Primary | ICD-10-CM

## 2020-09-08 PROCEDURE — 96367 TX/PROPH/DG ADDL SEQ IV INF: CPT

## 2020-09-08 PROCEDURE — G0498 CHEMO EXTEND IV INFUS W/PUMP: HCPCS

## 2020-09-08 PROCEDURE — 96413 CHEMO IV INFUSION 1 HR: CPT

## 2020-09-08 PROCEDURE — 96411 CHEMO IV PUSH ADDL DRUG: CPT

## 2020-09-08 PROCEDURE — 96417 CHEMO IV INFUS EACH ADDL SEQ: CPT

## 2020-09-08 PROCEDURE — 96368 THER/DIAG CONCURRENT INF: CPT

## 2020-09-08 PROCEDURE — 96375 TX/PRO/DX INJ NEW DRUG ADDON: CPT

## 2020-09-08 RX ORDER — SODIUM CHLORIDE 9 MG/ML
20 INJECTION, SOLUTION INTRAVENOUS ONCE
Status: COMPLETED | OUTPATIENT
Start: 2020-09-08 | End: 2020-09-08

## 2020-09-08 RX ORDER — ATROPINE SULFATE 1 MG/ML
0.25 INJECTION, SOLUTION INTRAMUSCULAR; INTRAVENOUS; SUBCUTANEOUS ONCE
Status: COMPLETED | OUTPATIENT
Start: 2020-09-08 | End: 2020-09-08

## 2020-09-08 RX ORDER — FLUOROURACIL 50 MG/ML
400 INJECTION, SOLUTION INTRAVENOUS ONCE
Status: COMPLETED | OUTPATIENT
Start: 2020-09-08 | End: 2020-09-08

## 2020-09-08 RX ADMIN — LEUCOVORIN CALCIUM 750 MG: 10 INJECTION INTRAMUSCULAR; INTRAVENOUS at 13:50

## 2020-09-08 RX ADMIN — Medication 900 MG: at 12:43

## 2020-09-08 RX ADMIN — FLUOROURACIL 745 MG: 50 INJECTION, SOLUTION INTRAVENOUS at 15:27

## 2020-09-08 RX ADMIN — DEXAMETHASONE SODIUM PHOSPHATE: 10 INJECTION, SOLUTION INTRAMUSCULAR; INTRAVENOUS at 11:51

## 2020-09-08 RX ADMIN — SODIUM CHLORIDE 20 ML/HR: 0.9 INJECTION, SOLUTION INTRAVENOUS at 11:45

## 2020-09-08 RX ADMIN — IRINOTECAN HYDROCHLORIDE 340 MG: 20 INJECTION, SOLUTION INTRAVENOUS at 13:57

## 2020-09-08 RX ADMIN — DIPHENHYDRAMINE HYDROCHLORIDE 25 MG: 50 INJECTION, SOLUTION INTRAMUSCULAR; INTRAVENOUS at 12:12

## 2020-09-08 RX ADMIN — ATROPINE SULFATE 0.25 MG: 1 INJECTION, SOLUTION INTRAMUSCULAR; INTRAVENOUS; SUBCUTANEOUS at 13:50

## 2020-09-08 NOTE — PROGRESS NOTES
Patient here for day 1 cycle 4 chemotherapy  Patient resting with no complaints  Vitals stable  Labs within parameters for treatment  Mg level 2 0 from 9/4/20, patient does not meet parameters for Mg infusion today, informed patient he is agreeable  Callbell within reach of patient  Will continue to monitor

## 2020-09-08 NOTE — PROGRESS NOTES
Patient tolerated treatment without complications  Patient connected to CADD pump, all connections secured  CADD infusing without issue, pump running and green light flashing  Patient aware of disconnect date and time   Patient given AVS

## 2020-09-09 ENCOUNTER — HOSPITAL ENCOUNTER (OUTPATIENT)
Dept: RADIOLOGY | Age: 50
Discharge: HOME | End: 2020-09-09
Payer: COMMERCIAL

## 2020-09-09 DIAGNOSIS — N13.30 HYDRONEPHROSIS, UNSPECIFIED HYDRONEPHROSIS TYPE: ICD-10-CM

## 2020-09-09 PROCEDURE — 51798 US URINE CAPACITY MEASURE: CPT

## 2020-09-10 ENCOUNTER — HOSPITAL ENCOUNTER (OUTPATIENT)
Dept: INFUSION CENTER | Facility: CLINIC | Age: 50
Discharge: HOME | End: 2020-09-10
Payer: COMMERCIAL

## 2020-09-10 VITALS — TEMPERATURE: 98 F

## 2020-09-10 DIAGNOSIS — C18.9 COLON CANCER METASTASIZED TO LIVER (HCC): Primary | ICD-10-CM

## 2020-09-10 DIAGNOSIS — C78.7 COLON CANCER METASTASIZED TO LIVER (HCC): Primary | ICD-10-CM

## 2020-09-10 PROCEDURE — 96372 THER/PROPH/DIAG INJ SC/IM: CPT

## 2020-09-10 RX ADMIN — PEGFILGRASTIM 6 MG: KIT SUBCUTANEOUS at 14:20

## 2020-09-10 NOTE — PROGRESS NOTES
Patient to [facility] for [facility] / Neulasta On Pro: Offers no complaints at present time: Lab work ( 09/04/20 ) reviewed:  Tolerated infusion without incident: No adverse reactions noted: Res volume - 0 0 ml: Verified follow up appt with patient: AVS offered and declined

## 2020-09-14 ENCOUNTER — OFFICE VISIT (OUTPATIENT)
Dept: UROLOGY | Facility: AMBULATORY SURGERY CENTER | Age: 50
End: 2020-09-14
Payer: COMMERCIAL

## 2020-09-14 VITALS
TEMPERATURE: 97.8 F | SYSTOLIC BLOOD PRESSURE: 116 MMHG | BODY MASS INDEX: 26.9 KG/M2 | HEIGHT: 66 IN | DIASTOLIC BLOOD PRESSURE: 82 MMHG | WEIGHT: 167.4 LBS | HEART RATE: 78 BPM

## 2020-09-14 DIAGNOSIS — N32.89 BLADDER WALL THICKENING: ICD-10-CM

## 2020-09-14 DIAGNOSIS — N13.30 HYDRONEPHROSIS, UNSPECIFIED HYDRONEPHROSIS TYPE: Primary | ICD-10-CM

## 2020-09-14 PROCEDURE — 99213 OFFICE O/P EST LOW 20 MIN: CPT | Performed by: NURSE PRACTITIONER

## 2020-09-14 NOTE — PROGRESS NOTES
9/14/2020    Marvin Puri  1970  [id number]      Assessment  -Left sided hydronephrosis  -Bladder wall thickening    Discussion/Plan  Ana Luisa Shaikh is a 54 y o  male being managed by Dr Lo Romero      1  Left sided hydronephrosis and bladder wall thickening- we reviewed the results of his recent renal ultrasound which identified unchanged and stable left sided hydronephrosis and bladder wall thickening  His recent creatinine is stable at 1 03, and he is asymptomatic  Because patient is currently undergoing chemotherapy treatment for stage IV colon cancer, I recommend observation at this time  He is amenable with plan  Follow up in 6 months with repeat renal ultrasound and BMP  Consider cystoscopy should bladder wall thickening remain present  Patient instructed to call with any issues    -All questions answered, patient agrees with plan      History of Present Illness  54 y o  male with a history of bladder wall thickening and left sided hydronephrosis presents today for follow up  Patient initially found to have mild left sided hydronephrosis on CT scan for workup of colon cancer  He is currently being treated again for stage IV colon cancer with liver metastases  Patient denies any flank pain, lower urinary tract symptoms, gross hematuria, or dysuria  Review of Systems  Review of Systems   Constitutional: Negative  HENT: Negative  Respiratory: Negative  Cardiovascular: Negative  Gastrointestinal: Negative  Genitourinary: Negative for decreased urine volume, difficulty urinating, dysuria, flank pain, frequency, hematuria and urgency  Musculoskeletal: Negative  Skin: Negative  Neurological: Negative  Psychiatric/Behavioral: Negative  AUA SYMPTOM SCORE      Most Recent Value   AUA SYMPTOM SCORE   How often have you had a sensation of not emptying your bladder completely after you finished urinating?   0   How often have you had to urinate again less than two hours after you finished urinating? 1   How often have you found you stopped and started again several times when you urinate?  0   How often have you found it difficult to postpone urination? 0   How often have you had a weak urinary stream?  1   How often have you had to push or strain to begin urination? 0   How many times did you most typically get up to urinate from the time you went to bed at night until the time you got up in the morning? 1   Quality of Life: If you were to spend the rest of your life with your urinary condition just the way it is now, how would you feel about that?  1   AUA SYMPTOM SCORE  3        Past Medical History  Past Medical History:   Diagnosis Date    Cancer ([facility] [address] )     Dysuria     Last Assessed:8/24/17    GERD (gastroesophageal reflux disease)     Liver nodule     Pulmonary nodule, right     Ureteropelvic junction (UPJ) obstruction 1/29/2020       Past Social History  Past Surgical History:   Procedure Laterality Date    ABDOMINAL SURGERY      COLON SURGERY      COLONOSCOPY N/A 1/22/2018    Procedure: COLONOSCOPY;  Surgeon: Tyrone Pittman MD;  [facility]: [facility]; Service: Colorectal   Nasima Grayslake DENTAL SURGERY  2016    Crown with bridge work    FLEXIBLE SIGMOIDOSCOPY N/A 6/15/2018    Procedure: SIGMOIDOSCOPY FLEXIBLE w/o sedation w/ tattoo;  Surgeon: Tyrone Pittman MD;  [facility];   Service: Colorectal    LAPAROTOMY N/A 6/8/2020    Procedure: LAPAROTOMY EXPLORATORY, LYSIS OF ADHESIONS;  Surgeon: Kaleb Mancera MD;  [facility];  Service: Surgical Oncology    LIVER LOBECTOMY N/A 6/21/2018    Procedure: liver resection/ablation, intraoperative ultrasound of liver;  Surgeon: Kaleb Mancera MD;  [facility];  Service: Surgical Oncology    LIVER LOBECTOMY N/A 6/8/2020    Procedure: LIVER RESECTION SEGMENT 3 WITH  INTRAOPERATIVE U/S OF LIVER;  Surgeon: Kaleb Mancera MD;  [facility];  Service: Surgical Oncology    ND EXPLORATORY OF ABDOMEN N/A 6/21/2018    Procedure: LAPAROTOMY EXPLORATORY;  Surgeon: Iris Carroll MD;  [facility];  Service: Colorectal    WV INSERT PICC W/ SUB-Q PORT N/A 1/25/2018    Procedure: PORT-A-CATHETER PLACEMENT  Fluoroscopy for performance/interpretation;  Surgeon: Iris Carroll MD;  [facility];  Service: Colorectal    WV PART REMOVAL COLON W ANASTOMOSIS Left 6/21/2018    Procedure: hemicolectomy, low anterior resection (open) SPY fluorescence angiography;  Surgeon: Iris Carroll MD;  [facility];  Service: Colorectal    WV PROCTECTOMY,PARTIAL N/A 6/21/2018    Procedure: Partial proctectomy ;  Surgeon: Iris Carroll MD;  [facility];  Service: Colorectal    WV SIGMOIDOSCOPY FLX DX W/COLLJ SPEC BR/WA IF PFRMD N/A 6/21/2018    Procedure: Mario Vides;  Surgeon: Iris Carroll MD;  [facility];  Service: Colorectal    ROOT CANAL  2015    TESTICLE SURGERY      Exploration of Undescended Testis rIght, age 8 had surgery for undescended testicle;  Last Assessed:8/24/17    TOOTH EXTRACTION  2015       Past Family History  Family History   Problem Relation Age of Onset    No Known Problems Father     Cancer Mother         unknown    Cancer Brother         pt  reports brother was diagnosed with stage 4 throat cancer    Throat cancer Brother        Past Social history  Social History     Socioeconomic History    Marital status: Single     Spouse name: Not on file    Number of children: Not on file    Years of education: Not on file    Highest education level: Not on file   Occupational History    Not on file   Social Needs    Financial resource strain: Not on file    Food insecurity     Worry: Not on file     Inability: Not on file    Transportation needs     Medical: Not on file     Non-medical: Not on file   Tobacco Use    Smoking status: Former Smoker     Types: E-Cigarettes    Smokeless tobacco: Current User    Tobacco comment: quit 4 years ago / smoked for 20 years    Substance and Sexual Activity    Alcohol use: Yes     Comment: socially - 2 month    Drug use: Yes     Types: Marijuana     Comment: occasional recreation drug use    Sexual activity: Yes     Comment: Resides alone   Lifestyle    Physical activity     Days per week: Not on file     Minutes per session: Not on file    Stress: Not on file   Relationships    Social connections     Talks on phone: Not on file     Gets together: Not on file     Attends Taoism service: Not on file     Active member of club or organization: Not on file     Attends meetings of clubs or organizations: Not on file     Relationship status: Not on file    Intimate partner violence     Fear of current or ex partner: Not on file     Emotionally abused: Not on file     Physically abused: Not on file     Forced sexual activity: Not on file   Other Topics Concern    Not on file   Social History Narrative    Not on file       Current Medications  Current Outpatient Medications   Medication Sig Dispense Refill    prochlorperazine (COMPAZINE) 10 mg tablet Take 1 tablet (10 mg total) by mouth every 6 (six) hours as needed for nausea or vomiting 45 tablet 3    sildenafil (REVATIO) 20 mg tablet Take 1 tablet (20 mg total) by mouth as needed (PRN) 90 tablet 1     No current facility-administered medications for this visit  Allergies  No Known Allergies    Past Medical History, Social History, Family History, medications and allergies were reviewed  Vitals  Vitals:    09/14/20 1521   BP: 116/82   BP Location: Left arm   Patient Position: Sitting   Cuff Size: Adult   Pulse: 78   Temp: 97 8 °F (36 6 °C)   TempSrc: Temporal   Weight: 75 9 kg (167 lb 6 4 oz)   Height: 5' 6" (1 676 m)       Physical Exam  Physical Exam  Constitutional:       Appearance: Normal appearance  He is well-developed  HENT:      Head: Normocephalic  Eyes:      Pupils: Pupils are equal, round, and reactive to light     Neck: Musculoskeletal: Normal range of motion  Pulmonary:      Effort: Pulmonary effort is normal    Abdominal:      Palpations: Abdomen is soft  Musculoskeletal: Normal range of motion  Skin:     General: Skin is warm and dry  Neurological:      General: No focal deficit present  Mental Status: He is alert and oriented to person, place, and time  Psychiatric:         Mood and Affect: Mood normal          Behavior: Behavior normal          Thought Content: Thought content normal          Judgment: Judgment normal          Results    I have personally reviewed all pertinent lab results and reviewed with patient  No results found for: PSA  Lab Results   Component Value Date    GLUCOSE 124 12/01/2017    CALCIUM 8 9 09/04/2020     08/26/2017    K 3 6 09/04/2020    CO2 24 09/04/2020     09/04/2020    BUN 6 09/04/2020    CREATININE 1 03 09/04/2020     Lab Results   Component Value Date    WBC 13 97 (H) 09/04/2020    HGB 13 8 09/04/2020    HCT 40 2 09/04/2020    MCV 98 09/04/2020     09/04/2020     No results found for this or any previous visit (from the past 1 hour(s))

## 2020-09-15 RX ORDER — FLUOROURACIL 50 MG/ML
400 INJECTION, SOLUTION INTRAVENOUS ONCE
Status: CANCELLED | OUTPATIENT
Start: 2020-09-21

## 2020-09-15 RX ORDER — MAGNESIUM SULFATE HEPTAHYDRATE 40 MG/ML
2 INJECTION, SOLUTION INTRAVENOUS ONCE AS NEEDED
Status: CANCELLED | OUTPATIENT
Start: 2020-09-21

## 2020-09-15 RX ORDER — MAGNESIUM SULFATE HEPTAHYDRATE 40 MG/ML
4 INJECTION, SOLUTION INTRAVENOUS ONCE AS NEEDED
Status: CANCELLED | OUTPATIENT
Start: 2020-09-21

## 2020-09-15 RX ORDER — SODIUM CHLORIDE 9 MG/ML
20 INJECTION, SOLUTION INTRAVENOUS ONCE
Status: CANCELLED | OUTPATIENT
Start: 2020-09-21

## 2020-09-15 RX ORDER — ATROPINE SULFATE 1 MG/ML
0.25 INJECTION, SOLUTION INTRAMUSCULAR; INTRAVENOUS; SUBCUTANEOUS ONCE
Status: CANCELLED | OUTPATIENT
Start: 2020-09-21

## 2020-09-16 DIAGNOSIS — C78.7 COLON CANCER METASTASIZED TO LIVER (HCC): Primary | ICD-10-CM

## 2020-09-16 DIAGNOSIS — C18.9 COLON CANCER METASTASIZED TO LIVER (HCC): Primary | ICD-10-CM

## 2020-09-17 ENCOUNTER — TELEPHONE (OUTPATIENT)
Dept: HEMATOLOGY ONCOLOGY | Facility: CLINIC | Age: 50
End: 2020-09-17

## 2020-09-17 NOTE — TELEPHONE ENCOUNTER
Patient fell asleep and woke up late  Can we see if we can reschedule   Can see Dr Shruti Ortiz or Steven Espinoza

## 2020-09-17 NOTE — TELEPHONE ENCOUNTER
Appointment Cancellation     Person calling in Patient    Provider Dr Nik Gilbert   Office Visit Date and Time 09/17 at 1:40pm   Office Visit Location [facility]   Did patient reschedule their appointment? no   Is this patient a treatment patient? yes   When is their next infusion visit? 09/21     I have called the [facility] and have notified Devin Doshi of the cancellation of this appt   He indicated that Adiel Morin would reach out to the pt to mc his appt

## 2020-09-18 ENCOUNTER — APPOINTMENT (OUTPATIENT)
Dept: LAB | Facility: CLINIC | Age: 50
End: 2020-09-18
Payer: COMMERCIAL

## 2020-09-18 ENCOUNTER — OFFICE VISIT (OUTPATIENT)
Dept: HEMATOLOGY ONCOLOGY | Facility: HOSPITAL | Age: 50
End: 2020-09-18
Payer: COMMERCIAL

## 2020-09-18 VITALS
TEMPERATURE: 98.7 F | DIASTOLIC BLOOD PRESSURE: 60 MMHG | HEART RATE: 84 BPM | SYSTOLIC BLOOD PRESSURE: 118 MMHG | RESPIRATION RATE: 18 BRPM | OXYGEN SATURATION: 96 % | WEIGHT: 167 LBS | BODY MASS INDEX: 26.84 KG/M2 | HEIGHT: 66 IN

## 2020-09-18 DIAGNOSIS — C18.9 COLON CANCER METASTASIZED TO LIVER (HCC): ICD-10-CM

## 2020-09-18 DIAGNOSIS — C78.7 COLON CANCER METASTASIZED TO LIVER (HCC): ICD-10-CM

## 2020-09-18 DIAGNOSIS — C78.7 COLON CANCER METASTASIZED TO LIVER (HCC): Primary | ICD-10-CM

## 2020-09-18 DIAGNOSIS — C18.9 COLON CANCER METASTASIZED TO LIVER (HCC): Primary | ICD-10-CM

## 2020-09-18 LAB
ALBUMIN SERPL BCP-MCNC: 4.2 G/DL (ref 3.5–5)
ALP SERPL-CCNC: 174 U/L (ref 46–116)
ALT SERPL W P-5'-P-CCNC: 30 U/L (ref 12–78)
ANION GAP SERPL CALCULATED.3IONS-SCNC: 10 MMOL/L (ref 4–13)
AST SERPL W P-5'-P-CCNC: 14 U/L (ref 5–45)
BASOPHILS # BLD AUTO: 0.11 THOUSANDS/ΜL (ref 0–0.1)
BASOPHILS NFR BLD AUTO: 1 % (ref 0–1)
BILIRUB SERPL-MCNC: 0.26 MG/DL (ref 0.2–1)
BUN SERPL-MCNC: 8 MG/DL (ref 5–25)
CALCIUM SERPL-MCNC: 8.9 MG/DL (ref 8.3–10.1)
CHLORIDE SERPL-SCNC: 106 MMOL/L (ref 100–108)
CO2 SERPL-SCNC: 25 MMOL/L (ref 21–32)
CREAT SERPL-MCNC: 0.94 MG/DL (ref 0.6–1.3)
EOSINOPHIL # BLD AUTO: 0.1 THOUSAND/ΜL (ref 0–0.61)
EOSINOPHIL NFR BLD AUTO: 1 % (ref 0–6)
ERYTHROCYTE [DISTWIDTH] IN BLOOD BY AUTOMATED COUNT: 17 % (ref 11.6–15.1)
GFR SERPL CREATININE-BSD FRML MDRD: 96 ML/MIN/1.73SQ M
GLUCOSE SERPL-MCNC: 101 MG/DL (ref 65–140)
HCT VFR BLD AUTO: 40.7 % (ref 36.5–49.3)
HGB BLD-MCNC: 14 G/DL (ref 12–17)
IMM GRANULOCYTES # BLD AUTO: 0.29 THOUSAND/UL (ref 0–0.2)
IMM GRANULOCYTES NFR BLD AUTO: 1 % (ref 0–2)
LYMPHOCYTES # BLD AUTO: 2.2 THOUSANDS/ΜL (ref 0.6–4.47)
LYMPHOCYTES NFR BLD AUTO: 11 % (ref 14–44)
MAGNESIUM SERPL-MCNC: 1.9 MG/DL (ref 1.6–2.6)
MCH RBC QN AUTO: 33.9 PG (ref 26.8–34.3)
MCHC RBC AUTO-ENTMCNC: 34.4 G/DL (ref 31.4–37.4)
MCV RBC AUTO: 99 FL (ref 82–98)
MONOCYTES # BLD AUTO: 1.66 THOUSAND/ΜL (ref 0.17–1.22)
MONOCYTES NFR BLD AUTO: 8 % (ref 4–12)
NEUTROPHILS # BLD AUTO: 16.05 THOUSANDS/ΜL (ref 1.85–7.62)
NEUTS SEG NFR BLD AUTO: 78 % (ref 43–75)
NRBC BLD AUTO-RTO: 0 /100 WBCS
PLATELET # BLD AUTO: 229 THOUSANDS/UL (ref 149–390)
PMV BLD AUTO: 10.6 FL (ref 8.9–12.7)
POTASSIUM SERPL-SCNC: 3.9 MMOL/L (ref 3.5–5.3)
PROT SERPL-MCNC: 7.7 G/DL (ref 6.4–8.2)
RBC # BLD AUTO: 4.13 MILLION/UL (ref 3.88–5.62)
SODIUM SERPL-SCNC: 141 MMOL/L (ref 136–145)
WBC # BLD AUTO: 20.41 THOUSAND/UL (ref 4.31–10.16)

## 2020-09-18 PROCEDURE — 99214 OFFICE O/P EST MOD 30 MIN: CPT | Performed by: INTERNAL MEDICINE

## 2020-09-18 PROCEDURE — 36415 COLL VENOUS BLD VENIPUNCTURE: CPT

## 2020-09-18 PROCEDURE — 80053 COMPREHEN METABOLIC PANEL: CPT

## 2020-09-18 PROCEDURE — 85025 COMPLETE CBC W/AUTO DIFF WBC: CPT

## 2020-09-18 PROCEDURE — 83735 ASSAY OF MAGNESIUM: CPT
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
## 2020-09-21 ENCOUNTER — HOSPITAL ENCOUNTER (OUTPATIENT)
Dept: INFUSION CENTER | Facility: CLINIC | Age: 50
Discharge: HOME | End: 2020-09-21
Payer: COMMERCIAL

## 2020-09-21 VITALS
OXYGEN SATURATION: 98 % | DIASTOLIC BLOOD PRESSURE: 60 MMHG | HEIGHT: 66 IN | HEART RATE: 80 BPM | SYSTOLIC BLOOD PRESSURE: 106 MMHG | BODY MASS INDEX: 26.92 KG/M2 | WEIGHT: 167.5 LBS | TEMPERATURE: 97.8 F | RESPIRATION RATE: 16 BRPM

## 2020-09-21 DIAGNOSIS — C78.7 COLON CANCER METASTASIZED TO LIVER (HCC): Primary | ICD-10-CM

## 2020-09-21 DIAGNOSIS — C18.9 COLON CANCER METASTASIZED TO LIVER (HCC): Primary | ICD-10-CM

## 2020-09-21 PROCEDURE — 36593 DECLOT VASCULAR DEVICE: CPT

## 2020-09-21 PROCEDURE — 96367 TX/PROPH/DG ADDL SEQ IV INF: CPT

## 2020-09-21 PROCEDURE — 96415 CHEMO IV INFUSION ADDL HR: CPT

## 2020-09-21 PROCEDURE — 96417 CHEMO IV INFUS EACH ADDL SEQ: CPT

## 2020-09-21 PROCEDURE — G0498 CHEMO EXTEND IV INFUS W/PUMP: HCPCS

## 2020-09-21 PROCEDURE — 96411 CHEMO IV PUSH ADDL DRUG: CPT

## 2020-09-21 PROCEDURE — 96375 TX/PRO/DX INJ NEW DRUG ADDON: CPT

## 2020-09-21 PROCEDURE — 96413 CHEMO IV INFUSION 1 HR: CPT

## 2020-09-21 PROCEDURE — 96368 THER/DIAG CONCURRENT INF: CPT

## 2020-09-21 RX ORDER — FLUOROURACIL 50 MG/ML
400 INJECTION, SOLUTION INTRAVENOUS ONCE
Status: COMPLETED | OUTPATIENT
Start: 2020-09-21 | End: 2020-09-21

## 2020-09-21 RX ORDER — SODIUM CHLORIDE 9 MG/ML
20 INJECTION, SOLUTION INTRAVENOUS ONCE
Status: COMPLETED | OUTPATIENT
Start: 2020-09-21 | End: 2020-09-21

## 2020-09-21 RX ORDER — MAGNESIUM SULFATE HEPTAHYDRATE 40 MG/ML
2 INJECTION, SOLUTION INTRAVENOUS ONCE AS NEEDED
Status: DISCONTINUED | OUTPATIENT
Start: 2020-09-21 | End: 2020-09-24 | Stop reason: HOSPADM

## 2020-09-21 RX ORDER — ATROPINE SULFATE 1 MG/ML
0.25 INJECTION, SOLUTION INTRAMUSCULAR; INTRAVENOUS; SUBCUTANEOUS ONCE
Status: COMPLETED | OUTPATIENT
Start: 2020-09-21 | End: 2020-09-21

## 2020-09-21 RX ADMIN — Medication 900 MG: at 11:06

## 2020-09-21 RX ADMIN — IRINOTECAN HYDROCHLORIDE 340 MG: 20 INJECTION, SOLUTION INTRAVENOUS at 12:08

## 2020-09-21 RX ADMIN — DIPHENHYDRAMINE HYDROCHLORIDE 25 MG: 50 INJECTION, SOLUTION INTRAMUSCULAR; INTRAVENOUS at 10:29

## 2020-09-21 RX ADMIN — LEUCOVORIN CALCIUM 750 MG: 10 INJECTION INTRAMUSCULAR; INTRAVENOUS at 12:09

## 2020-09-21 RX ADMIN — FLUOROURACIL 745 MG: 50 INJECTION, SOLUTION INTRAVENOUS at 15:20

## 2020-09-21 RX ADMIN — ATROPINE SULFATE 0.25 MG: 1 INJECTION, SOLUTION INTRAMUSCULAR; INTRAVENOUS; SUBCUTANEOUS at 12:09

## 2020-09-21 RX ADMIN — SODIUM CHLORIDE 20 ML/HR: 0.9 INJECTION, SOLUTION INTRAVENOUS at 08:40

## 2020-09-21 RX ADMIN — MAGNESIUM SULFATE IN WATER 2 G: 40 INJECTION, SOLUTION INTRAVENOUS at 08:51

## 2020-09-21 RX ADMIN — DEXAMETHASONE SODIUM PHOSPHATE: 10 INJECTION, SOLUTION INTRAMUSCULAR; INTRAVENOUS at 10:11

## 2020-09-21 RX ADMIN — ALTEPLASE 2 MG: 2.2 INJECTION, POWDER, LYOPHILIZED, FOR SOLUTION INTRAVENOUS at 14:19

## 2020-09-21 NOTE — PROGRESS NOTES
Patient tolerated treatment without complications  Patient connected to CADD pump, all connections secured  CADD infusing without issue, pump running and green light flashing  Patient aware of disconnect date and time   Patient given

## 2020-09-21 NOTE — PROGRESS NOTES
Patient here for day 1 cycle 5 chemotherapy  Patient resting with no complaints  Vitals stable  Labs within parameters for treatment  Mg 1 9 from 9/18/20, per order patient to receive 2g Mg today  Callbell within reach of patient  Will continue to monitor

## 2020-09-23 ENCOUNTER — HOSPITAL ENCOUNTER (OUTPATIENT)
Dept: MRI IMAGING | Facility: HOSPITAL | Age: 50
Discharge: HOME | End: 2020-09-23
Attending: SURGERY
Payer: COMMERCIAL

## 2020-09-23 ENCOUNTER — HOSPITAL ENCOUNTER (OUTPATIENT)
Dept: INFUSION CENTER | Facility: CLINIC | Age: 50
Discharge: HOME | End: 2020-09-23
Payer: COMMERCIAL

## 2020-09-23 VITALS — TEMPERATURE: 98.5 F

## 2020-09-23 DIAGNOSIS — C78.7 COLON CANCER METASTASIZED TO LIVER (HCC): Primary | ICD-10-CM

## 2020-09-23 DIAGNOSIS — C18.9 COLON CANCER METASTASIZED TO LIVER (HCC): Primary | ICD-10-CM

## 2020-09-23 DIAGNOSIS — C78.7 COLON CANCER METASTASIZED TO LIVER (HCC): ICD-10-CM

## 2020-09-23 DIAGNOSIS — C18.9 COLON CANCER METASTASIZED TO LIVER (HCC): ICD-10-CM

## 2020-09-23 PROCEDURE — 74183 MRI ABD W/O CNTR FLWD CNTR: CPT

## 2020-09-23 PROCEDURE — A9585 GADOBUTROL INJECTION: HCPCS | Performed by: SURGERY

## 2020-09-23 RX ADMIN — GADOBUTROL 7 ML: 604.72 INJECTION INTRAVENOUS at 17:30

## 2020-09-23 NOTE — PROGRESS NOTES
Patient arrived for CADD D/C  Offers no complaints  Patient tolerated 46 hour infusion of 5FU at home without complications  CADD D/C'd and port flushed per protocol  Patient scheduled for Neulasta on-pro today also but states he is going for an MRI this evening  Neulasta patch contraindicated for MRI  Notified Angel Mustafa in Dr Barbara Blank office  Patient to come back tomorrow for Neulasta injection here  Patient verbalized understanding and is aware of appointment time  AVS declined

## 2020-09-24 ENCOUNTER — HOSPITAL ENCOUNTER (OUTPATIENT)
Dept: INFUSION CENTER | Facility: CLINIC | Age: 50
Discharge: HOME | End: 2020-09-24
Payer: COMMERCIAL

## 2020-09-24 VITALS — TEMPERATURE: 98.2 F

## 2020-09-24 DIAGNOSIS — C18.9 COLON CANCER METASTASIZED TO LIVER (HCC): Primary | ICD-10-CM

## 2020-09-24 DIAGNOSIS — C78.7 COLON CANCER METASTASIZED TO LIVER (HCC): Primary | ICD-10-CM

## 2020-09-24 PROCEDURE — 96372 THER/PROPH/DIAG INJ SC/IM: CPT

## 2020-09-24 RX ADMIN — PEGFILGRASTIM 6 MG: 6 INJECTION SUBCUTANEOUS at 13:52

## 2020-09-24 NOTE — PROGRESS NOTES
Patient here for neulasta  Injection given in LUE without issue   Patient aware of next appointments, declined AVS

## 2020-09-29 RX ORDER — SODIUM CHLORIDE 9 MG/ML
20 INJECTION, SOLUTION INTRAVENOUS ONCE
Status: CANCELLED | OUTPATIENT
Start: 2020-10-05

## 2020-09-29 RX ORDER — ATROPINE SULFATE 1 MG/ML
0.25 INJECTION, SOLUTION INTRAMUSCULAR; INTRAVENOUS; SUBCUTANEOUS ONCE
Status: CANCELLED | OUTPATIENT
Start: 2020-10-05

## 2020-09-29 RX ORDER — MAGNESIUM SULFATE HEPTAHYDRATE 40 MG/ML
2 INJECTION, SOLUTION INTRAVENOUS ONCE AS NEEDED
Status: CANCELLED | OUTPATIENT
Start: 2020-10-05

## 2020-09-29 RX ORDER — FLUOROURACIL 50 MG/ML
400 INJECTION, SOLUTION INTRAVENOUS ONCE
Status: CANCELLED | OUTPATIENT
Start: 2020-10-05

## 2020-09-29 RX ORDER — MAGNESIUM SULFATE HEPTAHYDRATE 40 MG/ML
4 INJECTION, SOLUTION INTRAVENOUS ONCE AS NEEDED
Status: CANCELLED | OUTPATIENT
Start: 2020-10-05

## 2020-09-30 DIAGNOSIS — C78.7 COLON CANCER METASTASIZED TO LIVER (HCC): Primary | ICD-10-CM

## 2020-09-30 DIAGNOSIS — C18.9 COLON CANCER METASTASIZED TO LIVER (HCC): Primary | ICD-10-CM

## 2020-10-02 ENCOUNTER — APPOINTMENT (OUTPATIENT)
Dept: LAB | Facility: CLINIC | Age: 50
End: 2020-10-02
Payer: COMMERCIAL

## 2020-10-02 DIAGNOSIS — C18.9 METASTATIC COLON CANCER TO LIVER (HCC): ICD-10-CM

## 2020-10-02 DIAGNOSIS — C78.7 LIVER METASTASES (HCC): ICD-10-CM

## 2020-10-02 DIAGNOSIS — C78.7 METASTATIC COLON CANCER TO LIVER (HCC): ICD-10-CM

## 2020-10-02 DIAGNOSIS — C78.7 COLON CANCER METASTASIZED TO LIVER (HCC): ICD-10-CM

## 2020-10-02 DIAGNOSIS — C20 MALIGNANT NEOPLASM OF RECTUM (HCC): ICD-10-CM

## 2020-10-02 DIAGNOSIS — C18.9 COLON CANCER METASTASIZED TO LIVER (HCC): ICD-10-CM

## 2020-10-02 LAB
ALBUMIN SERPL BCP-MCNC: 3.9 G/DL (ref 3.5–5)
ALP SERPL-CCNC: 163 U/L (ref 46–116)
ALT SERPL W P-5'-P-CCNC: 26 U/L (ref 12–78)
ANION GAP SERPL CALCULATED.3IONS-SCNC: 10 MMOL/L (ref 4–13)
AST SERPL W P-5'-P-CCNC: 13 U/L (ref 5–45)
BASOPHILS # BLD AUTO: 0.08 THOUSANDS/ΜL (ref 0–0.1)
BASOPHILS NFR BLD AUTO: 1 % (ref 0–1)
BILIRUB SERPL-MCNC: 0.23 MG/DL (ref 0.2–1)
BUN SERPL-MCNC: 8 MG/DL (ref 5–25)
CALCIUM SERPL-MCNC: 8.7 MG/DL (ref 8.3–10.1)
CEA SERPL-MCNC: 0.7 NG/ML (ref 0–3)
CHLORIDE SERPL-SCNC: 107 MMOL/L (ref 100–108)
CO2 SERPL-SCNC: 24 MMOL/L (ref 21–32)
CREAT SERPL-MCNC: 0.88 MG/DL (ref 0.6–1.3)
EOSINOPHIL # BLD AUTO: 0.16 THOUSAND/ΜL (ref 0–0.61)
EOSINOPHIL NFR BLD AUTO: 1 % (ref 0–6)
ERYTHROCYTE [DISTWIDTH] IN BLOOD BY AUTOMATED COUNT: 16.7 % (ref 11.6–15.1)
GFR SERPL CREATININE-BSD FRML MDRD: 102 ML/MIN/1.73SQ M
GLUCOSE SERPL-MCNC: 102 MG/DL (ref 65–140)
HCT VFR BLD AUTO: 40.3 % (ref 36.5–49.3)
HGB BLD-MCNC: 13.7 G/DL (ref 12–17)
IMM GRANULOCYTES # BLD AUTO: 0.22 THOUSAND/UL (ref 0–0.2)
IMM GRANULOCYTES NFR BLD AUTO: 2 % (ref 0–2)
LYMPHOCYTES # BLD AUTO: 2.26 THOUSANDS/ΜL (ref 0.6–4.47)
LYMPHOCYTES NFR BLD AUTO: 15 % (ref 14–44)
MAGNESIUM SERPL-MCNC: 1.9 MG/DL (ref 1.6–2.6)
MCH RBC QN AUTO: 33.9 PG (ref 26.8–34.3)
MCHC RBC AUTO-ENTMCNC: 34 G/DL (ref 31.4–37.4)
MCV RBC AUTO: 100 FL (ref 82–98)
MONOCYTES # BLD AUTO: 1.35 THOUSAND/ΜL (ref 0.17–1.22)
MONOCYTES NFR BLD AUTO: 9 % (ref 4–12)
NEUTROPHILS # BLD AUTO: 11.02 THOUSANDS/ΜL (ref 1.85–7.62)
NEUTS SEG NFR BLD AUTO: 72 % (ref 43–75)
NRBC BLD AUTO-RTO: 0 /100 WBCS
PLATELET # BLD AUTO: 225 THOUSANDS/UL (ref 149–390)
PMV BLD AUTO: 11.9 FL (ref 8.9–12.7)
POTASSIUM SERPL-SCNC: 3.2 MMOL/L (ref 3.5–5.3)
PROT SERPL-MCNC: 7.4 G/DL (ref 6.4–8.2)
RBC # BLD AUTO: 4.04 MILLION/UL (ref 3.88–5.62)
SODIUM SERPL-SCNC: 141 MMOL/L (ref 136–145)
WBC # BLD AUTO: 15.09 THOUSAND/UL (ref 4.31–10.16)

## 2020-10-02 PROCEDURE — 85025 COMPLETE CBC W/AUTO DIFF WBC: CPT

## 2020-10-02 PROCEDURE — 83735 ASSAY OF MAGNESIUM: CPT

## 2020-10-02 PROCEDURE — 80053 COMPREHEN METABOLIC PANEL: CPT

## 2020-10-02 PROCEDURE — 82378 CARCINOEMBRYONIC ANTIGEN: CPT

## 2020-10-02 PROCEDURE — 36415 COLL VENOUS BLD VENIPUNCTURE: CPT

## 2020-10-05 ENCOUNTER — HOSPITAL ENCOUNTER (OUTPATIENT)
Dept: INFUSION CENTER | Facility: CLINIC | Age: 50
Discharge: HOME | End: 2020-10-05
Payer: COMMERCIAL

## 2020-10-05 VITALS
BODY MASS INDEX: 26.52 KG/M2 | SYSTOLIC BLOOD PRESSURE: 110 MMHG | TEMPERATURE: 96.5 F | OXYGEN SATURATION: 97 % | HEIGHT: 66 IN | WEIGHT: 165 LBS | HEART RATE: 78 BPM | RESPIRATION RATE: 16 BRPM | DIASTOLIC BLOOD PRESSURE: 68 MMHG

## 2020-10-05 DIAGNOSIS — C78.7 COLON CANCER METASTASIZED TO LIVER (HCC): Primary | ICD-10-CM

## 2020-10-05 DIAGNOSIS — C18.9 COLON CANCER METASTASIZED TO LIVER (HCC): Primary | ICD-10-CM

## 2020-10-05 PROCEDURE — 96417 CHEMO IV INFUS EACH ADDL SEQ: CPT

## 2020-10-05 PROCEDURE — G0498 CHEMO EXTEND IV INFUS W/PUMP: HCPCS

## 2020-10-05 PROCEDURE — 96413 CHEMO IV INFUSION 1 HR: CPT

## 2020-10-05 PROCEDURE — 96411 CHEMO IV PUSH ADDL DRUG: CPT

## 2020-10-05 PROCEDURE — 96367 TX/PROPH/DG ADDL SEQ IV INF: CPT

## 2020-10-05 PROCEDURE — 96368 THER/DIAG CONCURRENT INF: CPT

## 2020-10-05 PROCEDURE — 96415 CHEMO IV INFUSION ADDL HR: CPT

## 2020-10-05 PROCEDURE — 96372 THER/PROPH/DIAG INJ SC/IM: CPT

## 2020-10-05 RX ORDER — SODIUM CHLORIDE 9 MG/ML
20 INJECTION, SOLUTION INTRAVENOUS ONCE
Status: COMPLETED | OUTPATIENT
Start: 2020-10-05 | End: 2020-10-05

## 2020-10-05 RX ORDER — ATROPINE SULFATE 1 MG/ML
0.25 INJECTION, SOLUTION INTRAMUSCULAR; INTRAVENOUS; SUBCUTANEOUS ONCE
Status: COMPLETED | OUTPATIENT
Start: 2020-10-05 | End: 2020-10-05

## 2020-10-05 RX ORDER — FLUOROURACIL 50 MG/ML
400 INJECTION, SOLUTION INTRAVENOUS ONCE
Status: COMPLETED | OUTPATIENT
Start: 2020-10-05 | End: 2020-10-05

## 2020-10-05 RX ORDER — MAGNESIUM SULFATE HEPTAHYDRATE 40 MG/ML
2 INJECTION, SOLUTION INTRAVENOUS ONCE AS NEEDED
Status: DISCONTINUED | OUTPATIENT
Start: 2020-10-05 | End: 2020-10-08 | Stop reason: HOSPADM

## 2020-10-05 RX ADMIN — FLUOROURACIL 745 MG: 50 INJECTION, SOLUTION INTRAVENOUS at 13:40

## 2020-10-05 RX ADMIN — Medication 900 MG: at 10:36

## 2020-10-05 RX ADMIN — LEUCOVORIN CALCIUM 740 MG: 10 INJECTION INTRAMUSCULAR; INTRAVENOUS at 11:58

## 2020-10-05 RX ADMIN — ATROPINE SULFATE 0.25 MG: 1 INJECTION, SOLUTION INTRAMUSCULAR; INTRAVENOUS; SUBCUTANEOUS at 11:55

## 2020-10-05 RX ADMIN — DEXAMETHASONE SODIUM PHOSPHATE: 10 INJECTION, SOLUTION INTRAMUSCULAR; INTRAVENOUS at 09:43

## 2020-10-05 RX ADMIN — SODIUM CHLORIDE 20 ML/HR: 0.9 INJECTION, SOLUTION INTRAVENOUS at 08:29

## 2020-10-05 RX ADMIN — DIPHENHYDRAMINE HYDROCHLORIDE 25 MG: 50 INJECTION, SOLUTION INTRAMUSCULAR; INTRAVENOUS at 10:04

## 2020-10-05 RX ADMIN — IRINOTECAN HYDROCHLORIDE 340 MG: 20 INJECTION, SOLUTION INTRAVENOUS at 12:03

## 2020-10-05 RX ADMIN — MAGNESIUM SULFATE HEPTAHYDRATE 2 G: 40 INJECTION, SOLUTION INTRAVENOUS at 08:29

## 2020-10-07 ENCOUNTER — HOSPITAL ENCOUNTER (OUTPATIENT)
Dept: INFUSION CENTER | Facility: CLINIC | Age: 50
Discharge: HOME | End: 2020-10-07
Payer: COMMERCIAL

## 2020-10-07 ENCOUNTER — OFFICE VISIT (OUTPATIENT)
Dept: SURGICAL ONCOLOGY | Facility: CLINIC | Age: 50
End: 2020-10-07
Payer: COMMERCIAL

## 2020-10-07 VITALS — TEMPERATURE: 98 F

## 2020-10-07 VITALS
WEIGHT: 158 LBS | BODY MASS INDEX: 25.39 KG/M2 | RESPIRATION RATE: 16 BRPM | TEMPERATURE: 98.8 F | HEART RATE: 84 BPM | SYSTOLIC BLOOD PRESSURE: 126 MMHG | DIASTOLIC BLOOD PRESSURE: 82 MMHG | HEIGHT: 66 IN

## 2020-10-07 DIAGNOSIS — C18.9 COLON CANCER METASTASIZED TO LIVER (HCC): Primary | ICD-10-CM

## 2020-10-07 DIAGNOSIS — C78.7 COLON CANCER METASTASIZED TO LIVER (HCC): Primary | ICD-10-CM

## 2020-10-07 PROCEDURE — 99214 OFFICE O/P EST MOD 30 MIN: CPT | Performed by: SURGERY

## 2020-10-07 PROCEDURE — 96372 THER/PROPH/DIAG INJ SC/IM: CPT

## 2020-10-07 RX ADMIN — PEGFILGRASTIM 6 MG: KIT SUBCUTANEOUS at 12:14

## 2020-10-08 ENCOUNTER — HOSPITAL ENCOUNTER (OUTPATIENT)
Dept: CT IMAGING | Facility: HOSPITAL | Age: 50
Discharge: HOME | End: 2020-10-08
Attending: INTERNAL MEDICINE
Payer: COMMERCIAL

## 2020-10-08 DIAGNOSIS — C78.7 COLON CANCER METASTASIZED TO LIVER (HCC): ICD-10-CM

## 2020-10-08 DIAGNOSIS — C18.9 COLON CANCER METASTASIZED TO LIVER (HCC): ICD-10-CM

## 2020-10-08 PROCEDURE — G1004 CDSM NDSC: HCPCS

## 2020-10-08 PROCEDURE — 71260 CT THORAX DX C+: CPT

## 2020-10-08 RX ADMIN — IOHEXOL 85 ML: 350 INJECTION, SOLUTION INTRAVENOUS at 16:58

## 2020-10-13 ENCOUNTER — OFFICE VISIT (OUTPATIENT)
Dept: HEMATOLOGY ONCOLOGY | Facility: CLINIC | Age: 50
End: 2020-10-13
Payer: COMMERCIAL

## 2020-10-13 VITALS
OXYGEN SATURATION: 99 % | WEIGHT: 165 LBS | TEMPERATURE: 97.8 F | DIASTOLIC BLOOD PRESSURE: 82 MMHG | SYSTOLIC BLOOD PRESSURE: 132 MMHG | RESPIRATION RATE: 16 BRPM | HEART RATE: 82 BPM | BODY MASS INDEX: 26.52 KG/M2 | HEIGHT: 66 IN

## 2020-10-13 DIAGNOSIS — C78.7 COLON CANCER METASTASIZED TO LIVER (HCC): Primary | ICD-10-CM

## 2020-10-13 DIAGNOSIS — C18.9 COLON CANCER METASTASIZED TO LIVER (HCC): Primary | ICD-10-CM

## 2020-10-13 PROCEDURE — 1036F TOBACCO NON-USER: CPT | Performed by: NURSE PRACTITIONER

## 2020-10-13 PROCEDURE — 99214 OFFICE O/P EST MOD 30 MIN: CPT | Performed by: NURSE PRACTITIONER

## 2020-10-13 RX ORDER — SODIUM CHLORIDE 9 MG/ML
20 INJECTION, SOLUTION INTRAVENOUS ONCE
Status: CANCELLED | OUTPATIENT
Start: 2020-11-16

## 2020-10-13 RX ORDER — SODIUM CHLORIDE 9 MG/ML
20 INJECTION, SOLUTION INTRAVENOUS ONCE
Status: CANCELLED | OUTPATIENT
Start: 2020-10-19

## 2020-10-13 RX ORDER — MAGNESIUM SULFATE HEPTAHYDRATE 40 MG/ML
4 INJECTION, SOLUTION INTRAVENOUS ONCE AS NEEDED
Status: CANCELLED | OUTPATIENT
Start: 2020-12-14

## 2020-10-13 RX ORDER — MAGNESIUM SULFATE HEPTAHYDRATE 40 MG/ML
2 INJECTION, SOLUTION INTRAVENOUS ONCE AS NEEDED
Status: CANCELLED | OUTPATIENT
Start: 2020-12-14

## 2020-10-13 RX ORDER — MAGNESIUM SULFATE HEPTAHYDRATE 40 MG/ML
4 INJECTION, SOLUTION INTRAVENOUS ONCE AS NEEDED
Status: CANCELLED | OUTPATIENT
Start: 2020-10-19

## 2020-10-13 RX ORDER — MAGNESIUM SULFATE HEPTAHYDRATE 40 MG/ML
4 INJECTION, SOLUTION INTRAVENOUS ONCE AS NEEDED
Status: CANCELLED | OUTPATIENT
Start: 2020-12-28

## 2020-10-13 RX ORDER — MAGNESIUM SULFATE HEPTAHYDRATE 40 MG/ML
2 INJECTION, SOLUTION INTRAVENOUS ONCE AS NEEDED
Status: CANCELLED | OUTPATIENT
Start: 2020-11-30

## 2020-10-13 RX ORDER — MAGNESIUM SULFATE HEPTAHYDRATE 40 MG/ML
2 INJECTION, SOLUTION INTRAVENOUS ONCE AS NEEDED
Status: CANCELLED | OUTPATIENT
Start: 2020-11-16

## 2020-10-13 RX ORDER — MAGNESIUM SULFATE HEPTAHYDRATE 40 MG/ML
2 INJECTION, SOLUTION INTRAVENOUS ONCE AS NEEDED
Status: CANCELLED | OUTPATIENT
Start: 2020-10-19

## 2020-10-13 RX ORDER — MAGNESIUM SULFATE HEPTAHYDRATE 40 MG/ML
4 INJECTION, SOLUTION INTRAVENOUS ONCE AS NEEDED
Status: CANCELLED | OUTPATIENT
Start: 2020-11-30

## 2020-10-13 RX ORDER — SODIUM CHLORIDE 9 MG/ML
20 INJECTION, SOLUTION INTRAVENOUS ONCE
Status: CANCELLED | OUTPATIENT
Start: 2020-11-30

## 2020-10-13 RX ORDER — SODIUM CHLORIDE 9 MG/ML
20 INJECTION, SOLUTION INTRAVENOUS ONCE
Status: CANCELLED | OUTPATIENT
Start: 2020-12-28

## 2020-10-13 RX ORDER — MAGNESIUM SULFATE HEPTAHYDRATE 40 MG/ML
2 INJECTION, SOLUTION INTRAVENOUS ONCE AS NEEDED
Status: CANCELLED | OUTPATIENT
Start: 2020-11-02

## 2020-10-13 RX ORDER — MAGNESIUM SULFATE HEPTAHYDRATE 40 MG/ML
4 INJECTION, SOLUTION INTRAVENOUS ONCE AS NEEDED
Status: CANCELLED | OUTPATIENT
Start: 2020-11-16

## 2020-10-13 RX ORDER — SODIUM CHLORIDE 9 MG/ML
20 INJECTION, SOLUTION INTRAVENOUS ONCE
Status: CANCELLED | OUTPATIENT
Start: 2020-11-02

## 2020-10-13 RX ORDER — SODIUM CHLORIDE 9 MG/ML
20 INJECTION, SOLUTION INTRAVENOUS ONCE
Status: CANCELLED | OUTPATIENT
Start: 2020-12-14

## 2020-10-13 RX ORDER — MAGNESIUM SULFATE HEPTAHYDRATE 40 MG/ML
4 INJECTION, SOLUTION INTRAVENOUS ONCE AS NEEDED
Status: CANCELLED | OUTPATIENT
Start: 2020-11-02

## 2020-10-13 RX ORDER — MAGNESIUM SULFATE HEPTAHYDRATE 40 MG/ML
2 INJECTION, SOLUTION INTRAVENOUS ONCE AS NEEDED
Status: CANCELLED | OUTPATIENT
Start: 2020-12-28

## 2020-10-17 ENCOUNTER — LAB (OUTPATIENT)
Dept: LAB | Facility: CLINIC | Age: 50
End: 2020-10-17
Payer: COMMERCIAL

## 2020-10-17 DIAGNOSIS — C18.9 COLON CANCER METASTASIZED TO LIVER (HCC): ICD-10-CM

## 2020-10-17 DIAGNOSIS — C78.7 COLON CANCER METASTASIZED TO LIVER (HCC): ICD-10-CM

## 2020-10-17 LAB
ALBUMIN SERPL BCP-MCNC: 2.5 G/DL (ref 3.5–5)
ALP SERPL-CCNC: 158 U/L (ref 46–116)
ALT SERPL W P-5'-P-CCNC: 49 U/L (ref 12–78)
ANION GAP SERPL CALCULATED.3IONS-SCNC: 11 MMOL/L (ref 4–13)
AST SERPL W P-5'-P-CCNC: 21 U/L (ref 5–45)
BASOPHILS # BLD AUTO: 0.04 THOUSANDS/ΜL (ref 0–0.1)
BASOPHILS NFR BLD AUTO: 0 % (ref 0–1)
BILIRUB SERPL-MCNC: 0.32 MG/DL (ref 0.2–1)
BUN SERPL-MCNC: 1 MG/DL (ref 5–25)
CALCIUM ALBUM COR SERPL-MCNC: 10 MG/DL (ref 8.3–10.1)
CALCIUM SERPL-MCNC: 8.8 MG/DL (ref 8.3–10.1)
CHLORIDE SERPL-SCNC: 106 MMOL/L (ref 100–108)
CO2 SERPL-SCNC: 24 MMOL/L (ref 21–32)
CREAT SERPL-MCNC: 0.98 MG/DL (ref 0.6–1.3)
EOSINOPHIL # BLD AUTO: 0.12 THOUSAND/ΜL (ref 0–0.61)
EOSINOPHIL NFR BLD AUTO: 1 % (ref 0–6)
ERYTHROCYTE [DISTWIDTH] IN BLOOD BY AUTOMATED COUNT: 15.9 % (ref 11.6–15.1)
GFR SERPL CREATININE-BSD FRML MDRD: 91 ML/MIN/1.73SQ M
GLUCOSE SERPL-MCNC: 111 MG/DL (ref 65–140)
HCT VFR BLD AUTO: 39.7 % (ref 36.5–49.3)
HGB BLD-MCNC: 13.8 G/DL (ref 12–17)
IMM GRANULOCYTES # BLD AUTO: 0.08 THOUSAND/UL (ref 0–0.2)
IMM GRANULOCYTES NFR BLD AUTO: 1 % (ref 0–2)
LYMPHOCYTES # BLD AUTO: 1.61 THOUSANDS/ΜL (ref 0.6–4.47)
LYMPHOCYTES NFR BLD AUTO: 16 % (ref 14–44)
MAGNESIUM SERPL-MCNC: 1.7 MG/DL (ref 1.6–2.6)
MCH RBC QN AUTO: 34.4 PG (ref 26.8–34.3)
MCHC RBC AUTO-ENTMCNC: 34.8 G/DL (ref 31.4–37.4)
MCV RBC AUTO: 99 FL (ref 82–98)
MONOCYTES # BLD AUTO: 0.76 THOUSAND/ΜL (ref 0.17–1.22)
MONOCYTES NFR BLD AUTO: 8 % (ref 4–12)
NEUTROPHILS # BLD AUTO: 7.23 THOUSANDS/ΜL (ref 1.85–7.62)
NEUTS SEG NFR BLD AUTO: 74 % (ref 43–75)
NRBC BLD AUTO-RTO: 0 /100 WBCS
PLATELET # BLD AUTO: 201 THOUSANDS/UL (ref 149–390)
PMV BLD AUTO: 10.6 FL (ref 8.9–12.7)
POTASSIUM SERPL-SCNC: 3.4 MMOL/L (ref 3.5–5.3)
PROT SERPL-MCNC: 7.8 G/DL (ref 6.4–8.2)
RBC # BLD AUTO: 4.01 MILLION/UL (ref 3.88–5.62)
SODIUM SERPL-SCNC: 141 MMOL/L (ref 136–145)
WBC # BLD AUTO: 9.84 THOUSAND/UL (ref 4.31–10.16)

## 2020-10-17 PROCEDURE — 36415 COLL VENOUS BLD VENIPUNCTURE: CPT

## 2020-10-17 PROCEDURE — 83735 ASSAY OF MAGNESIUM: CPT

## 2020-10-17 PROCEDURE — 80053 COMPREHEN METABOLIC PANEL: CPT

## 2020-10-17 PROCEDURE — 85025 COMPLETE CBC W/AUTO DIFF WBC: CPT

## 2020-10-19 ENCOUNTER — HOSPITAL ENCOUNTER (OUTPATIENT)
Dept: INFUSION CENTER | Facility: HOSPITAL | Age: 50
Discharge: HOME | End: 2020-10-19
Attending: INTERNAL MEDICINE
Payer: COMMERCIAL

## 2020-10-19 VITALS
HEIGHT: 66 IN | DIASTOLIC BLOOD PRESSURE: 77 MMHG | HEART RATE: 87 BPM | SYSTOLIC BLOOD PRESSURE: 128 MMHG | BODY MASS INDEX: 26.18 KG/M2 | TEMPERATURE: 97.6 F | WEIGHT: 162.92 LBS | RESPIRATION RATE: 18 BRPM

## 2020-10-19 DIAGNOSIS — C18.9 COLON CANCER METASTASIZED TO LIVER (HCC): Primary | ICD-10-CM

## 2020-10-19 DIAGNOSIS — C78.7 COLON CANCER METASTASIZED TO LIVER (HCC): Primary | ICD-10-CM

## 2020-10-19 PROCEDURE — 96413 CHEMO IV INFUSION 1 HR: CPT

## 2020-10-19 PROCEDURE — 96367 TX/PROPH/DG ADDL SEQ IV INF: CPT

## 2020-10-19 RX ORDER — MAGNESIUM SULFATE HEPTAHYDRATE 40 MG/ML
2 INJECTION, SOLUTION INTRAVENOUS ONCE AS NEEDED
Status: DISCONTINUED | OUTPATIENT
Start: 2020-10-19 | End: 2020-10-22 | Stop reason: HOSPADM

## 2020-10-19 RX ORDER — SODIUM CHLORIDE 9 MG/ML
20 INJECTION, SOLUTION INTRAVENOUS ONCE
Status: COMPLETED | OUTPATIENT
Start: 2020-10-19 | End: 2020-10-19

## 2020-10-19 RX ADMIN — DIPHENHYDRAMINE HYDROCHLORIDE 25 MG: 50 INJECTION, SOLUTION INTRAMUSCULAR; INTRAVENOUS at 10:28

## 2020-10-19 RX ADMIN — SODIUM CHLORIDE 20 ML/HR: 0.9 INJECTION, SOLUTION INTRAVENOUS at 09:25

## 2020-10-19 RX ADMIN — ONDANSETRON: 2 SOLUTION INTRAMUSCULAR; INTRAVENOUS at 10:53

## 2020-10-19 RX ADMIN — MAGNESIUM SULFATE IN WATER 2 G: 40 INJECTION, SOLUTION INTRAVENOUS at 09:25

## 2020-10-19 RX ADMIN — Medication 900 MG: at 11:26

## 2020-10-20 ENCOUNTER — PATIENT OUTREACH (OUTPATIENT)
Dept: CASE MANAGEMENT | Facility: HOSPITAL | Age: 50
End: 2020-10-20

## 2020-10-30 ENCOUNTER — LAB (OUTPATIENT)
Dept: LAB | Facility: CLINIC | Age: 50
End: 2020-10-30
Payer: COMMERCIAL

## 2020-10-30 LAB
ALBUMIN SERPL BCP-MCNC: 3.7 G/DL (ref 3.5–5)
ALP SERPL-CCNC: 100 U/L (ref 46–116)
ALT SERPL W P-5'-P-CCNC: 49 U/L (ref 12–78)
ANION GAP SERPL CALCULATED.3IONS-SCNC: 12 MMOL/L (ref 4–13)
AST SERPL W P-5'-P-CCNC: 24 U/L (ref 5–45)
BASOPHILS # BLD AUTO: 0.05 THOUSANDS/ΜL (ref 0–0.1)
BASOPHILS NFR BLD AUTO: 1 % (ref 0–1)
BILIRUB SERPL-MCNC: 0.37 MG/DL (ref 0.2–1)
BUN SERPL-MCNC: 10 MG/DL (ref 5–25)
CALCIUM SERPL-MCNC: 9.1 MG/DL (ref 8.3–10.1)
CHLORIDE SERPL-SCNC: 103 MMOL/L (ref 100–108)
CO2 SERPL-SCNC: 25 MMOL/L (ref 21–32)
CREAT SERPL-MCNC: 0.87 MG/DL (ref 0.6–1.3)
EOSINOPHIL # BLD AUTO: 0.18 THOUSAND/ΜL (ref 0–0.61)
EOSINOPHIL NFR BLD AUTO: 2 % (ref 0–6)
ERYTHROCYTE [DISTWIDTH] IN BLOOD BY AUTOMATED COUNT: 14.7 % (ref 11.6–15.1)
GFR SERPL CREATININE-BSD FRML MDRD: 102 ML/MIN/1.73SQ M
GLUCOSE SERPL-MCNC: 119 MG/DL (ref 65–140)
HCT VFR BLD AUTO: 43.5 % (ref 36.5–49.3)
HGB BLD-MCNC: 14.9 G/DL (ref 12–17)
IMM GRANULOCYTES # BLD AUTO: 0.04 THOUSAND/UL (ref 0–0.2)
IMM GRANULOCYTES NFR BLD AUTO: 1 % (ref 0–2)
LYMPHOCYTES # BLD AUTO: 1.4 THOUSANDS/ΜL (ref 0.6–4.47)
LYMPHOCYTES NFR BLD AUTO: 18 % (ref 14–44)
MAGNESIUM SERPL-MCNC: 1.9 MG/DL (ref 1.6–2.6)
MCH RBC QN AUTO: 34.6 PG (ref 26.8–34.3)
MCHC RBC AUTO-ENTMCNC: 34.3 G/DL (ref 31.4–37.4)
MCV RBC AUTO: 101 FL (ref 82–98)
MONOCYTES # BLD AUTO: 0.77 THOUSAND/ΜL (ref 0.17–1.22)
MONOCYTES NFR BLD AUTO: 10 % (ref 4–12)
NEUTROPHILS # BLD AUTO: 5.18 THOUSANDS/ΜL (ref 1.85–7.62)
NEUTS SEG NFR BLD AUTO: 68 % (ref 43–75)
NRBC BLD AUTO-RTO: 0 /100 WBCS
PLATELET # BLD AUTO: 240 THOUSANDS/UL (ref 149–390)
PMV BLD AUTO: 11.5 FL (ref 8.9–12.7)
POTASSIUM SERPL-SCNC: 3.3 MMOL/L (ref 3.5–5.3)
PROT SERPL-MCNC: 8 G/DL (ref 6.4–8.2)
RBC # BLD AUTO: 4.31 MILLION/UL (ref 3.88–5.62)
SODIUM SERPL-SCNC: 140 MMOL/L (ref 136–145)
WBC # BLD AUTO: 7.62 THOUSAND/UL (ref 4.31–10.16)

## 2020-10-30 PROCEDURE — 80053 COMPREHEN METABOLIC PANEL: CPT

## 2020-10-30 PROCEDURE — 83735 ASSAY OF MAGNESIUM: CPT

## 2020-10-30 PROCEDURE — 85025 COMPLETE CBC W/AUTO DIFF WBC: CPT

## 2020-10-30 PROCEDURE — 36415 COLL VENOUS BLD VENIPUNCTURE: CPT

## 2020-11-02 ENCOUNTER — HOSPITAL ENCOUNTER (OUTPATIENT)
Dept: INFUSION CENTER | Facility: CLINIC | Age: 50
Discharge: HOME | End: 2020-11-02
Payer: COMMERCIAL

## 2020-11-02 VITALS
RESPIRATION RATE: 18 BRPM | TEMPERATURE: 96.8 F | WEIGHT: 168.5 LBS | BODY MASS INDEX: 27.08 KG/M2 | OXYGEN SATURATION: 97 % | SYSTOLIC BLOOD PRESSURE: 110 MMHG | HEIGHT: 66 IN | HEART RATE: 81 BPM | DIASTOLIC BLOOD PRESSURE: 78 MMHG

## 2020-11-02 DIAGNOSIS — C78.7 COLON CANCER METASTASIZED TO LIVER (HCC): Primary | ICD-10-CM

## 2020-11-02 DIAGNOSIS — C18.9 COLON CANCER METASTASIZED TO LIVER (HCC): Primary | ICD-10-CM

## 2020-11-02 PROCEDURE — 96368 THER/DIAG CONCURRENT INF: CPT

## 2020-11-02 PROCEDURE — 96413 CHEMO IV INFUSION 1 HR: CPT

## 2020-11-02 PROCEDURE — 96367 TX/PROPH/DG ADDL SEQ IV INF: CPT

## 2020-11-02 RX ORDER — MAGNESIUM SULFATE HEPTAHYDRATE 40 MG/ML
2 INJECTION, SOLUTION INTRAVENOUS ONCE AS NEEDED
Status: DISCONTINUED | OUTPATIENT
Start: 2020-11-02 | End: 2020-11-05 | Stop reason: HOSPADM

## 2020-11-02 RX ORDER — MAGNESIUM SULFATE HEPTAHYDRATE 40 MG/ML
4 INJECTION, SOLUTION INTRAVENOUS ONCE AS NEEDED
Status: DISCONTINUED | OUTPATIENT
Start: 2020-11-02 | End: 2020-11-03 | Stop reason: CLARIF

## 2020-11-02 RX ORDER — SODIUM CHLORIDE 9 MG/ML
20 INJECTION, SOLUTION INTRAVENOUS ONCE
Status: COMPLETED | OUTPATIENT
Start: 2020-11-02 | End: 2020-11-02

## 2020-11-02 RX ADMIN — DIPHENHYDRAMINE HYDROCHLORIDE 25 MG: 50 INJECTION, SOLUTION INTRAMUSCULAR; INTRAVENOUS at 14:03

## 2020-11-02 RX ADMIN — SODIUM CHLORIDE 20 ML/HR: 0.9 INJECTION, SOLUTION INTRAVENOUS at 13:55

## 2020-11-02 RX ADMIN — MAGNESIUM SULFATE HEPTAHYDRATE 2 G: 40 INJECTION, SOLUTION INTRAVENOUS at 13:58

## 2020-11-02 RX ADMIN — Medication 900 MG: at 15:10

## 2020-11-03 ENCOUNTER — TELEPHONE (OUTPATIENT)
Dept: HEMATOLOGY ONCOLOGY | Facility: CLINIC | Age: 50
End: 2020-11-03

## 2020-11-10 ENCOUNTER — OFFICE VISIT (OUTPATIENT)
Dept: HEMATOLOGY ONCOLOGY | Facility: CLINIC | Age: 50
End: 2020-11-10
Payer: COMMERCIAL

## 2020-11-10 VITALS
WEIGHT: 171 LBS | SYSTOLIC BLOOD PRESSURE: 142 MMHG | TEMPERATURE: 99 F | HEIGHT: 66 IN | OXYGEN SATURATION: 98 % | HEART RATE: 82 BPM | RESPIRATION RATE: 16 BRPM | BODY MASS INDEX: 27.48 KG/M2 | DIASTOLIC BLOOD PRESSURE: 82 MMHG

## 2020-11-10 DIAGNOSIS — C78.7 COLON CANCER METASTASIZED TO LIVER (HCC): Primary | ICD-10-CM

## 2020-11-10 DIAGNOSIS — C18.9 COLON CANCER METASTASIZED TO LIVER (HCC): Primary | ICD-10-CM

## 2020-11-10 PROCEDURE — 99214 OFFICE O/P EST MOD 30 MIN: CPT | Performed by: NURSE PRACTITIONER

## 2020-11-10 PROCEDURE — 3008F BODY MASS INDEX DOCD: CPT | Performed by: NURSE PRACTITIONER

## 2020-11-13 ENCOUNTER — LAB (OUTPATIENT)
Dept: LAB | Facility: CLINIC | Age: 50
End: 2020-11-13
Payer: COMMERCIAL

## 2020-11-13 DIAGNOSIS — C78.7 COLON CANCER METASTASIZED TO LIVER (HCC): ICD-10-CM

## 2020-11-13 DIAGNOSIS — C18.9 COLON CANCER METASTASIZED TO LIVER (HCC): ICD-10-CM

## 2020-11-13 LAB
ALBUMIN SERPL BCP-MCNC: 3.7 G/DL (ref 3.5–5)
ALP SERPL-CCNC: 74 U/L (ref 46–116)
ALT SERPL W P-5'-P-CCNC: 29 U/L (ref 12–78)
ANION GAP SERPL CALCULATED.3IONS-SCNC: 12 MMOL/L (ref 4–13)
AST SERPL W P-5'-P-CCNC: 18 U/L (ref 5–45)
BASOPHILS # BLD AUTO: 0.03 THOUSANDS/ΜL (ref 0–0.1)
BASOPHILS NFR BLD AUTO: 0 % (ref 0–1)
BILIRUB SERPL-MCNC: 0.27 MG/DL (ref 0.2–1)
BUN SERPL-MCNC: 8 MG/DL (ref 5–25)
CALCIUM SERPL-MCNC: 8.5 MG/DL (ref 8.3–10.1)
CHLORIDE SERPL-SCNC: 106 MMOL/L (ref 100–108)
CO2 SERPL-SCNC: 23 MMOL/L (ref 21–32)
CREAT SERPL-MCNC: 0.82 MG/DL (ref 0.6–1.3)
EOSINOPHIL # BLD AUTO: 0.13 THOUSAND/ΜL (ref 0–0.61)
EOSINOPHIL NFR BLD AUTO: 2 % (ref 0–6)
ERYTHROCYTE [DISTWIDTH] IN BLOOD BY AUTOMATED COUNT: 12.5 % (ref 11.6–15.1)
GFR SERPL CREATININE-BSD FRML MDRD: 105 ML/MIN/1.73SQ M
GLUCOSE SERPL-MCNC: 102 MG/DL (ref 65–140)
HCT VFR BLD AUTO: 39.7 % (ref 36.5–49.3)
HGB BLD-MCNC: 13.4 G/DL (ref 12–17)
IMM GRANULOCYTES # BLD AUTO: 0.01 THOUSAND/UL (ref 0–0.2)
IMM GRANULOCYTES NFR BLD AUTO: 0 % (ref 0–2)
LYMPHOCYTES # BLD AUTO: 1.88 THOUSANDS/ΜL (ref 0.6–4.47)
LYMPHOCYTES NFR BLD AUTO: 25 % (ref 14–44)
MAGNESIUM SERPL-MCNC: 1.9 MG/DL (ref 1.6–2.6)
MCH RBC QN AUTO: 34 PG (ref 26.8–34.3)
MCHC RBC AUTO-ENTMCNC: 33.8 G/DL (ref 31.4–37.4)
MCV RBC AUTO: 101 FL (ref 82–98)
MONOCYTES # BLD AUTO: 0.6 THOUSAND/ΜL (ref 0.17–1.22)
MONOCYTES NFR BLD AUTO: 8 % (ref 4–12)
NEUTROPHILS # BLD AUTO: 4.95 THOUSANDS/ΜL (ref 1.85–7.62)
NEUTS SEG NFR BLD AUTO: 65 % (ref 43–75)
NRBC BLD AUTO-RTO: 0 /100 WBCS
PLATELET # BLD AUTO: 224 THOUSANDS/UL (ref 149–390)
PMV BLD AUTO: 11.2 FL (ref 8.9–12.7)
POTASSIUM SERPL-SCNC: 3.4 MMOL/L (ref 3.5–5.3)
PROT SERPL-MCNC: 7.6 G/DL (ref 6.4–8.2)
RBC # BLD AUTO: 3.94 MILLION/UL (ref 3.88–5.62)
SODIUM SERPL-SCNC: 141 MMOL/L (ref 136–145)
WBC # BLD AUTO: 7.6 THOUSAND/UL (ref 4.31–10.16)

## 2020-11-13 PROCEDURE — 83735 ASSAY OF MAGNESIUM: CPT

## 2020-11-13 PROCEDURE — 85025 COMPLETE CBC W/AUTO DIFF WBC: CPT

## 2020-11-13 PROCEDURE — 80053 COMPREHEN METABOLIC PANEL: CPT

## 2020-11-13 PROCEDURE — 36415 COLL VENOUS BLD VENIPUNCTURE: CPT

## 2020-11-16 ENCOUNTER — HOSPITAL ENCOUNTER (OUTPATIENT)
Dept: INFUSION CENTER | Facility: CLINIC | Age: 50
Discharge: HOME | End: 2020-11-16
Payer: COMMERCIAL

## 2020-11-16 VITALS
HEART RATE: 72 BPM | RESPIRATION RATE: 16 BRPM | OXYGEN SATURATION: 99 % | DIASTOLIC BLOOD PRESSURE: 72 MMHG | HEIGHT: 66 IN | SYSTOLIC BLOOD PRESSURE: 108 MMHG | BODY MASS INDEX: 27.48 KG/M2 | WEIGHT: 171 LBS | TEMPERATURE: 98.7 F

## 2020-11-16 DIAGNOSIS — C18.9 COLON CANCER METASTASIZED TO LIVER (HCC): Primary | ICD-10-CM

## 2020-11-16 DIAGNOSIS — C78.7 COLON CANCER METASTASIZED TO LIVER (HCC): Primary | ICD-10-CM

## 2020-11-16 PROCEDURE — 96367 TX/PROPH/DG ADDL SEQ IV INF: CPT

## 2020-11-16 PROCEDURE — 96413 CHEMO IV INFUSION 1 HR: CPT

## 2020-11-16 RX ORDER — MAGNESIUM SULFATE HEPTAHYDRATE 40 MG/ML
2 INJECTION, SOLUTION INTRAVENOUS ONCE AS NEEDED
Status: DISCONTINUED | OUTPATIENT
Start: 2020-11-16 | End: 2020-11-19 | Stop reason: HOSPADM

## 2020-11-16 RX ORDER — SODIUM CHLORIDE 9 MG/ML
20 INJECTION, SOLUTION INTRAVENOUS ONCE
Status: COMPLETED | OUTPATIENT
Start: 2020-11-16 | End: 2020-11-16

## 2020-11-16 RX ADMIN — DIPHENHYDRAMINE HYDROCHLORIDE 25 MG: 50 INJECTION, SOLUTION INTRAMUSCULAR; INTRAVENOUS at 13:30

## 2020-11-16 RX ADMIN — MAGNESIUM SULFATE HEPTAHYDRATE 2 G: 40 INJECTION, SOLUTION INTRAVENOUS at 12:15

## 2020-11-16 RX ADMIN — Medication 900 MG: at 14:01

## 2020-11-16 RX ADMIN — SODIUM CHLORIDE 20 ML/HR: 0.9 INJECTION, SOLUTION INTRAVENOUS at 12:05

## 2020-11-18 ENCOUNTER — TELEPHONE (OUTPATIENT)
Dept: HEMATOLOGY ONCOLOGY | Facility: CLINIC | Age: 50
End: 2020-11-18

## 2020-11-25 ENCOUNTER — APPOINTMENT (OUTPATIENT)
Dept: LAB | Facility: CLINIC | Age: 50
End: 2020-11-25
Payer: COMMERCIAL

## 2020-11-25 DIAGNOSIS — C18.9 COLON CANCER METASTASIZED TO LIVER (HCC): ICD-10-CM

## 2020-11-25 DIAGNOSIS — C78.7 COLON CANCER METASTASIZED TO LIVER (HCC): ICD-10-CM

## 2020-11-25 LAB
ALBUMIN SERPL BCP-MCNC: 3.7 G/DL (ref 3.5–5)
ALP SERPL-CCNC: 70 U/L (ref 46–116)
ALT SERPL W P-5'-P-CCNC: 30 U/L (ref 12–78)
ANION GAP SERPL CALCULATED.3IONS-SCNC: 11 MMOL/L (ref 4–13)
AST SERPL W P-5'-P-CCNC: 23 U/L (ref 5–45)
BASOPHILS # BLD AUTO: 0.03 THOUSANDS/ΜL (ref 0–0.1)
BASOPHILS NFR BLD AUTO: 0 % (ref 0–1)
BILIRUB SERPL-MCNC: 0.41 MG/DL (ref 0.2–1)
BUN SERPL-MCNC: 8 MG/DL (ref 5–25)
CALCIUM SERPL-MCNC: 8.7 MG/DL (ref 8.3–10.1)
CHLORIDE SERPL-SCNC: 107 MMOL/L (ref 100–108)
CO2 SERPL-SCNC: 24 MMOL/L (ref 21–32)
CREAT SERPL-MCNC: 0.81 MG/DL (ref 0.6–1.3)
EOSINOPHIL # BLD AUTO: 0.09 THOUSAND/ΜL (ref 0–0.61)
EOSINOPHIL NFR BLD AUTO: 1 % (ref 0–6)
ERYTHROCYTE [DISTWIDTH] IN BLOOD BY AUTOMATED COUNT: 11.9 % (ref 11.6–15.1)
GFR SERPL CREATININE-BSD FRML MDRD: 105 ML/MIN/1.73SQ M
GLUCOSE SERPL-MCNC: 83 MG/DL (ref 65–140)
HCT VFR BLD AUTO: 40.7 % (ref 36.5–49.3)
HGB BLD-MCNC: 13.8 G/DL (ref 12–17)
IMM GRANULOCYTES # BLD AUTO: 0.01 THOUSAND/UL (ref 0–0.2)
IMM GRANULOCYTES NFR BLD AUTO: 0 % (ref 0–2)
LYMPHOCYTES # BLD AUTO: 2.35 THOUSANDS/ΜL (ref 0.6–4.47)
LYMPHOCYTES NFR BLD AUTO: 33 % (ref 14–44)
MAGNESIUM SERPL-MCNC: 1.6 MG/DL (ref 1.6–2.6)
MCH RBC QN AUTO: 33.8 PG (ref 26.8–34.3)
MCHC RBC AUTO-ENTMCNC: 33.9 G/DL (ref 31.4–37.4)
MCV RBC AUTO: 100 FL (ref 82–98)
MONOCYTES # BLD AUTO: 0.44 THOUSAND/ΜL (ref 0.17–1.22)
MONOCYTES NFR BLD AUTO: 6 % (ref 4–12)
NEUTROPHILS # BLD AUTO: 4.18 THOUSANDS/ΜL (ref 1.85–7.62)
NEUTS SEG NFR BLD AUTO: 60 % (ref 43–75)
NRBC BLD AUTO-RTO: 0 /100 WBCS
PLATELET # BLD AUTO: 191 THOUSANDS/UL (ref 149–390)
PMV BLD AUTO: 10.5 FL (ref 8.9–12.7)
POTASSIUM SERPL-SCNC: 3.7 MMOL/L (ref 3.5–5.3)
PROT SERPL-MCNC: 7.7 G/DL (ref 6.4–8.2)
RBC # BLD AUTO: 4.08 MILLION/UL (ref 3.88–5.62)
SODIUM SERPL-SCNC: 142 MMOL/L (ref 136–145)
WBC # BLD AUTO: 7.1 THOUSAND/UL (ref 4.31–10.16)

## 2020-11-25 PROCEDURE — 85025 COMPLETE CBC W/AUTO DIFF WBC: CPT

## 2020-11-25 PROCEDURE — 83735 ASSAY OF MAGNESIUM: CPT

## 2020-11-25 PROCEDURE — 36415 COLL VENOUS BLD VENIPUNCTURE: CPT

## 2020-11-25 PROCEDURE — 80053 COMPREHEN METABOLIC PANEL: CPT

## 2020-11-30 ENCOUNTER — HOSPITAL ENCOUNTER (OUTPATIENT)
Dept: INFUSION CENTER | Facility: CLINIC | Age: 50
Discharge: HOME | End: 2020-11-30
Payer: COMMERCIAL

## 2020-11-30 VITALS
DIASTOLIC BLOOD PRESSURE: 72 MMHG | SYSTOLIC BLOOD PRESSURE: 128 MMHG | RESPIRATION RATE: 18 BRPM | HEART RATE: 74 BPM | HEIGHT: 66 IN | BODY MASS INDEX: 27.72 KG/M2 | TEMPERATURE: 97.6 F | OXYGEN SATURATION: 97 % | WEIGHT: 172.5 LBS

## 2020-11-30 DIAGNOSIS — C18.9 COLON CANCER METASTASIZED TO LIVER (HCC): Primary | ICD-10-CM

## 2020-11-30 DIAGNOSIS — C78.7 COLON CANCER METASTASIZED TO LIVER (HCC): Primary | ICD-10-CM

## 2020-11-30 PROCEDURE — 96367 TX/PROPH/DG ADDL SEQ IV INF: CPT

## 2020-11-30 PROCEDURE — 96413 CHEMO IV INFUSION 1 HR: CPT

## 2020-11-30 RX ORDER — MAGNESIUM SULFATE HEPTAHYDRATE 40 MG/ML
4 INJECTION, SOLUTION INTRAVENOUS ONCE AS NEEDED
Status: DISCONTINUED | OUTPATIENT
Start: 2020-11-30 | End: 2020-12-03 | Stop reason: HOSPADM

## 2020-11-30 RX ORDER — MAGNESIUM SULFATE HEPTAHYDRATE 40 MG/ML
2 INJECTION, SOLUTION INTRAVENOUS ONCE AS NEEDED
Status: DISCONTINUED | OUTPATIENT
Start: 2020-11-30 | End: 2020-12-03 | Stop reason: HOSPADM

## 2020-11-30 RX ORDER — SODIUM CHLORIDE 9 MG/ML
20 INJECTION, SOLUTION INTRAVENOUS ONCE
Status: COMPLETED | OUTPATIENT
Start: 2020-11-30 | End: 2020-11-30

## 2020-11-30 RX ADMIN — SODIUM CHLORIDE 20 ML/HR: 0.9 INJECTION, SOLUTION INTRAVENOUS at 11:07

## 2020-11-30 RX ADMIN — DIPHENHYDRAMINE HYDROCHLORIDE 25 MG: 50 INJECTION, SOLUTION INTRAMUSCULAR; INTRAVENOUS at 12:10

## 2020-11-30 RX ADMIN — Medication 900 MG: at 12:44

## 2020-11-30 RX ADMIN — MAGNESIUM SULFATE HEPTAHYDRATE 2 G: 40 INJECTION, SOLUTION INTRAVENOUS at 11:06

## 2020-12-11 ENCOUNTER — LAB (OUTPATIENT)
Dept: LAB | Facility: CLINIC | Age: 50
End: 2020-12-11
Payer: COMMERCIAL

## 2020-12-11 DIAGNOSIS — C18.9 COLON CANCER METASTASIZED TO LIVER (HCC): ICD-10-CM

## 2020-12-11 DIAGNOSIS — C78.7 COLON CANCER METASTASIZED TO LIVER (HCC): ICD-10-CM

## 2020-12-11 LAB
ALBUMIN SERPL BCP-MCNC: 3.9 G/DL (ref 3.5–5)
ALP SERPL-CCNC: 76 U/L (ref 46–116)
ALT SERPL W P-5'-P-CCNC: 29 U/L (ref 12–78)
ANION GAP SERPL CALCULATED.3IONS-SCNC: 9 MMOL/L (ref 4–13)
AST SERPL W P-5'-P-CCNC: 21 U/L (ref 5–45)
BASOPHILS # BLD AUTO: 0.01 THOUSANDS/ΜL (ref 0–0.1)
BASOPHILS NFR BLD AUTO: 0 % (ref 0–1)
BILIRUB SERPL-MCNC: 0.26 MG/DL (ref 0.2–1)
BUN SERPL-MCNC: 8 MG/DL (ref 5–25)
CALCIUM SERPL-MCNC: 9 MG/DL (ref 8.3–10.1)
CHLORIDE SERPL-SCNC: 102 MMOL/L (ref 100–108)
CO2 SERPL-SCNC: 27 MMOL/L (ref 21–32)
CREAT SERPL-MCNC: 0.83 MG/DL (ref 0.6–1.3)
EOSINOPHIL # BLD AUTO: 0.03 THOUSAND/ΜL (ref 0–0.61)
EOSINOPHIL NFR BLD AUTO: 1 % (ref 0–6)
ERYTHROCYTE [DISTWIDTH] IN BLOOD BY AUTOMATED COUNT: 11.6 % (ref 11.6–15.1)
GFR SERPL CREATININE-BSD FRML MDRD: 104 ML/MIN/1.73SQ M
GLUCOSE SERPL-MCNC: 98 MG/DL (ref 65–140)
HCT VFR BLD AUTO: 44.2 % (ref 36.5–49.3)
HGB BLD-MCNC: 14.9 G/DL (ref 12–17)
IMM GRANULOCYTES # BLD AUTO: 0.01 THOUSAND/UL (ref 0–0.2)
IMM GRANULOCYTES NFR BLD AUTO: 0 % (ref 0–2)
LYMPHOCYTES # BLD AUTO: 2.02 THOUSANDS/ΜL (ref 0.6–4.47)
LYMPHOCYTES NFR BLD AUTO: 31 % (ref 14–44)
MAGNESIUM SERPL-MCNC: 1.7 MG/DL (ref 1.6–2.6)
MCH RBC QN AUTO: 32.6 PG (ref 26.8–34.3)
MCHC RBC AUTO-ENTMCNC: 33.7 G/DL (ref 31.4–37.4)
MCV RBC AUTO: 97 FL (ref 82–98)
MONOCYTES # BLD AUTO: 0.53 THOUSAND/ΜL (ref 0.17–1.22)
MONOCYTES NFR BLD AUTO: 8 % (ref 4–12)
NEUTROPHILS # BLD AUTO: 3.87 THOUSANDS/ΜL (ref 1.85–7.62)
NEUTS SEG NFR BLD AUTO: 60 % (ref 43–75)
NRBC BLD AUTO-RTO: 0 /100 WBCS
PLATELET # BLD AUTO: 209 THOUSANDS/UL (ref 149–390)
PMV BLD AUTO: 10.7 FL (ref 8.9–12.7)
POTASSIUM SERPL-SCNC: 3.8 MMOL/L (ref 3.5–5.3)
PROT SERPL-MCNC: 8.2 G/DL (ref 6.4–8.2)
RBC # BLD AUTO: 4.57 MILLION/UL (ref 3.88–5.62)
SODIUM SERPL-SCNC: 138 MMOL/L (ref 136–145)
WBC # BLD AUTO: 6.47 THOUSAND/UL (ref 4.31–10.16)

## 2020-12-11 PROCEDURE — 83735 ASSAY OF MAGNESIUM: CPT

## 2020-12-11 PROCEDURE — 36415 COLL VENOUS BLD VENIPUNCTURE: CPT

## 2020-12-11 PROCEDURE — 80053 COMPREHEN METABOLIC PANEL: CPT

## 2020-12-11 PROCEDURE — 85025 COMPLETE CBC W/AUTO DIFF WBC: CPT

## 2020-12-14 ENCOUNTER — HOSPITAL ENCOUNTER (OUTPATIENT)
Dept: INFUSION CENTER | Facility: CLINIC | Age: 50
Discharge: HOME | End: 2020-12-14
Payer: COMMERCIAL

## 2020-12-14 VITALS
HEART RATE: 77 BPM | SYSTOLIC BLOOD PRESSURE: 108 MMHG | RESPIRATION RATE: 16 BRPM | TEMPERATURE: 97.4 F | OXYGEN SATURATION: 96 % | DIASTOLIC BLOOD PRESSURE: 66 MMHG

## 2020-12-14 DIAGNOSIS — C78.7 COLON CANCER METASTASIZED TO LIVER (HCC): Primary | ICD-10-CM

## 2020-12-14 DIAGNOSIS — C18.9 COLON CANCER METASTASIZED TO LIVER (HCC): Primary | ICD-10-CM

## 2020-12-14 DIAGNOSIS — L27.0 DRUG-INDUCED SKIN RASH: Primary | ICD-10-CM

## 2020-12-14 PROCEDURE — 96365 THER/PROPH/DIAG IV INF INIT: CPT

## 2020-12-14 PROCEDURE — 36593 DECLOT VASCULAR DEVICE: CPT

## 2020-12-14 RX ORDER — SODIUM CHLORIDE 9 MG/ML
20 INJECTION, SOLUTION INTRAVENOUS ONCE
Status: COMPLETED | OUTPATIENT
Start: 2020-12-14 | End: 2020-12-14

## 2020-12-14 RX ORDER — MAGNESIUM SULFATE HEPTAHYDRATE 40 MG/ML
4 INJECTION, SOLUTION INTRAVENOUS ONCE AS NEEDED
Status: DISCONTINUED | OUTPATIENT
Start: 2020-12-14 | End: 2020-12-18 | Stop reason: HOSPADM

## 2020-12-14 RX ORDER — CLINDAMYCIN PHOSPHATE 10 UG/ML
LOTION TOPICAL 2 TIMES DAILY
Qty: 60 ML | Refills: 2 | Status: SHIPPED | OUTPATIENT
Start: 2020-12-14 | End: 2021-07-27

## 2020-12-14 RX ORDER — MAGNESIUM SULFATE HEPTAHYDRATE 40 MG/ML
2 INJECTION, SOLUTION INTRAVENOUS ONCE AS NEEDED
Status: CANCELLED | OUTPATIENT
Start: 2020-12-14

## 2020-12-14 RX ORDER — SODIUM CHLORIDE 9 MG/ML
20 INJECTION, SOLUTION INTRAVENOUS ONCE
Status: CANCELLED | OUTPATIENT
Start: 2020-12-14

## 2020-12-14 RX ORDER — MAGNESIUM SULFATE HEPTAHYDRATE 40 MG/ML
2 INJECTION, SOLUTION INTRAVENOUS ONCE AS NEEDED
Status: DISCONTINUED | OUTPATIENT
Start: 2020-12-14 | End: 2020-12-18 | Stop reason: HOSPADM

## 2020-12-14 RX ORDER — MINOCYCLINE HYDROCHLORIDE 100 MG/1
100 TABLET ORAL DAILY
Qty: 30 TABLET | Refills: 11 | Status: SHIPPED | OUTPATIENT
Start: 2020-12-14 | End: 2021-07-27

## 2020-12-14 RX ORDER — MAGNESIUM SULFATE HEPTAHYDRATE 40 MG/ML
4 INJECTION, SOLUTION INTRAVENOUS ONCE AS NEEDED
Status: CANCELLED | OUTPATIENT
Start: 2020-12-14

## 2020-12-14 RX ADMIN — ALTEPLASE 2 MG: 2.2 INJECTION, POWDER, LYOPHILIZED, FOR SOLUTION INTRAVENOUS at 11:28

## 2020-12-14 RX ADMIN — MAGNESIUM SULFATE HEPTAHYDRATE 2 G: 40 INJECTION, SOLUTION INTRAVENOUS at 12:08

## 2020-12-14 RX ADMIN — SODIUM CHLORIDE 20 ML/HR: 0.9 INJECTION, SOLUTION INTRAVENOUS at 12:08

## 2020-12-15 ENCOUNTER — TELEPHONE (OUTPATIENT)
Dept: HEMATOLOGY ONCOLOGY | Facility: CLINIC | Age: 50
End: 2020-12-15

## 2020-12-17 ENCOUNTER — TELEPHONE (OUTPATIENT)
Dept: HEMATOLOGY ONCOLOGY | Facility: CLINIC | Age: 50
End: 2020-12-17

## 2020-12-17 ENCOUNTER — TELEMEDICINE (OUTPATIENT)
Dept: HEMATOLOGY ONCOLOGY | Facility: CLINIC | Age: 50
End: 2020-12-17
Payer: COMMERCIAL

## 2020-12-17 DIAGNOSIS — L70.8 ACNEIFORM RASH: ICD-10-CM

## 2020-12-17 DIAGNOSIS — C78.7 COLON CANCER METASTASIZED TO LIVER (HCC): Primary | ICD-10-CM

## 2020-12-17 DIAGNOSIS — C18.9 COLON CANCER METASTASIZED TO LIVER (HCC): Primary | ICD-10-CM

## 2020-12-17 PROCEDURE — 1036F TOBACCO NON-USER: CPT | Performed by: NURSE PRACTITIONER

## 2020-12-17 PROCEDURE — 99214 OFFICE O/P EST MOD 30 MIN: CPT | Performed by: NURSE PRACTITIONER

## 2020-12-17 RX ORDER — SODIUM CHLORIDE 9 MG/ML
20 INJECTION, SOLUTION INTRAVENOUS ONCE
Status: CANCELLED | OUTPATIENT
Start: 2020-12-28

## 2020-12-17 RX ORDER — MAGNESIUM SULFATE HEPTAHYDRATE 40 MG/ML
4 INJECTION, SOLUTION INTRAVENOUS ONCE AS NEEDED
Status: CANCELLED | OUTPATIENT
Start: 2020-12-28

## 2020-12-17 RX ORDER — MAGNESIUM SULFATE HEPTAHYDRATE 40 MG/ML
2 INJECTION, SOLUTION INTRAVENOUS ONCE AS NEEDED
Status: CANCELLED | OUTPATIENT
Start: 2020-12-28

## 2020-12-23 ENCOUNTER — LAB (OUTPATIENT)
Dept: LAB | Facility: CLINIC | Age: 50
End: 2020-12-23
Payer: COMMERCIAL

## 2020-12-23 DIAGNOSIS — C18.9 COLON CANCER METASTASIZED TO LIVER (HCC): ICD-10-CM

## 2020-12-23 DIAGNOSIS — C78.7 COLON CANCER METASTASIZED TO LIVER (HCC): ICD-10-CM

## 2020-12-23 LAB
ALBUMIN SERPL BCP-MCNC: 3.7 G/DL (ref 3.5–5)
ALP SERPL-CCNC: 79 U/L (ref 46–116)
ALT SERPL W P-5'-P-CCNC: 31 U/L (ref 12–78)
ANION GAP SERPL CALCULATED.3IONS-SCNC: 9 MMOL/L (ref 4–13)
AST SERPL W P-5'-P-CCNC: 22 U/L (ref 5–45)
BASOPHILS # BLD AUTO: 0.02 THOUSANDS/ΜL (ref 0–0.1)
BASOPHILS NFR BLD AUTO: 0 % (ref 0–1)
BILIRUB SERPL-MCNC: 0.23 MG/DL (ref 0.2–1)
BUN SERPL-MCNC: 9 MG/DL (ref 5–25)
CALCIUM SERPL-MCNC: 8.5 MG/DL (ref 8.3–10.1)
CHLORIDE SERPL-SCNC: 106 MMOL/L (ref 100–108)
CO2 SERPL-SCNC: 25 MMOL/L (ref 21–32)
CREAT SERPL-MCNC: 0.83 MG/DL (ref 0.6–1.3)
EOSINOPHIL # BLD AUTO: 0.04 THOUSAND/ΜL (ref 0–0.61)
EOSINOPHIL NFR BLD AUTO: 1 % (ref 0–6)
ERYTHROCYTE [DISTWIDTH] IN BLOOD BY AUTOMATED COUNT: 11.9 % (ref 11.6–15.1)
GFR SERPL CREATININE-BSD FRML MDRD: 104 ML/MIN/1.73SQ M
GLUCOSE SERPL-MCNC: 115 MG/DL (ref 65–140)
HCT VFR BLD AUTO: 43.3 % (ref 36.5–49.3)
HGB BLD-MCNC: 14.8 G/DL (ref 12–17)
IMM GRANULOCYTES # BLD AUTO: 0.01 THOUSAND/UL (ref 0–0.2)
IMM GRANULOCYTES NFR BLD AUTO: 0 % (ref 0–2)
LYMPHOCYTES # BLD AUTO: 2.01 THOUSANDS/ΜL (ref 0.6–4.47)
LYMPHOCYTES NFR BLD AUTO: 38 % (ref 14–44)
MAGNESIUM SERPL-MCNC: 1.6 MG/DL (ref 1.6–2.6)
MCH RBC QN AUTO: 32.9 PG (ref 26.8–34.3)
MCHC RBC AUTO-ENTMCNC: 34.2 G/DL (ref 31.4–37.4)
MCV RBC AUTO: 96 FL (ref 82–98)
MONOCYTES # BLD AUTO: 0.4 THOUSAND/ΜL (ref 0.17–1.22)
MONOCYTES NFR BLD AUTO: 8 % (ref 4–12)
NEUTROPHILS # BLD AUTO: 2.86 THOUSANDS/ΜL (ref 1.85–7.62)
NEUTS SEG NFR BLD AUTO: 53 % (ref 43–75)
NRBC BLD AUTO-RTO: 0 /100 WBCS
PLATELET # BLD AUTO: 206 THOUSANDS/UL (ref 149–390)
PMV BLD AUTO: 10.6 FL (ref 8.9–12.7)
POTASSIUM SERPL-SCNC: 3.4 MMOL/L (ref 3.5–5.3)
PROT SERPL-MCNC: 7.6 G/DL (ref 6.4–8.2)
RBC # BLD AUTO: 4.5 MILLION/UL (ref 3.88–5.62)
SODIUM SERPL-SCNC: 140 MMOL/L (ref 136–145)
WBC # BLD AUTO: 5.34 THOUSAND/UL (ref 4.31–10.16)

## 2020-12-23 PROCEDURE — 85025 COMPLETE CBC W/AUTO DIFF WBC: CPT

## 2020-12-23 PROCEDURE — 36415 COLL VENOUS BLD VENIPUNCTURE: CPT

## 2020-12-23 PROCEDURE — 80053 COMPREHEN METABOLIC PANEL: CPT

## 2020-12-23 PROCEDURE — 83735 ASSAY OF MAGNESIUM: CPT

## 2020-12-28 ENCOUNTER — HOSPITAL ENCOUNTER (OUTPATIENT)
Dept: INFUSION CENTER | Facility: CLINIC | Age: 50
Discharge: HOME | End: 2020-12-28
Payer: COMMERCIAL

## 2020-12-28 VITALS
RESPIRATION RATE: 16 BRPM | HEART RATE: 72 BPM | BODY MASS INDEX: 27 KG/M2 | SYSTOLIC BLOOD PRESSURE: 122 MMHG | OXYGEN SATURATION: 95 % | WEIGHT: 168 LBS | TEMPERATURE: 97.5 F | HEIGHT: 66 IN | DIASTOLIC BLOOD PRESSURE: 78 MMHG

## 2020-12-28 DIAGNOSIS — C78.7 COLON CANCER METASTASIZED TO LIVER (HCC): Primary | ICD-10-CM

## 2020-12-28 DIAGNOSIS — C18.9 COLON CANCER METASTASIZED TO LIVER (HCC): Primary | ICD-10-CM

## 2020-12-28 PROCEDURE — 96413 CHEMO IV INFUSION 1 HR: CPT

## 2020-12-28 PROCEDURE — 96367 TX/PROPH/DG ADDL SEQ IV INF: CPT

## 2020-12-28 RX ORDER — MAGNESIUM SULFATE HEPTAHYDRATE 40 MG/ML
2 INJECTION, SOLUTION INTRAVENOUS ONCE AS NEEDED
Status: DISCONTINUED | OUTPATIENT
Start: 2020-12-28 | End: 2020-12-31 | Stop reason: HOSPADM

## 2020-12-28 RX ORDER — MAGNESIUM SULFATE HEPTAHYDRATE 40 MG/ML
4 INJECTION, SOLUTION INTRAVENOUS ONCE AS NEEDED
Status: DISCONTINUED | OUTPATIENT
Start: 2020-12-28 | End: 2020-12-31 | Stop reason: HOSPADM

## 2020-12-28 RX ORDER — SODIUM CHLORIDE 9 MG/ML
20 INJECTION, SOLUTION INTRAVENOUS ONCE
Status: COMPLETED | OUTPATIENT
Start: 2020-12-28 | End: 2020-12-28

## 2020-12-28 RX ADMIN — SODIUM CHLORIDE 20 ML/HR: 0.9 INJECTION, SOLUTION INTRAVENOUS at 11:42

## 2020-12-28 RX ADMIN — Medication 900 MG: at 13:35

## 2020-12-28 RX ADMIN — DIPHENHYDRAMINE HYDROCHLORIDE 25 MG: 50 INJECTION, SOLUTION INTRAMUSCULAR; INTRAVENOUS at 13:00

## 2020-12-28 RX ADMIN — MAGNESIUM SULFATE HEPTAHYDRATE 2 G: 40 INJECTION, SOLUTION INTRAVENOUS at 11:44

## 2021-01-04 RX ORDER — MAGNESIUM SULFATE HEPTAHYDRATE 40 MG/ML
2 INJECTION, SOLUTION INTRAVENOUS ONCE AS NEEDED
Status: CANCELLED | OUTPATIENT
Start: 2021-01-11

## 2021-01-04 RX ORDER — MAGNESIUM SULFATE HEPTAHYDRATE 40 MG/ML
4 INJECTION, SOLUTION INTRAVENOUS ONCE AS NEEDED
Status: CANCELLED | OUTPATIENT
Start: 2021-01-11

## 2021-01-04 RX ORDER — SODIUM CHLORIDE 9 MG/ML
20 INJECTION, SOLUTION INTRAVENOUS ONCE
Status: CANCELLED | OUTPATIENT
Start: 2021-01-11

## 2021-01-07 ENCOUNTER — APPOINTMENT (OUTPATIENT)
Dept: CT IMAGING | Facility: HOSPITAL | Age: 51
End: 2021-01-07
Payer: COMMERCIAL

## 2021-01-07 ENCOUNTER — HOSPITAL ENCOUNTER (OUTPATIENT)
Dept: CT IMAGING | Facility: HOSPITAL | Age: 51
Discharge: HOME | End: 2021-01-07
Payer: COMMERCIAL

## 2021-01-07 ENCOUNTER — HOSPITAL ENCOUNTER (OUTPATIENT)
Dept: MRI IMAGING | Facility: HOSPITAL | Age: 51
Discharge: HOME | End: 2021-01-07
Attending: SURGERY
Payer: COMMERCIAL

## 2021-01-07 DIAGNOSIS — C78.7 COLON CANCER METASTASIZED TO LIVER (HCC): ICD-10-CM

## 2021-01-07 DIAGNOSIS — C18.9 COLON CANCER METASTASIZED TO LIVER (HCC): ICD-10-CM

## 2021-01-07 PROCEDURE — 74183 MRI ABD W/O CNTR FLWD CNTR: CPT

## 2021-01-07 PROCEDURE — 71260 CT THORAX DX C+: CPT

## 2021-01-07 PROCEDURE — G1004 CDSM NDSC: HCPCS

## 2021-01-07 PROCEDURE — A9585 GADOBUTROL INJECTION: HCPCS | Performed by: SURGERY

## 2021-01-07 RX ADMIN — GADOBUTROL 7 ML: 604.72 INJECTION INTRAVENOUS at 13:17

## 2021-01-07 RX ADMIN — IOHEXOL 85 ML: 350 INJECTION, SOLUTION INTRAVENOUS at 12:17

## 2021-01-11 ENCOUNTER — TELEPHONE (OUTPATIENT)
Dept: SURGICAL ONCOLOGY | Facility: CLINIC | Age: 51
End: 2021-01-11

## 2021-01-11 ENCOUNTER — APPOINTMENT (OUTPATIENT)
Dept: LAB | Facility: CLINIC | Age: 51
End: 2021-01-11
Payer: COMMERCIAL

## 2021-01-11 ENCOUNTER — HOSPITAL ENCOUNTER (OUTPATIENT)
Dept: INFUSION CENTER | Facility: CLINIC | Age: 51
Discharge: HOME | End: 2021-01-11
Payer: COMMERCIAL

## 2021-01-11 VITALS
TEMPERATURE: 97.2 F | RESPIRATION RATE: 18 BRPM | WEIGHT: 167.55 LBS | BODY MASS INDEX: 26.93 KG/M2 | DIASTOLIC BLOOD PRESSURE: 78 MMHG | HEART RATE: 80 BPM | HEIGHT: 66 IN | SYSTOLIC BLOOD PRESSURE: 116 MMHG | OXYGEN SATURATION: 95 %

## 2021-01-11 DIAGNOSIS — C18.9 COLON CANCER METASTASIZED TO LIVER (HCC): ICD-10-CM

## 2021-01-11 DIAGNOSIS — C78.7 COLON CANCER METASTASIZED TO LIVER (HCC): Primary | ICD-10-CM

## 2021-01-11 DIAGNOSIS — C78.7 COLON CANCER METASTASIZED TO LIVER (HCC): ICD-10-CM

## 2021-01-11 DIAGNOSIS — C18.9 COLON CANCER METASTASIZED TO LIVER (HCC): Primary | ICD-10-CM

## 2021-01-11 LAB
ALBUMIN SERPL BCP-MCNC: 3.8 G/DL (ref 3.5–5)
ALP SERPL-CCNC: 86 U/L (ref 46–116)
ALT SERPL W P-5'-P-CCNC: 32 U/L (ref 12–78)
ANION GAP SERPL CALCULATED.3IONS-SCNC: 7 MMOL/L (ref 4–13)
AST SERPL W P-5'-P-CCNC: 16 U/L (ref 5–45)
BASOPHILS # BLD AUTO: 0.03 THOUSANDS/ΜL (ref 0–0.1)
BASOPHILS NFR BLD AUTO: 0 % (ref 0–1)
BILIRUB SERPL-MCNC: 0.34 MG/DL (ref 0.2–1)
BUN SERPL-MCNC: 11 MG/DL (ref 5–25)
CALCIUM SERPL-MCNC: 8.4 MG/DL (ref 8.3–10.1)
CHLORIDE SERPL-SCNC: 108 MMOL/L (ref 100–108)
CO2 SERPL-SCNC: 24 MMOL/L (ref 21–32)
CREAT SERPL-MCNC: 0.91 MG/DL (ref 0.6–1.3)
EOSINOPHIL # BLD AUTO: 0.13 THOUSAND/ΜL (ref 0–0.61)
EOSINOPHIL NFR BLD AUTO: 2 % (ref 0–6)
ERYTHROCYTE [DISTWIDTH] IN BLOOD BY AUTOMATED COUNT: 12 % (ref 11.6–15.1)
GFR SERPL CREATININE-BSD FRML MDRD: 99 ML/MIN/1.73SQ M
GLUCOSE SERPL-MCNC: 100 MG/DL (ref 65–140)
HCT VFR BLD AUTO: 45.5 % (ref 36.5–49.3)
HGB BLD-MCNC: 15.3 G/DL (ref 12–17)
IMM GRANULOCYTES # BLD AUTO: 0.02 THOUSAND/UL (ref 0–0.2)
IMM GRANULOCYTES NFR BLD AUTO: 0 % (ref 0–2)
LYMPHOCYTES # BLD AUTO: 1.89 THOUSANDS/ΜL (ref 0.6–4.47)
LYMPHOCYTES NFR BLD AUTO: 28 % (ref 14–44)
MAGNESIUM SERPL-MCNC: 1.7 MG/DL (ref 1.6–2.6)
MCH RBC QN AUTO: 31.9 PG (ref 26.8–34.3)
MCHC RBC AUTO-ENTMCNC: 33.6 G/DL (ref 31.4–37.4)
MCV RBC AUTO: 95 FL (ref 82–98)
MONOCYTES # BLD AUTO: 0.5 THOUSAND/ΜL (ref 0.17–1.22)
MONOCYTES NFR BLD AUTO: 8 % (ref 4–12)
NEUTROPHILS # BLD AUTO: 4.12 THOUSANDS/ΜL (ref 1.85–7.62)
NEUTS SEG NFR BLD AUTO: 62 % (ref 43–75)
NRBC BLD AUTO-RTO: 0 /100 WBCS
PLATELET # BLD AUTO: 191 THOUSANDS/UL (ref 149–390)
PMV BLD AUTO: 10.8 FL (ref 8.9–12.7)
POTASSIUM SERPL-SCNC: 3.8 MMOL/L (ref 3.5–5.3)
PROT SERPL-MCNC: 7.9 G/DL (ref 6.4–8.2)
RBC # BLD AUTO: 4.8 MILLION/UL (ref 3.88–5.62)
SODIUM SERPL-SCNC: 139 MMOL/L (ref 136–145)
WBC # BLD AUTO: 6.69 THOUSAND/UL (ref 4.31–10.16)

## 2021-01-11 PROCEDURE — 96413 CHEMO IV INFUSION 1 HR: CPT

## 2021-01-11 PROCEDURE — 80053 COMPREHEN METABOLIC PANEL: CPT

## 2021-01-11 PROCEDURE — 85025 COMPLETE CBC W/AUTO DIFF WBC: CPT

## 2021-01-11 PROCEDURE — 83735 ASSAY OF MAGNESIUM: CPT

## 2021-01-11 PROCEDURE — 36415 COLL VENOUS BLD VENIPUNCTURE: CPT

## 2021-01-11 PROCEDURE — 96367 TX/PROPH/DG ADDL SEQ IV INF: CPT

## 2021-01-11 RX ORDER — MAGNESIUM SULFATE HEPTAHYDRATE 40 MG/ML
2 INJECTION, SOLUTION INTRAVENOUS ONCE AS NEEDED
Status: DISCONTINUED | OUTPATIENT
Start: 2021-01-11 | End: 2021-01-14 | Stop reason: HOSPADM

## 2021-01-11 RX ORDER — SODIUM CHLORIDE 9 MG/ML
20 INJECTION, SOLUTION INTRAVENOUS ONCE
Status: COMPLETED | OUTPATIENT
Start: 2021-01-11 | End: 2021-01-11

## 2021-01-11 RX ORDER — MAGNESIUM SULFATE HEPTAHYDRATE 40 MG/ML
4 INJECTION, SOLUTION INTRAVENOUS ONCE AS NEEDED
Status: DISCONTINUED | OUTPATIENT
Start: 2021-01-11 | End: 2021-01-11

## 2021-01-11 RX ADMIN — MAGNESIUM SULFATE HEPTAHYDRATE 2 G: 40 INJECTION, SOLUTION INTRAVENOUS at 14:01

## 2021-01-11 RX ADMIN — DIPHENHYDRAMINE HYDROCHLORIDE 25 MG: 50 INJECTION, SOLUTION INTRAMUSCULAR; INTRAVENOUS at 15:02

## 2021-01-11 RX ADMIN — SODIUM CHLORIDE 20 ML/HR: 0.9 INJECTION, SOLUTION INTRAVENOUS at 14:00

## 2021-01-11 RX ADMIN — Medication 900 MG: at 15:23

## 2021-01-11 NOTE — PLAN OF CARE
Problem: Potential for Falls  Goal: Patient will remain free of falls  Description: INTERVENTIONS:  - Assess patient frequently for physical needs  -  Identify cognitive and physical deficits and behaviors that affect risk of falls    -  Bryson fall precautions as indicated by assessment   - Educate patient/family on patient safety including physical limitations  - Instruct patient to call for assistance with activity based on assessment  - Modify environment to reduce risk of injury  - Consider OT/PT consult to assist with strengthening/mobility  Outcome: Progressing

## 2021-01-12 ENCOUNTER — OFFICE VISIT (OUTPATIENT)
Dept: HEMATOLOGY ONCOLOGY | Facility: CLINIC | Age: 51
End: 2021-01-12
Payer: COMMERCIAL

## 2021-01-12 ENCOUNTER — OFFICE VISIT (OUTPATIENT)
Dept: SURGICAL ONCOLOGY | Facility: CLINIC | Age: 51
End: 2021-01-12
Payer: COMMERCIAL

## 2021-01-12 ENCOUNTER — LAB (OUTPATIENT)
Dept: LAB | Facility: CLINIC | Age: 51
End: 2021-01-12
Payer: COMMERCIAL

## 2021-01-12 VITALS
RESPIRATION RATE: 16 BRPM | HEIGHT: 66 IN | TEMPERATURE: 99.6 F | OXYGEN SATURATION: 98 % | HEART RATE: 78 BPM | DIASTOLIC BLOOD PRESSURE: 80 MMHG | SYSTOLIC BLOOD PRESSURE: 124 MMHG | BODY MASS INDEX: 27.32 KG/M2 | WEIGHT: 170 LBS

## 2021-01-12 VITALS
DIASTOLIC BLOOD PRESSURE: 80 MMHG | RESPIRATION RATE: 16 BRPM | BODY MASS INDEX: 28.32 KG/M2 | WEIGHT: 170 LBS | HEART RATE: 78 BPM | TEMPERATURE: 99.6 F | HEIGHT: 65 IN | SYSTOLIC BLOOD PRESSURE: 124 MMHG

## 2021-01-12 DIAGNOSIS — C78.7 COLON CANCER METASTASIZED TO LIVER (HCC): Primary | ICD-10-CM

## 2021-01-12 DIAGNOSIS — C18.9 METASTATIC COLON CANCER TO LIVER (HCC): ICD-10-CM

## 2021-01-12 DIAGNOSIS — C20 MALIGNANT NEOPLASM OF RECTUM (HCC): ICD-10-CM

## 2021-01-12 DIAGNOSIS — C18.9 COLON CANCER METASTASIZED TO LIVER (HCC): Primary | ICD-10-CM

## 2021-01-12 DIAGNOSIS — C78.7 COLON CANCER METASTASIZED TO LIVER (HCC): ICD-10-CM

## 2021-01-12 DIAGNOSIS — C18.9 COLON CANCER METASTASIZED TO LIVER (HCC): ICD-10-CM

## 2021-01-12 DIAGNOSIS — C78.7 METASTATIC COLON CANCER TO LIVER (HCC): ICD-10-CM

## 2021-01-12 LAB
BUN SERPL-MCNC: 8 MG/DL (ref 5–25)
CEA SERPL-MCNC: 1.1 NG/ML (ref 0–3)
CREAT SERPL-MCNC: 0.9 MG/DL (ref 0.6–1.3)
GFR SERPL CREATININE-BSD FRML MDRD: 101 ML/MIN/1.73SQ M

## 2021-01-12 PROCEDURE — 36415 COLL VENOUS BLD VENIPUNCTURE: CPT

## 2021-01-12 PROCEDURE — 82565 ASSAY OF CREATININE: CPT

## 2021-01-12 PROCEDURE — 3008F BODY MASS INDEX DOCD: CPT | Performed by: INTERNAL MEDICINE

## 2021-01-12 PROCEDURE — 1036F TOBACCO NON-USER: CPT | Performed by: SURGERY

## 2021-01-12 PROCEDURE — 99213 OFFICE O/P EST LOW 20 MIN: CPT | Performed by: SURGERY

## 2021-01-12 PROCEDURE — 99214 OFFICE O/P EST MOD 30 MIN: CPT | Performed by: INTERNAL MEDICINE

## 2021-01-12 PROCEDURE — 84520 ASSAY OF UREA NITROGEN: CPT

## 2021-01-12 PROCEDURE — 82378 CARCINOEMBRYONIC ANTIGEN: CPT

## 2021-01-12 NOTE — LETTER
January 12, 2021     Su Alonzo  [address]    Patient: Abdifatah Cooper   YOB: 1970   Date of Visit: 1/12/2021       Dear Dr Karan Encinas:    Thank you for referring Yasemin Bashir to me for evaluation  Below are my notes for this consultation  If you have questions, please do not hesitate to call me  I look forward to following your patient along with you  Sincerely,        Beto White MD        CC: MD Ria Shook MD Otha Kingfisher, MD  1/12/2021  4:10 PM  Sign when Signing Visit               Surgical Oncology Follow Up       [address] [place] [id number]    Abdifatah Cooper  1970  [id number]  [address] [place] [address] SURGICAL ONCOLOGY [place]  [address] [facility] [address] [id number]    Diagnoses and all orders for this visit:    Colon cancer metastasized to liver [facility])        Chief Complaint   Patient presents with    Follow-up       Return in about 4 months (around 5/12/2021) for Office Visit  Oncology History   Colon cancer metastasized to liver ([facility] [address] )   1/2018 Initial Diagnosis    Malignant neoplasm of sigmoid colon ([facility] [address] )     1/22/2018 Biopsy    Large Intestine, Sigmoid Colon, Recto-sigmoid tumor bx:    - Invasive colonic adenocarcinoma, moderately differentiated     2/6/2018 - 10/16/2018 Chemotherapy    Modified FOLFOX6 x 12 cycles  Added Erbitux on 3/20/18      6/21/2018 Surgery    Segment 7 liver resection/ablation, hemicolectomy     10/30/2018 -  Chemotherapy    Continuation of Single agent Erbitux  With 10/30/18 chemo, it was Cycle #14  Plan was for 6 additional cycles of Erbitux alone       3/2020 Genetic Testing    NEGATIVE for genes tested:    Test(s): [employer] + [employer] (34 genes): APC, ETHAN, BARD1, BRCA1, BRCA2, BRIP1, BMPR1A, CDH1, CDK4, CDKN2A, CHEK2, DICER1, EPCAM, GREM1, HOXB13, MLH1, MRE11A, MSH2, MSH6, MUTYH, NBN, NF1, PALB2, PMS2, POLD1, POLE, PTEN, RAD50, RAD51C, RAD51D, SMAD4, SMARCA4, STK11, TP53      6/8/2020 Surgery    Liver, segment 3 (wedge resection):  - Metastatic adenocarcinoma, consistent with the patient's known colon primary  - Margins negative for carcinoma      7/27/2020 -  Chemotherapy    fluorouracil (ADRUCIL) injection 750 mg, 745 mg, Intravenous, Once, 6 of 6 cycles  Administration: 750 mg (7/27/2020), 750 mg (8/10/2020), 745 mg (8/24/2020), 745 mg (9/8/2020), 745 mg (9/21/2020), 745 mg (10/5/2020)  pegfilgrastim (NEULASTA) subcutaneous injection 6 mg, 6 mg, Subcutaneous, Once, 1 of 1 cycle  Administration: 6 mg (9/24/2020)  pegfilgrastim (NEULASTA ONPRO) subcutaneous injection kit 6 mg, 6 mg, Subcutaneous, Once, 6 of 6 cycles  Administration: 6 mg (7/29/2020), 6 mg (8/12/2020), 6 mg (8/26/2020), 6 mg (9/10/2020), 6 mg (10/7/2020)  cetuximab (ERBITUX) 900 mg in IVPB 450 mL, 930 mg, Intravenous, Once, 12 of 12 cycles  Administration: 900 mg (7/27/2020), 900 mg (8/10/2020), 900 mg (8/24/2020), 900 mg (9/8/2020), 900 mg (9/21/2020), 900 mg (10/5/2020), 900 mg (10/19/2020), 900 mg (11/2/2020), 900 mg (11/16/2020), 900 mg (11/30/2020), 900 mg (12/28/2020), 900 mg (1/11/2021)  irinotecan (CAMPTOSAR) 340 mg in sodium chloride 0 9 % 500 mL chemo infusion, 335 mg, Intravenous, Once, 6 of 6 cycles  Administration: 340 mg (7/27/2020), 340 mg (8/10/2020), 340 mg (8/24/2020), 340 mg (9/8/2020), 340 mg (9/21/2020), 340 mg (10/5/2020)  leucovorin 750 mg in sodium chloride 0 9 % 250 mL IVPB, 744 mg, Intravenous, Once, 6 of 6 cycles  Administration: 750 mg (7/27/2020), 750 mg (8/10/2020), 750 mg (8/24/2020), 750 mg (9/8/2020), 750 mg (9/21/2020), 740 mg (10/5/2020)         Staging:  Metastatic rectosigmoid cancer  [id number]  Treatment history:   Modified FOLFOX 6   Erbitux added 03/20/2018   Segment 7 liver resection, June 2018  Segment 3 mass liver resection, June 2020  Chemotherapy with Erbitux  Current treatment:  Observation  Disease status:   LUTHER    History of Present Illness:  Patient returns in follow-up of his metastatic colon cancer  He has completed chemotherapy  He is doing well at this time with no complaints  MRI from January 7, 2021 reveals stable liver lesions consistent with treated metastasis  No new lesions were seen  CT of the chest did not reveal any metastatic disease  Calcification was seen in the LAD  I personally reviewed his films  He is going to follow up with a cardiologist at some point because of the calcifications  Review of Systems  Complete ROS Surg Onc:   Complete ROS Surg Onc:   Constitutional: The patient denies new or recent history of general fatigue, no recent weight loss, no change in appetite  Eyes: No complaints of visual problems, no scleral icterus  ENT: no complaints of ear pain, no hoarseness, no difficulty swallowing,  no tinnitus and no new masses in head, oral cavity, or neck  Cardiovascular: No complaints of chest pain, no palpitations, no ankle edema  Respiratory: No complaints of shortness of breath, no cough  Gastrointestinal: No complaints of jaundice, no bloody stools, no pale stools  Genitourinary: No complaints of dysuria, no hematuria, no nocturia, no frequent urination, no urethral discharge  Musculoskeletal: No complaints of weakness, paralysis, joint stiffness or arthralgias  Integumentary: No complaints of rash, no new lesions  Neurological: No complaints of convulsions, no seizures, no dizziness  Hematologic/Lymphatic: No complaints of easy bruising  Endocrine:  No hot or cold intolerance  No polydipsia, polyphagia, or polyuria  Allergy/immunology:  No environmental allergies  No food allergies  Not immunocompromised  Skin:  No pallor or rash  No wound          Patient Active Problem List   Diagnosis    ED (erectile dysfunction)    Colon cancer metastasized to liver (HCC)    GERD (gastroesophageal reflux disease)    Pulmonary nodule, right    Acneiform rash    Encounter for follow-up examination after completed treatment for malignant neoplasm    Other hydronephrosis    Annual physical exam    Vapes nicotine containing substance     Past Medical History:   Diagnosis Date    Cancer ([address] )     Dysuria     Last Assessed:8/24/17    GERD (gastroesophageal reflux disease)     Liver nodule     Pulmonary nodule, right     Ureteropelvic junction (UPJ) obstruction 1/29/2020     Past Surgical History:   Procedure Laterality Date    ABDOMINAL SURGERY      COLON SURGERY      COLONOSCOPY N/A 1/22/2018    Procedure: COLONOSCOPY;  Surgeon: Jericho Sahni MD;  [facility]; Service: Colorectal   Jomar Pila DENTAL SURGERY  2016    Crown with bridge work    FLEXIBLE SIGMOIDOSCOPY N/A 6/15/2018    Procedure: SIGMOIDOSCOPY FLEXIBLE w/o sedation w/ tattoo;  Surgeon: Jericho Sahni MD;  [facility];   Service: Colorectal    LAPAROTOMY N/A 6/8/2020    Procedure: LAPAROTOMY EXPLORATORY, LYSIS OF ADHESIONS;  Surgeon: Rashaad Husain MD;  [facility];  Service: Surgical Oncology    LIVER LOBECTOMY N/A 6/21/2018    Procedure: liver resection/ablation, intraoperative ultrasound of liver;  Surgeon: Rashaad Husain MD;  [facility];  Service: Surgical Oncology    LIVER LOBECTOMY N/A 6/8/2020    Procedure: LIVER RESECTION SEGMENT 3 WITH  INTRAOPERATIVE U/S OF LIVER;  Surgeon: Rashaad Husain MD;  [facility];  Service: Surgical Oncology    NC EXPLORATORY OF ABDOMEN N/A 6/21/2018    Procedure: LAPAROTOMY EXPLORATORY;  Surgeon: Jericho Sahni MD;  [facility];  Service: Colorectal    NC INSERT PICC W/ SUB-Q PORT N/A 1/25/2018    Procedure: PORT-A-CATHETER PLACEMENT  Fluoroscopy for performance/interpretation;  Surgeon: Jericho Sahni MD;  [facility];  Service: Colorectal    NC PART REMOVAL COLON W ANASTOMOSIS Left 6/21/2018    Procedure: hemicolectomy, low anterior resection (open) SPY fluorescence angiography;  Surgeon: Leydi Rudolph MD;  [facility];  Service: Colorectal    VT PROCTECTOMY,PARTIAL N/A 6/21/2018    Procedure: Partial proctectomy ;  Surgeon: Leydi Rudolph MD;  [facility];  Service: Colorectal    VT SIGMOIDOSCOPY FLX DX W/COLLJ SPEC BR/WA IF PFRMD N/A 6/21/2018    Procedure: Tacho Benedict;  Surgeon: Leydi Rudolph MD;  [facility];  Service: Colorectal    ROOT CANAL  2015    TESTICLE SURGERY      Exploration of Undescended Testis rIght, age 8 had surgery for undescended testicle;  Last Assessed:8/24/17    TOOTH EXTRACTION  2015     Family History   Problem Relation Age of Onset    No Known Problems Father     Cancer Mother         unknown    Cancer Brother         pt  reports brother was diagnosed with stage 4 throat cancer    Throat cancer Brother      Social History     Socioeconomic History    Marital status: Single     Spouse name: Not on file    Number of children: Not on file    Years of education: Not on file    Highest education level: Not on file   Occupational History    Not on file   Social Needs    Financial resource strain: Not on file    Food insecurity     Worry: Not on file     Inability: Not on file    Transportation needs     Medical: Not on file     Non-medical: Not on file   Tobacco Use    Smoking status: Former Smoker     Types: E-Cigarettes    Smokeless tobacco: Current User    Tobacco comment: quit 4 years ago / smoked for 20 years    Substance and Sexual Activity    Alcohol use: Yes     Comment: socially - 2 month    Drug use: Yes     Types: Marijuana     Comment: occasional recreation drug use    Sexual activity: Yes     Comment: Resides alone   Lifestyle    Physical activity     Days per week: Not on file     Minutes per session: Not on file    Stress: Not on file   Relationships    Social connections     Talks on phone: Not on file     Gets together: Not on file Attends Faith service: Not on file     Active member of club or organization: Not on file     Attends meetings of clubs or organizations: Not on file     Relationship status: Not on file    Intimate partner violence     Fear of current or ex partner: Not on file     Emotionally abused: Not on file     Physically abused: Not on file     Forced sexual activity: Not on file   Other Topics Concern    Not on file   Social History Narrative    Not on file       Current Outpatient Medications:     clindamycin (CLEOCIN T) 1 % lotion, Apply topically 2 (two) times a day To affected area, Disp: 60 mL, Rfl: 2    minocycline (DYNACIN) 100 MG tablet, Take 1 tablet (100 mg total) by mouth daily, Disp: 30 tablet, Rfl: 11    prochlorperazine (COMPAZINE) 10 mg tablet, Take 1 tablet (10 mg total) by mouth every 6 (six) hours as needed for nausea or vomiting, Disp: 45 tablet, Rfl: 3    sildenafil (REVATIO) 20 mg tablet, Take 1 tablet (20 mg total) by mouth as needed (PRN), Disp: 90 tablet, Rfl: 1  No current facility-administered medications for this visit  Facility-Administered Medications Ordered in Other Visits:     magnesium sulfate 2 g/50 mL IVPB (premix) 2 g, 2 g, Intravenous, Once PRN, Delilah Castellon MD, Stopped at 01/11/21 1501  No Known Allergies  Vitals:    01/12/21 1549   BP: 124/80   Pulse: 78   Resp: 16   Temp: 99 6 °F (37 6 °C)       Physical Exam  Constitutional: General appearance: The Patient is well-developed and well-nourished who appears the stated age in no acute distress  Patient is pleasant and talkative  HEENT:  Normocephalic  Sclerae are anicteric  Mucous membranes are moist  Neck is supple without adenopathy  No JVD  Chest: The lungs are clear to auscultation  Cardiac: Heart is regular rate  Abdomen: Abdomen is soft, non-tender, non-distended and without masses  Extremities: There is no clubbing or cyanosis  There is no edema  Symmetric    Neuro: Grossly nonfocal  Gait is normal      Lymphatic: No evidence of cervical adenopathy bilaterally  No evidence of axillary adenopathy bilaterally  Skin: Warm, anicteric  Psych:  Patient is pleasant and talkative  Breasts:        Pathology:  [unfilled]    Labs:  Lab Results   Component Value Date    CEA 0 7 10/02/2020         Imaging  Ct Chest W Contrast    Result Date: 1/11/2021  Narrative: CT CHEST WITH IV CONTRAST INDICATION:   C18 9: Malignant neoplasm of colon, unspecified C78 7: Secondary malignant neoplasm of liver and intrahepatic bile duct  Patient underwent with sections/ablation of a lesion in segment 7 in June 2018  Patient underwent wedge resection of a segment 3 lesion in June 2020  Patient has been undergoing chemotherapy  Most recent CEA level is normal  COMPARISON:  October 8, 2020  TECHNIQUE: CT examination of the chest was performed  Axial, sagittal, and coronal 2D reformatted images were created from the source data and submitted for interpretation  Radiation dose length product (DLP) for this visit:  232 mGy-cm   This examination, like all CT scans performed in the [facility], was performed utilizing techniques to minimize radiation dose exposure, including the use of iterative reconstruction and automated exposure control  IV Contrast:  85 mL of iohexol (OMNIPAQUE) FINDINGS: LUNGS:  Again noted are small noncalcified benign pulmonary nodules on the order of between 2 and 4 mm in size, unchanged since December 2017 in the right middle lobe (3/88), right lower lobe (3/80, 96), and left lower lobe (3/76, 85)  Benign intrapulmonary lymph node abutting the minor fissure (3/69)  No new or enlarging pulmonary mass  No consolidative or groundglass airspace opacity to suggest infectious process  No tracheal or endobronchial lesion  Mild paraseptal emphysematous changes most notable in the apices  PLEURA:  Unremarkable   HEART/GREAT VESSELS:  Moderate calcification of the left anterior descending coronary artery indicating atherosclerotic heart disease  No thoracic aortic aneurysm  MEDIASTINUM AND MAHENDRA:  Right port at cavoatrial junction  No mediastinal or hilar lymphadenopathy  CHEST WALL AND LOWER NECK:   Unremarkable  VISUALIZED STRUCTURES IN THE UPPER ABDOMEN:  Hypodensity along the ventral margin of segment 3 of the left hepatic lobe, and in the dome of the right hepatic lobe just cephalad to the right middle hepatic vein remain unchanged in size and CT appearance from previous examination consistent with treated hepatic metastases, better evaluated on concurrently performed pelvic MRI  OSSEOUS STRUCTURES:  No acute fracture or destructive osseous lesion  Impression: No CT evidence of suspicious pulmonary metastatic disease  Benign pulmonary nodules on the order of 2 to 4 mm in size, unchanged at least as far back as December 2017  Again identified is calcification of the left anterior descending coronary artery indicating atherosclerotic heart disease, greater than expected for the patient's age  Redemonstration of treated liver metastases, better evaluated on concurrently performed hepatic MRI  Workstation performed: [id number]     Mri Abdomen W Wo Contrast    Result Date: 1/11/2021  Narrative: MRI - ABDOMEN - WITH AND WITHOUT CONTRAST INDICATION:  Metastatic colon cancer  Patient underwent with sections/ablation of a lesion in segment 7 in June 2018  Patient underwent wedge resection of a segment 3 lesion in June 2020  Patient has been undergoing chemotherapy    Most recent CEA level is normal  COMPARISON: Multiple prior examinations, most recently September 23, 2020 TECHNIQUE:  The following pulse sequences were obtained prior to and following the administration of intravenous contrast:     Coronal and axial T2 with TE of 90 and 180 respectively, axial T2 with fat saturation, axial FIESTA fat-sat, axial T1-weighted in-and-out-of phase, axial DWI/ADC, precontrast axial T1 with fat saturation, post-contrast dynamic axial T1 with fat saturation at 20, 70, and 180 seconds, coronal T1  with fat saturation and 7 minute delayed axial T1 with fat saturation  IV Contrast:  7 mL of Gadobutrol injection (SINGLE-DOSE) FINDINGS: LOWER CHEST:   Unremarkable  LIVER: Postsurgical change of wedge resection along the ventral margin of segment 3 best visualized on image 155 of series 13, similar appearance from previous examination without developing solid soft tissue enhancement or diffusion restriction to suggest recurrent mass in this location  An irregular focus of signal abnormality with its epicenter directly cephalad to the middle hepatic vein (although the lesion is partially within both segments 7 and 8, this corresponds to the described treated segment 7 lesion) measuring 2 5 x 1 9 cm on  image 20 of series 13, hypointense on T1 imaging with very subtle T2 hyperintensity and thin uniform rind of peripheral enhancement that is similar from prior examination and is consistent with treated lesion demonstrating no evidence of increasing enhancement or increase in size  No new, suspicious, or enlarging hepatic mass is identified  Patent portal and hepatic veins noted  Normal hepatic contours  BILE DUCTS:  No intrahepatic or extrahepatic bile duct dilation  Common bile duct is normal in caliber  No choledocholithiasis, biliary stricture or suspicious mass  GALLBLADDER:  Normal  PANCREAS:  Normal  No main pancreatic ductal dilation  ADRENAL GLANDS:  Normal  SPLEEN:  Normal  KIDNEYS/PROXIMAL URETERS: Large left-sided parapelvic renal cyst is again noted  Chronic moderate left hydronephrosis with marked dilatation of an extrarenal pelvis and normal caliber left ureter, features typical of a congenital high-grade UPJ obstruction  Both kidneys otherwise unremarkable  No suspicious renal mass  BOWEL:   Unremarkable  No dilated loops of bowel  PERITONEAL CAVITY/RETROPERITONEUM:  No mass or ascites  LYMPH NODES:  No abdominal lymphadenopathy  VASCULAR STRUCTURES:  Unremarkable  No aortic aneurysm  ABDOMINAL WALL:  Fat-containing umbilical hernia, unchanged  OSSEOUS STRUCTURES:  Normal marrow signal and enhancement  No evidence of recent fracture, mass or marrow replacing process  Impression: 1  Two liver lesions as described above, not significantly changed since at least as far back as MRI performed May 12, 2020, consistent with treated metastases  No new or enlarging hepatic mass is identified  2   No new abnormality in the abdomen  Workstation performed: [id number]     I reviewed the above laboratory and imaging data  Discussion/Summary: 32-year-old male with metastatic rectosigmoid cancer  His MRI is stable  I would recommend continued observation  He has already scheduled for a repeat MRI and CT in 4 months  I will see him again at that time for another clinical exam    He is agreeable to this plan    All his questions were answered
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
## 2021-01-12 NOTE — PROGRESS NOTES
Surgical Oncology Follow Up       [address] [place] [address]   [facility]  [address] [facility] [address]  [place] [address]    Citlalli Spence  1970  [id number]  [address] [facility]  [facility]  [address]  [place] [address]    Diagnoses and all orders for this visit:    Colon cancer metastasized to liver [facility])        Chief Complaint   Patient presents with    Follow-up       Return in about 4 months (around 5/12/2021) for Office Visit  Oncology History   Colon cancer metastasized to liver ([facility] [address] )   1/2018 Initial Diagnosis    Malignant neoplasm of sigmoid colon ([facility] [address] )     1/22/2018 Biopsy    Large Intestine, Sigmoid Colon, Recto-sigmoid tumor bx:    - Invasive colonic adenocarcinoma, moderately differentiated     2/6/2018 - 10/16/2018 Chemotherapy    Modified FOLFOX6 x 12 cycles  Added Erbitux on 3/20/18      6/21/2018 Surgery    Segment 7 liver resection/ablation, hemicolectomy     10/30/2018 -  Chemotherapy    Continuation of Single agent Erbitux  With 10/30/18 chemo, it was Cycle #14  Plan was for 6 additional cycles of Erbitux alone       3/2020 Genetic Testing    NEGATIVE for genes tested:    Test(s): [employer] + RNA[employer]ight (34 genes): APC, ETHAN, BARD1, BRCA1, BRCA2, BRIP1, BMPR1A, CDH1, CDK4, CDKN2A, CHEK2, DICER1, EPCAM, GREM1, HOXB13, MLH1, MRE11A, MSH2, MSH6, MUTYH, NBN, NF1, PALB2, PMS2, POLD1, POLE, PTEN, RAD50, RAD51C, RAD51D, SMAD4, SMARCA4, STK11, TP53      6/8/2020 Surgery    Liver, segment 3 (wedge resection):  - Metastatic adenocarcinoma, consistent with the patient's known colon primary  - Margins negative for carcinoma      7/27/2020 -  Chemotherapy    fluorouracil (ADRUCIL) injection 750 mg, 745 mg, Intravenous, Once, 6 of 6 cycles  Administration: 750 mg (7/27/2020), 750 mg (8/10/2020), 745 mg (8/24/2020), 745 mg (9/8/2020), 745 mg (9/21/2020), 745 mg (10/5/2020)  pegfilgrastim (Kenny Nuñez) subcutaneous injection 6 mg, 6 mg, Subcutaneous, Once, 1 of 1 cycle  Administration: 6 mg (9/24/2020)  pegfilgrastim (NEULASTA ONPRO) subcutaneous injection kit 6 mg, 6 mg, Subcutaneous, Once, 6 of 6 cycles  Administration: 6 mg (7/29/2020), 6 mg (8/12/2020), 6 mg (8/26/2020), 6 mg (9/10/2020), 6 mg (10/7/2020)  cetuximab (ERBITUX) 900 mg in IVPB 450 mL, 930 mg, Intravenous, Once, 12 of 12 cycles  Administration: 900 mg (7/27/2020), 900 mg (8/10/2020), 900 mg (8/24/2020), 900 mg (9/8/2020), 900 mg (9/21/2020), 900 mg (10/5/2020), 900 mg (10/19/2020), 900 mg (11/2/2020), 900 mg (11/16/2020), 900 mg (11/30/2020), 900 mg (12/28/2020), 900 mg (1/11/2021)  irinotecan (CAMPTOSAR) 340 mg in sodium chloride 0 9 % 500 mL chemo infusion, 335 mg, Intravenous, Once, 6 of 6 cycles  Administration: 340 mg (7/27/2020), 340 mg (8/10/2020), 340 mg (8/24/2020), 340 mg (9/8/2020), 340 mg (9/21/2020), 340 mg (10/5/2020)  leucovorin 750 mg in sodium chloride 0 9 % 250 mL IVPB, 744 mg, Intravenous, Once, 6 of 6 cycles  Administration: 750 mg (7/27/2020), 750 mg (8/10/2020), 750 mg (8/24/2020), 750 mg (9/8/2020), 750 mg (9/21/2020), 740 mg (10/5/2020)         Staging:  Metastatic rectosigmoid cancer  [id number]  Treatment history:   Modified FOLFOX 6   Erbitux added 03/20/2018   Segment 7 liver resection, June 2018  Segment 3 mass liver resection, June 2020  Chemotherapy with Erbitux  Current treatment:  Observation  Disease status:   LUTHER    History of Present Illness:  Patient returns in follow-up of his metastatic colon cancer  He has completed chemotherapy  He is doing well at this time with no complaints  MRI from January 7, 2021 reveals stable liver lesions consistent with treated metastasis  No new lesions were seen  CT of the chest did not reveal any metastatic disease  Calcification was seen in the LAD  I personally reviewed his films    He is going to follow up with a cardiologist at some point because of the calcifications  Review of Systems  Complete ROS Surg Onc:   Complete ROS Surg Onc:   Constitutional: The patient denies new or recent history of general fatigue, no recent weight loss, no change in appetite  Eyes: No complaints of visual problems, no scleral icterus  ENT: no complaints of ear pain, no hoarseness, no difficulty swallowing,  no tinnitus and no new masses in head, oral cavity, or neck  Cardiovascular: No complaints of chest pain, no palpitations, no ankle edema  Respiratory: No complaints of shortness of breath, no cough  Gastrointestinal: No complaints of jaundice, no bloody stools, no pale stools  Genitourinary: No complaints of dysuria, no hematuria, no nocturia, no frequent urination, no urethral discharge  Musculoskeletal: No complaints of weakness, paralysis, joint stiffness or arthralgias  Integumentary: No complaints of rash, no new lesions  Neurological: No complaints of convulsions, no seizures, no dizziness  Hematologic/Lymphatic: No complaints of easy bruising  Endocrine:  No hot or cold intolerance  No polydipsia, polyphagia, or polyuria  Allergy/immunology:  No environmental allergies  No food allergies  Not immunocompromised  Skin:  No pallor or rash  No wound          Patient Active Problem List   Diagnosis    ED (erectile dysfunction)    Colon cancer metastasized to liver (HCC)    GERD (gastroesophageal reflux disease)    Pulmonary nodule, right    Acneiform rash    Encounter for follow-up examination after completed treatment for malignant neoplasm    Other hydronephrosis    Annual physical exam    Vapes nicotine containing substance     Past Medical History:   Diagnosis Date    Cancer ([facility] [address] )     Dysuria     Last Assessed:8/24/17    GERD (gastroesophageal reflux disease)     Liver nodule     Pulmonary nodule, right     Ureteropelvic junction (UPJ) obstruction 1/29/2020     Past Surgical History:   Procedure Laterality Date    ABDOMINAL SURGERY      COLON SURGERY      COLONOSCOPY N/A 1/22/2018    Procedure: COLONOSCOPY;  Surgeon: Samra Oh MD;  [facility]: [facility]; Service: Colorectal   Gonzalez DENTAL SURGERY  2016    Crown with bridge work    FLEXIBLE SIGMOIDOSCOPY N/A 6/15/2018    Procedure: SIGMOIDOSCOPY FLEXIBLE w/o sedation w/ tattoo;  Surgeon: Samra Oh MD;  [facility];   Service: Colorectal    LAPAROTOMY N/A 6/8/2020    Procedure: LAPAROTOMY EXPLORATORY, LYSIS OF ADHESIONS;  Surgeon: Violet Rosas MD;  [facility];  Service: Surgical Oncology    LIVER LOBECTOMY N/A 6/21/2018    Procedure: liver resection/ablation, intraoperative ultrasound of liver;  Surgeon: Violet Rosas MD;  [facility];  Service: Surgical Oncology    LIVER LOBECTOMY N/A 6/8/2020    Procedure: LIVER RESECTION SEGMENT 3 WITH  INTRAOPERATIVE U/S OF LIVER;  Surgeon: Violet Rosas MD;  [facility];  Service: Surgical Oncology    IA EXPLORATORY OF ABDOMEN N/A 6/21/2018    Procedure: LAPAROTOMY EXPLORATORY;  Surgeon: Samra Oh MD;  [facility];  Service: Colorectal    IA INSERT PICC W/ SUB-Q PORT N/A 1/25/2018    Procedure: PORT-A-CATHETER PLACEMENT  Fluoroscopy for performance/interpretation;  Surgeon: Samra Oh MD;  [facility];  Service: Colorectal    IA PART REMOVAL COLON W ANASTOMOSIS Left 6/21/2018    Procedure: hemicolectomy, low anterior resection (open) SPY fluorescence angiography;  Surgeon: Samra Oh MD;  [facility];  Service: Colorectal    IA PROCTECTOMY,PARTIAL N/A 6/21/2018    Procedure: Partial proctectomy ;  Surgeon: Samra Oh MD;  [facility];  Service: Colorectal    IA SIGMOIDOSCOPY FLX DX W/COLLJ SPEC BR/WA IF PFRMD N/A 6/21/2018    Procedure: Brody Pabon;  Surgeon: Samra Oh MD;  [facility];  Service: Colorectal    ROOT CANAL  2015    TESTICLE SURGERY      Exploration of Undescended Testis rIght, age 8 had surgery for undescended testicle;  Last Assessed:8/24/17    TOOTH EXTRACTION  2015     Family History   Problem Relation Age of Onset    No Known Problems Father     Cancer Mother         unknown    Cancer Brother         pt  reports brother was diagnosed with stage 4 throat cancer    Throat cancer Brother      Social History     Socioeconomic History    Marital status: Single     Spouse name: Not on file    Number of children: Not on file    Years of education: Not on file    Highest education level: Not on file   Occupational History    Not on file   Social Needs    Financial resource strain: Not on file    Food insecurity     Worry: Not on file     Inability: Not on file    Transportation needs     Medical: Not on file     Non-medical: Not on file   Tobacco Use    Smoking status: Former Smoker     Types: E-Cigarettes    Smokeless tobacco: Current User    Tobacco comment: quit 4 years ago / smoked for 20 years    Substance and Sexual Activity    Alcohol use: Yes     Comment: socially - 2 month    Drug use: Yes     Types: Marijuana     Comment: occasional recreation drug use    Sexual activity: Yes     Comment: Resides alone   Lifestyle    Physical activity     Days per week: Not on file     Minutes per session: Not on file    Stress: Not on file   Relationships    Social connections     Talks on phone: Not on file     Gets together: Not on file     Attends Restoration service: Not on file     Active member of club or organization: Not on file     Attends meetings of clubs or organizations: Not on file     Relationship status: Not on file    Intimate partner violence     Fear of current or ex partner: Not on file     Emotionally abused: Not on file     Physically abused: Not on file     Forced sexual activity: Not on file   Other Topics Concern    Not on file   Social History Narrative    Not on file       Current Outpatient Medications:     clindamycin (CLEOCIN T) 1 % lotion, Apply topically 2 (two) times a day To affected area, Disp: 60 mL, Rfl: 2    minocycline (DYNACIN) 100 MG tablet, Take 1 tablet (100 mg total) by mouth daily, Disp: 30 tablet, Rfl: 11    prochlorperazine (COMPAZINE) 10 mg tablet, Take 1 tablet (10 mg total) by mouth every 6 (six) hours as needed for nausea or vomiting, Disp: 45 tablet, Rfl: 3    sildenafil (REVATIO) 20 mg tablet, Take 1 tablet (20 mg total) by mouth as needed (PRN), Disp: 90 tablet, Rfl: 1  No current facility-administered medications for this visit  Facility-Administered Medications Ordered in Other Visits:     magnesium sulfate 2 g/50 mL IVPB (premix) 2 g, 2 g, Intravenous, Once PRN, Margarita Osuna MD, Stopped at 01/11/21 1501  No Known Allergies  Vitals:    01/12/21 1549   BP: 124/80   Pulse: 78   Resp: 16   Temp: 99 6 °F (37 6 °C)       Physical Exam  Constitutional: General appearance: The Patient is well-developed and well-nourished who appears the stated age in no acute distress  Patient is pleasant and talkative  HEENT:  Normocephalic  Sclerae are anicteric  Mucous membranes are moist  Neck is supple without adenopathy  No JVD  Chest: The lungs are clear to auscultation  Cardiac: Heart is regular rate  Abdomen: Abdomen is soft, non-tender, non-distended and without masses  Extremities: There is no clubbing or cyanosis  There is no edema  Symmetric  Neuro: Grossly nonfocal  Gait is normal      Lymphatic: No evidence of cervical adenopathy bilaterally  No evidence of axillary adenopathy bilaterally  Skin: Warm, anicteric  Psych:  Patient is pleasant and talkative  Breasts:        Pathology:  [unfilled]    Labs:  Lab Results   Component Value Date    CEA 0 7 10/02/2020         Imaging  Ct Chest W Contrast    Result Date: 1/11/2021  Narrative: CT CHEST WITH IV CONTRAST INDICATION:   C18 9: Malignant neoplasm of colon, unspecified C78 7: Secondary malignant neoplasm of liver and intrahepatic bile duct    Patient underwent with sections/ablation of a lesion in segment 7 in June 2018  Patient underwent wedge resection of a segment 3 lesion in June 2020  Patient has been undergoing chemotherapy  Most recent CEA level is normal  COMPARISON:  October 8, 2020  TECHNIQUE: CT examination of the chest was performed  Axial, sagittal, and coronal 2D reformatted images were created from the source data and submitted for interpretation  Radiation dose length product (DLP) for this visit:  232 mGy-cm   This examination, like all CT scans performed in the [facility], was performed utilizing techniques to minimize radiation dose exposure, including the use of iterative reconstruction and automated exposure control  IV Contrast:  85 mL of iohexol (OMNIPAQUE) FINDINGS: LUNGS:  Again noted are small noncalcified benign pulmonary nodules on the order of between 2 and 4 mm in size, unchanged since December 2017 in the right middle lobe (3/88), right lower lobe (3/80, 96), and left lower lobe (3/76, 85)  Benign intrapulmonary lymph node abutting the minor fissure (3/69)  No new or enlarging pulmonary mass  No consolidative or groundglass airspace opacity to suggest infectious process  No tracheal or endobronchial lesion  Mild paraseptal emphysematous changes most notable in the apices  PLEURA:  Unremarkable  HEART/GREAT VESSELS:  Moderate calcification of the left anterior descending coronary artery indicating atherosclerotic heart disease  No thoracic aortic aneurysm  MEDIASTINUM AND MAHENDRA:  Right port at cavoatrial junction  No mediastinal or hilar lymphadenopathy  CHEST WALL AND LOWER NECK:   Unremarkable   VISUALIZED STRUCTURES IN THE UPPER ABDOMEN:  Hypodensity along the ventral margin of segment 3 of the left hepatic lobe, and in the dome of the right hepatic lobe just cephalad to the right middle hepatic vein remain unchanged in size and CT appearance from previous examination consistent with treated hepatic metastases, better evaluated on concurrently performed pelvic MRI  OSSEOUS STRUCTURES:  No acute fracture or destructive osseous lesion  Impression: No CT evidence of suspicious pulmonary metastatic disease  Benign pulmonary nodules on the order of 2 to 4 mm in size, unchanged at least as far back as December 2017  Again identified is calcification of the left anterior descending coronary artery indicating atherosclerotic heart disease, greater than expected for the patient's age  Redemonstration of treated liver metastases, better evaluated on concurrently performed hepatic MRI  Workstation performed: [id number]     Mri Abdomen W Wo Contrast    Result Date: 1/11/2021  Narrative: MRI - ABDOMEN - WITH AND WITHOUT CONTRAST INDICATION:  Metastatic colon cancer  Patient underwent with sections/ablation of a lesion in segment 7 in June 2018  Patient underwent wedge resection of a segment 3 lesion in June 2020  Patient has been undergoing chemotherapy  Most recent CEA level is normal  COMPARISON: Multiple prior examinations, most recently September 23, 2020 TECHNIQUE:  The following pulse sequences were obtained prior to and following the administration of intravenous contrast:     Coronal and axial T2 with TE of 90 and 180 respectively, axial T2 with fat saturation, axial FIESTA fat-sat, axial T1-weighted in-and-out-of phase, axial DWI/ADC, precontrast axial T1 with fat saturation, post-contrast dynamic axial T1 with fat saturation at 20, 70, and 180 seconds, coronal T1  with fat saturation and 7 minute delayed axial T1 with fat saturation  IV Contrast:  7 mL of Gadobutrol injection (SINGLE-DOSE) FINDINGS: LOWER CHEST:   Unremarkable   LIVER: Postsurgical change of wedge resection along the ventral margin of segment 3 best visualized on image 155 of series 13, similar appearance from previous examination without developing solid soft tissue enhancement or diffusion restriction to suggest recurrent mass in this location  An irregular focus of signal abnormality with its epicenter directly cephalad to the middle hepatic vein (although the lesion is partially within both segments 7 and 8, this corresponds to the described treated segment 7 lesion) measuring 2 5 x 1 9 cm on  image 20 of series 13, hypointense on T1 imaging with very subtle T2 hyperintensity and thin uniform rind of peripheral enhancement that is similar from prior examination and is consistent with treated lesion demonstrating no evidence of increasing enhancement or increase in size  No new, suspicious, or enlarging hepatic mass is identified  Patent portal and hepatic veins noted  Normal hepatic contours  BILE DUCTS:  No intrahepatic or extrahepatic bile duct dilation  Common bile duct is normal in caliber  No choledocholithiasis, biliary stricture or suspicious mass  GALLBLADDER:  Normal  PANCREAS:  Normal  No main pancreatic ductal dilation  ADRENAL GLANDS:  Normal  SPLEEN:  Normal  KIDNEYS/PROXIMAL URETERS: Large left-sided parapelvic renal cyst is again noted  Chronic moderate left hydronephrosis with marked dilatation of an extrarenal pelvis and normal caliber left ureter, features typical of a congenital high-grade UPJ obstruction  Both kidneys otherwise unremarkable  No suspicious renal mass  BOWEL:   Unremarkable  No dilated loops of bowel  PERITONEAL CAVITY/RETROPERITONEUM:  No mass or ascites  LYMPH NODES:  No abdominal lymphadenopathy  VASCULAR STRUCTURES:  Unremarkable  No aortic aneurysm  ABDOMINAL WALL:  Fat-containing umbilical hernia, unchanged  OSSEOUS STRUCTURES:  Normal marrow signal and enhancement  No evidence of recent fracture, mass or marrow replacing process  Impression: 1  Two liver lesions as described above, not significantly changed since at least as far back as MRI performed May 12, 2020, consistent with treated metastases  No new or enlarging hepatic mass is identified  2   No new abnormality in the abdomen  Workstation performed: [id number]     I reviewed the above laboratory and imaging data  Discussion/Summary: 44-year-old male with metastatic rectosigmoid cancer  His MRI is stable  I would recommend continued observation  He has already scheduled for a repeat MRI and CT in 4 months  I will see him again at that time for another clinical exam    He is agreeable to this plan    All his questions were answered

## 2021-03-15 ENCOUNTER — HOSPITAL ENCOUNTER (OUTPATIENT)
Dept: RADIOLOGY | Age: 51
Discharge: HOME | End: 2021-03-15
Payer: COMMERCIAL

## 2021-03-15 DIAGNOSIS — N13.30 HYDRONEPHROSIS, UNSPECIFIED HYDRONEPHROSIS TYPE: ICD-10-CM

## 2021-03-15 PROCEDURE — 76770 US EXAM ABDO BACK WALL COMP: CPT

## 2021-03-22 ENCOUNTER — OFFICE VISIT (OUTPATIENT)
Dept: UROLOGY | Facility: AMBULATORY SURGERY CENTER | Age: 51
End: 2021-03-22
Payer: COMMERCIAL

## 2021-03-22 VITALS
DIASTOLIC BLOOD PRESSURE: 80 MMHG | BODY MASS INDEX: 27.99 KG/M2 | HEIGHT: 65 IN | WEIGHT: 168 LBS | SYSTOLIC BLOOD PRESSURE: 112 MMHG

## 2021-03-22 DIAGNOSIS — N13.30 HYDRONEPHROSIS, UNSPECIFIED HYDRONEPHROSIS TYPE: ICD-10-CM

## 2021-03-22 DIAGNOSIS — N52.8 OTHER MALE ERECTILE DYSFUNCTION: Primary | ICD-10-CM

## 2021-03-22 PROCEDURE — 99213 OFFICE O/P EST LOW 20 MIN: CPT | Performed by: NURSE PRACTITIONER

## 2021-03-22 PROCEDURE — 3008F BODY MASS INDEX DOCD: CPT | Performed by: SURGERY

## 2021-03-22 NOTE — PROGRESS NOTES
3/22/2021    Radames Davey  1970  [id number]      Assessment  -Left sided hydronephrosis  -Erectile dysfunction     Discussion/Plan  Merlinda Forester is a 52 y o  male being managed by our office    1  Left sided hydronephrosis-   We reviewed the results of his recent renal ultrasound which identified unchanged left parapelvic cyst measuring 4 5 cm with left-sided hydronephrosis  Findings appear unchanged compared to previous imaging in 2017  Current findings also noted on recent MRI performed in January  He will continue to receive repeat imaging and close observation by his oncologist  For history of metastatic colon cancer  Recent creatinine from January 2021 was 0 9   2  Erectile dysfunction-   Patient will continue to use sildenafil as needed prior to sexual activity    Will review recent imaging with MD to ensure there are no additional tests needed at this time  I advised patient that I will call him with any recommendations  Patient will otherwise return in 6 months for follow-up, or sooner if needed  He was instructed to call with any issues    -All questions answered, patient agrees with plan      History of Present Illness  52 y o  male with a history of hydronephrosis presents today for follow up  He has a history of metastatic colon cancer and completed chemotherapy treatment 2 months ago  He continues to follow with Hematology Oncology and Surgical Oncology  Patient has had known history of left-sided hydronephrosis since at least 2017  He remains asymptomatic and denies any episodes of flank pain, lower urinary tract symptoms, gross hematuria, or dysuria  Patient has had no recent urinary tract infections  His previous imaging had identified stability and creatinine stable  Patient denies any family history of bladder or urologic malignancy  He continues to use sildenafil as needed prior to sexual activity  Review of Systems  Review of Systems   Constitutional: Negative  HENT: Negative  Respiratory: Negative  Cardiovascular: Negative  Gastrointestinal: Negative  Genitourinary: Negative for decreased urine volume, difficulty urinating, dysuria, flank pain, frequency, hematuria and urgency  Musculoskeletal: Negative  Skin: Negative  Neurological: Negative  Psychiatric/Behavioral: Negative  Past Medical History  Past Medical History:   Diagnosis Date    Cancer ([address] )     Dysuria     Last Assessed:8/24/17    GERD (gastroesophageal reflux disease)     Liver nodule     Pulmonary nodule, right     Ureteropelvic junction (UPJ) obstruction 1/29/2020       Past Social History  Past Surgical History:   Procedure Laterality Date    ABDOMINAL SURGERY      COLON SURGERY      COLONOSCOPY N/A 1/22/2018    Procedure: COLONOSCOPY;  Surgeon: Marbin Urias MD;  [facility]: [facility]; Service: Colorectal   [facility] DENTAL SURG[facility]  2016    Crown with bridge work    FLEXIBLE SIGMOIDOSCOPY N/A 6/15/2018    Procedure: SIGMOIDOSCOPY FLEXIBLE w/o sedation w/ tattoo;  Surgeon: Marbin Urias MD;  [facility];   Service: Colorectal    LAPAROTOMY N/A 6/8/2020    Procedure: LAPAROTOMY EXPLORATORY, LYSIS OF ADHESIONS;  Surgeon: Eliana Zavala MD;  [facility];  Service: Surgical Oncology    LIVER LOBECTOMY N/A 6/21/2018    Procedure: liver resection/ablation, intraoperative ultrasound of liver;  Surgeon: Eliana Zavala MD;  [facility];  Service: Surgical Oncology    LIVER LOBECTOMY N/A 6/8/2020    Procedure: LIVER RESECTION SEGMENT 3 WITH  INTRAOPERATIVE U/S OF LIVER;  Surgeon: Eliana Zavala MD;  [facility];  Service: Surgical Oncology    NH EXPLORATORY OF ABDOMEN N/A 6/21/2018    Procedure: LAPAROTOMY EXPLORATORY;  Surgeon: Marbin Urias MD;  [facility];  Service: Colorectal    NH INSERT PICC W/ SUB-Q PORT N/A 1/25/2018    Procedure: PORT-A-CATHETER PLACEMENT  Fluoroscopy for performance/interpretation;  Surgeon: Marbin Urias MD; [facility];  Service: Colorectal    HI PART REMOVAL COLON W ANASTOMOSIS Left 6/21/2018    Procedure: hemicolectomy, low anterior resection (open) SPY fluorescence angiography;  Surgeon: Deborah Rhodes MD;  [facility];  Service: Colorectal    HI PROCTECTOMY,PARTIAL N/A 6/21/2018    Procedure: Partial proctectomy ;  Surgeon: Deborah Rhodes MD;  [facility];  Service: Colorectal    HI SIGMOIDOSCOPY FLX DX W/COLLJ SPEC BR/WA IF PFRMD N/A 6/21/2018    Procedure: Steva Manner;  Surgeon: Deborah Rhodes MD;  [facility];  Service: Colorectal    ROOT CANAL  2015    TESTICLE SURGERY      Exploration of Undescended Testis rIght, age 8 had surgery for undescended testicle;  Last Assessed:8/24/17    TOOTH EXTRACTION  2015       Past Family History  Family History   Problem Relation Age of Onset    No Known Problems Father     Cancer Mother         unknown    Cancer Brother         pt  reports brother was diagnosed with stage 4 throat cancer    Throat cancer Brother        Past Social history  Social History     Socioeconomic History    Marital status: Single     Spouse name: Not on file    Number of children: Not on file    Years of education: Not on file    Highest education level: Not on file   Occupational History    Not on file   Social Needs    Financial resource strain: Not on file    Food insecurity     Worry: Not on file     Inability: Not on file    Transportation needs     Medical: Not on file     Non-medical: Not on file   Tobacco Use    Smoking status: Former Smoker     Types: E-Cigarettes    Smokeless tobacco: Current User    Tobacco comment: quit 4 years ago / smoked for 20 years    Substance and Sexual Activity    Alcohol use: Yes     Comment: socially - 2 month    Drug use: Yes     Types: Marijuana     Comment: occasional recreation drug use    Sexual activity: Yes     Comment: Resides alone   Lifestyle    Physical activity Days per week: Not on file     Minutes per session: Not on file    Stress: Not on file   Relationships    Social connections     Talks on phone: Not on file     Gets together: Not on file     Attends Restorationism service: Not on file     Active member of club or organization: Not on file     Attends meetings of clubs or organizations: Not on file     Relationship status: Not on file    Intimate partner violence     Fear of current or ex partner: Not on file     Emotionally abused: Not on file     Physically abused: Not on file     Forced sexual activity: Not on file   Other Topics Concern    Not on file   Social History Narrative    Not on file       Current Medications  Current Outpatient Medications   Medication Sig Dispense Refill    sildenafil (REVATIO) 20 mg tablet Take 1 tablet (20 mg total) by mouth as needed (PRN) 90 tablet 1    clindamycin (CLEOCIN T) 1 % lotion Apply topically 2 (two) times a day To affected area (Patient not taking: Reported on 3/22/2021) 60 mL 2    minocycline (DYNACIN) 100 MG tablet Take 1 tablet (100 mg total) by mouth daily (Patient not taking: Reported on 3/22/2021) 30 tablet 11    prochlorperazine (COMPAZINE) 10 mg tablet Take 1 tablet (10 mg total) by mouth every 6 (six) hours as needed for nausea or vomiting (Patient not taking: Reported on 3/22/2021) 45 tablet 3     No current facility-administered medications for this visit  Allergies  No Known Allergies    Past Medical History, Social History, Family History, medications and allergies were reviewed  Vitals  Vitals:    03/22/21 1437   BP: 112/80   BP Location: Left arm   Patient Position: Sitting   Cuff Size: Adult   Weight: 76 2 kg (168 lb)   Height: 5' 5 1" (1 654 m)       Physical Exam  Physical Exam  Constitutional:       Appearance: Normal appearance  He is well-developed  HENT:      Head: Normocephalic  Eyes:      Pupils: Pupils are equal, round, and reactive to light     Neck:      Musculoskeletal: Normal range of motion  Pulmonary:      Effort: Pulmonary effort is normal    Abdominal:      Palpations: Abdomen is soft  Genitourinary:     Comments: Negative CVA tenderness  Musculoskeletal: Normal range of motion  Skin:     General: Skin is warm and dry  Neurological:      General: No focal deficit present  Mental Status: He is alert and oriented to person, place, and time  Psychiatric:         Mood and Affect: Mood normal          Behavior: Behavior normal          Thought Content: Thought content normal          Judgment: Judgment normal          Results    I have personally reviewed all pertinent lab results and reviewed with patient  No results found for: PSA  Lab Results   Component Value Date    GLUCOSE 124 12/01/2017    CALCIUM 8 4 01/11/2021     08/26/2017    K 3 8 01/11/2021    CO2 24 01/11/2021     01/11/2021    BUN 8 01/12/2021    CREATININE 0 90 01/12/2021     Lab Results   Component Value Date    WBC 6 69 01/11/2021    HGB 15 3 01/11/2021    HCT 45 5 01/11/2021    MCV 95 01/11/2021     01/11/2021     No results found for this or any previous visit (from the past 1 hour(s))

## 2021-03-23 ENCOUNTER — TELEPHONE (OUTPATIENT)
Dept: UROLOGY | Facility: AMBULATORY SURGERY CENTER | Age: 51
End: 2021-03-23

## 2021-03-29 NOTE — PROGRESS NOTES
NM renal lasix scan ordered per MD recommendation   Please notify patient and assist with scheduling

## 2021-03-30 ENCOUNTER — TELEPHONE (OUTPATIENT)
Dept: UROLOGY | Facility: AMBULATORY SURGERY CENTER | Age: 51
End: 2021-03-30

## 2021-03-30 NOTE — TELEPHONE ENCOUNTER
LM for Cally Duran going over 3/2 appointment that would verify with MD if any other testing is needed  Dr Kassidy Bloom recommended [facility] w Rome Singh and function  Gave him central scheduling number [phone] or he can contact our office and we can assist him

## 2021-03-30 NOTE — TELEPHONE ENCOUNTER
Patient returned call wanted to know what this test was for  Advised and gave phone number for central schedule  Patient will call back once its scheduled to see if he needs to get follow up appointment or if he calls for results

## 2021-03-31 NOTE — TELEPHONE ENCOUNTER
Renal scan scheduled for 4/12/21  Will monitor for results and schedule appropriate follow up based on results

## 2021-04-12 ENCOUNTER — HOSPITAL ENCOUNTER (OUTPATIENT)
Dept: NUCLEAR MEDICINE | Facility: HOSPITAL | Age: 51
Discharge: HOME | End: 2021-04-12
Payer: COMMERCIAL

## 2021-04-12 DIAGNOSIS — N13.30 HYDRONEPHROSIS, UNSPECIFIED HYDRONEPHROSIS TYPE: ICD-10-CM

## 2021-04-12 PROCEDURE — A9562 TC99M MERTIATIDE: HCPCS

## 2021-04-12 PROCEDURE — G1004 CDSM NDSC: HCPCS

## 2021-04-12 PROCEDURE — 78708 K FLOW/FUNCT IMAGE W/DRUG: CPT

## 2021-04-12 RX ORDER — FUROSEMIDE 10 MG/ML
40 INJECTION INTRAMUSCULAR; INTRAVENOUS ONCE
Status: COMPLETED | OUTPATIENT
Start: 2021-04-12 | End: 2021-04-12

## 2021-04-12 RX ADMIN — FUROSEMIDE 40 MG: 10 INJECTION, SOLUTION INTRAMUSCULAR; INTRAVENOUS at 10:19

## 2021-04-15 NOTE — TELEPHONE ENCOUNTER
Patient scheduled with Dr Kathrin Frausto on 5/19 @ 8:00 to review NM test 4/12 and MRI on 5/12 @ 8:00 Please call patient and advise of appointment

## 2021-04-15 NOTE — TELEPHONE ENCOUNTER
Please review NM testing and advise  IMPRESSION:     1   Left hydronephrosis with radiotracer retention on the pre-Lasix images, and only partial clearance on the post Lasix images  Findings suggest persistent partial obstruction in the left renal collecting system  2   Symmetric renal function      Patient is not scheduled for follow up until next year

## 2021-04-28 ENCOUNTER — IMMUNIZATIONS (OUTPATIENT)
Dept: FAMILY MEDICINE CLINIC | Facility: HOSPITAL | Age: 51
End: 2021-04-28

## 2021-04-28 DIAGNOSIS — Z23 ENCOUNTER FOR IMMUNIZATION: Primary | ICD-10-CM

## 2021-04-28 PROCEDURE — 91300 SARS-COV-2 / COVID-19 MRNA VACCINE (PFIZER-BIONTECH) 30 MCG: CPT

## 2021-04-28 PROCEDURE — 0001A SARS-COV-2 / COVID-19 MRNA VACCINE (PFIZER-BIONTECH) 30 MCG: CPT

## 2021-05-11 ENCOUNTER — HOSPITAL ENCOUNTER (OUTPATIENT)
Dept: CT IMAGING | Facility: HOSPITAL | Age: 51
Discharge: HOME | End: 2021-05-11
Attending: INTERNAL MEDICINE
Payer: COMMERCIAL

## 2021-05-11 ENCOUNTER — HOSPITAL ENCOUNTER (OUTPATIENT)
Dept: MRI IMAGING | Facility: HOSPITAL | Age: 51
Discharge: HOME | End: 2021-05-11
Attending: INTERNAL MEDICINE
Payer: COMMERCIAL

## 2021-05-11 DIAGNOSIS — C20 MALIGNANT NEOPLASM OF RECTUM (HCC): ICD-10-CM

## 2021-05-11 DIAGNOSIS — C78.7 COLON CANCER METASTASIZED TO LIVER (HCC): ICD-10-CM

## 2021-05-11 DIAGNOSIS — C18.9 COLON CANCER METASTASIZED TO LIVER (HCC): ICD-10-CM

## 2021-05-11 DIAGNOSIS — C78.7 METASTATIC COLON CANCER TO LIVER (HCC): ICD-10-CM

## 2021-05-11 DIAGNOSIS — C18.9 METASTATIC COLON CANCER TO LIVER (HCC): ICD-10-CM

## 2021-05-11 PROCEDURE — A9585 GADOBUTROL INJECTION: HCPCS | Performed by: INTERNAL MEDICINE

## 2021-05-11 PROCEDURE — 74183 MRI ABD W/O CNTR FLWD CNTR: CPT

## 2021-05-11 PROCEDURE — 71260 CT THORAX DX C+: CPT

## 2021-05-11 RX ADMIN — IOHEXOL 85 ML: 350 INJECTION, SOLUTION INTRAVENOUS at 11:41

## 2021-05-11 RX ADMIN — GADOBUTROL 7 ML: 604.72 INJECTION INTRAVENOUS at 12:39

## 2021-05-11 NOTE — NURSING NOTE
Right chest port accessed  No blood return noted  Port de-accessed  Peripheral line placed and used for scan

## 2021-05-14 ENCOUNTER — OFFICE VISIT (OUTPATIENT)
Dept: SURGICAL ONCOLOGY | Facility: CLINIC | Age: 51
End: 2021-05-14
Payer: COMMERCIAL

## 2021-05-14 VITALS
RESPIRATION RATE: 17 BRPM | WEIGHT: 162.2 LBS | BODY MASS INDEX: 27.02 KG/M2 | HEART RATE: 67 BPM | HEIGHT: 65 IN | DIASTOLIC BLOOD PRESSURE: 82 MMHG | TEMPERATURE: 98.6 F | SYSTOLIC BLOOD PRESSURE: 122 MMHG

## 2021-05-14 DIAGNOSIS — C78.7 COLON CANCER METASTASIZED TO LIVER (HCC): Primary | ICD-10-CM

## 2021-05-14 DIAGNOSIS — C18.9 COLON CANCER METASTASIZED TO LIVER (HCC): Primary | ICD-10-CM

## 2021-05-14 PROCEDURE — 99213 OFFICE O/P EST LOW 20 MIN: CPT | Performed by: SURGERY

## 2021-05-14 NOTE — PROGRESS NOTES
Surgical Oncology Follow Up       [address]  [facility]  [address] [facility]  [place] [address]    Lajune Carrel Gobles Roles  1970  [id number]  [address] [facility]  [facility]  [address] [facility] [address]  [place] [address]    Diagnoses and all orders for this visit:    Colon cancer metastasized to liver [facility])        Chief Complaint   Patient presents with    Follow-up       Return in about 4 months (around 9/14/2021) for Office Visit  Oncology History   Colon cancer metastasized to liver ([facility] [address] )   1/2018 Initial Diagnosis    Malignant neoplasm of sigmoid colon ([facility] [address] )     1/22/2018 Biopsy    Large Intestine, Sigmoid Colon, Recto-sigmoid tumor bx:    - Invasive colonic adenocarcinoma, moderately differentiated     2/6/2018 - 10/16/2018 Chemotherapy    Modified FOLFOX6 x 12 cycles  Added Erbitux on 3/20/18      6/21/2018 Surgery    Segment 7 liver resection/ablation, hemicolectomy     10/30/2018 -  Chemotherapy    Continuation of Single agent Erbitux  With 10/30/18 chemo, it was Cycle #14  Plan was for 6 additional cycles of Erbitux alone       3/2020 Genetic Testing    NEGATIVE for genes tested:    Test(s): [employer] + [employer] (34 genes): APC, ETHAN, BARD1, BRCA1, BRCA2, BRIP1, BMPR1A, CDH1, CDK4, CDKN2A, CHEK2, DICER1, EPCAM, GREM1, HOXB13, MLH1, MRE11A, MSH2, MSH6, MUTYH, NBN, NF1, PALB2, PMS2, POLD1, POLE, PTEN, RAD50, RAD51C, RAD51D, SMAD4, SMARCA4, STK11, TP53      6/8/2020 Surgery    Liver, segment 3 (wedge resection):  - Metastatic adenocarcinoma, consistent with the patient's known colon primary  - Margins negative for carcinoma      7/27/2020 -  Chemotherapy    fluorouracil (ADRUCIL) injection 750 mg, 745 mg, Intravenous, Once, 6 of 6 cycles  Administration: 750 mg (7/27/2020), 750 mg (8/10/2020), 745 mg (8/24/2020), 745 mg (9/8/2020), 745 mg (9/21/2020), 745 mg (10/5/2020)  pegfilgrastim (Ehsan Hernadez) subcutaneous injection 6 mg, 6 mg, Subcutaneous, Once, 1 of 1 cycle  Administration: 6 mg (9/24/2020)  pegfilgrastim (NEULASTA ONPRO) subcutaneous injection kit 6 mg, 6 mg, Subcutaneous, Once, 6 of 6 cycles  Administration: 6 mg (7/29/2020), 6 mg (8/12/2020), 6 mg (8/26/2020), 6 mg (9/10/2020), 6 mg (10/7/2020)  cetuximab (ERBITUX) 900 mg in IVPB 450 mL, 930 mg, Intravenous, Once, 12 of 12 cycles  Administration: 900 mg (7/27/2020), 900 mg (8/10/2020), 900 mg (8/24/2020), 900 mg (9/8/2020), 900 mg (9/21/2020), 900 mg (10/5/2020), 900 mg (10/19/2020), 900 mg (11/2/2020), 900 mg (11/16/2020), 900 mg (11/30/2020), 900 mg (12/28/2020), 900 mg (1/11/2021)  irinotecan (CAMPTOSAR) 340 mg in sodium chloride 0 9 % 500 mL chemo infusion, 335 mg, Intravenous, Once, 6 of 6 cycles  Administration: 340 mg (7/27/2020), 340 mg (8/10/2020), 340 mg (8/24/2020), 340 mg (9/8/2020), 340 mg (9/21/2020), 340 mg (10/5/2020)  leucovorin 750 mg in sodium chloride 0 9 % 250 mL IVPB, 744 mg, Intravenous, Once, 6 of 6 cycles  Administration: 750 mg (7/27/2020), 750 mg (8/10/2020), 750 mg (8/24/2020), 750 mg (9/8/2020), 750 mg (9/21/2020), 740 mg (10/5/2020)         Staging:  Metastatic rectosigmoid cancer  [id number]  Treatment history:   Modified FOLFOX 6   Erbitux added 03/20/2018   Segment 7 liver resection, June 2018  Segment 3 mass liver resection, June 2020  Chemotherapy with Erbitux  Current treatment:  Observation  Disease status:   LUTHER    History of Present Illness:  Patient returns in follow-up of his metastatic colon cancer  He is doing well with no complaints  He denies any abdominal pain, nausea or vomiting  His appetite is good  No unintentional weight loss  Chest CT from May 11, 2021 reveals no lung metastasis  MRI of his liver the same date reveals postsurgical changes to the liver  I personally reviewed the films  He he has not followed up with the cardiologist regarding his cardiac calcifications      Review of Systems  Complete ROS Surg Onc:   Complete ROS Surg Onc:   Constitutional: The patient denies new or recent history of general fatigue, no recent weight loss, no change in appetite  Eyes: No complaints of visual problems, no scleral icterus  ENT: no complaints of ear pain, no hoarseness, no difficulty swallowing,  no tinnitus and no new masses in head, oral cavity, or neck  Cardiovascular: No complaints of chest pain, no palpitations, no ankle edema  Respiratory: No complaints of shortness of breath, no cough  Gastrointestinal: No complaints of jaundice, no bloody stools, no pale stools  Genitourinary: No complaints of dysuria, no hematuria, no nocturia, no frequent urination, no urethral discharge  Musculoskeletal: No complaints of weakness, paralysis, joint stiffness or arthralgias  Integumentary: No complaints of rash, no new lesions  Neurological: No complaints of convulsions, no seizures, no dizziness  Hematologic/Lymphatic: No complaints of easy bruising  Endocrine:  No hot or cold intolerance  No polydipsia, polyphagia, or polyuria  Allergy/immunology:  No environmental allergies  No food allergies  Not immunocompromised  Skin:  No pallor or rash  No wound          Patient Active Problem List   Diagnosis    ED (erectile dysfunction)    Colon cancer metastasized to liver (HCC)    GERD (gastroesophageal reflux disease)    Pulmonary nodule, right    Acneiform rash    Encounter for follow-up examination after completed treatment for malignant neoplasm    Other hydronephrosis    Annual physical exam    Vapes nicotine containing substance     Past Medical History:   Diagnosis Date    Cancer ([facility] [address] )     Dysuria     Last Assessed:8/24/17    GERD (gastroesophageal reflux disease)     Liver nodule     Pulmonary nodule, right     Ureteropelvic junction (UPJ) obstruction 1/29/2020     Past Surgical History:   Procedure Laterality Date    ABDOMINAL SURGERY      COLON SURGERY      COLONOSCOPY N/A 1/22/2018    Procedure: COLONOSCOPY;  Surgeon: Lorna Pichardo MD;  [facility]; Service: Colorectal   Kylie Courser DENTAL SURGERY  2016    Crown with bridge work    FLEXIBLE SIGMOIDOSCOPY N/A 6/15/2018    Procedure: SIGMOIDOSCOPY FLEXIBLE w/o sedation w/ tattoo;  Surgeon: Lorna Pichardo MD;  [facility];   Service: Colorectal    LAPAROTOMY N/A 6/8/2020    Procedure: LAPAROTOMY EXPLORATORY, LYSIS OF ADHESIONS;  Surgeon: Merlyn Barker MD;  [facility];  Service: Surgical Oncology    LIVER LOBECTOMY N/A 6/21/2018    Procedure: liver resection/ablation, intraoperative ultrasound of liver;  Surgeon: Merlyn Barker MD;  [facility];  Service: Surgical Oncology    LIVER LOBECTOMY N/A 6/8/2020    Procedure: LIVER RESECTION SEGMENT 3 WITH  INTRAOPERATIVE U/S OF LIVER;  Surgeon: Merlyn Barker MD;  [facility];  Service: Surgical Oncology    NY EXPLORATORY OF ABDOMEN N/A 6/21/2018    Procedure: LAPAROTOMY EXPLORATORY;  Surgeon: Lorna Pichardo MD;  [facility];  Service: Colorectal    NY INSERT PICC W/ SUB-Q PORT N/A 1/25/2018    Procedure: PORT-A-CATHETER PLACEMENT  Fluoroscopy for performance/interpretation;  Surgeon: Lorna Pichardo MD;  [facility];  Service: Colorectal    NY PART REMOVAL COLON W ANASTOMOSIS Left 6/21/2018    Procedure: hemicolectomy, low anterior resection (open) SPY fluorescence angiography;  Surgeon: Lorna Pichardo MD;  [facility];  Service: Colorectal    NY PROCTECTOMY,PARTIAL N/A 6/21/2018    Procedure: Partial proctectomy ;  Surgeon: Lorna Pichardo MD;  [facility];  Service: Colorectal    NY SIGMOIDOSCOPY FLX DX W/COLLJ SPEC BR/WA IF PFRMD N/A 6/21/2018    Procedure: [place];  Surgeon: Lorna Pichardo MD;  [facility];  Service: Colorectal    ROOT CANAL  2015    TESTICLE SURGERY      Exploration of Undescended Testis rIght, age 8 had surgery for undescended testicle;  Last Assessed:8/24/17    TOOTH EXTRACTION  2015     Family History   Problem Relation Age of Onset    No Known Problems Father     Cancer Mother         unknown    Cancer Brother         pt  reports brother was diagnosed with stage 4 throat cancer    Throat cancer Brother      Social History     Socioeconomic History    Marital status: Single     Spouse name: Not on file    Number of children: Not on file    Years of education: Not on file    Highest education level: Not on file   Occupational History    Not on file   Social Needs    Financial resource strain: Not on file    Food insecurity     Worry: Not on file     Inability: Not on file    Transportation needs     Medical: Not on file     Non-medical: Not on file   Tobacco Use    Smoking status: Former Smoker     Types: E-Cigarettes    Smokeless tobacco: Current User    Tobacco comment: quit 4 years ago / smoked for 20 years    Substance and Sexual Activity    Alcohol use: Yes     Comment: socially - 2 month    Drug use: Yes     Types: Marijuana     Comment: occasional recreation drug use    Sexual activity: Yes     Comment: Resides alone   Lifestyle    Physical activity     Days per week: Not on file     Minutes per session: Not on file    Stress: Not on file   Relationships    Social connections     Talks on phone: Not on file     Gets together: Not on file     Attends Holiness service: Not on file     Active member of club or organization: Not on file     Attends meetings of clubs or organizations: Not on file     Relationship status: Not on file    Intimate partner violence     Fear of current or ex partner: Not on file     Emotionally abused: Not on file     Physically abused: Not on file     Forced sexual activity: Not on file   Other Topics Concern    Not on file   Social History Narrative    Not on file       Current Outpatient Medications:     sildenafil (REVATIO) 20 mg tablet, Take 1 tablet (20 mg total) by mouth as needed (PRN), Disp: 90 tablet, Rfl: 1    clindamycin (CLEOCIN T) 1 % lotion, Apply topically 2 (two) times a day To affected area (Patient not taking: Reported on 3/22/2021), Disp: 60 mL, Rfl: 2    minocycline (DYNACIN) 100 MG tablet, Take 1 tablet (100 mg total) by mouth daily (Patient not taking: Reported on 3/22/2021), Disp: 30 tablet, Rfl: 11    prochlorperazine (COMPAZINE) 10 mg tablet, Take 1 tablet (10 mg total) by mouth every 6 (six) hours as needed for nausea or vomiting (Patient not taking: Reported on 3/22/2021), Disp: 45 tablet, Rfl: 3  No Known Allergies  Vitals:    05/14/21 1008   BP: 122/82   Pulse: 67   Resp: 17   Temp: 98 6 °F (37 °C)       Physical Exam  Constitutional: General appearance: The Patient is well-developed and well-nourished who appears the stated age in no acute distress  Patient is pleasant and talkative  HEENT:  Normocephalic  Sclerae are anicteric  Mucous membranes are moist  Neck is supple without adenopathy  No JVD  Chest: The lungs are clear to auscultation  Cardiac: Heart is regular rate  Abdomen: Abdomen is soft, non-tender, non-distended and without masses  Extremities: There is no clubbing or cyanosis  There is no edema  Symmetric  Neuro: Grossly nonfocal  Gait is normal      Lymphatic: No evidence of cervical adenopathy bilaterally  No evidence of axillary adenopathy bilaterally  No evidence of inguinal adenopathy bilaterally  Skin: Warm, anicteric  Psych:  Patient is pleasant and talkative  Breasts:        Pathology:  [unfilled]    Labs:      Imaging  Ct Chest W Contrast    Result Date: 5/13/2021  Narrative: CT CHEST WITH IV CONTRAST INDICATION:   C18 9: Malignant neoplasm of colon, unspecified C78 7: Secondary malignant neoplasm of liver and intrahepatic bile duct C20: Malignant neoplasm of rectum C18 9: Malignant neoplasm of colon, unspecified C78 7: Secondary malignant neoplasm of liver and intrahepatic bile duct   COMPARISON: Chest CT from 1/7/2021 and 12/1/2017  TECHNIQUE: Chest CT with intravenous contrast   Axial, sagittal, coronal 2D reformats and coronal MIPS from source data  Radiation dose length product (DLP):  225 mGy-cm   Radiation dose exposure minimized using iterative reconstruction and automated exposure control  IV Contrast:  85 mL of iohexol (OMNIPAQUE) FINDINGS: LUNGS:  No metastases  4 mm and smaller bilateral lung nodules, stable since 2017 and benign (marked on series 3)  Benign intrapulmonary lymph node abutting the minor fissure (3/77)  Benign calcified granulomas  Mild paraseptal emphysema  AIRWAYS: No significant filling defects  PLEURA:  Unremarkable  HEART/GREAT VESSELS:  Normal heart size  Mild coronary artery calcification indicating atherosclerotic heart disease  MEDIASTINUM AND MAHENDRA:  Right port at cavoatrial junction  CHEST WALL AND LOWER NECK: Unremarkable  UPPER ABDOMEN:  Treated metastasis in the dome of the liver  Left parapelvic cysts  OSSEOUS STRUCTURES: Moderate degenerative disease in the spine  Benign bone island in the right humeral head  Impression: No lung metastases  Workstation performed: [id number]     Mri Abdomen W Wo Contrast    Result Date: 5/13/2021  Narrative: MRI - ABDOMEN - WITH AND WITHOUT CONTRAST INDICATION:  Colon carcinoma with liver metastases COMPARISON: MR abdomen 1/7/2021 TECHNIQUE:  The following pulse sequences were obtained prior to and following the administration of intravenous contrast:     Coronal and axial T2 with TE of 90 and 180 respectively, axial T2 with fat saturation, axial FIESTA fat-sat, axial T1-weighted in-and-out-of phase, axial DWI/ADC, precontrast axial T1 with fat saturation, post-contrast dynamic axial T1 with fat saturation at 20, 70, and 180 seconds, coronal T1  with fat saturation and 7 minute delayed axial T1 with fat saturation   Imaging performed on 1 5T MRI   IV Contrast:  7 mL of Gadobutrol injection (SINGLE-DOSE) FINDINGS: LOWER CHEST: Unremarkable MR appearance  LIVER:  Postsurgical changes from wedge resection along the ventral margin of segment 3, without findings of local recurrence  Stable appearance of treated segment 7 lesion at the dome measuring 2 5 x 3 0 cm (series 13 image 139) since prior MR 5/12/2020 with unchanged thin rim of peripheral enhancement  No new suspicious hepatic mass  The hepatic veins and portal veins are patent  BILE DUCTS:  No intrahepatic or extrahepatic biliary ductal dilatation  GALLBLADDER:  Normal  PANCREAS:  Normal  No main pancreatic ductal dilation  ADRENAL GLANDS:  Normal  SPLEEN:  Normal  KIDNEYS/PROXIMAL URETERS:  Again noted 5 cm left renal sinus cyst and chronic left caliectasis  No suspicious renal mass  BOWEL:  Nondilated  PERITONEUM/RETROPERITONEUM:  No ascites  LYMPH NODES:  No abdominal lymphadenopathy  VASCULAR STRUCTURES:  No abdominal aortic aneurysm  ABDOMINAL WALL:  Postsurgical changes in the anterior abdominal wall  OSSEOUS STRUCTURES:  No suspicious osseous lesion  Impression: 1  Stable appearance of treated segment 7 lesion and postsurgical changes from segment 3 wedge resection  No new hepatic metastases  Workstation performed: [id number]     I reviewed the above laboratory and imaging data  Discussion/Summary: 44-year-old male with metastatic rectosigmoid cancer  His MRI is stable  I would recommend continued observation  He has seen Dr Maddy Canela next week I will tentatively see him again in 4 months  I will use the imaging and laboratory work ordered by Medical Oncology  I will see him again at that time for another clinical exam   Camilla Cross is agreeable to this plan   All his questions were answered

## 2021-05-14 NOTE — LETTER
May 14, 2021     Su [address]    Patient: Patriciaann Fleischer   YOB: 1970   Date of Visit: 5/14/2021       Dear Dr Ragini Jenkins:    Thank you for referring Maggie Evans to me for evaluation  Below are my notes for this consultation  If you have questions, please do not hesitate to call me  I look forward to following your patient along with you  Sincerely,        Natalya Bartlett MD        CC: MD Yonis Seth MD Lovina Maki, MD  5/14/2021 10:19 AM  Sign when Signing Visit               Surgical Oncology Follow Up       [address] [place] [id number]    Linette Lezama  1970  [id number]  [address] [facility]  [facility]  [address] [id number]    Diagnoses and all orders for this visit:    Colon cancer metastasized to liver [facility])        Chief Complaint   Patient presents with    Follow-up       Return in about 4 months (around 9/14/2021) for Office Visit  Oncology History   Colon cancer metastasized to liver ([address] )   1/2018 Initial Diagnosis    Malignant neoplasm of sigmoid colon ([facility] [address] )     1/22/2018 Biopsy    Large Intestine, Sigmoid Colon, Recto-sigmoid tumor bx:    - Invasive colonic adenocarcinoma, moderately differentiated     2/6/2018 - 10/16/2018 Chemotherapy    Modified FOLFOX6 x 12 cycles  Added Erbitux on 3/20/18      6/21/2018 Surgery    Segment 7 liver resection/ablation, hemicolectomy     10/30/2018 -  Chemotherapy    Continuation of Single agent Erbitux  With 10/30/18 chemo, it was Cycle #14  Plan was for 6 additional cycles of Erbitux alone       3/2020 Genetic Testing    NEGATIVE for genes tested:    Test(s): [employer] + [employer] (34 genes): APC, ETHAN, BARD1, BRCA1, BRCA2, BRIP1, BMPR1A, CDH1, CDK4, CDKN2A, CHEK2, DICER1, EPCAM, GREM1, HOXB13, MLH1, MRE11A, MSH2, MSH6, MUTYH, NBN, NF1, PALB2, PMS2, POLD1, POLE, PTEN, RAD50, RAD51C, RAD51D, SMAD4, SMARCA4, STK11, TP53      6/8/2020 Surgery    Liver, segment 3 (wedge resection):  - Metastatic adenocarcinoma, consistent with the patient's known colon primary  - Margins negative for carcinoma      7/27/2020 -  Chemotherapy    fluorouracil (ADRUCIL) injection 750 mg, 745 mg, Intravenous, Once, 6 of 6 cycles  Administration: 750 mg (7/27/2020), 750 mg (8/10/2020), 745 mg (8/24/2020), 745 mg (9/8/2020), 745 mg (9/21/2020), 745 mg (10/5/2020)  pegfilgrastim (NEULASTA) subcutaneous injection 6 mg, 6 mg, Subcutaneous, Once, 1 of 1 cycle  Administration: 6 mg (9/24/2020)  pegfilgrastim (NEULASTA ONPRO) subcutaneous injection kit 6 mg, 6 mg, Subcutaneous, Once, 6 of 6 cycles  Administration: 6 mg (7/29/2020), 6 mg (8/12/2020), 6 mg (8/26/2020), 6 mg (9/10/2020), 6 mg (10/7/2020)  cetuximab (ERBITUX) 900 mg in IVPB 450 mL, 930 mg, Intravenous, Once, 12 of 12 cycles  Administration: 900 mg (7/27/2020), 900 mg (8/10/2020), 900 mg (8/24/2020), 900 mg (9/8/2020), 900 mg (9/21/2020), 900 mg (10/5/2020), 900 mg (10/19/2020), 900 mg (11/2/2020), 900 mg (11/16/2020), 900 mg (11/30/2020), 900 mg (12/28/2020), 900 mg (1/11/2021)  irinotecan (CAMPTOSAR) 340 mg in sodium chloride 0 9 % 500 mL chemo infusion, 335 mg, Intravenous, Once, 6 of 6 cycles  Administration: 340 mg (7/27/2020), 340 mg (8/10/2020), 340 mg (8/24/2020), 340 mg (9/8/2020), 340 mg (9/21/2020), 340 mg (10/5/2020)  leucovorin 750 mg in sodium chloride 0 9 % 250 mL IVPB, 744 mg, Intravenous, Once, 6 of 6 cycles  Administration: 750 mg (7/27/2020), 750 mg (8/10/2020), 750 mg (8/24/2020), 750 mg (9/8/2020), 750 mg (9/21/2020), 740 mg (10/5/2020)         Staging:  Metastatic rectosigmoid cancer  [id number]  Treatment history:   Modified FOLFOX 6   Erbitux added 03/20/2018   Segment 7 liver resection, June 2018  Segment 3 mass liver resection, June 2020  Chemotherapy with Erbitux  Current treatment:  Observation  Disease status:   LUTHER    History of Present Illness:  Patient returns in follow-up of his metastatic colon cancer  He is doing well with no complaints  He denies any abdominal pain, nausea or vomiting  His appetite is good  No unintentional weight loss  Chest CT from May 11, 2021 reveals no lung metastasis  MRI of his liver the same date reveals postsurgical changes to the liver  I personally reviewed the films  He he has not followed up with the cardiologist regarding his cardiac calcifications  Review of Systems  Complete ROS Surg Onc:   Complete ROS Surg Onc:   Constitutional: The patient denies new or recent history of general fatigue, no recent weight loss, no change in appetite  Eyes: No complaints of visual problems, no scleral icterus  ENT: no complaints of ear pain, no hoarseness, no difficulty swallowing,  no tinnitus and no new masses in head, oral cavity, or neck  Cardiovascular: No complaints of chest pain, no palpitations, no ankle edema  Respiratory: No complaints of shortness of breath, no cough  Gastrointestinal: No complaints of jaundice, no bloody stools, no pale stools  Genitourinary: No complaints of dysuria, no hematuria, no nocturia, no frequent urination, no urethral discharge  Musculoskeletal: No complaints of weakness, paralysis, joint stiffness or arthralgias  Integumentary: No complaints of rash, no new lesions  Neurological: No complaints of convulsions, no seizures, no dizziness  Hematologic/Lymphatic: No complaints of easy bruising  Endocrine:  No hot or cold intolerance  No polydipsia, polyphagia, or polyuria  Allergy/immunology:  No environmental allergies  No food allergies  Not immunocompromised  Skin:  No pallor or rash  No wound          Patient Active Problem List   Diagnosis    ED (erectile dysfunction)    Colon cancer metastasized to liver (HCC)    GERD (gastroesophageal reflux disease)    Pulmonary nodule, right    Acneiform rash    Encounter for follow-up examination after completed treatment for malignant neoplasm    Other hydronephrosis    Annual physical exam    Vapes nicotine containing substance     Past Medical History:   Diagnosis Date    Cancer ([address] )     Dysuria     Last Assessed:8/24/17    GERD (gastroesophageal reflux disease)     Liver nodule     Pulmonary nodule, right     Ureteropelvic junction (UPJ) obstruction 1/29/2020     Past Surgical History:   Procedure Laterality Date    ABDOMINAL SURGERY      COLON SURGERY      COLONOSCOPY N/A 1/22/2018    Procedure: COLONOSCOPY;  Surgeon: Yonis Hernandez MD;  [facility]; Service: Colorectal   [facility] DENTAL SURGERY  2016    Crown with bridge work    FLEXIBLE SIGMOIDOSCOPY N/A 6/15/2018    Procedure: SIGMOIDOSCOPY FLEXIBLE w/o sedation w/ tattoo;  Surgeon: Yonis Hernandez MD;  [facility];   Service: Colorectal    LAPAROTOMY N/A 6/8/2020    Procedure: LAPAROTOMY EXPLORATORY, LYSIS OF ADHESIONS;  Surgeon: Natalya Bartlett MD;  [facility];  Service: Surgical Oncology    LIVER LOBECTOMY N/A 6/21/2018    Procedure: liver resection/ablation, intraoperative ultrasound of liver;  Surgeon: Natalya Bartlett MD;  [facility];  Service: Surgical Oncology    LIVER LOBECTOMY N/A 6/8/2020    Procedure: LIVER RESECTION SEGMENT 3 WITH  INTRAOPERATIVE U/S OF LIVER;  Surgeon: Natalya Bartlett MD;  [facility];  Service: Surgical Oncology    CA EXPLORATORY OF ABDOMEN N/A 6/21/2018    Procedure: LAPAROTOMY EXPLORATORY;  Surgeon: Yonis Hernandez MD;  [facility];  Service: Colorectal    CA INSERT PICC W/ SUB-Q PORT N/A 1/25/2018    Procedure: PORT-A-CATHETER PLACEMENT  Fluoroscopy for performance/interpretation;  Surgeon: Yonis Hernandez MD;  [facility];  Service: Colorectal    CA PART REMOVAL COLON W ANASTOMOSIS Left 6/21/2018    Procedure: hemicolectomy, low anterior resection (open) SPY fluorescence angiography;  Surgeon: Sara Perez MD;  [facility];  Service: Colorectal    SD PROCTECTOMY,PARTIAL N/A 6/21/2018    Procedure: Partial proctectomy ;  Surgeon: Sara Perez MD;  [facility];  Service: Colorectal    SD SIGMOIDOSCOPY FLX DX W/COLLJ SPEC BR/WA IF PFRMD N/A 6/21/2018    Procedure: Itzel Yang;  Surgeon: Sara Perez MD;  [facility];  Service: Colorectal    ROOT CANAL  2015    TESTICLE SURGERY      Exploration of Undescended Testis rIght, age 8 had surgery for undescended testicle;  Last Assessed:8/24/17    TOOTH EXTRACTION  2015     Family History   Problem Relation Age of Onset    No Known Problems Father     Cancer Mother         unknown    Cancer Brother         pt  reports brother was diagnosed with stage 4 throat cancer    Throat cancer Brother      Social History     Socioeconomic History    Marital status: Single     Spouse name: Not on file    Number of children: Not on file    Years of education: Not on file    Highest education level: Not on file   Occupational History    Not on file   Social Needs    Financial resource strain: Not on file    Food insecurity     Worry: Not on file     Inability: Not on file    Transportation needs     Medical: Not on file     Non-medical: Not on file   Tobacco Use    Smoking status: Former Smoker     Types: E-Cigarettes    Smokeless tobacco: Current User    Tobacco comment: quit 4 years ago / smoked for 20 years    Substance and Sexual Activity    Alcohol use: Yes     Comment: socially - 2 month    Drug use: Yes     Types: Marijuana     Comment: occasional recreation drug use    Sexual activity: Yes     Comment: Resides alone   Lifestyle    Physical activity     Days per week: Not on file     Minutes per session: Not on file    Stress: Not on file   Relationships    Social connections     Talks on phone: Not on file     Gets together: Not on file     Attends Taoism service: Not on file     Active member of club or organization: Not on file     Attends meetings of clubs or organizations: Not on file     Relationship status: Not on file    Intimate partner violence     Fear of current or ex partner: Not on file     Emotionally abused: Not on file     Physically abused: Not on file     Forced sexual activity: Not on file   Other Topics Concern    Not on file   Social History Narrative    Not on file       Current Outpatient Medications:     sildenafil (REVATIO) 20 mg tablet, Take 1 tablet (20 mg total) by mouth as needed (PRN), Disp: 90 tablet, Rfl: 1    clindamycin (CLEOCIN T) 1 % lotion, Apply topically 2 (two) times a day To affected area (Patient not taking: Reported on 3/22/2021), Disp: 60 mL, Rfl: 2    minocycline (DYNACIN) 100 MG tablet, Take 1 tablet (100 mg total) by mouth daily (Patient not taking: Reported on 3/22/2021), Disp: 30 tablet, Rfl: 11    prochlorperazine (COMPAZINE) 10 mg tablet, Take 1 tablet (10 mg total) by mouth every 6 (six) hours as needed for nausea or vomiting (Patient not taking: Reported on 3/22/2021), Disp: 45 tablet, Rfl: 3  No Known Allergies  Vitals:    05/14/21 1008   BP: 122/82   Pulse: 67   Resp: 17   Temp: 98 6 °F (37 °C)       Physical Exam  Constitutional: General appearance: The Patient is well-developed and well-nourished who appears the stated age in no acute distress  Patient is pleasant and talkative  HEENT:  Normocephalic  Sclerae are anicteric  Mucous membranes are moist  Neck is supple without adenopathy  No JVD  Chest: The lungs are clear to auscultation  Cardiac: Heart is regular rate  Abdomen: Abdomen is soft, non-tender, non-distended and without masses  Extremities: There is no clubbing or cyanosis  There is no edema  Symmetric  Neuro: Grossly nonfocal  Gait is normal      Lymphatic: No evidence of cervical adenopathy bilaterally  No evidence of axillary adenopathy bilaterally   No evidence of inguinal adenopathy bilaterally  Skin: Warm, anicteric  Psych:  Patient is pleasant and talkative  Breasts:        Pathology:  [unfilled]    Labs:      Imaging  Ct Chest W Contrast    Result Date: 5/13/2021  Narrative: CT CHEST WITH IV CONTRAST INDICATION:   C18 9: Malignant neoplasm of colon, unspecified C78 7: Secondary malignant neoplasm of liver and intrahepatic bile duct C20: Malignant neoplasm of rectum C18 9: Malignant neoplasm of colon, unspecified C78 7: Secondary malignant neoplasm of liver and intrahepatic bile duct  COMPARISON:  Chest CT from 1/7/2021 and 12/1/2017  TECHNIQUE: Chest CT with intravenous contrast   Axial, sagittal, coronal 2D reformats and coronal MIPS from source data  Radiation dose length product (DLP):  225 mGy-cm   Radiation dose exposure minimized using iterative reconstruction and automated exposure control  IV Contrast:  85 mL of iohexol (OMNIPAQUE) FINDINGS: LUNGS:  No metastases  4 mm and smaller bilateral lung nodules, stable since 2017 and benign (marked on series 3)  Benign intrapulmonary lymph node abutting the minor fissure (3/77)  Benign calcified granulomas  Mild paraseptal emphysema  AIRWAYS: No significant filling defects  PLEURA:  Unremarkable  HEART/GREAT VESSELS:  Normal heart size  Mild coronary artery calcification indicating atherosclerotic heart disease  MEDIASTINUM AND MAHENDRA:  Right port at cavoatrial junction  CHEST WALL AND LOWER NECK: Unremarkable  UPPER ABDOMEN:  Treated metastasis in the dome of the liver  Left parapelvic cysts  OSSEOUS STRUCTURES: Moderate degenerative disease in the spine  Benign bone island in the right humeral head  Impression: No lung metastases    Workstation performed: [id number]     Mri Abdomen W Wo Contrast    Result Date: 5/13/2021  Narrative: MRI - ABDOMEN - WITH AND WITHOUT CONTRAST INDICATION:  Colon carcinoma with liver metastases COMPARISON: MR abdomen 1/7/2021 TECHNIQUE:  The following pulse sequences were obtained prior to and following the administration of intravenous contrast:     Coronal and axial T2 with TE of 90 and 180 respectively, axial T2 with fat saturation, axial FIESTA fat-sat, axial T1-weighted in-and-out-of phase, axial DWI/ADC, precontrast axial T1 with fat saturation, post-contrast dynamic axial T1 with fat saturation at 20, 70, and 180 seconds, coronal T1  with fat saturation and 7 minute delayed axial T1 with fat saturation  Imaging performed on 1 5T MRI   IV Contrast:  7 mL of Gadobutrol injection (SINGLE-DOSE) FINDINGS: LOWER CHEST:  Unremarkable MR appearance  LIVER:  Postsurgical changes from wedge resection along the ventral margin of segment 3, without findings of local recurrence  Stable appearance of treated segment 7 lesion at the dome measuring 2 5 x 3 0 cm (series 13 image 139) since prior MR 5/12/2020 with unchanged thin rim of peripheral enhancement  No new suspicious hepatic mass  The hepatic veins and portal veins are patent  BILE DUCTS:  No intrahepatic or extrahepatic biliary ductal dilatation  GALLBLADDER:  Normal  PANCREAS:  Normal  No main pancreatic ductal dilation  ADRENAL GLANDS:  Normal  SPLEEN:  Normal  KIDNEYS/PROXIMAL URETERS:  Again noted 5 cm left renal sinus cyst and chronic left caliectasis  No suspicious renal mass  BOWEL:  Nondilated  PERITONEUM/RETROPERITONEUM:  No ascites  LYMPH NODES:  No abdominal lymphadenopathy  VASCULAR STRUCTURES:  No abdominal aortic aneurysm  ABDOMINAL WALL:  Postsurgical changes in the anterior abdominal wall  OSSEOUS STRUCTURES:  No suspicious osseous lesion  Impression: 1  Stable appearance of treated segment 7 lesion and postsurgical changes from segment 3 wedge resection  No new hepatic metastases  Workstation performed: [id number]     I reviewed the above laboratory and imaging data  Discussion/Summary: 43-year-old male with metastatic rectosigmoid cancer  His MRI is stable  I would recommend continued observation    He has seen Dr Finn Newell next week I will tentatively see him again in 4 months  I will use the imaging and laboratory work ordered by Medical Oncology  I will see him again at that time for another clinical exam   Ryland Jang is agreeable to this plan   All his questions were answered

## 2021-05-18 ENCOUNTER — APPOINTMENT (OUTPATIENT)
Dept: LAB | Facility: CLINIC | Age: 51
End: 2021-05-18
Payer: COMMERCIAL

## 2021-05-18 ENCOUNTER — TRANSCRIBE ORDERS (OUTPATIENT)
Dept: LAB | Facility: CLINIC | Age: 51
End: 2021-05-18

## 2021-05-18 ENCOUNTER — OFFICE VISIT (OUTPATIENT)
Dept: HEMATOLOGY ONCOLOGY | Facility: CLINIC | Age: 51
End: 2021-05-18
Payer: COMMERCIAL

## 2021-05-18 VITALS
DIASTOLIC BLOOD PRESSURE: 82 MMHG | BODY MASS INDEX: 25.88 KG/M2 | SYSTOLIC BLOOD PRESSURE: 132 MMHG | RESPIRATION RATE: 16 BRPM | OXYGEN SATURATION: 97 % | HEART RATE: 80 BPM | WEIGHT: 161 LBS | TEMPERATURE: 98.8 F | HEIGHT: 66 IN

## 2021-05-18 DIAGNOSIS — C20 MALIGNANT NEOPLASM OF RECTUM (HCC): ICD-10-CM

## 2021-05-18 DIAGNOSIS — C78.7 COLON CANCER METASTASIZED TO LIVER (HCC): ICD-10-CM

## 2021-05-18 DIAGNOSIS — C18.9 COLON CANCER METASTASIZED TO LIVER (HCC): ICD-10-CM

## 2021-05-18 DIAGNOSIS — C78.7 METASTATIC COLON CANCER TO LIVER (HCC): ICD-10-CM

## 2021-05-18 DIAGNOSIS — C18.9 METASTATIC COLON CANCER TO LIVER (HCC): ICD-10-CM

## 2021-05-18 DIAGNOSIS — C20 MALIGNANT NEOPLASM OF RECTUM (HCC): Primary | ICD-10-CM

## 2021-05-18 LAB
ALBUMIN SERPL BCP-MCNC: 4.2 G/DL (ref 3.5–5)
ALP SERPL-CCNC: 61 U/L (ref 46–116)
ALT SERPL W P-5'-P-CCNC: 28 U/L (ref 12–78)
ANION GAP SERPL CALCULATED.3IONS-SCNC: 10 MMOL/L (ref 4–13)
AST SERPL W P-5'-P-CCNC: 13 U/L (ref 5–45)
BASOPHILS # BLD AUTO: 0.02 THOUSANDS/ΜL (ref 0–0.1)
BASOPHILS NFR BLD AUTO: 0 % (ref 0–1)
BILIRUB SERPL-MCNC: 0.53 MG/DL (ref 0.2–1)
BUN SERPL-MCNC: 13 MG/DL (ref 5–25)
CALCIUM SERPL-MCNC: 9 MG/DL (ref 8.3–10.1)
CEA SERPL-MCNC: 0.8 NG/ML (ref 0–3)
CHLORIDE SERPL-SCNC: 105 MMOL/L (ref 100–108)
CO2 SERPL-SCNC: 25 MMOL/L (ref 21–32)
CREAT SERPL-MCNC: 0.92 MG/DL (ref 0.6–1.3)
EOSINOPHIL # BLD AUTO: 0.06 THOUSAND/ΜL (ref 0–0.61)
EOSINOPHIL NFR BLD AUTO: 1 % (ref 0–6)
ERYTHROCYTE [DISTWIDTH] IN BLOOD BY AUTOMATED COUNT: 12.7 % (ref 11.6–15.1)
GFR SERPL CREATININE-BSD FRML MDRD: 98 ML/MIN/1.73SQ M
GLUCOSE SERPL-MCNC: 91 MG/DL (ref 65–140)
HCT VFR BLD AUTO: 42.6 % (ref 36.5–49.3)
HGB BLD-MCNC: 14.8 G/DL (ref 12–17)
IMM GRANULOCYTES # BLD AUTO: 0.01 THOUSAND/UL (ref 0–0.2)
IMM GRANULOCYTES NFR BLD AUTO: 0 % (ref 0–2)
LYMPHOCYTES # BLD AUTO: 1.9 THOUSANDS/ΜL (ref 0.6–4.47)
LYMPHOCYTES NFR BLD AUTO: 39 % (ref 14–44)
MCH RBC QN AUTO: 32.7 PG (ref 26.8–34.3)
MCHC RBC AUTO-ENTMCNC: 34.7 G/DL (ref 31.4–37.4)
MCV RBC AUTO: 94 FL (ref 82–98)
MONOCYTES # BLD AUTO: 0.35 THOUSAND/ΜL (ref 0.17–1.22)
MONOCYTES NFR BLD AUTO: 7 % (ref 4–12)
NEUTROPHILS # BLD AUTO: 2.57 THOUSANDS/ΜL (ref 1.85–7.62)
NEUTS SEG NFR BLD AUTO: 53 % (ref 43–75)
NRBC BLD AUTO-RTO: 0 /100 WBCS
PLATELET # BLD AUTO: 160 THOUSANDS/UL (ref 149–390)
PMV BLD AUTO: 11.1 FL (ref 8.9–12.7)
POTASSIUM SERPL-SCNC: 3.9 MMOL/L (ref 3.5–5.3)
PROT SERPL-MCNC: 7.9 G/DL (ref 6.4–8.2)
RBC # BLD AUTO: 4.53 MILLION/UL (ref 3.88–5.62)
SODIUM SERPL-SCNC: 140 MMOL/L (ref 136–145)
WBC # BLD AUTO: 4.91 THOUSAND/UL (ref 4.31–10.16)

## 2021-05-18 PROCEDURE — 85025 COMPLETE CBC W/AUTO DIFF WBC: CPT

## 2021-05-18 PROCEDURE — 99214 OFFICE O/P EST MOD 30 MIN: CPT | Performed by: INTERNAL MEDICINE

## 2021-05-18 PROCEDURE — 82378 CARCINOEMBRYONIC ANTIGEN: CPT

## 2021-05-18 PROCEDURE — 36415 COLL VENOUS BLD VENIPUNCTURE: CPT

## 2021-05-18 PROCEDURE — 80053 COMPREHEN METABOLIC PANEL: CPT
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
## 2021-05-19 ENCOUNTER — OFFICE VISIT (OUTPATIENT)
Dept: UROLOGY | Facility: AMBULATORY SURGERY CENTER | Age: 51
End: 2021-05-19
Payer: COMMERCIAL

## 2021-05-19 VITALS
WEIGHT: 158 LBS | HEART RATE: 62 BPM | HEIGHT: 66 IN | SYSTOLIC BLOOD PRESSURE: 112 MMHG | BODY MASS INDEX: 25.39 KG/M2 | DIASTOLIC BLOOD PRESSURE: 80 MMHG

## 2021-05-19 DIAGNOSIS — N13.30 HYDRONEPHROSIS, UNSPECIFIED HYDRONEPHROSIS TYPE: Primary | ICD-10-CM

## 2021-05-19 PROCEDURE — 99214 OFFICE O/P EST MOD 30 MIN: CPT | Performed by: UROLOGY

## 2021-05-19 PROCEDURE — 3008F BODY MASS INDEX DOCD: CPT | Performed by: UROLOGY

## 2021-05-19 PROCEDURE — 1036F TOBACCO NON-USER: CPT | Performed by: UROLOGY

## 2021-05-19 NOTE — LETTER
May 19, 2021     Su Munoz  [address]    Patient: Blanca Willett   YOB: 1970   Date of Visit: 5/19/2021       Dear Dr Raya Mas:    Thank you for referring Thad Vázquez to me for evaluation  Below are my notes for this consultation  If you have questions, please do not hesitate to call me  I look forward to following your patient along with you  Sincerely,        Cayla Leung MD        CC: MD Shruti Strickland MD Derinda Camper, MD Kristin Chain, MD  5/19/2021  8:33 AM  Sign when Signing Visit  5/19/2021    Blanca Willett  1970  [id number]        Assessment  Left parapelvic cyst, mild left-sided hydronephrosis, erectile dysfunction, stage IV colon cancer status post colectomy and liver resection x2      Discussion   we discussed and reviewed his history of colon cancer with metastatic disease to the liver status post resection  He is following with Medical Oncology as well as colorectal surgery and Surgical Oncology  With regards to his left-sided hydronephrosis this to peers to be a chronic finding either related to his left parapelvic cyst or possibly a left UPJ obstruction  Reviewed his recent Mag 3 renal scan which reveals equal split function but delayed excretion of contrast from the left side  Based on his other comorbidities I am in favor of conservative management as his creatinine is less than 1 and is left-sided hydronephrosis is stable  I recommend that he return in follow-up in 1 year  I did not order imaging studies as these will likely be performed by his surgical oncology team   He will continue to use PDE5 inhibitors for his erectile dysfunction  History of Present Illness  52 y o  male with a history of unchanged mild left-sided hydronephrosis which has been present since 2017  He also has a left parapelvic cyst which is the likely etiology    He had a recent Mag 3 renal scan with Lasix performed which revealed even split function ( 48% left, 52% right )  There is delayed excretion of radiotracer on the left side  He is left-sided hydronephrosis may be secondary to a parapelvic cyst versus left UPJ obstruction  We discussed that he is completely asymptomatic at this time and that his creatinine is less than 1  He continues to follow with Medical Oncology, Surgical Oncology, Colorectal surgery for his stage IV colon cancer  His disease  Appears to be stable at this time  AUA Symptom Score      Review of Systems  Review of Systems   Constitutional: Negative  HENT: Negative  Eyes: Negative  Respiratory: Negative  Cardiovascular: Negative  Gastrointestinal: Negative  Endocrine: Negative  Genitourinary:        Per HPI   Musculoskeletal: Negative  Skin: Negative  Allergic/Immunologic: Negative  Neurological: Negative  Hematological: Negative  Psychiatric/Behavioral: Negative  All other systems reviewed and are negative  Past Medical History  Past Medical History:   Diagnosis Date    Cancer ([facility] [address] )     Dysuria     Last Assessed:8/24/17    GERD (gastroesophageal reflux disease)     Liver nodule     Pulmonary nodule, right     Ureteropelvic junction (UPJ) obstruction 1/29/2020       Past Social History  Past Surgical History:   Procedure Laterality Date    ABDOMINAL SURGERY      COLON SURGERY      COLONOSCOPY N/A 1/22/2018    Procedure: COLONOSCOPY;  Surgeon: Janet Vergara MD;  [facility]; Service: Colorectal   [employer] DENTAL SURGERY  2016    Crown with bridge work    FLEXIBLE SIGMOIDOSCOPY N/A 6/15/2018    Procedure: SIGMOIDOSCOPY FLEXIBLE w/o sedation w/ tattoo;  Surgeon: Janet Vergara MD;  [facility];   Service: Colorectal    LAPAROTOMY N/A 6/8/2020    Procedure: LAPAROTOMY EXPLORATORY, LYSIS OF ADHESIONS;  Surgeon: Ruthann Echols MD;  [facility];  Service: Surgical Oncology    LIVER LOBECTOMY N/A 6/21/2018    Procedure: liver resection/ablation, intraoperative ultrasound of liver;  Surgeon: Sherry Mishra MD;  [facility];  Service: Surgical Oncology    LIVER LOBECTOMY N/A 6/8/2020    Procedure: LIVER RESECTION SEGMENT 3 WITH  INTRAOPERATIVE U/S OF LIVER;  Surgeon: Sherry Mishra MD;  [facility];  Service: Surgical Oncology    CA EXPLORATORY OF ABDOMEN N/A 6/21/2018    Procedure: LAPAROTOMY EXPLORATORY;  Surgeon: Laitsha Feliciano MD;  [facility];  Service: Colorectal    CA INSERT PICC W/ SUB-Q PORT N/A 1/25/2018    Procedure: PORT-A-CATHETER PLACEMENT  Fluoroscopy for performance/interpretation;  Surgeon: Latisha Feliciano MD;  [facility];  Service: Colorectal    CA PART REMOVAL COLON W ANASTOMOSIS Left 6/21/2018    Procedure: hemicolectomy, low anterior resection (open) SPY fluorescence angiography;  Surgeon: Latisha Feliciano MD;  [facility];  Service: Colorectal    CA PROCTECTOMY,PARTIAL N/A 6/21/2018    Procedure: Partial proctectomy ;  Surgeon: Latisha Feliciano MD;  [facility];  Service: Colorectal    CA SIGMOIDOSCOPY FLX DX W/COLLJ SPEC BR/WA IF PFRMD N/A 6/21/2018    Procedure: Arley Win;  Surgeon: Latisha Feliciano MD;  [facility];  Service: Colorectal    ROOT CANAL  2015    TESTICLE SURGERY      Exploration of Undescended Testis rIght, age 8 had surgery for undescended testicle;  Last Assessed:8/24/17    TOOTH EXTRACTION  2015       Past Family History  Family History   Problem Relation Age of Onset    No Known Problems Father     Cancer Mother         unknown    Cancer Brother         pt  reports brother was diagnosed with stage 4 throat cancer    Throat cancer Brother        Past Social history  Social History     Socioeconomic History    Marital status: Single     Spouse name: Not on file    Number of children: Not on file    Years of education: Not on file    Highest education level: Not on file   Occupational History  Not on file   Social Needs    Financial resource strain: Not on file    Food insecurity     Worry: Not on file     Inability: Not on file    Transportation needs     Medical: Not on file     Non-medical: Not on file   Tobacco Use    Smoking status: Former Smoker     Types: E-Cigarettes    Smokeless tobacco: Current User    Tobacco comment: quit 4 years ago / smoked for 20 years    Substance and Sexual Activity    Alcohol use: Yes     Comment: socially - 2 month    Drug use: Yes     Types: Marijuana     Comment: occasional recreation drug use    Sexual activity: Yes     Comment: Resides alone   Lifestyle    Physical activity     Days per week: Not on file     Minutes per session: Not on file    Stress: Not on file   Relationships    Social connections     Talks on phone: Not on file     Gets together: Not on file     Attends Pentecostalism service: Not on file     Active member of club or organization: Not on file     Attends meetings of clubs or organizations: Not on file     Relationship status: Not on file    Intimate partner violence     Fear of current or ex partner: Not on file     Emotionally abused: Not on file     Physically abused: Not on file     Forced sexual activity: Not on file   Other Topics Concern    Not on file   Social History Narrative    Not on file       Current Medications  Current Outpatient Medications   Medication Sig Dispense Refill    prochlorperazine (COMPAZINE) 10 mg tablet Take 1 tablet (10 mg total) by mouth every 6 (six) hours as needed for nausea or vomiting 45 tablet 3    clindamycin (CLEOCIN T) 1 % lotion Apply topically 2 (two) times a day To affected area (Patient not taking: Reported on 5/19/2021) 60 mL 2    minocycline (DYNACIN) 100 MG tablet Take 1 tablet (100 mg total) by mouth daily (Patient not taking: Reported on 5/19/2021) 30 tablet 11    sildenafil (REVATIO) 20 mg tablet Take 1 tablet (20 mg total) by mouth as needed (PRN) (Patient not taking: Reported on 5/19/2021) 90 tablet 1     No current facility-administered medications for this visit  Allergies  No Known Allergies    Past Medical History, Social History, Family History, medications and allergies were reviewed  Vitals  Vitals:    05/19/21 0803   BP: 112/80   Pulse: 62   Weight: 71 7 kg (158 lb)   Height: 5' 6 12" (1 679 m)       Physical Exam  Physical Exam      On examination he is in no acute distress  His abdomen is soft nontender nondistended  A well-healed midline scars appreciated from xiphoid to pubis  There is no CVA tenderness  Skin is warm  Extremities without edema    Neurologic is grossly intact and nonfocal   Gait normal   Affect normal    Results  No results found for: PSA  Lab Results   Component Value Date    GLUCOSE 124 12/01/2017    CALCIUM 9 0 05/18/2021     08/26/2017    K 3 9 05/18/2021    CO2 25 05/18/2021     05/18/2021    BUN 13 05/18/2021    CREATININE 0 92 05/18/2021     Lab Results   Component Value Date    WBC 4 91 05/18/2021    HGB 14 8 05/18/2021    HCT 42 6 05/18/2021    MCV 94 05/18/2021     05/18/2021         Office Urine Dip  No results found for this or any previous visit (from the past 1 hour(s)) ]

## 2021-05-19 NOTE — PROGRESS NOTES
5/19/2021    Darrelyn Canavan  1970  [id number]        Assessment  Left parapelvic cyst, mild left-sided hydronephrosis, erectile dysfunction, stage IV colon cancer status post colectomy and liver resection x2      Discussion   we discussed and reviewed his history of colon cancer with metastatic disease to the liver status post resection  He is following with Medical Oncology as well as colorectal surgery and Surgical Oncology  With regards to his left-sided hydronephrosis this to peers to be a chronic finding either related to his left parapelvic cyst or possibly a left UPJ obstruction  Reviewed his recent Mag 3 renal scan which reveals equal split function but delayed excretion of contrast from the left side  Based on his other comorbidities I am in favor of conservative management as his creatinine is less than 1 and is left-sided hydronephrosis is stable  I recommend that he return in follow-up in 1 year  I did not order imaging studies as these will likely be performed by his surgical oncology team   He will continue to use PDE5 inhibitors for his erectile dysfunction  History of Present Illness  52 y o  male with a history of unchanged mild left-sided hydronephrosis which has been present since 2017  He also has a left parapelvic cyst which is the likely etiology  He had a recent Mag 3 renal scan with Lasix performed which revealed even split function ( 48% left, 52% right )  There is delayed excretion of radiotracer on the left side  He is left-sided hydronephrosis may be secondary to a parapelvic cyst versus left UPJ obstruction  We discussed that he is completely asymptomatic at this time and that his creatinine is less than 1  He continues to follow with Medical Oncology, Surgical Oncology, Colorectal surgery for his stage IV colon cancer  His disease  Appears to be stable at this time  AUA Symptom Score      Review of Systems  Review of Systems   Constitutional: Negative  HENT: Negative  Eyes: Negative  Respiratory: Negative  Cardiovascular: Negative  Gastrointestinal: Negative  Endocrine: Negative  Genitourinary:        Per HPI   Musculoskeletal: Negative  Skin: Negative  Allergic/Immunologic: Negative  Neurological: Negative  Hematological: Negative  Psychiatric/Behavioral: Negative  All other systems reviewed and are negative  Past Medical History  Past Medical History:   Diagnosis Date    [facility] ([facility] [address] )     Dysuria     Last Assessed:8/24/17    GERD (gastroesophageal reflux disease)     Liver nodule     Pulmonary nodule, right     Ureteropelvic junction (UPJ) obstruction 1/29/2020       Past Social History  Past Surgical History:   Procedure Laterality Date    ABDOMINAL SURGERY      COLON SURGERY      COLONOSCOPY N/A 1/22/2018    Procedure: COLONOSCOPY;  Surgeon: Ham Menard MD;  [facility]; Service: Colorectal   [facility] DENTAL SURGERY  2016    Crown with bridge work    FLEXIBLE SIGMOIDOSCOPY N/A 6/15/2018    Procedure: SIGMOIDOSCOPY FLEXIBLE w/o sedation w/ tattoo;  Surgeon: Ham Menard MD;  [facility];   Service: Colorectal    LAPAROTOMY N/A 6/8/2020    Procedure: LAPAROTOMY EXPLORATORY, LYSIS OF ADHESIONS;  Surgeon: Georgie Hashimoto, MD;  [facility];  Service: Surgical Oncology    LIVER LOBECTOMY N/A 6/21/2018    Procedure: liver resection/ablation, intraoperative ultrasound of liver;  Surgeon: Georgie Hashimoto, MD;  [facility];  Service: Surgical Oncology    LIVER LOBECTOMY N/A 6/8/2020    Procedure: LIVER RESECTION SEGMENT 3 WITH  INTRAOPERATIVE U/S OF LIVER;  Surgeon: Georgie Hashimoto, MD;  [facility];  Service: Surgical Oncology    AK EXPLORATORY OF ABDOMEN N/A 6/21/2018    Procedure: LAPAROTOMY EXPLORATORY;  Surgeon: Ham Menard MD;  [facility];  Service: Colorectal    AK INSERT PICC W/ SUB-Q PORT N/A 1/25/2018    Procedure: Epifanio Holter Fluoroscopy for performance/interpretation;  Surgeon: Zhao Guerrero MD;  [facility];  Service: Colorectal    WA PART REMOVAL COLON W ANASTOMOSIS Left 6/21/2018    Procedure: hemicolectomy, low anterior resection (open) SPY fluorescence angiography;  Surgeon: Zhao Guerrero MD;  [facility];  Service: Colorectal    WA PROCTECTOMY,PARTIAL N/A 6/21/2018    Procedure: Partial proctectomy ;  Surgeon: Zhao Guerrero MD;  [facility];  Service: Colorectal    WA SIGMOIDOSCOPY FLX DX W/COLLJ SPEC BR/WA IF PFRMD N/A 6/21/2018    Procedure: Andrés Trevin;  Surgeon: Zhao Guerrero MD;  [facility];  Service: Colorectal    ROOT CANAL  2015    TESTICLE SURGERY      Exploration of Undescended Testis rIght, age 8 had surgery for undescended testicle;  Last Assessed:8/24/17    TOOTH EXTRACTION  2015       Past Family History  Family History   Problem Relation Age of Onset    No Known Problems Father     Cancer Mother         unknown    Cancer Brother         pt  reports brother was diagnosed with stage 4 throat cancer    Throat cancer Brother        Past Social history  Social History     Socioeconomic History    Marital status: Single     Spouse name: Not on file    Number of children: Not on file    Years of education: Not on file    Highest education level: Not on file   Occupational History    Not on file   Social Needs    Financial resource strain: Not on file    Food insecurity     Worry: Not on file     Inability: Not on file    Transportation needs     Medical: Not on file     Non-medical: Not on file   Tobacco Use    Smoking status: Former Smoker     Types: E-Cigarettes    Smokeless tobacco: Current User    Tobacco comment: quit 4 years ago / smoked for 20 years    Substance and Sexual Activity    Alcohol use: Yes     Comment: socially - 2 month    Drug use: Yes     Types: Marijuana     Comment: occasional recreation drug use    Sexual activity: Yes     Comment: Resides alone   Lifestyle    Physical activity     Days per week: Not on file     Minutes per session: Not on file    Stress: Not on file   Relationships    Social connections     Talks on phone: Not on file     Gets together: Not on file     Attends Scientologist service: Not on file     Active member of club or organization: Not on file     Attends meetings of clubs or organizations: Not on file     Relationship status: Not on file    Intimate partner violence     Fear of current or ex partner: Not on file     Emotionally abused: Not on file     Physically abused: Not on file     Forced sexual activity: Not on file   Other Topics Concern    Not on file   Social History Narrative    Not on file       Current Medications  Current Outpatient Medications   Medication Sig Dispense Refill    prochlorperazine (COMPAZINE) 10 mg tablet Take 1 tablet (10 mg total) by mouth every 6 (six) hours as needed for nausea or vomiting 45 tablet 3    clindamycin (CLEOCIN T) 1 % lotion Apply topically 2 (two) times a day To affected area (Patient not taking: Reported on 5/19/2021) 60 mL 2    minocycline (DYNACIN) 100 MG tablet Take 1 tablet (100 mg total) by mouth daily (Patient not taking: Reported on 5/19/2021) 30 tablet 11    sildenafil (REVATIO) 20 mg tablet Take 1 tablet (20 mg total) by mouth as needed (PRN) (Patient not taking: Reported on 5/19/2021) 90 tablet 1     No current facility-administered medications for this visit  Allergies  No Known Allergies    Past Medical History, Social History, Family History, medications and allergies were reviewed  Vitals  Vitals:    05/19/21 0803   BP: 112/80   Pulse: 62   Weight: 71 7 kg (158 lb)   Height: 5' 6 12" (1 679 m)       Physical Exam  Physical Exam      On examination he is in no acute distress  His abdomen is soft nontender nondistended  A well-healed midline scars appreciated from xiphoid to pubis  There is no CVA tenderness    Skin is warm  Extremities without edema    Neurologic is grossly intact and nonfocal   Gait normal   Affect normal    Results  No results found for: PSA  Lab Results   Component Value Date    GLUCOSE 124 12/01/2017    CALCIUM 9 0 05/18/2021     08/26/2017    K 3 9 05/18/2021    CO2 25 05/18/2021     05/18/2021    BUN 13 05/18/2021    CREATININE 0 92 05/18/2021     Lab Results   Component Value Date    WBC 4 91 05/18/2021    HGB 14 8 05/18/2021    HCT 42 6 05/18/2021    MCV 94 05/18/2021     05/18/2021         Office Urine Dip  No results found for this or any previous visit (from the past 1 hour(s)) ]

## 2021-05-20 ENCOUNTER — IMMUNIZATIONS (OUTPATIENT)
Dept: FAMILY MEDICINE CLINIC | Facility: HOSPITAL | Age: 51
End: 2021-05-20

## 2021-05-20 DIAGNOSIS — Z23 ENCOUNTER FOR IMMUNIZATION: Primary | ICD-10-CM

## 2021-05-20 PROCEDURE — 91300 SARS-COV-2 / COVID-19 MRNA VACCINE (PFIZER-BIONTECH) 30 MCG: CPT

## 2021-05-20 PROCEDURE — 0002A SARS-COV-2 / COVID-19 MRNA VACCINE (PFIZER-BIONTECH) 30 MCG: CPT

## 2021-05-25 ENCOUNTER — HOSPITAL ENCOUNTER (OUTPATIENT)
Dept: INFUSION CENTER | Facility: CLINIC | Age: 51
Discharge: HOME | End: 2021-05-25
Payer: COMMERCIAL

## 2021-05-25 VITALS — TEMPERATURE: 97.8 F

## 2021-05-25 DIAGNOSIS — C18.9 COLON CANCER METASTASIZED TO LIVER (HCC): Primary | ICD-10-CM

## 2021-05-25 DIAGNOSIS — C78.7 COLON CANCER METASTASIZED TO LIVER (HCC): Primary | ICD-10-CM

## 2021-05-25 PROCEDURE — 36593 DECLOT VASCULAR DEVICE: CPT

## 2021-05-25 RX ADMIN — ALTEPLASE 2 MG: 2.2 INJECTION, POWDER, LYOPHILIZED, FOR SOLUTION INTRAVENOUS at 15:37

## 2021-05-25 NOTE — PROGRESS NOTES
Pt here for central   Unable to obtain +blood return, TPA instilled per protocol  Call bell within reach-will continue to monitor

## 2021-07-20 ENCOUNTER — HOSPITAL ENCOUNTER (OUTPATIENT)
Dept: INFUSION CENTER | Facility: CLINIC | Age: 51
Discharge: HOME | End: 2021-07-20
Payer: COMMERCIAL

## 2021-07-20 DIAGNOSIS — C78.7 COLON CANCER METASTASIZED TO LIVER (HCC): Primary | ICD-10-CM

## 2021-07-20 DIAGNOSIS — C18.9 COLON CANCER METASTASIZED TO LIVER (HCC): Primary | ICD-10-CM

## 2021-07-20 PROCEDURE — 96523 IRRIG DRUG DELIVERY DEVICE: CPT

## 2021-07-20 NOTE — PROGRESS NOTES
Patient here today for port flush, offers no complaints  Port accessed, good blood return noted  Port flushes and deaccessed without issue   Patient declined AVS

## 2021-07-27 ENCOUNTER — OFFICE VISIT (OUTPATIENT)
Dept: INTERNAL MEDICINE CLINIC | Facility: CLINIC | Age: 51
End: 2021-07-27
Payer: COMMERCIAL

## 2021-07-27 VITALS
DIASTOLIC BLOOD PRESSURE: 82 MMHG | RESPIRATION RATE: 18 BRPM | WEIGHT: 160.8 LBS | HEART RATE: 71 BPM | OXYGEN SATURATION: 99 % | BODY MASS INDEX: 25.84 KG/M2 | TEMPERATURE: 97.8 F | SYSTOLIC BLOOD PRESSURE: 122 MMHG | HEIGHT: 66 IN

## 2021-07-27 DIAGNOSIS — Z13.6 ENCOUNTER FOR LIPID SCREENING FOR CARDIOVASCULAR DISEASE: ICD-10-CM

## 2021-07-27 DIAGNOSIS — Z13.220 ENCOUNTER FOR LIPID SCREENING FOR CARDIOVASCULAR DISEASE: ICD-10-CM

## 2021-07-27 DIAGNOSIS — Z11.59 ENCOUNTER FOR HEPATITIS C SCREENING TEST FOR LOW RISK PATIENT: ICD-10-CM

## 2021-07-27 DIAGNOSIS — Z72.0 VAPES NICOTINE CONTAINING SUBSTANCE: ICD-10-CM

## 2021-07-27 DIAGNOSIS — Z00.00 ANNUAL PHYSICAL EXAM: Primary | ICD-10-CM

## 2021-07-27 PROCEDURE — 99396 PREV VISIT EST AGE 40-64: CPT | Performed by: INTERNAL MEDICINE

## 2021-07-27 PROCEDURE — 3725F SCREEN DEPRESSION PERFORMED: CPT | Performed by: INTERNAL MEDICINE

## 2021-07-27 PROCEDURE — 1036F TOBACCO NON-USER: CPT | Performed by: INTERNAL MEDICINE

## 2021-07-27 PROCEDURE — 3008F BODY MASS INDEX DOCD: CPT | Performed by: INTERNAL MEDICINE

## 2021-07-27 NOTE — ASSESSMENT & PLAN NOTE
Discussed lifestyle modification with diet exercise  Counseled patient on Vape cessation  He is up-to-date on immunizations, discussed pneumococcal immunization  Continue follow-up with Surgical Oncology and Medical Oncology  Discussed skin cancer screening

## 2021-07-27 NOTE — PROGRESS NOTES
[address] [address]    NAME: Jaleesa Foster  AGE: 52 y o  SEX: male  : 1970     DATE: 2021     Assessment and Plan:     Problem List Items Addressed This Visit        Other    Annual physical exam - Primary     Discussed lifestyle modification with diet exercise  Counseled patient on Vape cessation  He is up-to-date on immunizations, discussed pneumococcal immunization  Continue follow-up with Surgical Oncology and Medical Oncology  Discussed skin cancer screening  Relevant Orders    Lipid panel    Vapes nicotine containing substance      Other Visit Diagnoses     Encounter for lipid screening for cardiovascular disease        Relevant Orders    Lipid panel    Encounter for hepatitis C screening test for low risk patient        Relevant Orders    Hepatitis C antibody          Immunizations and preventive care screenings were discussed with patient today  Appropriate education was printed on patient's after visit summary  Counseling:  Alcohol/drug use: discussed moderation in alcohol intake, the recommendations for healthy alcohol use, and avoidance of illicit drug use  Dental Health: discussed importance of regular tooth brushing, flossing, and dental visits  Injury prevention: discussed safety/seat belts, safety helmets, smoke detectors, carbon dioxide detectors, and smoking near bedding or upholstery  Sexual health: discussed sexually transmitted diseases, partner selection, use of condoms, avoidance of unintended pregnancy, and contraceptive alternatives  · Exercise: the importance of regular exercise/physical activity was discussed  Recommend exercise 3-5 times per week for at least 30 minutes  BMI Counseling: Body mass index is 25 95 kg/m²   The BMI is above normal  Nutrition recommendations include decreasing portion sizes, encouraging healthy choices of fruits and vegetables, decreasing fast food intake, consuming healthier snacks, limiting drinks that contain sugar, moderation in carbohydrate intake, increasing intake of lean protein, reducing intake of saturated and trans fat and reducing intake of cholesterol  Exercise recommendations include moderate physical activity 150 minutes/week and exercising 3-5 times per week  No pharmacotherapy was ordered  Patient referred to PCP due to patient being overweight  Depression Screening and Follow-up Plan: Patient's depression screening was positive with a PHQ-2 score of 0  Clincally patient does not have depression  No treatment is required  Patient advised to follow-up with PCP for further management  Tobacco Cessation Counseling: Tobacco cessation counseling was provided  The patient is sincerely urged to quit consumption of tobacco  He is not ready to quit tobacco  Medication options and side effects of medication discussed  Patient refused medication  Return in about 1 year (around 7/27/2022) for Annual physical      Chief Complaint:     Chief Complaint   Patient presents with    Physical Exam     hep c, phq, bmi f/u plan      History of Present Illness:     Adult Annual Physical   Patient here for a comprehensive physical exam  The patient reports no problems  Diet and Physical Activity  · Diet/Nutrition: well balanced diet  · Exercise: no formal exercise  Depression Screening  PHQ-9 Depression Screening    PHQ-9:   Frequency of the following problems over the past two weeks:      Little interest or pleasure in doing things: 0 - not at all  Feeling down, depressed, or hopeless: 0 - not at all  PHQ-2 Score: 0       General Health  · Sleep: sleeps well and gets 7-8 hours of sleep on average  · Hearing: normal - bilateral   · Vision: no vision problems, most recent eye exam >1 year ago and wears glasses  · Dental: regular dental visits, brushes teeth twice daily and flosses teeth occasionally          Health  · Symptoms include: erectile dysfunction     Review of Systems:     Review of Systems   Constitutional: Negative for activity change, appetite change, chills, diaphoresis, fatigue and fever  HENT: Negative for congestion, postnasal drip, rhinorrhea, sinus pressure, sinus pain, sneezing and sore throat  Eyes: Negative for visual disturbance  Respiratory: Negative for apnea, cough, choking, chest tightness, shortness of breath and wheezing  Cardiovascular: Negative for chest pain, palpitations and leg swelling  Gastrointestinal: Negative for abdominal distention, abdominal pain, anal bleeding, blood in stool, constipation, diarrhea, nausea and vomiting  Endocrine: Negative for cold intolerance and heat intolerance  Genitourinary: Negative for difficulty urinating, dysuria and hematuria  Musculoskeletal: Negative  Skin: Negative  Neurological: Negative for dizziness, weakness, light-headedness, numbness and headaches  Hematological: Negative for adenopathy  Psychiatric/Behavioral: Negative for agitation, sleep disturbance and suicidal ideas  All other systems reviewed and are negative  Past Medical History:     Past Medical History:   Diagnosis Date    Cancer ([facility] [address] )     Dysuria     Last Assessed:8/24/17    GERD (gastroesophageal reflux disease)     Liver nodule     Pulmonary nodule, right     Ureteropelvic junction (UPJ) obstruction 1/29/2020      Past Surgical History:     Past Surgical History:   Procedure Laterality Date    ABDOMINAL SURGERY      COLON SURGERY      COLONOSCOPY N/A 1/22/2018    Procedure: COLONOSCOPY;  Surgeon: Jose Alberto Mobley MD;  [facility]; Service[facility] Col[facility]   [facility]    Crown with bridge work    FLEXIBLE SIGMOIDOSCOPY N/A 6/15/2018    Procedure: SIGMOIDOSCOPY FLEXIBLE w/o sedation w/ tattoo;  Surgeon: Jose Alberto Mobley MD;  [facility]: [facility];   Service: Colorectal    LAPAROTOMY N/A 6/8/2020    Procedure: LAPAROTOMY EXPLORATORY, LYSIS OF ADHESIONS;  Surgeon: Nani Garcia MD;  [facility];  Service: Surgical Oncology    LIVER LOBECTOMY N/A 6/21/2018    Procedure: liver resection/ablation, intraoperative ultrasound of liver;  Surgeon: Nani Garcia MD;  [facility];  Service: Surgical Oncology    LIVER LOBECTOMY N/A 6/8/2020    Procedure: LIVER RESECTION SEGMENT 3 WITH  INTRAOPERATIVE U/S OF LIVER;  Surgeon: Nani Garcia MD;  [facility];  Service: Surgical Oncology    TN EXPLORATORY OF ABDOMEN N/A 6/21/2018    Procedure: LAPAROTOMY EXPLORATORY;  Surgeon: Macey Mcmanus MD;  [facility];  Service: Colorectal    TN INSERT PICC W/ SUB-Q PORT N/A 1/25/2018    Procedure: PORT-A-CATHETER PLACEMENT  Fluoroscopy for performance/interpretation;  Surgeon: Macey Mcmanus MD;  [facility];  Service: Colorectal    TN PART REMOVAL COLON W ANASTOMOSIS Left 6/21/2018    Procedure: hemicolectomy, low anterior resection (open) SPY fluorescence angiography;  Surgeon: Macey Mcmanus MD;  [facility];  Service: Colorectal    TN PROCTECTOMY,PARTIAL N/A 6/21/2018    Procedure: Partial proctectomy ;  Surgeon: Macey Mcmanus MD;  [facility];  Service: Colorectal    TN SIGMOIDOSCOPY FLX DX W/COLLJ SPEC BR/WA IF PFRMD N/A 6/21/2018    Procedure: Shawna Reis;  Surgeon: Macey Mcmanus MD;  [facility];  Service: Colorectal    ROOT CANAL  2015    TESTICLE SURGERY      Exploration of Undescended Testis rIght, age 8 had surgery for undescended testicle;  Last Assessed:8/24/17    TOOTH EXTRACTION  2015      Family History:     Family History   Problem Relation Age of Onset    No Known Problems Father     Cancer Mother         unknown    Cancer Brother         pt  reports brother was diagnosed with stage 4 throat cancer    Throat cancer Brother       Social History:     Social History     Socioeconomic History    Marital status: Single     Spouse name: None    Number of children: None    Years of education: None    Highest education level: None   Occupational History    None   Tobacco Use    Smoking status: Former Smoker     Types: E-Cigarettes    Smokeless tobacco: Current User    Tobacco comment: quit 4 years ago / smoked for 20 years    Vaping Use    Vaping Use: Every day    Substances: Nicotine, THC (at time), CBD (at times), Flavoring   Substance and Sexual Activity    Alcohol use: Yes     Comment: socially - 2 month    Drug use: Yes     Types: Marijuana     Comment: occasional recreation drug use    Sexual activity: Yes     Comment: Resides alone   Other Topics Concern    None   Social History Narrative    None     Social Determinants of Health     Financial Resource Strain:     Difficulty of Paying Living Expenses:    Food Insecurity:     Worried About Running Out of Food in the Last Year:     Ran Out of Food in the Last Year:    Transportation Needs:     Lack of Transportation (Medical):  Lack of Transportation (Non-Medical):    Physical Activity:     Days of Exercise per Week:     Minutes of Exercise per Session:    Stress:     Feeling of Stress :    Social Connections:     Frequency of Communication with Friends and Family:     Frequency of Social Gatherings with Friends and Family:     Attends Jewish Services:     Active Member of Clubs or Organizations:     Attends Club or Organization Meetings:     Marital Status:    Intimate Partner Violence:     Fear of Current or Ex-Partner:     Emotionally Abused:     Physically Abused:     Sexually Abused:       Current Medications:     Current Outpatient Medications   Medication Sig Dispense Refill    sildenafil (REVATIO) 20 mg tablet Take 1 tablet (20 mg total) by mouth as needed (PRN) 90 tablet 1     No current facility-administered medications for this visit        Allergies:     No Known Allergies   Physical Exam:     /82 (BP Location: Left arm, Patient Position: Sitting, Cuff Size: Standard)   Pulse 71   Temp 97 8 °F (36 6 °C) (Temporal)   Resp 18   Ht 5' 6" (1 676 m)   Wt 72 9 kg (160 lb 12 8 oz)   SpO2 99%   BMI 25 95 kg/m²     Physical Exam  Vitals and nursing note reviewed  Constitutional:       General: He is not in acute distress  Appearance: Normal appearance  He is normal weight  He is not ill-appearing, toxic-appearing or diaphoretic  HENT:      Head: Normocephalic and atraumatic  Right Ear: Tympanic membrane, ear canal and external ear normal  There is no impacted cerumen  Left Ear: Tympanic membrane, ear canal and external ear normal  There is no impacted cerumen  Nose: Nose normal  No congestion or rhinorrhea  Mouth/Throat:      Mouth: Mucous membranes are moist       Pharynx: Oropharynx is clear  No oropharyngeal exudate or posterior oropharyngeal erythema  Eyes:      General: No scleral icterus  Right eye: No discharge  Left eye: No discharge  Extraocular Movements: Extraocular movements intact  Conjunctiva/sclera: Conjunctivae normal       Pupils: Pupils are equal, round, and reactive to light  Neck:      Vascular: No carotid bruit  Cardiovascular:      Rate and Rhythm: Normal rate and regular rhythm  Pulses: Normal pulses  Heart sounds: Normal heart sounds  No murmur heard  No friction rub  No gallop  Pulmonary:      Effort: Pulmonary effort is normal  No respiratory distress  Breath sounds: Normal breath sounds  No wheezing or rales  Abdominal:      General: Abdomen is flat  Bowel sounds are normal  There is no distension  Palpations: Abdomen is soft  There is no mass  Tenderness: There is no abdominal tenderness  There is no guarding  Hernia: No hernia is present  Musculoskeletal:         General: No swelling, tenderness, deformity or signs of injury  Normal range of motion        Cervical back: Normal range of motion and neck supple  No rigidity  No muscular tenderness  Right lower leg: No edema  Left lower leg: No edema  Lymphadenopathy:      Cervical: No cervical adenopathy  Skin:     General: Skin is warm and dry  Capillary Refill: Capillary refill takes less than 2 seconds  Coloration: Skin is not jaundiced or pale  Findings: No bruising, erythema, lesion or rash  Neurological:      General: No focal deficit present  Mental Status: He is alert and oriented to person, place, and time  Mental status is at baseline  Cranial Nerves: No cranial nerve deficit  Sensory: No sensory deficit  Motor: No weakness  Coordination: Coordination normal       Gait: Gait normal       Deep Tendon Reflexes: Reflexes normal    Psychiatric:         Mood and Affect: Mood normal          Behavior: Behavior normal          Thought Content:  Thought content normal          Judgment: Judgment normal           Ivet Browning MD    [address] Alek Varner

## 2021-09-16 ENCOUNTER — APPOINTMENT (OUTPATIENT)
Dept: LAB | Facility: CLINIC | Age: 51
End: 2021-09-16
Payer: COMMERCIAL

## 2021-09-16 DIAGNOSIS — C78.7 COLON CANCER METASTASIZED TO LIVER (HCC): ICD-10-CM

## 2021-09-16 DIAGNOSIS — Z00.00 ANNUAL PHYSICAL EXAM: ICD-10-CM

## 2021-09-16 DIAGNOSIS — C18.9 COLON CANCER METASTASIZED TO LIVER (HCC): ICD-10-CM

## 2021-09-16 DIAGNOSIS — C20 MALIGNANT NEOPLASM OF RECTUM (HCC): ICD-10-CM

## 2021-09-16 DIAGNOSIS — Z13.6 ENCOUNTER FOR LIPID SCREENING FOR CARDIOVASCULAR DISEASE: ICD-10-CM

## 2021-09-16 DIAGNOSIS — Z13.220 ENCOUNTER FOR LIPID SCREENING FOR CARDIOVASCULAR DISEASE: ICD-10-CM

## 2021-09-16 DIAGNOSIS — Z11.59 ENCOUNTER FOR HEPATITIS C SCREENING TEST FOR LOW RISK PATIENT: ICD-10-CM

## 2021-09-16 LAB
ALBUMIN SERPL BCP-MCNC: 4 G/DL (ref 3.5–5)
ALP SERPL-CCNC: 54 U/L (ref 46–116)
ALT SERPL W P-5'-P-CCNC: 26 U/L (ref 12–78)
ANION GAP SERPL CALCULATED.3IONS-SCNC: 7 MMOL/L (ref 4–13)
AST SERPL W P-5'-P-CCNC: 18 U/L (ref 5–45)
BASOPHILS # BLD AUTO: 0.02 THOUSANDS/ΜL (ref 0–0.1)
BASOPHILS NFR BLD AUTO: 0 % (ref 0–1)
BILIRUB SERPL-MCNC: 0.41 MG/DL (ref 0.2–1)
BUN SERPL-MCNC: 12 MG/DL (ref 5–25)
CALCIUM SERPL-MCNC: 8.9 MG/DL (ref 8.3–10.1)
CEA SERPL-MCNC: 0.9 NG/ML (ref 0–3)
CHLORIDE SERPL-SCNC: 106 MMOL/L (ref 100–108)
CHOLEST SERPL-MCNC: 115 MG/DL (ref 50–200)
CO2 SERPL-SCNC: 28 MMOL/L (ref 21–32)
CREAT SERPL-MCNC: 1.03 MG/DL (ref 0.6–1.3)
EOSINOPHIL # BLD AUTO: 0.07 THOUSAND/ΜL (ref 0–0.61)
EOSINOPHIL NFR BLD AUTO: 1 % (ref 0–6)
ERYTHROCYTE [DISTWIDTH] IN BLOOD BY AUTOMATED COUNT: 12.4 % (ref 11.6–15.1)
GFR SERPL CREATININE-BSD FRML MDRD: 85 ML/MIN/1.73SQ M
GLUCOSE P FAST SERPL-MCNC: 96 MG/DL (ref 65–99)
HCT VFR BLD AUTO: 41.4 % (ref 36.5–49.3)
HCV AB SER QL: NORMAL
HDLC SERPL-MCNC: 43 MG/DL
HGB BLD-MCNC: 14.4 G/DL (ref 12–17)
IMM GRANULOCYTES # BLD AUTO: 0.01 THOUSAND/UL (ref 0–0.2)
IMM GRANULOCYTES NFR BLD AUTO: 0 % (ref 0–2)
LDLC SERPL CALC-MCNC: 65 MG/DL (ref 0–100)
LYMPHOCYTES # BLD AUTO: 2.2 THOUSANDS/ΜL (ref 0.6–4.47)
LYMPHOCYTES NFR BLD AUTO: 39 % (ref 14–44)
MCH RBC QN AUTO: 33.2 PG (ref 26.8–34.3)
MCHC RBC AUTO-ENTMCNC: 34.8 G/DL (ref 31.4–37.4)
MCV RBC AUTO: 95 FL (ref 82–98)
MONOCYTES # BLD AUTO: 0.43 THOUSAND/ΜL (ref 0.17–1.22)
MONOCYTES NFR BLD AUTO: 8 % (ref 4–12)
NEUTROPHILS # BLD AUTO: 2.93 THOUSANDS/ΜL (ref 1.85–7.62)
NEUTS SEG NFR BLD AUTO: 52 % (ref 43–75)
NONHDLC SERPL-MCNC: 72 MG/DL
NRBC BLD AUTO-RTO: 0 /100 WBCS
PLATELET # BLD AUTO: 165 THOUSANDS/UL (ref 149–390)
PMV BLD AUTO: 10.9 FL (ref 8.9–12.7)
POTASSIUM SERPL-SCNC: 4.4 MMOL/L (ref 3.5–5.3)
PROT SERPL-MCNC: 7.7 G/DL (ref 6.4–8.2)
RBC # BLD AUTO: 4.34 MILLION/UL (ref 3.88–5.62)
SODIUM SERPL-SCNC: 141 MMOL/L (ref 136–145)
TRIGL SERPL-MCNC: 34 MG/DL
WBC # BLD AUTO: 5.66 THOUSAND/UL (ref 4.31–10.16)

## 2021-09-16 PROCEDURE — 82378 CARCINOEMBRYONIC ANTIGEN: CPT

## 2021-09-16 PROCEDURE — 80061 LIPID PANEL: CPT

## 2021-09-16 PROCEDURE — 86803 HEPATITIS C AB TEST: CPT

## 2021-09-16 PROCEDURE — 36415 COLL VENOUS BLD VENIPUNCTURE: CPT

## 2021-09-16 PROCEDURE — 85025 COMPLETE CBC W/AUTO DIFF WBC: CPT

## 2021-09-16 PROCEDURE — 80053 COMPREHEN METABOLIC PANEL: CPT

## 2021-09-17 ENCOUNTER — HOSPITAL ENCOUNTER (OUTPATIENT)
Dept: MRI IMAGING | Facility: HOSPITAL | Age: 51
Discharge: HOME | End: 2021-09-17
Attending: INTERNAL MEDICINE
Payer: COMMERCIAL

## 2021-09-17 ENCOUNTER — HOSPITAL ENCOUNTER (OUTPATIENT)
Dept: CT IMAGING | Facility: HOSPITAL | Age: 51
Discharge: HOME | End: 2021-09-17
Attending: INTERNAL MEDICINE
Payer: COMMERCIAL

## 2021-09-17 DIAGNOSIS — C78.7 COLON CANCER METASTASIZED TO LIVER (HCC): ICD-10-CM

## 2021-09-17 DIAGNOSIS — C18.9 COLON CANCER METASTASIZED TO LIVER (HCC): ICD-10-CM

## 2021-09-17 DIAGNOSIS — C20 MALIGNANT NEOPLASM OF RECTUM (HCC): ICD-10-CM

## 2021-09-17 PROCEDURE — 74183 MRI ABD W/O CNTR FLWD CNTR: CPT

## 2021-09-17 PROCEDURE — 71260 CT THORAX DX C+: CPT

## 2021-09-17 PROCEDURE — G1004 CDSM NDSC: HCPCS

## 2021-09-17 PROCEDURE — A9585 GADOBUTROL INJECTION: HCPCS | Performed by: INTERNAL MEDICINE

## 2021-09-17 RX ADMIN — IOHEXOL 85 ML: 350 INJECTION, SOLUTION INTRAVENOUS at 11:37

## 2021-09-17 RX ADMIN — GADOBUTROL 7 ML: 604.72 INJECTION INTRAVENOUS at 10:56

## 2021-09-23 ENCOUNTER — OFFICE VISIT (OUTPATIENT)
Dept: HEMATOLOGY ONCOLOGY | Facility: CLINIC | Age: 51
End: 2021-09-23
Payer: COMMERCIAL

## 2021-09-23 VITALS
WEIGHT: 165 LBS | RESPIRATION RATE: 16 BRPM | SYSTOLIC BLOOD PRESSURE: 134 MMHG | OXYGEN SATURATION: 99 % | HEART RATE: 80 BPM | TEMPERATURE: 97.2 F | DIASTOLIC BLOOD PRESSURE: 82 MMHG | HEIGHT: 66 IN | BODY MASS INDEX: 26.52 KG/M2

## 2021-09-23 DIAGNOSIS — C18.9 COLON CANCER METASTASIZED TO LIVER (HCC): Primary | ICD-10-CM

## 2021-09-23 DIAGNOSIS — C78.7 COLON CANCER METASTASIZED TO LIVER (HCC): Primary | ICD-10-CM

## 2021-09-23 DIAGNOSIS — C20 MALIGNANT NEOPLASM OF RECTUM (HCC): ICD-10-CM

## 2021-09-23 PROCEDURE — 1036F TOBACCO NON-USER: CPT | Performed by: INTERNAL MEDICINE

## 2021-09-23 PROCEDURE — 3008F BODY MASS INDEX DOCD: CPT | Performed by: INTERNAL MEDICINE

## 2021-09-23 PROCEDURE — 99214 OFFICE O/P EST MOD 30 MIN: CPT | Performed by: INTERNAL MEDICINE
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
## 2021-10-06 ENCOUNTER — OFFICE VISIT (OUTPATIENT)
Dept: SURGICAL ONCOLOGY | Facility: CLINIC | Age: 51
End: 2021-10-06
Payer: COMMERCIAL

## 2021-10-06 VITALS
OXYGEN SATURATION: 99 % | SYSTOLIC BLOOD PRESSURE: 138 MMHG | RESPIRATION RATE: 18 BRPM | TEMPERATURE: 98.4 F | HEIGHT: 65 IN | WEIGHT: 167 LBS | HEART RATE: 77 BPM | BODY MASS INDEX: 27.82 KG/M2 | DIASTOLIC BLOOD PRESSURE: 72 MMHG

## 2021-10-06 DIAGNOSIS — C18.9 COLON CANCER METASTASIZED TO LIVER (HCC): Primary | ICD-10-CM

## 2021-10-06 DIAGNOSIS — C78.7 COLON CANCER METASTASIZED TO LIVER (HCC): Primary | ICD-10-CM

## 2021-10-06 PROCEDURE — 1036F TOBACCO NON-USER: CPT | Performed by: SURGERY

## 2021-10-06 PROCEDURE — 99213 OFFICE O/P EST LOW 20 MIN: CPT | Performed by: SURGERY

## 2021-10-06 PROCEDURE — 3008F BODY MASS INDEX DOCD: CPT | Performed by: SURGERY

## 2022-01-24 ENCOUNTER — TELEMEDICINE (OUTPATIENT)
Dept: INTERNAL MEDICINE CLINIC | Facility: OTHER | Age: 52
End: 2022-01-24
Payer: COMMERCIAL

## 2022-01-24 VITALS — HEIGHT: 65 IN | WEIGHT: 168 LBS | TEMPERATURE: 97.7 F | BODY MASS INDEX: 27.99 KG/M2

## 2022-01-24 DIAGNOSIS — B34.9 VIRAL INFECTION: Primary | ICD-10-CM

## 2022-01-24 PROCEDURE — 1036F TOBACCO NON-USER: CPT | Performed by: NURSE PRACTITIONER

## 2022-01-24 PROCEDURE — U0003 INFECTIOUS AGENT DETECTION BY NUCLEIC ACID (DNA OR RNA); SEVERE ACUTE RESPIRATORY SYNDROME CORONAVIRUS 2 (SARS-COV-2) (CORONAVIRUS DISEASE [COVID-19]), AMPLIFIED PROBE TECHNIQUE, MAKING USE OF HIGH THROUGHPUT TECHNOLOGIES AS DESCRIBED BY CMS-2020-01-R: HCPCS | Performed by: NURSE PRACTITIONER

## 2022-01-24 PROCEDURE — U0005 INFEC AGEN DETEC AMPLI PROBE: HCPCS | Performed by: NURSE PRACTITIONER

## 2022-01-24 PROCEDURE — 3008F BODY MASS INDEX DOCD: CPT | Performed by: NURSE PRACTITIONER

## 2022-01-24 PROCEDURE — 99213 OFFICE O/P EST LOW 20 MIN: CPT | Performed by: NURSE PRACTITIONER

## 2022-01-24 NOTE — PROGRESS NOTES
COVID-19 Outpatient Progress Note    Assessment/Plan:    Problem List Items Addressed This Visit     None         Disposition:     Referred patient to centralized site to test for COVID-19  Patient is fully vaccinated and I recommended self quarantine for 5 days followed by strict mask use for an additional 5 days  If patient were to develop symptoms, they should immediately self isolate and call our office for further guidance  Discussed symptom directed medication options with patient  Obtain covid test  Quarantine x 5 days then 5 days of masking if positive      I have spent 20 minutes directly with the patient  Greater than 50% of this time was spent in counseling/coordination of care regarding: risks and benefits of treatment options, instructions for management and patient and family education  Encounter provider STEPHANIE Townsend    Provider located at [address] [place] [address]    Recent Visits  No visits were found meeting these conditions  Showing recent visits within past 7 days and meeting all other requirements  Today's Visits  Date Type Provider Dept   01/24/22 Telemedicine STEPHANIE Townsend [facility]   Showing today's visits and meeting all other requirements  Future Appointments  No visits were found meeting these conditions  Showing future appointments within next 150 days and meeting all other requirements     This virtual check-in was done via [employer] and patient was informed that this is a secure, HIPAA-compliant platform  He agrees to proceed  Patient agrees to participate in a virtual check in via telephone or video visit instead of presenting to the office to address urgent/immediate medical needs  Patient is aware this is a billable service      After connecting through [facility], the patient was identified by name and date of birth  Joana Weber was informed that this was a telemedicine visit and that the exam was being conducted confidentially over secure lines  My office door was closed  No one else was in the room  Joana Weber acknowledged consent and understanding of privacy and security of the telemedicine visit  I informed the patient that I have reviewed his record in Epic and presented the opportunity for him to ask any questions regarding the visit today  The patient agreed to participate  Verification of patient location:  Patient is located in the following state in which I hold an active license: [place]    Subjective:   Joana Weber is a 54 y o  male who is concerned about COVID-19  Patient's symptoms include fatigue, nasal congestion, sore throat, cough and headache      - Date of symptom onset: 1/21/2022      COVID-19 vaccination status: Fully vaccinated (primary series)    Exposure:   Contact with a person who is under investigation (PUI) for or who is positive for COVID-19 within the last 14 days?: No    Hospitalized recently for fever and/or lower respiratory symptoms?: No      Currently a healthcare worker that is involved in direct patient care?: No      Works in a special setting where the risk of COVID-19 transmission may be high? (this may include long-term care, correctional and snf facilities; homeless shelters; assisted-living facilities and group homes ): No      Resident in a special setting where the risk of COVID-19 transmission may be high? (this may include long-term care, correctional and snf facilities; homeless shelters; assisted-living facilities and group homes ): No      Pt started with symptoms Friday  He is concerned regarding his h/o cancer however he is now coming up on 2 years without any recurrence, last chemotherapy being in 2020   His symptoms have improved today but are still present and he is interested in testing    Lab Results   Component Value Date    Jamilah Sea Not Detected 06/03/2020     Past Medical History:   Diagnosis Date    Cancer ([address] )     Dysuria     Last Assessed:8/24/17    GERD (gastroesophageal reflux disease)     Liver nodule     Pulmonary nodule, right     Ureteropelvic junction (UPJ) obstruction 1/29/2020     Past Surgical History:   Procedure Laterality Date    ABDOMINAL SURGERY      COLON SURGERY      COLONOSCOPY N/A 1/22/2018    Procedure: COLONOSCOPY;  Surgeon: Isabell Mcdaniel MD;  [facility]; Service: Colorectal   [facility] DENTAL SURGERY  2016    Crown with bridge work    FLEXIBLE SIGMOIDOSCOPY N/A 6/15/2018    Procedure: SIGMOIDOSCOPY FLEXIBLE w/o sedation w/ tattoo;  Surgeon: Isabell Mcdaniel MD;  [facility];   Service: Colorectal    LAPAROTOMY N/A 6/8/2020    Procedure: LAPAROTOMY EXPLORATORY, LYSIS OF ADHESIONS;  Surgeon: Nick Kevin MD;  [facility];  Service: Surgical Oncology    LIVER LOBECTOMY N/A 6/21/2018    Procedure: liver resection/ablation, intraoperative ultrasound of liver;  Surgeon: Nick Kevin MD;  [facility];  Service: Surgical Oncology    LIVER LOBECTOMY N/A 6/8/2020    Procedure: LIVER RESECTION SEGMENT 3 WITH  INTRAOPERATIVE U/S OF LIVER;  Surgeon: Nick Kevin MD;  [facility];  Service: Surgical Oncology    NC EXPLORATORY OF ABDOMEN N/A 6/21/2018    Procedure: LAPAROTOMY EXPLORATORY;  Surgeon: Isabell Mcdaniel MD;  [facility];  Service: Colorectal    NC INSERT PICC W/ SUB-Q PORT N/A 1/25/2018    Procedure: PORT-A-CATHETER PLACEMENT  Fluoroscopy for performance/interpretation;  Surgeon: Isabell Mcdaniel MD;  [facility];  Service: Colorectal    NC PART REMOVAL COLON W ANASTOMOSIS Left 6/21/2018    Procedure: hemicolectomy, low anterior resection (open) SPY fluorescence angiography;  Surgeon: Isabell Mcdaniel MD;  [facility];  Service: Colorectal    NC PROCTECTOMY,PARTIAL N/A 6/21/2018    Procedure: Partial proctectomy ;  Surgeon: Asha Beltran Christiano Mancia MD;  [facility];  Service: Colorectal    WA SIGMOIDOSCOPY FLX DX W/COLLJ SPEC BR/WA IF PFRMD N/A 6/21/2018    Procedure: Papi Jama;  Surgeon: Ham Menard MD;  [facility];  Service: Colorectal    ROOT CANAL  2015    TESTICLE SURGERY      Exploration of Undescended Testis rIght, age 8 had surgery for undescended testicle; Last Assessed:8/24/17    TOOTH EXTRACTION  2015     No current outpatient medications on file  No current facility-administered medications for this visit  No Known Allergies    Review of Systems   Constitutional: Positive for fatigue  HENT: Positive for congestion and sore throat  Respiratory: Positive for cough  Neurological: Positive for headaches  Objective:    Vitals:    01/24/22 1302   Temp: 97 7 °F (36 5 °C)   TempSrc: Temporal   Weight: 76 2 kg (168 lb)   Height: 5' 5 31" (1 659 m)       Physical Exam  Vitals reviewed  Constitutional:       Appearance: Normal appearance  Neurological:      Mental Status: He is alert  VIRTUAL VISIT DISCLAIMER    Julia Wongroberto verbally agrees to participate in [employer]  Pt is aware that [employer] could be limited without vital signs or the ability to perform a full hands-on physical Donalynn Cleverly understands he or the provider may request at any time to terminate the video visit and request the patient to seek care or treatment in person

## 2022-01-24 NOTE — ASSESSMENT & PLAN NOTE
Check COVID  Ensure adequate hydration  Take tylenol/ibuprofen as needed for body aches/headache  mucinex 2x daily as needed for cough  Quarantine x 5 days from onset of symptoms then mask x5 days post

## 2022-01-24 NOTE — PATIENT INSTRUCTIONS
Drink plenty of fluids  Take tylenol/ibuprofen as needed for headaches/body aches  Quarantine x 5 days then mask for 5 additional days   If testing is negative OK to return to work when symptoms are improved  Can take mucinex 2x daily as needed for cough

## 2022-01-26 ENCOUNTER — TELEPHONE (OUTPATIENT)
Dept: INTERNAL MEDICINE CLINIC | Age: 52
End: 2022-01-26

## 2022-01-26 NOTE — TELEPHONE ENCOUNTER
Patient needs a return to work note to return tomorrow   Patient was seen by Denise Arriola on 01/24/2022 per patient and she said to call office to get the note    Patient will give us a Fax number for his job and to send to when the letter is ready

## 2022-03-23 ENCOUNTER — TELEPHONE (OUTPATIENT)
Dept: HEMATOLOGY ONCOLOGY | Facility: CLINIC | Age: 52
End: 2022-03-23

## 2022-03-23 NOTE — TELEPHONE ENCOUNTER
Called and left a vm for pt to CB and left message in mychart notifying him that we ordered CMP for him to get done prio to CT appointment to check kidney function status to see if he can handle the contrast  Orders placed in chart

## 2022-03-24 ENCOUNTER — APPOINTMENT (OUTPATIENT)
Dept: LAB | Facility: CLINIC | Age: 52
End: 2022-03-24
Payer: COMMERCIAL

## 2022-03-24 DIAGNOSIS — C18.9 METASTATIC COLON CANCER TO LIVER (HCC): ICD-10-CM

## 2022-03-24 DIAGNOSIS — C18.9 COLON CANCER METASTASIZED TO LIVER (HCC): ICD-10-CM

## 2022-03-24 DIAGNOSIS — C20 MALIGNANT NEOPLASM OF RECTUM (HCC): ICD-10-CM

## 2022-03-24 DIAGNOSIS — C78.7 COLON CANCER METASTASIZED TO LIVER (HCC): ICD-10-CM

## 2022-03-24 DIAGNOSIS — C78.7 METASTATIC COLON CANCER TO LIVER (HCC): ICD-10-CM

## 2022-03-24 LAB
ALBUMIN SERPL BCP-MCNC: 4 G/DL (ref 3.5–5)
ALP SERPL-CCNC: 49 U/L (ref 46–116)
ALT SERPL W P-5'-P-CCNC: 28 U/L (ref 12–78)
ANION GAP SERPL CALCULATED.3IONS-SCNC: 11 MMOL/L (ref 4–13)
AST SERPL W P-5'-P-CCNC: 17 U/L (ref 5–45)
BASOPHILS # BLD AUTO: 0.03 THOUSANDS/ΜL (ref 0–0.1)
BASOPHILS NFR BLD AUTO: 0 % (ref 0–1)
BILIRUB SERPL-MCNC: 0.29 MG/DL (ref 0.2–1)
BUN SERPL-MCNC: 18 MG/DL (ref 5–25)
CALCIUM SERPL-MCNC: 8.7 MG/DL (ref 8.3–10.1)
CEA SERPL-MCNC: 0.6 NG/ML (ref 0–3)
CHLORIDE SERPL-SCNC: 104 MMOL/L (ref 100–108)
CO2 SERPL-SCNC: 26 MMOL/L (ref 21–32)
CREAT SERPL-MCNC: 1.19 MG/DL (ref 0.6–1.3)
EOSINOPHIL # BLD AUTO: 0.04 THOUSAND/ΜL (ref 0–0.61)
EOSINOPHIL NFR BLD AUTO: 1 % (ref 0–6)
ERYTHROCYTE [DISTWIDTH] IN BLOOD BY AUTOMATED COUNT: 12.7 % (ref 11.6–15.1)
GFR SERPL CREATININE-BSD FRML MDRD: 71 ML/MIN/1.73SQ M
GLUCOSE SERPL-MCNC: 102 MG/DL (ref 65–140)
HCT VFR BLD AUTO: 41.9 % (ref 36.5–49.3)
HGB BLD-MCNC: 14.5 G/DL (ref 12–17)
IMM GRANULOCYTES # BLD AUTO: 0.01 THOUSAND/UL (ref 0–0.2)
IMM GRANULOCYTES NFR BLD AUTO: 0 % (ref 0–2)
LYMPHOCYTES # BLD AUTO: 2.18 THOUSANDS/ΜL (ref 0.6–4.47)
LYMPHOCYTES NFR BLD AUTO: 32 % (ref 14–44)
MCH RBC QN AUTO: 32.9 PG (ref 26.8–34.3)
MCHC RBC AUTO-ENTMCNC: 34.6 G/DL (ref 31.4–37.4)
MCV RBC AUTO: 95 FL (ref 82–98)
MONOCYTES # BLD AUTO: 0.5 THOUSAND/ΜL (ref 0.17–1.22)
MONOCYTES NFR BLD AUTO: 7 % (ref 4–12)
NEUTROPHILS # BLD AUTO: 4.01 THOUSANDS/ΜL (ref 1.85–7.62)
NEUTS SEG NFR BLD AUTO: 60 % (ref 43–75)
NRBC BLD AUTO-RTO: 0 /100 WBCS
PLATELET # BLD AUTO: 180 THOUSANDS/UL (ref 149–390)
PMV BLD AUTO: 10.9 FL (ref 8.9–12.7)
POTASSIUM SERPL-SCNC: 3.8 MMOL/L (ref 3.5–5.3)
PROT SERPL-MCNC: 8 G/DL (ref 6.4–8.2)
RBC # BLD AUTO: 4.41 MILLION/UL (ref 3.88–5.62)
SODIUM SERPL-SCNC: 141 MMOL/L (ref 136–145)
WBC # BLD AUTO: 6.77 THOUSAND/UL (ref 4.31–10.16)

## 2022-03-24 PROCEDURE — 85025 COMPLETE CBC W/AUTO DIFF WBC: CPT

## 2022-03-24 PROCEDURE — 36415 COLL VENOUS BLD VENIPUNCTURE: CPT

## 2022-03-24 PROCEDURE — 82378 CARCINOEMBRYONIC ANTIGEN: CPT

## 2022-03-24 PROCEDURE — 80053 COMPREHEN METABOLIC PANEL: CPT

## 2022-03-25 ENCOUNTER — HOSPITAL ENCOUNTER (OUTPATIENT)
Dept: CT IMAGING | Facility: HOSPITAL | Age: 52
Discharge: HOME | End: 2022-03-25
Attending: INTERNAL MEDICINE
Payer: COMMERCIAL

## 2022-03-25 ENCOUNTER — HOSPITAL ENCOUNTER (OUTPATIENT)
Dept: MRI IMAGING | Facility: HOSPITAL | Age: 52
Discharge: HOME | End: 2022-03-25
Attending: INTERNAL MEDICINE
Payer: COMMERCIAL

## 2022-03-25 DIAGNOSIS — C18.9 COLON CANCER METASTASIZED TO LIVER (HCC): ICD-10-CM

## 2022-03-25 DIAGNOSIS — C78.7 COLON CANCER METASTASIZED TO LIVER (HCC): ICD-10-CM

## 2022-03-25 DIAGNOSIS — C20 MALIGNANT NEOPLASM OF RECTUM (HCC): ICD-10-CM

## 2022-03-25 PROCEDURE — 71260 CT THORAX DX C+: CPT

## 2022-03-25 PROCEDURE — 74183 MRI ABD W/O CNTR FLWD CNTR: CPT

## 2022-03-25 PROCEDURE — A9585 GADOBUTROL INJECTION: HCPCS | Performed by: INTERNAL MEDICINE

## 2022-03-25 RX ADMIN — IOHEXOL 85 ML: 350 INJECTION, SOLUTION INTRAVENOUS at 10:13

## 2022-03-25 RX ADMIN — GADOBUTROL 8 ML: 604.72 INJECTION INTRAVENOUS at 11:29

## 2022-04-06 ENCOUNTER — OFFICE VISIT (OUTPATIENT)
Dept: SURGICAL ONCOLOGY | Facility: CLINIC | Age: 52
End: 2022-04-06
Payer: COMMERCIAL

## 2022-04-06 VITALS
DIASTOLIC BLOOD PRESSURE: 70 MMHG | HEART RATE: 79 BPM | SYSTOLIC BLOOD PRESSURE: 120 MMHG | BODY MASS INDEX: 28.82 KG/M2 | HEIGHT: 65 IN | TEMPERATURE: 97.8 F | OXYGEN SATURATION: 98 % | RESPIRATION RATE: 16 BRPM | WEIGHT: 173 LBS

## 2022-04-06 DIAGNOSIS — C78.7 COLON CANCER METASTASIZED TO LIVER (HCC): ICD-10-CM

## 2022-04-06 DIAGNOSIS — Z08 ENCOUNTER FOR FOLLOW-UP EXAMINATION AFTER COMPLETED TREATMENT FOR MALIGNANT NEOPLASM: Primary | ICD-10-CM

## 2022-04-06 DIAGNOSIS — C18.9 COLON CANCER METASTASIZED TO LIVER (HCC): ICD-10-CM

## 2022-04-06 PROCEDURE — 99215 OFFICE O/P EST HI 40 MIN: CPT | Performed by: SURGERY

## 2022-04-06 NOTE — PROGRESS NOTES
Surgical Oncology Follow Up       [address]  [facility]  [address] [facility]  [place] [address]    Yadi Flank Cynthia Endo  1970  [id number]  [address] [facility]  [facility]  [address] [facility]  [place] [address]    Diagnoses and all orders for this visit:    Encounter for follow-up examination after completed treatment for malignant neoplasm    Colon cancer metastasized to liver [facility])  -     Ambulatory referral to Radiation Oncology; Future  -     BUN; Future  -     Creatinine, serum; Future  -     NM PET CT skull base to mid thigh; Future  -     MRI abdomen w wo contrast; Future  -     BUN; Future  -     Creatinine, serum; Future  -     Ambulatory referral to Hematology / Oncology; Future        Chief Complaint   Patient presents with    Follow-up       Return in about 6 months (around 10/6/2022) for Office Visit, Imaging - See orders, Labs - See Treatment Plan  Oncology History   Colon cancer metastasized to liver ([facility] )   1/2018 Initial Diagnosis    Malignant neoplasm of sigmoid colon ([facility] )     1/22/2018 Biopsy    Large Intestine, Sigmoid Colon, Recto-sigmoid tumor bx:    - Invasive colonic adenocarcinoma, moderately differentiated     2/6/2018 - 10/16/2018 Chemotherapy    Modified FOLFOX6 x 12 cycles  Added Erbitux on 3/20/18      6/21/2018 Surgery    Segment 7 liver resection/ablation, hemicolectomy     10/30/2018 -  Chemotherapy    Continuation of Single agent Erbitux  With 10/30/18 chemo, it was Cycle #14  Plan was for 6 additional cycles of Erbitux alone       3/2020 Genetic Testing    NEGATIVE for genes tested:    Test(s): [employer] + [employer] (34 genes): APC, ETHAN, BARD1, BRCA1, BRCA2, BRIP1, BMPR1A, CDH1, CDK4, CDKN2A, CHEK2, DICER1, EPCAM, GREM1, HOXB13, MLH1, MRE11A, MSH2, MSH6, MUTYH, NBN, NF1, PALB2, PMS2, POLD1, POLE, PTEN, RAD50, RAD51C, RAD51D, SMAD4, SMARCA4, STK11, TP53 6/8/2020 Surgery    Liver, segment 3 (wedge resection):  - Metastatic adenocarcinoma, consistent with the patient's known colon primary  - Margins negative for carcinoma      7/27/2020 - 1/11/2021 Chemotherapy    fluorouracil (ADRUCIL), 745 mg, Intravenous, Once, 6 of 6 cycles  Administration: 750 mg (7/27/2020), 750 mg (8/10/2020), 745 mg (8/24/2020), 745 mg (9/8/2020), 745 mg (9/21/2020), 745 mg (10/5/2020)  pegfilgrastim (NEULASTA), 6 mg, Subcutaneous, Once, 1 of 1 cycle  Administration: 6 mg (9/24/2020)  pegfilgrastim (Sparkle Virk), 6 mg, Subcutaneous, Once, 6 of 6 cycles  Administration: 6 mg (7/29/2020), 6 mg (8/12/2020), 6 mg (8/26/2020), 6 mg (9/10/2020), 6 mg (10/7/2020)  cetuximab (ERBITUX), 930 mg, Intravenous, Once, 12 of 12 cycles  Administration: 900 mg (7/27/2020), 900 mg (8/10/2020), 900 mg (8/24/2020), 900 mg (9/8/2020), 900 mg (9/21/2020), 900 mg (10/5/2020), 900 mg (10/19/2020), 900 mg (11/2/2020), 900 mg (11/16/2020), 900 mg (11/30/2020), 900 mg (12/28/2020), 900 mg (1/11/2021)  irinotecan (CAMPTOSAR) chemo infusion, 335 mg, Intravenous, Once, 6 of 6 cycles  Administration: 340 mg (7/27/2020), 340 mg (8/10/2020), 340 mg (8/24/2020), 340 mg (9/8/2020), 340 mg (9/21/2020), 340 mg (10/5/2020)  leucovorin calcium IVPB, 744 mg, Intravenous, Once, 6 of 6 cycles  Administration: 750 mg (7/27/2020), 750 mg (8/10/2020), 750 mg (8/24/2020), 750 mg (9/8/2020), 750 mg (9/21/2020), 740 mg (10/5/2020)  fluorouracil (ADRUCIL) ambulatory infusion Soln, 1,200 mg/m2/day = 4,465 mg, Intravenous, Over 46 hours, 6 of 6 cycles  Dose modification: 1,200 mg/m2/day (original dose 1,200 mg/m2/day, Cycle 3)         Staging:  Metastatic rectosigmoid cancer  [id number]  Treatment history:   Modified FOLFOX 6   Erbitux added 03/20/2018   Segment 7 liver resection, June 2018  Segment 3 mass liver resection, June 2020  Chemotherapy with Erbitux  Current treatment:  Observation  Disease status:   LUTHER    History of Present Illness:  Patient returns in follow-up of his metastatic colon cancer  He is doing well at this time with no complaints  He denies any abdominal pain, nausea or vomiting  No change in appetite or unintentional weight loss  No shortness of breath  MRI of the abdomen reveals no evidence of recurrence in the liver  Chest CT from the same date on March 25, 2022 revealed an enlarging right lower lobe pulmonary nodule that was suspicious for metastasis  The I personally reviewed the films  Review of Systems  Complete ROS Surg Onc:   Complete ROS Surg Onc:   Constitutional: The patient denies new or recent history of general fatigue, no recent weight loss, no change in appetite  Eyes: No complaints of visual problems, no scleral icterus  ENT: no complaints of ear pain, no hoarseness, no difficulty swallowing,  no tinnitus and no new masses in head, oral cavity, or neck  Cardiovascular: No complaints of chest pain, no palpitations, no ankle edema  Respiratory: No complaints of shortness of breath, no cough  Gastrointestinal: No complaints of jaundice, no bloody stools, no pale stools  Genitourinary: No complaints of dysuria, no hematuria, no nocturia, no frequent urination, no urethral discharge  Musculoskeletal: No complaints of weakness, paralysis, joint stiffness or arthralgias  Integumentary: No complaints of rash, no new lesions  Neurological: No complaints of convulsions, no seizures, no dizziness  Hematologic/Lymphatic: No complaints of easy bruising  Endocrine:  No hot or cold intolerance  No polydipsia, polyphagia, or polyuria  Allergy/immunology:  No environmental allergies  No food allergies  Not immunocompromised  Skin:  No pallor or rash  No wound          Patient Active Problem List   Diagnosis    ED (erectile dysfunction)    Colon cancer metastasized to liver (HCC)    GERD (gastroesophageal reflux disease)    Pulmonary nodule, right    Acneiform rash    Encounter for follow-up examination after completed treatment for malignant neoplasm    Other hydronephrosis    Annual physical exam    Vapes nicotine containing substance    Viral infection     Past Medical History:   Diagnosis Date    Cancer ([address] )     Dysuria     Last Assessed:8/24/17    GERD (gastroesophageal reflux disease)     Liver nodule     Pulmonary nodule, right     Ureteropelvic junction (UPJ) obstruction 1/29/2020     Past Surgical History:   Procedure Laterality Date    ABDOMINAL SURGERY      COLON SURGERY      COLONOSCOPY N/A 1/22/2018    Procedure: COLONOSCOPY;  Surgeon: Judy Stroud MD;  [facility]: [facility]; Service: Colorectal   [facility] DENTAL SURGERY  2016    Crown with bridge work    FLEXIBLE SIGMOIDOSCOPY N/A 6/15/2018    Procedure: SIGMOIDOSCOPY FLEXIBLE w/o sedation w/ tattoo;  Surgeon: Judy Stroud MD;  [facility];   Service: Colorectal    LAPAROTOMY N/A 6/8/2020    Procedure: LAPAROTOMY EXPLORATORY, LYSIS OF ADHESIONS;  Surgeon: Diane Helm MD;  [facility];  Service: Surgical Oncology    LIVER LOBECTOMY N/A 6/21/2018    Procedure: liver resection/ablation, intraoperative ultrasound of liver;  Surgeon: Diane Helm MD;  [facility];  Service: Surgical Oncology    LIVER LOBECTOMY N/A 6/8/2020    Procedure: LIVER RESECTION SEGMENT 3 WITH  INTRAOPERATIVE U/S OF LIVER;  Surgeon: Diane Helm MD;  [facility];  Service: Surgical Oncology    CT EXPLORATORY OF ABDOMEN N/A 6/21/2018    Procedure: LAPAROTOMY EXPLORATORY;  Surgeon: Judy Stroud MD;  [facility];  Service: Colorectal    CT INSERT PICC W/ SUB-Q PORT N/A 1/25/2018    Procedure: PORT-A-CATHETER PLACEMENT  Fluoroscopy for performance/interpretation;  Surgeon: Judy Stroud MD;  [facility];  Service: Colorectal    CT PART REMOVAL COLON W ANASTOMOSIS Left 6/21/2018    Procedure: hemicolectomy, low anterior resection (open) SPY fluorescence angiography;  Surgeon: Daniel Carlisle MD;  [facility];  Service: Colorectal    FL PROCTECTOMY,PARTIAL N/A 6/21/2018    Procedure: Partial proctectomy ;  Surgeon: Daniel Carlisle MD;  [facility];  Service: Colorectal    FL SIGMOIDOSCOPY FLX DX W/COLLJ SPEC BR/WA IF PFRMD N/A 6/21/2018    Procedure: Wilbert Patient;  Surgeon: Daniel Carlisle MD;  [facility];  Service: Colorectal    ROOT CANAL  2015    TESTICLE SURGERY      Exploration of Undescended Testis rIght, age 8 had surgery for undescended testicle;  Last Assessed:8/24/17    TOOTH EXTRACTION  2015     Family History   Problem Relation Age of Onset    No Known Problems Father     Cancer Mother         unknown    Cancer Brother         pt  reports brother was diagnosed with stage 4 throat cancer    Throat cancer Brother      Social History     Socioeconomic History    Marital status: Single     Spouse name: Not on file    Number of children: Not on file    Years of education: Not on file    Highest education level: Not on file   Occupational History    Not on file   Tobacco Use    Smoking status: Former Smoker     Types: E-Cigarettes    Smokeless tobacco: Current User    Tobacco comment: quit 4 years ago / smoked for 20 years    Vaping Use    Vaping Use: Every day    Substances: Nicotine, THC (at time), CBD (at times), Flavoring   Substance and Sexual Activity    Alcohol use: Yes     Comment: socially - 2 month    Drug use: Yes     Types: Marijuana     Comment: occasional recreation drug use    Sexual activity: Yes     Comment: Resides alone   Other Topics Concern    Not on file   Social History Narrative    Not on file     Social Determinants of Health     Financial Resource Strain: Not on file   Food Insecurity: Not on file   Transportation Needs: Not on file   Physical Activity: Not on file   Stress: Not on file   Social Connections: Not on file   Intimate Partner Violence: Not on file   Housing Stability: Not on file     No current outpatient medications on file  No Known Allergies  Vitals:    04/06/22 1533   BP: 120/70   Pulse: 79   Resp: 16   Temp: 97 8 °F (36 6 °C)   SpO2: 98%       Physical Exam  Constitutional: General appearance: The Patient is well-developed and well-nourished who appears the stated age in no acute distress  Patient is pleasant and talkative  HEENT:  Normocephalic  Sclerae are anicteric  Mucous membranes are moist  Neck is supple without adenopathy  No JVD  Chest: The lungs are clear to auscultation  Cardiac: Heart is regular rate  Abdomen: Abdomen is soft, non-tender, non-distended and without masses  Extremities: There is no clubbing or cyanosis  There is no edema  Symmetric  Neuro: Grossly nonfocal  Gait is normal      Lymphatic: No evidence of cervical adenopathy bilaterally  No evidence of axillary adenopathy bilaterally  No evidence of inguinal adenopathy bilaterally  Skin: Warm, anicteric  Psych:  Patient is pleasant and talkative  Breasts:        Pathology:  [unfilled]    Labs:      Imaging  CT chest w contrast    Result Date: 3/31/2022  Narrative: CT CHEST WITH IV CONTRAST INDICATION:   C18 9: Malignant neoplasm of colon, unspecified C78 7: Secondary malignant neoplasm of liver and intrahepatic bile duct C20: Malignant neoplasm of rectum  COMPARISON:  Chest CT from 9/17/2021, 5/11/2021, and 12/1/2017  TECHNIQUE: Chest CT with intravenous contrast   Axial, sagittal, coronal 2D reformats and coronal MIPS from source data  Radiation dose length product (DLP):  220 mGy-cm   Radiation dose exposure minimized using iterative reconstruction and automated exposure control  IV Contrast:  85 mL of iohexol (OMNIPAQUE) FINDINGS: LUNGS:  5 mm juxtapleural right lower lobe nodule, increased from 2 mm in September 2021 and 1 mm in May 2021, suspicious for metastatic disease (3/78)   Additional 4 mm and smaller nodules, 2 in the right lower lobe (3/76, 93) and 2 in the lateral basal left lower lobe (3/71, 80), stable since 2017  Mild paraseptal emphysema  AIRWAYS: No significant filling defects  PLEURA:  Unremarkable  HEART/GREAT VESSELS:  Normal heart size  Mild coronary artery calcification indicating atherosclerotic heart disease  Right port at cavoatrial junction  MEDIASTINUM AND MAHENDRA:  Unremarkable  CHEST WALL AND LOWER NECK: Unremarkable  UPPER ABDOMEN:  Stable 2 9 x 2 3 cm lesion in the dome of the liver (2/51)  Cholelithiasis  Left caliectasis and dilated renal pelvis, stable since May 2020  OSSEOUS STRUCTURES: Mild degenerative disease in the spine  Impression: 5 mm right lower lobe nodule, increased from 2 mm in September 2021 and 1 mm in May 2021, suspicious for metastatic disease  Liver metastasis, stable since September 2021, see MRI for further evaluation  Mild coronary artery calcification indicating atherosclerotic heart disease  I notified Salvatore Andrew and [facility] VALENTINO BOYD by [employer] message on 3/31/2022 at 7:33 AM  This study was also marked for significant notification  Workstation performed: [id number]     MRI abdomen w wo contrast    Result Date: 3/31/2022  Narrative: MRI - ABDOMEN - WITH AND WITHOUT CONTRAST INDICATION: 54 years / Male  C18 9: Malignant neoplasm of colon, unspecified C78 7: Secondary malignant neoplasm of liver and intrahepatic bile duct C20: Malignant neoplasm of rectum  COMPARISON: MRI abdomen 9/17/2021  TECHNIQUE:  The following pulse sequences were obtained prior to and following the administration of intravenous contrast:     Coronal and axial T2 with TE of 90 and 180 respectively, axial T2 with fat saturation, axial FIESTA fat-sat, axial T1-weighted in-and-out-of phase, axial DWI/ADC, precontrast axial T1 with fat saturation, post-contrast dynamic axial T1 with fat saturation at 20, 70, and 180 seconds, coronal T1  with fat saturation and 7 minute delayed axial T1 with fat saturation   IV Contrast:  8 mL of Gadobutrol injection (SINGLE-DOSE) FINDINGS: LOWER CHEST:   Unremarkable  LIVER: Segment 3 postsurgical changes  Numerous scattered tiny cysts unchanged from the prior study  dp Slightly smaller treated hepatic dome segment 7 lesion now measuring 2 6 x 2 1 cm previously measured 3 0 x 2 2 cm  No apparent enhancement  No new hepatic lesions  BILE DUCTS: No intrahepatic or extrahepatic bile duct dilation  GALLBLADDER:  Single frontal gallstone  No pericholecystic inflammatory changes  PANCREAS:  Unremarkable  ADRENAL GLANDS:  Normal  SPLEEN:  Normal  KIDNEYS/PROXIMAL URETERS: No hydroureteronephrosis  No suspicious renal mass  Unchanged 5 4 cm simple left renal parapelvic cyst causing mild unchanged chronic left renal caliectasis  BOWEL:   No dilated loops of bowel  PERITONEUM/RETROPERITONEUM: No mass  No ascites  LYMPH NODES: No abdominal lymphadenopathy  VASCULAR STRUCTURES:  No aneurysm  ABDOMINAL WALL:  Postsurgical changes and rectus diastases unchanged from the prior study  OSSEOUS STRUCTURES:  No suspicious osseous lesion  Impression: Unchanged posttreatment appearance of the liver since 9/17/2021 without new hepatic metastases  Workstation performed: [id number]     I reviewed the above laboratory and imaging data  Discussion/Summary:  44-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  His MRI of the liver is stable  We will continue with observation and repeat his MRI in 6 months  His chest CT reveals an enlarging right lower lobe pulmonary nodule  Based on its location, as well as its size I do not think this would be amenable to biopsy  It is too small to have FDG avidity on PET-CT  We discussed treatment options including short-term observation including repeat CT or PET-CT in the next 2-3 months verses SBRT now  He would rather have some type of treatment now, since this has slowly increased in size  I think this is reasonable  I will set him up with radiation oncology to discuss this    Resection could always be considered as well  We will plan on repeating his PET-CT in the next 3 months  He will follow up with Dr Jeff Granda after that PET-CT  I will see him again in 6 months with a repeat MRI  He is going to repeat his colonoscopy later this summer  He is agreeable to this plan  All his questions were answered

## 2022-04-06 NOTE — LETTER
April 6, 2022     Jose RadhaSu  [address]    Patient: Angelina Alanis   YOB: 1970   Date of Visit: 4/6/2022       Dear Dr Richar Pinto:    Thank you for referring Bre Lujan to me for evaluation  Below are my notes for this consultation  If you have questions, please do not hesitate to call me  I look forward to following your patient along with you  Sincerely,        Whitney Abarca MD        CC: MD Tami Cifuentes MD Josem Fees, MD Alise Mechanic, MD  4/6/2022  3:59 PM  Incomplete               Surgical Oncology Follow Up       [address]  [facility] [place]  [address] [facility] [address]    Ford Estrada  1970  [id number]  [address] [facility]  [facility] [place]  [address] [facility] [address]  [facility] [address]    Diagnoses and all orders for this visit:    Encounter for follow-up examination after completed treatment for malignant neoplasm    Colon cancer metastasized to liver [facility])  -     Ambulatory referral to Radiation Oncology; Future  -     BUN; Future  -     Creatinine, serum; Future  -     NM PET CT skull base to mid thigh; Future  -     MRI abdomen w wo contrast; Future  -     BUN; Future  -     Creatinine, serum; Future  -     Ambulatory referral to Hematology / Oncology; Future        Chief Complaint   Patient presents with    Follow-up       Return in about 6 months (around 10/6/2022) for Office Visit, Imaging - See orders, Labs - See Treatment Plan        Oncology History   Colon cancer metastasized to liver ([facility] [address] )   1/2018 Initial Diagnosis    Malignant neoplasm of sigmoid colon ([facility] [address] )     1/22/2018 Biopsy    Large Intestine, Sigmoid Colon, Recto-sigmoid tumor bx:    - Invasive colonic adenocarcinoma, moderately differentiated     2/6/2018 - 10/16/2018 Chemotherapy    Modified FOLFOX6 x 12 cycles  Added Erbitux on 3/20/18      6/21/2018 Surgery    Segment 7 liver resection/ablation, hemicolectomy     10/30/2018 -  Chemotherapy    Continuation of Single agent Erbitux  With 10/30/18 chemo, it was Cycle #14  Plan was for 6 additional cycles of Erbitux alone       3/2020 Genetic Testing    NEGATIVE for genes tested:    Test(s): CancerNext + RNAinsight (34 genes): APC, ETHAN, BARD1, BRCA1, BRCA2, BRIP1, BMPR1A, CDH1, CDK4, CDKN2A, CHEK2, DICER1, EPCAM, GREM1, HOXB13, MLH1, MRE11A, MSH2, MSH6, MUTYH, NBN, NF1, PALB2, PMS2, POLD1, POLE, PTEN, RAD50, RAD51C, RAD51D, SMAD4, SMARCA4, STK11, TP53      6/8/2020 Surgery    Liver, segment 3 (wedge resection):  - Metastatic adenocarcinoma, consistent with the patient's known colon primary  - Margins negative for carcinoma      7/27/2020 - 1/11/2021 Chemotherapy    fluorouracil (ADRUCIL), 745 mg, Intravenous, Once, 6 of 6 cycles  Administration: 750 mg (7/27/2020), 750 mg (8/10/2020), 745 mg (8/24/2020), 745 mg (9/8/2020), 745 mg (9/21/2020), 745 mg (10/5/2020)  pegfilgrastim (NEULASTA), 6 mg, Subcutaneous, Once, 1 of 1 cycle  Administration: 6 mg (9/24/2020)  pegfilgrastim (NEULASTA ONPRO), 6 mg, Subcutaneous, Once, 6 of 6 cycles  Administration: 6 mg (7/29/2020), 6 mg (8/12/2020), 6 mg (8/26/2020), 6 mg (9/10/2020), 6 mg (10/7/2020)  cetuximab (ERBITUX), 930 mg, Intravenous, Once, 12 of 12 cycles  Administration: 900 mg (7/27/2020), 900 mg (8/10/2020), 900 mg (8/24/2020), 900 mg (9/8/2020), 900 mg (9/21/2020), 900 mg (10/5/2020), 900 mg (10/19/2020), 900 mg (11/2/2020), 900 mg (11/16/2020), 900 mg (11/30/2020), 900 mg (12/28/2020), 900 mg (1/11/2021)  irinotecan (CAMPTOSAR) chemo infusion, 335 mg, Intravenous, Once, 6 of 6 cycles  Administration: 340 mg (7/27/2020), 340 mg (8/10/2020), 340 mg (8/24/2020), 340 mg (9/8/2020), 340 mg (9/21/2020), 340 mg (10/5/2020)  leucovorin calcium IVPB, 744 mg, Intravenous, Once, 6 of 6 cycles  Administration: 750 mg (7/27/2020), 750 mg (8/10/2020), 750 mg (8/24/2020), 750 mg (9/8/2020), 750 mg (9/21/2020), 740 mg (10/5/2020)  fluorouracil (ADRUCIL) ambulatory infusion Soln, 1,200 mg/m2/day = 4,465 mg, Intravenous, Over 46 hours, 6 of 6 cycles  Dose modification: 1,200 mg/m2/day (original dose 1,200 mg/m2/day, Cycle 3)         Staging:  Metastatic rectosigmoid cancer  [id number]  Treatment history:   Modified FOLFOX 6   Erbitux added 03/20/2018   Segment 7 liver resection, June 2018  Segment 3 mass liver resection, June 2020  Chemotherapy with Erbitux  Current treatment:  Observation  Disease status:   LUTHER    History of Present Illness:  Patient returns in follow-up of his metastatic colon cancer  He is doing well at this time with no complaints  He denies any abdominal pain, nausea or vomiting  No change in appetite or unintentional weight loss  No shortness of breath  MRI of the abdomen reveals no evidence of recurrence in the liver  Chest CT from the same date on March 25, 2022 revealed an enlarging right lower lobe pulmonary nodule that was suspicious for metastasis  The I personally reviewed the films  Review of Systems  Complete ROS Surg Onc:   Complete ROS Surg Onc:   Constitutional: The patient denies new or recent history of general fatigue, no recent weight loss, no change in appetite  Eyes: No complaints of visual problems, no scleral icterus  ENT: no complaints of ear pain, no hoarseness, no difficulty swallowing,  no tinnitus and no new masses in head, oral cavity, or neck  Cardiovascular: No complaints of chest pain, no palpitations, no ankle edema  Respiratory: No complaints of shortness of breath, no cough  Gastrointestinal: No complaints of jaundice, no bloody stools, no pale stools  Genitourinary: No complaints of dysuria, no hematuria, no nocturia, no frequent urination, no urethral discharge  Musculoskeletal: No complaints of weakness, paralysis, joint stiffness or arthralgias  Integumentary: No complaints of rash, no new lesions  Neurological: No complaints of convulsions, no seizures, no dizziness  Hematologic/Lymphatic: No complaints of easy bruising  Endocrine:  No hot or cold intolerance  No polydipsia, polyphagia, or polyuria  Allergy/immunology:  No environmental allergies  No food allergies  Not immunocompromised  Skin:  No pallor or rash  No wound  Patient Active Problem List   Diagnosis    ED (erectile dysfunction)    Colon cancer metastasized to liver (HCC)    GERD (gastroesophageal reflux disease)    Pulmonary nodule, right    Acneiform rash    Encounter for follow-up examination after completed treatment for malignant neoplasm    Other hydronephrosis    Annual physical exam    Vapes nicotine containing substance    Viral infection     Past Medical History:   Diagnosis Date    [facility]     Dysuria     Last Assessed:8/24/17    GERD (gastroesophageal reflux disease)     Liver nodule     Pulmonary nodule, right     Ureteropelvic junction (UPJ) obstruction 1/29/2020     Past Surgical History:   Procedure Laterality Date    ABDOMINAL SURGERY      COLON SURGERY      COLONOSCOPY N/A 1/22/2018    Procedure: COLONOSCOPY;  Surgeon: Brian Artis MD;  [facility]; Service: Colorectal   [facility] DENTAL SURGERY  2016    Crown with bridge work    FLEXIBLE SIGMOIDOSCOPY N/A 6/15/2018    Procedure: SIGMOIDOSCOPY FLEXIBLE w/o sedation w/ tattoo;  Surgeon: Brian Artis MD;  [facility];   Service: Colorectal    LAPAROTOMY N/A 6/8/2020    Procedure: LAPAROTOMY EXPLORATORY, LYSIS OF ADHESIONS;  Surgeon: Delmi Thomson MD;  [facility];  Service: Surgical Oncology    LIVER LOBECTOMY N/A 6/21/2018    Procedure: liver resection/ablation, intraoperative ultrasound of liver;  Surgeon: Delmi Thomson MD;  [facility];  Service: Surgical Oncology    LIVER LOBECTOMY N/A 6/8/2020    Procedure: LIVER RESECTION SEGMENT 3 WITH  INTRAOPERATIVE U/S OF LIVER;  Surgeon: Delmi Thomson MD;  [facility];  Service: Surgical Oncology    UT EXPLORATORY OF ABDOMEN N/A 6/21/2018    Procedure: LAPAROTOMY EXPLORATORY;  Surgeon: Sukhjinder Godwin MD;  [facility];  Service: Colorectal    UT INSERT PICC W/ SUB-Q PORT N/A 1/25/2018    Procedure: PORT-A-CATHETER PLACEMENT  Fluoroscopy for performance/interpretation;  Surgeon: Sukhjinder oGdwin MD;  [facility];  Service: Colorectal    UT PART REMOVAL COLON W ANASTOMOSIS Left 6/21/2018    Procedure: hemicolectomy, low anterior resection (open) SPY fluorescence angiography;  Surgeon: Sukhjinder Godwin MD;  [facility];  Service: Colorectal    UT PROCTECTOMY,PARTIAL N/A 6/21/2018    Procedure: Partial proctectomy ;  Surgeon: Sukhjinder Godwin MD;  [facility];  Service: Colorectal    UT SIGMOIDOSCOPY FLX DX W/COLLJ SPEC BR/WA IF PFRMD N/A 6/21/2018    Procedure: Bereket Van;  Surgeon: Sukhjinder Godwin MD;  [facility];  Service: Colorectal    ROOT CANAL  2015    TESTICLE SURGERY      Exploration of Undescended Testis rIght, age 8 had surgery for undescended testicle;  Last Assessed:8/24/17    TOOTH EXTRACTION  2015     Family History   Problem Relation Age of Onset    No Known Problems Father     Cancer Mother         unknown    Cancer Brother         pt  reports brother was diagnosed with stage 4 throat cancer    Throat cancer Brother      Social History     Socioeconomic History    Marital status: Single     Spouse name: Not on file    Number of children: Not on file    Years of education: Not on file    Highest education level: Not on file   Occupational History    Not on file   Tobacco Use    Smoking status: Former Smoker     Types: E-Cigarettes    Smokeless tobacco: Current User    Tobacco comment: quit 4 years ago / smoked for 20 years    Vaping Use    Vaping Use: Every day    Substances: Nicotine, THC (at time), CBD (at times), Flavoring   Substance and Sexual Activity    Alcohol use: Yes     Comment: socially - 2 month    Drug use: Yes     Types: Marijuana     Comment: occasional recreation drug use    Sexual activity: Yes     Comment: Resides alone   Other Topics Concern    Not on file   Social History Narrative    Not on file     Social Determinants of Health     Financial Resource Strain: Not on file   Food Insecurity: Not on file   Transportation Needs: Not on file   Physical Activity: Not on file   Stress: Not on file   Social Connections: Not on file   Intimate Partner Violence: Not on file   Housing Stability: Not on file     No current outpatient medications on file  No Known Allergies  Vitals:    04/06/22 1533   BP: 120/70   Pulse: 79   Resp: 16   Temp: 97 8 °F (36 6 °C)   SpO2: 98%       Physical Exam  Constitutional: General appearance: The Patient is well-developed and well-nourished who appears the stated age in no acute distress  Patient is pleasant and talkative  HEENT:  Normocephalic  Sclerae are anicteric  Mucous membranes are moist  Neck is supple without adenopathy  No JVD  Chest: The lungs are clear to auscultation  Cardiac: Heart is regular rate  Abdomen: Abdomen is soft, non-tender, non-distended and without masses  Extremities: There is no clubbing or cyanosis  There is no edema  Symmetric  Neuro: Grossly nonfocal  Gait is normal      Lymphatic: No evidence of cervical adenopathy bilaterally  No evidence of axillary adenopathy bilaterally  No evidence of inguinal adenopathy bilaterally  Skin: Warm, anicteric  Psych:  Patient is pleasant and talkative  Breasts:        Pathology:  [unfilled]    Labs:      Imaging  CT chest w contrast    Result Date: 3/31/2022  Narrative: CT CHEST WITH IV CONTRAST INDICATION:   C18 9: Malignant neoplasm of colon, unspecified C78 7: Secondary malignant neoplasm of liver and intrahepatic bile duct C20: Malignant neoplasm of rectum   COMPARISON:  Chest CT from 9/17/2021, 5/11/2021, and 12/1/2017  TECHNIQUE: Chest CT with intravenous contrast   Axial, sagittal, coronal 2D reformats and coronal MIPS from source data  Radiation dose length product (DLP):  220 mGy-cm   Radiation dose exposure minimized using iterative reconstruction and automated exposure control  IV Contrast:  85 mL of iohexol (OMNIPAQUE) FINDINGS: LUNGS:  5 mm juxtapleural right lower lobe nodule, increased from 2 mm in September 2021 and 1 mm in May 2021, suspicious for metastatic disease (3/78)  Additional 4 mm and smaller nodules, 2 in the right lower lobe (3/76, 93) and 2 in the lateral basal left lower lobe (3/71, 80), stable since 2017  Mild paraseptal emphysema  AIRWAYS: No significant filling defects  PLEURA:  Unremarkable  HEART/GREAT VESSELS:  Normal heart size  Mild coronary artery calcification indicating atherosclerotic heart disease  Right port at cavoatrial junction  MEDIASTINUM AND MAHENDRA:  Unremarkable  CHEST WALL AND LOWER NECK: Unremarkable  UPPER ABDOMEN:  Stable 2 9 x 2 3 cm lesion in the dome of the liver (2/51)  Cholelithiasis  Left caliectasis and dilated renal pelvis, stable since May 2020  OSSEOUS STRUCTURES: Mild degenerative disease in the spine  Impression: 5 mm right lower lobe nodule, increased from 2 mm in September 2021 and 1 mm in May 2021, suspicious for metastatic disease  Liver metastasis, stable since September 2021, see MRI for further evaluation  Mild coronary artery calcification indicating atherosclerotic heart disease  I notified Salvatore Ayala and [facility] VALENTINO BOYD by [employer] message on 3/31/2022 at 7:33 AM  This study was also marked for significant notification  Workstation performed: [id number]     MRI abdomen w wo contrast    Result Date: 3/31/2022  Narrative: MRI - ABDOMEN - WITH AND WITHOUT CONTRAST INDICATION: 54 years / Male  C18 9: Malignant neoplasm of colon, unspecified C78 7: Secondary malignant neoplasm of liver and intrahepatic bile duct C20:  Malignant neoplasm of rectum  COMPARISON: MRI abdomen 9/17/2021  TECHNIQUE:  The following pulse sequences were obtained prior to and following the administration of intravenous contrast:     Coronal and axial T2 with TE of 90 and 180 respectively, axial T2 with fat saturation, axial FIESTA fat-sat, axial T1-weighted in-and-out-of phase, axial DWI/ADC, precontrast axial T1 with fat saturation, post-contrast dynamic axial T1 with fat saturation at 20, 70, and 180 seconds, coronal T1  with fat saturation and 7 minute delayed axial T1 with fat saturation  IV Contrast:  8 mL of Gadobutrol injection (SINGLE-DOSE) FINDINGS: LOWER CHEST:   Unremarkable  LIVER: Segment 3 postsurgical changes  Numerous scattered tiny cysts unchanged from the prior study  dp Slightly smaller treated hepatic dome segment 7 lesion now measuring 2 6 x 2 1 cm previously measured 3 0 x 2 2 cm  No apparent enhancement  No new hepatic lesions  BILE DUCTS: No intrahepatic or extrahepatic bile duct dilation  GALLBLADDER:  Single frontal gallstone  No pericholecystic inflammatory changes  PANCREAS:  Unremarkable  ADRENAL GLANDS:  Normal  SPLEEN:  Normal  KIDNEYS/PROXIMAL URETERS: No hydroureteronephrosis  No suspicious renal mass  Unchanged 5 4 cm simple left renal parapelvic cyst causing mild unchanged chronic left renal caliectasis  BOWEL:   No dilated loops of bowel  PERITONEUM/RETROPERITONEUM: No mass  No ascites  LYMPH NODES: No abdominal lymphadenopathy  VASCULAR STRUCTURES:  No aneurysm  ABDOMINAL WALL:  Postsurgical changes and rectus diastases unchanged from the prior study  OSSEOUS STRUCTURES:  No suspicious osseous lesion  Impression: Unchanged posttreatment appearance of the liver since 9/17/2021 without new hepatic metastases  Workstation performed: [id number]     I reviewed the above laboratory and imaging data  Discussion/Summary:  46-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  His MRI of the liver is stable    We will continue with observation and repeat his MRI in 6 months  His chest CT reveals an enlarging right lower lobe pulmonary nodule  Based on its location, as well as its size I do not think this would be amenable to biopsy  It is too small to have FDG avidity on PET-CT  We discussed treatment options including short-term observation including repeat CT or PET-CT in the next 2-3 months verses SBRT now  He would rather have some type of treatment now, since this has slowly increased in size  I think this is reasonable  I will set him up with radiation oncology to discuss this  Resection could always be considered as well  We will plan on repeating his PET-CT in the next 3 months  He will follow up with Dr Castro Garner after that PET-CT  I will see him again in 6 months with a repeat MRI  He is going to repeat his colonoscopy later this summer  He is agreeable to this plan  All his questions were answered  Eliana Zavala MD  4/6/2022  3:59 PM  Sign when Signing Visit               Surgical Oncology Follow Up       [address] [place] [address]  [address] [facility] [address]    Kelvin Banda  1970  [id number]  [address] [facility]  [facility] [place]  [address] [address]    Diagnoses and all orders for this visit:    Encounter for follow-up examination after completed treatment for malignant neoplasm    Colon cancer metastasized to liver [facility])  -     Ambulatory referral to Radiation Oncology; Future  -     BUN; Future  -     Creatinine, serum; Future  -     NM PET CT skull base to mid thigh; Future  -     MRI abdomen w wo contrast; Future  -     BUN; Future  -     Creatinine, serum; Future  -     Ambulatory referral to Hematology / Oncology;  Future        Chief Complaint   Patient presents with    Follow-up       Return in about 6 months (around 10/6/2022) for Office Visit, Imaging - See orders, Labs - See Treatment Plan  Oncology History   Colon cancer metastasized to liver ([facility] [address] )   1/2018 Initial Diagnosis    Malignant neoplasm of sigmoid colon ([facility] [address] )     1/22/2018 Biopsy    Large Intestine, Sigmoid Colon, Recto-sigmoid tumor bx:    - Invasive colonic adenocarcinoma, moderately differentiated     2/6/2018 - 10/16/2018 Chemotherapy    Modified FOLFOX6 x 12 cycles  Added Erbitux on 3/20/18      6/21/2018 Surgery    Segment 7 liver resection/ablation, hemicolectomy     10/30/2018 -  Chemotherapy    Continuation of Single agent Erbitux  With 10/30/18 chemo, it was Cycle #14  Plan was for 6 additional cycles of Erbitux alone       3/2020 Genetic Testing    NEGATIVE for genes tested:    Test(s): CancerNext + RNAinsight (34 genes): APC, ETHAN, BARD1, BRCA1, BRCA2, BRIP1, BMPR1A, CDH1, CDK4, CDKN2A, CHEK2, DICER1, EPCAM, GREM1, HOXB13, MLH1, MRE11A, MSH2, MSH6, MUTYH, NBN, NF1, PALB2, PMS2, POLD1, POLE, PTEN, RAD50, RAD51C, RAD51D, SMAD4, SMARCA4, STK11, TP53      6/8/2020 Surgery    Liver, segment 3 (wedge resection):  - Metastatic adenocarcinoma, consistent with the patient's known colon primary  - Margins negative for carcinoma      7/27/2020 - 1/11/2021 Chemotherapy    fluorouracil (ADRUCIL), 745 mg, Intravenous, Once, 6 of 6 cycles  Administration: 750 mg (7/27/2020), 750 mg (8/10/2020), 745 mg (8/24/2020), 745 mg (9/8/2020), 745 mg (9/21/2020), 745 mg (10/5/2020)  pegfilgrastim (NEULASTA), 6 mg, Subcutaneous, Once, 1 of 1 cycle  Administration: 6 mg (9/24/2020)  pegfilgrastim (Paticia Kand), 6 mg, Subcutaneous, Once, 6 of 6 cycles  Administration: 6 mg (7/29/2020), 6 mg (8/12/2020), 6 mg (8/26/2020), 6 mg (9/10/2020), 6 mg (10/7/2020)  cetuximab (ERBITUX), 930 mg, Intravenous, Once, 12 of 12 cycles  Administration: 900 mg (7/27/2020), 900 mg (8/10/2020), 900 mg (8/24/2020), 900 mg (9/8/2020), 900 mg (9/21/2020), 900 mg (10/5/2020), 900 mg (10/19/2020), 900 mg (11/2/2020), 900 mg (11/16/2020), 900 mg (11/30/2020), 900 mg (12/28/2020), 900 mg (1/11/2021)  irinotecan (CAMPTOSAR) chemo infusion, 335 mg, Intravenous, Once, 6 of 6 cycles  Administration: 340 mg (7/27/2020), 340 mg (8/10/2020), 340 mg (8/24/2020), 340 mg (9/8/2020), 340 mg (9/21/2020), 340 mg (10/5/2020)  leucovorin calcium IVPB, 744 mg, Intravenous, Once, 6 of 6 cycles  Administration: 750 mg (7/27/2020), 750 mg (8/10/2020), 750 mg (8/24/2020), 750 mg (9/8/2020), 750 mg (9/21/2020), 740 mg (10/5/2020)  fluorouracil (ADRUCIL) ambulatory infusion Soln, 1,200 mg/m2/day = 4,465 mg, Intravenous, Over 46 hours, 6 of 6 cycles  Dose modification: 1,200 mg/m2/day (original dose 1,200 mg/m2/day, Cycle 3)         Staging:  Metastatic rectosigmoid cancer  [id number]  Treatment history:   Modified FOLFOX 6   Erbitux added 03/20/2018   Segment 7 liver resection, June 2018  Segment 3 mass liver resection, June 2020  Chemotherapy with Erbitux  Current treatment:  Observation  Disease status:   LUTHER    History of Present Illness:  Patient returns in follow-up of his metastatic colon cancer  He is doing well at this time with no complaints  He denies any abdominal pain, nausea or vomiting  No change in appetite or unintentional weight loss  No shortness of breath  MRI of the abdomen reveals no evidence of recurrence in the liver  Chest CT from the same date on March 25, 2022 revealed an enlarging right lower lobe pulmonary nodule that was suspicious for metastasis  The I personally reviewed the films  Review of Systems  Complete ROS Surg Onc:   Complete ROS Surg Onc:   Constitutional: The patient denies new or recent history of general fatigue, no recent weight loss, no change in appetite  Eyes: No complaints of visual problems, no scleral icterus  ENT: no complaints of ear pain, no hoarseness, no difficulty swallowing,  no tinnitus and no new masses in head, oral cavity, or neck     Cardiovascular: No complaints of chest pain, no palpitations, no ankle edema  Respiratory: No complaints of shortness of breath, no cough  Gastrointestinal: No complaints of jaundice, no bloody stools, no pale stools  Genitourinary: No complaints of dysuria, no hematuria, no nocturia, no frequent urination, no urethral discharge  Musculoskeletal: No complaints of weakness, paralysis, joint stiffness or arthralgias  Integumentary: No complaints of rash, no new lesions  Neurological: No complaints of convulsions, no seizures, no dizziness  Hematologic/Lymphatic: No complaints of easy bruising  Endocrine:  No hot or cold intolerance  No polydipsia, polyphagia, or polyuria  Allergy/immunology:  No environmental allergies  No food allergies  Not immunocompromised  Skin:  No pallor or rash  No wound  Patient Active Problem List   Diagnosis    ED (erectile dysfunction)    Colon cancer metastasized to liver (HCC)    GERD (gastroesophageal reflux disease)    Pulmonary nodule, right    Acneiform rash    Encounter for follow-up examination after completed treatment for malignant neoplasm    Other hydronephrosis    Annual physical exam    Vapes nicotine containing substance    Viral infection     Past Medical History:   Diagnosis Date    [facility] ([facility] [address] )     Dysuria     Last Assessed:8/24/17    GERD (gastroesophageal reflux disease)     Liver nodule     Pulmonary nodule, right     Ureteropelvic junction (UPJ) obstruction 1/29/2020     Past Surgical History:   Procedure Laterality Date    ABDOMINAL SURGERY      COLON SURGERY      COLONOSCOPY N/A 1/22/2018    Procedure: COLONOSCOPY;  Surgeon: De Tristan MD;  [facility]: [facility]; Service: Colorectal   [facility] DENTAL SURGERY  2016    Crown with bridge work    FLEXIBLE SIGMOIDOSCOPY N/A 6/15/2018    Procedure: SIGMOIDOSCOPY FLEXIBLE w/o sedation w/ tattoo;  Surgeon: De Tristan MD;  Location: [facility];   Service: Colorectal    LAPAROTOMY N/A 6/8/2020    Procedure: LAPAROTOMY EXPLORATORY, LYSIS OF ADHESIONS;  Surgeon: Nikita Marks MD;  [facility];  Service: Surgical Oncology    LIVER LOBECTOMY N/A 6/21/2018    Procedure: liver resection/ablation, intraoperative ultrasound of liver;  Surgeon: Nikita Marks MD;  [facility];  Service: Surgical Oncology    LIVER LOBECTOMY N/A 6/8/2020    Procedure: LIVER RESECTION SEGMENT 3 WITH  INTRAOPERATIVE U/S OF LIVER;  Surgeon: Nikita Marks MD;  [facility];  Service: Surgical Oncology    RI EXPLORATORY OF ABDOMEN N/A 6/21/2018    Procedure: LAPAROTOMY EXPLORATORY;  Surgeon: Madison Encinas MD;  [facility];  Service: Colorectal    RI INSERT PICC W/ SUB-Q PORT N/A 1/25/2018    Procedure: PORT-A-CATHETER PLACEMENT  Fluoroscopy for performance/interpretation;  Surgeon: Madison Encinas MD;  [facility]: [facility];  Service: Colorectal    RI PART REMOVAL COLON W ANASTOMOSIS Left 6/21/2018    Procedure: hemicolectomy, low anterior resection (open) SPY fluorescence angiography;  Surgeon: Madison Encinas MD;  [facility];  Service: Colorectal    RI PROCTECTOMY,PARTIAL N/A 6/21/2018    Procedure: Partial proctectomy ;  Surgeon: Madison Encinas MD;  [facility];  Service: Colorectal    RI SIGMOIDOSCOPY FLX DX W/COLLJ SPEC BR/WA IF PFRMD N/A 6/21/2018    Procedure: Adrienne Cristina;  Surgeon: Madison Encinas MD;  [facility];  Service: Colorectal    ROOT CANAL  2015    TESTICLE SURGERY      Exploration of Undescended Testis rIght, age 8 had surgery for undescended testicle;  Last Assessed:8/24/17    TOOTH EXTRACTION  2015     Family History   Problem Relation Age of Onset    No Known Problems Father     Cancer Mother         unknown    Cancer Brother         pt  reports brother was diagnosed with stage 4 throat cancer    Throat cancer Brother      Social History     Socioeconomic History    Marital status: Single     Spouse name: Not on file    Number of children: Not on file    Years of education: Not on file    Highest education level: Not on file   Occupational History    Not on file   Tobacco Use    Smoking status: Former Smoker     Types: E-Cigarettes    Smokeless tobacco: Current User    Tobacco comment: quit 4 years ago / smoked for 20 years    Vaping Use    Vaping Use: Every day    Substances: Nicotine, THC (at time), CBD (at times), Flavoring   Substance and Sexual Activity    Alcohol use: Yes     Comment: socially - 2 month    Drug use: Yes     Types: Marijuana     Comment: occasional recreation drug use    Sexual activity: Yes     Comment: Resides alone   Other Topics Concern    Not on file   Social History Narrative    Not on file     Social Determinants of Health     Financial Resource Strain: Not on file   Food Insecurity: Not on file   Transportation Needs: Not on file   Physical Activity: Not on file   Stress: Not on file   Social Connections: Not on file   Intimate Partner Violence: Not on file   Housing Stability: Not on file     No current outpatient medications on file  No Known Allergies  Vitals:    04/06/22 1533   BP: 120/70   Pulse: 79   Resp: 16   Temp: 97 8 °F (36 6 °C)   SpO2: 98%       Physical Exam  Constitutional: General appearance: The Patient is well-developed and well-nourished who appears the stated age in no acute distress  Patient is pleasant and talkative  HEENT:  Normocephalic  Sclerae are anicteric  Mucous membranes are moist  Neck is supple without adenopathy  No JVD  Chest: The lungs are clear to auscultation  Cardiac: Heart is regular rate  Abdomen: Abdomen is soft, non-tender, non-distended and without masses  Extremities: There is no clubbing or cyanosis  There is no edema  Symmetric  Neuro: Grossly nonfocal  Gait is normal      Lymphatic: No evidence of cervical adenopathy bilaterally  No evidence of axillary adenopathy bilaterally  No evidence of inguinal adenopathy bilaterally  Skin: Warm, anicteric  Psych:  Patient is pleasant and talkative  Breasts:        Pathology:  [unfilled]    Labs:      Imaging  CT chest w contrast    Result Date: 3/31/2022  Narrative: CT CHEST WITH IV CONTRAST INDICATION:   C18 9: Malignant neoplasm of colon, unspecified C78 7: Secondary malignant neoplasm of liver and intrahepatic bile duct C20: Malignant neoplasm of rectum  COMPARISON:  Chest CT from 9/17/2021, 5/11/2021, and 12/1/2017  TECHNIQUE: Chest CT with intravenous contrast   Axial, sagittal, coronal 2D reformats and coronal MIPS from source data  Radiation dose length product (DLP):  220 mGy-cm   Radiation dose exposure minimized using iterative reconstruction and automated exposure control  IV Contrast:  85 mL of iohexol (OMNIPAQUE) FINDINGS: LUNGS:  5 mm juxtapleural right lower lobe nodule, increased from 2 mm in September 2021 and 1 mm in May 2021, suspicious for metastatic disease (3/78)  Additional 4 mm and smaller nodules, 2 in the right lower lobe (3/76, 93) and 2 in the lateral basal left lower lobe (3/71, 80), stable since 2017  Mild paraseptal emphysema  AIRWAYS: No significant filling defects  PLEURA:  Unremarkable  HEART/GREAT VESSELS:  Normal heart size  Mild coronary artery calcification indicating atherosclerotic heart disease  Right port at cavoatrial junction  MEDIASTINUM AND MAHENDRA:  Unremarkable  CHEST WALL AND LOWER NECK: Unremarkable  UPPER ABDOMEN:  Stable 2 9 x 2 3 cm lesion in the dome of the liver (2/51)  Cholelithiasis  Left caliectasis and dilated renal pelvis, stable since May 2020  OSSEOUS STRUCTURES: Mild degenerative disease in the spine  Impression: 5 mm right lower lobe nodule, increased from 2 mm in September 2021 and 1 mm in May 2021, suspicious for metastatic disease  Liver metastasis, stable since September 2021, see MRI for further evaluation  Mild coronary artery calcification indicating atherosclerotic heart disease  I notified Salvatore Chakraborty and [facility] VALENTINO ALI by [employer] message on 3/31/2022 at 7:33 AM  This study was also marked for significant notification  Workstation performed: [id number]     MRI abdomen w wo contrast    Result Date: 3/31/2022  Narrative: MRI - ABDOMEN - WITH AND WITHOUT CONTRAST INDICATION: 54 years / Male  C18 9: Malignant neoplasm of colon, unspecified C78 7: Secondary malignant neoplasm of liver and intrahepatic bile duct C20: Malignant neoplasm of rectum  COMPARISON: MRI abdomen 9/17/2021  TECHNIQUE:  The following pulse sequences were obtained prior to and following the administration of intravenous contrast:     Coronal and axial T2 with TE of 90 and 180 respectively, axial T2 with fat saturation, axial FIESTA fat-sat, axial T1-weighted in-and-out-of phase, axial DWI/ADC, precontrast axial T1 with fat saturation, post-contrast dynamic axial T1 with fat saturation at 20, 70, and 180 seconds, coronal T1  with fat saturation and 7 minute delayed axial T1 with fat saturation  IV Contrast:  8 mL of Gadobutrol injection (SINGLE-DOSE) FINDINGS: LOWER CHEST:   Unremarkable  LIVER: Segment 3 postsurgical changes  Numerous scattered tiny cysts unchanged from the prior study  dp Slightly smaller treated hepatic dome segment 7 lesion now measuring 2 6 x 2 1 cm previously measured 3 0 x 2 2 cm  No apparent enhancement  No new hepatic lesions  BILE DUCTS: No intrahepatic or extrahepatic bile duct dilation  GALLBLADDER:  Single frontal gallstone  No pericholecystic inflammatory changes  PANCREAS:  Unremarkable  ADRENAL GLANDS:  Normal  SPLEEN:  Normal  KIDNEYS/PROXIMAL URETERS: No hydroureteronephrosis  No suspicious renal mass  Unchanged 5 4 cm simple left renal parapelvic cyst causing mild unchanged chronic left renal caliectasis  BOWEL:   No dilated loops of bowel  PERITONEUM/RETROPERITONEUM: No mass  No ascites  LYMPH NODES: No abdominal lymphadenopathy  VASCULAR STRUCTURES:  No aneurysm   ABDOMINAL WALL:  Postsurgical changes and rectus diastases unchanged from the prior study  OSSEOUS STRUCTURES:  No suspicious osseous lesion  Impression: Unchanged posttreatment appearance of the liver since 9/17/2021 without new hepatic metastases  Workstation performed: [id number]     I reviewed the above laboratory and imaging data  Discussion/Summary:  61-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  His MRI of the liver is stable  We will continue with observation and repeat his MRI in 6 months  His chest CT reveals an enlarging right lower lobe pulmonary nodule  Based on its location, as well as its size I do not think this would be amenable to biopsy  It is too small to have FDG avidity on PET-CT  We discussed treatment options including short-term observation including repeat CT or PET-CT in the next 2-3 months verses SBRT now  He would rather have some type of treatment now, since this has slowly increased in size  I think this is reasonable  I will set him up with radiation oncology to discuss this  Resection could always be considered as well  We will plan on repeating his PET-CT in the next 3 months  He will follow up with Dr Rosado Few after that PET-CT  I will see him again in 6 months with a repeat MRI  He is agreeable to this plan  All his questions were answered

## 2022-04-07 ENCOUNTER — TELEPHONE (OUTPATIENT)
Dept: SURGICAL ONCOLOGY | Facility: CLINIC | Age: 52
End: 2022-04-07

## 2022-04-07 ENCOUNTER — TELEPHONE (OUTPATIENT)
Dept: HEMATOLOGY ONCOLOGY | Facility: CLINIC | Age: 52
End: 2022-04-07

## 2022-04-07 NOTE — TELEPHONE ENCOUNTER
Patient was returning call because he was left a voicemail  I made patient aware of the message that was left stating that his port flush will be on 4/14 @ 3:30 at [address]  Patient understood

## 2022-04-11 PROBLEM — C18.9 METASTASIS FROM COLON CANCER (HCC): Status: ACTIVE | Noted: 2022-04-11

## 2022-04-11 PROBLEM — C79.9 METASTASIS FROM COLON CANCER (HCC): Status: ACTIVE | Noted: 2022-04-11

## 2022-04-14 ENCOUNTER — RADIATION ONCOLOGY CONSULT (OUTPATIENT)
Dept: RADIATION ONCOLOGY | Facility: HOSPITAL | Age: 52
End: 2022-04-14
Attending: STUDENT IN AN ORGANIZED HEALTH CARE EDUCATION/TRAINING PROGRAM
Payer: COMMERCIAL

## 2022-04-14 ENCOUNTER — HOSPITAL ENCOUNTER (OUTPATIENT)
Dept: INFUSION CENTER | Facility: CLINIC | Age: 52
Discharge: HOME | End: 2022-04-14
Payer: COMMERCIAL

## 2022-04-14 VITALS
OXYGEN SATURATION: 98 % | RESPIRATION RATE: 18 BRPM | DIASTOLIC BLOOD PRESSURE: 70 MMHG | BODY MASS INDEX: 26.86 KG/M2 | HEIGHT: 65 IN | WEIGHT: 161.2 LBS | TEMPERATURE: 98.3 F | HEART RATE: 58 BPM | SYSTOLIC BLOOD PRESSURE: 110 MMHG

## 2022-04-14 DIAGNOSIS — C18.9 COLON CANCER METASTASIZED TO LIVER (HCC): Primary | ICD-10-CM

## 2022-04-14 DIAGNOSIS — C18.9 METASTASIS FROM COLON CANCER (HCC): Primary | ICD-10-CM

## 2022-04-14 DIAGNOSIS — C79.9 METASTASIS FROM COLON CANCER (HCC): Primary | ICD-10-CM

## 2022-04-14 DIAGNOSIS — C18.9 COLON CANCER METASTASIZED TO LIVER (HCC): ICD-10-CM

## 2022-04-14 DIAGNOSIS — C78.7 COLON CANCER METASTASIZED TO LIVER (HCC): ICD-10-CM

## 2022-04-14 DIAGNOSIS — C78.7 COLON CANCER METASTASIZED TO LIVER (HCC): Primary | ICD-10-CM

## 2022-04-14 PROCEDURE — 99204 OFFICE O/P NEW MOD 45 MIN: CPT | Performed by: STUDENT IN AN ORGANIZED HEALTH CARE EDUCATION/TRAINING PROGRAM

## 2022-04-14 PROCEDURE — 96523 IRRIG DRUG DELIVERY DEVICE: CPT

## 2022-04-14 PROCEDURE — 99211 OFF/OP EST MAY X REQ PHY/QHP: CPT | Performed by: STUDENT IN AN ORGANIZED HEALTH CARE EDUCATION/TRAINING PROGRAM

## 2022-04-14 PROCEDURE — G0463 HOSPITAL OUTPT CLINIC VISIT: HCPCS | Performed by: STUDENT IN AN ORGANIZED HEALTH CARE EDUCATION/TRAINING PROGRAM

## 2022-04-14 NOTE — PROGRESS NOTES
Patient presents for port flush  Port accessed without difficulty, flushes and aspirates freely  Port saline locked and de-accessed  Patient will schedule his next port flush appointment  AVS declined
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
## 2022-04-14 NOTE — PROGRESS NOTES
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 status post left hemicolectomy, low anterior resection on 18 for sigmoid cancer/concurrent liver resection/ablation  Pathology revealed metastatic adenocarcinoma with clear margins, left colon biopsy showed no residual lesion, 0/25 nodes [id number], interval repeat liver resection 2020  He completed chemotherapy in 2021  He presents with surviellence chest CT which reveals enlarging right lower lobe pulmonary nodule  21 Hem/Onc - Dr Chani Veloz  Currently on ovservation    10/19/21 Colon/Rectal Surgery - Dr Joey Heath  Doing well, no complaints  Colonoscopy 2022        Component CARCINOEMBRYONIC ANTIGEN   Latest Ref Rng & Units 0 0 - 3 0 ng/mL   2021 0 8   2021 0 9   3/24/2022 0 6     3/25/22 CT chest w contrast  IMPRESSION:  5 mm right lower lobe nodule, increased from 2 mm in 2021 and 1 mm in May 2021, suspicious for metastatic disease  Liver metastasis, stable since 2021, see MRI for further evaluation  Mild coronary artery calcification indicating atherosclerotic heart disease  3/25/22 MRI abdomen w wo contrast  IMPRESSION:  Unchanged posttreatment appearance of the liver since 2021 without new hepatic metastases      Upcomin22 PET CT  22 Hem/Onc - Dr Chani Veloz  10/7/22 MRI abdomen w wo contrast  10/14/22 Surg/Onc - Dr Bran Russell                Oncology History   Colon cancer metastasized to liver [facility])   2018 Initial Diagnosis    Malignant neoplasm of sigmoid colon ([facility] [address] )     2018 Biopsy    Large Intestine, Sigmoid Colon, Recto-sigmoid tumor bx:    - Invasive colonic adenocarcinoma, moderately differentiated     2018 - 10/16/2018 Chemotherapy    Modified FOLFOX6 x 12 cycles  Added Erbitux on 3/20/18      2018 Surgery    Segment 7 liver resection/ablation, hemicolectomy     10/30/2018 -  Chemotherapy    Continuation of Single agent Erbitux  With 10/30/18 chemo, it was Cycle #14  Plan was for 6 additional cycles of Erbitux alone       3/2020 Genetic Testing    NEGATIVE for genes tested:    Test(s): [employer] + [employer] (34 genes): APC, ETHAN, BARD1, BRCA1, BRCA2, BRIP1, BMPR1A, CDH1, CDK4, CDKN2A, CHEK2, DICER1, EPCAM, GREM1, HOXB13, MLH1, MRE11A, MSH2, MSH6, MUTYH, NBN, NF1, PALB2, PMS2, POLD1, POLE, PTEN, RAD50, RAD51C, RAD51D, SMAD4, SMARCA4, STK11, TP53      6/8/2020 Surgery    Liver, segment 3 (wedge resection):  - Metastatic adenocarcinoma, consistent with the patient's known colon primary  - Margins negative for carcinoma      7/27/2020 - 1/11/2021 Chemotherapy    fluorouracil (ADRUCIL), 745 mg, Intravenous, Once, 6 of 6 cycles  Administration: 750 mg (7/27/2020), 750 mg (8/10/2020), 745 mg (8/24/2020), 745 mg (9/8/2020), 745 mg (9/21/2020), 745 mg (10/5/2020)  pegfilgrastim (NEULASTA), 6 mg, Subcutaneous, Once, 1 of 1 cycle  Administration: 6 mg (9/24/2020)  pegfilgrastim (NEULASTA ONPRO), 6 mg, Subcutaneous, Once, 6 of 6 cycles  Administration: 6 mg (7/29/2020), 6 mg (8/12/2020), 6 mg (8/26/2020), 6 mg (9/10/2020), 6 mg (10/7/2020)  cetuximab (ERBITUX), 930 mg, Intravenous, Once, 12 of 12 cycles  Administration: 900 mg (7/27/2020), 900 mg (8/10/2020), 900 mg (8/24/2020), 900 mg (9/8/2020), 900 mg (9/21/2020), 900 mg (10/5/2020), 900 mg (10/19/2020), 900 mg (11/2/2020), 900 mg (11/16/2020), 900 mg (11/30/2020), 900 mg (12/28/2020), 900 mg (1/11/2021)  irinotecan (CAMPTOSAR) chemo infusion, 335 mg, Intravenous, Once, 6 of 6 cycles  Administration: 340 mg (7/27/2020), 340 mg (8/10/2020), 340 mg (8/24/2020), 340 mg (9/8/2020), 340 mg (9/21/2020), 340 mg (10/5/2020)  leucovorin calcium IVPB, 744 mg, Intravenous, Once, 6 of 6 cycles  Administration: 750 mg (7/27/2020), 750 mg (8/10/2020), 750 mg (8/24/2020), 750 mg (9/8/2020), 750 mg (9/21/2020), 740 mg (10/5/2020)  fluorouracil (ADRUCIL) ambulatory infusion Soln, 1,200 mg/m2/day = 4,465 mg, Intravenous, Over 46 hours, 6 of 6 cycles  Dose modification: 1,200 mg/m2/day (original dose 1,200 mg/m2/day, Cycle 3)     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 ([facility] [address] )   4/11/2022 Initial Diagnosis    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 ([facility] )         Review of Systems:  Review of Systems   Constitutional: Positive for appetite change (mild decrease), fatigue (mild- moderate) and unexpected weight change (flucatates 5lbs)  HENT: Negative  Eyes: Negative  Respiratory: Positive for cough (productive cough white/milky phelgm)  Negative for chest tightness, shortness of breath and wheezing  Cardiovascular: Negative  Negative for chest pain, palpitations and leg swelling  Gastrointestinal: Negative  Endocrine: Negative  Genitourinary: Negative  Musculoskeletal: Positive for arthralgias (right knee) and back pain (lower back)  Negative for neck pain and neck stiffness  Skin: Negative  Allergic/Immunologic: Positive for environmental allergies  Neurological: Positive for headaches (related to sinus, while wearing glasses and staring at small objects)  Negative for dizziness, seizures, speech difficulty, weakness, light-headedness and numbness  Hematological: Bruises/bleeds easily  Psychiatric/Behavioral: Negative for sleep disturbance  The patient is not nervous/anxious           Forgetful        Clinical Trial: no    Pain assessment: 0    PFT: N/a    Prior Radiation: no    Teaching: NCI Radiation packet, SBRT    MST: complete    Implantable Devices (Port, pacemaker, pain stimulator): RCW PAC    Hip Replacement: no    Covid Vaccine Status: Vaccinated no booster    Health Maintenance   Topic Date Due    Pneumococcal Vaccine: Pediatrics (0 to 5 Years) and At-Risk Patients (6 to 59 Years) (1 of 4 - PCV13) Never done    COVID-19 Vaccine (3 - Pfizer risk 4-dose series) 06/17/2021    Influenza Vaccine (1) Never done    Depression Screening  07/27/2022    BMI: Followup Plan  07/27/2022    HIV Screening  07/28/2023 (Originally 10/27/1987)    Annual Physical  07/27/2022    BMI: Adult  04/06/2023    DTaP,Tdap,and Td Vaccines (2 - Td or Tdap) 12/01/2027    Hepatitis C Screening  Completed    HIB Vaccine  Aged Out    Hepatitis B Vaccine  Aged Out    IPV Vaccine  Aged Out    Hepatitis A Vaccine  Aged Out    Meningococcal ACWY Vaccine  Aged Out    HPV Vaccine  Aged Out       Past Medical History:   Diagnosis Date    Cancer ([address] )     Dysuria     Last Assessed:8/24/17    GERD (gastroesophageal reflux disease)     Liver nodule     Pulmonary nodule, right     Ureteropelvic junction (UPJ) obstruction 1/29/2020       Past Surgical History:   Procedure Laterality Date    ABDOMINAL SURGERY      COLON SURGERY      COLONOSCOPY N/A 1/22/2018    Procedure: COLONOSCOPY;  Surgeon: Karla Willett MD;  [facility]; Service: Colorectal   [facility] DENTAL SURGERY  2016    Crown with bridge work    FLEXIBLE SIGMOIDOSCOPY N/A 6/15/2018    Procedure: SIGMOIDOSCOPY FLEXIBLE w/o sedation w/ tattoo;  Surgeon: Karla Willett MD;  [facility];   Service: Colorectal    LAPAROTOMY N/A 6/8/2020    Procedure: LAPAROTOMY EXPLORATORY, LYSIS OF ADHESIONS;  Surgeon: Deepak Acosta MD;  [facility];  Service: Surgical Oncology    LIVER LOBECTOMY N/A 6/21/2018    Procedure: liver resection/ablation, intraoperative ultrasound of liver;  Surgeon: Deepak Acosta MD;  [facility];  Service: Surgical Oncology    LIVER LOBECTOMY N/A 6/8/2020    Procedure: LIVER RESECTION SEGMENT 3 WITH  INTRAOPERATIVE U/S OF LIVER;  Surgeon: Deepak Acosta MD;  [facility];  Service: Surgical Oncology    NV EXPLORATORY OF ABDOMEN N/A 6/21/2018    Procedure: LAPAROTOMY EXPLORATORY;  Surgeon: Karla Willett MD;  [facility];  Service: Colorectal    NV INSERT PICC W/ SUB-Q PORT N/A 1/25/2018    Procedure: Gerlene McKean PLACEMENT  Fluoroscopy for performance/interpretation;  Surgeon: Sukhjinder Godwin MD;  [facility];  Service: Colorectal    GA PART REMOVAL COLON W ANASTOMOSIS Left 6/21/2018    Procedure: hemicolectomy, low anterior resection (open) SPY fluorescence angiography;  Surgeon: Sukhjinder Godwin MD;  [facility];  Service: Colorectal    GA PROCTECTOMY,PARTIAL N/A 6/21/2018    Procedure: Partial proctectomy ;  Surgeon: Sukhjinder Godwin MD;  [facility];  Service: Colorectal    GA SIGMOIDOSCOPY FLX DX W/COLLJ SPEC BR/WA IF PFRMD N/A 6/21/2018    Procedure: Bereket Van;  Surgeon: Sukhjinder Godwin MD;  [facility];  Service: Colorectal    ROOT CANAL  2015    TESTICLE SURGERY      Exploration of Undescended Testis rIght, age 8 had surgery for undescended testicle; Last Assessed:8/24/17    TOOTH EXTRACTION  2015       Family History   Problem Relation Age of Onset    No Known Problems Father     Cancer Mother         unknown    Cancer Brother         pt  reports brother was diagnosed with stage 4 throat cancer    Throat cancer Brother        Social History     Tobacco Use    Smoking status: Former Smoker     Types: E-Cigarettes    Smokeless tobacco: Current User    Tobacco comment: quit 4 years ago / smoked for 20 years    Vaping Use    Vaping Use: Every day    Substances: Nicotine, THC (at time), CBD (at times), Flavoring   Substance Use Topics    Alcohol use: Yes     Comment: socially - 2 month    Drug use: Yes     Types: Marijuana     Comment: occasional recreation drug use        No current outpatient medications on file  No Known Allergies     There were no vitals filed for this visit

## 2022-04-22 ENCOUNTER — OFFICE VISIT (OUTPATIENT)
Dept: UROLOGY | Facility: AMBULATORY SURGERY CENTER | Age: 52
End: 2022-04-22
Payer: COMMERCIAL

## 2022-04-22 VITALS
HEART RATE: 80 BPM | WEIGHT: 168 LBS | HEIGHT: 65 IN | DIASTOLIC BLOOD PRESSURE: 82 MMHG | BODY MASS INDEX: 27.99 KG/M2 | SYSTOLIC BLOOD PRESSURE: 116 MMHG

## 2022-04-22 DIAGNOSIS — N13.39 OTHER HYDRONEPHROSIS: Primary | ICD-10-CM

## 2022-04-22 PROCEDURE — 99213 OFFICE O/P EST LOW 20 MIN: CPT | Performed by: NURSE PRACTITIONER

## 2022-04-22 NOTE — PROGRESS NOTES
4/22/2022    Abdifatah Cooper  1970  [id number]      Assessment  -Left sided hydronephrosis     Discussion/Plan  Dean cMkeon is a 54 y o  male being managed by Dr Rodney Merino  1  Left sided hydronephrosis- we reviewed the results of his recent MRI ordered by Oncology on 03/25/2022 which identified unchanged, mild left-sided hydronephrosis  He remains asymptomatic  Recent creatinine 1 19  Given his current health status, we discussed continued monitoring  He is amenable with this plan  Follow-up in the office in 1 year for re-evaluation  Will review follow-up imaging ordered by Oncology at that time  He was advised to call sooner with any issues     -All questions answered, patient agrees with plan      History of Present Illness  54 y o  male with a history of hydronephrosis presents today for follow up  Patient last seen in the office in May 2021  He follows with Medical Oncology for history of stage IV colon cancer  Patient was found to have mild left-sided hydronephrosis on imaging since 2017  Mag 3 renal scan with Lasix revealed even split function with delayed excretion of radiotracer on left side  He remains asymptomatic  Patient denies any lower urinary tract symptoms, gross hematuria, or dysuria  He has had no recent urinary tract infections  Review of Systems  Review of Systems   Constitutional: Negative  HENT: Negative  Respiratory: Negative  Cardiovascular: Negative  Gastrointestinal: Negative  Genitourinary: Negative for decreased urine volume, difficulty urinating, dysuria, flank pain, frequency, hematuria and urgency  Musculoskeletal: Negative  Skin: Negative  Neurological: Negative  Psychiatric/Behavioral: Negative          Past Medical History  Past Medical History:   Diagnosis Date    Cancer [facility])     Colon cancer ([facility] [address] )     Dysuria     Last Assessed:8/24/17    GERD (gastroesophageal reflux disease)     Liver cancer (HCC)     Liver nodule     Pulmonary nodule, right     Ureteropelvic junction (UPJ) obstruction 1/29/2020       Past Social History  Past Surgical History:   Procedure Laterality Date    ABDOMINAL SURGERY      COLON SURGERY      COLONOSCOPY N/A 1/22/2018    Procedure: COLONOSCOPY;  Surgeon: Palma Espitia MD;  [facility]; Service: Colorectal   [place] DENTAL SURGERY  2016    Crown with bridge work    FLEXIBLE SIGMOIDOSCOPY N/A 6/15/2018    Procedure: SIGMOIDOSCOPY FLEXIBLE w/o sedation w/ tattoo;  Surgeon: Palma Espitia MD;  [facility];   Service: Colorectal    LAPAROTOMY N/A 6/8/2020    Procedure: LAPAROTOMY EXPLORATORY, LYSIS OF ADHESIONS;  Surgeon: Juan Hassan MD;  [facility];  Service: Surgical Oncology    LIVER LOBECTOMY N/A 6/21/2018    Procedure: liver resection/ablation, intraoperative ultrasound of liver;  Surgeon: Juan Hassan MD;  [facility];  Service: Surgical Oncology    LIVER LOBECTOMY N/A 6/8/2020    Procedure: LIVER RESECTION SEGMENT 3 WITH  INTRAOPERATIVE U/S OF LIVER;  Surgeon: Juan Hassan MD;  [facility];  Service: Surgical Oncology    ND EXPLORATORY OF ABDOMEN N/A 6/21/2018    Procedure: LAPAROTOMY EXPLORATORY;  Surgeon: Palma Espitia MD;  [facility];  Service: Colorectal    ND INSERT PICC W/ SUB-Q PORT N/A 1/25/2018    Procedure: PORT-A-CATHETER PLACEMENT  Fluoroscopy for performance/interpretation;  Surgeon: Palma Espitia MD;  [facility];  Service: Colorectal    ND PART REMOVAL COLON W ANASTOMOSIS Left 6/21/2018    Procedure: hemicolectomy, low anterior resection (open) SPY fluorescence angiography;  Surgeon: Palma Espitia MD;  [facility];  Service: Colorectal    ND PROCTECTOMY,PARTIAL N/A 6/21/2018    Procedure: Partial proctectomy ;  Surgeon: Palma Espitia MD;  [facility];  Service: Colorectal    ND SIGMOIDOSCOPY FLX DX W/COLLJ SPEC BR/WA IF PFRMD N/A 6/21/2018    Procedure: SIGMOIDOSCOPY FLEXIBLE;  Surgeon: Karla Willett MD;  [facility];  Service: Colorectal    ROOT CANAL  2015    TESTICLE SURGERY      Exploration of Undescended Testis rIght, age 8 had surgery for undescended testicle; Last Assessed:8/24/17    TOOTH EXTRACTION  2015       Past Family History  Family History   Problem Relation Age of Onset    No Known Problems Father     Cancer Mother         unknown    Cancer Brother         pt  reports brother was diagnosed with stage 4 throat cancer    Throat cancer Brother     Breast cancer Maternal Aunt        Past Social history  Social History     Socioeconomic History    Marital status: Single     Spouse name: Not on file    Number of children: Not on file    Years of education: Not on file    Highest education level: Not on file   Occupational History    Not on file   Tobacco Use    Smoking status: Former Smoker     Types: E-Cigarettes    Smokeless tobacco: Current User    Tobacco comment: quit 4 years ago / smoked for 20 years    Vaping Use    Vaping Use: Every day    Substances: Nicotine, THC (at time), CBD (at times), Flavoring   Substance and Sexual Activity    Alcohol use: Yes     Comment: socially - 2 month    Drug use: Yes     Types: Marijuana     Comment: occasional recreation drug use - 3 days ago    Sexual activity: Yes     Comment: Resides alone   Other Topics Concern    Not on file   Social History Narrative    Not on file     Social Determinants of Health     Financial Resource Strain: Not on file   Food Insecurity: Not on file   Transportation Needs: Not on file   Physical Activity: Not on file   Stress: Not on file   Social Connections: Not on file   Intimate Partner Violence: Not on file   Housing Stability: Not on file       Current Medications  No current outpatient medications on file  No current facility-administered medications for this visit         Allergies  No Known Allergies    Past Medical History, Social History, Family History, medications and allergies were reviewed  Vitals  Vitals:    04/22/22 1447   BP: 116/82   Pulse: 80   Weight: 76 2 kg (168 lb)   Height: 5' 5" (1 651 m)       Physical Exam  Physical Exam  Constitutional:       Appearance: Normal appearance  He is well-developed  HENT:      Head: Normocephalic  Eyes:      Pupils: Pupils are equal, round, and reactive to light  Pulmonary:      Effort: Pulmonary effort is normal    Abdominal:      Palpations: Abdomen is soft  Tenderness: There is no right CVA tenderness or left CVA tenderness  Musculoskeletal:         General: Normal range of motion  Cervical back: Normal range of motion  Skin:     General: Skin is warm and dry  Neurological:      General: No focal deficit present  Mental Status: He is alert and oriented to person, place, and time  Psychiatric:         Mood and Affect: Mood normal          Behavior: Behavior normal          Thought Content: Thought content normal          Judgment: Judgment normal          Results    I have personally reviewed all pertinent lab results and reviewed with patient  No results found for: PSA  Lab Results   Component Value Date    GLUCOSE 124 12/01/2017    CALCIUM 8 7 03/24/2022     08/26/2017    K 3 8 03/24/2022    CO2 26 03/24/2022     03/24/2022    BUN 18 03/24/2022    CREATININE 1 19 03/24/2022     Lab Results   Component Value Date    WBC 6 77 03/24/2022    HGB 14 5 03/24/2022    HCT 41 9 03/24/2022    MCV 95 03/24/2022     03/24/2022     No results found for this or any previous visit (from the past 1 hour(s))

## 2022-05-30 ENCOUNTER — PATIENT MESSAGE (OUTPATIENT)
Dept: UROLOGY | Facility: AMBULATORY SURGERY CENTER | Age: 52
End: 2022-05-30

## 2022-05-31 ENCOUNTER — TELEPHONE (OUTPATIENT)
Dept: UROLOGY | Facility: AMBULATORY SURGERY CENTER | Age: 52
End: 2022-05-31

## 2022-05-31 DIAGNOSIS — N52.8 OTHER MALE ERECTILE DYSFUNCTION: Primary | ICD-10-CM

## 2022-05-31 RX ORDER — SILDENAFIL 50 MG/1
50 TABLET, FILM COATED ORAL AS NEEDED
Qty: 10 TABLET | Refills: 0 | Status: SHIPPED | OUTPATIENT
Start: 2022-05-31

## 2022-05-31 NOTE — TELEPHONE ENCOUNTER
Regarding: Prescription   ----- Message from Nathan Holstein, RN sent at 5/31/2022  8:46 AM EDT -----       ----- Message from Lavell Fernandez to [id number] sent at 5/30/2022  9:19 PM -----     Hello I was wondering if I could have my prescription filled for my ED  I'm leaving on vacation Thursday night and would like to pick it up locally if possible anywhere near [place]   Thanks and talk to you soon

## 2022-07-06 ENCOUNTER — HOSPITAL ENCOUNTER (OUTPATIENT)
Dept: RADIOLOGY | Age: 52
Discharge: HOME | End: 2022-07-06
Payer: COMMERCIAL

## 2022-07-06 DIAGNOSIS — C18.9 COLON CANCER METASTASIZED TO LIVER (HCC): ICD-10-CM

## 2022-07-06 DIAGNOSIS — C78.7 COLON CANCER METASTASIZED TO LIVER (HCC): ICD-10-CM

## 2022-07-06 LAB — GLUCOSE SERPL-MCNC: 111 MG/DL (ref 65–140)

## 2022-07-06 PROCEDURE — G1004 CDSM NDSC: HCPCS

## 2022-07-06 PROCEDURE — A9552 F18 FDG: HCPCS

## 2022-07-06 PROCEDURE — 78815 PET IMAGE W/CT SKULL-THIGH: CPT

## 2022-07-06 PROCEDURE — 82948 REAGENT STRIP/BLOOD GLUCOSE: CPT

## 2022-07-13 ENCOUNTER — APPOINTMENT (OUTPATIENT)
Dept: LAB | Facility: CLINIC | Age: 52
End: 2022-07-13
Payer: COMMERCIAL

## 2022-07-13 DIAGNOSIS — C18.9 COLON CANCER METASTASIZED TO LIVER (HCC): ICD-10-CM

## 2022-07-13 DIAGNOSIS — C78.7 COLON CANCER METASTASIZED TO LIVER (HCC): ICD-10-CM

## 2022-07-13 LAB
BUN SERPL-MCNC: 14 MG/DL (ref 5–25)
CREAT SERPL-MCNC: 0.96 MG/DL (ref 0.6–1.3)
GFR SERPL CREATININE-BSD FRML MDRD: 92 ML/MIN/1.73SQ M

## 2022-07-13 PROCEDURE — 36415 COLL VENOUS BLD VENIPUNCTURE: CPT

## 2022-07-13 PROCEDURE — 84520 ASSAY OF UREA NITROGEN: CPT

## 2022-07-13 PROCEDURE — 82565 ASSAY OF CREATININE: CPT

## 2022-07-14 ENCOUNTER — HOSPITAL ENCOUNTER (OUTPATIENT)
Dept: CT IMAGING | Facility: HOSPITAL | Age: 52
Discharge: HOME | End: 2022-07-14
Attending: STUDENT IN AN ORGANIZED HEALTH CARE EDUCATION/TRAINING PROGRAM
Payer: COMMERCIAL

## 2022-07-14 ENCOUNTER — TELEPHONE (OUTPATIENT)
Dept: HEMATOLOGY ONCOLOGY | Facility: CLINIC | Age: 52
End: 2022-07-14

## 2022-07-14 DIAGNOSIS — C18.9 COLON CANCER METASTASIZED TO LIVER (HCC): Primary | ICD-10-CM

## 2022-07-14 DIAGNOSIS — C78.7 COLON CANCER METASTASIZED TO LIVER (HCC): ICD-10-CM

## 2022-07-14 DIAGNOSIS — C18.9 COLON CANCER METASTASIZED TO LIVER (HCC): ICD-10-CM

## 2022-07-14 DIAGNOSIS — C78.7 COLON CANCER METASTASIZED TO LIVER (HCC): Primary | ICD-10-CM

## 2022-07-14 DIAGNOSIS — C20 MALIGNANT NEOPLASM OF RECTUM (HCC): ICD-10-CM

## 2022-07-14 PROCEDURE — 71250 CT THORAX DX C-: CPT

## 2022-07-14 PROCEDURE — G1004 CDSM NDSC: HCPCS

## 2022-07-14 NOTE — TELEPHONE ENCOUNTER
07/14/22    Spoke with patient reminding updated labs prior to appt  I will only repeat CBC/CMP  Advising pt to go to any [address] [place] [address] [facility]in [facility] to get blood work done  Pt stated he will get labs done tomorrow

## 2022-07-18 ENCOUNTER — APPOINTMENT (OUTPATIENT)
Dept: LAB | Facility: CLINIC | Age: 52
End: 2022-07-18
Payer: COMMERCIAL

## 2022-07-18 DIAGNOSIS — C18.9 COLON CANCER METASTASIZED TO LIVER (HCC): ICD-10-CM

## 2022-07-18 DIAGNOSIS — C78.7 COLON CANCER METASTASIZED TO LIVER (HCC): ICD-10-CM

## 2022-07-18 DIAGNOSIS — C20 MALIGNANT NEOPLASM OF RECTUM (HCC): ICD-10-CM

## 2022-07-18 LAB
ALBUMIN SERPL BCP-MCNC: 4.3 G/DL (ref 3.5–5)
ALP SERPL-CCNC: 41 U/L (ref 34–104)
ALT SERPL W P-5'-P-CCNC: 16 U/L (ref 7–52)
ANION GAP SERPL CALCULATED.3IONS-SCNC: 5 MMOL/L (ref 4–13)
AST SERPL W P-5'-P-CCNC: 18 U/L (ref 13–39)
BASOPHILS # BLD AUTO: 0.02 THOUSANDS/ΜL (ref 0–0.1)
BASOPHILS NFR BLD AUTO: 0 % (ref 0–1)
BILIRUB SERPL-MCNC: 0.36 MG/DL (ref 0.2–1)
BUN SERPL-MCNC: 11 MG/DL (ref 5–25)
CALCIUM SERPL-MCNC: 9.3 MG/DL (ref 8.4–10.2)
CHLORIDE SERPL-SCNC: 105 MMOL/L (ref 96–108)
CO2 SERPL-SCNC: 28 MMOL/L (ref 21–32)
CREAT SERPL-MCNC: 0.99 MG/DL (ref 0.6–1.3)
EOSINOPHIL # BLD AUTO: 0.08 THOUSAND/ΜL (ref 0–0.61)
EOSINOPHIL NFR BLD AUTO: 1 % (ref 0–6)
ERYTHROCYTE [DISTWIDTH] IN BLOOD BY AUTOMATED COUNT: 13.1 % (ref 11.6–15.1)
GFR SERPL CREATININE-BSD FRML MDRD: 89 ML/MIN/1.73SQ M
GLUCOSE SERPL-MCNC: 102 MG/DL (ref 65–140)
HCT VFR BLD AUTO: 39.2 % (ref 36.5–49.3)
HGB BLD-MCNC: 13.8 G/DL (ref 12–17)
IMM GRANULOCYTES # BLD AUTO: 0.01 THOUSAND/UL (ref 0–0.2)
IMM GRANULOCYTES NFR BLD AUTO: 0 % (ref 0–2)
LYMPHOCYTES # BLD AUTO: 2.31 THOUSANDS/ΜL (ref 0.6–4.47)
LYMPHOCYTES NFR BLD AUTO: 40 % (ref 14–44)
MCH RBC QN AUTO: 33.4 PG (ref 26.8–34.3)
MCHC RBC AUTO-ENTMCNC: 35.2 G/DL (ref 31.4–37.4)
MCV RBC AUTO: 95 FL (ref 82–98)
MONOCYTES # BLD AUTO: 0.47 THOUSAND/ΜL (ref 0.17–1.22)
MONOCYTES NFR BLD AUTO: 8 % (ref 4–12)
NEUTROPHILS # BLD AUTO: 2.91 THOUSANDS/ΜL (ref 1.85–7.62)
NEUTS SEG NFR BLD AUTO: 51 % (ref 43–75)
NRBC BLD AUTO-RTO: 0 /100 WBCS
PLATELET # BLD AUTO: 162 THOUSANDS/UL (ref 149–390)
PMV BLD AUTO: 10.6 FL (ref 8.9–12.7)
POTASSIUM SERPL-SCNC: 3.6 MMOL/L (ref 3.5–5.3)
PROT SERPL-MCNC: 7.6 G/DL (ref 6.4–8.4)
RBC # BLD AUTO: 4.13 MILLION/UL (ref 3.88–5.62)
SODIUM SERPL-SCNC: 138 MMOL/L (ref 135–147)
WBC # BLD AUTO: 5.8 THOUSAND/UL (ref 4.31–10.16)

## 2022-07-18 PROCEDURE — 85025 COMPLETE CBC W/AUTO DIFF WBC: CPT

## 2022-07-18 PROCEDURE — 36415 COLL VENOUS BLD VENIPUNCTURE: CPT

## 2022-07-18 PROCEDURE — 80053 COMPREHEN METABOLIC PANEL: CPT

## 2022-07-18 NOTE — TELEPHONE ENCOUNTER
07/18/22    LVM reminding pt labs again but I will keep his appt anyway based of his recent CT scan  Reading room called for the read of Chest ct

## 2022-07-19 ENCOUNTER — OFFICE VISIT (OUTPATIENT)
Dept: HEMATOLOGY ONCOLOGY | Facility: CLINIC | Age: 52
End: 2022-07-19
Payer: COMMERCIAL

## 2022-07-19 VITALS
BODY MASS INDEX: 26.82 KG/M2 | DIASTOLIC BLOOD PRESSURE: 64 MMHG | RESPIRATION RATE: 14 BRPM | TEMPERATURE: 97.8 F | OXYGEN SATURATION: 98 % | HEIGHT: 65 IN | HEART RATE: 69 BPM | SYSTOLIC BLOOD PRESSURE: 100 MMHG | WEIGHT: 161 LBS

## 2022-07-19 DIAGNOSIS — C78.7 METASTATIC COLON CANCER TO LIVER (HCC): ICD-10-CM

## 2022-07-19 DIAGNOSIS — C78.7 COLON CANCER METASTASIZED TO LIVER (HCC): Primary | ICD-10-CM

## 2022-07-19 DIAGNOSIS — C20 MALIGNANT NEOPLASM OF RECTUM (HCC): ICD-10-CM

## 2022-07-19 DIAGNOSIS — C18.9 METASTATIC COLON CANCER TO LIVER (HCC): ICD-10-CM

## 2022-07-19 DIAGNOSIS — C18.9 COLON CANCER METASTASIZED TO LIVER (HCC): Primary | ICD-10-CM

## 2022-07-19 PROCEDURE — 99214 OFFICE O/P EST MOD 30 MIN: CPT | Performed by: INTERNAL MEDICINE
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
## 2022-07-21 ENCOUNTER — CLINICAL SUPPORT (OUTPATIENT)
Dept: RADIATION ONCOLOGY | Facility: HOSPITAL | Age: 52
End: 2022-07-21
Attending: STUDENT IN AN ORGANIZED HEALTH CARE EDUCATION/TRAINING PROGRAM
Payer: COMMERCIAL

## 2022-07-21 VITALS
HEART RATE: 82 BPM | OXYGEN SATURATION: 98 % | TEMPERATURE: 99.2 F | DIASTOLIC BLOOD PRESSURE: 78 MMHG | SYSTOLIC BLOOD PRESSURE: 118 MMHG | WEIGHT: 159.6 LBS | RESPIRATION RATE: 18 BRPM | BODY MASS INDEX: 26.56 KG/M2

## 2022-07-21 DIAGNOSIS — C18.9 COLON CANCER METASTASIZED TO LIVER (HCC): Primary | ICD-10-CM

## 2022-07-21 DIAGNOSIS — C78.01 MALIGNANT NEOPLASM METASTATIC TO RIGHT LUNG (HCC): Primary | ICD-10-CM

## 2022-07-21 DIAGNOSIS — C78.7 COLON CANCER METASTASIZED TO LIVER (HCC): Primary | ICD-10-CM

## 2022-07-21 PROCEDURE — G0463 HOSPITAL OUTPT CLINIC VISIT: HCPCS | Performed by: STUDENT IN AN ORGANIZED HEALTH CARE EDUCATION/TRAINING PROGRAM

## 2022-07-21 PROCEDURE — 99211 OFF/OP EST MAY X REQ PHY/QHP: CPT | Performed by: STUDENT IN AN ORGANIZED HEALTH CARE EDUCATION/TRAINING PROGRAM

## 2022-07-21 PROCEDURE — 99214 OFFICE O/P EST MOD 30 MIN: CPT | Performed by: STUDENT IN AN ORGANIZED HEALTH CARE EDUCATION/TRAINING PROGRAM

## 2022-07-21 NOTE — PROGRESS NOTES
Francia Gan 1970 is a 54 y o  male     Follow up visit   54year old man with a longstanding history of 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 with 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  He has been managed with FOLFOX x12 cycles, synchronous LAR and liver resection (segment 7), cetuximab, and repeat liver resection (segment 3)  He has been off systemic therapy since 1/2021 with interval imaging showing LUTHER but for an enlarging RLL juxtapleural nodule suspicious for metastasis  He was seen in consult on 4/14/2022 for consideration of SBRT to this nodule  Close interval follow up was decided upon at that time  He presents today for 3 month visit and limited consult  7/6/2022 PET skull base to mid thigh:The 6 mm right lower lobe pulmonary nodule is just below size criteria for accurate assessment with PET/CT  Although there is at most minimal glucose activity, the growth pattern remains very suspicious for a metastatic lesion  2  The other smaller pulmonary nodules in the lungs demonstrate no abnormal glucose activity or change since earlier CTs  3  There is no evidence of glucose avid metastatic disease within the abdomen, pelvis, skeleton or neck  4  Treated metastasis in the dome of the right hepatic lobe, remaining unchanged from prior PET scan and without abnormal glucose activity      7/14/2022 CT chest: Continued enlargement of right lower lobe nodule, now 7 mm, suspicious for metastatic disease  7/19/2022 [facility]  Most recent imaging showed slow growing lung nodule up to 7 mm concerning for metastatic lesion     Set up to see cardiothoracic surgery      10/7/2022 MRI abdomen  [facility]    Oncology History   Colon cancer metastasized to liver ([facility] [address] )   1/2018 Initial Diagnosis    Malignant neoplasm of sigmoid colon ([address] )     1/22/2018 Biopsy    Large Intestine, Sigmoid Colon, Recto-sigmoid tumor bx:    - Invasive colonic adenocarcinoma, moderately differentiated     2/6/2018 - 10/16/2018 Chemotherapy    Modified FOLFOX6 x 12 cycles  Added Erbitux on 3/20/18      6/21/2018 Surgery    Segment 7 liver resection/ablation, hemicolectomy     10/30/2018 -  Chemotherapy    Continuation of Single agent Erbitux  With 10/30/18 chemo, it was Cycle #14  Plan was for 6 additional cycles of Erbitux alone       3/2020 Genetic Testing    NEGATIVE for genes tested:    Test(s): [employer] + [employer] (34 genes): APC, ETHAN, BARD1, BRCA1, BRCA2, BRIP1, BMPR1A, CDH1, CDK4, CDKN2A, CHEK2, DICER1, EPCAM, GREM1, HOXB13, MLH1, MRE11A, MSH2, MSH6, MUTYH, NBN, NF1, PALB2, PMS2, POLD1, POLE, PTEN, RAD50, RAD51C, RAD51D, SMAD4, SMARCA4, STK11, TP53      6/8/2020 Surgery    Liver, segment 3 (wedge resection):  - Metastatic adenocarcinoma, consistent with the patient's known colon primary  - Margins negative for carcinoma      7/27/2020 - 1/11/2021 Chemotherapy    fluorouracil (ADRUCIL), 745 mg, Intravenous, Once, 6 of 6 cycles  Administration: 750 mg (7/27/2020), 750 mg (8/10/2020), 745 mg (8/24/2020), 745 mg (9/8/2020), 745 mg (9/21/2020), 745 mg (10/5/2020)  pegfilgrastim (NEULASTA), 6 mg, Subcutaneous, Once, 1 of 1 cycle  Administration: 6 mg (9/24/2020)  pegfilgrastim (NEULASTA ONPRO), 6 mg, Subcutaneous, Once, 6 of 6 cycles  Administration: 6 mg (7/29/2020), 6 mg (8/12/2020), 6 mg (8/26/2020), 6 mg (9/10/2020), 6 mg (10/7/2020)  cetuximab (ERBITUX), 930 mg, Intravenous, Once, 12 of 12 cycles  Administration: 900 mg (7/27/2020), 900 mg (8/10/2020), 900 mg (8/24/2020), 900 mg (9/8/2020), 900 mg (9/21/2020), 900 mg (10/5/2020), 900 mg (10/19/2020), 900 mg (11/2/2020), 900 mg (11/16/2020), 900 mg (11/30/2020), 900 mg (12/28/2020), 900 mg (1/11/2021)  irinotecan (CAMPTOSAR) chemo infusion, 335 mg, Intravenous, Once, 6 of 6 cycles  Administration: 340 mg (7/27/2020), 340 mg (8/10/2020), 340 mg (8/24/2020), 340 mg (9/8/2020), 340 mg (9/21/2020), 340 mg (10/5/2020)  leucovorin calcium IVPB, 744 mg, Intravenous, Once, 6 of 6 cycles  Administration: 750 mg (7/27/2020), 750 mg (8/10/2020), 750 mg (8/24/2020), 750 mg (9/8/2020), 750 mg (9/21/2020), 740 mg (10/5/2020)  fluorouracil (ADRUCIL) ambulatory infusion Soln, 1,200 mg/m2/day = 4,465 mg, Intravenous, Over 46 hours, 6 of 6 cycles  Dose modification: 1,200 mg/m2/day (original dose 1,200 mg/m2/day, Cycle 3)     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 ([address] )   4/11/2022 Initial Diagnosis    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 ([address] )         Review of Systems:  Review of Systems   Constitutional: Positive for appetite change (up and down), fatigue (mild- moderate) and unexpected weight change (flucatates 5lbs)  HENT: Negative  Eyes: Negative  Respiratory: Negative  Negative for cough, chest tightness, shortness of breath and wheezing  Cardiovascular: Negative  Negative for chest pain, palpitations and leg swelling  Gastrointestinal: Negative  Endocrine: Negative  Genitourinary: Negative  Musculoskeletal: Positive for arthralgias (right knee) and back pain (lower back)  Negative for neck pain and neck stiffness  Skin: Negative  Allergic/Immunologic: Positive for environmental allergies  Neurological: Positive for speech difficulty (trouble word finding) and headaches (related to sinus, while wearing glasses and staring at small objects)  Negative for dizziness, seizures, weakness, light-headedness and numbness  Hematological: Negative  Does not bruise/bleed easily  Psychiatric/Behavioral: Positive for sleep disturbance (trouble falling asleep at times)  The patient is nervous/anxious           Forgetful - trouble remembering names         Clinical Trial: no    Teaching:     Covid Vaccine Status: Vaccinated    Health Maintenance   Topic Date Due    Pneumococcal Vaccine: Pediatrics (0 to 5 Years) and At-Risk Patients (6 to 59 Years) (1 - PCV) Never done    COVID-19 Vaccine (3 - Pfizer risk series) 06/17/2021    BMI: Followup Plan  07/27/2022    Annual Physical  07/27/2022  Influenza Vaccine (1) 09/01/2022    HIV Screening  07/28/2023 (Originally 10/27/1985)    Depression Screening  04/14/2023    BMI: Adult  07/19/2023    DTaP,Tdap,and Td Vaccines (2 - Td or Tdap) 12/01/2027    Hepatitis C Screening  Completed    HIB Vaccine  Aged Out    Hepatitis B Vaccine  Aged Out    IPV Vaccine  Aged Out    Hepatitis A Vaccine  Aged Out    Meningococcal ACWY Vaccine  Aged Out    HPV Vaccine  Aged Out     Patient Active Problem List   Diagnosis    ED (erectile dysfunction)    [facility]    GERD (gastroesophageal reflux disease)    Pulmonary nodule, right    Acneiform rash    Encounter for follow-up examination after completed treatment for malignant neoplasm    Other hydronephrosis    Annual physical exam    Vapes nicotine containing substance    Viral infection    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 [facility])     Past Medical History:   Diagnosis Date    Cancer ([facility] )     Colon cancer ([place] [address] )     Dysuria     Last Assessed:8/24/17    GERD (gastroesophageal reflux disease)     Liver cancer ([place] [address] )     Liver nodule     Pulmonary nodule, right     Ureteropelvic junction (UPJ) obstruction 1/29/2020     Past Surgical History:   Procedure Laterality Date    ABDOMINAL SURGERY      COLON SURGERY      COLONOSCOPY N/A 1/22/2018    Procedure: COLONOSCOPY;  Surgeon: Iman Munoz MD;  [facility]; Service: Colorectal   [facility] DENTAL SURGERY  2016    Crown with bridge work    FLEXIBLE SIGMOIDOSCOPY N/A 6/15/2018    Procedure: SIGMOIDOSCOPY FLEXIBLE w/o sedation w/ tattoo;  Surgeon: Iman Munoz MD;  [facility];   Service: Colorectal    LAPAROTOMY N/A 6/8/2020    Procedure: LAPAROTOMY EXPLORATORY, LYSIS OF ADHESIONS;  Surgeon: Sueellen Habermann, MD;  [facility];  Service: Surgical Oncology    LIVER LOBECTOMY N/A 6/21/2018    Procedure: liver resection/ablation, intraoperative ultrasound of liver;  Surgeon: Sueellen Habermann, MD; [facility];  Service: Surgical Oncology    LIVER LOBECTOMY N/A 6/8/2020    Procedure: LIVER RESECTION SEGMENT 3 WITH  INTRAOPERATIVE U/S OF LIVER;  Surgeon: Slade Rodriguez MD;  [facility];  Service: Surgical Oncology    NM EXPLORATORY OF ABDOMEN N/A 6/21/2018    Procedure: LAPAROTOMY EXPLORATORY;  Surgeon: Kathy Riojas MD;  [facility];  Service: Colorectal    NM INSERT PICC W/ SUB-Q PORT N/A 1/25/2018    Procedure: PORT-A-CATHETER PLACEMENT  Fluoroscopy for performance/interpretation;  Surgeon: Kathy Riojas MD;  [facility];  Service: Colorectal    NM PART REMOVAL COLON W ANASTOMOSIS Left 6/21/2018    Procedure: hemicolectomy, low anterior resection (open) SPY fluorescence angiography;  Surgeon: Kathy Riojas MD;  [facility];  Service: Colorectal    NM PROCTECTOMY,PARTIAL N/A 6/21/2018    Procedure: Partial proctectomy ;  Surgeon: Kathy Riojas MD;  [facility];  Service: Colorectal    NM SIGMOIDOSCOPY FLX DX W/COLLJ SPEC BR/WA IF PFRMD N/A 6/21/2018    Procedure: Gilbertoea Hattiee;  Surgeon: Kathy Riojas MD;  [facility];  Service: Colorectal    ROOT CANAL  2015    TESTICLE SURGERY      Exploration of Undescended Testis rIght, age 8 had surgery for undescended testicle;  Last Assessed:8/24/17    TOOTH EXTRACTION  2015     Family History   Problem Relation Age of Onset    No Known Problems Father     Cancer Mother         unknown    Cancer Brother         pt  reports brother was diagnosed with stage 4 throat cancer    Throat cancer Brother     Breast cancer Maternal Aunt      Social History     Socioeconomic History    Marital status: Single     Spouse name: Not on file    Number of children: Not on file    Years of education: Not on file    Highest education level: Not on file   Occupational History    Not on file   Tobacco Use    Smoking status: Former Smoker     Types: E-Cigarettes    Smokeless tobacco: Current User    Tobacco comment: quit 4 years ago / smoked for 20 years    Vaping Use    Vaping Use: Every day    Substances: Nicotine, THC (at time), CBD (at times), Flavoring   Substance and Sexual Activity    Alcohol use: Yes     Comment: socially - 2 month    Drug use: Yes     Types: Marijuana     Comment: occasional recreation drug use - 3 days ago    Sexual activity: Yes     Comment: Resides alone   Other Topics Concern    Not on file   Social History Narrative    Not on file     Social Determinants of Health     Financial Resource Strain: Not on file   Food Insecurity: Not on file   Transportation Needs: Not on file   Physical Activity: Not on file   Stress: Not on file   Social Connections: Not on file   Intimate Partner Violence: Not on file   Housing Stability: Not on file       Current Outpatient Medications:     sildenafil (VIAGRA) 50 MG tablet, Take 1 tablet (50 mg total) by mouth as needed for erectile dysfunction, Disp: 10 tablet, Rfl: 0  No Known Allergies  There were no vitals filed for this visit

## 2022-07-22 ENCOUNTER — TELEPHONE (OUTPATIENT)
Dept: HEMATOLOGY ONCOLOGY | Facility: CLINIC | Age: 52
End: 2022-07-22

## 2022-07-22 NOTE — TELEPHONE ENCOUNTER
Left message for patient to call the office to schedule Thoracic Surgery NP appt  Offered Thursday 8/4/22 at 8:20am or 11:20am with Dr Poppy Grijalva since they would like him seen asap

## 2022-07-22 NOTE — PROGRESS NOTES
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  He has been managed with FOLFOX x12 cycles, synchronous LAR and liver resection (segment 7), cetuximab, and repeat liver resection (segment 3)  He has been off systemic therapy since 1/2021 with interval imaging showing LUTHER but for an enlarging RLL juxtapleural nodule suspicious for metastasis  He was seen in initial consult on 4/14/22 for consideration of consolidative SBRT to this site at that time with plan for continued observation  He returns today for follow-up  Interval History:  Since the time of last visit the patient has been doing well overall  He continues to work and remains asymptomatic of his disease at present beyond fluctuating appetite, anorexia, and anxiety  He remains off systemic therapy and was seen by Dr Ioana Lo on 7/19/22  PET/CT (7/6/22) demonstrated the 6mm RLL nodule with at most minimal glucose activity; no other evidence of metastatic disease  CT Chest (7/14/22) demonstrated the RLL juxtapleural nodule to have increased from 5mm in 3/2022 and 2mm in 9/2021 concerning for enlarging metastatic disease         Historical Information   Oncology History   Colon cancer metastasized to liver [facility])   1/2018 Initial Diagnosis    Malignant neoplasm of sigmoid colon ([facility] [address] )     1/22/2018 Biopsy    Large Intestine, Sigmoid Colon, Recto-sigmoid tumor bx:    - Invasive colonic adenocarcinoma, moderately differentiated     2/6/2018 - 10/16/2018 Chemotherapy    Modified FOLFOX6 x 12 cycles  Added Erbitux on 3/20/18      6/21/2018 Surgery    Segment 7 liver resection/ablation, hemicolectomy     10/30/2018 -  Chemotherapy    Continuation of Single agent Erbitux  With 10/30/18 chemo, it was Cycle #14  Plan was for 6 additional cycles of Erbitux alone       3/2020 Genetic Testing    NEGATIVE for genes tested:    Test(s): [employer] + [employer] (34 genes): APC, ETHAN, BARD1, BRCA1, BRCA2, BRIP1, BMPR1A, CDH1, CDK4, CDKN2A, CHEK2, DICER1, EPCAM, GREM1, HOXB13, MLH1, MRE11A, MSH2, MSH6, MUTYH, NBN, NF1, PALB2, PMS2, POLD1, POLE, PTEN, RAD50, RAD51C, RAD51D, SMAD4, SMARCA4, STK11, TP53      6/8/2020 Surgery    Liver, segment 3 (wedge resection):  - Metastatic adenocarcinoma, consistent with the patient's known colon primary  - Margins negative for carcinoma      7/27/2020 - 1/11/2021 Chemotherapy    fluorouracil (ADRUCIL), 745 mg, Intravenous, Once, 6 of 6 cycles  Administration: 750 mg (7/27/2020), 750 mg (8/10/2020), 745 mg (8/24/2020), 745 mg (9/8/2020), 745 mg (9/21/2020), 745 mg (10/5/2020)  pegfilgrastim (NEULASTA), 6 mg, Subcutaneous, Once, 1 of 1 cycle  Administration: 6 mg (9/24/2020)  pegfilgrastim (NEULASTA ONPRO), 6 mg, Subcutaneous, Once, 6 of 6 cycles  Administration: 6 mg (7/29/2020), 6 mg (8/12/2020), 6 mg (8/26/2020), 6 mg (9/10/2020), 6 mg (10/7/2020)  cetuximab (ERBITUX), 930 mg, Intravenous, Once, 12 of 12 cycles  Administration: 900 mg (7/27/2020), 900 mg (8/10/2020), 900 mg (8/24/2020), 900 mg (9/8/2020), 900 mg (9/21/2020), 900 mg (10/5/2020), 900 mg (10/19/2020), 900 mg (11/2/2020), 900 mg (11/16/2020), 900 mg (11/30/2020), 900 mg (12/28/2020), 900 mg (1/11/2021)  irinotecan (CAMPTOSAR) chemo infusion, 335 mg, Intravenous, Once, 6 of 6 cycles  Administration: 340 mg (7/27/2020), 340 mg (8/10/2020), 340 mg (8/24/2020), 340 mg (9/8/2020), 340 mg (9/21/2020), 340 mg (10/5/2020)  leucovorin calcium IVPB, 744 mg, Intravenous, Once, 6 of 6 cycles  Administration: 750 mg (7/27/2020), 750 mg (8/10/2020), 750 mg (8/24/2020), 750 mg (9/8/2020), 750 mg (9/21/2020), 740 mg (10/5/2020)  fluorouracil (ADRUCIL) ambulatory infusion Soln, 1,200 mg/m2/day = 4,465 mg, Intravenous, Over 46 hours, 6 of 6 cycles  Dose modification: 1,200 mg/m2/day (original dose 1,200 mg/m2/day, Cycle 3)     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 ([facility] )   4/11/2022 Initial Diagnosis    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 [facility])         Past Medical History:   Diagnosis Date    Cancer ([place] [address] )     Colon cancer ([place] [address] )     Dysuria     Last Assessed:8/24/17    GERD (gastroesophageal reflux disease)     Liver cancer ([place] [address] )     Liver nodule     Pulmonary nodule, right     Ureteropelvic junction (UPJ) obstruction 1/29/2020     Past Surgical History:   Procedure Laterality Date    ABDOMINAL SURGERY      COLON SURGERY      COLONOSCOPY N/A 1/22/2018    Procedure: COLONOSCOPY;  Surgeon: Magdalena Milner MD;  [facility]: [facility]; Service: Colorectal   [facility] DENTAL SURGERY  2016    Crown with bridge work    FLEXIBLE SIGMOIDOSCOPY N/A 6/15/2018    Procedure: SIGMOIDOSCOPY FLEXIBLE w/o sedation w/ tattoo;  Surgeon: Magdalena Milner MD;  [facility];   Service: Colorectal    LAPAROTOMY N/A 6/8/2020    Procedure: LAPAROTOMY EXPLORATORY, LYSIS OF ADHESIONS;  Surgeon: Froilan Pearson MD;  [facility];  Service: Surgical Oncology    LIVER LOBECTOMY N/A 6/21/2018    Procedure: liver resection/ablation, intraoperative ultrasound of liver;  Surgeon: Froilan Pearson MD;  [facility];  Service: Surgical Oncology    LIVER LOBECTOMY N/A 6/8/2020    Procedure: LIVER RESECTION SEGMENT 3 WITH  INTRAOPERATIVE U/S OF LIVER;  Surgeon: Froilan Pearson MD;  [facility];  Service: Surgical Oncology    TN EXPLORATORY OF ABDOMEN N/A 6/21/2018    Procedure: LAPAROTOMY EXPLORATORY;  Surgeon: Magdalena Milner MD;  [facility];  Service: Colorectal    TN INSERT PICC W/ SUB-Q PORT N/A 1/25/2018    Procedure: Angel Yoder PLACEMENT  Fluoroscopy for performance/interpretation;  Surgeon: Arnav Gutiérrez MD;  [facility];  Service: Colorectal    DE PART REMOVAL COLON W ANASTOMOSIS Left 6/21/2018    Procedure: hemicolectomy, low anterior resection (open) SPY fluorescence angiography;  Surgeon: Arnav Gutiérrez MD;  [facility];  Service: Colorectal    DE PROCTECTOMY,PARTIAL N/A 6/21/2018    Procedure: Partial proctectomy ;  Surgeon: Arnav Gutiérrez MD;  [facility];  Service: Colorectal    DE SIGMOIDOSCOPY FLX DX W/COLLJ SPEC BR/WA IF PFRMD N/A 6/21/2018    Procedure: Diana Arriola;  Surgeon: Arnav Gutiérrez MD;  [facility];  Service: Colorectal    ROOT CANAL  2015    TESTICLE SURGERY      Exploration of Undescended Testis rIght, age 8 had surgery for undescended testicle; Last Assessed:8/24/17    TOOTH EXTRACTION  2015       Social History   Social History     Substance and Sexual Activity   Alcohol Use Yes    Comment: socially - 2 month     Social History     Substance and Sexual Activity   Drug Use Yes    Types: Marijuana    Comment: occasional recreation drug use - 3 days ago     Social History     Tobacco Use   Smoking Status Former Smoker    Types: E-Cigarettes   Smokeless Tobacco Current User   Tobacco Comment    quit 4 years ago / smoked for 20 years          Meds/Allergies     Current Outpatient Medications:     sildenafil (VIAGRA) 50 MG tablet, Take 1 tablet (50 mg total) by mouth as needed for erectile dysfunction, Disp: 10 tablet, Rfl: 0  No Known Allergies    Review of Systems   Constitutional: Positive for appetite change (up and down), fatigue (mild- moderate) and unexpected weight change (flucatates 5lbs)  HENT: Negative  Eyes: Negative  Respiratory: Negative  Negative for cough, chest tightness, shortness of breath and wheezing  Cardiovascular: Negative  Negative for chest pain, palpitations and leg swelling  Gastrointestinal: Negative  Endocrine: Negative  Genitourinary: Negative  Musculoskeletal: Positive for arthralgias (right knee) and back pain (lower back)  Negative for neck pain and neck stiffness  Skin: Negative  Allergic/Immunologic: Positive for environmental allergies  Neurological: Positive for speech difficulty (trouble word finding) and headaches (related to sinus, while wearing glasses and staring at small objects)  Negative for dizziness, seizures, weakness, light-headedness and numbness  Hematological: Negative  Does not bruise/bleed easily  Psychiatric/Behavioral: Positive for sleep disturbance (trouble falling asleep at times)  The patient is nervous/anxious  Forgetful - trouble remembering names          OBJECTIVE:    /78 (BP Location: Left arm, Patient Position: Sitting, Cuff Size: Standard)   Pulse 82   Temp 99 2 °F (37 3 °C) (Temporal)   Resp 18   Wt 72 4 kg (159 lb 9 6 oz)   SpO2 98%   BMI 26 56 kg/m²   BSA 1 8 m²   Pain Sc 0-No pain  Pain Assessment:  0  ECOG/Zubrod/WHO: 0 - Asymptomatic    Physical Exam   Well appearing  NAD  No increased work of breathing  Extremities warm and well perfused       RESULTS    Lab Results:   Recent Results (from the past 672 hour(s))   Fingerstick Glucose (POCT)    Collection Time: 07/06/22  7:27 AM   Result Value Ref Range    POC Glucose 111 65 - 140 mg/dl   BUN    Collection Time: 07/13/22  5:55 PM   Result Value Ref Range    BUN 14 5 - 25 mg/dL   Creatinine, serum    Collection Time: 07/13/22  5:55 PM   Result Value Ref Range    Creatinine 0 96 0 60 - 1 30 mg/dL    eGFR 92 ml/min/1 73sq m   CBC and differential    Collection Time: 07/18/22  3:30 PM   Result Value Ref Range    WBC 5 80 4 31 - 10 16 Thousand/uL    RBC 4 13 3 88 - 5 62 Million/uL    Hemoglobin 13 8 12 0 - 17 0 g/dL    Hematocrit 39 2 36 5 - 49 3 %    MCV 95 82 - 98 fL    MCH 33 4 26 8 - 34 3 pg    MCHC 35 2 31 4 - 37 4 g/dL    RDW 13 1 11 6 - 15 1 %    MPV 10 6 8 9 - 12 7 fL    Platelets 162 149 - 390 Thousands/uL    nRBC 0 /100 WBCs    Neutrophils Relative 51 43 - 75 %    Immat GRANS % 0 0 - 2 %    Lymphocytes Relative 40 14 - 44 %    Monocytes Relative 8 4 - 12 %    Eosinophils Relative 1 0 - 6 %    Basophils Relative 0 0 - 1 %    Neutrophils Absolute 2 91 1 85 - 7 62 Thousands/µL    Immature Grans Absolute 0 01 0 00 - 0 20 Thousand/uL    Lymphocytes Absolute 2 31 0 60 - 4 47 Thousands/µL    Monocytes Absolute 0 47 0 17 - 1 22 Thousand/µL    Eosinophils Absolute 0 08 0 00 - 0 61 Thousand/µL    Basophils Absolute 0 02 0 00 - 0 10 Thousands/µL   Comprehensive metabolic panel    Collection Time: 07/18/22  3:30 PM   Result Value Ref Range    Sodium 138 135 - 147 mmol/L    Potassium 3 6 3 5 - 5 3 mmol/L    Chloride 105 96 - 108 mmol/L    CO2 28 21 - 32 mmol/L    ANION GAP 5 4 - 13 mmol/L    BUN 11 5 - 25 mg/dL    Creatinine 0 99 0 60 - 1 30 mg/dL    Glucose 102 65 - 140 mg/dL    Calcium 9 3 8 4 - 10 2 mg/dL    AST 18 13 - 39 U/L    ALT 16 7 - 52 U/L    Alkaline Phosphatase 41 34 - 104 U/L    Total Protein 7 6 6 4 - 8 4 g/dL    Albumin 4 3 3 5 - 5 0 g/dL    Total Bilirubin 0 36 0 20 - 1 00 mg/dL    eGFR 89 ml/min/1 73sq m       Imaging Studies:CT chest wo contrast    Result Date: 7/18/2022  Narrative: CT CHEST WITHOUT IV CONTRAST INDICATION:   C18 9: Malignant neoplasm of colon, unspecified C78 7: Secondary malignant neoplasm of liver and intrahepatic bile duct  "Lung nodules with history of metastatic colon cancer  Monitor for possible SBRT " COMPARISON:  PET CT from 7/6/2022, chest CT from 3/25/2022, 9/17/2021, 5/20/2021, and 1/7/2021  TECHNIQUE: Chest CT without intravenous contrast   Axial, sagittal, coronal 2D reformats and coronal MIPS from source data  Radiation dose length product (DLP):  181 mGy-cm   Radiation dose exposure minimized using iterative reconstruction and automated exposure control   FINDINGS: LUNGS:  7 mm juxtapleural posterior basal right lower lobe nodule (3/81), increased from 5 mm in March 2022, 2 mm in September 2021, and new since January 2021  Several additional bilateral 4 mm and smaller nodules, marked with horizontal arrows on series 3, stable since at least January 2021  Mild paraseptal emphysema  AIRWAYS: No significant filling defects  PLEURA:  Unremarkable  HEART/GREAT VESSELS:  Normal heart size  Mild coronary artery calcification indicating atherosclerotic heart disease  MEDIASTINUM AND MAHENDRA:  Right port at cavoatrial junction  CHEST WALL AND LOWER NECK: Stable 9 mm right thyroid nodule  No follow-up needed  UPPER ABDOMEN:  Stable 2 7 cm hypodensity in the dome of the liver at the site of treated tumor (2/54)  Left parapelvic cysts  OSSEOUS STRUCTURES: Mild degenerative disease in the spine  Impression: Continued enlargement of right lower lobe nodule, now 7 mm, suspicious for metastatic disease  Workstation performed: [id number]     NM PET CT skull base to mid thigh    Result Date: 7/6/2022  Narrative: PET/CT SCAN INDICATION:  C18 9: Malignant neoplasm of colon, unspecified C78 7: Secondary malignant neoplasm of liver and intrahepatic bile duct   History of metastatic colon carcinoma status post left hemicolectomy  Prior liver segmentectomy  Restaging examination  MODIFIER: PS COMPARISON: Chest CT, 3/25/2022, MRI, 3/25/2022, prior PET scan 5/21/2020 CELL TYPE:  Invasive colonic adenocarcinoma, moderately differentiated TECHNIQUE:   8 0 mCi F-18-FDG administered IV  Multiplanar attenuation corrected and non attenuation corrected PET images were acquired 60 minutes post injection  Contiguous, low dose, axial CT sections were obtained from the skull base through the femurs   Intravenous contrast material was not utilized  This examination, like all CT scans performed in the [facility], was performed utilizing techniques to minimize radiation dose exposure, including the use of iterative reconstruction and automated exposure control   Fasting serum glucose: 111 mg/dl FINDINGS: VISUALIZED BRAIN:   No acute abnormalities are seen  HEAD/NECK:   There is a physiologic distribution of FDG  No FDG avid cervical adenopathy is seen  CT images: Small nonglucose avid right thyroid lobe nodule unchanged from prior PET scan CHEST:   -Posterior pleural-based right lower lobe nodule currently measuring 6 mm with questionable trace activity, SUV max 1 6  Also seen is a 2nd adjacent nodule which remains unchanged from prior exams such as 10/8/2020  (Images 3/137 and 138) - Additional smaller nodules for example in the lateral aspect of the left lung demonstrate no abnormal glucose activity  There is there is no glucose avid mediastinal or hilar adenopathy  CT images: Coronary artery calcification  Lwanfm-g-Dwiw catheter  No pleural or pericardial effusion  ABDOMEN:   Hypodense lesion in the posterior aspect of the right hepatic lobe (3/159) appears unchanged in size since prior PET scan and demonstrates no abnormal glucose activity  No new intrahepatic hypermetabolic foci CT images: Cholelithiasis and large left renal cyst   Ventral adipose containing midline hernia without bowel involvement  No ascites  PELVIS: No FDG avid soft tissue lesions are seen  CT images: Rectosigmoid postsurgical changes without abnormal activity in the region of the anastomosis  No ascites  OSSEOUS STRUCTURES: No FDG avid lesions are seen  CT images: Retention cyst or polyp in the left maxillary sinus     Impression: 1  The 6 mm right lower lobe pulmonary nodule is just below size criteria for accurate assessment with PET/CT  Although there is at most minimal glucose activity, the growth pattern remains very suspicious for a metastatic lesion  2  The other smaller pulmonary nodules in the lungs demonstrate no abnormal glucose activity or change since earlier CTs  3  There is no evidence of glucose avid metastatic disease within the abdomen, pelvis, skeleton or neck   4  Treated metastasis in the dome of the right hepatic lobe, remaining unchanged from prior PET scan and without abnormal glucose activity Workstation performed: [id number]           Assessment/Plan:  Orders Placed This Encounter   Procedures    Ambulatory Referral to Thoracic Surgery      We reviewed the patient's repeat CT Chest  The continued increase in size of his RLL juxtapleural nodule with stable lesions elsewhere is most consistent with a solitary site of recurrent progressive disease  At this point I would recommend local therapy with either surgical resection or SBRT  We discussed the advantages and disadvantages of each approach  We discussed that either approach provides a high degree of local control, but that resection provides added diagnostic benefit, which may be of value in determining need for any additional therapy  The patient was agreeable to this approach and will be referred to thoracic surgery for evaluation  He will otherwise continue to follow with Dr Lorena Em and Dr Maria Del Carmen Herring  I will remain available should any need for RT arise in the future  Con Kaur MD  0/83/5147,1:16 PM    Portions of the record may have been created with voice recognition software   Occasional wrong word or "sound a like" substitutions may have occurred due to the inherent limitations of voice recognition software   Read the chart carefully and recognize, using context, where substitutions have occurred

## 2022-08-04 ENCOUNTER — OFFICE VISIT (OUTPATIENT)
Dept: CARDIAC SURGERY | Facility: CLINIC | Age: 52
End: 2022-08-04
Payer: COMMERCIAL

## 2022-08-04 ENCOUNTER — ANESTHESIA EVENT (OUTPATIENT)
Dept: PERIOP | Facility: HOSPITAL | Age: 52
DRG: 164 | End: 2022-08-04
Payer: MEDICARE

## 2022-08-04 VITALS
HEART RATE: 69 BPM | WEIGHT: 158.73 LBS | OXYGEN SATURATION: 97 % | DIASTOLIC BLOOD PRESSURE: 70 MMHG | HEIGHT: 65 IN | RESPIRATION RATE: 17 BRPM | TEMPERATURE: 98 F | SYSTOLIC BLOOD PRESSURE: 106 MMHG | BODY MASS INDEX: 26.45 KG/M2

## 2022-08-04 DIAGNOSIS — C78.01 MALIGNANT NEOPLASM METASTATIC TO RIGHT LUNG (HCC): Primary | ICD-10-CM

## 2022-08-04 PROCEDURE — 99205 OFFICE O/P NEW HI 60 MIN: CPT | Performed by: THORACIC SURGERY (CARDIOTHORACIC VASCULAR SURGERY)

## 2022-08-04 RX ORDER — GABAPENTIN 300 MG/1
600 CAPSULE ORAL ONCE
Status: CANCELLED | OUTPATIENT
Start: 2022-08-04 | End: 2022-08-04

## 2022-08-04 RX ORDER — HEPARIN SODIUM 5000 [USP'U]/ML
5000 INJECTION, SOLUTION INTRAVENOUS; SUBCUTANEOUS
Status: CANCELLED | OUTPATIENT
Start: 2022-08-05 | End: 2022-08-06

## 2022-08-04 RX ORDER — ACETAMINOPHEN 325 MG/1
975 TABLET ORAL ONCE
Status: CANCELLED | OUTPATIENT
Start: 2022-08-04 | End: 2022-08-04

## 2022-08-04 RX ORDER — CELECOXIB 200 MG/1
200 CAPSULE ORAL ONCE
Status: CANCELLED | OUTPATIENT
Start: 2022-08-04 | End: 2022-08-04

## 2022-08-04 RX ORDER — CEFAZOLIN SODIUM 1 G/50ML
1000 SOLUTION INTRAVENOUS ONCE
Status: CANCELLED | OUTPATIENT
Start: 2022-08-04 | End: 2022-08-04

## 2022-08-04 NOTE — H&P (VIEW-ONLY)
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  This lung nodule with interval changes in size from 2mm 9/2021 to 5mm 3/2022 and now 6mm as noted on PET scan in 7/2022 is suspicious for a metastatic lesion to the right lung  Discussed options with the patient at length and he is amenable to a wedge resection of the lesion  Discussed alternative options, risks, and benefits  Answered all of the patient's questions and he is understanding of the current plan  Will plan for a VATS wedge resection of the nodule  Discussed likely post-operative course and possible complications  Diagnoses and all orders for this visit:    Malignant neoplasm metastatic to right lung [facility])  -     Case request operating room: [place] [address] (VATS) Wedge of the right lower lung   - No labs required, patient has recent labs  - Received full set of covid vaccinations  - Type and Screen to be completed on the morning of surgery         Thoracic History   Diagnosis: Lung nodule with interval increases in size   Procedures/Surgeries: Not applicable   Pathology: Not applicable   Adjuvant Therapy: Not applicable      Subjective:    Patient ID: Glory Alicea is a 54 y o  male  The patient has a history of 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 with 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  He has had two liver resections with Dr Froylan Omer, [address] with Dr Danielle Skinner, and completed folfox x12, no radiation  He was referred by Dr Jones Thmopson (rad onc) for evaluation of a lung nodule that is suspected to be a metastatic nodule  There have been interval increases in size from previous CT scans from 2mm in 9/2021 to 5mm in 3/2022, and now 6mm on PET done in 7/2022  He has not experienced any pulmonary symptoms including shortness of breath, chest tightness, or hemoptysis   He smoked cigarettes in the past, but has transitioned to vaping in the recent years following his colon cancer diagnosis  The following portions of the patient's history were reviewed and updated as appropriate: allergies, current medications, past family history, past medical history, past social history, past surgical history and problem list     Past Medical History:   Diagnosis Date    Cancer [facility] [address] )     [facility]     Dysuria     Last Assessed:8/24/17    GERD (gastroesophageal reflux disease)     Liver cancer ([place] [id number] )     Liver nodule     Pulmonary nodule, right     Ureteropelvic junction (UPJ) obstruction 1/29/2020      Past Surgical History:   Procedure Laterality Date    ABDOMINAL SURGERY      COLON SURGERY      COLONOSCOPY N/A 1/22/2018    Procedure: COLONOSCOPY;  Surgeon: Magdalena Milner MD;  [facility]; Service: Colorectal   [facility] DENTAL SURGERY  2016    [place] with bridge work    FLEXIBLE SIGMOIDOSCOPY N/A 6/15/2018    Procedure: SIGMOIDOSCOPY FLEXIBLE w/o sedation w/ tattoo;  Surgeon: Magdalena Milner MD;  [facility];   Service: Colorectal    LAPAROTOMY N/A 6/8/2020    Procedure: LAPAROTOMY EXPLORATORY, LYSIS OF ADHESIONS;  Surgeon: Froilan Pearson MD;  [facility];  Service: Surgical Oncology    LIVER LOBECTOMY N/A 6/21/2018    Procedure: liver resection/ablation, intraoperative ultrasound of liver;  Surgeon: Froilan Pearson MD;  [facility];  Service: Surgical Oncology    LIVER LOBECTOMY N/A 6/8/2020    Procedure: LIVER RESECTION SEGMENT 3 WITH  INTRAOPERATIVE U/S OF LIVER;  Surgeon: Froilan Pearson MD;  [facility];  Service: Surgical Oncology    WY EXPLORATORY OF ABDOMEN N/A 6/21/2018    Procedure: LAPAROTOMY EXPLORATORY;  Surgeon: Magdalena Milner MD;  [facility];  Service: Colorectal    WY INSERT PICC W/ SUB-Q PORT N/A 1/25/2018    Procedure: PORT-A-CATHETER PLACEMENT  Fluoroscopy for performance/interpretation;  Surgeon: Magdalena Milner MD;  [facility];  Service: Colorectal    NE PART REMOVAL COLON W ANASTOMOSIS Left 6/21/2018    Procedure: hemicolectomy, low anterior resection (open) SPY fluorescence angiography;  Surgeon: Sara Perez MD;  [facility];  Service: Colorectal    NE PROCTECTOMY,PARTIAL N/A 6/21/2018    Procedure: Partial proctectomy ;  Surgeon: Sara Perez MD;  [facility];  Service: Colorectal    NE SIGMOIDOSCOPY FLX DX W/COLLJ SPEC BR/WA IF PFRMD N/A 6/21/2018    Procedure: Itzel Yang;  Surgeon: Sara Perez MD;  [facility];  Service: Colorectal    ROOT CANAL  2015    TESTICLE SURGERY      Exploration of Undescended Testis rIght, age 8 had surgery for undescended testicle;  Last Assessed:8/24/17    TOOTH EXTRACTION  2015      Family History   Problem Relation Age of Onset    No Known Problems Father     Cancer Mother         unknown    Cancer Brother         pt  reports brother was diagnosed with stage 4 throat cancer    Throat cancer Brother     Breast cancer Maternal Aunt       Social History     Socioeconomic History    Marital status: Single     Spouse name: Not on file    Number of children: Not on file    Years of education: Not on file    Highest education level: Not on file   Occupational History    Not on file   Tobacco Use    Smoking status: Former Smoker     Types: E-Cigarettes    Smokeless tobacco: Current User    Tobacco comment: quit 4 years ago / smoked for 20 years    Vaping Use    Vaping Use: Every day    Substances: Nicotine, THC (at time), CBD (at times), Flavoring   Substance and Sexual Activity    Alcohol use: Yes     Comment: socially - 2 month    Drug use: Yes     Types: Marijuana     Comment: occasional recreation drug use - 3 days ago    Sexual activity: Yes     Comment: Resides alone   Other Topics Concern    Not on file   Social History Narrative    Not on file     Social Determinants of Health     Financial Resource Strain: Not on file   Food Insecurity: Not on file   Transportation Needs: Not on file   Physical Activity: Not on file   Stress: Not on file   Social Connections: Not on file   Intimate Partner Violence: Not on file   Housing Stability: Not on file      Review of Systems   Constitutional: Negative  HENT: Negative  Respiratory: Negative  Cardiovascular: Negative  Gastrointestinal: Negative  Endocrine: Negative  Genitourinary: Negative  Musculoskeletal: Negative  Skin: Negative  Neurological: Negative  Psychiatric/Behavioral: Negative  Objective:   Physical Exam  Constitutional:       General: He is not in acute distress  Appearance: Normal appearance  He is not ill-appearing  HENT:      Head: Normocephalic and atraumatic  Right Ear: External ear normal       Left Ear: External ear normal       Nose: Nose normal       Mouth/Throat:      Mouth: Mucous membranes are dry  Eyes:      Extraocular Movements: Extraocular movements intact  Conjunctiva/sclera: Conjunctivae normal    Cardiovascular:      Rate and Rhythm: Normal rate  Heart sounds: Normal heart sounds  Pulmonary:      Effort: Pulmonary effort is normal  No respiratory distress  Breath sounds: Normal breath sounds  Musculoskeletal:         General: Normal range of motion  Cervical back: Normal range of motion and neck supple  Skin:     General: Skin is warm and dry  Neurological:      General: No focal deficit present  Mental Status: He is alert and oriented to person, place, and time  Psychiatric:         Mood and Affect: Mood normal      /70   Pulse 69   Temp 98 °F (36 7 °C)   Resp 17   Ht 5' 5" (1 651 m)   Wt 72 kg (158 lb 11 7 oz)   SpO2 97%   BMI 26 41 kg/m²     No Chest XR results available for this patient       CT chest wo contrast    Result Date: 7/18/2022  Narrative CT CHEST WITHOUT IV CONTRAST INDICATION:   C18 9: Malignant neoplasm of colon, unspecified C78 7: Secondary malignant neoplasm of liver and intrahepatic bile duct  "Lung nodules with history of metastatic colon cancer  Monitor for possible SBRT " COMPARISON:  PET CT from 7/6/2022, chest CT from 3/25/2022, 9/17/2021, 5/20/2021, and 1/7/2021  TECHNIQUE: Chest CT without intravenous contrast   Axial, sagittal, coronal 2D reformats and coronal MIPS from source data  Radiation dose length product (DLP):  181 mGy-cm   Radiation dose exposure minimized using iterative reconstruction and automated exposure control  FINDINGS: LUNGS:  7 mm juxtapleural posterior basal right lower lobe nodule (3/81), increased from 5 mm in March 2022, 2 mm in September 2021, and new since January 2021  Several additional bilateral 4 mm and smaller nodules, marked with horizontal arrows on series 3, stable since at least January 2021  Mild paraseptal emphysema  AIRWAYS: No significant filling defects  PLEURA:  Unremarkable  HEART/GREAT VESSELS:  Normal heart size  Mild coronary artery calcification indicating atherosclerotic heart disease  MEDIASTINUM AND MAHENDRA:  Right port at cavoatrial junction  CHEST WALL AND LOWER NECK: Stable 9 mm right thyroid nodule  No follow-up needed  UPPER ABDOMEN:  Stable 2 7 cm hypodensity in the dome of the liver at the site of treated tumor (2/54)  Left parapelvic cysts  OSSEOUS STRUCTURES: Mild degenerative disease in the spine  Impression Continued enlargement of right lower lobe nodule, now 7 mm, suspicious for metastatic disease  Workstation performed: [id number]     CT chest w contrast    Result Date: 3/31/2022  Narrative CT CHEST WITH IV CONTRAST INDICATION:   C18 9: Malignant neoplasm of colon, unspecified C78 7: Secondary malignant neoplasm of liver and intrahepatic bile duct C20: Malignant neoplasm of rectum  COMPARISON:  Chest CT from 9/17/2021, 5/11/2021, and 12/1/2017  TECHNIQUE: Chest CT with intravenous contrast   Axial, sagittal, coronal 2D reformats and coronal MIPS from source data  Radiation dose length product (DLP):  220 mGy-cm   Radiation dose exposure minimized using iterative reconstruction and automated exposure control  IV Contrast:  85 mL of iohexol (OMNIPAQUE) FINDINGS: LUNGS:  5 mm juxtapleural right lower lobe nodule, increased from 2 mm in September 2021 and 1 mm in May 2021, suspicious for metastatic disease (3/78)  Additional 4 mm and smaller nodules, 2 in the right lower lobe (3/76, 93) and 2 in the lateral basal left lower lobe (3/71, 80), stable since 2017  Mild paraseptal emphysema  AIRWAYS: No significant filling defects  PLEURA:  Unremarkable  HEART/GREAT VESSELS:  Normal heart size  Mild coronary artery calcification indicating atherosclerotic heart disease  Right port at cavoatrial junction  MEDIASTINUM AND MAHENDRA:  Unremarkable  CHEST WALL AND LOWER NECK: Unremarkable  UPPER ABDOMEN:  Stable 2 9 x 2 3 cm lesion in the dome of the liver (2/51)  Cholelithiasis  Left caliectasis and dilated renal pelvis, stable since May 2020  OSSEOUS STRUCTURES: Mild degenerative disease in the spine  Impression 5 mm right lower lobe nodule, increased from 2 mm in September 2021 and 1 mm in May 2021, suspicious for metastatic disease  Liver metastasis, stable since September 2021, see MRI for further evaluation  Mild coronary artery calcification indicating atherosclerotic heart disease  I notified Salvatore Osman and [facility] VALENTINO BOYD by RABBL message on 3/31/2022 at 7:33 AM  This study was also marked for significant notification  Workstation performed: [id number]     CT chest w contrast    Result Date: 9/22/2021  Narrative CT CHEST WITH IV CONTRAST INDICATION:   C18 9: Malignant neoplasm of colon, unspecified C78 7: Secondary malignant neoplasm of liver and intrahepatic bile duct C20: Malignant neoplasm of rectum  COMPARISON:  5/11/2021; 1/17/2021; 10/8/2020; 1/23/2020 TECHNIQUE: CT examination of the chest was performed   Axial, sagittal, and coronal 2D reformatted images were created from the source data and submitted for interpretation  Radiation dose length product (DLP) for this visit:  204 mGy-cm   This examination, like all CT scans performed in the [facility], was performed utilizing techniques to minimize radiation dose exposure, including the use of iterative reconstruction and automated exposure control  IV Contrast:  85 mL of iohexol (OMNIPAQUE) FINDINGS: Port-A-Cath is present with tip in SVC  LUNGS:  Right lower lobe 5 mm nodule image 77, series 3, stable Right lower lobe 2 mm nodule image 78, stable  Left lower lobe 3 mm nodule image 75, stable  Left lower lobe 3 mm nodule image 84, stable  Right lower lobe 4 mm nodule image 90, stable There is no tracheal or endobronchial lesion  PLEURA:  Unremarkable  HEART/GREAT VESSELS:  Unremarkable for patient's age  MEDIASTINUM AND MAHENDRA:  Unremarkable  CHEST WALL AND LOWER NECK:   Unremarkable  VISUALIZED STRUCTURES IN THE UPPER ABDOMEN: There is fullness of the visualized collecting system in the left kidney  There is a liver lesion involving segment 7 abutting the left hemidiaphragm measuring up to 3 cm in size without change  OSSEOUS STRUCTURES:  No acute fracture or destructive osseous lesion  Impression Stable lung nodules Liver lesion is unchanged  Some fullness of the upper pole collecting system is again demonstrated  Workstation performed: [id number]     No CT Chest,Abdomen,Pelvis results available for this patient  NM PET CT skull base to mid thigh    Result Date: 7/6/2022  Narrative PET/CT SCAN INDICATION:  C18 9: Malignant neoplasm of colon, unspecified C78 7: Secondary malignant neoplasm of liver and intrahepatic bile duct   History of metastatic colon carcinoma status post left hemicolectomy  Prior liver segmentectomy  Restaging examination   MODIFIER: PS COMPARISON: Chest CT, 3/25/2022, MRI, 3/25/2022, prior PET scan 5/21/2020 CELL TYPE:  Invasive colonic adenocarcinoma, moderately differentiated TECHNIQUE:   8 0 mCi F-18-FDG administered IV  Multiplanar attenuation corrected and non attenuation corrected PET images were acquired 60 minutes post injection  Contiguous, low dose, axial CT sections were obtained from the skull base through the femurs   Intravenous contrast material was not utilized  This examination, like all CT scans performed in the [facility], was performed utilizing techniques to minimize radiation dose exposure, including the use of iterative reconstruction and automated exposure control  Fasting serum glucose: 111 mg/dl FINDINGS: VISUALIZED BRAIN:   No acute abnormalities are seen  HEAD/NECK:   There is a physiologic distribution of FDG  No FDG avid cervical adenopathy is seen  CT images: Small nonglucose avid right thyroid lobe nodule unchanged from prior PET scan CHEST:   -Posterior pleural-based right lower lobe nodule currently measuring 6 mm with questionable trace activity, SUV max 1 6  Also seen is a 2nd adjacent nodule which remains unchanged from prior exams such as 10/8/2020  (Images 3/137 and 138) - Additional smaller nodules for example in the lateral aspect of the left lung demonstrate no abnormal glucose activity  There is there is no glucose avid mediastinal or hilar adenopathy  CT images: Coronary artery calcification  Xkjxll-b-Ummy catheter  No pleural or pericardial effusion  ABDOMEN:   Hypodense lesion in the posterior aspect of the right hepatic lobe (3/159) appears unchanged in size since prior PET scan and demonstrates no abnormal glucose activity  No new intrahepatic hypermetabolic foci CT images: Cholelithiasis and large left renal cyst   Ventral adipose containing midline hernia without bowel involvement  No ascites  PELVIS: No FDG avid soft tissue lesions are seen  CT images: Rectosigmoid postsurgical changes without abnormal activity in the region of the anastomosis  No ascites   OSSEOUS STRUCTURES: No FDG avid lesions are seen  CT images: Retention cyst or polyp in the left maxillary sinus     Impression 1  The 6 mm right lower lobe pulmonary nodule is just below size criteria for accurate assessment with PET/CT  Although there is at most minimal glucose activity, the growth pattern remains very suspicious for a metastatic lesion  2  The other smaller pulmonary nodules in the lungs demonstrate no abnormal glucose activity or change since earlier CTs  3  There is no evidence of glucose avid metastatic disease within the abdomen, pelvis, skeleton or neck  4  Treated metastasis in the dome of the right hepatic lobe, remaining unchanged from prior PET scan and without abnormal glucose activity Workstation performed: [id number]      No Barium Swallow results available for this patient

## 2022-08-04 NOTE — ASSESSMENT & PLAN NOTE
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

## 2022-08-04 NOTE — PROGRESS NOTES
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  This lung nodule with interval changes in size from 2mm 9/2021 to 5mm 3/2022 and now 6mm as noted on PET scan in 7/2022 is suspicious for a metastatic lesion to the right lung  Discussed options with the patient at length and he is amenable to a wedge resection of the lesion  Discussed alternative options, risks, and benefits  Answered all of the patient's questions and he is understanding of the current plan  Will plan for a VATS wedge resection of the nodule  Discussed likely post-operative course and possible complications  Diagnoses and all orders for this visit:    Malignant neoplasm metastatic to right lung [facility])  -     Case request operating room: [place] [address] (VATS) Wedge of the right lower lung   - No labs required, patient has recent labs  - Received full set of covid vaccinations  - Type and Screen to be completed on the morning of surgery         Thoracic History   Diagnosis: Lung nodule with interval increases in size   Procedures/Surgeries: Not applicable   Pathology: Not applicable   Adjuvant Therapy: Not applicable      Subjective:    Patient ID: Dania Hdz is a 54 y o  male  The patient has a history of 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 with 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  He has had two liver resections with Dr Bereket Haney, [address] with Dr Eduin Hurley, and completed folfox x12, no radiation  He was referred by Dr Luis Alberto Em (rad onc) for evaluation of a lung nodule that is suspected to be a metastatic nodule  There have been interval increases in size from previous CT scans from 2mm in 9/2021 to 5mm in 3/2022, and now 6mm on PET done in 7/2022  He has not experienced any pulmonary symptoms including shortness of breath, chest tightness, or hemoptysis   He smoked cigarettes in the past, but has transitioned to vaping in the recent years following his colon cancer diagnosis  The following portions of the patient's history were reviewed and updated as appropriate: allergies, current medications, past family history, past medical history, past social history, past surgical history and problem list     Past Medical History:   Diagnosis Date    Cancer [address] )     [facility]     Dysuria     Last Assessed:8/24/17    GERD (gastroesophageal reflux disease)     Liver cancer ([place] [address] )     Liver nodule     Pulmonary nodule, right     Ureteropelvic junction (UPJ) obstruction 1/29/2020      Past Surgical History:   Procedure Laterality Date    ABDOMINAL SURGERY      COLON SURGERY      COLONOSCOPY N/A 1/22/2018    Procedure: COLONOSCOPY;  Surgeon: Dina Lang MD;  [facility]; Service: Colorectal   [facility]  [facility]    Crown with bridge work    FLEXIBLE SIGMOIDOSCOPY N/A 6/15/2018    Procedure: SIGMOIDOSCOPY FLEXIBLE w/o sedation w/ tattoo;  Surgeon: Dina Lang MD;  [facility];   Service: Colorectal    LAPAROTOMY N/A 6/8/2020    Procedure: LAPAROTOMY EXPLORATORY, LYSIS OF ADHESIONS;  Surgeon: Jesús Galvan MD;  [facility];  Service: Surgical Oncology    LIVER LOBECTOMY N/A 6/21/2018    Procedure: liver resection/ablation, intraoperative ultrasound of liver;  Surgeon: Jesús Galvan MD;  [facility];  Service: Surgical Oncology    LIVER LOBECTOMY N/A 6/8/2020    Procedure: LIVER RESECTION SEGMENT 3 WITH  INTRAOPERATIVE U/S OF LIVER;  Surgeon: Jesús Galvan MD;  [facility];  Service: Surgical Oncology    IL EXPLORATORY OF ABDOMEN N/A 6/21/2018    Procedure: LAPAROTOMY EXPLORATORY;  Surgeon: Dina Lang MD;  [facility];  Service: Colorectal    IL INSERT PICC W/ SUB-Q PORT N/A 1/25/2018    Procedure: PORT-A-CATHETER PLACEMENT  Fluoroscopy for performance/interpretation;  Surgeon: Dina Lang MD;  [facility];  Service: Colorectal    GA PART REMOVAL COLON W ANASTOMOSIS Left 6/21/2018    Procedure: hemicolectomy, low anterior resection (open) SPY fluorescence angiography;  Surgeon: Selina Alba MD;  [facility];  Service: Colorectal    GA PROCTECTOMY,PARTIAL N/A 6/21/2018    Procedure: Partial proctectomy ;  Surgeon: Selina Alba MD;  [facility];  Service: Colorectal    GA SIGMOIDOSCOPY FLX DX W/COLLJ SPEC BR/WA IF PFRMD N/A 6/21/2018    Procedure: Stefan Rosas;  Surgeon: Selina Alba MD;  [facility];  Service: Colorectal    ROOT CANAL  2015    TESTICLE SURGERY      Exploration of Undescended Testis rIght, age 8 had surgery for undescended testicle;  Last Assessed:8/24/17    TOOTH EXTRACTION  2015      Family History   Problem Relation Age of Onset    No Known Problems Father     Cancer Mother         unknown    Cancer Brother         pt  reports brother was diagnosed with stage 4 throat cancer    Throat cancer Brother     Breast cancer Maternal Aunt       Social History     Socioeconomic History    Marital status: Single     Spouse name: Not on file    Number of children: Not on file    Years of education: Not on file    Highest education level: Not on file   Occupational History    Not on file   Tobacco Use    Smoking status: Former Smoker     Types: E-Cigarettes    Smokeless tobacco: Current User    Tobacco comment: quit 4 years ago / smoked for 20 years    Vaping Use    Vaping Use: Every day    Substances: Nicotine, THC (at time), CBD (at times), Flavoring   Substance and Sexual Activity    Alcohol use: Yes     Comment: socially - 2 month    Drug use: Yes     Types: Marijuana     Comment: occasional recreation drug use - 3 days ago    Sexual activity: Yes     Comment: Resides alone   Other Topics Concern    Not on file   Social History Narrative    Not on file     Social Determinants of Health     Financial Resource Strain: Not on file   Food Insecurity: Not on file   Transportation Needs: Not on file   Physical Activity: Not on file   Stress: Not on file   Social Connections: Not on file   Intimate Partner Violence: Not on file   Housing Stability: Not on file      Review of Systems   Constitutional: Negative  HENT: Negative  Respiratory: Negative  Cardiovascular: Negative  Gastrointestinal: Negative  Endocrine: Negative  Genitourinary: Negative  Musculoskeletal: Negative  Skin: Negative  Neurological: Negative  Psychiatric/Behavioral: Negative  Objective:   Physical Exam  Constitutional:       General: He is not in acute distress  Appearance: Normal appearance  He is not ill-appearing  HENT:      Head: Normocephalic and atraumatic  Right Ear: External ear normal       Left Ear: External ear normal       Nose: Nose normal       Mouth/Throat:      Mouth: Mucous membranes are dry  Eyes:      Extraocular Movements: Extraocular movements intact  Conjunctiva/sclera: Conjunctivae normal    Cardiovascular:      Rate and Rhythm: Normal rate  Heart sounds: Normal heart sounds  Pulmonary:      Effort: Pulmonary effort is normal  No respiratory distress  Breath sounds: Normal breath sounds  Musculoskeletal:         General: Normal range of motion  Cervical back: Normal range of motion and neck supple  Skin:     General: Skin is warm and dry  Neurological:      General: No focal deficit present  Mental Status: He is alert and oriented to person, place, and time  Psychiatric:         Mood and Affect: Mood normal      /70   Pulse 69   Temp 98 °F (36 7 °C)   Resp 17   Ht 5' 5" (1 651 m)   Wt 72 kg (158 lb 11 7 oz)   SpO2 97%   BMI 26 41 kg/m²     No Chest XR results available for this patient       CT chest wo contrast    Result Date: 7/18/2022  Narrative CT CHEST WITHOUT IV CONTRAST INDICATION:   C18 9: Malignant neoplasm of colon, unspecified C78 7: Secondary malignant neoplasm of liver and intrahepatic bile duct  "Lung nodules with history of metastatic colon cancer  Monitor for possible SBRT " COMPARISON:  PET CT from 7/6/2022, chest CT from 3/25/2022, 9/17/2021, 5/20/2021, and 1/7/2021  TECHNIQUE: Chest CT without intravenous contrast   Axial, sagittal, coronal 2D reformats and coronal MIPS from source data  Radiation dose length product (DLP):  181 mGy-cm   Radiation dose exposure minimized using iterative reconstruction and automated exposure control  FINDINGS: LUNGS:  7 mm juxtapleural posterior basal right lower lobe nodule (3/81), increased from 5 mm in March 2022, 2 mm in September 2021, and new since January 2021  Several additional bilateral 4 mm and smaller nodules, marked with horizontal arrows on series 3, stable since at least January 2021  Mild paraseptal emphysema  AIRWAYS: No significant filling defects  PLEURA:  Unremarkable  HEART/GREAT VESSELS:  Normal heart size  Mild coronary artery calcification indicating atherosclerotic heart disease  MEDIASTINUM AND MAHENDRA:  Right port at cavoatrial junction  CHEST WALL AND LOWER NECK: Stable 9 mm right thyroid nodule  No follow-up needed  UPPER ABDOMEN:  Stable 2 7 cm hypodensity in the dome of the liver at the site of treated tumor (2/54)  Left parapelvic cysts  OSSEOUS STRUCTURES: Mild degenerative disease in the spine  Impression Continued enlargement of right lower lobe nodule, now 7 mm, suspicious for metastatic disease  Workstation performed: [id number]     CT chest w contrast    Result Date: 3/31/2022  Narrative CT CHEST WITH IV CONTRAST INDICATION:   C18 9: Malignant neoplasm of colon, unspecified C78 7: Secondary malignant neoplasm of liver and intrahepatic bile duct C20: Malignant neoplasm of rectum  COMPARISON:  Chest CT from 9/17/2021, 5/11/2021, and 12/1/2017  TECHNIQUE: Chest CT with intravenous contrast   Axial, sagittal, coronal 2D reformats and coronal MIPS from source data  Radiation dose length product (DLP):  220 mGy-cm   Radiation dose exposure minimized using iterative reconstruction and automated exposure control  IV Contrast:  85 mL of iohexol (OMNIPAQUE) FINDINGS: LUNGS:  5 mm juxtapleural right lower lobe nodule, increased from 2 mm in September 2021 and 1 mm in May 2021, suspicious for metastatic disease (3/78)  Additional 4 mm and smaller nodules, 2 in the right lower lobe (3/76, 93) and 2 in the lateral basal left lower lobe (3/71, 80), stable since 2017  Mild paraseptal emphysema  AIRWAYS: No significant filling defects  PLEURA:  Unremarkable  HEART/GREAT VESSELS:  Normal heart size  Mild coronary artery calcification indicating atherosclerotic heart disease  Right port at cavoatrial junction  MEDIASTINUM AND MAHENDRA:  Unremarkable  CHEST WALL AND LOWER NECK: Unremarkable  UPPER ABDOMEN:  Stable 2 9 x 2 3 cm lesion in the dome of the liver (2/51)  Cholelithiasis  Left caliectasis and dilated renal pelvis, stable since May 2020  OSSEOUS STRUCTURES: Mild degenerative disease in the spine  Impression 5 mm right lower lobe nodule, increased from 2 mm in September 2021 and 1 mm in May 2021, suspicious for metastatic disease  Liver metastasis, stable since September 2021, see MRI for further evaluation  Mild coronary artery calcification indicating atherosclerotic heart disease  I notified Salvatore Gallego and [facility] VALENTINO BOYD by [employer] message on 3/31/2022 at 7:33 AM  This study was also marked for significant notification  Workstation performed: [id number]     CT chest w contrast    Result Date: 9/22/2021  Narrative CT CHEST WITH IV CONTRAST INDICATION:   C18 9: Malignant neoplasm of colon, unspecified C78 7: Secondary malignant neoplasm of liver and intrahepatic bile duct C20: Malignant neoplasm of rectum  COMPARISON:  5/11/2021; 1/17/2021; 10/8/2020; 1/23/2020 TECHNIQUE: CT examination of the chest was performed   Axial, sagittal, and coronal 2D reformatted images were created from the source data and submitted for interpretation  Radiation dose length product (DLP) for this visit:  204 mGy-cm   This examination, like all CT scans performed in the [facility], was performed utilizing techniques to minimize radiation dose exposure, including the use of iterative reconstruction and automated exposure control  IV Contrast:  85 mL of iohexol (OMNIPAQUE) FINDINGS: Port-A-Cath is present with tip in SVC  LUNGS:  Right lower lobe 5 mm nodule image 77, series 3, stable Right lower lobe 2 mm nodule image 78, stable  Left lower lobe 3 mm nodule image 75, stable  Left lower lobe 3 mm nodule image 84, stable  Right lower lobe 4 mm nodule image 90, stable There is no tracheal or endobronchial lesion  PLEURA:  Unremarkable  HEART/GREAT VESSELS:  Unremarkable for patient's age  MEDIASTINUM AND MAHENDRA:  Unremarkable  CHEST WALL AND LOWER NECK:   Unremarkable  VISUALIZED STRUCTURES IN THE UPPER ABDOMEN: There is fullness of the visualized collecting system in the left kidney  There is a liver lesion involving segment 7 abutting the left hemidiaphragm measuring up to 3 cm in size without change  OSSEOUS STRUCTURES:  No acute fracture or destructive osseous lesion  Impression Stable lung nodules Liver lesion is unchanged  Some fullness of the upper pole collecting system is again demonstrated  Workstation performed: [id number]     No CT Chest,Abdomen,Pelvis results available for this patient  NM PET CT skull base to mid thigh    Result Date: 7/6/2022  Narrative PET/CT SCAN INDICATION:  C18 9: Malignant neoplasm of colon, unspecified C78 7: Secondary malignant neoplasm of liver and intrahepatic bile duct   History of metastatic colon carcinoma status post left hemicolectomy  Prior liver segmentectomy  Restaging examination   MODIFIER: PS COMPARISON: Chest CT, 3/25/2022, MRI, 3/25/2022, prior PET scan 5/21/2020 CELL TYPE:  Invasive colonic adenocarcinoma, moderately differentiated TECHNIQUE:   8 0 mCi F-18-FDG administered IV  Multiplanar attenuation corrected and non attenuation corrected PET images were acquired 60 minutes post injection  Contiguous, low dose, axial CT sections were obtained from the skull base through the femurs   Intravenous contrast material was not utilized  This examination, like all CT scans performed in the [facility], was performed utilizing techniques to minimize radiation dose exposure, including the use of iterative reconstruction and automated exposure control  Fasting serum glucose: 111 mg/dl FINDINGS: VISUALIZED BRAIN:   No acute abnormalities are seen  HEAD/NECK:   There is a physiologic distribution of FDG  No FDG avid cervical adenopathy is seen  CT images: Small nonglucose avid right thyroid lobe nodule unchanged from prior PET scan CHEST:   -Posterior pleural-based right lower lobe nodule currently measuring 6 mm with questionable trace activity, SUV max 1 6  Also seen is a 2nd adjacent nodule which remains unchanged from prior exams such as 10/8/2020  (Images 3/137 and 138) - Additional smaller nodules for example in the lateral aspect of the left lung demonstrate no abnormal glucose activity  There is there is no glucose avid mediastinal or hilar adenopathy  CT images: Coronary artery calcification  Ffyugm-p-Okeo catheter  No pleural or pericardial effusion  ABDOMEN:   Hypodense lesion in the posterior aspect of the right hepatic lobe (3/159) appears unchanged in size since prior PET scan and demonstrates no abnormal glucose activity  No new intrahepatic hypermetabolic foci CT images: Cholelithiasis and large left renal cyst   Ventral adipose containing midline hernia without bowel involvement  No ascites  PELVIS: No FDG avid soft tissue lesions are seen  CT images: Rectosigmoid postsurgical changes without abnormal activity in the region of the anastomosis  No ascites   OSSEOUS STRUCTURES: No FDG avid lesions are seen  CT images: Retention cyst or polyp in the left maxillary sinus     Impression 1  The 6 mm right lower lobe pulmonary nodule is just below size criteria for accurate assessment with PET/CT  Although there is at most minimal glucose activity, the growth pattern remains very suspicious for a metastatic lesion  2  The other smaller pulmonary nodules in the lungs demonstrate no abnormal glucose activity or change since earlier CTs  3  There is no evidence of glucose avid metastatic disease within the abdomen, pelvis, skeleton or neck  4  Treated metastasis in the dome of the right hepatic lobe, remaining unchanged from prior PET scan and without abnormal glucose activity Workstation performed: [id number]      No Barium Swallow results available for this patient

## 2022-08-04 NOTE — LETTER
August 4, 2022     Berny Westfall, [address] [place] [id number]    Patient: Alexandria Diaz   YOB: 1970   Date of Visit: 8/4/2022       Dear Dr Colleen Robertson: Thank you for referring Soheila Mitchell to me for evaluation  Below are my notes for this consultation  If you have questions, please do not hesitate to call me  I look forward to following your patient along with you  Sincerely,        Yoly Ortiz MD        CC: MD Ruthann Polanco MD Jodean Chesterfield, MD Graig Failing, MD  8/4/2022 12:18 PM  Attested  Thoracic Consult  Assessment/Plan:    Malignant neoplasm metastatic to right lung 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  This lung nodule with interval changes in size from 2mm 9/2021 to 5mm 3/2022 and now 6mm as noted on PET scan in 7/2022 is suspicious for a metastatic lesion to the right lung  Discussed options with the patient at length and he is amenable to a wedge resection of the lesion  Discussed alternative options, risks, and benefits  Answered all of the patient's questions and he is understanding of the current plan  Will plan for a VATS wedge resection of the nodule  Discussed likely post-operative course and possible complications  Diagnoses and all orders for this visit:    Malignant neoplasm metastatic to right lung [facility])  -     Case request operating room: [place] [address] (VATS) Wedge of the right lower lung   - No labs required, patient has recent labs  - Received full set of covid vaccinations  - Type and Screen to be completed on the morning of surgery         Thoracic History   Diagnosis: Lung nodule with interval increases in size   Procedures/Surgeries: Not applicable   Pathology: Not applicable   Adjuvant Therapy: Not applicable      Subjective:    Patient ID: Alexandria Diaz is a 54 y o  male      The patient has a history of 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 with 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  He has had two liver resections with Dr Tina Caldwell, [address] with Dr Danya Anglin, and completed folfox x12, no radiation  He was referred by Dr Julián Lipscomb (rad onc) for evaluation of a lung nodule that is suspected to be a metastatic nodule  There have been interval increases in size from previous CT scans from 2mm in 9/2021 to 5mm in 3/2022, and now 6mm on PET done in 7/2022  He has not experienced any pulmonary symptoms including shortness of breath, chest tightness, or hemoptysis  He smoked cigarettes in the past, but has transitioned to vaping in the recent years following his colon cancer diagnosis  The following portions of the patient's history were reviewed and updated as appropriate: allergies, current medications, past family history, past medical history, past social history, past surgical history and problem list     Past Medical History:   Diagnosis Date    [facility] )     Colon cancer ([facility] )     Dysuria     Last Assessed:8/24/17    GERD (gastroesophageal reflux disease)     Liver cancer ([facility] )     Liver nodule     Pulmonary nodule, right     Ureteropelvic junction (UPJ) obstruction 1/29/2020      Past Surgical History:   Procedure Laterality Date    ABDOMINAL SURGERY      COLON SURGERY      COLONOSCOPY N/A 1/22/2018    Procedure: COLONOSCOPY;  Surgeon: Shelia Sheppard MD;  [facility]; Service: Colorectal   Lolita Laroses DENTAL SURGERY  2016    Crown with bridge work    FLEXIBLE SIGMOIDOSCOPY N/A 6/15/2018    Procedure: SIGMOIDOSCOPY FLEXIBLE w/o sedation w/ tattoo;  Surgeon: Shelia Sheppard MD;  [facility];   Service: Colorectal    LAPAROTOMY N/A 6/8/2020    Procedure: LAPAROTOMY EXPLORATORY, LYSIS OF ADHESIONS;  Surgeon: Vernon Watt MD;  [facility];  Service: Surgical Oncology    LIVER LOBECTOMY N/A 6/21/2018    Procedure: liver resection/ablation, intraoperative ultrasound of liver;  Surgeon: Emma Abreu Can Garrido MD;  [facility];  Service: Surgical Oncology    LIVER LOBECTOMY N/A 6/8/2020    Procedure: LIVER RESECTION SEGMENT 3 WITH  INTRAOPERATIVE U/S OF LIVER;  Surgeon: Shruti Jeffrey MD;  [facility];  Service: Surgical Oncology    MA EXPLORATORY OF ABDOMEN N/A 6/21/2018    Procedure: LAPAROTOMY EXPLORATORY;  Surgeon: Iris Carroll MD;  [facility];  Service: Colorectal    MA INSERT PICC W/ SUB-Q PORT N/A 1/25/2018    Procedure: PORT-A-CATHETER PLACEMENT  Fluoroscopy for performance/interpretation;  Surgeon: Iris Carroll MD;  [facility];  Service: Colorectal    MA PART REMOVAL COLON W ANASTOMOSIS Left 6/21/2018    Procedure: hemicolectomy, low anterior resection (open) SPY fluorescence angiography;  Surgeon: Iris Carroll MD;  [facility];  Service: Colorectal    MA PROCTECTOMY,PARTIAL N/A 6/21/2018    Procedure: Partial proctectomy ;  Surgeon: Iris Carroll MD;  [facility];  Service: Colorectal    MA SIGMOIDOSCOPY FLX DX W/COLLJ SPEC BR/WA IF PFRMD N/A 6/21/2018    Procedure: Mario Vides;  Surgeon: Iris Carroll MD;  [facility];  Service: Colorectal    ROOT CANAL  2015    TESTICLE SURGERY      Exploration of Undescended Testis rIght, age 8 had surgery for undescended testicle;  Last Assessed:8/24/17    TOOTH EXTRACTION  2015      Family History   Problem Relation Age of Onset    No Known Problems Father     Cancer Mother         unknown    Cancer Brother         pt  reports brother was diagnosed with stage 4 throat cancer    Throat cancer Brother     Breast cancer Maternal Aunt       Social History     Socioeconomic History    Marital status: Single     Spouse name: Not on file    Number of children: Not on file    Years of education: Not on file    Highest education level: Not on file   Occupational History    Not on file   Tobacco Use    Smoking status: Former Smoker     Types: E-Cigarettes  Smokeless tobacco: Current User    Tobacco comment: quit 4 years ago / smoked for 20 years    Vaping Use    Vaping Use: Every day    Substances: Nicotine, THC (at time), CBD (at times), Flavoring   Substance and Sexual Activity    Alcohol use: Yes     Comment: socially - 2 month    Drug use: Yes     Types: Marijuana     Comment: occasional recreation drug use - 3 days ago    Sexual activity: Yes     Comment: Resides alone   Other Topics Concern    Not on file   Social History Narrative    Not on file     Social Determinants of Health     Financial Resource Strain: Not on file   Food Insecurity: Not on file   Transportation Needs: Not on file   Physical Activity: Not on file   Stress: Not on file   Social Connections: Not on file   Intimate Partner Violence: Not on file   Housing Stability: Not on file      Review of Systems   Constitutional: Negative  HENT: Negative  Respiratory: Negative  Cardiovascular: Negative  Gastrointestinal: Negative  Endocrine: Negative  Genitourinary: Negative  Musculoskeletal: Negative  Skin: Negative  Neurological: Negative  Psychiatric/Behavioral: Negative  Objective:   Physical Exam  Constitutional:       General: He is not in acute distress  Appearance: Normal appearance  He is not ill-appearing  HENT:      Head: Normocephalic and atraumatic  Right Ear: External ear normal       Left Ear: External ear normal       Nose: Nose normal       Mouth/Throat:      Mouth: Mucous membranes are dry  Eyes:      Extraocular Movements: Extraocular movements intact  Conjunctiva/sclera: Conjunctivae normal    Cardiovascular:      Rate and Rhythm: Normal rate  Heart sounds: Normal heart sounds  Pulmonary:      Effort: Pulmonary effort is normal  No respiratory distress  Breath sounds: Normal breath sounds  Musculoskeletal:         General: Normal range of motion        Cervical back: Normal range of motion and neck supple  Skin:     General: Skin is warm and dry  Neurological:      General: No focal deficit present  Mental Status: He is alert and oriented to person, place, and time  Psychiatric:         Mood and Affect: Mood normal      /70   Pulse 69   Temp 98 °F (36 7 °C)   Resp 17   Ht 5' 5" (1 651 m)   Wt 72 kg (158 lb 11 7 oz)   SpO2 97%   BMI 26 41 kg/m²     No Chest XR results available for this patient  CT chest wo contrast    Result Date: 7/18/2022  Narrative CT CHEST WITHOUT IV CONTRAST INDICATION:   C18 9: Malignant neoplasm of colon, unspecified C78 7: Secondary malignant neoplasm of liver and intrahepatic bile duct  "Lung nodules with history of metastatic colon cancer  Monitor for possible SBRT " COMPARISON:  PET CT from 7/6/2022, chest CT from 3/25/2022, 9/17/2021, 5/20/2021, and 1/7/2021  TECHNIQUE: Chest CT without intravenous contrast   Axial, sagittal, coronal 2D reformats and coronal MIPS from source data  Radiation dose length product (DLP):  181 mGy-cm   Radiation dose exposure minimized using iterative reconstruction and automated exposure control  FINDINGS: LUNGS:  7 mm juxtapleural posterior basal right lower lobe nodule (3/81), increased from 5 mm in March 2022, 2 mm in September 2021, and new since January 2021  Several additional bilateral 4 mm and smaller nodules, marked with horizontal arrows on series 3, stable since at least January 2021  Mild paraseptal emphysema  AIRWAYS: No significant filling defects  PLEURA:  Unremarkable  HEART/GREAT VESSELS:  Normal heart size  Mild coronary artery calcification indicating atherosclerotic heart disease  MEDIASTINUM AND MAHENDRA:  Right port at cavoatrial junction  CHEST WALL AND LOWER NECK: Stable 9 mm right thyroid nodule  No follow-up needed  UPPER ABDOMEN:  Stable 2 7 cm hypodensity in the dome of the liver at the site of treated tumor (2/54)  Left parapelvic cysts  OSSEOUS STRUCTURES: Mild degenerative disease in the spine  Impression Continued enlargement of right lower lobe nodule, now 7 mm, suspicious for metastatic disease  Workstation performed: [id number]     CT chest w contrast    Result Date: 3/31/2022  Narrative CT CHEST WITH IV CONTRAST INDICATION:   C18 9: Malignant neoplasm of colon, unspecified C78 7: Secondary malignant neoplasm of liver and intrahepatic bile duct C20: Malignant neoplasm of rectum  COMPARISON:  Chest CT from 9/17/2021, 5/11/2021, and 12/1/2017  TECHNIQUE: Chest CT with intravenous contrast   Axial, sagittal, coronal 2D reformats and coronal MIPS from source data  Radiation dose length product (DLP):  220 mGy-cm   Radiation dose exposure minimized using iterative reconstruction and automated exposure control  IV Contrast:  85 mL of iohexol (OMNIPAQUE) FINDINGS: LUNGS:  5 mm juxtapleural right lower lobe nodule, increased from 2 mm in September 2021 and 1 mm in May 2021, suspicious for metastatic disease (3/78)  Additional 4 mm and smaller nodules, 2 in the right lower lobe (3/76, 93) and 2 in the lateral basal left lower lobe (3/71, 80), stable since 2017  Mild paraseptal emphysema  AIRWAYS: No significant filling defects  PLEURA:  Unremarkable  HEART/GREAT VESSELS:  Normal heart size  Mild coronary artery calcification indicating atherosclerotic heart disease  Right port at cavoatrial junction  MEDIASTINUM AND MAHENDRA:  Unremarkable  CHEST WALL AND LOWER NECK: Unremarkable  UPPER ABDOMEN:  Stable 2 9 x 2 3 cm lesion in the dome of the liver (2/51)  Cholelithiasis  Left caliectasis and dilated renal pelvis, stable since May 2020  OSSEOUS STRUCTURES: Mild degenerative disease in the spine  Impression 5 mm right lower lobe nodule, increased from 2 mm in September 2021 and 1 mm in May 2021, suspicious for metastatic disease  Liver metastasis, stable since September 2021, see MRI for further evaluation  Mild coronary artery calcification indicating atherosclerotic heart disease  I notified Salvatore Diaz Cullen and [address] [facility] [address] VALENTINO BOYD by [employer] on 3/31/2022 at 7:33 AM  This study was also marked for significant notification  Workstation performed: [id number]     CT chest w contrast    Result Date: 9/22/2021  Narrative CT CHEST WITH IV CONTRAST INDICATION:   C18 9: Malignant neoplasm of colon, unspecified C78 7: Secondary malignant neoplasm of liver and intrahepatic bile duct C20: Malignant neoplasm of rectum  COMPARISON:  5/11/2021; 1/17/2021; 10/8/2020; 1/23/2020 TECHNIQUE: CT examination of the chest was performed  Axial, sagittal, and coronal 2D reformatted images were created from the source data and submitted for interpretation  Radiation dose length product (DLP) for this visit:  204 mGy-cm   This examination, like all CT scans performed in the [facility], was performed utilizing techniques to minimize radiation dose exposure, including the use of iterative reconstruction and automated exposure control  IV Contrast:  85 mL of iohexol (OMNIPAQUE) FINDINGS: Port-A-Cath is present with tip in SVC  LUNGS:  Right lower lobe 5 mm nodule image 77, series 3, stable Right lower lobe 2 mm nodule image 78, stable  Left lower lobe 3 mm nodule image 75, stable  Left lower lobe 3 mm nodule image 84, stable  Right lower lobe 4 mm nodule image 90, stable There is no tracheal or endobronchial lesion  PLEURA:  Unremarkable  HEART/GREAT VESSELS:  Unremarkable for patient's age  MEDIASTINUM AND MAHENDRA:  Unremarkable  CHEST WALL AND LOWER NECK:   Unremarkable  VISUALIZED STRUCTURES IN THE UPPER ABDOMEN: There is fullness of the visualized collecting system in the left kidney  There is a liver lesion involving segment 7 abutting the left hemidiaphragm measuring up to 3 cm in size without change  OSSEOUS STRUCTURES:  No acute fracture or destructive osseous lesion  Impression Stable lung nodules Liver lesion is unchanged  Some fullness of the upper pole collecting system is again demonstrated   Workstation performed: [id number]     No CT Chest,Abdomen,Pelvis results available for this patient  NM PET CT skull base to mid thigh    Result Date: 7/6/2022  Narrative PET/CT SCAN INDICATION:  C18 9: Malignant neoplasm of colon, unspecified C78 7: Secondary malignant neoplasm of liver and intrahepatic bile duct   History of metastatic colon carcinoma status post left hemicolectomy  Prior liver segmentectomy  Restaging examination  MODIFIER: PS COMPARISON: Chest CT, 3/25/2022, MRI, 3/25/2022, prior PET scan 5/21/2020 CELL TYPE:  Invasive colonic adenocarcinoma, moderately differentiated TECHNIQUE:   8 0 mCi F-18-FDG administered IV  Multiplanar attenuation corrected and non attenuation corrected PET images were acquired 60 minutes post injection  Contiguous, low dose, axial CT sections were obtained from the skull base through the femurs   Intravenous contrast material was not utilized  This examination, like all CT scans performed in the [facility], was performed utilizing techniques to minimize radiation dose exposure, including the use of iterative reconstruction and automated exposure control  Fasting serum glucose: 111 mg/dl FINDINGS: VISUALIZED BRAIN:   No acute abnormalities are seen  HEAD/NECK:   There is a physiologic distribution of FDG  No FDG avid cervical adenopathy is seen  CT images: Small nonglucose avid right thyroid lobe nodule unchanged from prior PET scan CHEST:   -Posterior pleural-based right lower lobe nodule currently measuring 6 mm with questionable trace activity, SUV max 1 6  Also seen is a 2nd adjacent nodule which remains unchanged from prior exams such as 10/8/2020  (Images 3/137 and 138) - Additional smaller nodules for example in the lateral aspect of the left lung demonstrate no abnormal glucose activity  There is there is no glucose avid mediastinal or hilar adenopathy  CT images: Coronary artery calcification  Dftgci-l-Kyzu catheter    No pleural or pericardial effusion  ABDOMEN:   Hypodense lesion in the posterior aspect of the right hepatic lobe (3/159) appears unchanged in size since prior PET scan and demonstrates no abnormal glucose activity  No new intrahepatic hypermetabolic foci CT images: Cholelithiasis and large left renal cyst   Ventral adipose containing midline hernia without bowel involvement  No ascites  PELVIS: No FDG avid soft tissue lesions are seen  CT images: Rectosigmoid postsurgical changes without abnormal activity in the region of the anastomosis  No ascites  OSSEOUS STRUCTURES: No FDG avid lesions are seen  CT images: Retention cyst or polyp in the left maxillary sinus     Impression 1  The 6 mm right lower lobe pulmonary nodule is just below size criteria for accurate assessment with PET/CT  Although there is at most minimal glucose activity, the growth pattern remains very suspicious for a metastatic lesion  2  The other smaller pulmonary nodules in the lungs demonstrate no abnormal glucose activity or change since earlier CTs  3  There is no evidence of glucose avid metastatic disease within the abdomen, pelvis, skeleton or neck  4  Treated metastasis in the dome of the right hepatic lobe, remaining unchanged from prior PET scan and without abnormal glucose activity Workstation performed: [id number]      No Barium Swallow results available for this patient  Attestation signed by Adrian Eid MD at 8/4/2022 12:25 PM:  I reviewed the care furnished by the Resident during the visit  I was present in the office and personally saw and examined the patient Zackery Maxwell is a 24-year-old male who has a history of stage IV adenocarcinoma of the sigmoid with met task this is to the liver  His primary colorectal cancer has been controlled with surgical resection  He is status post 12 cycles of FOLFOX  He has an enlarging right lower lobe pulmonary nodule  Have personally reviewed all his imaging in PACS    Additionally, I have read all the most recent notes from Surgical Oncology and Colorectal surgery as well as Radiation Oncology  At this time, the right lower lobe nodule is a suspected metastatic focus  This appears to be the only site of disease in his body  At this time, I am recommending that he undergo VATS wedge resection for surgical resection of this nodule  This is a peripheral nodule and is easily amenable to wedge resection  We discussed the risks and benefits of the surgery and the patient wishes to proceed  Consent was obtained in the office today  We will plan to proceed next week      Anshu Rouse MD  Thoracic Surgery  (Available on [employer] Text)  Office: [phone]      Dragan Isabel MD 08/04/22

## 2022-08-04 NOTE — PRE-PROCEDURE INSTRUCTIONS
Pre-Surgery Instructions:   Medication Instructions    Multiple Vitamins-Minerals (MENS MULTIVITAMIN PO) instructed to stop    [facility] preop instructions reviewed with pt  Pt has surgical soap  ERAS reviewed

## 2022-08-09 ENCOUNTER — HOSPITAL ENCOUNTER (INPATIENT)
Facility: HOSPITAL | Age: 52
LOS: 1 days | Discharge: HOME | DRG: 164 | End: 2022-08-10
Attending: THORACIC SURGERY (CARDIOTHORACIC VASCULAR SURGERY) | Admitting: THORACIC SURGERY (CARDIOTHORACIC VASCULAR SURGERY)
Payer: MEDICARE

## 2022-08-09 ENCOUNTER — APPOINTMENT (INPATIENT)
Dept: RADIOLOGY | Facility: HOSPITAL | Age: 52
DRG: 164 | End: 2022-08-09
Payer: MEDICARE

## 2022-08-09 ENCOUNTER — ANESTHESIA (OUTPATIENT)
Dept: PERIOP | Facility: HOSPITAL | Age: 52
DRG: 164 | End: 2022-08-09
Payer: MEDICARE

## 2022-08-09 DIAGNOSIS — C79.9 METASTASIS FROM COLON CANCER (HCC): Primary | ICD-10-CM

## 2022-08-09 DIAGNOSIS — C78.01 MALIGNANT NEOPLASM METASTATIC TO RIGHT LUNG (HCC): ICD-10-CM

## 2022-08-09 DIAGNOSIS — C18.9 METASTASIS FROM COLON CANCER (HCC): Primary | ICD-10-CM

## 2022-08-09 LAB
ABO GROUP BLD: NORMAL
ANION GAP SERPL CALCULATED.3IONS-SCNC: 3 MMOL/L (ref 4–13)
BLD GP AB SCN SERPL QL: NEGATIVE
BUN SERPL-MCNC: 12 MG/DL (ref 5–25)
CALCIUM SERPL-MCNC: 8.2 MG/DL (ref 8.3–10.1)
CHLORIDE SERPL-SCNC: 110 MMOL/L (ref 96–108)
CO2 SERPL-SCNC: 26 MMOL/L (ref 21–32)
CREAT SERPL-MCNC: 1.07 MG/DL (ref 0.6–1.3)
ERYTHROCYTE [DISTWIDTH] IN BLOOD BY AUTOMATED COUNT: 13.1 % (ref 11.6–15.1)
GFR SERPL CREATININE-BSD FRML MDRD: 81 ML/MIN/1.73SQ M
GLUCOSE SERPL-MCNC: 104 MG/DL (ref 65–140)
HCT VFR BLD AUTO: 43 % (ref 36.5–49.3)
HGB BLD-MCNC: 14.4 G/DL (ref 12–17)
MAGNESIUM SERPL-MCNC: 2.2 MG/DL (ref 1.6–2.6)
MCH RBC QN AUTO: 32.8 PG (ref 26.8–34.3)
MCHC RBC AUTO-ENTMCNC: 33.5 G/DL (ref 31.4–37.4)
MCV RBC AUTO: 98 FL (ref 82–98)
PLATELET # BLD AUTO: 129 THOUSANDS/UL (ref 149–390)
PMV BLD AUTO: 11.1 FL (ref 8.9–12.7)
POTASSIUM SERPL-SCNC: 4 MMOL/L (ref 3.5–5.3)
RBC # BLD AUTO: 4.39 MILLION/UL (ref 3.88–5.62)
RH BLD: NEGATIVE
SODIUM SERPL-SCNC: 139 MMOL/L (ref 135–147)
SPECIMEN EXPIRATION DATE: NORMAL
WBC # BLD AUTO: 4.16 THOUSAND/UL (ref 4.31–10.16)

## 2022-08-09 PROCEDURE — 88341 IMHCHEM/IMCYTCHM EA ADD ANTB: CPT | Performed by: PATHOLOGY

## 2022-08-09 PROCEDURE — C9290 INJ, BUPIVACAINE LIPOSOME: HCPCS | Performed by: THORACIC SURGERY (CARDIOTHORACIC VASCULAR SURGERY)

## 2022-08-09 PROCEDURE — 88342 IMHCHEM/IMCYTCHM 1ST ANTB: CPT | Performed by: PATHOLOGY

## 2022-08-09 PROCEDURE — 88307 TISSUE EXAM BY PATHOLOGIST: CPT | Performed by: PATHOLOGY

## 2022-08-09 PROCEDURE — 85027 COMPLETE CBC AUTOMATED: CPT | Performed by: PHYSICIAN ASSISTANT

## 2022-08-09 PROCEDURE — 0W9930Z DRAINAGE OF RIGHT PLEURAL CAVITY WITH DRAINAGE DEVICE, PERCUTANEOUS APPROACH: ICD-10-PCS | Performed by: THORACIC SURGERY (CARDIOTHORACIC VASCULAR SURGERY)

## 2022-08-09 PROCEDURE — 71045 X-RAY EXAM CHEST 1 VIEW: CPT

## 2022-08-09 PROCEDURE — 83735 ASSAY OF MAGNESIUM: CPT | Performed by: PHYSICIAN ASSISTANT

## 2022-08-09 PROCEDURE — 80048 BASIC METABOLIC PNL TOTAL CA: CPT | Performed by: PHYSICIAN ASSISTANT

## 2022-08-09 PROCEDURE — 32666 THORACOSCOPY W/WEDGE RESECT: CPT | Performed by: THORACIC SURGERY (CARDIOTHORACIC VASCULAR SURGERY)

## 2022-08-09 PROCEDURE — 86901 BLOOD TYPING SEROLOGIC RH(D): CPT | Performed by: THORACIC SURGERY (CARDIOTHORACIC VASCULAR SURGERY)

## 2022-08-09 PROCEDURE — 86850 RBC ANTIBODY SCREEN: CPT | Performed by: THORACIC SURGERY (CARDIOTHORACIC VASCULAR SURGERY)

## 2022-08-09 PROCEDURE — 86900 BLOOD TYPING SEROLOGIC ABO: CPT | Performed by: THORACIC SURGERY (CARDIOTHORACIC VASCULAR SURGERY)

## 2022-08-09 PROCEDURE — NC001 PR NO CHARGE: Performed by: THORACIC SURGERY (CARDIOTHORACIC VASCULAR SURGERY)

## 2022-08-09 PROCEDURE — 0BTF4ZZ RESECTION OF RIGHT LOWER LUNG LOBE, PERCUTANEOUS ENDOSCOPIC APPROACH: ICD-10-PCS | Performed by: THORACIC SURGERY (CARDIOTHORACIC VASCULAR SURGERY)

## 2022-08-09 PROCEDURE — 0BJ08ZZ INSPECTION OF TRACHEOBRONCHIAL TREE, VIA NATURAL OR ARTIFICIAL OPENING ENDOSCOPIC: ICD-10-PCS | Performed by: THORACIC SURGERY (CARDIOTHORACIC VASCULAR SURGERY)

## 2022-08-09 RX ORDER — FENTANYL CITRATE 50 UG/ML
INJECTION, SOLUTION INTRAMUSCULAR; INTRAVENOUS AS NEEDED
Status: DISCONTINUED | OUTPATIENT
Start: 2022-08-09 | End: 2022-08-09

## 2022-08-09 RX ORDER — ONDANSETRON 2 MG/ML
4 INJECTION INTRAMUSCULAR; INTRAVENOUS EVERY 6 HOURS PRN
Status: DISCONTINUED | OUTPATIENT
Start: 2022-08-09 | End: 2022-08-10 | Stop reason: HOSPADM

## 2022-08-09 RX ORDER — PANTOPRAZOLE SODIUM 40 MG/1
40 TABLET, DELAYED RELEASE ORAL
Status: DISCONTINUED | OUTPATIENT
Start: 2022-08-10 | End: 2022-08-10 | Stop reason: HOSPADM

## 2022-08-09 RX ORDER — BUPIVACAINE HYDROCHLORIDE 2.5 MG/ML
INJECTION, SOLUTION EPIDURAL; INFILTRATION; INTRACAUDAL AS NEEDED
Status: DISCONTINUED | OUTPATIENT
Start: 2022-08-09 | End: 2022-08-09 | Stop reason: HOSPADM

## 2022-08-09 RX ORDER — SODIUM CHLORIDE 9 MG/ML
INJECTION, SOLUTION INTRAVENOUS CONTINUOUS PRN
Status: DISCONTINUED | OUTPATIENT
Start: 2022-08-09 | End: 2022-08-09

## 2022-08-09 RX ORDER — ACETAMINOPHEN 325 MG/1
975 TABLET ORAL ONCE
Status: COMPLETED | OUTPATIENT
Start: 2022-08-09 | End: 2022-08-09

## 2022-08-09 RX ORDER — OXYCODONE HYDROCHLORIDE 5 MG/1
5 TABLET ORAL EVERY 4 HOURS PRN
Status: DISCONTINUED | OUTPATIENT
Start: 2022-08-09 | End: 2022-08-10 | Stop reason: HOSPADM

## 2022-08-09 RX ORDER — FENTANYL CITRATE/PF 50 MCG/ML
50 SYRINGE (ML) INJECTION
Status: DISCONTINUED | OUTPATIENT
Start: 2022-08-09 | End: 2022-08-09 | Stop reason: HOSPADM

## 2022-08-09 RX ORDER — ENOXAPARIN SODIUM 100 MG/ML
40 INJECTION SUBCUTANEOUS DAILY
Status: DISCONTINUED | OUTPATIENT
Start: 2022-08-09 | End: 2022-08-10 | Stop reason: HOSPADM

## 2022-08-09 RX ORDER — EPHEDRINE SULFATE 50 MG/ML
INJECTION INTRAVENOUS AS NEEDED
Status: DISCONTINUED | OUTPATIENT
Start: 2022-08-09 | End: 2022-08-09

## 2022-08-09 RX ORDER — CEFAZOLIN SODIUM 1 G/50ML
SOLUTION INTRAVENOUS AS NEEDED
Status: DISCONTINUED | OUTPATIENT
Start: 2022-08-09 | End: 2022-08-09

## 2022-08-09 RX ORDER — ONDANSETRON 2 MG/ML
INJECTION INTRAMUSCULAR; INTRAVENOUS AS NEEDED
Status: DISCONTINUED | OUTPATIENT
Start: 2022-08-09 | End: 2022-08-09

## 2022-08-09 RX ORDER — CELECOXIB 100 MG/1
100 CAPSULE ORAL 2 TIMES DAILY
Status: DISCONTINUED | OUTPATIENT
Start: 2022-08-09 | End: 2022-08-10 | Stop reason: HOSPADM

## 2022-08-09 RX ORDER — SODIUM CHLORIDE, SODIUM LACTATE, POTASSIUM CHLORIDE, CALCIUM CHLORIDE 600; 310; 30; 20 MG/100ML; MG/100ML; MG/100ML; MG/100ML
60 INJECTION, SOLUTION INTRAVENOUS CONTINUOUS
Status: DISCONTINUED | OUTPATIENT
Start: 2022-08-09 | End: 2022-08-10

## 2022-08-09 RX ORDER — POLYETHYLENE GLYCOL 3350 17 G/17G
17 POWDER, FOR SOLUTION ORAL DAILY PRN
Status: DISCONTINUED | OUTPATIENT
Start: 2022-08-09 | End: 2022-08-10 | Stop reason: HOSPADM

## 2022-08-09 RX ORDER — HYDROMORPHONE HCL/PF 1 MG/ML
0.5 SYRINGE (ML) INJECTION
Status: DISCONTINUED | OUTPATIENT
Start: 2022-08-09 | End: 2022-08-09 | Stop reason: HOSPADM

## 2022-08-09 RX ORDER — PROPOFOL 10 MG/ML
INJECTION, EMULSION INTRAVENOUS AS NEEDED
Status: DISCONTINUED | OUTPATIENT
Start: 2022-08-09 | End: 2022-08-09

## 2022-08-09 RX ORDER — PROPOFOL 10 MG/ML
INJECTION, EMULSION INTRAVENOUS CONTINUOUS PRN
Status: DISCONTINUED | OUTPATIENT
Start: 2022-08-09 | End: 2022-08-09

## 2022-08-09 RX ORDER — CELECOXIB 200 MG/1
200 CAPSULE ORAL ONCE
Status: COMPLETED | OUTPATIENT
Start: 2022-08-09 | End: 2022-08-09

## 2022-08-09 RX ORDER — LIDOCAINE HYDROCHLORIDE 10 MG/ML
INJECTION, SOLUTION EPIDURAL; INFILTRATION; INTRACAUDAL; PERINEURAL AS NEEDED
Status: DISCONTINUED | OUTPATIENT
Start: 2022-08-09 | End: 2022-08-09

## 2022-08-09 RX ORDER — GABAPENTIN 300 MG/1
300 CAPSULE ORAL 3 TIMES DAILY
Status: DISCONTINUED | OUTPATIENT
Start: 2022-08-09 | End: 2022-08-10 | Stop reason: HOSPADM

## 2022-08-09 RX ORDER — DOCUSATE SODIUM 100 MG/1
100 CAPSULE, LIQUID FILLED ORAL 2 TIMES DAILY
Status: DISCONTINUED | OUTPATIENT
Start: 2022-08-09 | End: 2022-08-10 | Stop reason: HOSPADM

## 2022-08-09 RX ORDER — ROCURONIUM BROMIDE 10 MG/ML
INJECTION, SOLUTION INTRAVENOUS AS NEEDED
Status: DISCONTINUED | OUTPATIENT
Start: 2022-08-09 | End: 2022-08-09

## 2022-08-09 RX ORDER — SODIUM CHLORIDE, SODIUM LACTATE, POTASSIUM CHLORIDE, CALCIUM CHLORIDE 600; 310; 30; 20 MG/100ML; MG/100ML; MG/100ML; MG/100ML
125 INJECTION, SOLUTION INTRAVENOUS CONTINUOUS
Status: DISCONTINUED | OUTPATIENT
Start: 2022-08-09 | End: 2022-08-09

## 2022-08-09 RX ORDER — GABAPENTIN 300 MG/1
600 CAPSULE ORAL ONCE
Status: COMPLETED | OUTPATIENT
Start: 2022-08-09 | End: 2022-08-09

## 2022-08-09 RX ORDER — GLYCOPYRROLATE 0.2 MG/ML
INJECTION INTRAMUSCULAR; INTRAVENOUS AS NEEDED
Status: DISCONTINUED | OUTPATIENT
Start: 2022-08-09 | End: 2022-08-09

## 2022-08-09 RX ORDER — HYDROMORPHONE HCL/PF 1 MG/ML
0.5 SYRINGE (ML) INJECTION
Status: DISCONTINUED | OUTPATIENT
Start: 2022-08-09 | End: 2022-08-10 | Stop reason: HOSPADM

## 2022-08-09 RX ORDER — CEFAZOLIN SODIUM 1 G/50ML
1000 SOLUTION INTRAVENOUS ONCE
Status: DISCONTINUED | OUTPATIENT
Start: 2022-08-09 | End: 2022-08-09 | Stop reason: HOSPADM

## 2022-08-09 RX ORDER — SENNOSIDES 8.6 MG
1 TABLET ORAL DAILY
Status: DISCONTINUED | OUTPATIENT
Start: 2022-08-09 | End: 2022-08-10 | Stop reason: HOSPADM

## 2022-08-09 RX ORDER — ACETAMINOPHEN 325 MG/1
975 TABLET ORAL EVERY 6 HOURS
Status: DISCONTINUED | OUTPATIENT
Start: 2022-08-09 | End: 2022-08-10 | Stop reason: HOSPADM

## 2022-08-09 RX ORDER — HEPARIN SODIUM 5000 [USP'U]/ML
5000 INJECTION, SOLUTION INTRAVENOUS; SUBCUTANEOUS
Status: COMPLETED | OUTPATIENT
Start: 2022-08-09 | End: 2022-08-09

## 2022-08-09 RX ORDER — MIDAZOLAM HYDROCHLORIDE 2 MG/2ML
INJECTION, SOLUTION INTRAMUSCULAR; INTRAVENOUS AS NEEDED
Status: DISCONTINUED | OUTPATIENT
Start: 2022-08-09 | End: 2022-08-09

## 2022-08-09 RX ORDER — ONDANSETRON 2 MG/ML
4 INJECTION INTRAMUSCULAR; INTRAVENOUS ONCE AS NEEDED
Status: DISCONTINUED | OUTPATIENT
Start: 2022-08-09 | End: 2022-08-09 | Stop reason: HOSPADM

## 2022-08-09 RX ORDER — DEXAMETHASONE SODIUM PHOSPHATE 10 MG/ML
INJECTION, SOLUTION INTRAMUSCULAR; INTRAVENOUS AS NEEDED
Status: DISCONTINUED | OUTPATIENT
Start: 2022-08-09 | End: 2022-08-09

## 2022-08-09 RX ADMIN — FENTANYL CITRATE 100 MCG: 50 INJECTION INTRAMUSCULAR; INTRAVENOUS at 12:36

## 2022-08-09 RX ADMIN — PROPOFOL 50 MG: 10 INJECTION, EMULSION INTRAVENOUS at 12:37

## 2022-08-09 RX ADMIN — LIDOCAINE HYDROCHLORIDE 100 MG: 10 INJECTION, SOLUTION EPIDURAL; INFILTRATION; INTRACAUDAL at 12:36

## 2022-08-09 RX ADMIN — GLYCOPYRROLATE 0.2 MG: 0.2 INJECTION INTRAMUSCULAR; INTRAVENOUS at 12:32

## 2022-08-09 RX ADMIN — EPHEDRINE SULFATE 5 MG: 50 INJECTION INTRAVENOUS at 12:36

## 2022-08-09 RX ADMIN — MIDAZOLAM 2 MG: 1 INJECTION INTRAMUSCULAR; INTRAVENOUS at 12:30

## 2022-08-09 RX ADMIN — DOCUSATE SODIUM 100 MG: 100 CAPSULE, LIQUID FILLED ORAL at 20:39

## 2022-08-09 RX ADMIN — ACETAMINOPHEN 975 MG: 325 TABLET ORAL at 20:38

## 2022-08-09 RX ADMIN — ROCURONIUM BROMIDE 50 MG: 50 INJECTION, SOLUTION INTRAVENOUS at 12:37

## 2022-08-09 RX ADMIN — EPHEDRINE SULFATE 10 MG: 50 INJECTION INTRAVENOUS at 12:45

## 2022-08-09 RX ADMIN — DEXAMETHASONE SODIUM PHOSPHATE 10 MG: 10 INJECTION, SOLUTION INTRAMUSCULAR; INTRAVENOUS at 12:37

## 2022-08-09 RX ADMIN — CELECOXIB 100 MG: 100 CAPSULE ORAL at 20:39

## 2022-08-09 RX ADMIN — SUGAMMADEX 200 MG: 100 INJECTION, SOLUTION INTRAVENOUS at 13:47

## 2022-08-09 RX ADMIN — SODIUM CHLORIDE, SODIUM LACTATE, POTASSIUM CHLORIDE, AND CALCIUM CHLORIDE 250 ML: .6; .31; .03; .02 INJECTION, SOLUTION INTRAVENOUS at 17:15

## 2022-08-09 RX ADMIN — GABAPENTIN 300 MG: 300 CAPSULE ORAL at 20:39

## 2022-08-09 RX ADMIN — CEFAZOLIN SODIUM 1000 MG: 1 SOLUTION INTRAVENOUS at 12:39

## 2022-08-09 RX ADMIN — SODIUM CHLORIDE: 0.9 INJECTION, SOLUTION INTRAVENOUS at 12:39

## 2022-08-09 RX ADMIN — SODIUM CHLORIDE, SODIUM LACTATE, POTASSIUM CHLORIDE, AND CALCIUM CHLORIDE 60 ML/HR: .6; .31; .03; .02 INJECTION, SOLUTION INTRAVENOUS at 18:04

## 2022-08-09 RX ADMIN — CELECOXIB 200 MG: 200 CAPSULE ORAL at 09:50

## 2022-08-09 RX ADMIN — PROPOFOL 120 MCG/KG/MIN: 10 INJECTION, EMULSION INTRAVENOUS at 12:39

## 2022-08-09 RX ADMIN — ONDANSETRON HYDROCHLORIDE 4 MG: 2 INJECTION, SOLUTION INTRAMUSCULAR; INTRAVENOUS at 13:39

## 2022-08-09 RX ADMIN — GABAPENTIN 600 MG: 300 CAPSULE ORAL at 09:50

## 2022-08-09 RX ADMIN — Medication 30 MG: at 12:36

## 2022-08-09 RX ADMIN — ACETAMINOPHEN 975 MG: 325 TABLET ORAL at 09:50

## 2022-08-09 RX ADMIN — PROPOFOL 150 MG: 10 INJECTION, EMULSION INTRAVENOUS at 12:36

## 2022-08-09 RX ADMIN — LIDOCAINE HYDROCHLORIDE 30 MG: 10 INJECTION, SOLUTION EPIDURAL; INFILTRATION; INTRACAUDAL; PERINEURAL at 13:37

## 2022-08-09 RX ADMIN — SODIUM CHLORIDE, SODIUM LACTATE, POTASSIUM CHLORIDE, AND CALCIUM CHLORIDE 125 ML/HR: .6; .31; .03; .02 INJECTION, SOLUTION INTRAVENOUS at 09:55

## 2022-08-09 RX ADMIN — HEPARIN SODIUM 5000 UNITS: 5000 INJECTION INTRAVENOUS; SUBCUTANEOUS at 10:08

## 2022-08-09 NOTE — INTERVAL H&P NOTE
H&P reviewed  After examining the patient I find no changes in the patients condition since the H&P had been written  Vitals:    08/09/22 0938   BP: 107/76   Pulse: 59   Resp: 16   Temp: 97 9 °F (36 6 °C)   SpO2: 97%     Safe to proceed   R VATS wedge resection    Vijay Arnold MD  Thoracic Surgery  (Available on [employer] Text)  Office: [phone]

## 2022-08-09 NOTE — PERIOPERATIVE NURSING NOTE
SBP 90s, Dr Rosie Deluna aware, 250cc LR given  Pt asymptomatic, okay to send to floor per anesthesia

## 2022-08-09 NOTE — ANESTHESIA PREPROCEDURE EVALUATION
Procedure:  THORACOSCOPY VIDEO ASSISTED SURGERY (VATS) (Right Chest)  RESECTION WEDGE LUNG (Right Chest)  BRONCHOSCOPY FLEXIBLE (N/A Bronchus)    Relevant Problems   GI/HEPATIC   (+) Colon cancer metastasized to liver (HCC)   (+) GERD (gastroesophageal reflux disease)      /RENAL   (+) Other hydronephrosis      Respiratory   (+) Malignant neoplasm metastatic to right lung (HCC)      Other   (+) 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 (HCC)   (+) Vapes nicotine containing substance      This is a 54 y o  male who presents for THORACOSCOPY VIDEO ASSISTED SURGERY (VATS) (Right Chest)  RESECTION WEDGE LUNG (Right Chest)  BRONCHOSCOPY FLEXIBLE (N/A Bronchus)  -- VITALS --  Ht 5' 7" (1 702 m)   Wt 72 6 kg (160 lb)   BMI 25 06 kg/m²   BP Readings from Last 3 Encounters:   08/04/22 106/70   07/21/22 118/78   07/19/22 100/64     -- LABS --  Results from Last 12 Months   Lab Units 07/18/22  1530 07/13/22  1755 03/24/22  1533   SODIUM mmol/L 138  --  141   POTASSIUM mmol/L 3 6  --  3 8   CHLORIDE mmol/L 105  --  104   CO2 mmol/L 28  --  26   ANION GAP mmol/L 5  --  11   BUN mg/dL 11 14 18   CREATININE mg/dL 0 99 0 96 1 19   CALCIUM mg/dL 9 3  --  8 7   GLUCOSE RANDOM mg/dL 102  --  102   AST U/L 18  --  17   ALT U/L 16  --  28   ALK PHOS U/L 41  --  49   TOTAL BILIRUBIN mg/dL 0 36  --  0 29   ALBUMIN g/dL 4 3  --  4 0         Results from Last 12 Months   Lab Units 07/18/22  1530 03/24/22  1533   WBC Thousand/uL 5 80 6 77   HEMOGLOBIN g/dL 13 8 14 5   HEMATOCRIT % 39 2 41 9   PLATELETS Thousands/uL 162 180         - Coags:      -- EKG --  Normal sinus rhythm with sinus arrhythmia  When compared with ECG of 29-MAY-2018 08:42,  No significant change was found  Confirmed by [facility] ([id number]) on 5/29/2020 4:05:32 PM    -- ECHO/RELEVANT IMAGING --  No Echo, No PFT    7/6/2022 PET  IMPRESSION:     1  The 6 mm right lower lobe pulmonary nodule is just below size criteria for accurate assessment with PET/CT    Although there is at most minimal glucose activity, the growth pattern remains very suspicious for a metastatic lesion  2  The other smaller pulmonary nodules in the lungs demonstrate no abnormal glucose activity or change since earlier CTs  3  There is no evidence of glucose avid metastatic disease within the abdomen, pelvis, skeleton or neck  4  Treated metastasis in the dome of the right hepatic lobe, remaining unchanged from prior PET scan and without abnormal glucose activity      7/14/2022 CT chest  FINDINGS:     LUNGS:  7 mm juxtapleural posterior basal right lower lobe nodule (3/81), increased from 5 mm in March 2022, 2 mm in September 2021, and new since January 2021      Several additional bilateral 4 mm and smaller nodules, marked with horizontal arrows on series 3, stable since at least January 2021      Mild paraseptal emphysema      AIRWAYS: No significant filling defects      PLEURA:  Unremarkable      HEART/GREAT VESSELS:  Normal heart size  Mild coronary artery calcification indicating atherosclerotic heart disease      MEDIASTINUM AND MAHENDRA:  Right port at cavoatrial junction      CHEST WALL AND LOWER NECK: Stable 9 mm right thyroid nodule  No follow-up needed      UPPER ABDOMEN:  Stable 2 7 cm hypodensity in the dome of the liver at the site of treated tumor (2/54)  Left parapelvic cysts      OSSEOUS STRUCTURES: Mild degenerative disease in the spine      IMPRESSION:     Continued enlargement of right lower lobe nodule, now 7 mm, suspicious for metastatic disease  -- ANESTHESIA RISK ASSESSMENT / QUESTIONNAIRE --   (1) Personal or family history of anesthesia related complications: n   (2) Relevant airway history: Direct laryngoscopy, Stylet; Type: Cuffed, Oral, Inflated, Hi-Lo; Tube Size: 8 mm;  Laryngoscope: Cathryne Corey Size: 2; Location: Oral; Grade View: 1; Insertion Attempts: 1;   (3) Patient took the following medications this morning: none   (4) Patient meets ASA NPO guidelines: y   (5) Cardiac assessment       (a) Does the patient have severe, modifiable cardiac conditions including MI (w/i 14 dys of angioplasty, 1 mo after BMS, 2 mo after no intervention, 3-6 mo after LYN), decompensated CHF, decompensated valvular Dz, unstable arrhythmias, or unstable pulmonary HTN?: n      (b) Calculate Danis's RCRI (Surgical risk, ICM, CHF, Cr >2, CVA/TIA, IDDM): 1      (c) Quote MACE risk based on RCRI: 0= 0 4%; 1= 0 6%; 2= 7%; 3= 11%      (d) If RCRI >1, is exercise tolerance METs >4?: See above    -- ANESTHESIA SHARED DECISION MAKING --  Benefits discussed (Christiano Joseph 81 [id number], PMID [id number]): Amnesia, Analgesia, Specialized anesthesiology support reduces mortality for major surgery by about 100-fold  Mortality risks associated with anesthesia discussed (PMID [id number]): ASA-PS I 1:250,000; ASA-PS II 1:20,000; ASA-PS III 1:3,500; ASA-PS IV 1:1,800  Risks by organ systems discussed included were but not limited to (PMID [id number]):  (1) Drug reactions / Allergic reactions / Overdose adverse events  (2) Pulmonary / Airway adverse events: Dental injury, Throat pain, Hypoxia, Prolonged intubation  (3) Cardiac and Vascular adverse events: PeriOp MI or other MACE  Risk of bleeding or infection related to relevant vascular access (Peripheral, Central, Arterial, or other line placement)  Risks associated with bypass circuits if relevant  (4) Neuro adverse events: IntraOp awareness, Stroke, POCD  Risk of bleeding or infection associated with neuraxial anesthesia if relevant  (5) FEN / GI / Vomiting risks: Aspiration, PONV  Physical Exam    Airway    Mallampati score: II  TM Distance: >3 FB  Neck ROM: full     Dental       Cardiovascular      Pulmonary      Other Findings        Anesthesia Plan  ASA Score- 3     Anesthesia Type- general with ASA Monitors  Additional Monitors: arterial line  Airway Plan: ETT  Comment: 37F Lft LEON + Bronchoscope    Plan Factors-    Chart reviewed  EKG reviewed   Imaging results reviewed  Existing labs reviewed  Induction- intravenous  Postoperative Plan- Plan for postoperative opioid use   Planned trial extubation    Informed Consent-

## 2022-08-09 NOTE — ANESTHESIA POSTPROCEDURE EVALUATION
Post-Op Assessment Note    CV Status:  Stable  Pain Score: 0    Pain management: adequate     Mental Status:  Alert and awake   Hydration Status:  Euvolemic   PONV Controlled:  Controlled   Airway Patency:  Patent and adequate      Post Op Vitals Reviewed: Yes      Staff: CRNA         No complications documented      BP   111/68   Temp   97   Pulse  97   Resp   18   SpO2   99

## 2022-08-09 NOTE — ANESTHESIA POSTPROCEDURE EVALUATION
Post-Op Assessment Note             Reason for prolonged intubation > 24 hours:  Pt not intubatedReason for prolonged intubation > 48 hours:  Pt not intubated

## 2022-08-09 NOTE — OP NOTE
OPERATIVE REPORT  PATIENT NAME: Maria De Jesus Ang    :  1970  MRN: [id number]  Pt Location: BE OR ROOM 04    SURGERY DATE: 2022    Surgeon(s) and Role:     * Nicolas Castelan MD - Primary     * Alva Law PA-C - Assisting    Preop Diagnosis:  Malignant neoplasm metastatic to right lung ([address] ) [C78 01]    Post-Op Diagnosis Codes:     * Malignant neoplasm metastatic to right lung ([address] ) [C78 01]    Procedure(s) (LRB):  THORACOSCOPY VIDEO ASSISTED SURGERY (VATS) (Right)  RESECTION WEDGE LUNG, THERAPEUTIC (Right)  BRONCHOSCOPY FLEXIBLE (N/A)    Specimen(s):  ID Type Source Tests Collected by Time Destination   1 : RIGHT LOWER LOBE WEDGE Tissue Lung TISSUE EXAM Nicolas Castelan MD 2022 1323        Estimated Blood Loss:   Minimal    Drains:  * No LDAs found *    Anesthesia Type:   General    Operative Indications:  Malignant neoplasm metastatic to right lung ([address] ) [C78 01]    Operative Findings:  Palpable nodule in the right lower lobe    Complications:   None    Procedure and Technique:  INDICATION:       Maria De Jesus Ang is a 54 y o  male with a past medical history of colorectal cancer  He has an enlarging nodule in his right lower lobe that is presumed to be metastatic colorectal cancer  He was sent to thoracic surgery for evaluation  He was deemed a adequate candidate for surgery    The potential risks and benefits of the surgery were explained to the patient and he elected to proceed  All of his questions were answered  OPERATION IN DETAIL:    The patient was correctly identified by name and medical record number in the holding area and brought to the operative suite, where he was placed supine on the operative table  After satisfactory induction of general endotracheal anesthesia, a flexible bronchoscope was passed through the endotracheal tube visualizing the distal trachea, errol, right and left main stem bronchi, including all of the primary and secondary divisions   No evidence of Vital Signs Last 24 Hrs  T(C): 36.9 (21 Oct 2019 05:41), Max: 37.1 (21 Oct 2019 01:39)  T(F): 98.5 (21 Oct 2019 05:41), Max: 98.7 (21 Oct 2019 01:39)  HR: 68 (21 Oct 2019 05:41) (64 - 72)  BP: 146/66 (21 Oct 2019 05:41) (134/61 - 148/65)  BP(mean): --  RR: 17 (21 Oct 2019 05:41) (16 - 18)  SpO2: 98% (21 Oct 2019 05:41) (96% - 100%)    I&O's Detail    20 Oct 2019 07:01  -  21 Oct 2019 07:00  --------------------------------------------------------  IN:    dextrose 5% + sodium chloride 0.45%: 2200 mL    IV PiggyBack: 250 mL  Total IN: 2450 mL    OUT:    Nasoenteral Tube: 400 mL    Voided: 2200 mL  Total OUT: 2600 mL    Total NET: -150 mL                                8.5    4.61  )-----------( 203      ( 20 Oct 2019 14:26 )             26.2       10-20    141  |  104  |  7   ----------------------------<  132<H>  3.6   |  27  |  0.43<L>    Ca    8.0<L>      20 Oct 2019 05:35  Phos  2.3     10-20  Mg     1.9     10-20    Incision clear    Pt. passing flatus and BM"s            PLAN:  Clamp NG to remove later this AM  Ambulate any endobronchial tumor was noted although there was a significant amount of sputum present in the airways  No suspicion or identified risk for TB or other airborne infectious disease; bronchoscopy procedure being performed for diagnostic purposes  Flexible bronchoscopy was then terminated and the scope was withdrawn  The patient was positioned in the left lateral decubitus position, prepped and draped in the usual fashion  A time-out was performed to confirm procedure and laterality  A 2cm incision was made in the 7th intercostal space, in the posterior axillary line  This was carried down to the pleura and the thoracic cavity was entered  The thoracoscope was introduced and the cavity explored  A working port, about 4cm in length, was then created in the 5th intercostal space in the anterior axillary line  An intercostal nerve block was then applied using Exparel in rib spaces 3-10  The lung was grasped with a ring clamp grasper  Using digital palpation, the nodule was identified in the right lower lobe  This was then resected using serial firings of a tissue load stapler  The staple line was hemostatic  The nodule was confirmed to be in the specimen and it was sent off the table to pathology  The resection bed and all dissected areas were examined and hemostasis was deemed as adequate  A 24F chest tube was inserted through the port site  The lung was re-expanded under direct visualization  The chest tube was secured to the skin with 0 prolene sutures  The working port incision was closed with serial Vicryl sutures and then closed with a subcuticular monofilament suture  The other sites were closed with Vicryl sutures and then closed with a subcuticular monofilament suture  Dermabond was applied to the wound  At the end of the procedure, the instrument, sponge and needle counts were confirmed to be correct x2  The patient tolerated the procedure well and was delivered to the PACU       I was present for the entire procedure, A qualified resident physician was not available and A physician assistant was required during the procedure for retraction tissue handling,dissection and suturing  There was no resident available and it is not possible to do this case without the assistance of a physician assistant      Patient Disposition:  PACU  and extubated and stable      SIGNATURE: Gerardo Mata MD  DATE: August 9, 2022  TIME: 1:43 PM

## 2022-08-10 ENCOUNTER — APPOINTMENT (INPATIENT)
Dept: RADIOLOGY | Facility: HOSPITAL | Age: 52
DRG: 164 | End: 2022-08-10
Payer: MEDICARE

## 2022-08-10 VITALS
RESPIRATION RATE: 18 BRPM | DIASTOLIC BLOOD PRESSURE: 64 MMHG | TEMPERATURE: 98.1 F | OXYGEN SATURATION: 99 % | WEIGHT: 163.36 LBS | BODY MASS INDEX: 25.64 KG/M2 | HEART RATE: 52 BPM | SYSTOLIC BLOOD PRESSURE: 111 MMHG | HEIGHT: 67 IN

## 2022-08-10 PROCEDURE — 99024 POSTOP FOLLOW-UP VISIT: CPT | Performed by: THORACIC SURGERY (CARDIOTHORACIC VASCULAR SURGERY)

## 2022-08-10 PROCEDURE — 71046 X-RAY EXAM CHEST 2 VIEWS: CPT

## 2022-08-10 PROCEDURE — 99024 POSTOP FOLLOW-UP VISIT: CPT | Performed by: PHYSICIAN ASSISTANT

## 2022-08-10 RX ORDER — OXYCODONE HYDROCHLORIDE 5 MG/1
5 TABLET ORAL EVERY 4 HOURS PRN
Qty: 30 TABLET | Refills: 0 | Status: SHIPPED | OUTPATIENT
Start: 2022-08-10 | End: 2022-08-20

## 2022-08-10 RX ORDER — IBUPROFEN 600 MG/1
600 TABLET ORAL
Qty: 21 TABLET | Refills: 0 | Status: SHIPPED | OUTPATIENT
Start: 2022-08-10 | End: 2023-02-16

## 2022-08-10 RX ORDER — GABAPENTIN 300 MG/1
300 CAPSULE ORAL 3 TIMES DAILY
Qty: 63 CAPSULE | Refills: 0 | Status: SHIPPED | OUTPATIENT
Start: 2022-08-10 | End: 2023-02-16

## 2022-08-10 RX ORDER — DOCUSATE SODIUM 100 MG/1
100 CAPSULE, LIQUID FILLED ORAL 2 TIMES DAILY
Refills: 0
Start: 2022-08-10 | End: 2023-02-16

## 2022-08-10 RX ORDER — ACETAMINOPHEN 325 MG/1
650 TABLET ORAL EVERY 6 HOURS
Qty: 56 TABLET | Refills: 0
Start: 2022-08-10 | End: 2022-08-17

## 2022-08-10 RX ADMIN — DOCUSATE SODIUM 100 MG: 100 CAPSULE, LIQUID FILLED ORAL at 08:34

## 2022-08-10 RX ADMIN — ACETAMINOPHEN 975 MG: 325 TABLET ORAL at 08:33

## 2022-08-10 RX ADMIN — GABAPENTIN 300 MG: 300 CAPSULE ORAL at 08:33

## 2022-08-10 RX ADMIN — OXYCODONE HYDROCHLORIDE 5 MG: 5 TABLET ORAL at 00:38

## 2022-08-10 RX ADMIN — ENOXAPARIN SODIUM 40 MG: 40 INJECTION SUBCUTANEOUS at 08:34

## 2022-08-10 RX ADMIN — PANTOPRAZOLE SODIUM 40 MG: 40 TABLET, DELAYED RELEASE ORAL at 05:56

## 2022-08-10 RX ADMIN — ACETAMINOPHEN 975 MG: 325 TABLET ORAL at 03:02

## 2022-08-10 RX ADMIN — CELECOXIB 100 MG: 100 CAPSULE ORAL at 08:33

## 2022-08-10 RX ADMIN — SENNOSIDES 8.6 MG: 8.6 TABLET, FILM COATED ORAL at 08:33

## 2022-08-10 NOTE — DISCHARGE INSTRUCTIONS
Gently wash your incisions daily with soap and water, do not soak in a tub  Do not apply any lotions, creams, or ointments to incisions  No lifting over 10 lbs or strenuous exercise  No driving until seen at your post operative visit  The blue stitch will be removed at your post operative visit  Please obtain a pa/lat chest xray at a [facility] within 3 days of your follow up visit  Please call the office first if you have any questions or concerns during your post op period, prior to going to the emergency room or urgent care  You will be prescribed 3 medications to take at home, that should be taken exactly as directed  These have been shown to greatly improve post-operative pain:     Gabapentin 300mg tablet  Take 1 tablet by mouth 3x a day for 3 weeks  2    Tylenol 325mg tablet  Take 2 tablets by mouth, every 6 hours, for 1 week  3    Ibuprofen 200mg tablet  Take 3 tablets by mouth, 3x a day with meals for 1 week  You will also be prescribed a medication that you may take on an as needed basis:  Oxycodone 5mg tablet  Take 1-2 tablets every 4 hours as needed for pain

## 2022-08-10 NOTE — UTILIZATION REVIEW
Initial Clinical Review    Elective IP surgical procedure  Age/Sex: 54 y o  male  Surgery Date: 8/09/2022  Procedure:   THORACOSCOPY VIDEO ASSISTED SURGERY (VATS) (Right)  RESECTION WEDGE LUNG, THERAPEUTIC (Right)  BRONCHOSCOPY FLEXIBLE   Anesthesia: General  Operative Findings: Palpable nodule in the right lower lobe    POD#1 Progress Note: Pt reports pain is well controlled, tolerating regular diet without nausea or emesis, using incentive spirometry, denies any chest pain or sob  Currently on 2L O2 nc  R CT water seal, no air leak present, 50 cc serosanguineous since the OR  UOP 1000  R CT removed  Will check a pa/lat chest x-ray  Incentive spirometry  Lovenox sq, SCDs  Pain control   Encourage ambulation        Admission Orders: Date/Time/Statement:   Admission Orders (From admission, onward)     Ordered        08/09/22 1411  Inpatient Admission  Once                      Orders Placed This Encounter   Procedures    Inpatient Admission     Standing Status:   Standing     Number of Occurrences:   1     Order Specific Question:   Level of Care     Answer:   Med Surg [16]     Order Specific Question:   Estimated length of stay     Answer:   Inpatient Only Surgery     Vital Signs:   Date/Time Temp Pulse Resp BP MAP (mmHg) SpO2 Calculated FIO2 (%) - Nasal Cannula O2 Flow Rate (L/min) Nasal Cannula O2 Flow Rate (L/min) O2 Device Patient Position - Orthostatic VS   08/10/22 0804 97 7 °F (36 5 °C) 55 -- 94/57 70 98 % -- -- -- None (Room air) --   08/10/22 0302 98 1 °F (36 7 °C) 51 Abnormal  16 100/60 76 97 % -- -- -- None (Room air) Lying   08/10/22 0009 98 1 °F (36 7 °C) 50 Abnormal  18 105/64 -- 96 % -- -- -- None (Room air) Lying   08/09/22 1830 -- 48 Abnormal  26 Abnormal  104/67 76 100 % 28 -- 2 L/min Nasal cannula --   08/09/22 1800 97 6 °F (36 4 °C) 50 Abnormal  18 95/70 84 100 % 28 -- 2 L/min Nasal cannula --   08/09/22 1730 -- 48 Abnormal  18 98/61 70 100 % 28 -- 2 L/min Nasal cannula --   08/09/22 1700 -- 50 Abnormal  19 92/58 70 99 % 28 -- 2 L/min Nasal cannula --   08/09/22 1630 -- 48 Abnormal  21 98/59 72 100 % 28 -- 2 L/min Nasal cannula --   08/09/22 1600 -- 46 Abnormal  20 99/64 75 100 % -- -- -- -- --   08/09/22 1545 -- 46 Abnormal  13 106/63 75 99 % -- -- -- -- --   08/09/22 1530 -- 48 Abnormal  14 100/61 74 99 % -- -- -- -- --   08/09/22 1515 -- 48 Abnormal  13 99/56 69 100 % -- -- -- -- --   08/09/22 1500 -- 46 Abnormal  14 98/59 71 100 % -- -- -- -- --   08/09/22 1445 -- 48 Abnormal  18 109/65 78 100 % -- -- -- -- --   08/09/22 1430 -- 56 20 108/66 79 100 % -- -- -- -- --   08/09/22 1415 -- 46 Abnormal  12 99/68 80 100 % -- -- -- -- --   08/09/22 1400 -- 72 15 108/69 83 100 % -- 2 L/min -- Nasal cannula --   08/09/22 1357 97 °F (36 1 °C) Abnormal  76 15 111/68 83 100 % -- 6 L/min -- Simple mask --   08/09/22 0938 97 9 °F (36 6 °C) 59 16 107/76 -- 97 % -- -- -- None (Room air) --       Pertinent Labs/Diagnostic Test Results:   X-ray chest 1 view   Final Result by Radha Ga MD (08/09 1652)      Right chest tube in place  No pneumothorax           XR chest pa & lateral    (Results Pending)         Results from last 7 days   Lab Units 08/09/22  1426   WBC Thousand/uL 4 16*   HEMOGLOBIN g/dL 14 4   HEMATOCRIT % 43 0   PLATELETS Thousands/uL 129*     Results from last 7 days   Lab Units 08/09/22  1426   SODIUM mmol/L 139   POTASSIUM mmol/L 4 0   CHLORIDE mmol/L 110*   CO2 mmol/L 26   ANION GAP mmol/L 3*   BUN mg/dL 12   CREATININE mg/dL 1 07   EGFR ml/min/1 73sq m 81   CALCIUM mg/dL 8 2*   MAGNESIUM mg/dL 2 2     Results from last 7 days   Lab Units 08/09/22  1426   GLUCOSE RANDOM mg/dL 104     Scheduled Medications:  acetaminophen, 975 mg, Oral, Q6H  celecoxib, 100 mg, Oral, BID  docusate sodium, 100 mg, Oral, BID  enoxaparin, 40 mg, Subcutaneous, Daily  gabapentin, 300 mg, Oral, TID  pantoprazole, 40 mg, Oral, Early Morning  senna, 1 tablet, Oral, Daily       PRN Meds:  HYDROmorphone, 0 5 mg, Intravenous, Q3H PRN  ondansetron, 4 mg, Intravenous, Q6H PRN  oxyCODONE, 5 mg, Oral, Q4H PRN 8/10 x1  polyethylene glycol, 17 g, Oral, Daily PRN        Network Utilization Review Department  ATTENTION: Please call with any questions or concerns to [phone] and carefully listen to the prompts so that you are directed to the right person  All voicemails are confidential   Debbie Guerra all requests for admission clinical reviews, approved or denied determinations and any other requests to dedicated fax number below belonging to the campus where the patient is receiving treatment   List of dedicated fax numbers for the Facilities:  [address] DENIALS (Administrative/Medical Necessity) [phone]   [address] (Maternity/NICU/Pediatrics) [phone]   [address]  [address] [address] [place] [address] [place] [address] [facility] [facility] [phone]

## 2022-08-10 NOTE — RESPIRATORY THERAPY NOTE
RT Protocol Note  Payam Saunders 54 y o  male MRN: [id number]  Unit/Bed#: [facility] 410-01 Encounter: [id number]    Assessment    Principal Problem:    Pulmonary nodule, right      Home Pulmonary Medications:   None       Past Medical History:   Diagnosis Date    Cancer ([address] )     [facility] ([facility] )     Dysuria     Last Assessed:17    GERD (gastroesophageal reflux disease)     Liver cancer (HCC)     Liver nodule     Pulmonary nodule, right     Ureteropelvic junction (UPJ) obstruction 2020    Wears glasses      Social History     Socioeconomic History    Marital status: Single     Spouse name: None    Number of children: None    Years of education: None    Highest education level: None   Occupational History    None   Tobacco Use    Smoking status: Former Smoker     Types: Cigarettes     Quit date:      Years since quittin 6    Smokeless tobacco: Current User   Vaping Use    Vaping Use: Every day    Substances: Nicotine, THC (at time), CBD (at times), Flavoring   Substance and Sexual Activity    Alcohol use: Yes     Comment: socially - 2 month    Drug use: Yes     Frequency: 1 0 times per week     Types: Marijuana    Sexual activity: None   Other Topics Concern    None   Social History Narrative    None     Social Determinants of Health     Financial Resource Strain: Not on file   Food Insecurity: Not on file   Transportation Needs: Not on file   Physical Activity: Not on file   Stress: Not on file   Social Connections: Not on file   Intimate Partner Violence: Not on file   Housing Stability: Not on file       Subjective         Objective    Physical Exam:   Assessment Type: Assess only  General Appearance: Alert, Awake  Respiratory Pattern: Normal  Chest Assessment: Chest expansion symmetrical  Bilateral Breath Sounds: Clear    Vitals:  Blood pressure 94/57, pulse 62, temperature 97 7 °F (36 5 °C), temperature source Oral, resp   rate 16, height 5' 7" (1 702 m), weight 74 1 kg (163 lb 5 8 oz), SpO2 99 %           Imaging and other studies: I have personally reviewed pertinent reports  Plan       Airway Clearance Plan: Incentive Spirometer     Resp Comments: patient pulmonary hx consist of lung cancer  post lung reection  No other pulmonary hx  No home devices, bno home meds  Bilateral BS clear  Patient is up and walking  No distress noted, no needs at this timre  ra

## 2022-08-10 NOTE — QUICK NOTE
08/10/22    Procedure: R Chest tube removal    Chest tube removed in routine fashion without incident  The patient tolerated the procedure well  A dry, sterile dressing was placed  Will check a pa/lat chest x-ray         MD Katia Manzano MD  9:07 AM  08/10/22

## 2022-08-10 NOTE — DISCHARGE SUMMARY
Discharge Summary - Thoracic Surgery   Jose Ramon Copeland 54 y o  male MRN: [id number]  Unit/Bed#: [facility] 410-01 Encounter: [id number]    Admission Date:   Admission Orders (From admission, onward)     Ordered        08/09/22 1411  Inpatient Admission  Once                         Discharge Date: 8/10/22    Admitting Diagnosis: Malignant neoplasm metastatic to right lung [facility]) [C78 01]    Discharge Diagnosis: as above     Medical Problems             Resolved Problems  Date Reviewed: 8/10/2022   None                 Attending: Dr Walker Ashley Physician(s): SHAVONNE     Procedures Performed: Flexible bronchoscopy, right thoracoscopic therapeutic wedge resection 8/9/22     Pathology: Final pathology is pending     Hospital Course: Mr Santy Julien is a 55 yo gentleman who was electively admitted on 8/9/22 to undergo a right thoracoscopic therapeutic lower lobe wedge resection for metastatic colon cancer  He tolerated the procedure well and was transferred to med/surg post operatively  POD #1, his chest tube had no air leak and his output was minimal  His IVF were d/c'd and chest tube  Post pull cxr did not reveal a pneumothorax  He was ambulating on room air and his pain was well controlled with oral pain medications  He was therefore stable for d/c to home on ERAS protocol and will follow up with a cxr in 2 weeks with Dr Efra Kim  Condition at Discharge: good     Discharge instructions/Information to patient and family:   See after visit summary for information provided to patient and family  Provisions for Follow-Up Care:  See after visit summary for information related to follow-up care and any pertinent home health orders  Disposition: Home      Planned Readmission: No    Discharge Statement   I spent 15 minutes discharging the patient  This time was spent on the day of discharge  I had direct contact with the patient on the day of discharge   Additional documentation is required if more than 30 minutes were spent on discharge  Discharge Medications:  See after visit summary for reconciled discharge medications provided to patient and family

## 2022-08-10 NOTE — PROGRESS NOTES
Progress Note - Thoracic Surgery   Dania Hdz 54 y o  male MRN: [id number]  Unit/Bed#: [facility] 410-01 Encounter: [id number]    Assessment:  52yoM w metastatic colon cancer to the right lung, now POD1 s/p R VATS wedge resection, flexible bronchoscopy on 8/9    Vitals stable, afebrile, 2 L NC  Labs pending    R CT water seal, no air leak present, 50 cc serosanguineous since the OR  UOP 1000    Plan:  - regular diet  - [email or web address], will discontinue  - R CT, to water seal, monitor for air leak, monitor output and character  - incentive spirometry  - Lovenox  - pain control  - encourage ambulation    Subjective/Objective   Subjective:   Patient point reports is well controlled tolerating regular diet without nausea or emesis, using incentive spirometry, denies any chest pain or shortness of breath  Objective:     Blood pressure 100/60, pulse (!) 51, temperature 98 1 °F (36 7 °C), temperature source Oral, resp  rate 16, height 5' 7" (1 702 m), weight 72 6 kg (160 lb), SpO2 97 %  ,Body mass index is 25 06 kg/m²  Intake/Output Summary (Last 24 hours) at 8/10/2022 0603  Last data filed at 8/10/2022 0301  Gross per 24 hour   Intake 1450 ml   Output 1000 ml   Net 450 ml       Invasive Devices  Report    Central Venous Catheter Line  Duration           Port A Cath 04/30/19 Right Chest 1197 days          Peripheral Intravenous Line  Duration           Peripheral IV 05/11/21 Right Forearm 455 days    Peripheral IV 08/09/22 Dorsal (posterior); Left Hand <1 day          Drain  Duration           Chest Tube 1 Right Anterior 24 Fr  <1 day                Physical Exam:   General: NAD  Skin: Warm, dry, anicteric  HEENT: Normocephalic, atraumatic  CV: RRR, no m/r/g  Pulm: CTA b/l, no inc WOB, right chest tube as above, incisions clean dry and intact, 2 L NC  Abd: Soft, ND/NT  MSK: Symmetric, no edema, no tenderness, no deformity  Neuro: AOx3, GCS 15    Lab, Imaging and other studies:  I have personally reviewed pertinent lab results    , CBC:   Lab Results   Component Value Date    WBC 4 16 (L) 08/09/2022    HGB 14 4 08/09/2022    HCT 43 0 08/09/2022    MCV 98 08/09/2022     (L) 08/09/2022    MCH 32 8 08/09/2022    MCHC 33 5 08/09/2022    RDW 13 1 08/09/2022    MPV 11 1 08/09/2022   , CMP:   Lab Results   Component Value Date    SODIUM 139 08/09/2022    K 4 0 08/09/2022     (H) 08/09/2022    CO2 26 08/09/2022    BUN 12 08/09/2022    CREATININE 1 07 08/09/2022    CALCIUM 8 2 (L) 08/09/2022    EGFR 81 08/09/2022     VTE Pharmacologic Prophylaxis: Sequential compression device (Venodyne)  and Enoxaparin (Lovenox)  VTE Mechanical Prophylaxis: sequential compression device

## 2022-08-10 NOTE — PROGRESS NOTES
S: Alexandria Diaz is a 54 y o  male who returns to the floor s/p R VATS wedge resection, flexible bronchoscopy  EBL minimal   Patient tolerated procedure well, without complications, was extubated in the OR, returned to PACU in stable condition  Patient doing well postoperatively, reports some minimal discomfort with his incision and right chest tube insertion site, denies any shortness of breath or chest pain, is tolerating a diet without nausea or emesis, voiding without issues, using incentive spirometry      O:    Vitals:    08/09/22 1830   BP: 104/67   Pulse: (!) 48   Resp: (!) 26   Temp:    SpO2: 100%     PACU CXR shows right chest tube in place, no pneumothorax  Postop labs:  WBC 4 16, hemoglobin 14 4  Creatinine 1 07    General: NAD  Skin: Warm, dry, anicteric  HEENT: Normocephalic, atraumatic  CV: RRR, no m/r/g  Pulm: CTA b/l, no inc WOB, 2L NC, right chest tube to water seal, no air leak present, minimal serosanguineous output present in chamber  Abd: Soft, ND/NT  MSK: Symmetric, no edema, no tenderness, no deformity  Neuro: AOx3, GCS 15    A: 51yoM w metastatic colon cancer to the right lung, now POD0 s/p R VATS wedge resection, flexible bronchoscopy on 8/9    P:  - regular diet  - [email or web address]  - R CT, to water seal, monitor for air leak, monitor output and character  - incentive spirometry  - Lovenox  - pain control  - encourage ambulation

## 2022-08-10 NOTE — CASE MANAGEMENT
Case Management Discharge Planning Note    Patient name Arcelia Mitchell  [facility] [id number]  : 1970 Date 8/10/2022       Current Admission Date: 2022  Current Admission Diagnosis:Pulmonary nodule, right   Patient Active Problem List    Diagnosis Date Noted    Malignant neoplasm metastatic to right lung ([facility] ) 2022    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 ([facility] ) 2022    Viral infection 2022    Annual physical exam 2020    Vapes nicotine containing substance 2020    Other hydronephrosis 2020    Encounter for follow-up examination after completed treatment for malignant neoplasm 2019    Acneiform rash 2018    Colon cancer metastasized to liver ([facility] [facility] ) 03/15/2018    ED (erectile dysfunction) 2018    Pulmonary nodule, right 2017    GERD (gastroesophageal reflux disease) 2017      LOS (days): 1  Geometric Mean LOS (GMLOS) (days): 4 40  Days to GMLOS:3 5     OBJECTIVE:  Risk of Unplanned Readmission Score: 7 26         Current admission status: Inpatient   Preferred Pharmacy:   [address] [place] [address] [id number] [place] [address]  Phone: [phone] Fax: [phone]    [facility], [address] [facility]  [address] [place] [address]  Phone: [phone] Fax: [phone]    [address] [facility] [address] [place] [address]  Phone: [phone] Fax: [address] [address]  Phone: [phone] Fax: [phone]    Primary Care Provider: Jackelyn Wilson MD    Primary Insurance: MEDICARE  Secondary Insurance: [address] SHIELD    DISCHARGE DETAILS:                                                                                                 Additional Comments: Home today, POD#1 no HHC needs   Family transport

## 2022-08-11 ENCOUNTER — TRANSITIONAL CARE MANAGEMENT (OUTPATIENT)
Dept: INTERNAL MEDICINE CLINIC | Facility: OTHER | Age: 52
End: 2022-08-11

## 2022-08-17 ENCOUNTER — OFFICE VISIT (OUTPATIENT)
Dept: INTERNAL MEDICINE CLINIC | Facility: OTHER | Age: 52
End: 2022-08-17
Payer: COMMERCIAL

## 2022-08-17 VITALS
BODY MASS INDEX: 25.43 KG/M2 | HEIGHT: 67 IN | SYSTOLIC BLOOD PRESSURE: 108 MMHG | WEIGHT: 162 LBS | TEMPERATURE: 98.1 F | HEART RATE: 59 BPM | DIASTOLIC BLOOD PRESSURE: 72 MMHG | OXYGEN SATURATION: 99 % | RESPIRATION RATE: 20 BRPM

## 2022-08-17 DIAGNOSIS — F11.20 CONTINUOUS OPIOID DEPENDENCE (HCC): ICD-10-CM

## 2022-08-17 DIAGNOSIS — Z12.5 PROSTATE CANCER SCREENING: ICD-10-CM

## 2022-08-17 DIAGNOSIS — C79.9 METASTASIS FROM COLON CANCER (HCC): ICD-10-CM

## 2022-08-17 DIAGNOSIS — Z13.6 ENCOUNTER FOR LIPID SCREENING FOR CARDIOVASCULAR DISEASE: Primary | ICD-10-CM

## 2022-08-17 DIAGNOSIS — C18.9 METASTASIS FROM COLON CANCER (HCC): ICD-10-CM

## 2022-08-17 DIAGNOSIS — Z13.220 ENCOUNTER FOR LIPID SCREENING FOR CARDIOVASCULAR DISEASE: Primary | ICD-10-CM

## 2022-08-17 DIAGNOSIS — D69.6 PLATELETS DECREASED (HCC): ICD-10-CM

## 2022-08-17 DIAGNOSIS — C78.01 MALIGNANT NEOPLASM METASTATIC TO RIGHT LUNG (HCC): ICD-10-CM

## 2022-08-17 DIAGNOSIS — R91.1 PULMONARY NODULE, RIGHT: ICD-10-CM

## 2022-08-17 PROBLEM — B34.9 VIRAL INFECTION: Status: RESOLVED | Noted: 2022-01-24 | Resolved: 2022-08-17

## 2022-08-17 PROCEDURE — 99496 TRANSJ CARE MGMT HIGH F2F 7D: CPT | Performed by: INTERNAL MEDICINE

## 2022-08-17 PROCEDURE — 1111F DSCHRG MED/CURRENT MED MERGE: CPT | Performed by: INTERNAL MEDICINE

## 2022-08-17 NOTE — ASSESSMENT & PLAN NOTE
Recovering well  Status post resection  Continue follow-up with Cardiothoracic surgery, medical and surgical Oncology

## 2022-08-17 NOTE — PROGRESS NOTES
Assessment/Plan:    Malignant neoplasm metastatic to right lung [facility])  Recovering well  Status post resection  Continue follow-up with Cardiothoracic surgery, medical and surgical Oncology  Platelets decreased ([facility] [facility] )  Continue to trend CBC  Follow-up with Oncology  Diagnoses and all orders for this visit:    Encounter for lipid screening for cardiovascular disease  -     Lipid panel; Future    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 ([facility] )  -     CBC; Future    Prostate cancer screening  -     PSA, Total Screen; Future    Continuous opioid dependence ([facility] )    Platelets decreased ([facility] [facility] )    Malignant neoplasm metastatic to right lung [facility])    Pulmonary nodule, right                Subjective:      Patient ID: Julia Stout is a 54 y o  male  Chief Complaint   Patient presents with    Transition of Care Management      d/c 8/10 slb pulmonory nodule right  Feeling good since home, no other issues    hm     Bmi f/u plan, annual exam       51-year-old male seen today for transition of care to which he was hospitalized at [facility] from 08/09 to 08/10/2022 to undergo elective right lower lobe wedge resection for metastatic colon cancer  Hospitalization and discharge summary reviewed today  He underwent elective right thoracoscopy therapeutic lower lobe wedge resection on 08/09/2022  No complications occurred during and postprocedure  Chest tube was removed postoperatively, postoperative chest x-ray revealed no pneumothorax  He was hemodynamically and medically stable for discharge  He has follow-up with cardiothoracic surgery and medical Oncology scheduled      TCM Call     Date and time call was made  8/11/2022 12:56 PM    Hospital care reviewed  Records reviewed    Patient was hospitial[facility] at  [facility]    Date of Admission  08/09/22    Date of discharge  08/10/22    Diagnosis  pulmonory nofule right    Disposition  Home    Were the patients medications reviewed and updated  Yes Current Symptoms  Incisional pain      TCM Call     Post hospital issues  Reduced activity    Should patient be enrolled in anticoag monitoring? No    Scheduled for follow up? Yes    Did you obtain your prescribed medications  Yes    Do you need help managing your prescriptions or medications  No    Is transportation to your appointment needed  No    I have advised the patient to call PCP with any new or worsening symptoms  [address]  Friends    The type of support provided  Emotional; Physical    Do you have social support  Yes, quite a bit    Are you recieving any outpatient services  No    Are you recieving home care services  No    Are you using any community resources  No    Current waiver services  No    Have you fallen in the last 12 months  No    Interperter language line needed  No    Counseling  Patient    Counseling topics  Importance of RX compliance; Home health agency benefits; instructions for management          The following portions of the patient's history were reviewed and updated as appropriate: allergies, current medications, past family history, past medical history, past social history, past surgical history and problem list     Review of Systems   Constitutional: Negative for activity change, appetite change, chills, diaphoresis, fatigue and fever  HENT: Negative for congestion, postnasal drip, rhinorrhea, sinus pressure, sinus pain, sneezing and sore throat  Eyes: Negative for visual disturbance  Respiratory: Negative for apnea, cough, choking, chest tightness, shortness of breath and wheezing  Cardiovascular: Negative for chest pain, palpitations and leg swelling  Gastrointestinal: Negative for abdominal distention, abdominal pain, anal bleeding, blood in stool, constipation, diarrhea, nausea and vomiting  Endocrine: Negative for cold intolerance and heat intolerance     Genitourinary: Negative for difficulty urinating, dysuria and hematuria  Musculoskeletal: Negative  Skin: Negative  Neurological: Negative for dizziness, weakness, light-headedness, numbness and headaches  Hematological: Negative for adenopathy  Psychiatric/Behavioral: Negative for agitation, sleep disturbance and suicidal ideas  All other systems reviewed and are negative          Past Medical History:   Diagnosis Date    Cancer [facility])     Colon cancer ([facility] [address] [facility] )     Dysuria     Last Assessed:8/24/17    GERD (gastroesophageal reflux disease)     Liver cancer (HCC)     Liver nodule     Pulmonary nodule, right     Ureteropelvic junction (UPJ) obstruction 01/29/2020    Wears glasses          Current Outpatient Medications:     acetaminophen (TYLENOL) 325 mg tablet, Take 2 tablets (650 mg total) by mouth every 6 (six) hours for 7 days, Disp: 56 tablet, Rfl: 0    docusate sodium (COLACE) 100 mg capsule, Take 1 capsule (100 mg total) by mouth 2 (two) times a day for 10 days, Disp: , Rfl: 0    gabapentin (NEURONTIN) 300 mg capsule, Take 1 capsule (300 mg total) by mouth 3 (three) times a day for 21 days, Disp: 63 capsule, Rfl: 0    ibuprofen (MOTRIN) 600 mg tablet, Take 1 tablet (600 mg total) by mouth 3 (three) times a day with meals for 7 days, Disp: 21 tablet, Rfl: 0    Multiple Vitamins-Minerals (MENS MULTIVITAMIN PO), Take by mouth in the morning, Disp: , Rfl:     oxyCODONE (ROXICODONE) 5 immediate release tablet, Take 1 tablet (5 mg total) by mouth every 4 (four) hours as needed (severe pain) for up to 10 days Max Daily Amount: 30 mg, Disp: 30 tablet, Rfl: 0    sildenafil (VIAGRA) 50 MG tablet, Take 1 tablet (50 mg total) by mouth as needed for erectile dysfunction, Disp: 10 tablet, Rfl: 0    No Known Allergies    Social History   Past Surgical History:   Procedure Laterality Date    ABDOMINAL SURGERY      COLON SURGERY      COLONOSCOPY N/A 01/22/2018    Procedure: COLONOSCOPY;  Surgeon: Kavin Garcia MD;  [facility];  Service: Colorectal   Lolita Clunes DENTAL SURGERY  2016    Crown with bridge work    FLEXIBLE SIGMOIDOSCOPY N/A 06/15/2018    Procedure: SIGMOIDOSCOPY FLEXIBLE w/o sedation w/ tattoo;  Surgeon: Shelia Sheppard MD;  [facility];   Service: Colorectal    IR PORT PLACEMENT Right     LAPAROTOMY N/A 06/08/2020    Procedure: LAPAROTOMY EXPLORATORY, LYSIS OF ADHESIONS;  Surgeon: Vernon Watt MD;  [facility];  Service: Surgical Oncology    LIVER LOBECTOMY N/A 06/21/2018    Procedure: liver resection/ablation, intraoperative ultrasound of liver;  Surgeon: Vernon Watt MD;  [facility];  Service: Surgical Oncology    LIVER LOBECTOMY N/A 06/08/2020    Procedure: LIVER RESECTION SEGMENT 3 WITH  INTRAOPERATIVE U/S OF LIVER;  Surgeon: Vernon Watt MD;  [facility];  Service: Surgical Oncology    LUNG SEGMENTECTOMY Right 8/9/2022    Procedure: RESECTION WEDGE LUNG, THERAPEUTIC;  Surgeon: Sidney Gray MD;  [facility];  Service: Thoracic    [address] N/A 8/9/2022    Procedure: BRONCHOSCOPY FLEXIBLE;  Surgeon: Sidney Gray MD;  [facility];  Service: Thoracic    AK EXPLORATORY OF ABDOMEN N/A 06/21/2018    Procedure: LAPAROTOMY EXPLORATORY;  Surgeon: Shelia Sheppard MD;  [facility];  Service: Colorectal    AK INSERT PICC W/ SUB-Q PORT N/A 01/25/2018    Procedure: PORT-A-CATHETER PLACEMENT  Fluoroscopy for performance/interpretation;  Surgeon: Shelia Sheppard MD;  [facility];  Service: Colorectal    AK PART REMOVAL COLON W ANASTOMOSIS Left 06/21/2018    Procedure: hemicolectomy, low anterior resection (open) SPY fluorescence angiography;  Surgeon: Shelia Sheppard MD;  [facility];  Service: Colorectal    AK PROCTECTOMY,PARTIAL N/A 06/21/2018    Procedure: Partial proctectomy ;  Surgeon: Shelia Sheppard MD;  [facility];  Service: Colorectal    AK SIGMOIDOSCOPY FLX DX W/COLLJ SPEC BR/WA IF PFRMD N/A 06/21/2018    Procedure: Reyna Ardener;  Surgeon: Lubna Perry MD;  [facility];  Service: Colorectal    DE THORACOSCOPY W/THERA WEDGE RESEXN INITIAL UNILAT Right 8/9/2022    Procedure: THORACOSCOPY VIDEO ASSISTED SURGERY (VATS); Surgeon: Sofia Seip, MD;  [facility];  Service: Thoracic    ROOT CANAL  2015    TESTICLE SURGERY      Exploration of Undescended Testis rIght, age 8 had surgery for undescended testicle;  Last Assessed:8/24/17    TOOTH EXTRACTION  2015     Family History   Problem Relation Age of Onset    No Known Problems Father     Cancer Mother         unknown    Cancer Brother         pt  reports brother was diagnosed with stage 4 throat cancer    Throat cancer Brother     Breast cancer Maternal Aunt        Objective:  /72 (BP Location: Left arm, Patient Position: Sitting, Cuff Size: Standard)   Pulse 59   Temp 98 1 °F (36 7 °C) (Temporal)   Resp 20   Ht 5' 7" (1 702 m)   Wt 73 5 kg (162 lb)   SpO2 99%   BMI 25 37 kg/m²     Recent Results (from the past 1344 hour(s))   Fingerstick Glucose (POCT)    Collection Time: 07/06/22  7:27 AM   Result Value Ref Range    POC Glucose 111 65 - 140 mg/dl   BUN    Collection Time: 07/13/22  5:55 PM   Result Value Ref Range    BUN 14 5 - 25 mg/dL   Creatinine, serum    Collection Time: 07/13/22  5:55 PM   Result Value Ref Range    Creatinine 0 96 0 60 - 1 30 mg/dL    eGFR 92 ml/min/1 73sq m   CBC and differential    Collection Time: 07/18/22  3:30 PM   Result Value Ref Range    WBC 5 80 4 31 - 10 16 Thousand/uL    RBC 4 13 3 88 - 5 62 Million/uL    Hemoglobin 13 8 12 0 - 17 0 g/dL    Hematocrit 39 2 36 5 - 49 3 %    MCV 95 82 - 98 fL    MCH 33 4 26 8 - 34 3 pg    MCHC 35 2 31 4 - 37 4 g/dL    RDW 13 1 11 6 - 15 1 %    MPV 10 6 8 9 - 12 7 fL    Platelets 261 213 - 975 Thousands/uL    nRBC 0 /100 WBCs    Neutrophils Relative 51 43 - 75 %    Immat GRANS % 0 0 - 2 %    Lymphocytes Relative 40 14 - 44 %    Monocytes Relative 8 4 - 12 %    Eosinophils Relative 1 0 - 6 %    Basophils Relative 0 0 - 1 %    Neutrophils Absolute 2 91 1 85 - 7 62 Thousands/µL    Immature Grans Absolute 0 01 0 00 - 0 20 Thousand/uL    Lymphocytes Absolute 2 31 0 60 - 4 47 Thousands/µL    Monocytes Absolute 0 47 0 17 - 1 22 Thousand/µL    Eosinophils Absolute 0 08 0 00 - 0 61 Thousand/µL    Basophils Absolute 0 02 0 00 - 0 10 Thousands/µL   Comprehensive metabolic panel    Collection Time: 07/18/22  3:30 PM   Result Value Ref Range    Sodium 138 135 - 147 mmol/L    Potassium 3 6 3 5 - 5 3 mmol/L    Chloride 105 96 - 108 mmol/L    CO2 28 21 - 32 mmol/L    ANION GAP 5 4 - 13 mmol/L    BUN 11 5 - 25 mg/dL    Creatinine 0 99 0 60 - 1 30 mg/dL    Glucose 102 65 - 140 mg/dL    Calcium 9 3 8 4 - 10 2 mg/dL    AST 18 13 - 39 U/L    ALT 16 7 - 52 U/L    Alkaline Phosphatase 41 34 - 104 U/L    Total Protein 7 6 6 4 - 8 4 g/dL    Albumin 4 3 3 5 - 5 0 g/dL    Total Bilirubin 0 36 0 20 - 1 00 mg/dL    eGFR 89 ml/min/1 73sq m   Type and screen    Collection Time: 08/09/22 10:05 AM   Result Value Ref Range    ABO Grouping A     Rh Factor Negative     Antibody Screen Negative     Specimen Expiration Date [id number]    CBC    Collection Time: 08/09/22  2:26 PM   Result Value Ref Range    WBC 4 16 (L) 4 31 - 10 16 Thousand/uL    RBC 4 39 3 88 - 5 62 Million/uL    Hemoglobin 14 4 12 0 - 17 0 g/dL    Hematocrit 43 0 36 5 - 49 3 %    MCV 98 82 - 98 fL    MCH 32 8 26 8 - 34 3 pg    MCHC 33 5 31 4 - 37 4 g/dL    RDW 13 1 11 6 - 15 1 %    Platelets 102 (L) 674 - 390 Thousands/uL    MPV 11 1 8 9 - 12 7 fL   Basic metabolic panel    Collection Time: 08/09/22  2:26 PM   Result Value Ref Range    Sodium 139 135 - 147 mmol/L    Potassium 4 0 3 5 - 5 3 mmol/L    Chloride 110 (H) 96 - 108 mmol/L    CO2 26 21 - 32 mmol/L    ANION GAP 3 (L) 4 - 13 mmol/L    BUN 12 5 - 25 mg/dL    Creatinine 1 07 0 60 - 1 30 mg/dL    Glucose 104 65 - 140 mg/dL    Calcium 8 2 (L) 8 3 - 10 1 mg/dL    eGFR 81 ml/min/1 73sq m   Magnesium    Collection Time: 08/09/22  2:26 PM   Result Value Ref Range    Magnesium 2 2 1 6 - 2 6 mg/dL            Physical Exam  Vitals and nursing note reviewed  Constitutional:       General: He is not in acute distress  Appearance: He is well-developed  He is not diaphoretic  HENT:      Head: Normocephalic and atraumatic  Eyes:      General: No scleral icterus  Right eye: No discharge  Left eye: No discharge  Conjunctiva/sclera: Conjunctivae normal       Pupils: Pupils are equal, round, and reactive to light  Neck:      Thyroid: No thyromegaly  Vascular: No JVD  Cardiovascular:      Rate and Rhythm: Normal rate and regular rhythm  Heart sounds: Normal heart sounds  No murmur heard  No friction rub  No gallop  Pulmonary:      Effort: Pulmonary effort is normal  No respiratory distress  Breath sounds: Normal breath sounds  No wheezing or rales  Chest:      Chest wall: No tenderness  Abdominal:      General: Bowel sounds are normal  There is no distension  Palpations: Abdomen is soft  There is no mass  Tenderness: There is no abdominal tenderness  There is no guarding or rebound  Musculoskeletal:         General: No tenderness or deformity  Normal range of motion  Cervical back: Normal range of motion and neck supple  Lymphadenopathy:      Cervical: No cervical adenopathy  Skin:     General: Skin is warm and dry  Coloration: Skin is not pale  Findings: No erythema or rash  Neurological:      Mental Status: He is alert and oriented to person, place, and time  Cranial Nerves: No cranial nerve deficit  Coordination: Coordination normal       Deep Tendon Reflexes: Reflexes are normal and symmetric  Psychiatric:         Behavior: Behavior normal          Thought Content:  Thought content normal          Judgment: Judgment normal

## 2022-08-18 ENCOUNTER — TELEPHONE (OUTPATIENT)
Dept: HEMATOLOGY ONCOLOGY | Facility: CLINIC | Age: 52
End: 2022-08-18

## 2022-08-18 NOTE — TELEPHONE ENCOUNTER
I moved up his appt to 8/31  Pt verbalized understanding and agreement       ===View-only below this line===  ----- Message -----  From: Scar Perez MD  Sent: 8/17/2022   4:43 PM EDT  To: Juan Luis Diaz, RN, Myranda Watson    See if the patient can come in to see me 1-3 weeks before the 28th of September as scheduled    ----- Message -----  From: Kevon Saenz MD  Sent: 8/17/2022   1:51 PM EDT  To: Scar Perez MD, Alicia Sepulveda MD, *    FYI       ----- Message -----  From: Lab, Background User  Sent: 8/17/2022   1:09 PM EDT  To: Kevon Saenz MD

## 2022-08-26 ENCOUNTER — TELEPHONE (OUTPATIENT)
Dept: HEMATOLOGY ONCOLOGY | Facility: HOSPITAL | Age: 52
End: 2022-08-26

## 2022-08-26 NOTE — TELEPHONE ENCOUNTER
08/26/22    Spoke with patient reminding updated labs prior to appt  Advising pt to go to any [facility] walk-in labs to get blood work done  Pt stated he will get labs done on Monday

## 2022-08-30 ENCOUNTER — APPOINTMENT (OUTPATIENT)
Dept: LAB | Facility: CLINIC | Age: 52
End: 2022-08-30
Payer: COMMERCIAL

## 2022-08-30 DIAGNOSIS — Z13.220 ENCOUNTER FOR LIPID SCREENING FOR CARDIOVASCULAR DISEASE: ICD-10-CM

## 2022-08-30 DIAGNOSIS — C18.9 METASTATIC COLON CANCER TO LIVER (HCC): ICD-10-CM

## 2022-08-30 DIAGNOSIS — C79.9 METASTASIS FROM COLON CANCER (HCC): ICD-10-CM

## 2022-08-30 DIAGNOSIS — C78.7 COLON CANCER METASTASIZED TO LIVER (HCC): ICD-10-CM

## 2022-08-30 DIAGNOSIS — C18.9 METASTASIS FROM COLON CANCER (HCC): ICD-10-CM

## 2022-08-30 DIAGNOSIS — C18.9 COLON CANCER METASTASIZED TO LIVER (HCC): ICD-10-CM

## 2022-08-30 DIAGNOSIS — C78.7 METASTATIC COLON CANCER TO LIVER (HCC): ICD-10-CM

## 2022-08-30 DIAGNOSIS — Z12.5 PROSTATE CANCER SCREENING: ICD-10-CM

## 2022-08-30 DIAGNOSIS — Z13.6 ENCOUNTER FOR LIPID SCREENING FOR CARDIOVASCULAR DISEASE: ICD-10-CM

## 2022-08-30 DIAGNOSIS — C20 MALIGNANT NEOPLASM OF RECTUM (HCC): ICD-10-CM

## 2022-08-30 LAB
ALBUMIN SERPL BCP-MCNC: 4.1 G/DL (ref 3.5–5)
ALP SERPL-CCNC: 47 U/L (ref 34–104)
ALT SERPL W P-5'-P-CCNC: 17 U/L (ref 7–52)
ANION GAP SERPL CALCULATED.3IONS-SCNC: 6 MMOL/L (ref 4–13)
AST SERPL W P-5'-P-CCNC: 17 U/L (ref 13–39)
BASOPHILS # BLD AUTO: 0.07 THOUSANDS/ΜL (ref 0–0.1)
BASOPHILS NFR BLD AUTO: 1 % (ref 0–1)
BILIRUB SERPL-MCNC: 0.36 MG/DL (ref 0.2–1)
BUN SERPL-MCNC: 13 MG/DL (ref 5–25)
CALCIUM SERPL-MCNC: 9.2 MG/DL (ref 8.4–10.2)
CEA SERPL-MCNC: 0.6 NG/ML (ref 0–3)
CHLORIDE SERPL-SCNC: 108 MMOL/L (ref 96–108)
CO2 SERPL-SCNC: 25 MMOL/L (ref 21–32)
CREAT SERPL-MCNC: 1.01 MG/DL (ref 0.6–1.3)
EOSINOPHIL # BLD AUTO: 0.5 THOUSAND/ΜL (ref 0–0.61)
EOSINOPHIL NFR BLD AUTO: 9 % (ref 0–6)
ERYTHROCYTE [DISTWIDTH] IN BLOOD BY AUTOMATED COUNT: 12.9 % (ref 11.6–15.1)
GFR SERPL CREATININE-BSD FRML MDRD: 86 ML/MIN/1.73SQ M
GLUCOSE SERPL-MCNC: 87 MG/DL (ref 65–140)
HCT VFR BLD AUTO: 40.9 % (ref 36.5–49.3)
HGB BLD-MCNC: 14.3 G/DL (ref 12–17)
IMM GRANULOCYTES # BLD AUTO: 0.01 THOUSAND/UL (ref 0–0.2)
IMM GRANULOCYTES NFR BLD AUTO: 0 % (ref 0–2)
LYMPHOCYTES # BLD AUTO: 1.8 THOUSANDS/ΜL (ref 0.6–4.47)
LYMPHOCYTES NFR BLD AUTO: 33 % (ref 14–44)
MCH RBC QN AUTO: 32.7 PG (ref 26.8–34.3)
MCHC RBC AUTO-ENTMCNC: 35 G/DL (ref 31.4–37.4)
MCV RBC AUTO: 94 FL (ref 82–98)
MONOCYTES # BLD AUTO: 0.42 THOUSAND/ΜL (ref 0.17–1.22)
MONOCYTES NFR BLD AUTO: 8 % (ref 4–12)
NEUTROPHILS # BLD AUTO: 2.61 THOUSANDS/ΜL (ref 1.85–7.62)
NEUTS SEG NFR BLD AUTO: 49 % (ref 43–75)
NRBC BLD AUTO-RTO: 0 /100 WBCS
PLATELET # BLD AUTO: 211 THOUSANDS/UL (ref 149–390)
PMV BLD AUTO: 11.4 FL (ref 8.9–12.7)
POTASSIUM SERPL-SCNC: 4 MMOL/L (ref 3.5–5.3)
PROT SERPL-MCNC: 7.3 G/DL (ref 6.4–8.4)
RBC # BLD AUTO: 4.37 MILLION/UL (ref 3.88–5.62)
SODIUM SERPL-SCNC: 139 MMOL/L (ref 135–147)
WBC # BLD AUTO: 5.41 THOUSAND/UL (ref 4.31–10.16)

## 2022-08-30 PROCEDURE — 82378 CARCINOEMBRYONIC ANTIGEN: CPT

## 2022-08-30 PROCEDURE — 80053 COMPREHEN METABOLIC PANEL: CPT

## 2022-08-30 PROCEDURE — 36415 COLL VENOUS BLD VENIPUNCTURE: CPT

## 2022-08-30 PROCEDURE — 85025 COMPLETE CBC W/AUTO DIFF WBC: CPT

## 2022-08-31 ENCOUNTER — OFFICE VISIT (OUTPATIENT)
Dept: HEMATOLOGY ONCOLOGY | Facility: CLINIC | Age: 52
End: 2022-08-31
Payer: COMMERCIAL

## 2022-08-31 ENCOUNTER — TELEPHONE (OUTPATIENT)
Dept: HEMATOLOGY ONCOLOGY | Facility: CLINIC | Age: 52
End: 2022-08-31

## 2022-08-31 VITALS
HEIGHT: 67 IN | BODY MASS INDEX: 25.11 KG/M2 | OXYGEN SATURATION: 98 % | DIASTOLIC BLOOD PRESSURE: 78 MMHG | RESPIRATION RATE: 14 BRPM | TEMPERATURE: 98 F | SYSTOLIC BLOOD PRESSURE: 110 MMHG | HEART RATE: 74 BPM | WEIGHT: 160 LBS

## 2022-08-31 DIAGNOSIS — C79.9 METASTASIS FROM COLON CANCER (HCC): ICD-10-CM

## 2022-08-31 DIAGNOSIS — T45.1X5A CHEMOTHERAPY INDUCED NEUTROPENIA (HCC): Primary | ICD-10-CM

## 2022-08-31 DIAGNOSIS — C18.9 COLON CANCER METASTASIZED TO LIVER (HCC): ICD-10-CM

## 2022-08-31 DIAGNOSIS — C18.9 METASTASIS FROM COLON CANCER (HCC): Primary | ICD-10-CM

## 2022-08-31 DIAGNOSIS — R11.0 NAUSEA: Primary | ICD-10-CM

## 2022-08-31 DIAGNOSIS — C78.7 COLON CANCER METASTASIZED TO LIVER (HCC): ICD-10-CM

## 2022-08-31 DIAGNOSIS — C79.9 METASTASIS FROM COLON CANCER (HCC): Primary | ICD-10-CM

## 2022-08-31 DIAGNOSIS — C18.9 METASTASIS FROM COLON CANCER (HCC): ICD-10-CM

## 2022-08-31 DIAGNOSIS — C78.01 MALIGNANT NEOPLASM METASTATIC TO RIGHT LUNG (HCC): ICD-10-CM

## 2022-08-31 DIAGNOSIS — D70.1 CHEMOTHERAPY INDUCED NEUTROPENIA (HCC): Primary | ICD-10-CM

## 2022-08-31 PROCEDURE — 99214 OFFICE O/P EST MOD 30 MIN: CPT | Performed by: INTERNAL MEDICINE

## 2022-08-31 RX ORDER — ONDANSETRON 8 MG/1
8 TABLET, ORALLY DISINTEGRATING ORAL EVERY 8 HOURS PRN
Qty: 20 TABLET | Refills: 0 | Status: SHIPPED | OUTPATIENT
Start: 2022-08-31

## 2022-08-31 RX ORDER — CLINDAMYCIN PHOSPHATE 10 MG/G
GEL TOPICAL 2 TIMES DAILY
Qty: 30 G | Refills: 1 | Status: SHIPPED | OUTPATIENT
Start: 2022-08-31

## 2022-08-31 RX ORDER — MINOCYCLINE HYDROCHLORIDE 100 MG/1
100 CAPSULE ORAL EVERY 12 HOURS SCHEDULED
Qty: 60 CAPSULE | Refills: 3 | Status: SHIPPED | OUTPATIENT
Start: 2022-08-31 | End: 2022-09-30

## 2022-08-31 NOTE — TELEPHONE ENCOUNTER
Please schedule patient for new tx  He prefers a Monday connect, but would be accepting of Tuesday as well  He would like morning appts  He will be away from 9/5 to 9/9  Thank you!

## 2022-08-31 NOTE — TELEPHONE ENCOUNTER
[facility] do not have availability the week of 9/12 first available is 9/20 at [facility]   Patient is scheduled on 9/20 please have dates changed and I will scheduled out at [facility]
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
## 2022-08-31 NOTE — TELEPHONE ENCOUNTER
Follow-up disposition: Return in about 6 weeks (around 10/12/2022)  Check out comments: Start FOLFIRI plus Erbitux with Neulasta growth factor support every 2 weeks  Blood work every 2 weeks before chemotherapy  See me back in 4-6 weeks     While we try to accommodate patient requests, our priority is to schedule treatment according to Doctor's orders and site availability  1  Does the Provider use the intake sheet or checkout note? no  2  What would be a preferred day of the week that would work best for your infusion appointment? Monday and come back Wed  To have it removed preferred, Tuesday; Thursday would work too  3  Do you prefer mornings or afternoons for your appointments? mornings  4  Are there any days or dates that do not work for your schedule, including any upcoming vacations? Patient has many appts  Going on 9/5-9/9 so would like to start the week of 9/12/22  5  We are going to try our best to schedule you at the infusion center closest to your home  In the event that we are unable to what would be your next preferred infusion site or sites? 1  [facility]  2  [facility]  3  [facility]    6  Do you have transportation to take you to all of your appointments? yes  7   Would you like the infusion center to draw labs from your port? (disregard if patient doesn't have a port or need labs for infusion appointment) no

## 2022-08-31 NOTE — TELEPHONE ENCOUNTER
Spoke with patient  He is aware of updated schedule  He will review all updates on his MyChart  Patient is aware that I will be calling him back with a follow up appt with Dr Julissa Goldsmith

## 2022-09-01 ENCOUNTER — OFFICE VISIT (OUTPATIENT)
Dept: CARDIAC SURGERY | Facility: CLINIC | Age: 52
End: 2022-09-01

## 2022-09-01 VITALS
HEART RATE: 69 BPM | SYSTOLIC BLOOD PRESSURE: 119 MMHG | WEIGHT: 160.27 LBS | HEIGHT: 67 IN | RESPIRATION RATE: 17 BRPM | DIASTOLIC BLOOD PRESSURE: 77 MMHG | TEMPERATURE: 98.5 F | OXYGEN SATURATION: 98 % | BODY MASS INDEX: 25.16 KG/M2

## 2022-09-01 DIAGNOSIS — C78.01 MALIGNANT NEOPLASM METASTATIC TO RIGHT LUNG (HCC): Primary | ICD-10-CM

## 2022-09-01 PROCEDURE — 99024 POSTOP FOLLOW-UP VISIT: CPT | Performed by: THORACIC SURGERY (CARDIOTHORACIC VASCULAR SURGERY)

## 2022-09-01 NOTE — PROGRESS NOTES
Assessment/Plan:    Malignant neoplasm metastatic to right lung [facility])  Mr Carmelo Bingham is a 55 yo gentleman with a history of colon cancer who we evaluated for colon cancer metastatic to the lung  He underwent a VATS right lower lobe wedge resection for a presumed metastatic lesion  He is doing very well in the office today  He has no complaints since his surgery  He met with Dr Shruti Ortiz yesterday and has decided to go through with an additional round of chemotherapy  This will be starting on September 20th  At this time, the patient is doing incredibly well  All restrictions have been lifted and the patient can resume any and all normal activities  The patient did not undergo a chest x-ray since his discharge from the hospital   I have ordered this today and I will have the patient get this in the next day or so  If there are any abnormalities, I will call the patient with the results  Asiya Ortega MD  Thoracic Surgery  (Available on [employer])  Office: [phone]         Diagnoses and all orders for this visit:    Malignant neoplasm metastatic to right lung (HCC)  -     XR chest pa & lateral; Future          Thoracic History   Cancer Staging  Colon cancer metastasized to liver [facility])  Staging form: Colon and Rectum, AJCC 8th Edition  - Pathologic stage from 6/21/2018: Stage WERO (ypT0, pN0, cM1a) - Unsigned  Stage prefix: Post-therapy  Total positive nodes: 0  Sites of metastasis: Liver    Oncology History   Colon cancer metastasized to liver ([facility] [address] )   1/2018 Initial Diagnosis    Malignant neoplasm of sigmoid colon ([facility] [address] )     1/22/2018 Biopsy    Large Intestine, Sigmoid Colon, Recto-sigmoid tumor bx:    - Invasive colonic adenocarcinoma, moderately differentiated     2/6/2018 - 10/16/2018 Chemotherapy    Modified FOLFOX6 x 12 cycles  Added Erbitux on 3/20/18      6/21/2018 Surgery    Segment 7 liver resection/ablation, hemicolectomy     10/30/2018 -  Chemotherapy    Continuation of Single agent Erbitux  With 10/30/18 chemo, it was Cycle #14  Plan was for 6 additional cycles of Erbitux alone       3/2020 Genetic Testing    NEGATIVE for genes tested:    Test(s): [employer] + [employer] (34 genes): APC, ETHAN, BARD1, BRCA1, BRCA2, BRIP1, BMPR1A, CDH1, CDK4, CDKN2A, CHEK2, DICER1, EPCAM, GREM1, HOXB13, MLH1, MRE11A, MSH2, MSH6, MUTYH, NBN, NF1, PALB2, PMS2, POLD1, POLE, PTEN, RAD50, RAD51C, RAD51D, SMAD4, SMARCA4, STK11, TP53      6/8/2020 Surgery    Liver, segment 3 (wedge resection):  - Metastatic adenocarcinoma, consistent with the patient's known colon primary  - Margins negative for carcinoma      7/27/2020 - 1/11/2021 Chemotherapy    fluorouracil (ADRUCIL), 745 mg, Intravenous, Once, 6 of 6 cycles  Administration: 750 mg (7/27/2020), 750 mg (8/10/2020), 745 mg (8/24/2020), 745 mg (9/8/2020), 745 mg (9/21/2020), 745 mg (10/5/2020)  pegfilgrastim (NEULASTA), 6 mg, Subcutaneous, Once, 1 of 1 cycle  Administration: 6 mg (9/24/2020)  pegfilgrastim (NEULASTA ONPRO), 6 mg, Subcutaneous, Once, 6 of 6 cycles  Administration: 6 mg (7/29/2020), 6 mg (8/12/2020), 6 mg (8/26/2020), 6 mg (9/10/2020), 6 mg (10/7/2020)  cetuximab (ERBITUX), 930 mg, Intravenous, Once, 12 of 12 cycles  Administration: 900 mg (7/27/2020), 900 mg (8/10/2020), 900 mg (8/24/2020), 900 mg (9/8/2020), 900 mg (9/21/2020), 900 mg (10/5/2020), 900 mg (10/19/2020), 900 mg (11/2/2020), 900 mg (11/16/2020), 900 mg (11/30/2020), 900 mg (12/28/2020), 900 mg (1/11/2021)  irinotecan (CAMPTOSAR) chemo infusion, 335 mg, Intravenous, Once, 6 of 6 cycles  Administration: 340 mg (7/27/2020), 340 mg (8/10/2020), 340 mg (8/24/2020), 340 mg (9/8/2020), 340 mg (9/21/2020), 340 mg (10/5/2020)  leucovorin calcium IVPB, 744 mg, Intravenous, Once, 6 of 6 cycles  Administration: 750 mg (7/27/2020), 750 mg (8/10/2020), 750 mg (8/24/2020), 750 mg (9/8/2020), 750 mg (9/21/2020), 740 mg (10/5/2020)  fluorouracil (ADRUCIL) ambulatory infusion Soln, 1,200 mg/m2/day = 4,465 mg, Intravenous, Over 46 hours, 6 of 6 cycles  Dose modification: 1,200 mg/m2/day (original dose 1,200 mg/m2/day, Cycle 3)     9/20/2022 -  Chemotherapy    pegfilgrastim (NEULASTA ONPRO), 6 mg, Subcutaneous, Once, 0 of 6 cycles  fluorouracil (ADRUCIL), 400 mg/m2, Intravenous, Once, 0 of 6 cycles  cetuximab (ERBITUX), 500 mg/m2, Intravenous, Once, 0 of 6 cycles  irinotecan (CAMPTOSAR) chemo infusion, 180 mg/m2, Intravenous, Once, 0 of 6 cycles  leucovorin calcium IVPB, 400 mg/m2, Intravenous, Once, 0 of 6 cycles  fluorouracil (ADRUCIL) ambulatory infusion Soln, 1,200 mg/m2/day, Intravenous, Over 46 hours, 0 of 6 cycles     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 ([facility] )   4/11/2022 Initial Diagnosis    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 ([place] [address] )     9/20/2022 -  Chemotherapy    pegfilgrastim (NEULASTA ONPRO), 6 mg, Subcutaneous, Once, 0 of 6 cycles  fluorouracil (ADRUCIL), 400 mg/m2, Intravenous, Once, 0 of 6 cycles  cetuximab (ERBITUX), 500 mg/m2, Intravenous, Once, 0 of 6 cycles  irinotecan (CAMPTOSAR) chemo infusion, 180 mg/m2, Intravenous, Once, 0 of 6 cycles  leucovorin calcium IVPB, 400 mg/m2, Intravenous, Once, 0 of 6 cycles  fluorouracil (ADRUCIL) ambulatory infusion Soln, 1,200 mg/m2/day, Intravenous, Over 46 hours, 0 of 6 cycles     [facility]  )   8/4/2022 Initial Diagnosis    Malignant neoplasm metastatic to right lung ([place] [address] )     8/9/2022 Surgery    Flexible bronchoscopy, right thoracoscopic therapeutic wedge resection      9/20/2022 -  Chemotherapy    pegfilgrastim (NEULASTA ONPRO), 6 mg, Subcutaneous, Once, 0 of 6 cycles  fluorouracil (ADRUCIL), 400 mg/m2, Intravenous, Once, 0 of 6 cycles  cetuximab (ERBITUX), 500 mg/m2, Intravenous, Once, 0 of 6 cycles  irinotecan (CAMPTOSAR) chemo infusion, 180 mg/m2, Intravenous, Once, 0 of 6 cycles  leucovorin calcium IVPB, 400 mg/m2, Intravenous, Once, 0 of 6 cycles  fluorouracil (ADRUCIL) ambulatory infusion Soln, 1,200 mg/m2/day, Intravenous, Over 46 hours, 0 of 6 cycles                 Patient ID: Alexandria Diaz is a 54 y o  male  ECOG 1     HPI    Mr Darian Lyle is a 53 yo gentleman with a history of colon cancer who we evaluated for colon cancer metastatic to the lung  He underwent a VATS right lower lobe wedge resection for a presumed metastatic lesion  The final pathology was consistent with metastatic colorectal cancer  He presents today for his 1st postoperative visit  Today in the office, he is doing very well  He has no complaints today  He denies any fevers or chills  He denies any pain  He is not taking any pain medication  He denies any shortness of breath  He denies any fevers or chills  The following portions of the patient's history were reviewed and updated as appropriate: allergies, current medications, past family history, past medical history, past social history, past surgical history and problem list     Review of Systems   Constitutional: Negative  Negative for activity change, chills, fatigue, fever and unexpected weight change  HENT: Negative for sore throat, trouble swallowing and voice change  Eyes: Negative  Negative for visual disturbance  Respiratory: Negative for cough, chest tightness, shortness of breath, wheezing and stridor  Cardiovascular: Negative for chest pain and leg swelling  Gastrointestinal: Negative  Endocrine: Negative  Genitourinary: Negative  Musculoskeletal: Negative  Skin: Negative  Allergic/Immunologic: Negative  Neurological: Negative for seizures, syncope, speech difficulty, weakness, light-headedness and headaches  Hematological: Negative for adenopathy  Psychiatric/Behavioral: Negative  Objective:   Physical Exam  Vitals and nursing note reviewed  Constitutional:       General: He is not in acute distress  Appearance: Normal appearance  He is well-developed and normal weight  He is not ill-appearing, toxic-appearing or diaphoretic     HENT:      Head: Normocephalic and atraumatic  Nose: Nose normal  No congestion  Mouth/Throat:      Mouth: Mucous membranes are moist       Pharynx: Oropharynx is clear  Eyes:      Extraocular Movements: Extraocular movements intact  Pupils: Pupils are equal, round, and reactive to light  Neck:      Trachea: No tracheal deviation  Cardiovascular:      Rate and Rhythm: Normal rate and regular rhythm  Heart sounds: Normal heart sounds  No murmur heard  Pulmonary:      Effort: Pulmonary effort is normal  No respiratory distress  Breath sounds: Normal breath sounds  No stridor  No wheezing or rales  Comments: Chest tube stitch DC'ed  In visions healing well  Chest:      Chest wall: No tenderness  Abdominal:      General: Bowel sounds are normal  There is no distension  Palpations: Abdomen is soft  Tenderness: There is no abdominal tenderness  Musculoskeletal:         General: Normal range of motion  Cervical back: Normal range of motion  Lymphadenopathy:      Cervical: No cervical adenopathy  Skin:     General: Skin is warm and dry  Coloration: Skin is not pale  Findings: No erythema or rash  Neurological:      Mental Status: He is alert and oriented to person, place, and time  Psychiatric:         Behavior: Behavior normal          Thought Content: Thought content normal          Judgment: Judgment normal      /77   Pulse 69   Temp 98 5 °F (36 9 °C)   Resp 17   Ht 5' 7" (1 702 m)   Wt 72 7 kg (160 lb 4 4 oz)   SpO2 98%   BMI 25 10 kg/m²     XR chest pa & lateral    Result Date: 8/10/2022  Impression No pneumothorax or other acute findings   Workstation performed: [id number]

## 2022-09-01 NOTE — ASSESSMENT & PLAN NOTE
Mr Carmelo Bingham is a 55 yo gentleman with a history of colon cancer who we evaluated for colon cancer metastatic to the lung  He underwent a VATS right lower lobe wedge resection for a presumed metastatic lesion  He is doing very well in the office today  He has no complaints since his surgery  He met with Dr Shruti Ortiz yesterday and has decided to go through with an additional round of chemotherapy  This will be starting on September 20th  At this time, the patient is doing incredibly well  All restrictions have been lifted and the patient can resume any and all normal activities  The patient did not undergo a chest x-ray since his discharge from the hospital   I have ordered this today and I will have the patient get this in the next day or so  If there are any abnormalities, I will call the patient with the results      Asiya Ortega MD  Thoracic Surgery  (Available on [employer])  Office: [phone]

## 2022-09-12 ENCOUNTER — PATIENT MESSAGE (OUTPATIENT)
Dept: CARDIAC SURGERY | Facility: CLINIC | Age: 52
End: 2022-09-12

## 2022-09-12 RX ORDER — MAGNESIUM SULFATE HEPTAHYDRATE 40 MG/ML
4 INJECTION, SOLUTION INTRAVENOUS ONCE AS NEEDED
Status: CANCELLED | OUTPATIENT
Start: 2022-09-20

## 2022-09-12 RX ORDER — FLUOROURACIL 50 MG/ML
400 INJECTION, SOLUTION INTRAVENOUS ONCE
Status: CANCELLED | OUTPATIENT
Start: 2022-09-20

## 2022-09-12 RX ORDER — MAGNESIUM SULFATE HEPTAHYDRATE 40 MG/ML
2 INJECTION, SOLUTION INTRAVENOUS ONCE AS NEEDED
Status: CANCELLED | OUTPATIENT
Start: 2022-09-20

## 2022-09-12 RX ORDER — SODIUM CHLORIDE 9 MG/ML
20 INJECTION, SOLUTION INTRAVENOUS ONCE
Status: CANCELLED | OUTPATIENT
Start: 2022-09-20

## 2022-09-12 RX ORDER — ATROPINE SULFATE 1 MG/ML
0.25 INJECTION, SOLUTION INTRAVENOUS ONCE
Status: CANCELLED | OUTPATIENT
Start: 2022-09-20

## 2022-09-13 DIAGNOSIS — T45.1X5A CHEMOTHERAPY INDUCED NEUTROPENIA (HCC): Primary | ICD-10-CM

## 2022-09-13 DIAGNOSIS — C18.9 COLON CANCER METASTASIZED TO LIVER (HCC): ICD-10-CM

## 2022-09-13 DIAGNOSIS — C18.9 METASTASIS FROM COLON CANCER (HCC): ICD-10-CM

## 2022-09-13 DIAGNOSIS — C79.9 METASTASIS FROM COLON CANCER (HCC): ICD-10-CM

## 2022-09-13 DIAGNOSIS — C78.01 MALIGNANT NEOPLASM METASTATIC TO RIGHT LUNG (HCC): ICD-10-CM

## 2022-09-13 DIAGNOSIS — D70.1 CHEMOTHERAPY INDUCED NEUTROPENIA (HCC): Primary | ICD-10-CM

## 2022-09-13 DIAGNOSIS — C78.7 COLON CANCER METASTASIZED TO LIVER (HCC): ICD-10-CM

## 2022-09-16 ENCOUNTER — APPOINTMENT (OUTPATIENT)
Dept: LAB | Facility: CLINIC | Age: 52
End: 2022-09-16
Payer: COMMERCIAL

## 2022-09-16 ENCOUNTER — HOSPITAL ENCOUNTER (OUTPATIENT)
Dept: RADIOLOGY | Facility: HOSPITAL | Age: 52
Discharge: HOME | End: 2022-09-16
Attending: THORACIC SURGERY (CARDIOTHORACIC VASCULAR SURGERY)
Payer: COMMERCIAL

## 2022-09-16 DIAGNOSIS — C78.01 MALIGNANT NEOPLASM METASTATIC TO RIGHT LUNG (HCC): ICD-10-CM

## 2022-09-16 DIAGNOSIS — C18.9 COLON CANCER METASTASIZED TO LIVER (HCC): ICD-10-CM

## 2022-09-16 DIAGNOSIS — D70.1 CHEMOTHERAPY INDUCED NEUTROPENIA (HCC): ICD-10-CM

## 2022-09-16 DIAGNOSIS — C18.9 METASTASIS FROM COLON CANCER (HCC): ICD-10-CM

## 2022-09-16 DIAGNOSIS — C78.7 COLON CANCER METASTASIZED TO LIVER (HCC): ICD-10-CM

## 2022-09-16 DIAGNOSIS — T45.1X5A CHEMOTHERAPY INDUCED NEUTROPENIA (HCC): ICD-10-CM

## 2022-09-16 DIAGNOSIS — C79.9 METASTASIS FROM COLON CANCER (HCC): ICD-10-CM

## 2022-09-16 LAB
ALBUMIN SERPL BCP-MCNC: 4.4 G/DL (ref 3.5–5)
ALP SERPL-CCNC: 43 U/L (ref 34–104)
ALT SERPL W P-5'-P-CCNC: 15 U/L (ref 7–52)
ANION GAP SERPL CALCULATED.3IONS-SCNC: 7 MMOL/L (ref 4–13)
AST SERPL W P-5'-P-CCNC: 17 U/L (ref 13–39)
BASOPHILS # BLD AUTO: 0.03 THOUSANDS/ΜL (ref 0–0.1)
BASOPHILS NFR BLD AUTO: 1 % (ref 0–1)
BILIRUB SERPL-MCNC: 0.61 MG/DL (ref 0.2–1)
BUN SERPL-MCNC: 12 MG/DL (ref 5–25)
CALCIUM SERPL-MCNC: 9.1 MG/DL (ref 8.4–10.2)
CHLORIDE SERPL-SCNC: 107 MMOL/L (ref 96–108)
CHOLEST SERPL-MCNC: 152 MG/DL
CO2 SERPL-SCNC: 24 MMOL/L (ref 21–32)
CREAT SERPL-MCNC: 1.03 MG/DL (ref 0.6–1.3)
EOSINOPHIL # BLD AUTO: 0.27 THOUSAND/ΜL (ref 0–0.61)
EOSINOPHIL NFR BLD AUTO: 5 % (ref 0–6)
ERYTHROCYTE [DISTWIDTH] IN BLOOD BY AUTOMATED COUNT: 12.8 % (ref 11.6–15.1)
GFR SERPL CREATININE-BSD FRML MDRD: 84 ML/MIN/1.73SQ M
GLUCOSE P FAST SERPL-MCNC: 109 MG/DL (ref 65–99)
HCT VFR BLD AUTO: 43 % (ref 36.5–49.3)
HDLC SERPL-MCNC: 46 MG/DL
HGB BLD-MCNC: 14.5 G/DL (ref 12–17)
IMM GRANULOCYTES # BLD AUTO: 0.01 THOUSAND/UL (ref 0–0.2)
IMM GRANULOCYTES NFR BLD AUTO: 0 % (ref 0–2)
LDLC SERPL CALC-MCNC: 94 MG/DL (ref 0–100)
LYMPHOCYTES # BLD AUTO: 1.61 THOUSANDS/ΜL (ref 0.6–4.47)
LYMPHOCYTES NFR BLD AUTO: 31 % (ref 14–44)
MAGNESIUM SERPL-MCNC: 2.2 MG/DL (ref 1.9–2.7)
MCH RBC QN AUTO: 31.9 PG (ref 26.8–34.3)
MCHC RBC AUTO-ENTMCNC: 33.7 G/DL (ref 31.4–37.4)
MCV RBC AUTO: 95 FL (ref 82–98)
MONOCYTES # BLD AUTO: 0.34 THOUSAND/ΜL (ref 0.17–1.22)
MONOCYTES NFR BLD AUTO: 7 % (ref 4–12)
NEUTROPHILS # BLD AUTO: 2.86 THOUSANDS/ΜL (ref 1.85–7.62)
NEUTS SEG NFR BLD AUTO: 56 % (ref 43–75)
NONHDLC SERPL-MCNC: 106 MG/DL
NRBC BLD AUTO-RTO: 0 /100 WBCS
PLATELET # BLD AUTO: 168 THOUSANDS/UL (ref 149–390)
PMV BLD AUTO: 11 FL (ref 8.9–12.7)
POTASSIUM SERPL-SCNC: 3.9 MMOL/L (ref 3.5–5.3)
PROT SERPL-MCNC: 7.6 G/DL (ref 6.4–8.4)
PSA SERPL-MCNC: 1.2 NG/ML (ref 0–4)
RBC # BLD AUTO: 4.54 MILLION/UL (ref 3.88–5.62)
SODIUM SERPL-SCNC: 138 MMOL/L (ref 135–147)
TRIGL SERPL-MCNC: 62 MG/DL
WBC # BLD AUTO: 5.12 THOUSAND/UL (ref 4.31–10.16)

## 2022-09-16 PROCEDURE — 85025 COMPLETE CBC W/AUTO DIFF WBC: CPT

## 2022-09-16 PROCEDURE — 80053 COMPREHEN METABOLIC PANEL: CPT

## 2022-09-16 PROCEDURE — 80061 LIPID PANEL: CPT

## 2022-09-16 PROCEDURE — 36415 COLL VENOUS BLD VENIPUNCTURE: CPT

## 2022-09-16 PROCEDURE — 83735 ASSAY OF MAGNESIUM: CPT

## 2022-09-16 PROCEDURE — G0103 PSA SCREENING: HCPCS

## 2022-09-16 PROCEDURE — 71046 X-RAY EXAM CHEST 2 VIEWS: CPT

## 2022-09-20 ENCOUNTER — HOSPITAL ENCOUNTER (OUTPATIENT)
Dept: INFUSION CENTER | Facility: HOSPITAL | Age: 52
Discharge: HOME | End: 2022-09-20
Attending: INTERNAL MEDICINE
Payer: COMMERCIAL

## 2022-09-20 VITALS
DIASTOLIC BLOOD PRESSURE: 66 MMHG | SYSTOLIC BLOOD PRESSURE: 98 MMHG | WEIGHT: 157.41 LBS | BODY MASS INDEX: 24.71 KG/M2 | HEART RATE: 72 BPM | HEIGHT: 67 IN | RESPIRATION RATE: 20 BRPM | TEMPERATURE: 96.8 F

## 2022-09-20 DIAGNOSIS — D70.1 CHEMOTHERAPY INDUCED NEUTROPENIA (HCC): ICD-10-CM

## 2022-09-20 DIAGNOSIS — C79.9 METASTASIS FROM COLON CANCER (HCC): ICD-10-CM

## 2022-09-20 DIAGNOSIS — C18.9 METASTASIS FROM COLON CANCER (HCC): ICD-10-CM

## 2022-09-20 DIAGNOSIS — C18.9 COLON CANCER METASTASIZED TO LIVER (HCC): Primary | ICD-10-CM

## 2022-09-20 DIAGNOSIS — T45.1X5A CHEMOTHERAPY INDUCED NEUTROPENIA (HCC): ICD-10-CM

## 2022-09-20 DIAGNOSIS — C78.01 MALIGNANT NEOPLASM METASTATIC TO RIGHT LUNG (HCC): ICD-10-CM

## 2022-09-20 DIAGNOSIS — C78.7 COLON CANCER METASTASIZED TO LIVER (HCC): Primary | ICD-10-CM

## 2022-09-20 PROCEDURE — 96375 TX/PRO/DX INJ NEW DRUG ADDON: CPT

## 2022-09-20 PROCEDURE — 96415 CHEMO IV INFUSION ADDL HR: CPT

## 2022-09-20 PROCEDURE — 96417 CHEMO IV INFUS EACH ADDL SEQ: CPT

## 2022-09-20 PROCEDURE — G0498 CHEMO EXTEND IV INFUS W/PUMP: HCPCS

## 2022-09-20 PROCEDURE — 96411 CHEMO IV PUSH ADDL DRUG: CPT

## 2022-09-20 PROCEDURE — 96368 THER/DIAG CONCURRENT INF: CPT

## 2022-09-20 PROCEDURE — 96413 CHEMO IV INFUSION 1 HR: CPT

## 2022-09-20 PROCEDURE — 96367 TX/PROPH/DG ADDL SEQ IV INF: CPT

## 2022-09-20 RX ORDER — FLUOROURACIL 50 MG/ML
400 INJECTION, SOLUTION INTRAVENOUS ONCE
Status: COMPLETED | OUTPATIENT
Start: 2022-09-20 | End: 2022-09-20

## 2022-09-20 RX ORDER — ATROPINE SULFATE 1 MG/ML
0.25 INJECTION, SOLUTION INTRAVENOUS ONCE
Status: COMPLETED | OUTPATIENT
Start: 2022-09-20 | End: 2022-09-20

## 2022-09-20 RX ORDER — SODIUM CHLORIDE 9 MG/ML
20 INJECTION, SOLUTION INTRAVENOUS ONCE
Status: COMPLETED | OUTPATIENT
Start: 2022-09-20 | End: 2022-09-20

## 2022-09-20 RX ADMIN — DIPHENHYDRAMINE HYDROCHLORIDE 25 MG: 50 INJECTION, SOLUTION INTRAMUSCULAR; INTRAVENOUS at 10:18

## 2022-09-20 RX ADMIN — Medication 900 MG: at 10:54

## 2022-09-20 RX ADMIN — ALTEPLASE 2 MG: 2.2 INJECTION, POWDER, LYOPHILIZED, FOR SOLUTION INTRAVENOUS at 09:07

## 2022-09-20 RX ADMIN — SODIUM CHLORIDE 20 ML/HR: 0.9 INJECTION, SOLUTION INTRAVENOUS at 09:45

## 2022-09-20 RX ADMIN — ATROPINE SULFATE 0.25 MG: 1 INJECTION, SOLUTION INTRAMUSCULAR; INTRAVENOUS; SUBCUTANEOUS at 13:47

## 2022-09-20 RX ADMIN — IRINOTECAN HYDROCHLORIDE 320 MG: 20 INJECTION, SOLUTION INTRAVENOUS at 13:53

## 2022-09-20 RX ADMIN — FLUOROURACIL 735 MG: 50 INJECTION, SOLUTION INTRAVENOUS at 15:26

## 2022-09-20 RX ADMIN — DEXAMETHASONE SODIUM PHOSPHATE: 10 INJECTION, SOLUTION INTRAMUSCULAR; INTRAVENOUS at 09:50

## 2022-09-20 RX ADMIN — LEUCOVORIN CALCIUM 700 MG: 200 INJECTION, POWDER, LYOPHILIZED, FOR SUSPENSION INTRAMUSCULAR; INTRAVENOUS at 13:53

## 2022-09-22 ENCOUNTER — HOSPITAL ENCOUNTER (OUTPATIENT)
Dept: INFUSION CENTER | Facility: CLINIC | Age: 52
Discharge: HOME | End: 2022-09-22
Payer: COMMERCIAL

## 2022-09-22 ENCOUNTER — OFFICE VISIT (OUTPATIENT)
Dept: URGENT CARE | Age: 52
End: 2022-09-22
Payer: COMMERCIAL

## 2022-09-22 VITALS
RESPIRATION RATE: 16 BRPM | SYSTOLIC BLOOD PRESSURE: 106 MMHG | DIASTOLIC BLOOD PRESSURE: 71 MMHG | TEMPERATURE: 97.7 F | OXYGEN SATURATION: 99 % | HEART RATE: 81 BPM

## 2022-09-22 DIAGNOSIS — R11.0 NAUSEA: Primary | ICD-10-CM

## 2022-09-22 DIAGNOSIS — T45.1X5A CHEMOTHERAPY INDUCED NEUTROPENIA (HCC): ICD-10-CM

## 2022-09-22 DIAGNOSIS — C78.01 MALIGNANT NEOPLASM METASTATIC TO RIGHT LUNG (HCC): ICD-10-CM

## 2022-09-22 DIAGNOSIS — C18.9 METASTASIS FROM COLON CANCER (HCC): ICD-10-CM

## 2022-09-22 DIAGNOSIS — C78.7 COLON CANCER METASTASIZED TO LIVER (HCC): Primary | ICD-10-CM

## 2022-09-22 DIAGNOSIS — D70.1 CHEMOTHERAPY INDUCED NEUTROPENIA (HCC): ICD-10-CM

## 2022-09-22 DIAGNOSIS — C79.9 METASTASIS FROM COLON CANCER (HCC): ICD-10-CM

## 2022-09-22 DIAGNOSIS — C18.9 COLON CANCER METASTASIZED TO LIVER (HCC): Primary | ICD-10-CM

## 2022-09-22 PROCEDURE — 96372 THER/PROPH/DIAG INJ SC/IM: CPT

## 2022-09-22 PROCEDURE — 99213 OFFICE O/P EST LOW 20 MIN: CPT | Performed by: NURSE PRACTITIONER

## 2022-09-22 RX ORDER — PROMETHAZINE HYDROCHLORIDE 12.5 MG/1
12.5 TABLET ORAL EVERY 6 HOURS PRN
Qty: 30 TABLET | Refills: 1 | Status: SHIPPED | OUTPATIENT
Start: 2022-09-22

## 2022-09-22 RX ORDER — PROMETHAZINE HYDROCHLORIDE 25 MG/ML
25 INJECTION, SOLUTION INTRAMUSCULAR; INTRAVENOUS ONCE
Status: COMPLETED | OUTPATIENT
Start: 2022-09-22 | End: 2022-09-22

## 2022-09-22 RX ADMIN — PROMETHAZINE HYDROCHLORIDE 25 MG: 25 INJECTION, SOLUTION INTRAMUSCULAR; INTRAVENOUS at 13:47

## 2022-09-22 RX ADMIN — PEGFILGRASTIM 6 MG: KIT SUBCUTANEOUS at 14:57

## 2022-09-22 NOTE — PROGRESS NOTES
Pt here for Elastomeric pump disconnect  Pt reports having reflux on Tuesday night after his treatment and waves of hot flashes with lightheadedness prior to nausea and vomiting episodes  Pt reports taking his zofran as prescribed, but it was not helping  Patient stopped at urgent care on the way to his infusion appointment today, pt received Phenergan with relief of nausea/vomiting & associated symptoms  Spoke with Maria Del Carmen De La Torre RN, Phenergan to be sent to patients pharmacy, patient made aware of this and encouraged to contact provider's office with any further concerns  Pump appeared completely deflated after 46 hours, unable to obtain blood return after disconnect with multiple position changes  Pt is scheduled for port flush w/ anticipation of TPA for next week, as patient is unable to stay for TPA today  Maria Del Carmen De La Torre RN made aware of this as well  Port flushed per protocol  Neulasta OnPro placed on L arm-green light flashing upon discharge, pt is aware of when to remove device at home  Pt aware of next appointments   Declines AVS

## 2022-09-22 NOTE — PROGRESS NOTES
[address] [facility]        NAME: Payam Saunders is a 54 y o  male  : 1970    MRN: [id number]  DATE: 2022  TIME: 1:56 PM    Assessment and Plan   Nausea [R11 0]  1  Nausea  promethazine (PHENERGAN) injection 25 mg         Patient Instructions     Continue taking prescribed medication for nausea and vomiting p r n  Given Phenergan injection at the clinic, 25 mg IM  Nausea mostly resolved before leaving the clinic today  Driven home by girlfriend's daughter  Follow up with PCP in 3-5 days  Proceed to  ER if symptoms worsen  Chief Complaint     Chief Complaint   Patient presents with    Vomiting    Nausea         History of Present Illness       HPI   Presents to clinic with complaint of severe nausea  States he is currently undergoing chemotherapy for tumor removed on the right side of his chest  He was driving at the time and decided to stop by the clinic because of the nausea was getting severe  He initially had some lightheadedness  Vomiting x 1, while at the clinic    Review of Systems   Review of Systems   Constitutional: Negative for fever  HENT: Negative for sore throat  Respiratory: Negative for chest tightness and shortness of breath  Cardiovascular: Negative for chest pain  Gastrointestinal: Positive for nausea and vomiting  Negative for abdominal pain  Neurological: Positive for light-headedness           Current Medications       Current Outpatient Medications:     clindamycin (CLINDAGEL) 1 % gel, Apply topically 2 (two) times a day, Disp: 30 g, Rfl: 1    docusate sodium (COLACE) 100 mg capsule, Take 1 capsule (100 mg total) by mouth 2 (two) times a day for 10 days (Patient not taking: Reported on 2022), Disp: , Rfl: 0    fluorouracil 4,415 mg in CADD/Elastomeric Infusion Device, Infuse 4,415 mg (1,200 mg/m2/day x 1 84 m2) into a catheter in a vein via infusion device over 46 hours for 2 days  Do not start before 2022 , Disp: 1 each, Rfl: 6   gabapentin (NEURONTIN) 300 mg capsule, Take 1 capsule (300 mg total) by mouth 3 (three) times a day for 21 days (Patient not taking: Reported on 9/1/2022), Disp: 63 capsule, Rfl: 0    ibuprofen (MOTRIN) 600 mg tablet, Take 1 tablet (600 mg total) by mouth 3 (three) times a day with meals for 7 days (Patient not taking: Reported on 9/1/2022), Disp: 21 tablet, Rfl: 0    minocycline (MINOCIN) 100 mg capsule, Take 1 capsule (100 mg total) by mouth every 12 (twelve) hours, Disp: 60 capsule, Rfl: 3    Multiple Vitamins-Minerals (MENS MULTIVITAMIN PO), Take by mouth in the morning, Disp: , Rfl:     ondansetron (Zofran ODT) 8 mg disintegrating tablet, Take 1 tablet (8 mg total) by mouth every 8 (eight) hours as needed for nausea or vomiting, Disp: 20 tablet, Rfl: 0    sildenafil (VIAGRA) 50 MG tablet, Take 1 tablet (50 mg total) by mouth as needed for erectile dysfunction, Disp: 10 tablet, Rfl: 0  No current facility-administered medications for this visit  Current Allergies     Allergies as of 09/22/2022    (No Known Allergies)            The following portions of the patient's history were reviewed and updated as appropriate: allergies, current medications, past family history, past medical history, past social history, past surgical history and problem list      Past Medical History:   Diagnosis Date    Cancer ([facility] [address] )     Colon cancer ([facility] [address] )     Dysuria     Last Assessed:8/24/17    GERD (gastroesophageal reflux disease)     Liver cancer ([facility] [address] )     Liver nodule     Pulmonary nodule, right     Ureteropelvic junction (UPJ) obstruction 01/29/2020    Wears glasses        Past Surgical History:   Procedure Laterality Date    ABDOMINAL SURGERY      COLON SURGERY      COLONOSCOPY N/A 01/22/2018    Procedure: COLONOSCOPY;  Surgeon: Joe Bello MD;  [facility];   Service: Colorectal   Jeniffer Riis DENTAL SURGERY  2016    Crown with bridge work    FLEXIBLE SIGMOIDOSCOPY N/A 06/15/2018    Procedure: SIGMOIDOSCOPY FLEXIBLE w/o sedation w/ tattoo;  Surgeon: Roxanna Reyna MD;  [facility];   Service: Colorectal    IR PORT PLACEMENT Right     LAPAROTOMY N/A 06/08/2020    Procedure: LAPAROTOMY EXPLORATORY, LYSIS OF ADHESIONS;  Surgeon: Kathy Garrett MD;  [facility];  Service: Surgical Oncology    LIVER LOBECTOMY N/A 06/21/2018    Procedure: liver resection/ablation, intraoperative ultrasound of liver;  Surgeon: Kathy Garrett MD;  [facility];  Service: Surgical Oncology    LIVER LOBECTOMY N/A 06/08/2020    Procedure: LIVER RESECTION SEGMENT 3 WITH  INTRAOPERATIVE U/S OF LIVER;  Surgeon: Kathy Garrett MD;  [facility];  Service: Surgical Oncology    LUNG SEGMENTECTOMY Right 8/9/2022    Procedure: RESECTION WEDGE LUNG, THERAPEUTIC;  Surgeon: Lisa Munoz MD;  [facility];  Service: Thoracic    DC [address] N/A 8/9/2022    Procedure: [place] Stall;  Surgeon: Lisa Munoz MD;  [facility];  Service: Thoracic    DC EXPLORATORY OF ABDOMEN N/A 06/21/2018    Procedure: LAPAROTOMY EXPLORATORY;  Surgeon: Roxanna Reyna MD;  [facility];  Service: Colorectal    DC INSERT PICC W/ SUB-Q PORT N/A 01/25/2018    Procedure: PORT-A-CATHETER PLACEMENT  Fluoroscopy for performance/interpretation;  Surgeon: Roxanna Reyna MD;  [facility];  Service: Colorectal    DC PART REMOVAL COLON W ANASTOMOSIS Left 06/21/2018    Procedure: hemicolectomy, low anterior resection (open) SPY fluorescence angiography;  Surgeon: Roxanna Reyna MD;  [facility];  Service: Colorectal    DC PROCTECTOMY,PARTIAL N/A 06/21/2018    Procedure: Partial proctectomy ;  Surgeon: Roxanna Reyna MD;  [facility];  Service: Colorectal    DC SIGMOIDOSCOPY FLX DX W/COLLJ SPEC BR/WA IF PFRMD N/A 06/21/2018    Procedure: Therman Larger;  Surgeon: Roxanna Reyna MD;  [facility];  Service: Colorectal    MO THORACOSCOPY W/THERA WEDGE RESEXN INITIAL UNILAT Right 8/9/2022    Procedure: THORACOSCOPY VIDEO ASSISTED SURGERY (VATS); Surgeon: Jaya Moreland MD;  [facility];  Service: Thoracic    ROOT CANAL  2015    TESTICLE SURGERY      Exploration of Undescended Testis rIght, age 8 had surgery for undescended testicle; Last Assessed:8/24/17    TOOTH EXTRACTION  2015       Family History   Problem Relation Age of Onset    No Known Problems Father     Cancer Mother         unknown    Cancer Brother         pt  reports brother was diagnosed with stage 4 throat cancer    Throat cancer Brother     Breast cancer Maternal Aunt          Medications have been verified  Objective   /71 (BP Location: Left arm, Patient Position: Sitting, Cuff Size: Standard)   Pulse 81   Temp 97 7 °F (36 5 °C) (Temporal)   Resp 16   SpO2 99%   No LMP for male patient  Physical Exam     Physical Exam  Constitutional:       Appearance: He is not ill-appearing or diaphoretic  HENT:      Right Ear: Tympanic membrane normal       Left Ear: Tympanic membrane normal       Nose: No rhinorrhea  Mouth/Throat:      Pharynx: No posterior oropharyngeal erythema  Cardiovascular:      Rate and Rhythm: Regular rhythm  Heart sounds: Normal heart sounds  Pulmonary:      Effort: Pulmonary effort is normal       Breath sounds: Normal breath sounds  No wheezing

## 2022-09-26 RX ORDER — MAGNESIUM SULFATE HEPTAHYDRATE 40 MG/ML
4 INJECTION, SOLUTION INTRAVENOUS ONCE AS NEEDED
Status: CANCELLED | OUTPATIENT
Start: 2022-10-04

## 2022-09-26 RX ORDER — SODIUM CHLORIDE 9 MG/ML
20 INJECTION, SOLUTION INTRAVENOUS ONCE
Status: CANCELLED | OUTPATIENT
Start: 2022-10-04

## 2022-09-26 RX ORDER — ATROPINE SULFATE 1 MG/ML
0.25 INJECTION, SOLUTION INTRAVENOUS ONCE
Status: CANCELLED | OUTPATIENT
Start: 2022-10-04

## 2022-09-26 RX ORDER — FLUOROURACIL 50 MG/ML
400 INJECTION, SOLUTION INTRAVENOUS ONCE
Status: CANCELLED | OUTPATIENT
Start: 2022-10-04

## 2022-09-26 RX ORDER — MAGNESIUM SULFATE HEPTAHYDRATE 40 MG/ML
2 INJECTION, SOLUTION INTRAVENOUS ONCE AS NEEDED
Status: CANCELLED | OUTPATIENT
Start: 2022-10-04

## 2022-09-27 ENCOUNTER — PATIENT OUTREACH (OUTPATIENT)
Dept: CASE MANAGEMENT | Facility: HOSPITAL | Age: 52
End: 2022-09-27

## 2022-09-27 DIAGNOSIS — C18.9 METASTASIS FROM COLON CANCER (HCC): ICD-10-CM

## 2022-09-27 DIAGNOSIS — C18.9 COLON CANCER METASTASIZED TO LIVER (HCC): ICD-10-CM

## 2022-09-27 DIAGNOSIS — C79.9 METASTASIS FROM COLON CANCER (HCC): ICD-10-CM

## 2022-09-27 DIAGNOSIS — C78.01 MALIGNANT NEOPLASM METASTATIC TO RIGHT LUNG (HCC): ICD-10-CM

## 2022-09-27 DIAGNOSIS — D70.1 CHEMOTHERAPY INDUCED NEUTROPENIA (HCC): Primary | ICD-10-CM

## 2022-09-27 DIAGNOSIS — C78.7 COLON CANCER METASTASIZED TO LIVER (HCC): ICD-10-CM

## 2022-09-27 DIAGNOSIS — T45.1X5A CHEMOTHERAPY INDUCED NEUTROPENIA (HCC): Primary | ICD-10-CM

## 2022-09-27 NOTE — PROGRESS NOTES
JAY received completed [employer] application from [employer] at [facility] infusion  This MSW e-mailed the completed application to [id number] [facility] [address]  N

## 2022-09-28 ENCOUNTER — HOSPITAL ENCOUNTER (OUTPATIENT)
Dept: INFUSION CENTER | Facility: CLINIC | Age: 52
Discharge: HOME | End: 2022-09-28

## 2022-09-28 NOTE — PROGRESS NOTES
Patient to infusion for port flush and possible TPA  He states he is feeling much better, nausea and heartburn has resolved  Port accessed,  Good blood return noted  No need for TPA today    Next appointment confirmed, he declined AVS

## 2022-10-03 ENCOUNTER — APPOINTMENT (OUTPATIENT)
Dept: LAB | Facility: CLINIC | Age: 52
End: 2022-10-03
Payer: COMMERCIAL

## 2022-10-03 DIAGNOSIS — C78.7 COLON CANCER METASTASIZED TO LIVER (HCC): ICD-10-CM

## 2022-10-03 DIAGNOSIS — T45.1X5A CHEMOTHERAPY INDUCED NEUTROPENIA (HCC): ICD-10-CM

## 2022-10-03 DIAGNOSIS — C18.9 COLON CANCER METASTASIZED TO LIVER (HCC): ICD-10-CM

## 2022-10-03 DIAGNOSIS — C78.01 MALIGNANT NEOPLASM METASTATIC TO RIGHT LUNG (HCC): ICD-10-CM

## 2022-10-03 DIAGNOSIS — C79.9 METASTASIS FROM COLON CANCER (HCC): ICD-10-CM

## 2022-10-03 DIAGNOSIS — C18.9 METASTASIS FROM COLON CANCER (HCC): ICD-10-CM

## 2022-10-03 DIAGNOSIS — D70.1 CHEMOTHERAPY INDUCED NEUTROPENIA (HCC): ICD-10-CM

## 2022-10-03 LAB
ALBUMIN SERPL BCP-MCNC: 4.2 G/DL (ref 3.5–5)
ALP SERPL-CCNC: 101 U/L (ref 34–104)
ALT SERPL W P-5'-P-CCNC: 20 U/L (ref 7–52)
ANION GAP SERPL CALCULATED.3IONS-SCNC: 6 MMOL/L (ref 4–13)
AST SERPL W P-5'-P-CCNC: 16 U/L (ref 13–39)
BASOPHILS # BLD AUTO: 0.06 THOUSANDS/ΜL (ref 0–0.1)
BASOPHILS NFR BLD AUTO: 1 % (ref 0–1)
BILIRUB SERPL-MCNC: 0.31 MG/DL (ref 0.2–1)
BUN SERPL-MCNC: 11 MG/DL (ref 5–25)
CALCIUM SERPL-MCNC: 9.2 MG/DL (ref 8.4–10.2)
CHLORIDE SERPL-SCNC: 107 MMOL/L (ref 96–108)
CO2 SERPL-SCNC: 26 MMOL/L (ref 21–32)
CREAT SERPL-MCNC: 0.86 MG/DL (ref 0.6–1.3)
EOSINOPHIL # BLD AUTO: 0.21 THOUSAND/ΜL (ref 0–0.61)
EOSINOPHIL NFR BLD AUTO: 2 % (ref 0–6)
ERYTHROCYTE [DISTWIDTH] IN BLOOD BY AUTOMATED COUNT: 14 % (ref 11.6–15.1)
GFR SERPL CREATININE-BSD FRML MDRD: 101 ML/MIN/1.73SQ M
GLUCOSE SERPL-MCNC: 121 MG/DL (ref 65–140)
HCT VFR BLD AUTO: 42.4 % (ref 36.5–49.3)
HGB BLD-MCNC: 14.8 G/DL (ref 12–17)
IMM GRANULOCYTES # BLD AUTO: 0.13 THOUSAND/UL (ref 0–0.2)
IMM GRANULOCYTES NFR BLD AUTO: 1 % (ref 0–2)
LYMPHOCYTES # BLD AUTO: 1.51 THOUSANDS/ΜL (ref 0.6–4.47)
LYMPHOCYTES NFR BLD AUTO: 12 % (ref 14–44)
MAGNESIUM SERPL-MCNC: 1.8 MG/DL (ref 1.9–2.7)
MCH RBC QN AUTO: 33.1 PG (ref 26.8–34.3)
MCHC RBC AUTO-ENTMCNC: 34.9 G/DL (ref 31.4–37.4)
MCV RBC AUTO: 95 FL (ref 82–98)
MONOCYTES # BLD AUTO: 0.71 THOUSAND/ΜL (ref 0.17–1.22)
MONOCYTES NFR BLD AUTO: 5 % (ref 4–12)
NEUTROPHILS # BLD AUTO: 10.43 THOUSANDS/ΜL (ref 1.85–7.62)
NEUTS SEG NFR BLD AUTO: 79 % (ref 43–75)
NRBC BLD AUTO-RTO: 0 /100 WBCS
PLATELET # BLD AUTO: 159 THOUSANDS/UL (ref 149–390)
PMV BLD AUTO: 10.7 FL (ref 8.9–12.7)
POTASSIUM SERPL-SCNC: 3.9 MMOL/L (ref 3.5–5.3)
PROT SERPL-MCNC: 7.4 G/DL (ref 6.4–8.4)
RBC # BLD AUTO: 4.47 MILLION/UL (ref 3.88–5.62)
SODIUM SERPL-SCNC: 139 MMOL/L (ref 135–147)
WBC # BLD AUTO: 13.05 THOUSAND/UL (ref 4.31–10.16)

## 2022-10-03 PROCEDURE — 85025 COMPLETE CBC W/AUTO DIFF WBC: CPT

## 2022-10-03 PROCEDURE — 83735 ASSAY OF MAGNESIUM: CPT

## 2022-10-03 PROCEDURE — 80053 COMPREHEN METABOLIC PANEL: CPT

## 2022-10-03 PROCEDURE — 36415 COLL VENOUS BLD VENIPUNCTURE: CPT

## 2022-10-04 ENCOUNTER — DOCUMENTATION (OUTPATIENT)
Dept: CARDIAC SURGERY | Facility: CLINIC | Age: 52
End: 2022-10-04

## 2022-10-04 ENCOUNTER — HOSPITAL ENCOUNTER (OUTPATIENT)
Dept: INFUSION CENTER | Facility: CLINIC | Age: 52
Discharge: HOME | End: 2022-10-04
Payer: COMMERCIAL

## 2022-10-04 VITALS
SYSTOLIC BLOOD PRESSURE: 115 MMHG | DIASTOLIC BLOOD PRESSURE: 78 MMHG | WEIGHT: 156 LBS | HEART RATE: 68 BPM | OXYGEN SATURATION: 98 % | TEMPERATURE: 98.8 F | BODY MASS INDEX: 24.48 KG/M2 | RESPIRATION RATE: 18 BRPM | HEIGHT: 67 IN

## 2022-10-04 DIAGNOSIS — C78.01 MALIGNANT NEOPLASM METASTATIC TO RIGHT LUNG (HCC): ICD-10-CM

## 2022-10-04 DIAGNOSIS — C79.9 METASTASIS FROM COLON CANCER (HCC): ICD-10-CM

## 2022-10-04 DIAGNOSIS — C78.7 COLON CANCER METASTASIZED TO LIVER (HCC): Primary | ICD-10-CM

## 2022-10-04 DIAGNOSIS — T45.1X5A CHEMOTHERAPY INDUCED NEUTROPENIA (HCC): ICD-10-CM

## 2022-10-04 DIAGNOSIS — D70.1 CHEMOTHERAPY INDUCED NEUTROPENIA (HCC): ICD-10-CM

## 2022-10-04 DIAGNOSIS — C18.9 METASTASIS FROM COLON CANCER (HCC): ICD-10-CM

## 2022-10-04 DIAGNOSIS — C18.9 COLON CANCER METASTASIZED TO LIVER (HCC): Primary | ICD-10-CM

## 2022-10-04 PROCEDURE — 96368 THER/DIAG CONCURRENT INF: CPT

## 2022-10-04 PROCEDURE — 96417 CHEMO IV INFUS EACH ADDL SEQ: CPT

## 2022-10-04 PROCEDURE — 96413 CHEMO IV INFUSION 1 HR: CPT

## 2022-10-04 PROCEDURE — G0498 CHEMO EXTEND IV INFUS W/PUMP: HCPCS

## 2022-10-04 PROCEDURE — 96367 TX/PROPH/DG ADDL SEQ IV INF: CPT

## 2022-10-04 PROCEDURE — 96411 CHEMO IV PUSH ADDL DRUG: CPT

## 2022-10-04 PROCEDURE — 96375 TX/PRO/DX INJ NEW DRUG ADDON: CPT

## 2022-10-04 RX ORDER — ATROPINE SULFATE 1 MG/ML
0.25 INJECTION, SOLUTION INTRAVENOUS ONCE
Status: COMPLETED | OUTPATIENT
Start: 2022-10-04 | End: 2022-10-04

## 2022-10-04 RX ORDER — FLUOROURACIL 50 MG/ML
400 INJECTION, SOLUTION INTRAVENOUS ONCE
Status: COMPLETED | OUTPATIENT
Start: 2022-10-04 | End: 2022-10-04

## 2022-10-04 RX ORDER — MAGNESIUM SULFATE HEPTAHYDRATE 40 MG/ML
2 INJECTION, SOLUTION INTRAVENOUS ONCE AS NEEDED
Status: DISCONTINUED | OUTPATIENT
Start: 2022-10-04 | End: 2022-10-07 | Stop reason: HOSPADM

## 2022-10-04 RX ORDER — MAGNESIUM SULFATE HEPTAHYDRATE 40 MG/ML
4 INJECTION, SOLUTION INTRAVENOUS ONCE AS NEEDED
Status: DISCONTINUED | OUTPATIENT
Start: 2022-10-04 | End: 2022-10-07 | Stop reason: HOSPADM

## 2022-10-04 RX ORDER — SODIUM CHLORIDE 9 MG/ML
20 INJECTION, SOLUTION INTRAVENOUS ONCE
Status: COMPLETED | OUTPATIENT
Start: 2022-10-04 | End: 2022-10-04

## 2022-10-04 RX ADMIN — DEXAMETHASONE SODIUM PHOSPHATE: 10 INJECTION, SOLUTION INTRAMUSCULAR; INTRAVENOUS at 11:01

## 2022-10-04 RX ADMIN — FLUOROURACIL 735 MG: 50 INJECTION, SOLUTION INTRAVENOUS at 13:15

## 2022-10-04 RX ADMIN — DIPHENHYDRAMINE HYDROCHLORIDE 25 MG: 50 INJECTION, SOLUTION INTRAMUSCULAR; INTRAVENOUS at 08:38

## 2022-10-04 RX ADMIN — SODIUM CHLORIDE 20 ML/HR: 0.9 INJECTION, SOLUTION INTRAVENOUS at 08:38

## 2022-10-04 RX ADMIN — Medication 900 MG: at 10:01

## 2022-10-04 RX ADMIN — ATROPINE SULFATE 0.25 MG: 1 INJECTION, SOLUTION INTRAMUSCULAR; INTRAVENOUS; SUBCUTANEOUS at 11:30

## 2022-10-04 RX ADMIN — MAGNESIUM SULFATE HEPTAHYDRATE 2 G: 40 INJECTION, SOLUTION INTRAVENOUS at 08:38

## 2022-10-04 RX ADMIN — LEUCOVORIN CALCIUM 700 MG: 200 INJECTION, POWDER, LYOPHILIZED, FOR SOLUTION INTRAMUSCULAR; INTRAVENOUS at 11:36

## 2022-10-04 RX ADMIN — IRINOTECAN HYDROCHLORIDE 320 MG: 20 INJECTION, SOLUTION INTRAVENOUS at 11:36

## 2022-10-04 NOTE — PROGRESS NOTES
Pt tolerated treatment well  Elastomeric pump connected with double RN check  White clamp on pump tubing unclamped prior to discharge  Pt aware to return 10/6 @ 1130 for disconnect  AVS declined

## 2022-10-04 NOTE — PROGRESS NOTES
Pt here for chemotherapy +elastomeric pump connect  Pt offers no complaints-reports that he is feeling well today  Vitals stable upon admission  Labs reviewed from 10/3-within parameters for treatment   Call bell in reach

## 2022-10-04 NOTE — PROGRESS NOTES
This patient had no readmissions in the 30 days following surgery    Nuha Mendez MD  Thoracic Surgery  (Available on [facility] Text)  Office: [phone]

## 2022-10-06 ENCOUNTER — APPOINTMENT (OUTPATIENT)
Dept: LAB | Facility: CLINIC | Age: 52
End: 2022-10-06
Payer: COMMERCIAL

## 2022-10-06 ENCOUNTER — HOSPITAL ENCOUNTER (OUTPATIENT)
Dept: INFUSION CENTER | Facility: CLINIC | Age: 52
Discharge: HOME | End: 2022-10-06
Payer: COMMERCIAL

## 2022-10-06 DIAGNOSIS — C78.7 COLON CANCER METASTASIZED TO LIVER (HCC): ICD-10-CM

## 2022-10-06 DIAGNOSIS — T45.1X5A CHEMOTHERAPY INDUCED NEUTROPENIA (HCC): Primary | ICD-10-CM

## 2022-10-06 DIAGNOSIS — D70.1 CHEMOTHERAPY INDUCED NEUTROPENIA (HCC): Primary | ICD-10-CM

## 2022-10-06 DIAGNOSIS — C78.01 MALIGNANT NEOPLASM METASTATIC TO RIGHT LUNG (HCC): ICD-10-CM

## 2022-10-06 DIAGNOSIS — C18.9 COLON CANCER METASTASIZED TO LIVER (HCC): ICD-10-CM

## 2022-10-06 DIAGNOSIS — C18.9 METASTASIS FROM COLON CANCER (HCC): ICD-10-CM

## 2022-10-06 DIAGNOSIS — T45.1X5A CHEMOTHERAPY INDUCED NEUTROPENIA (HCC): ICD-10-CM

## 2022-10-06 DIAGNOSIS — C79.9 METASTASIS FROM COLON CANCER (HCC): ICD-10-CM

## 2022-10-06 DIAGNOSIS — C78.7 COLON CANCER METASTASIZED TO LIVER (HCC): Primary | ICD-10-CM

## 2022-10-06 DIAGNOSIS — C18.9 COLON CANCER METASTASIZED TO LIVER (HCC): Primary | ICD-10-CM

## 2022-10-06 DIAGNOSIS — D70.1 CHEMOTHERAPY INDUCED NEUTROPENIA (HCC): ICD-10-CM

## 2022-10-06 LAB
BUN SERPL-MCNC: 16 MG/DL (ref 5–25)
CREAT SERPL-MCNC: 0.88 MG/DL (ref 0.6–1.3)
GFR SERPL CREATININE-BSD FRML MDRD: 100 ML/MIN/1.73SQ M

## 2022-10-06 PROCEDURE — 82565 ASSAY OF CREATININE: CPT

## 2022-10-06 PROCEDURE — 36415 COLL VENOUS BLD VENIPUNCTURE: CPT

## 2022-10-06 PROCEDURE — 84520 ASSAY OF UREA NITROGEN: CPT

## 2022-10-06 NOTE — PROGRESS NOTES
Patient here for elastomeric pump removal  Elastomeric pump removed after 46 hours per order and visually appeared to be empty  Port flushed with blood return noted and de-accessed without issue  Patient ordered neulasta on pro and has MRI tomorrow at 11:00 am, patient unable to have device on during MRI and device will not administer medication until after MRI scheduled  Spoke with Raghav Acuña RN in Dr Michael Crenshaw office, okay for patient to not receive on pro and return to infusion center tomorrow after MRI for ziextenzo injection  Patient agreeable to this and scheduled for 12:30  Patient aware of next appointments  AVS declined

## 2022-10-07 ENCOUNTER — HOSPITAL ENCOUNTER (OUTPATIENT)
Dept: MRI IMAGING | Facility: HOSPITAL | Age: 52
Discharge: HOME | End: 2022-10-07
Attending: SURGERY
Payer: COMMERCIAL

## 2022-10-07 ENCOUNTER — HOSPITAL ENCOUNTER (OUTPATIENT)
Dept: INFUSION CENTER | Facility: CLINIC | Age: 52
End: 2022-10-07
Payer: COMMERCIAL

## 2022-10-07 VITALS — TEMPERATURE: 98 F

## 2022-10-07 DIAGNOSIS — D70.1 CHEMOTHERAPY INDUCED NEUTROPENIA (HCC): ICD-10-CM

## 2022-10-07 DIAGNOSIS — C18.9 COLON CANCER METASTASIZED TO LIVER (HCC): ICD-10-CM

## 2022-10-07 DIAGNOSIS — C79.9 METASTASIS FROM COLON CANCER (HCC): ICD-10-CM

## 2022-10-07 DIAGNOSIS — C18.9 COLON CANCER METASTASIZED TO LIVER (HCC): Primary | ICD-10-CM

## 2022-10-07 DIAGNOSIS — C18.9 METASTASIS FROM COLON CANCER (HCC): ICD-10-CM

## 2022-10-07 DIAGNOSIS — C78.7 COLON CANCER METASTASIZED TO LIVER (HCC): ICD-10-CM

## 2022-10-07 DIAGNOSIS — C78.7 COLON CANCER METASTASIZED TO LIVER (HCC): Primary | ICD-10-CM

## 2022-10-07 DIAGNOSIS — C78.01 MALIGNANT NEOPLASM METASTATIC TO RIGHT LUNG (HCC): ICD-10-CM

## 2022-10-07 DIAGNOSIS — T45.1X5A CHEMOTHERAPY INDUCED NEUTROPENIA (HCC): ICD-10-CM

## 2022-10-07 PROCEDURE — 74183 MRI ABD W/O CNTR FLWD CNTR: CPT

## 2022-10-07 PROCEDURE — G1004 CDSM NDSC: HCPCS

## 2022-10-07 PROCEDURE — A9585 GADOBUTROL INJECTION: HCPCS | Performed by: SURGERY

## 2022-10-07 PROCEDURE — 96372 THER/PROPH/DIAG INJ SC/IM: CPT

## 2022-10-07 RX ADMIN — PEGFILGRASTIM-BMEZ 6 MG: 6 INJECTION SUBCUTANEOUS at 12:38

## 2022-10-07 RX ADMIN — GADOBUTROL 7 ML: 604.72 INJECTION INTRAVENOUS at 11:26

## 2022-10-10 ENCOUNTER — TELEPHONE (OUTPATIENT)
Dept: HEMATOLOGY ONCOLOGY | Facility: HOSPITAL | Age: 52
End: 2022-10-10

## 2022-10-10 DIAGNOSIS — C18.9 COLON CANCER METASTASIZED TO LIVER (HCC): ICD-10-CM

## 2022-10-10 DIAGNOSIS — C18.9 METASTASIS FROM COLON CANCER (HCC): ICD-10-CM

## 2022-10-10 DIAGNOSIS — C78.01 MALIGNANT NEOPLASM METASTATIC TO RIGHT LUNG (HCC): ICD-10-CM

## 2022-10-10 DIAGNOSIS — T45.1X5A CHEMOTHERAPY INDUCED NEUTROPENIA (HCC): Primary | ICD-10-CM

## 2022-10-10 DIAGNOSIS — C78.7 COLON CANCER METASTASIZED TO LIVER (HCC): ICD-10-CM

## 2022-10-10 DIAGNOSIS — D70.1 CHEMOTHERAPY INDUCED NEUTROPENIA (HCC): Primary | ICD-10-CM

## 2022-10-10 DIAGNOSIS — C78.01 MALIGNANT NEOPLASM METASTATIC TO RIGHT LUNG (HCC): Primary | ICD-10-CM

## 2022-10-10 DIAGNOSIS — C79.9 METASTASIS FROM COLON CANCER (HCC): ICD-10-CM

## 2022-10-10 RX ORDER — MAGNESIUM SULFATE HEPTAHYDRATE 40 MG/ML
2 INJECTION, SOLUTION INTRAVENOUS ONCE AS NEEDED
Status: CANCELLED | OUTPATIENT
Start: 2022-10-18

## 2022-10-10 RX ORDER — MAGNESIUM SULFATE HEPTAHYDRATE 40 MG/ML
4 INJECTION, SOLUTION INTRAVENOUS ONCE AS NEEDED
Status: CANCELLED | OUTPATIENT
Start: 2022-10-18

## 2022-10-10 RX ORDER — FLUOROURACIL 50 MG/ML
400 INJECTION, SOLUTION INTRAVENOUS ONCE
Status: CANCELLED | OUTPATIENT
Start: 2022-10-18

## 2022-10-10 RX ORDER — SODIUM CHLORIDE 9 MG/ML
20 INJECTION, SOLUTION INTRAVENOUS ONCE
Status: CANCELLED | OUTPATIENT
Start: 2022-10-18

## 2022-10-10 RX ORDER — ATROPINE SULFATE 1 MG/ML
0.25 INJECTION, SOLUTION INTRAVENOUS ONCE
Status: CANCELLED | OUTPATIENT
Start: 2022-10-18

## 2022-10-10 NOTE — TELEPHONE ENCOUNTER
10/10/22    Spoke with pt completing his STD/ FMLA form  Will fax over today  Also recommended pt to repeat CEA test for trending  Pt verbalized great understanding and appreciation

## 2022-10-12 ENCOUNTER — APPOINTMENT (OUTPATIENT)
Dept: LAB | Facility: CLINIC | Age: 52
End: 2022-10-12
Payer: COMMERCIAL

## 2022-10-12 ENCOUNTER — OFFICE VISIT (OUTPATIENT)
Dept: HEMATOLOGY ONCOLOGY | Facility: CLINIC | Age: 52
End: 2022-10-12
Payer: COMMERCIAL

## 2022-10-12 ENCOUNTER — TELEPHONE (OUTPATIENT)
Dept: HEMATOLOGY ONCOLOGY | Facility: CLINIC | Age: 52
End: 2022-10-12

## 2022-10-12 VITALS
DIASTOLIC BLOOD PRESSURE: 76 MMHG | SYSTOLIC BLOOD PRESSURE: 120 MMHG | OXYGEN SATURATION: 98 % | BODY MASS INDEX: 24.64 KG/M2 | WEIGHT: 157 LBS | HEART RATE: 74 BPM | TEMPERATURE: 97.6 F | HEIGHT: 67 IN | RESPIRATION RATE: 14 BRPM

## 2022-10-12 DIAGNOSIS — C18.9 COLON CANCER METASTASIZED TO LIVER (HCC): ICD-10-CM

## 2022-10-12 DIAGNOSIS — C18.9 METASTASIS FROM COLON CANCER (HCC): ICD-10-CM

## 2022-10-12 DIAGNOSIS — C78.7 COLON CANCER METASTASIZED TO LIVER (HCC): ICD-10-CM

## 2022-10-12 DIAGNOSIS — C79.9 METASTASIS FROM COLON CANCER (HCC): ICD-10-CM

## 2022-10-12 DIAGNOSIS — C78.01 MALIGNANT NEOPLASM METASTATIC TO RIGHT LUNG (HCC): Primary | ICD-10-CM

## 2022-10-12 DIAGNOSIS — C78.01 MALIGNANT NEOPLASM METASTATIC TO RIGHT LUNG (HCC): ICD-10-CM

## 2022-10-12 LAB — CEA SERPL-MCNC: 0.5 NG/ML (ref 0–3)

## 2022-10-12 PROCEDURE — 99214 OFFICE O/P EST MOD 30 MIN: CPT | Performed by: INTERNAL MEDICINE

## 2022-10-12 PROCEDURE — 82378 CARCINOEMBRYONIC ANTIGEN: CPT

## 2022-10-12 PROCEDURE — 36415 COLL VENOUS BLD VENIPUNCTURE: CPT

## 2022-10-12 NOTE — TELEPHONE ENCOUNTER
Follow-up disposition: Return in about 4 weeks (around 11/9/2022)     Check out comments: See me back in 4-6 weeks   Phyllis okay  Continue chemotherapy with no change  Add on emmend and Aloxi because of nausea with chemotherapy
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
## 2022-10-13 RX ORDER — PALONOSETRON 0.05 MG/ML
0.25 INJECTION, SOLUTION INTRAVENOUS ONCE
Status: CANCELLED | OUTPATIENT
Start: 2022-10-18

## 2022-10-14 ENCOUNTER — OFFICE VISIT (OUTPATIENT)
Dept: SURGICAL ONCOLOGY | Facility: CLINIC | Age: 52
End: 2022-10-14
Payer: COMMERCIAL

## 2022-10-14 VITALS
HEART RATE: 90 BPM | HEIGHT: 67 IN | TEMPERATURE: 98.8 F | BODY MASS INDEX: 24.8 KG/M2 | WEIGHT: 158 LBS | SYSTOLIC BLOOD PRESSURE: 118 MMHG | RESPIRATION RATE: 16 BRPM | DIASTOLIC BLOOD PRESSURE: 74 MMHG | OXYGEN SATURATION: 99 %

## 2022-10-14 DIAGNOSIS — C78.7 COLON CANCER METASTASIZED TO LIVER (HCC): Primary | ICD-10-CM

## 2022-10-14 DIAGNOSIS — C18.9 COLON CANCER METASTASIZED TO LIVER (HCC): Primary | ICD-10-CM

## 2022-10-14 PROCEDURE — 99214 OFFICE O/P EST MOD 30 MIN: CPT | Performed by: SURGERY

## 2022-10-14 NOTE — LETTER
October 14, 2022     Raquel Pena, [address] [facility]    Patient: Alexandria Diaz   YOB: 1970   Date of Visit: 10/14/2022       Dear Dr Lieutenant Booker:    Thank you for referring Soheila Mitchell to me for evaluation  Below are my notes for this consultation  If you have questions, please do not hesitate to call me  I look forward to following your patient along with you  Sincerely,        Ruthann Echols MD        CC: MD Janet Aguilera MD Rosebud Genera, MD  10/14/2022 11:38 AM  Incomplete               Surgical Oncology Follow Up       [address] [facility] SURGICAL ONCOLOGY [place]  [address] [facility] [address]    Karla Lyle  1970  [id number]  [address] [facility]  [facility] [place]  [address]  [facility] [address]    Diagnoses and all orders for this visit:    Colon cancer metastasized to liver [facility])  -     MRI abdomen w wo contrast; Future  -     BUN; Future  -     Creatinine, serum; Future        No chief complaint on file  Return in about 6 months (around 4/14/2023) for Office Visit, Imaging - See orders  Oncology History   Colon cancer metastasized to liver ([facility] )   1/2018 Initial Diagnosis    Malignant neoplasm of sigmoid colon ([facility] )     1/22/2018 Biopsy    Large Intestine, Sigmoid Colon, Recto-sigmoid tumor bx:    - Invasive colonic adenocarcinoma, moderately differentiated     2/6/2018 - 10/16/2018 Chemotherapy    Modified FOLFOX6 x 12 cycles  Added Erbitux on 3/20/18      6/21/2018 Surgery    Segment 7 liver resection/ablation, hemicolectomy     10/30/2018 -  Chemotherapy    Continuation of Single agent Erbitux  With 10/30/18 chemo, it was Cycle #14  Plan was for 6 additional cycles of Erbitux alone       3/2020 Genetic Testing    NEGATIVE for genes tested:    Test(s): [employer] + [employer] (34 genes): APC, ETHAN, BARD1, BRCA1, BRCA2, BRIP1, BMPR1A, CDH1, CDK4, CDKN2A, CHEK2, DICER1, EPCAM, GREM1, HOXB13, MLH1, MRE11A, MSH2, MSH6, MUTYH, NBN, NF1, PALB2, PMS2, POLD1, POLE, PTEN, RAD50, RAD51C, RAD51D, SMAD4, SMARCA4, STK11, TP53      6/8/2020 Surgery    Liver, segment 3 (wedge resection):  - Metastatic adenocarcinoma, consistent with the patient's known colon primary  - Margins negative for carcinoma      7/27/2020 - 1/11/2021 Chemotherapy    fluorouracil (ADRUCIL), 745 mg, Intravenous, Once, 6 of 6 cycles  Administration: 750 mg (7/27/2020), 750 mg (8/10/2020), 745 mg (8/24/2020), 745 mg (9/8/2020), 745 mg (9/21/2020), 745 mg (10/5/2020)  pegfilgrastim (Mitcheal Vizcarra), 6 mg, Subcutaneous, Once, 1 of 1 cycle  Administration: 6 mg (9/24/2020)  pegfilgrastim (Juliette Anger), 6 mg, Subcutaneous, Once, 6 of 6 cycles  Administration: 6 mg (7/29/2020), 6 mg (8/12/2020), 6 mg (8/26/2020), 6 mg (9/10/2020), 6 mg (10/7/2020)  cetuximab (ERBITUX), 930 mg, Intravenous, Once, 12 of 12 cycles  Administration: 900 mg (7/27/2020), 900 mg (8/10/2020), 900 mg (8/24/2020), 900 mg (9/8/2020), 900 mg (9/21/2020), 900 mg (10/5/2020), 900 mg (10/19/2020), 900 mg (11/2/2020), 900 mg (11/16/2020), 900 mg (11/30/2020), 900 mg (12/28/2020), 900 mg (1/11/2021)  irinotecan (CAMPTOSAR) chemo infusion, 335 mg, Intravenous, Once, 6 of 6 cycles  Administration: 340 mg (7/27/2020), 340 mg (8/10/2020), 340 mg (8/24/2020), 340 mg (9/8/2020), 340 mg (9/21/2020), 340 mg (10/5/2020)  leucovorin calcium IVPB, 744 mg, Intravenous, Once, 6 of 6 cycles  Administration: 750 mg (7/27/2020), 750 mg (8/10/2020), 750 mg (8/24/2020), 750 mg (9/8/2020), 750 mg (9/21/2020), 740 mg (10/5/2020)  fluorouracil (ADRUCIL) ambulatory infusion Soln, 1,200 mg/m2/day = 4,465 mg, Intravenous, Over 46 hours, 6 of 6 cycles  Dose modification: 1,200 mg/m2/day (original dose 1,200 mg/m2/day, Cycle 3)     9/20/2022 -  Chemotherapy    palonosetron (ALOXI), 0 25 mg, Intravenous, Once, 0 of 4 cycles  pegfilgrastim (Juliette Anger), 6 mg, Subcutaneous, Once, 2 of 6 cycles  Administration: 6 mg (9/22/2022)  Pegfilgrastim-bmez (Monroe Marcia), 6 mg, Subcutaneous, Once, 1 of 1 cycle  Administration: 6 mg (10/7/2022)  fosaprepitant (EMEND) IVPB, 150 mg, Intravenous, Once, 0 of 4 cycles  fluorouracil (ADRUCIL), 400 mg/m2 = 735 mg, Intravenous, Once, 2 of 6 cycles  Administration: 735 mg (9/20/2022), 735 mg (10/4/2022)  cetuximab (ERBITUX), 920 mg, Intravenous, Once, 2 of 6 cycles  Administration: 900 mg (9/20/2022), 900 mg (10/4/2022)  irinotecan (CAMPTOSAR) chemo infusion, 331 mg, Intravenous, Once, 2 of 6 cycles  Administration: 320 mg (9/20/2022), 320 mg (10/4/2022)  leucovorin calcium IVPB, 736 mg, Intravenous, Once, 2 of 6 cycles  Administration: 700 mg (9/20/2022), 700 mg (10/4/2022)  fluorouracil (ADRUCIL) ambulatory infusion Soln, 1,200 mg/m2/day = 4,415 mg, Intravenous, Over 46 hours, 2 of 6 cycles     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 ([facility] )   4/11/2022 Initial Diagnosis    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 ([facility] )     9/20/2022 -  Chemotherapy    palonosetron (ALOXI), 0 25 mg, Intravenous, Once, 0 of 4 cycles  pegfilgrastim (Soledad Harrison), 6 mg, Subcutaneous, Once, 2 of 6 cycles  Administration: 6 mg (9/22/2022)  Pegfilgrastim-bmez (Juan Francisco Marcia), 6 mg, Subcutaneous, Once, 1 of 1 cycle  Administration: 6 mg (10/7/2022)  fosaprepitant (EMEND) IVPB, 150 mg, Intravenous, Once, 0 of 4 cycles  fluorouracil (ADRUCIL), 400 mg/m2 = 735 mg, Intravenous, Once, 2 of 6 cycles  Administration: 735 mg (9/20/2022), 735 mg (10/4/2022)  cetuximab (ERBITUX), 920 mg, Intravenous, Once, 2 of 6 cycles  Administration: 900 mg (9/20/2022), 900 mg (10/4/2022)  irinotecan (CAMPTOSAR) chemo infusion, 331 mg, Intravenous, Once, 2 of 6 cycles  Administration: 320 mg (9/20/2022), 320 mg (10/4/2022)  leucovorin calcium IVPB, 736 mg, Intravenous, Once, 2 of 6 cycles  Administration: 700 mg (9/20/2022), 700 mg (10/4/2022)  fluorouracil (ADRUCIL) ambulatory infusion Soln, 1,200 mg/m2/day = 4,415 mg, Intravenous, Over 46 hours, 2 of 6 cycles     Malignant neoplasm metastatic to right lung ([facility] [address] )   8/4/2022 Initial Diagnosis    Malignant neoplasm metastatic to right lung ([facility] [address] )     8/9/2022 Surgery    Flexible bronchoscopy, right thoracoscopic therapeutic wedge resection      9/20/2022 -  Chemotherapy    palonosetron (ALOXI), 0 25 mg, Intravenous, Once, 0 of 4 cycles  pegfilgrastim (Celina Marchi), 6 mg, Subcutaneous, Once, 2 of 6 cycles  Administration: 6 mg (9/22/2022)  Pegfilgrastim-bmez (Rosy Less), 6 mg, Subcutaneous, Once, 1 of 1 cycle  Administration: 6 mg (10/7/2022)  fosaprepitant (EMEND) IVPB, 150 mg, Intravenous, Once, 0 of 4 cycles  fluorouracil (ADRUCIL), 400 mg/m2 = 735 mg, Intravenous, Once, 2 of 6 cycles  Administration: 735 mg (9/20/2022), 735 mg (10/4/2022)  cetuximab (ERBITUX), 920 mg, Intravenous, Once, 2 of 6 cycles  Administration: 900 mg (9/20/2022), 900 mg (10/4/2022)  irinotecan (CAMPTOSAR) chemo infusion, 331 mg, Intravenous, Once, 2 of 6 cycles  Administration: 320 mg (9/20/2022), 320 mg (10/4/2022)  leucovorin calcium IVPB, 736 mg, Intravenous, Once, 2 of 6 cycles  Administration: 700 mg (9/20/2022), 700 mg (10/4/2022)  fluorouracil (ADRUCIL) ambulatory infusion Soln, 1,200 mg/m2/day = 4,415 mg, Intravenous, Over 46 hours, 2 of 6 cycles         Staging:  Metastatic rectosigmoid cancer  [id number]  Treatment history:   Modified FOLFOX 6   Erbitux added 03/20/2018   Segment 7 liver resection, June 2018  Segment 3 mass liver resection, June 2020  Resection of a right lower lobe pulmonary metastasis  Chemotherapy with Erbitux  Current treatment:  FOLFIRI plus cetuximab  Disease status:       History of Present Illness:  Patient returns in follow-up of his metastatic colon cancer  Doing very well  He has had his lung nodule resected  He has restarted chemotherapy and is doing well except for some nausea  His appetite is good  No unintentional weight loss    MRI from October 7, 2022 reveals postsurgical changes  There is no evidence of recurrence in the liver  I personally reviewed the films  Review of Systems  Complete ROS Surg Onc:   Complete ROS Surg Onc:   Constitutional: The patient denies new or recent history of general fatigue, no recent weight loss, no change in appetite  Eyes: No complaints of visual problems, no scleral icterus  ENT: no complaints of ear pain, no hoarseness, no difficulty swallowing,  no tinnitus and no new masses in head, oral cavity, or neck  Cardiovascular: No complaints of chest pain, no palpitations, no ankle edema  Respiratory: No complaints of shortness of breath, no cough  Gastrointestinal: No complaints of jaundice, no bloody stools, no pale stools  Genitourinary: No complaints of dysuria, no hematuria, no nocturia, no frequent urination, no urethral discharge  Musculoskeletal: No complaints of weakness, paralysis, joint stiffness or arthralgias  Integumentary: No complaints of rash, no new lesions  Neurological: No complaints of convulsions, no seizures, no dizziness  Hematologic/Lymphatic: No complaints of easy bruising  Endocrine:  No hot or cold intolerance  No polydipsia, polyphagia, or polyuria  Allergy/immunology:  No environmental allergies  No food allergies  Not immunocompromised  Skin:  No pallor or rash  No wound          Patient Active Problem List   Diagnosis   • ED (erectile dysfunction)   • Colon cancer metastasized to liver [facility])   • GERD (gastroesophageal reflux disease)   • Pulmonary nodule, right   • Acneiform rash   • Encounter for follow-up examination after completed treatment for malignant neoplasm   • Other hydronephrosis   • Annual physical exam   • Vapes nicotine containing substance   • 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 [facility])   • Malignant neoplasm metastatic to right lung [facility])   • Platelets decreased ([address] )   • Chemotherapy induced neutropenia ([address] )     Past Medical History:   Diagnosis Date   • Cancer [facility])    • Colon cancer [facility])    • Dysuria     Last Assessed:8/24/17   • GERD (gastroesophageal reflux disease)    • Liver cancer [facility])    • Liver nodule    • Pulmonary nodule, right    • Ureteropelvic junction (UPJ) obstruction 01/29/2020   • Wears glasses      Past Surgical History:   Procedure Laterality Date   • ABDOMINAL SURGERY     • COLON SURGERY     • COLONOSCOPY N/A 01/22/2018    Procedure: COLONOSCOPY;  Surgeon: Dina Lang MD;  [facility]; Service: Colorectal   • DENTAL SURGERY  2016    Crown with bridge work   • FLEXIBLE SIGMOIDOSCOPY N/A 06/15/2018    Procedure: SIGMOIDOSCOPY FLEXIBLE w/o sedation w/ tattoo;  Surgeon: Dina Lang MD;  [facility];   Service: Colorectal   • IR PORT PLACEMENT Right    • LAPAROTOMY N/A 06/08/2020    Procedure: LAPAROTOMY EXPLORATORY, LYSIS OF ADHESIONS;  Surgeon: Jesús Galvan MD;  [facility];  Service: Surgical Oncology   • LIVER LOBECTOMY N/A 06/21/2018    Procedure: liver resection/ablation, intraoperative ultrasound of liver;  Surgeon: Jesús Galvan MD;  [facility];  Service: Surgical Oncology   • LIVER LOBECTOMY N/A 06/08/2020    Procedure: LIVER RESECTION SEGMENT 3 WITH  INTRAOPERATIVE U/S OF LIVER;  Surgeon: Jesús Galvan MD;  [facility];  Service: Surgical Oncology   • LUNG SEGMENTECTOMY Right 8/9/2022    Procedure: RESECTION WEDGE LUNG, THERAPEUTIC;  Surgeon: Mireille Mcmanus MD;  [facility];  Service: Thoracic   • AZ BRONCHOSCOPY,DIAGNOSTIC N/A 8/9/2022    Procedure: BRONCHOSCOPY FLEXIBLE;  Surgeon: Mireille Mcmanus MD;  [facility];  Service: Thoracic   • AZ EXPLORATORY OF ABDOMEN N/A 06/21/2018    Procedure: LAPAROTOMY EXPLORATORY;  Surgeon: Dina Lang MD;  [facility];  Service: Colorectal   • AZ INSERT PICC W/ SUB-Q PORT N/A 01/25/2018    Procedure: PORT-A-CATHETER PLACEMENT  Fluoroscopy for performance/interpretation;  Surgeon: Dina Lang MD;  [facility];  Service: Colorectal   • MN PART REMOVAL COLON W ANASTOMOSIS Left 2018    Procedure: hemicolectomy, low anterior resection (open) SPY fluorescence angiography;  Surgeon: Kathy Riojas MD;  [facility];  Service: Colorectal   • MN PROCTECTOMY,PARTIAL N/A 2018    Procedure: Partial proctectomy ;  Surgeon: Kathy Riojas MD;  [facility];  Service: Colorectal   • MN SIGMOIDOSCOPY FLX DX W/COLLJ SPEC BR/WA IF PFRMD N/A 2018    Procedure: Tamea Krabbe;  Surgeon: Kathy Riojas MD;  [facility];  Service: Colorectal   • MN THORACOSCOPY W/THERA WEDGE RESEXN INITIAL UNILAT Right 2022    Procedure: THORACOSCOPY VIDEO ASSISTED SURGERY (VATS); Surgeon: Marcella Gutierres MD;  [facility];  Service: Thoracic   • ROOT CANAL     • TESTICLE SURGERY      Exploration of Undescended Testis rIght, age 8 had surgery for undescended testicle;  Last Assessed:17   • TOOTH EXTRACTION       Family History   Problem Relation Age of Onset   • No Known Problems Father    • Cancer Mother         unknown   • Cancer Brother         pt  reports brother was diagnosed with stage 4 throat cancer   • Throat cancer Brother    • Breast cancer Maternal Aunt      Social History     Socioeconomic History   • Marital status: Single     Spouse name: Not on file   • Number of children: Not on file   • Years of education: Not on file   • Highest education level: Not on file   Occupational History   • Not on file   Tobacco Use   • Smoking status: Former Smoker     Types: Cigarettes     Quit date:      Years since quittin 7   • Smokeless tobacco: Current User   Vaping Use   • Vaping Use: Every day   • Substances: Nicotine, THC (at time), CBD (at times), Flavoring   Substance and Sexual Activity   • Alcohol use: Yes     Comment: socially - 2 month   • Drug use: Yes     Frequency: 1 0 times per week     Types: Marijuana   • Sexual activity: Not on file   Other Topics Concern   • Not on file   Social History Narrative   • Not on file     Social Determinants of Health     Financial Resource Strain: Not on file   Food Insecurity: Not on file   Transportation Needs: Not on file   Physical Activity: Not on file   Stress: Not on file   Social Connections: Not on file   Intimate Partner Violence: Not on file   Housing Stability: Not on file       Current Outpatient Medications:   •  clindamycin (CLINDAGEL) 1 % gel, Apply topically 2 (two) times a day, Disp: 30 g, Rfl: 1  •  [START ON 10/18/2022] fluorouracil 4,370 mg in CADD/Elastomeric Infusion Device, Infuse 4,370 mg (1,200 mg/m2/day x 1 82 m2) into a catheter in a vein via infusion device over 46 hours for 2 days  Do not start before October 18, 2022 , Disp: 1 each, Rfl: 6  •  Multiple Vitamins-Minerals (MENS MULTIVITAMIN PO), Take by mouth in the morning, Disp: , Rfl:   •  ondansetron (Zofran ODT) 8 mg disintegrating tablet, Take 1 tablet (8 mg total) by mouth every 8 (eight) hours as needed for nausea or vomiting, Disp: 20 tablet, Rfl: 0  •  promethazine (PHENERGAN) 12 5 MG tablet, Take 1 tablet (12 5 mg total) by mouth every 6 (six) hours as needed for nausea or vomiting, Disp: 30 tablet, Rfl: 1  •  sildenafil (VIAGRA) 50 MG tablet, Take 1 tablet (50 mg total) by mouth as needed for erectile dysfunction, Disp: 10 tablet, Rfl: 0  •  docusate sodium (COLACE) 100 mg capsule, Take 1 capsule (100 mg total) by mouth 2 (two) times a day for 10 days (Patient not taking: Reported on 10/12/2022), Disp: , Rfl: 0  •  gabapentin (NEURONTIN) 300 mg capsule, Take 1 capsule (300 mg total) by mouth 3 (three) times a day for 21 days (Patient not taking: No sig reported), Disp: 63 capsule, Rfl: 0  •  ibuprofen (MOTRIN) 600 mg tablet, Take 1 tablet (600 mg total) by mouth 3 (three) times a day with meals for 7 days (Patient not taking: No sig reported), Disp: 21 tablet, Rfl: 0  No Known Allergies  Vitals: 10/14/22 1128   BP: 118/74   Pulse: 90   Resp: 16   Temp: 98 8 °F (37 1 °C)   SpO2: 99%       Physical Exam  Constitutional: General appearance: The Patient is well-developed and well-nourished who appears the stated age in no acute distress  Patient is pleasant and talkative  HEENT:  Normocephalic  Sclerae are anicteric  Mucous membranes are moist  Neck is supple without adenopathy  No JVD  Chest: The lungs are clear to auscultation  Cardiac: Heart is regular rate  Abdomen: Abdomen is soft, non-tender, non-distended and without masses  Extremities: There is no clubbing or cyanosis  There is no edema  Symmetric  Neuro: Grossly nonfocal  Gait is normal      Lymphatic: No evidence of cervical adenopathy bilaterally  No evidence of axillary adenopathy bilaterally  No evidence of inguinal adenopathy bilaterally  Skin: Warm, anicteric  Psych:  Patient is pleasant and talkative  Breasts:        Pathology:  [unfilled]    Labs:  Lab Results   Component Value Date    CEA 0 5 10/12/2022         Imaging  XR chest pa & lateral    Result Date: 9/18/2022  Narrative: CHEST INDICATION:   C78 01: Secondary malignant neoplasm of right lung  Metastatic colon cancer  COMPARISON:  CXR 8/10/2022 and chest CT 7/14/2022  EXAM PERFORMED/VIEWS:  XR CHEST PA & LATERAL  DUAL ENERGY SUBTRACTION  FINDINGS:  Right port at cavoatrial junction  Cardiomediastinal silhouette appears unremarkable  The lungs are clear  No pneumothorax or pleural effusion  Osseous structures appear within normal limits for patient age  Impression: No acute cardiopulmonary disease  Workstation performed: [id number]     MRI abdomen w wo contrast    Result Date: 10/13/2022  Narrative: MRI - ABDOMEN - WITH AND WITHOUT CONTRAST INDICATION: 54 years / Male  C18 9: Malignant neoplasm of colon, unspecified C78 7: Secondary malignant neoplasm of liver and intrahepatic bile duct  Sigmoid colon malignancy diagnosed in January 2018  Patient underwent segment 7 liver resection and hemicolectomy in June 2018 after neoadjuvant chemotherapy  Patient then underwent adjuvant chemotherapy  Segment 3 wedge resection was performed in June 2020  Patient has continued on adjuvant chemotherapy  Reevaluate  COMPARISON: Multiple prior examinations including most recent PET CT scan performed July 6, 2022 and most recent abdominal MRI performed March 25, 2022  TECHNIQUE:  The following pulse sequences were obtained:  axial T1 weighted in/out of phase images, multiplanar T2 weighted images, axial DWI/ADC, pre-contrast axial T1 with fat saturation and dynamic multiphase post-contrast fat suppressed T1 weighted images    IV Contrast:  7 mL of Gadobutrol injection (SINGLE-DOSE) FINDINGS: LOWER CHEST:   Unremarkable  LIVER: Normal in size  Atypical contours in lung the undersurface of segment 3 in keeping with the reported history of prior wedge resection  Focal signal abnormality along the surface of the medial dome of segment 7 on image 44 of series 14 and measuring 2 5  x 1 8 cm on image 236 of series 12, slightly decreased in size from 2 6 x 2 1 cm on March 25, 2022 examination and again in keeping with prior prior resection/ablation  No MR evidence for recurrent or developing hepatic mass  Scattered tiny hepatic cysts are reidentified  The hepatic veins and portal veins are patent  BILE DUCTS: No intrahepatic or extrahepatic bile duct dilation  GALLBLADDER:  Small gallstones in the dependent lumen of the gallbladder fundus  A lateral wall thickening  No pericholecystic edema  PANCREAS:  Unremarkable  ADRENAL GLANDS:  Normal  SPLEEN:  Normal  KIDNEYS/PROXIMAL URETERS: No hydroureteronephrosis  No suspicious renal mass  Unchanged large simple left renal parapelvic cyst causing unchanged chronic left renal caliectasis  BOWEL:   No dilated loops of bowel  PERITONEUM/RETROPERITONEUM: No mass  No ascites  LYMPH NODES: No abdominal lymphadenopathy  VASCULAR STRUCTURES:  No aneurysm  ABDOMINAL WALL:  Unchanged 5 4 cm simple left renal parapelvic cyst causing mild unchanged chronic left renal caliectasis  OSSEOUS STRUCTURES:  No suspicious osseous lesion  Impression: No MR findings of metastatic tumor in the abdomen and specifically no recurrent or enlarging metastatic lesions in the liver  Treatment related changes in segment 3 and segment 7 reidentified  Workstation performed: [id number]     I reviewed the above laboratory and imaging data  Discussion/Summary:  47-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  His liver MRI is stable  His CEA level is normal   He has had all his metastatic disease surgically resected  Going to be completing further chemotherapy in the next 3 months  Since he is going to be getting scans at that time, I will plan on repeating his liver MRI in 6 months  I will see him again at that time for another clinical exam   Is agreeable to this  All his questions were answered

## 2022-10-14 NOTE — PROGRESS NOTES
Surgical Oncology Follow Up       [facility] SURGICAL ONCOLOGY [place]  [address]  [place] [address]    Joaquina Raphael  1970  [id number]  [facility] SURGICAL ONCOLOGY [place]  [address] [facility] [place] [address]    Diagnoses and all orders for this visit:    Colon cancer metastasized to liver [facility])  -     MRI abdomen w wo contrast; Future  -     BUN; Future  -     Creatinine, serum; Future        No chief complaint on file  Return in about 6 months (around 4/14/2023) for Office Visit, Imaging - See orders  Oncology History   Colon cancer metastasized to liver ([facility] [address] )   1/2018 Initial Diagnosis    Malignant neoplasm of sigmoid colon ([facility] [address] )     1/22/2018 Biopsy    Large Intestine, Sigmoid Colon, Recto-sigmoid tumor bx:    - Invasive colonic adenocarcinoma, moderately differentiated     2/6/2018 - 10/16/2018 Chemotherapy    Modified FOLFOX6 x 12 cycles  Added Erbitux on 3/20/18      6/21/2018 Surgery    Segment 7 liver resection/ablation, hemicolectomy     10/30/2018 -  Chemotherapy    Continuation of Single agent Erbitux  With 10/30/18 chemo, it was Cycle #14  Plan was for 6 additional cycles of Erbitux alone       3/2020 Genetic Testing    NEGATIVE for genes tested:    Test(s): [employer] + [employer] (34 genes): APC, ETHAN, BARD1, BRCA1, BRCA2, BRIP1, BMPR1A, CDH1, CDK4, CDKN2A, CHEK2, DICER1, EPCAM, GREM1, HOXB13, MLH1, MRE11A, MSH2, MSH6, MUTYH, NBN, NF1, PALB2, PMS2, POLD1, POLE, PTEN, RAD50, RAD51C, RAD51D, SMAD4, SMARCA4, STK11, TP53      6/8/2020 Surgery    Liver, segment 3 (wedge resection):  - Metastatic adenocarcinoma, consistent with the patient's known colon primary  - Margins negative for carcinoma      7/27/2020 - 1/11/2021 Chemotherapy    fluorouracil (ADRUCIL), 745 mg, Intravenous, Once, 6 of 6 cycles  Administration: 750 mg (7/27/2020), 750 mg (8/10/2020), 745 mg (8/24/2020), 745 mg (9/8/2020), 745 mg (9/21/2020), 745 mg (10/5/2020)  pegfilgrastim (NEULASTA), 6 mg, Subcutaneous, Once, 1 of 1 cycle  Administration: 6 mg (9/24/2020)  pegfilgrastim (Malachy Haley), 6 mg, Subcutaneous, Once, 6 of 6 cycles  Administration: 6 mg (7/29/2020), 6 mg (8/12/2020), 6 mg (8/26/2020), 6 mg (9/10/2020), 6 mg (10/7/2020)  cetuximab (ERBITUX), 930 mg, Intravenous, Once, 12 of 12 cycles  Administration: 900 mg (7/27/2020), 900 mg (8/10/2020), 900 mg (8/24/2020), 900 mg (9/8/2020), 900 mg (9/21/2020), 900 mg (10/5/2020), 900 mg (10/19/2020), 900 mg (11/2/2020), 900 mg (11/16/2020), 900 mg (11/30/2020), 900 mg (12/28/2020), 900 mg (1/11/2021)  irinotecan (CAMPTOSAR) chemo infusion, 335 mg, Intravenous, Once, 6 of 6 cycles  Administration: 340 mg (7/27/2020), 340 mg (8/10/2020), 340 mg (8/24/2020), 340 mg (9/8/2020), 340 mg (9/21/2020), 340 mg (10/5/2020)  leucovorin calcium IVPB, 744 mg, Intravenous, Once, 6 of 6 cycles  Administration: 750 mg (7/27/2020), 750 mg (8/10/2020), 750 mg (8/24/2020), 750 mg (9/8/2020), 750 mg (9/21/2020), 740 mg (10/5/2020)  fluorouracil (ADRUCIL) ambulatory infusion Soln, 1,200 mg/m2/day = 4,465 mg, Intravenous, Over 46 hours, 6 of 6 cycles  Dose modification: 1,200 mg/m2/day (original dose 1,200 mg/m2/day, Cycle 3)     9/20/2022 -  Chemotherapy    palonosetron (ALOXI), 0 25 mg, Intravenous, Once, 0 of 4 cycles  pegfilgrastim (NEULASTA ONPRO), 6 mg, Subcutaneous, Once, 2 of 6 cycles  Administration: 6 mg (9/22/2022)  Pegfilgrastim-bmez (Gabriela Pintos), 6 mg, Subcutaneous, Once, 1 of 1 cycle  Administration: 6 mg (10/7/2022)  fosaprepitant (EMEND) IVPB, 150 mg, Intravenous, Once, 0 of 4 cycles  fluorouracil (ADRUCIL), 400 mg/m2 = 735 mg, Intravenous, Once, 2 of 6 cycles  Administration: 735 mg (9/20/2022), 735 mg (10/4/2022)  cetuximab (ERBITUX), 920 mg, Intravenous, Once, 2 of 6 cycles  Administration: 900 mg (9/20/2022), 900 mg (10/4/2022)  irinotecan (CAMPTOSAR) chemo infusion, 331 mg, Intravenous, Once, 2 of 6 cycles  Administration: 320 mg (9/20/2022), 320 mg (10/4/2022)  leucovorin calcium IVPB, 736 mg, Intravenous, Once, 2 of 6 cycles  Administration: 700 mg (9/20/2022), 700 mg (10/4/2022)  fluorouracil (ADRUCIL) ambulatory infusion Soln, 1,200 mg/m2/day = 4,415 mg, Intravenous, Over 46 hours, 2 of 6 cycles     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   4/11/2022 Initial Diagnosis    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 ([place] [address] )     9/20/2022 -  Chemotherapy    palonosetron (ALOXI), 0 25 mg, Intravenous, Once, 0 of 4 cycles  pegfilgrastim (Larinda Mingle), 6 mg, Subcutaneous, Once, 2 of 6 cycles  Administration: 6 mg (9/22/2022)  Pegfilgrastim-bmez (Guzman Bolk), 6 mg, Subcutaneous, Once, 1 of 1 cycle  Administration: 6 mg (10/7/2022)  fosaprepitant (EMEND) IVPB, 150 mg, Intravenous, Once, 0 of 4 cycles  fluorouracil (ADRUCIL), 400 mg/m2 = 735 mg, Intravenous, Once, 2 of 6 cycles  Administration: 735 mg (9/20/2022), 735 mg (10/4/2022)  cetuximab (ERBITUX), 920 mg, Intravenous, Once, 2 of 6 cycles  Administration: 900 mg (9/20/2022), 900 mg (10/4/2022)  irinotecan (CAMPTOSAR) chemo infusion, 331 mg, Intravenous, Once, 2 of 6 cycles  Administration: 320 mg (9/20/2022), 320 mg (10/4/2022)  leucovorin calcium IVPB, 736 mg, Intravenous, Once, 2 of 6 cycles  Administration: 700 mg (9/20/2022), 700 mg (10/4/2022)  fluorouracil (ADRUCIL) ambulatory infusion Soln, 1,200 mg/m2/day = 4,415 mg, Intravenous, Over 46 hours, 2 of 6 cycles     Malignant neoplasm [facility] right lung ([facility] )   8/4/2022 Initial Diagnosis    Malignant neoplasm metastatic to right lung ([address] )     8/9/2022 Surgery    Flexible bronchoscopy, right thoracoscopic therapeutic wedge resection      9/20/2022 -  Chemotherapy    palonosetron (ALOXI), 0 25 mg, Intravenous, Once, 0 of 4 cycles  pegfilgrastim (NEULASTA ONPRO), 6 mg, Subcutaneous, Once, 2 of 6 cycles  Administration: 6 mg (9/22/2022)  Pegfilgrastim-bmez (ZIEXTENZO), 6 mg, Subcutaneous, Once, 1 of 1 cycle  Administration: 6 mg (10/7/2022)  fosaprepitant (EMEND) IVPB, 150 mg, Intravenous, Once, 0 of 4 cycles  fluorouracil (ADRUCIL), 400 mg/m2 = 735 mg, Intravenous, Once, 2 of 6 cycles  Administration: 735 mg (9/20/2022), 735 mg (10/4/2022)  cetuximab (ERBITUX), 920 mg, Intravenous, Once, 2 of 6 cycles  Administration: 900 mg (9/20/2022), 900 mg (10/4/2022)  irinotecan (CAMPTOSAR) chemo infusion, 331 mg, Intravenous, Once, 2 of 6 cycles  Administration: 320 mg (9/20/2022), 320 mg (10/4/2022)  leucovorin calcium IVPB, 736 mg, Intravenous, Once, 2 of 6 cycles  Administration: 700 mg (9/20/2022), 700 mg (10/4/2022)  fluorouracil (ADRUCIL) ambulatory infusion Soln, 1,200 mg/m2/day = 4,415 mg, Intravenous, Over 46 hours, 2 of 6 cycles         Staging:  Metastatic rectosigmoid cancer  [id number]  Treatment history:   Modified FOLFOX 6   Erbitux added 03/20/2018   Segment 7 liver resection, June 2018  Segment 3 mass liver resection, June 2020  Resection of a right lower lobe pulmonary metastasis  Chemotherapy with Erbitux  Current treatment:  FOLFIRI plus cetuximab  Disease status:       History of Present Illness:  Patient returns in follow-up of his metastatic colon cancer  Doing very well  He has had his lung nodule resected  He has restarted chemotherapy and is doing well except for some nausea  His appetite is good  No unintentional weight loss  MRI from October 7, 2022 reveals postsurgical changes  There is no evidence of recurrence in the liver  I personally reviewed the films  Review of Systems  Complete ROS Surg Onc:   Complete ROS Surg Onc:   Constitutional: The patient denies new or recent history of general fatigue, no recent weight loss, no change in appetite  Eyes: No complaints of visual problems, no scleral icterus  ENT: no complaints of ear pain, no hoarseness, no difficulty swallowing,  no tinnitus and no new masses in head, oral cavity, or neck     Cardiovascular: No complaints of chest pain, no palpitations, no ankle edema  Respiratory: No complaints of shortness of breath, no cough  Gastrointestinal: No complaints of jaundice, no bloody stools, no pale stools  Genitourinary: No complaints of dysuria, no hematuria, no nocturia, no frequent urination, no urethral discharge  Musculoskeletal: No complaints of weakness, paralysis, joint stiffness or arthralgias  Integumentary: No complaints of rash, no new lesions  Neurological: No complaints of convulsions, no seizures, no dizziness  Hematologic/Lymphatic: No complaints of easy bruising  Endocrine:  No hot or cold intolerance  No polydipsia, polyphagia, or polyuria  Allergy/immunology:  No environmental allergies  No food allergies  Not immunocompromised  Skin:  No pallor or rash  No wound  Patient Active Problem List   Diagnosis   • ED (erectile dysfunction)   • Colon cancer metastasized to liver [facility])   • GERD (gastroesophageal reflux disease)   • Pulmonary nodule, right   • Acneiform rash   • Encounter for follow-up examination after completed treatment for malignant neoplasm   • Other hydronephrosis   • Annual physical exam   • Vapes nicotine containing substance   • 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 [facility])   • Malignant neoplasm metastatic to right lung ([facility] [address] )   • Platelets decreased ([facility] [address] )   • Chemotherapy induced neutropenia ([facility] [address] )     Past Medical History:   Diagnosis Date   • Cancer [facility])    • Colon cancer ([facility] [address] )    • Dysuria     Last Assessed:8/24/17   • GERD (gastroesophageal reflux disease)    • Liver cancer ([facility] [address] )    • Liver nodule    • Pulmonary nodule, right    • Ureteropelvic junction (UPJ) obstruction 01/29/2020   • Wears glasses      Past Surgical History:   Procedure Laterality Date   • ABDOMINAL SURGERY     • COLON SURGERY     • COLONOSCOPY N/A 01/22/2018    Procedure: COLONOSCOPY;  Surgeon: Louis Bar MD;  [facility]: [facility];   Service: Colorectal   • DENTAL SURGERY  2016    [place] with bridge work • FLEXIBLE SIGMOIDOSCOPY N/A 06/15/2018    Procedure: SIGMOIDOSCOPY FLEXIBLE w/o sedation w/ tattoo;  Surgeon: Isabell Mcdaniel MD;  [facility]: [facility];   Service: Colorectal   • IR PORT PLACEMENT Right    • LAPAROTOMY N/A 06/08/2020    Procedure: LAPAROTOMY EXPLORATORY, LYSIS OF ADHESIONS;  Surgeon: Nick Kevin MD;  [facility];  Service: Surgical Oncology   • LIVER LOBECTOMY N/A 06/21/2018    Procedure: liver resection/ablation, intraoperative ultrasound of liver;  Surgeon: Nick Kevin MD;  [facility];  Service: Surgical Oncology   • LIVER LOBECTOMY N/A 06/08/2020    Procedure: LIVER RESECTION SEGMENT 3 WITH  INTRAOPERATIVE U/S OF LIVER;  Surgeon: Nick Kevin MD;  [facility];  Service: Surgical Oncology   • LUNG SEGMENTECTOMY Right 8/9/2022    Procedure: RESECTION WEDGE LUNG, THERAPEUTIC;  Surgeon: Kate Gaines MD;  [facility];  Service: Thoracic   • HI BRONCHOSCOPY,DIAGNOSTIC N/A 8/9/2022    Procedure: BRONCHOSCOPY FLEXIBLE;  Surgeon: Kate Gaines MD;  [facility];  Service: Thoracic   • HI EXPLORATORY OF ABDOMEN N/A 06/21/2018    Procedure: LAPAROTOMY EXPLORATORY;  Surgeon: Isabell Mcdaniel MD;  [facility];  Service: Colorectal   • HI INSERT PICC W/ SUB-Q PORT N/A 01/25/2018    Procedure: PORT-A-CATHETER PLACEMENT  Fluoroscopy for performance/interpretation;  Surgeon: Isabell Mcdaniel MD;  [facility];  Service: Colorectal   • HI PART REMOVAL COLON W ANASTOMOSIS Left 06/21/2018    Procedure: hemicolectomy, low anterior resection (open) SPY fluorescence angiography;  Surgeon: Isabell Mcdaniel MD;  [facility];  Service: Colorectal   • HI PROCTECTOMY,PARTIAL N/A 06/21/2018    Procedure: Partial proctectomy ;  Surgeon: Isabell Mcdaniel MD;  [facility];  Service: Colorectal   • HI SIGMOIDOSCOPY FLX DX W/COLLJ SPEC BR/WA IF PFRMD N/A 06/21/2018    Procedure: SIGMOIDOSCOPY FLEXIBLE;  Surgeon: Asha Beltran Marcello Kahn MD;  [facility];  Service: Colorectal   • ND THORACOSCOPY W/THERA WEDGE RESEXN INITIAL UNILAT Right 2022    Procedure: THORACOSCOPY VIDEO ASSISTED SURGERY (VATS); Surgeon: Yovani Monte MD;  [facility];  Service: Thoracic   • ROOT CANAL     • TESTICLE SURGERY      Exploration of Undescended Testis rIght, age 8 had surgery for undescended testicle;  Last Assessed:17   • TOOTH EXTRACTION       Family History   Problem Relation Age of Onset   • No Known Problems Father    • Cancer Mother         unknown   • Cancer Brother         pt  reports brother was diagnosed with stage 4 throat cancer   • Throat cancer Brother    • Breast cancer Maternal Aunt      Social History     Socioeconomic History   • Marital status: Single     Spouse name: Not on file   • Number of children: Not on file   • Years of education: Not on file   • Highest education level: Not on file   Occupational History   • Not on file   Tobacco Use   • Smoking status: Former Smoker     Types: Cigarettes     Quit date:      Years since quittin 7   • Smokeless tobacco: Current User   Vaping Use   • Vaping Use: Every day   • Substances: Nicotine, THC (at time), CBD (at times), Flavoring   Substance and Sexual Activity   • Alcohol use: Yes     Comment: socially - 2 month   • Drug use: Yes     Frequency: 1 0 times per week     Types: Marijuana   • Sexual activity: Not on file   Other Topics Concern   • Not on file   Social History Narrative   • Not on file     Social Determinants of Health     Financial Resource Strain: Not on file   Food Insecurity: Not on file   Transportation Needs: Not on file   Physical Activity: Not on file   Stress: Not on file   Social Connections: Not on file   Intimate Partner Violence: Not on file   Housing Stability: Not on file       Current Outpatient Medications:   •  clindamycin (CLINDAGEL) 1 % gel, Apply topically 2 (two) times a day, Disp: 30 g, Rfl: 1  •  [START ON 10/18/2022] fluorouracil 4,370 mg in CADD/Elastomeric Infusion Device, Infuse 4,370 mg (1,200 mg/m2/day x 1 82 m2) into a catheter in a vein via infusion device over 46 hours for 2 days  Do not start before October 18, 2022 , Disp: 1 each, Rfl: 6  •  Multiple Vitamins-Minerals (MENS MULTIVITAMIN PO), Take by mouth in the morning, Disp: , Rfl:   •  ondansetron (Zofran ODT) 8 mg disintegrating tablet, Take 1 tablet (8 mg total) by mouth every 8 (eight) hours as needed for nausea or vomiting, Disp: 20 tablet, Rfl: 0  •  promethazine (PHENERGAN) 12 5 MG tablet, Take 1 tablet (12 5 mg total) by mouth every 6 (six) hours as needed for nausea or vomiting, Disp: 30 tablet, Rfl: 1  •  sildenafil (VIAGRA) 50 MG tablet, Take 1 tablet (50 mg total) by mouth as needed for erectile dysfunction, Disp: 10 tablet, Rfl: 0  •  docusate sodium (COLACE) 100 mg capsule, Take 1 capsule (100 mg total) by mouth 2 (two) times a day for 10 days (Patient not taking: Reported on 10/12/2022), Disp: , Rfl: 0  •  gabapentin (NEURONTIN) 300 mg capsule, Take 1 capsule (300 mg total) by mouth 3 (three) times a day for 21 days (Patient not taking: No sig reported), Disp: 63 capsule, Rfl: 0  •  ibuprofen (MOTRIN) 600 mg tablet, Take 1 tablet (600 mg total) by mouth 3 (three) times a day with meals for 7 days (Patient not taking: No sig reported), Disp: 21 tablet, Rfl: 0  No Known Allergies  Vitals:    10/14/22 1128   BP: 118/74   Pulse: 90   Resp: 16   Temp: 98 8 °F (37 1 °C)   SpO2: 99%       Physical Exam  Constitutional: General appearance: The Patient is well-developed and well-nourished who appears the stated age in no acute distress  Patient is pleasant and talkative  HEENT:  Normocephalic  Sclerae are anicteric  Mucous membranes are moist  Neck is supple without adenopathy  No JVD  Chest: The lungs are clear to auscultation  Cardiac: Heart is regular rate       Abdomen: Abdomen is soft, non-tender, non-distended and without masses  Extremities: There is no clubbing or cyanosis  There is no edema  Symmetric  Neuro: Grossly nonfocal  Gait is normal      Lymphatic: No evidence of cervical adenopathy bilaterally  No evidence of axillary adenopathy bilaterally  No evidence of inguinal adenopathy bilaterally  Skin: Warm, anicteric  Psych:  Patient is pleasant and talkative  Breasts:        Pathology:  [unfilled]    Labs:  Lab Results   Component Value Date    CEA 0 5 10/12/2022         Imaging  XR chest pa & lateral    Result Date: 9/18/2022  Narrative: CHEST INDICATION:   C78 01: Secondary malignant neoplasm of right lung  Metastatic colon cancer  COMPARISON:  CXR 8/10/2022 and chest CT 7/14/2022  EXAM PERFORMED/VIEWS:  XR CHEST PA & LATERAL  DUAL ENERGY SUBTRACTION  FINDINGS:  Right port at cavoatrial junction  Cardiomediastinal silhouette appears unremarkable  The lungs are clear  No pneumothorax or pleural effusion  Osseous structures appear within normal limits for patient age  Impression: No acute cardiopulmonary disease  Workstation performed: [id number]     MRI abdomen w wo contrast    Result Date: 10/13/2022  Narrative: MRI - ABDOMEN - WITH AND WITHOUT CONTRAST INDICATION: 54 years / Male  C18 9: Malignant neoplasm of colon, unspecified C78 7: Secondary malignant neoplasm of liver and intrahepatic bile duct  Sigmoid colon malignancy diagnosed in January 2018  Patient underwent segment 7 liver resection and hemicolectomy in June 2018 after neoadjuvant chemotherapy  Patient then underwent adjuvant chemotherapy  Segment 3 wedge resection was performed in June 2020  Patient has continued on adjuvant chemotherapy  Reevaluate  COMPARISON: Multiple prior examinations including most recent PET CT scan performed July 6, 2022 and most recent abdominal MRI performed March 25, 2022   TECHNIQUE:  The following pulse sequences were obtained:  axial T1 weighted in/out of phase images, multiplanar T2 weighted images, axial DWI/ADC, pre-contrast axial T1 with fat saturation and dynamic multiphase post-contrast fat suppressed T1 weighted images    IV Contrast:  7 mL of Gadobutrol injection (SINGLE-DOSE) FINDINGS: LOWER CHEST:   Unremarkable  LIVER: Normal in size  Atypical contours in lung the undersurface of segment 3 in keeping with the reported history of prior wedge resection  Focal signal abnormality along the surface of the medial dome of segment 7 on image 44 of series 14 and measuring 2 5  x 1 8 cm on image 236 of series 12, slightly decreased in size from 2 6 x 2 1 cm on March 25, 2022 examination and again in keeping with prior prior resection/ablation  No MR evidence for recurrent or developing hepatic mass  Scattered tiny hepatic cysts are reidentified  The hepatic veins and portal veins are patent  BILE DUCTS: No intrahepatic or extrahepatic bile duct dilation  GALLBLADDER:  Small gallstones in the dependent lumen of the gallbladder fundus  A lateral wall thickening  No pericholecystic edema  PANCREAS:  Unremarkable  ADRENAL GLANDS:  Normal  SPLEEN:  Normal  KIDNEYS/PROXIMAL URETERS: No hydroureteronephrosis  No suspicious renal mass  Unchanged large simple left renal parapelvic cyst causing unchanged chronic left renal caliectasis  BOWEL:   No dilated loops of bowel  PERITONEUM/RETROPERITONEUM: No mass  No ascites  LYMPH NODES: No abdominal lymphadenopathy  VASCULAR STRUCTURES:  No aneurysm  ABDOMINAL WALL:  Unchanged 5 4 cm simple left renal parapelvic cyst causing mild unchanged chronic left renal caliectasis  OSSEOUS STRUCTURES:  No suspicious osseous lesion  Impression: No MR findings of metastatic tumor in the abdomen and specifically no recurrent or enlarging metastatic lesions in the liver  Treatment related changes in segment 3 and segment 7 reidentified  Workstation performed: [id number]     I reviewed the above laboratory and imaging data      Discussion/Summary:  46-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  His liver MRI is stable  His CEA level is normal   He has had all his metastatic disease surgically resected  Going to be completing further chemotherapy in the next 3 months  Since he is going to be getting scans at that time, I will plan on repeating his liver MRI in 6 months  I will see him again at that time for another clinical exam   Is agreeable to this  All his questions were answered

## 2022-10-17 ENCOUNTER — PATIENT MESSAGE (OUTPATIENT)
Dept: INTERNAL MEDICINE CLINIC | Facility: OTHER | Age: 52
End: 2022-10-17

## 2022-10-17 ENCOUNTER — TELEPHONE (OUTPATIENT)
Dept: INFUSION CENTER | Facility: CLINIC | Age: 52
End: 2022-10-17

## 2022-10-17 ENCOUNTER — APPOINTMENT (OUTPATIENT)
Dept: LAB | Facility: CLINIC | Age: 52
End: 2022-10-17
Payer: COMMERCIAL

## 2022-10-17 DIAGNOSIS — D70.1 CHEMOTHERAPY INDUCED NEUTROPENIA (HCC): ICD-10-CM

## 2022-10-17 DIAGNOSIS — K21.9 GASTROESOPHAGEAL REFLUX DISEASE WITHOUT ESOPHAGITIS: Primary | ICD-10-CM

## 2022-10-17 DIAGNOSIS — C78.01 MALIGNANT NEOPLASM METASTATIC TO RIGHT LUNG (HCC): ICD-10-CM

## 2022-10-17 DIAGNOSIS — C78.7 COLON CANCER METASTASIZED TO LIVER (HCC): ICD-10-CM

## 2022-10-17 DIAGNOSIS — C79.9 METASTASIS FROM COLON CANCER (HCC): ICD-10-CM

## 2022-10-17 DIAGNOSIS — T45.1X5A CHEMOTHERAPY INDUCED NEUTROPENIA (HCC): ICD-10-CM

## 2022-10-17 DIAGNOSIS — C18.9 COLON CANCER METASTASIZED TO LIVER (HCC): ICD-10-CM

## 2022-10-17 DIAGNOSIS — C18.9 METASTASIS FROM COLON CANCER (HCC): ICD-10-CM

## 2022-10-17 LAB
ALBUMIN SERPL BCP-MCNC: 4.1 G/DL (ref 3.5–5)
ALP SERPL-CCNC: 107 U/L (ref 34–104)
ALT SERPL W P-5'-P-CCNC: 28 U/L (ref 7–52)
ANION GAP SERPL CALCULATED.3IONS-SCNC: 7 MMOL/L (ref 4–13)
AST SERPL W P-5'-P-CCNC: 20 U/L (ref 13–39)
BASOPHILS # BLD AUTO: 0.07 THOUSANDS/ΜL (ref 0–0.1)
BASOPHILS NFR BLD AUTO: 1 % (ref 0–1)
BILIRUB SERPL-MCNC: 0.37 MG/DL (ref 0.2–1)
BUN SERPL-MCNC: 9 MG/DL (ref 5–25)
CALCIUM SERPL-MCNC: 9.1 MG/DL (ref 8.4–10.2)
CHLORIDE SERPL-SCNC: 104 MMOL/L (ref 96–108)
CO2 SERPL-SCNC: 27 MMOL/L (ref 21–32)
CREAT SERPL-MCNC: 0.91 MG/DL (ref 0.6–1.3)
EOSINOPHIL # BLD AUTO: 0.13 THOUSAND/ΜL (ref 0–0.61)
EOSINOPHIL NFR BLD AUTO: 1 % (ref 0–6)
ERYTHROCYTE [DISTWIDTH] IN BLOOD BY AUTOMATED COUNT: 15.1 % (ref 11.6–15.1)
GFR SERPL CREATININE-BSD FRML MDRD: 98 ML/MIN/1.73SQ M
GLUCOSE SERPL-MCNC: 131 MG/DL (ref 65–140)
HCT VFR BLD AUTO: 42.6 % (ref 36.5–49.3)
HGB BLD-MCNC: 14.8 G/DL (ref 12–17)
IMM GRANULOCYTES # BLD AUTO: 0.09 THOUSAND/UL (ref 0–0.2)
IMM GRANULOCYTES NFR BLD AUTO: 1 % (ref 0–2)
LYMPHOCYTES # BLD AUTO: 2.09 THOUSANDS/ΜL (ref 0.6–4.47)
LYMPHOCYTES NFR BLD AUTO: 22 % (ref 14–44)
MAGNESIUM SERPL-MCNC: 1.9 MG/DL (ref 1.9–2.7)
MCH RBC QN AUTO: 33 PG (ref 26.8–34.3)
MCHC RBC AUTO-ENTMCNC: 34.7 G/DL (ref 31.4–37.4)
MCV RBC AUTO: 95 FL (ref 82–98)
MONOCYTES # BLD AUTO: 0.65 THOUSAND/ΜL (ref 0.17–1.22)
MONOCYTES NFR BLD AUTO: 7 % (ref 4–12)
NEUTROPHILS # BLD AUTO: 6.43 THOUSANDS/ΜL (ref 1.85–7.62)
NEUTS SEG NFR BLD AUTO: 68 % (ref 43–75)
NRBC BLD AUTO-RTO: 0 /100 WBCS
PLATELET # BLD AUTO: 209 THOUSANDS/UL (ref 149–390)
PMV BLD AUTO: 10.3 FL (ref 8.9–12.7)
POTASSIUM SERPL-SCNC: 3.5 MMOL/L (ref 3.5–5.3)
PROT SERPL-MCNC: 7.2 G/DL (ref 6.4–8.4)
RBC # BLD AUTO: 4.48 MILLION/UL (ref 3.88–5.62)
SODIUM SERPL-SCNC: 138 MMOL/L (ref 135–147)
WBC # BLD AUTO: 9.46 THOUSAND/UL (ref 4.31–10.16)

## 2022-10-17 PROCEDURE — 83735 ASSAY OF MAGNESIUM: CPT

## 2022-10-17 PROCEDURE — 85025 COMPLETE CBC W/AUTO DIFF WBC: CPT

## 2022-10-17 PROCEDURE — 36415 COLL VENOUS BLD VENIPUNCTURE: CPT

## 2022-10-17 PROCEDURE — 80053 COMPREHEN METABOLIC PANEL: CPT

## 2022-10-18 ENCOUNTER — HOSPITAL ENCOUNTER (OUTPATIENT)
Dept: INFUSION CENTER | Facility: CLINIC | Age: 52
Discharge: HOME | End: 2022-10-18
Payer: COMMERCIAL

## 2022-10-18 VITALS
TEMPERATURE: 97.9 F | HEIGHT: 67 IN | RESPIRATION RATE: 16 BRPM | BODY MASS INDEX: 24.8 KG/M2 | OXYGEN SATURATION: 98 % | SYSTOLIC BLOOD PRESSURE: 117 MMHG | HEART RATE: 72 BPM | DIASTOLIC BLOOD PRESSURE: 76 MMHG | WEIGHT: 158 LBS

## 2022-10-18 DIAGNOSIS — C79.9 METASTASIS FROM COLON CANCER (HCC): ICD-10-CM

## 2022-10-18 DIAGNOSIS — C78.7 COLON CANCER METASTASIZED TO LIVER (HCC): Primary | ICD-10-CM

## 2022-10-18 DIAGNOSIS — C78.01 MALIGNANT NEOPLASM METASTATIC TO RIGHT LUNG (HCC): ICD-10-CM

## 2022-10-18 DIAGNOSIS — C18.9 COLON CANCER METASTASIZED TO LIVER (HCC): Primary | ICD-10-CM

## 2022-10-18 DIAGNOSIS — C18.9 METASTASIS FROM COLON CANCER (HCC): ICD-10-CM

## 2022-10-18 DIAGNOSIS — D70.1 CHEMOTHERAPY INDUCED NEUTROPENIA (HCC): ICD-10-CM

## 2022-10-18 DIAGNOSIS — T45.1X5A CHEMOTHERAPY INDUCED NEUTROPENIA (HCC): ICD-10-CM

## 2022-10-18 PROCEDURE — G0498 CHEMO EXTEND IV INFUS W/PUMP: HCPCS

## 2022-10-18 PROCEDURE — 96411 CHEMO IV PUSH ADDL DRUG: CPT

## 2022-10-18 PROCEDURE — 96375 TX/PRO/DX INJ NEW DRUG ADDON: CPT

## 2022-10-18 PROCEDURE — 96413 CHEMO IV INFUSION 1 HR: CPT

## 2022-10-18 PROCEDURE — 96417 CHEMO IV INFUS EACH ADDL SEQ: CPT

## 2022-10-18 PROCEDURE — 96367 TX/PROPH/DG ADDL SEQ IV INF: CPT

## 2022-10-18 PROCEDURE — 96368 THER/DIAG CONCURRENT INF: CPT

## 2022-10-18 PROCEDURE — 96415 CHEMO IV INFUSION ADDL HR: CPT

## 2022-10-18 RX ORDER — ATROPINE SULFATE 1 MG/ML
0.25 INJECTION, SOLUTION INTRAVENOUS ONCE
Status: COMPLETED | OUTPATIENT
Start: 2022-10-18 | End: 2022-10-18

## 2022-10-18 RX ORDER — OMEPRAZOLE 20 MG/1
20 CAPSULE, DELAYED RELEASE ORAL DAILY
Qty: 30 CAPSULE | Refills: 3 | Status: SHIPPED | OUTPATIENT
Start: 2022-10-18

## 2022-10-18 RX ORDER — PALONOSETRON 0.05 MG/ML
0.25 INJECTION, SOLUTION INTRAVENOUS ONCE
Status: COMPLETED | OUTPATIENT
Start: 2022-10-18 | End: 2022-10-18

## 2022-10-18 RX ORDER — SODIUM CHLORIDE 9 MG/ML
20 INJECTION, SOLUTION INTRAVENOUS ONCE
Status: COMPLETED | OUTPATIENT
Start: 2022-10-18 | End: 2022-10-18

## 2022-10-18 RX ORDER — FLUOROURACIL 50 MG/ML
400 INJECTION, SOLUTION INTRAVENOUS ONCE
Status: COMPLETED | OUTPATIENT
Start: 2022-10-18 | End: 2022-10-18

## 2022-10-18 RX ORDER — MAGNESIUM SULFATE HEPTAHYDRATE 40 MG/ML
2 INJECTION, SOLUTION INTRAVENOUS ONCE AS NEEDED
Status: DISCONTINUED | OUTPATIENT
Start: 2022-10-18 | End: 2022-10-21 | Stop reason: HOSPADM

## 2022-10-18 RX ADMIN — LEUCOVORIN CALCIUM 750 MG: 500 INJECTION, POWDER, LYOPHILIZED, FOR SOLUTION INTRAMUSCULAR; INTRAVENOUS at 11:10

## 2022-10-18 RX ADMIN — PALONOSETRON HYDROCHLORIDE 0.25 MG: 0.25 INJECTION INTRAVENOUS at 11:05

## 2022-10-18 RX ADMIN — IRINOTECAN HYDROCHLORIDE 320 MG: 20 INJECTION, SOLUTION INTRAVENOUS at 11:12

## 2022-10-18 RX ADMIN — SODIUM CHLORIDE 20 ML/HR: 0.9 INJECTION, SOLUTION INTRAVENOUS at 08:28

## 2022-10-18 RX ADMIN — ATROPINE SULFATE 0.25 MG: 1 INJECTION, SOLUTION INTRAMUSCULAR; INTRAVENOUS; SUBCUTANEOUS at 11:04

## 2022-10-18 RX ADMIN — FLUOROURACIL 730 MG: 50 INJECTION, SOLUTION INTRAVENOUS at 12:47

## 2022-10-18 RX ADMIN — DIPHENHYDRAMINE HYDROCHLORIDE 25 MG: 50 INJECTION, SOLUTION INTRAMUSCULAR; INTRAVENOUS at 08:28

## 2022-10-18 RX ADMIN — Medication 900 MG: at 09:07

## 2022-10-18 RX ADMIN — DEXAMETHASONE SODIUM PHOSPHATE 10 MG: 10 INJECTION, SOLUTION INTRAMUSCULAR; INTRAVENOUS at 10:10

## 2022-10-18 RX ADMIN — FOSAPREPITANT 150 MG: 150 INJECTION, POWDER, LYOPHILIZED, FOR SOLUTION INTRAVENOUS at 10:32

## 2022-10-18 RX ADMIN — MAGNESIUM SULFATE HEPTAHYDRATE 2 G: 40 INJECTION, SOLUTION INTRAVENOUS at 11:08

## 2022-10-18 NOTE — PROGRESS NOTES
Patient is here for FOLFIRI with Erbitux  He is feeling ok and states his rash has improved  Labs reviewed, will receive 2gm Mag Sulfate with chemo today  Port accessed without issue, premeds in progress

## 2022-10-18 NOTE — TELEPHONE ENCOUNTER
He does not have any meds for acid reflux active in his chart   He was on omeprazole but discontinued in 2020

## 2022-10-20 ENCOUNTER — HOSPITAL ENCOUNTER (OUTPATIENT)
Dept: INFUSION CENTER | Facility: CLINIC | Age: 52
Discharge: HOME | End: 2022-10-20
Payer: COMMERCIAL

## 2022-10-20 DIAGNOSIS — C18.9 METASTASIS FROM COLON CANCER (HCC): ICD-10-CM

## 2022-10-20 DIAGNOSIS — T45.1X5A CHEMOTHERAPY INDUCED NEUTROPENIA (HCC): ICD-10-CM

## 2022-10-20 DIAGNOSIS — C79.9 METASTASIS FROM COLON CANCER (HCC): ICD-10-CM

## 2022-10-20 DIAGNOSIS — C78.7 COLON CANCER METASTASIZED TO LIVER (HCC): Primary | ICD-10-CM

## 2022-10-20 DIAGNOSIS — C18.9 COLON CANCER METASTASIZED TO LIVER (HCC): Primary | ICD-10-CM

## 2022-10-20 DIAGNOSIS — D70.1 CHEMOTHERAPY INDUCED NEUTROPENIA (HCC): ICD-10-CM

## 2022-10-20 DIAGNOSIS — C78.01 MALIGNANT NEOPLASM METASTATIC TO RIGHT LUNG (HCC): ICD-10-CM

## 2022-10-20 PROCEDURE — 96372 THER/PROPH/DIAG INJ SC/IM: CPT

## 2022-10-20 RX ADMIN — PEGFILGRASTIM 6 MG: KIT SUBCUTANEOUS at 11:14

## 2022-10-20 NOTE — PROGRESS NOTES
Patient here for elastomeric pump disconnect and neulasta onpro  He is doing well, no c/o offered  He tolerated pump d/c without incident followed by neulasta onpro placement to [facility] for injection tomorrow at about 1415  Patient aware he should be able to remove neulasta around 1500 hours tomorrow, he is familiar with injection and removal of onpro, has had this previously  Next cycle 11/1/22, AVS declined

## 2022-10-20 NOTE — PATIENT INSTRUCTIONS
October 2022 Sunday Monday Tuesday Wednesday Thursday Friday Saturday                                 1                2     3    LAB WALK IN   2:15 PM   (5 min )   AN MOB LAB PSC CHAIR 1   [facility] [address] MOB 4    INF ONCOLOGY TX-TREATMENT PLAN   8:00 AM   (330 min )   AN INF CHAIR [facility] 5     6    INF ONCOLOGY TX-TREATMENT PLAN  11:30 AM   (30 min )   AN INF QUICK CHAIR [facility]    LAB WALK IN   1:10 PM   (5 min )   AN MOB LAB PSC CHAIR 1   [facility] [address] MOB 7    MRI ABDOMEN W WO CON  11:00 AM   (45 min )   AM MRI 1   [facility]    INF THERAPY PLAN  12:30 PM   (30 min )   AN INF QUICK CHAIR   St  [address] 8         Cycle 2, Day 1  Cycle 2, Day 3 Cycle 2, Day 4    9     10     11     12    LAB WALK IN   9:00 AM   (5 min )   AN MOB LAB PSC CHAIR 1   [facility] [address] MOB    FOLLOW UP PG  10:25 AM   (20 min )   Nelly Aviles MD   [facility] [place] 13     14    FOLLOW UP PG  11:15 AM   (15 min )   Yelitza Marie MD   [facility] [place] 15                16     17    LAB WALK IN   4:05 PM   (5 min )   AN MOB LAB PSC CHAIR 1   [address] MOB 18    INF ONCOLOGY TX-TREATMENT PLAN   8:00 AM   (360 min )   AN INF CHAIR 2   [address] 19     20    INF ONCOLOGY TX-TREATMENT PLAN  11:00 AM   (30 min )   AN INF QUICK CHAIR [facility] 21     22         Cycle 3, Day 1  Cycle 3, Day 3     23     24     25     26     34  Happy Birthday!     87     18                59     68                                                Treatment Details         10/4/2022 - Cycle 2, Day 1      Supportive Care: [address], ONCBCN NURSE COMMUNICATION7, magnesium sulfate, magnesium sulfate      Chemotherapy: ONCBCN PROVIDER COMMUNICATION9, cetuximab (ERBITUX), ONCBCN PROVIDER COMMUNICATION2, LEUCOVORIN IVPB, IRINOTECAN INFUSION, fluorouracil (ADRUCIL)      Take-Home Chemo: ONCBCN PROVIDER COMMUNICATION8, FLUOROURACIL AMBULATORY INFUSION    10/6/2022 - Cycle 2, Day 3      Supportive Care: [facility] PROVIDER COMMUNICATION7, pegfilgrastim (Katherene Marcie)    10/7/2022 - Cycle 2, Day 4      Supportive Care: APPT 17, ONCBCN NURSE COMMUNICATION, Pegfilgrastim-bmez (ZIEXTENZO)    10/18/2022 - Cycle 3, Day 1      Supportive Care: ONCBCN PROVIDER COMMUNICATION8, ONCBCN NURSE COMMUNICATION7, magnesium sulfate, magnesium sulfate      Chemotherapy: ONCBCN PROVIDER COMMUNICATION9, cetuximab (ERBITUX), ONCBCN PROVIDER COMMUNICATION2, LEUCOVORIN IVPB, IRINOTECAN INFUSION, fluorouracil (ADRUCIL)      Take-Home Chemo: ONCBCN PROVIDER COMMUNICATION8, FLUOROURACIL AMBULATORY INFUSION    10/20/2022 - Cycle 3, Day 3      Supportive Care: [facility] PROVIDER COMMUNICATION7, pegfilgrastim (Katherene Marcei)        November 2022 Sunday Monday Tuesday Wednesday Thursday Friday Saturday             1    INF ONCOLOGY TX-TREATMENT PLAN   8:30 AM   (360 min )   AN INF CHAIR [address] 2     3    INF ONCOLOGY TX-TREATMENT PLAN  12:00 PM   (30 min )   AN INF QUICK CHAIR [address] 4     5         Cycle 4, Day 1  Cycle 4, Day 3     6     7     8     9     10     11     12                13     14     15    INF ONCOLOGY TX-TREATMENT PLAN   8:30 AM   (360 min )   AN INF CHAIR [address] 16     17    INF ONCOLOGY TX-TREATMENT PLAN  12:00 PM   (30 min )   AN INF QUICK CHAIR [address] 18     19         Cycle 5, Day 1  Cycle 5, Day 3     20     21     22     23    FOLLOW UP PG  10:45 AM   (20 min )   Sugar Parker MD   [address] [facility] [place] 24     25     26                27     28     29    INF ONCOLOGY TX-TREATMENT PLAN   8:30 AM   (360 min )   AN INF CHAIR [address] 30                        Cycle 6, Day 1              Treatment Details         11/1/2022 - Cycle 4, Day 1      Supportive Care: ONCBCN PROVIDER COMMUNICATION8, ONCBCN NURSE COMMUNICATION7, magnesium sulfate, magnesium sulfate      Chemotherapy: ONCBCN PROVIDER COMMUNICATION9, cetuximab (ERBITUX), ONCBCN PROVIDER COMMUNICATION2, LEUCOVORIN IVPB, IRINOTECAN INFUSION, fluorouracil (ADRUCIL)      Take-Home Chemo: ONCBCN PROVIDER COMMUNICATION8, FLUOROURACIL AMBULATORY INFUSION    11/3/2022 - Cycle 4, Day 3      Supportive Care: [facility] PROVIDER COMMUNICATION7, pegfilgrastim (Virgene Alexander)    11/15/2022 - Cycle 5, Day 1      Supportive Care: ONCBCN PROVIDER COMMUNICATION8, ONCBCN NURSE COMMUNICATION7, magnesium sulfate, magnesium sulfate      Chemotherapy: ONCBCN PROVIDER COMMUNICATION9, cetuximab (ERBITUX), ONCBCN PROVIDER COMMUNICATION2, LEUCOVORIN IVPB, IRINOTECAN INFUSION, fluorouracil (ADRUCIL)      Take-Home Chemo: ONCBCN PROVIDER COMMUNICATION8, FLUOROURACIL AMBULATORY INFUSION    11/17/2022 - Cycle 5, Day 3      Supportive Care: [facility] PROVIDER COMMUNICATION7, pegfilgrastim (Virgene Alexander)    11/29/2022 - Cycle 6, Day 1      Supportive Care: ONCBCN PROVIDER COMMUNICATION8, ONCBCN NURSE COMMUNICATION7, magnesium sulfate, magnesium sulfate      Chemotherapy: ONCBCN PROVIDER COMMUNICATION9, cetuximab (ERBITUX), ONCBCN PROVIDER COMMUNICATION2, LEUCOVORIN IVPB, IRINOTECAN INFUSION, fluorouracil (ADRUCIL)      Take-Home Chemo: ONCBCN PROVIDER COMMUNICATION8, FLUOROURACIL AMBULATORY INFUSION

## 2022-10-24 RX ORDER — SODIUM CHLORIDE 9 MG/ML
20 INJECTION, SOLUTION INTRAVENOUS ONCE
Status: CANCELLED | OUTPATIENT
Start: 2022-11-01

## 2022-10-24 RX ORDER — MAGNESIUM SULFATE HEPTAHYDRATE 40 MG/ML
2 INJECTION, SOLUTION INTRAVENOUS ONCE AS NEEDED
Status: CANCELLED | OUTPATIENT
Start: 2022-11-01

## 2022-10-24 RX ORDER — MAGNESIUM SULFATE HEPTAHYDRATE 40 MG/ML
4 INJECTION, SOLUTION INTRAVENOUS ONCE AS NEEDED
Status: CANCELLED | OUTPATIENT
Start: 2022-11-01

## 2022-10-24 RX ORDER — PALONOSETRON 0.05 MG/ML
0.25 INJECTION, SOLUTION INTRAVENOUS ONCE
Status: CANCELLED | OUTPATIENT
Start: 2022-11-01

## 2022-10-24 RX ORDER — FLUOROURACIL 50 MG/ML
400 INJECTION, SOLUTION INTRAVENOUS ONCE
Status: CANCELLED | OUTPATIENT
Start: 2022-11-01

## 2022-10-24 RX ORDER — ATROPINE SULFATE 1 MG/ML
0.25 INJECTION, SOLUTION INTRAVENOUS ONCE
Status: CANCELLED | OUTPATIENT
Start: 2022-11-01

## 2022-10-25 DIAGNOSIS — D70.1 CHEMOTHERAPY INDUCED NEUTROPENIA (HCC): Primary | ICD-10-CM

## 2022-10-25 DIAGNOSIS — T45.1X5A CHEMOTHERAPY INDUCED NEUTROPENIA (HCC): Primary | ICD-10-CM

## 2022-10-25 DIAGNOSIS — C79.9 METASTASIS FROM COLON CANCER (HCC): ICD-10-CM

## 2022-10-25 DIAGNOSIS — C18.9 METASTASIS FROM COLON CANCER (HCC): ICD-10-CM

## 2022-10-25 DIAGNOSIS — C78.7 COLON CANCER METASTASIZED TO LIVER (HCC): ICD-10-CM

## 2022-10-25 DIAGNOSIS — C18.9 COLON CANCER METASTASIZED TO LIVER (HCC): ICD-10-CM

## 2022-10-25 DIAGNOSIS — C78.01 MALIGNANT NEOPLASM METASTATIC TO RIGHT LUNG (HCC): ICD-10-CM

## 2022-10-26 ENCOUNTER — DOCUMENTATION (OUTPATIENT)
Dept: HEMATOLOGY ONCOLOGY | Facility: CLINIC | Age: 52
End: 2022-10-26

## 2022-10-26 NOTE — PROGRESS NOTES
Received income documents from patient via email  I am currently not working on anything for this patient  Forwarded documents to Crystal / [employer] for Infusion medications as patient is receiving infusion treatments

## 2022-10-31 ENCOUNTER — APPOINTMENT (OUTPATIENT)
Dept: LAB | Facility: CLINIC | Age: 52
End: 2022-10-31

## 2022-10-31 DIAGNOSIS — C18.9 COLON CANCER METASTASIZED TO LIVER (HCC): ICD-10-CM

## 2022-10-31 DIAGNOSIS — C78.01 MALIGNANT NEOPLASM METASTATIC TO RIGHT LUNG (HCC): ICD-10-CM

## 2022-10-31 DIAGNOSIS — D70.1 CHEMOTHERAPY INDUCED NEUTROPENIA (HCC): ICD-10-CM

## 2022-10-31 DIAGNOSIS — C79.9 METASTASIS FROM COLON CANCER (HCC): ICD-10-CM

## 2022-10-31 DIAGNOSIS — T45.1X5A CHEMOTHERAPY INDUCED NEUTROPENIA (HCC): ICD-10-CM

## 2022-10-31 DIAGNOSIS — C18.9 METASTASIS FROM COLON CANCER (HCC): ICD-10-CM

## 2022-10-31 DIAGNOSIS — C78.7 COLON CANCER METASTASIZED TO LIVER (HCC): ICD-10-CM

## 2022-10-31 LAB
ALBUMIN SERPL BCP-MCNC: 4.1 G/DL (ref 3.5–5)
ALP SERPL-CCNC: 140 U/L (ref 34–104)
ALT SERPL W P-5'-P-CCNC: 20 U/L (ref 7–52)
ANION GAP SERPL CALCULATED.3IONS-SCNC: 8 MMOL/L (ref 4–13)
AST SERPL W P-5'-P-CCNC: 21 U/L (ref 13–39)
BASOPHILS # BLD MANUAL: 0 THOUSAND/UL (ref 0–0.1)
BASOPHILS NFR MAR MANUAL: 0 % (ref 0–1)
BILIRUB SERPL-MCNC: 0.3 MG/DL (ref 0.2–1)
BUN SERPL-MCNC: 10 MG/DL (ref 5–25)
CALCIUM SERPL-MCNC: 9.1 MG/DL (ref 8.4–10.2)
CHLORIDE SERPL-SCNC: 106 MMOL/L (ref 96–108)
CO2 SERPL-SCNC: 24 MMOL/L (ref 21–32)
CREAT SERPL-MCNC: 0.85 MG/DL (ref 0.6–1.3)
EOSINOPHIL # BLD MANUAL: 0.42 THOUSAND/UL (ref 0–0.4)
EOSINOPHIL NFR BLD MANUAL: 2 % (ref 0–6)
ERYTHROCYTE [DISTWIDTH] IN BLOOD BY AUTOMATED COUNT: 16.5 % (ref 11.6–15.1)
GFR SERPL CREATININE-BSD FRML MDRD: 101 ML/MIN/1.73SQ M
GLUCOSE SERPL-MCNC: 101 MG/DL (ref 65–140)
HCT VFR BLD AUTO: 40.2 % (ref 36.5–49.3)
HGB BLD-MCNC: 13.9 G/DL (ref 12–17)
LG PLATELETS BLD QL SMEAR: PRESENT
LYMPHOCYTES # BLD AUTO: 11 % (ref 14–44)
LYMPHOCYTES # BLD AUTO: 2.31 THOUSAND/UL (ref 0.6–4.47)
MAGNESIUM SERPL-MCNC: 1.7 MG/DL (ref 1.9–2.7)
MCH RBC QN AUTO: 33.3 PG (ref 26.8–34.3)
MCHC RBC AUTO-ENTMCNC: 34.6 G/DL (ref 31.4–37.4)
MCV RBC AUTO: 96 FL (ref 82–98)
MONOCYTES # BLD AUTO: 1.68 THOUSAND/UL (ref 0–1.22)
MONOCYTES NFR BLD: 8 % (ref 4–12)
NEUTROPHILS # BLD MANUAL: 16.57 THOUSAND/UL (ref 1.85–7.62)
NEUTS BAND NFR BLD MANUAL: 12 % (ref 0–8)
NEUTS SEG NFR BLD AUTO: 67 % (ref 43–75)
PLATELET # BLD AUTO: 217 THOUSANDS/UL (ref 149–390)
PLATELET BLD QL SMEAR: ADEQUATE
PMV BLD AUTO: 11 FL (ref 8.9–12.7)
POLYCHROMASIA BLD QL SMEAR: PRESENT
POTASSIUM SERPL-SCNC: 3.9 MMOL/L (ref 3.5–5.3)
PROT SERPL-MCNC: 7.3 G/DL (ref 6.4–8.4)
RBC # BLD AUTO: 4.18 MILLION/UL (ref 3.88–5.62)
RBC MORPH BLD: PRESENT
SODIUM SERPL-SCNC: 138 MMOL/L (ref 135–147)
TOXIC GRANULES BLD QL SMEAR: PRESENT
WBC # BLD AUTO: 20.98 THOUSAND/UL (ref 4.31–10.16)

## 2022-11-01 ENCOUNTER — HOSPITAL ENCOUNTER (OUTPATIENT)
Dept: INFUSION CENTER | Facility: CLINIC | Age: 52
Discharge: HOME | End: 2022-11-01

## 2022-11-01 VITALS
DIASTOLIC BLOOD PRESSURE: 62 MMHG | TEMPERATURE: 98.8 F | HEIGHT: 67 IN | HEART RATE: 80 BPM | SYSTOLIC BLOOD PRESSURE: 115 MMHG | RESPIRATION RATE: 16 BRPM | OXYGEN SATURATION: 98 % | BODY MASS INDEX: 25.11 KG/M2 | WEIGHT: 160 LBS

## 2022-11-01 DIAGNOSIS — T45.1X5A CHEMOTHERAPY INDUCED NEUTROPENIA (HCC): ICD-10-CM

## 2022-11-01 DIAGNOSIS — C18.9 METASTASIS FROM COLON CANCER (HCC): ICD-10-CM

## 2022-11-01 DIAGNOSIS — C78.7 COLON CANCER METASTASIZED TO LIVER (HCC): Primary | ICD-10-CM

## 2022-11-01 DIAGNOSIS — C79.9 METASTASIS FROM COLON CANCER (HCC): ICD-10-CM

## 2022-11-01 DIAGNOSIS — C18.9 COLON CANCER METASTASIZED TO LIVER (HCC): Primary | ICD-10-CM

## 2022-11-01 DIAGNOSIS — D70.1 CHEMOTHERAPY INDUCED NEUTROPENIA (HCC): ICD-10-CM

## 2022-11-01 DIAGNOSIS — C78.01 MALIGNANT NEOPLASM METASTATIC TO RIGHT LUNG (HCC): ICD-10-CM

## 2022-11-01 RX ORDER — PALONOSETRON 0.05 MG/ML
0.25 INJECTION, SOLUTION INTRAVENOUS ONCE
Status: COMPLETED | OUTPATIENT
Start: 2022-11-01 | End: 2022-11-01

## 2022-11-01 RX ORDER — FLUOROURACIL 50 MG/ML
400 INJECTION, SOLUTION INTRAVENOUS ONCE
Status: COMPLETED | OUTPATIENT
Start: 2022-11-01 | End: 2022-11-01

## 2022-11-01 RX ORDER — ATROPINE SULFATE 1 MG/ML
0.25 INJECTION, SOLUTION INTRAVENOUS ONCE
Status: COMPLETED | OUTPATIENT
Start: 2022-11-01 | End: 2022-11-01

## 2022-11-01 RX ORDER — SODIUM CHLORIDE 9 MG/ML
20 INJECTION, SOLUTION INTRAVENOUS ONCE
Status: COMPLETED | OUTPATIENT
Start: 2022-11-01 | End: 2022-11-01

## 2022-11-01 RX ORDER — MAGNESIUM SULFATE HEPTAHYDRATE 40 MG/ML
4 INJECTION, SOLUTION INTRAVENOUS ONCE AS NEEDED
Status: DISCONTINUED | OUTPATIENT
Start: 2022-11-01 | End: 2022-11-01

## 2022-11-01 RX ORDER — MAGNESIUM SULFATE HEPTAHYDRATE 40 MG/ML
2 INJECTION, SOLUTION INTRAVENOUS ONCE AS NEEDED
Status: DISCONTINUED | OUTPATIENT
Start: 2022-11-01 | End: 2022-11-04 | Stop reason: HOSPADM

## 2022-11-01 RX ADMIN — DEXAMETHASONE SODIUM PHOSPHATE 10 MG: 10 INJECTION, SOLUTION INTRAMUSCULAR; INTRAVENOUS at 11:59

## 2022-11-01 RX ADMIN — FLUOROURACIL 730 MG: 50 INJECTION, SOLUTION INTRAVENOUS at 14:38

## 2022-11-01 RX ADMIN — ATROPINE SULFATE 0.25 MG: 1 INJECTION, SOLUTION INTRAVENOUS at 12:56

## 2022-11-01 RX ADMIN — PALONOSETRON 0.25 MG: 0.05 INJECTION, SOLUTION INTRAVENOUS at 11:56

## 2022-11-01 RX ADMIN — MAGNESIUM SULFATE HEPTAHYDRATE 2 G: 40 INJECTION, SOLUTION INTRAVENOUS at 09:00

## 2022-11-01 RX ADMIN — SODIUM CHLORIDE 20 ML/HR: 0.9 INJECTION, SOLUTION INTRAVENOUS at 08:59

## 2022-11-01 RX ADMIN — IRINOTECAN HYDROCHLORIDE 320 MG: 20 INJECTION, SOLUTION INTRAVENOUS at 13:05

## 2022-11-01 RX ADMIN — DIPHENHYDRAMINE HYDROCHLORIDE 25 MG: 50 INJECTION, SOLUTION INTRAMUSCULAR; INTRAVENOUS at 10:25

## 2022-11-01 RX ADMIN — LEUCOVORIN CALCIUM 750 MG: 500 INJECTION, POWDER, LYOPHILIZED, FOR SOLUTION INTRAMUSCULAR; INTRAVENOUS at 13:00

## 2022-11-01 RX ADMIN — Medication 900 MG: at 10:55

## 2022-11-01 RX ADMIN — FOSAPREPITANT 150 MG: 150 INJECTION, POWDER, LYOPHILIZED, FOR SOLUTION INTRAVENOUS at 12:23

## 2022-11-01 NOTE — PROGRESS NOTES
Pt to clinic for mag, erbitux, camptosar, 5FU push and pump, pt tolerated treatment without complications, aware of next appointment, declined avs

## 2022-11-03 ENCOUNTER — HOSPITAL ENCOUNTER (OUTPATIENT)
Dept: INFUSION CENTER | Facility: CLINIC | Age: 52
Discharge: HOME | End: 2022-11-03

## 2022-11-03 VITALS — TEMPERATURE: 98.8 F

## 2022-11-03 DIAGNOSIS — C79.9 METASTASIS FROM COLON CANCER (HCC): ICD-10-CM

## 2022-11-03 DIAGNOSIS — C18.9 METASTASIS FROM COLON CANCER (HCC): ICD-10-CM

## 2022-11-03 DIAGNOSIS — D70.1 CHEMOTHERAPY INDUCED NEUTROPENIA (HCC): ICD-10-CM

## 2022-11-03 DIAGNOSIS — C18.9 COLON CANCER METASTASIZED TO LIVER (HCC): Primary | ICD-10-CM

## 2022-11-03 DIAGNOSIS — C78.7 COLON CANCER METASTASIZED TO LIVER (HCC): Primary | ICD-10-CM

## 2022-11-03 DIAGNOSIS — T45.1X5A CHEMOTHERAPY INDUCED NEUTROPENIA (HCC): ICD-10-CM

## 2022-11-03 DIAGNOSIS — C78.01 MALIGNANT NEOPLASM METASTATIC TO RIGHT LUNG (HCC): ICD-10-CM

## 2022-11-03 RX ADMIN — PEGFILGRASTIM 6 MG: KIT SUBCUTANEOUS at 13:10

## 2022-11-03 NOTE — PROGRESS NOTES
Patient to [facility] for Elastomeric Pump Disconnect / Neulasta On Pro: Offers no complaints at present time: Tolerated infusion without incident: No adverse reactions noted: Elastomeric Pump appears deflated: 46 hours completed: Neulasta On Pro per MD order: Applied to Left UA: Verified follow up appt with patient: AVS offered and declined

## 2022-11-07 RX ORDER — SODIUM CHLORIDE 9 MG/ML
20 INJECTION, SOLUTION INTRAVENOUS ONCE
Status: CANCELLED | OUTPATIENT
Start: 2022-11-15

## 2022-11-07 RX ORDER — ATROPINE SULFATE 1 MG/ML
0.25 INJECTION, SOLUTION INTRAVENOUS ONCE
Status: CANCELLED | OUTPATIENT
Start: 2022-11-15

## 2022-11-07 RX ORDER — FLUOROURACIL 50 MG/ML
400 INJECTION, SOLUTION INTRAVENOUS ONCE
Status: CANCELLED | OUTPATIENT
Start: 2022-11-15

## 2022-11-07 RX ORDER — MAGNESIUM SULFATE HEPTAHYDRATE 40 MG/ML
4 INJECTION, SOLUTION INTRAVENOUS ONCE AS NEEDED
Status: CANCELLED | OUTPATIENT
Start: 2022-11-15

## 2022-11-07 RX ORDER — PALONOSETRON 0.05 MG/ML
0.25 INJECTION, SOLUTION INTRAVENOUS ONCE
Status: CANCELLED | OUTPATIENT
Start: 2022-11-15

## 2022-11-07 RX ORDER — MAGNESIUM SULFATE HEPTAHYDRATE 40 MG/ML
2 INJECTION, SOLUTION INTRAVENOUS ONCE AS NEEDED
Status: CANCELLED | OUTPATIENT
Start: 2022-11-15

## 2022-11-08 DIAGNOSIS — C78.7 COLON CANCER METASTASIZED TO LIVER (HCC): ICD-10-CM

## 2022-11-08 DIAGNOSIS — D70.1 CHEMOTHERAPY INDUCED NEUTROPENIA (HCC): Primary | ICD-10-CM

## 2022-11-08 DIAGNOSIS — C78.01 MALIGNANT NEOPLASM METASTATIC TO RIGHT LUNG (HCC): ICD-10-CM

## 2022-11-08 DIAGNOSIS — C79.9 METASTASIS FROM COLON CANCER (HCC): ICD-10-CM

## 2022-11-08 DIAGNOSIS — C18.9 COLON CANCER METASTASIZED TO LIVER (HCC): ICD-10-CM

## 2022-11-08 DIAGNOSIS — T45.1X5A CHEMOTHERAPY INDUCED NEUTROPENIA (HCC): Primary | ICD-10-CM

## 2022-11-08 DIAGNOSIS — C18.9 METASTASIS FROM COLON CANCER (HCC): ICD-10-CM

## 2022-11-08 RX ORDER — SODIUM CHLORIDE 9 MG/ML
20 INJECTION, SOLUTION INTRAVENOUS ONCE
OUTPATIENT
Start: 2022-11-29

## 2022-11-08 RX ORDER — PALONOSETRON 0.05 MG/ML
0.25 INJECTION, SOLUTION INTRAVENOUS ONCE
OUTPATIENT
Start: 2022-11-29

## 2022-11-08 RX ORDER — MAGNESIUM SULFATE HEPTAHYDRATE 40 MG/ML
2 INJECTION, SOLUTION INTRAVENOUS ONCE AS NEEDED
OUTPATIENT
Start: 2022-11-29

## 2022-11-08 RX ORDER — MAGNESIUM SULFATE HEPTAHYDRATE 40 MG/ML
4 INJECTION, SOLUTION INTRAVENOUS ONCE AS NEEDED
OUTPATIENT
Start: 2022-11-29

## 2022-11-08 RX ORDER — FLUOROURACIL 50 MG/ML
400 INJECTION, SOLUTION INTRAVENOUS ONCE
OUTPATIENT
Start: 2022-11-29

## 2022-11-08 RX ORDER — ATROPINE SULFATE 1 MG/ML
0.25 INJECTION, SOLUTION INTRAVENOUS ONCE
OUTPATIENT
Start: 2022-11-29

## 2022-11-10 ENCOUNTER — TELEPHONE (OUTPATIENT)
Dept: HEMATOLOGY ONCOLOGY | Facility: CLINIC | Age: 52
End: 2022-11-10

## 2022-11-14 ENCOUNTER — APPOINTMENT (OUTPATIENT)
Dept: LAB | Facility: CLINIC | Age: 52
End: 2022-11-14

## 2022-11-14 DIAGNOSIS — C78.7 COLON CANCER METASTASIZED TO LIVER (HCC): ICD-10-CM

## 2022-11-14 DIAGNOSIS — C18.9 METASTASIS FROM COLON CANCER (HCC): ICD-10-CM

## 2022-11-14 DIAGNOSIS — T45.1X5A CHEMOTHERAPY INDUCED NEUTROPENIA (HCC): ICD-10-CM

## 2022-11-14 DIAGNOSIS — C78.01 MALIGNANT NEOPLASM METASTATIC TO RIGHT LUNG (HCC): ICD-10-CM

## 2022-11-14 DIAGNOSIS — C79.9 METASTASIS FROM COLON CANCER (HCC): ICD-10-CM

## 2022-11-14 DIAGNOSIS — C18.9 COLON CANCER METASTASIZED TO LIVER (HCC): ICD-10-CM

## 2022-11-14 DIAGNOSIS — D70.1 CHEMOTHERAPY INDUCED NEUTROPENIA (HCC): ICD-10-CM

## 2022-11-14 LAB
ALBUMIN SERPL BCP-MCNC: 4.3 G/DL (ref 3.5–5)
ALP SERPL-CCNC: 179 U/L (ref 34–104)
ALT SERPL W P-5'-P-CCNC: 21 U/L (ref 7–52)
ANION GAP SERPL CALCULATED.3IONS-SCNC: 8 MMOL/L (ref 4–13)
ANISOCYTOSIS BLD QL SMEAR: PRESENT
AST SERPL W P-5'-P-CCNC: 16 U/L (ref 13–39)
BASOPHILS # BLD MANUAL: 0 THOUSAND/UL (ref 0–0.1)
BASOPHILS NFR MAR MANUAL: 0 % (ref 0–1)
BILIRUB SERPL-MCNC: 0.37 MG/DL (ref 0.2–1)
BUN SERPL-MCNC: 8 MG/DL (ref 5–25)
CALCIUM SERPL-MCNC: 8.8 MG/DL (ref 8.4–10.2)
CHLORIDE SERPL-SCNC: 105 MMOL/L (ref 96–108)
CO2 SERPL-SCNC: 24 MMOL/L (ref 21–32)
CREAT SERPL-MCNC: 0.92 MG/DL (ref 0.6–1.3)
EOSINOPHIL # BLD MANUAL: 0.2 THOUSAND/UL (ref 0–0.4)
EOSINOPHIL NFR BLD MANUAL: 1 % (ref 0–6)
ERYTHROCYTE [DISTWIDTH] IN BLOOD BY AUTOMATED COUNT: 16.3 % (ref 11.6–15.1)
GFR SERPL CREATININE-BSD FRML MDRD: 96 ML/MIN/1.73SQ M
GLUCOSE SERPL-MCNC: 129 MG/DL (ref 65–140)
HCT VFR BLD AUTO: 42.7 % (ref 36.5–49.3)
HGB BLD-MCNC: 14.6 G/DL (ref 12–17)
LG PLATELETS BLD QL SMEAR: PRESENT
LYMPHOCYTES # BLD AUTO: 17 % (ref 14–44)
LYMPHOCYTES # BLD AUTO: 3.33 THOUSAND/UL (ref 0.6–4.47)
MAGNESIUM SERPL-MCNC: 1.9 MG/DL (ref 1.9–2.7)
MCH RBC QN AUTO: 33.7 PG (ref 26.8–34.3)
MCHC RBC AUTO-ENTMCNC: 34.2 G/DL (ref 31.4–37.4)
MCV RBC AUTO: 99 FL (ref 82–98)
MONOCYTES # BLD AUTO: 0.59 THOUSAND/UL (ref 0–1.22)
MONOCYTES NFR BLD: 3 % (ref 4–12)
NEUTROPHILS # BLD MANUAL: 15.45 THOUSAND/UL (ref 1.85–7.62)
NEUTS BAND NFR BLD MANUAL: 6 % (ref 0–8)
NEUTS SEG NFR BLD AUTO: 73 % (ref 43–75)
PLATELET # BLD AUTO: 231 THOUSANDS/UL (ref 149–390)
PLATELET BLD QL SMEAR: ADEQUATE
PMV BLD AUTO: 11 FL (ref 8.9–12.7)
POLYCHROMASIA BLD QL SMEAR: PRESENT
POTASSIUM SERPL-SCNC: 3.4 MMOL/L (ref 3.5–5.3)
PROT SERPL-MCNC: 7.4 G/DL (ref 6.4–8.4)
RBC # BLD AUTO: 4.33 MILLION/UL (ref 3.88–5.62)
RBC MORPH BLD: PRESENT
SODIUM SERPL-SCNC: 137 MMOL/L (ref 135–147)
TOXIC GRANULES BLD QL SMEAR: PRESENT
WBC # BLD AUTO: 19.56 THOUSAND/UL (ref 4.31–10.16)

## 2022-11-14 NOTE — PROGRESS NOTES
Spoke to pt to remind him he needs labs for treatment tomorrow  He said he was on his way to get them done

## 2022-11-15 ENCOUNTER — HOSPITAL ENCOUNTER (OUTPATIENT)
Dept: INFUSION CENTER | Facility: CLINIC | Age: 52
Discharge: HOME | End: 2022-11-15

## 2022-11-15 VITALS
TEMPERATURE: 98.6 F | OXYGEN SATURATION: 99 % | BODY MASS INDEX: 25.19 KG/M2 | DIASTOLIC BLOOD PRESSURE: 72 MMHG | SYSTOLIC BLOOD PRESSURE: 118 MMHG | HEIGHT: 67 IN | HEART RATE: 92 BPM | WEIGHT: 160.5 LBS | RESPIRATION RATE: 18 BRPM

## 2022-11-15 DIAGNOSIS — T45.1X5A CHEMOTHERAPY INDUCED NEUTROPENIA (HCC): ICD-10-CM

## 2022-11-15 DIAGNOSIS — C18.9 METASTASIS FROM COLON CANCER (HCC): ICD-10-CM

## 2022-11-15 DIAGNOSIS — C78.01 MALIGNANT NEOPLASM METASTATIC TO RIGHT LUNG (HCC): ICD-10-CM

## 2022-11-15 DIAGNOSIS — C79.9 METASTASIS FROM COLON CANCER (HCC): ICD-10-CM

## 2022-11-15 DIAGNOSIS — C78.7 COLON CANCER METASTASIZED TO LIVER (HCC): Primary | ICD-10-CM

## 2022-11-15 DIAGNOSIS — D70.1 CHEMOTHERAPY INDUCED NEUTROPENIA (HCC): ICD-10-CM

## 2022-11-15 DIAGNOSIS — C18.9 COLON CANCER METASTASIZED TO LIVER (HCC): Primary | ICD-10-CM

## 2022-11-15 RX ORDER — PALONOSETRON 0.05 MG/ML
0.25 INJECTION, SOLUTION INTRAVENOUS ONCE
Status: COMPLETED | OUTPATIENT
Start: 2022-11-15 | End: 2022-11-15

## 2022-11-15 RX ORDER — SODIUM CHLORIDE 9 MG/ML
20 INJECTION, SOLUTION INTRAVENOUS ONCE
Status: COMPLETED | OUTPATIENT
Start: 2022-11-15 | End: 2022-11-15

## 2022-11-15 RX ORDER — MAGNESIUM SULFATE HEPTAHYDRATE 40 MG/ML
4 INJECTION, SOLUTION INTRAVENOUS ONCE AS NEEDED
Status: DISCONTINUED | OUTPATIENT
Start: 2022-11-15 | End: 2022-11-15

## 2022-11-15 RX ORDER — FLUOROURACIL 50 MG/ML
400 INJECTION, SOLUTION INTRAVENOUS ONCE
Status: COMPLETED | OUTPATIENT
Start: 2022-11-15 | End: 2022-11-15

## 2022-11-15 RX ORDER — ATROPINE SULFATE 1 MG/ML
0.25 INJECTION, SOLUTION INTRAVENOUS ONCE
Status: COMPLETED | OUTPATIENT
Start: 2022-11-15 | End: 2022-11-15

## 2022-11-15 RX ORDER — MAGNESIUM SULFATE HEPTAHYDRATE 40 MG/ML
2 INJECTION, SOLUTION INTRAVENOUS ONCE AS NEEDED
Status: DISCONTINUED | OUTPATIENT
Start: 2022-11-15 | End: 2022-11-18 | Stop reason: HOSPADM

## 2022-11-15 RX ADMIN — IRINOTECAN HYDROCHLORIDE 320 MG: 20 INJECTION, SOLUTION INTRAVENOUS at 12:13

## 2022-11-15 RX ADMIN — Medication 900 MG: at 09:47

## 2022-11-15 RX ADMIN — FLUOROURACIL 730 MG: 50 INJECTION, SOLUTION INTRAVENOUS at 14:06

## 2022-11-15 RX ADMIN — PALONOSETRON HYDROCHLORIDE 0.25 MG: 0.25 INJECTION INTRAVENOUS at 11:48

## 2022-11-15 RX ADMIN — ATROPINE SULFATE 0.25 MG: 1 INJECTION, SOLUTION INTRAVENOUS at 11:48

## 2022-11-15 RX ADMIN — DIPHENHYDRAMINE HYDROCHLORIDE 25 MG: 50 INJECTION, SOLUTION INTRAMUSCULAR; INTRAVENOUS at 09:11

## 2022-11-15 RX ADMIN — SODIUM CHLORIDE 20 ML/HR: 0.9 INJECTION, SOLUTION INTRAVENOUS at 09:10

## 2022-11-15 RX ADMIN — DEXAMETHASONE SODIUM PHOSPHATE 10 MG: 10 INJECTION, SOLUTION INTRAMUSCULAR; INTRAVENOUS at 10:51

## 2022-11-15 RX ADMIN — FOSAPREPITANT 150 MG: 150 INJECTION, POWDER, LYOPHILIZED, FOR SOLUTION INTRAVENOUS at 11:13

## 2022-11-15 RX ADMIN — MAGNESIUM SULFATE HEPTAHYDRATE 2 G: 40 INJECTION, SOLUTION INTRAVENOUS at 12:13

## 2022-11-15 RX ADMIN — LEUCOVORIN CALCIUM 700 MG: 500 INJECTION, POWDER, LYOPHILIZED, FOR SOLUTION INTRAMUSCULAR; INTRAVENOUS at 12:13

## 2022-11-15 NOTE — PROGRESS NOTES
Patient tolerated treatment without any problems  Good blood return before, during, and after treatment  Patient connected to Elastomeric pump for 46 hour infusion of 5FU  All connections secured  All claps open and verified by a second RN  Patient aware to return on 11/17 at 1230 for disconnect  AVS declined

## 2022-11-15 NOTE — PROGRESS NOTES
Patient is here for [address] [facility]   Labs reviewed from 11/14, within treatment parameters  Mg 1 9, 2 grams IV will be given  Port was accessed with good blood return noted  Patient resting with no complains  Will continue to monitor

## 2022-11-17 ENCOUNTER — HOSPITAL ENCOUNTER (OUTPATIENT)
Dept: INFUSION CENTER | Facility: CLINIC | Age: 52
Discharge: HOME | End: 2022-11-17

## 2022-11-17 VITALS — TEMPERATURE: 98.1 F

## 2022-11-17 DIAGNOSIS — C18.9 COLON CANCER METASTASIZED TO LIVER (HCC): Primary | ICD-10-CM

## 2022-11-17 DIAGNOSIS — T45.1X5A CHEMOTHERAPY INDUCED NEUTROPENIA (HCC): ICD-10-CM

## 2022-11-17 DIAGNOSIS — C79.9 METASTASIS FROM COLON CANCER (HCC): ICD-10-CM

## 2022-11-17 DIAGNOSIS — C78.7 COLON CANCER METASTASIZED TO LIVER (HCC): Primary | ICD-10-CM

## 2022-11-17 DIAGNOSIS — C18.9 METASTASIS FROM COLON CANCER (HCC): ICD-10-CM

## 2022-11-17 DIAGNOSIS — C78.01 MALIGNANT NEOPLASM METASTATIC TO RIGHT LUNG (HCC): ICD-10-CM

## 2022-11-17 DIAGNOSIS — D70.1 CHEMOTHERAPY INDUCED NEUTROPENIA (HCC): ICD-10-CM

## 2022-11-17 RX ADMIN — PEGFILGRASTIM 6 MG: KIT SUBCUTANEOUS at 12:21

## 2022-11-17 NOTE — PROGRESS NOTES
Patient to [facility] for Elastomeric Pump Disconnect / Neulasta On Pro: Offers no complaints at present time: Tolerated infusion without incident: No adverse reactions noted: Elastomeric Pump appears deflated: 46 hours completed: Neulasta On Pro per MD order: Applied to Left UA: Verified follow up appt with patient: AVS offered and declined  Pt walked out of unit safely

## 2022-11-20 NOTE — PROGRESS NOTES
Hematology/Oncology Outpatient Follow- up Note  Elizabeth Luis, 1970, [id number]  11/22/2022        Chief Complaint   Patient presents with   • Follow-up       HPI:  Landon Meeks a 49 yo male with stage IV colon cancer  He was diagnosed in January 2018   He was treated with  modified FOLFox 6 and Erbitux  He then went for surgery on 06/21/2018 with a nice response   Liver resection was positive for residual malignancy   After surgery, treatment with 5 FU bolus, leucovorin, 5 FU pump and Erbitux was restarted on 07/24/2018 and he received 12 cycles of chemotherapy   He was switched to single agent Erbitux for 6 doses  This was completed in January 2019  He was placed on observation     PET CT completed 5/21/20 showed that there were hypermetabolic left lateral liver metastases with minimal activity in the hepatic dome treated metastases   He is s/p  Resection  on June 8, 2020  Pathology revealed metastatic adenocarcinoma consistent with the patient's known colon primary   Margins were negative for carcinoma  He was treated with adjuvant with FOLFIRI plus Erbitux for 6 months, this was completed in early 2021      He was again on observation until more recently  He had surveillance imaging completed in 7/2022 which showed a slowly growing lung nodule up to 7 mm concerning for a growing metastatic lesion  He underwent   a right thoracoscopic therapeutic lower lobe wedge resection on 8/9/22 and pathology was consistent with a   metastatic colon adenocarcinoma with tumor necrosis immunohistochemically consistent with metastases from the patient's known colon primary measuring 0 8 x 0 5 x 0 5 cm which was completely excised      He is currently receiving 3 months of adjuvant treatment with FOLFIRI plus Erbitux    Previous Hematologic/ Oncologic History:    Oncology History   Colon cancer metastasized to liver [facility])   1/2018 Initial Diagnosis    Malignant neoplasm of sigmoid colon ([facility] [address] )     1/22/2018 Biopsy Large Intestine, Sigmoid Colon, Recto-sigmoid tumor bx:    - Invasive colonic adenocarcinoma, moderately differentiated     2/6/2018 - 10/16/2018 Chemotherapy    Modified FOLFOX6 x 12 cycles  Added Erbitux on 3/20/18      6/21/2018 Surgery    Segment 7 liver resection/ablation, hemicolectomy     10/30/2018 -  Chemotherapy    Continuation of Single agent Erbitux  With 10/30/18 chemo, it was Cycle #14  Plan was for 6 additional cycles of Erbitux alone       3/2020 Genetic Testing    NEGATIVE for genes tested:    Test(s): [employer] + [employer] (34 genes): APC, ETHAN, BARD1, BRCA1, BRCA2, BRIP1, BMPR1A, CDH1, CDK4, CDKN2A, CHEK2, DICER1, EPCAM, GREM1, HOXB13, MLH1, MRE11A, MSH2, MSH6, MUTYH, NBN, NF1, PALB2, PMS2, POLD1, POLE, PTEN, RAD50, RAD51C, RAD51D, SMAD4, SMARCA4, STK11, TP53      6/8/2020 Surgery    Liver, segment 3 (wedge resection):  - Metastatic adenocarcinoma, consistent with the patient's known colon primary  - Margins negative for carcinoma      7/27/2020 - 1/11/2021 Chemotherapy    fluorouracil (ADRUCIL), 745 mg, Intravenous, Once, 6 of 6 cycles  Administration: 750 mg (7/27/2020), 750 mg (8/10/2020), 745 mg (8/24/2020), 745 mg (9/8/2020), 745 mg (9/21/2020), 745 mg (10/5/2020)  pegfilgrastim (NEULASTA), 6 mg, Subcutaneous, Once, 1 of 1 cycle  Administration: 6 mg (9/24/2020)  pegfilgrastim (NEULASTA ONPRO), 6 mg, Subcutaneous, Once, 6 of 6 cycles  Administration: 6 mg (7/29/2020), 6 mg (8/12/2020), 6 mg (8/26/2020), 6 mg (9/10/2020), 6 mg (10/7/2020)  cetuximab (ERBITUX), 930 mg, Intravenous, Once, 12 of 12 cycles  Administration: 900 mg (7/27/2020), 900 mg (8/10/2020), 900 mg (8/24/2020), 900 mg (9/8/2020), 900 mg (9/21/2020), 900 mg (10/5/2020), 900 mg (10/19/2020), 900 mg (11/2/2020), 900 mg (11/16/2020), 900 mg (11/30/2020), 900 mg (12/28/2020), 900 mg (1/11/2021)  irinotecan (CAMPTOSAR) chemo infusion, 335 mg, Intravenous, Once, 6 of 6 cycles  Administration: 340 mg (7/27/2020), 340 mg (8/10/2020), 340 mg (8/24/2020), 340 mg (9/8/2020), 340 mg (9/21/2020), 340 mg (10/5/2020)  leucovorin calcium IVPB, 744 mg, Intravenous, Once, 6 of 6 cycles  Administration: 750 mg (7/27/2020), 750 mg (8/10/2020), 750 mg (8/24/2020), 750 mg (9/8/2020), 750 mg (9/21/2020), 740 mg (10/5/2020)  fluorouracil (ADRUCIL) ambulatory infusion Soln, 1,200 mg/m2/day = 4,465 mg, Intravenous, Over 46 hours, 6 of 6 cycles  Dose modification: 1,200 mg/m2/day (original dose 1,200 mg/m2/day, Cycle 3)     9/20/2022 -  Chemotherapy    palonosetron (ALOXI), 0 25 mg, Intravenous, Once, 3 of 4 cycles  Administration: 0 25 mg (10/18/2022), 0 25 mg (11/1/2022), 0 25 mg (11/15/2022)  pegfilgrastim (Bogata Nickels), 6 mg, Subcutaneous, Once, 5 of 6 cycles  Administration: 6 mg (9/22/2022), 6 mg (10/20/2022), 6 mg (11/3/2022), 6 mg (11/17/2022)  Pegfilgrastim-bmez (Patrick Elms), 6 mg, Subcutaneous, Once, 1 of 1 cycle  Administration: 6 mg (10/7/2022)  fosaprepitant (EMEND) IVPB, 150 mg, Intravenous, Once, 3 of 4 cycles  Administration: 150 mg (10/18/2022), 150 mg (11/1/2022), 150 mg (11/15/2022)  fluorouracil (ADRUCIL), 400 mg/m2 = 735 mg, Intravenous, Once, 5 of 6 cycles  Administration: 735 mg (9/20/2022), 735 mg (10/4/2022), 730 mg (10/18/2022), 730 mg (11/1/2022), 730 mg (11/15/2022)  cetuximab (ERBITUX), 920 mg, Intravenous, Once, 5 of 6 cycles  Administration: 900 mg (9/20/2022), 900 mg (10/4/2022), 900 mg (10/18/2022), 900 mg (11/1/2022), 900 mg (11/15/2022)  irinotecan (CAMPTOSAR) chemo infusion, 331 mg, Intravenous, Once, 5 of 6 cycles  Administration: 320 mg (9/20/2022), 320 mg (10/4/2022), 320 mg (10/18/2022), 320 mg (11/1/2022), 320 mg (11/15/2022)  leucovorin calcium IVPB, 736 mg, Intravenous, Once, 5 of 6 cycles  Administration: 700 mg (9/20/2022), 700 mg (10/4/2022), 750 mg (10/18/2022), 750 mg (11/1/2022), 700 mg (11/15/2022)  fluorouracil (ADRUCIL) ambulatory infusion Soln, 1,200 mg/m2/day = 4,415 mg, Intravenous, Over 46 hours, 5 of 6 cycles     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 ([facility] [address] )   4/11/2022 Initial Diagnosis    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 ([facility] [address] )     9/20/2022 -  Chemotherapy    palonosetron (ALOXI), 0 25 mg, Intravenous, Once, 3 of 4 cycles  Administration: 0 25 mg (10/18/2022), 0 25 mg (11/1/2022), 0 25 mg (11/15/2022)  pegfilgrastim (Vanna [place]), 6 mg, Subcutaneous, Once, 5 of 6 cycles  Administration: 6 mg (9/22/2022), 6 mg (10/20/2022), 6 mg (11/3/2022), 6 mg (11/17/2022)  Kala Mocha ([place]), 6 mg, Subcutaneous, Once, 1 of 1 cycle  Administration: 6 mg (10/7/2022)  fosaprepitant (EMEND) IVPB, 150 mg, Intravenous, Once, 3 of 4 cycles  Administration: 150 mg (10/18/2022), 150 mg (11/1/2022), 150 mg (11/15/2022)  fluorouracil (ADRUCIL), 400 mg/m2 = 735 mg, Intravenous, Once, 5 of 6 cycles  Administration: 735 mg (9/20/2022), 735 mg (10/4/2022), 730 mg (10/18/2022), 730 mg (11/1/2022), 730 mg (11/15/2022)  cetuximab (ERBITUX), 920 mg, Intravenous, Once, 5 of 6 cycles  Administration: 900 mg (9/20/2022), 900 mg (10/4/2022), 900 mg (10/18/2022), 900 mg (11/1/2022), 900 mg (11/15/2022)  irinotecan (CAMPTOSAR) chemo infusion, 331 mg, Intravenous, Once, 5 of 6 cycles  Administration: 320 mg (9/20/2022), 320 mg (10/4/2022), 320 mg (10/18/2022), 320 mg (11/1/2022), 320 mg (11/15/2022)  leucovorin calcium IVPB, 736 mg, Intravenous, Once, 5 of 6 cycles  Administration: 700 mg (9/20/2022), 700 mg (10/4/2022), 750 mg (10/18/2022), 750 mg (11/1/2022), 700 mg (11/15/2022)  fluorouracil (ADRUCIL) ambulatory infusion Soln, 1,200 mg/m2/day = 4,415 mg, Intravenous, Over 46 hours, 5 of 6 cycles     Malignant neoplasm metastatic to right lung ([facility] [address] )   8/4/2022 Initial Diagnosis    Malignant neoplasm metastatic to right lung ([facility] [address] )     8/9/2022 Surgery    Flexible bronchoscopy, right thoracoscopic therapeutic wedge resection      9/20/2022 -  Chemotherapy    palonosetron (ALOXI), 0 25 mg, Intravenous, Once, 3 of 4 cycles  Administration: 0 25 mg (10/18/2022), 0 25 mg (2022), 0 25 mg (11/15/2022)  pegfilgrastim (Harvis Yovana), 6 mg, Subcutaneous, Once, 5 of 6 cycles  Administration: 6 mg (2022), 6 mg (10/20/2022), 6 mg (11/3/2022), 6 mg (2022)  Pegfilgrastim-bmez (Juan Del), 6 mg, Subcutaneous, Once, 1 of 1 cycle  Administration: 6 mg (10/7/2022)  fosaprepitant (EMEND) IVPB, 150 mg, Intravenous, Once, 3 of 4 cycles  Administration: 150 mg (10/18/2022), 150 mg (2022), 150 mg (11/15/2022)  fluorouracil (ADRUCIL), 400 mg/m2 = 735 mg, Intravenous, Once, 5 of 6 cycles  Administration: 735 mg (2022), 735 mg (10/4/2022), 730 mg (10/18/2022), 730 mg (2022), 730 mg (11/15/2022)  cetuximab (ERBITUX), 920 mg, Intravenous, Once, 5 of 6 cycles  Administration: 900 mg (2022), 900 mg (10/4/2022), 900 mg (10/18/2022), 900 mg (2022), 900 mg (11/15/2022)  irinotecan (CAMPTOSAR) chemo infusion, 331 mg, Intravenous, Once, 5 of 6 cycles  Administration: 320 mg (2022), 320 mg (10/4/2022), 320 mg (10/18/2022), 320 mg (2022), 320 mg (11/15/2022)  leucovorin calcium IVPB, 736 mg, Intravenous, Once, 5 of 6 cycles  Administration: 700 mg (2022), 700 mg (10/4/2022), 750 mg (10/18/2022), 750 mg (2022), 700 mg (11/15/2022)  fluorouracil (ADRUCIL) ambulatory infusion Soln, 1,200 mg/m2/day = 4,415 mg, Intravenous, Over 46 hours, 5 of 6 cycles         Current Hematologic/ Oncologic Treatment:    FOLFIRI plus Erbitux    ECO - Asymptomatic    Interval History:   The patient presents for routine follow up  He has completed five cycles of chemo  Cycle 6 is scheduled for 1129  Overall, patient reports he has been tolerating treatment quite well  He does have some constipation which is managed  Denies any nausea/vomiting  Denies neuropathy    He does have Erbitux associated rash  He is remained active, builds models      Cancer Staging:   Cancer Staging   Colon cancer metastasized to liver [facility])  Staging form: Colon and Rectum, AJCC 8th Edition  - Pathologic stage from 6/21/2018: Stage WERO (ypT0, pN0, cM1a) - Unsigned  Stage prefix: Post-therapy  Total positive nodes: 0  Sites of metastasis: Liver      Molecular Testing:             Test Results:    Imaging: No results found  Labs:   Lab Results   Component Value Date    WBC 19 56 (H) 11/14/2022    HGB 14 6 11/14/2022    HCT 42 7 11/14/2022    MCV 99 (H) 11/14/2022     11/14/2022     Lab Results   Component Value Date     08/26/2017    K 3 4 (L) 11/14/2022     11/14/2022    CO2 24 11/14/2022    BUN 8 11/14/2022    CREATININE 0 92 11/14/2022    GLUCOSE 124 12/01/2017    GLUF 109 (H) 09/16/2022    CALCIUM 8 8 11/14/2022    CORRECTEDCA 10 0 10/17/2020    AST 16 11/14/2022    ALT 21 11/14/2022    ALKPHOS 179 (H) 11/14/2022    PROT 7 2 08/26/2017    BILITOT 0 6 08/26/2017    EGFR 96 11/14/2022           Review of Systems   Gastrointestinal: Positive for constipation  Skin: Positive for rash (Secondary to Erbitux)  All other systems reviewed and are negative          Active Problems:   Patient Active Problem List   Diagnosis   • ED (erectile dysfunction)   • Colon cancer metastasized to liver [facility])   • GERD (gastroesophageal reflux disease)   • Pulmonary nodule, right   • Acneiform rash   • Encounter for follow-up examination after completed treatment for malignant neoplasm   • Other hydronephrosis   • Annual physical exam   • Vapes nicotine containing substance   • 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 [facility])   • Malignant neoplasm metastatic to right lung ([facility] [address] )   • Platelets decreased ([address] )   • Chemotherapy induced neutropenia ([address] )       Past Medical History:   Past Medical History:   Diagnosis Date   • Cancer ([address] )    • Colon cancer ([address] )    • Dysuria     Last Assessed:8/24/17   • GERD (gastroesophageal reflux disease)    • Liver cancer ([facility] [address] )    • Liver nodule    • Pulmonary nodule, right    • Ureteropelvic junction (UPJ) obstruction 01/29/2020   • Wears glasses Surgical History:   Past Surgical History:   Procedure Laterality Date   • ABDOMINAL SURGERY     • COLON SURGERY     • COLONOSCOPY N/A 01/22/2018    Procedure: COLONOSCOPY;  Surgeon: Palma Espitia MD;  [facility]; Service: Colorectal   • DENTAL SURGERY  2016    Crown with bridge work   • FLEXIBLE SIGMOIDOSCOPY N/A 06/15/2018    Procedure: SIGMOIDOSCOPY FLEXIBLE w/o sedation w/ tattoo;  Surgeon: Palma Espitia MD;  [facility];   Service: Colorectal   • IR PORT PLACEMENT Right    • LAPAROTOMY N/A 06/08/2020    Procedure: LAPAROTOMY EXPLORATORY, LYSIS OF ADHESIONS;  Surgeon: Juan Hassan MD;  [facility];  Service: Surgical Oncology   • LIVER LOBECTOMY N/A 06/21/2018    Procedure: liver resection/ablation, intraoperative ultrasound of liver;  Surgeon: Juan Hassan MD;  [facility];  Service: Surgical Oncology   • LIVER LOBECTOMY N/A 06/08/2020    Procedure: LIVER RESECTION SEGMENT 3 WITH  INTRAOPERATIVE U/S OF LIVER;  Surgeon: Juan Hassan MD;  [facility];  Service: Surgical Oncology   • LUNG SEGMENTECTOMY Right 8/9/2022    Procedure: RESECTION WEDGE LUNG, THERAPEUTIC;  Surgeon: Mark Alarcon MD;  [facility];  Service: Thoracic   • NH BRONCHOSCOPY,DIAGNOSTIC N/A 8/9/2022    Procedure: BRONCHOSCOPY FLEXIBLE;  Surgeon: Mark Alarcon MD;  [facility];  Service: Thoracic   • NH EXPLORATORY OF ABDOMEN N/A 06/21/2018    Procedure: LAPAROTOMY EXPLORATORY;  Surgeon: Palma Espitia MD;  [facility];  Service: Colorectal   • NH INSERT PICC W/ SUB-Q PORT N/A 01/25/2018    Procedure: PORT-A-CATHETER PLACEMENT  Fluoroscopy for performance/interpretation;  Surgeon: Palma Espitia MD;  [facility];  Service: Colorectal   • NH PART REMOVAL COLON W ANASTOMOSIS Left 06/21/2018    Procedure: hemicolectomy, low anterior resection (open) SPY fluorescence angiography;  Surgeon: Palma Espitia MD;  [facility];  Service: Colorectal   • NC PROCTECTOMY,PARTIAL N/A 2018    Procedure: Partial proctectomy ;  Surgeon: Madison Ecninas MD;  [facility];  Service: Colorectal   • NC SIGMOIDOSCOPY FLX DX W/COLLJ SPEC BR/WA IF PFRMD N/A 2018    Procedure: Adrienne Cristina;  Surgeon: Madison Encinas MD;  [facility];  Service: Colorectal   • NC THORACOSCOPY W/THERA WEDGE RESEXN INITIAL UNILAT Right 2022    Procedure: THORACOSCOPY VIDEO ASSISTED SURGERY (VATS); Surgeon: Yasmeen Miranda MD;  [facility];  Service: Thoracic   • ROOT CANAL     • TESTICLE SURGERY      Exploration of Undescended Testis rIght, age 8 had surgery for undescended testicle; Last Assessed:17   • TOOTH EXTRACTION         Family History:    Family History   Problem Relation Age of Onset   • No Known Problems Father    • Cancer Mother         unknown   • Cancer Brother         pt  reports brother was diagnosed with stage 4 throat cancer   • Throat cancer Brother    • Breast cancer Maternal Aunt        Cancer-related family history includes Breast cancer in his maternal aunt; Cancer in his brother and mother      Social History:   Social History     Socioeconomic History   • Marital status: Single     Spouse name: Not on file   • Number of children: Not on file   • Years of education: Not on file   • Highest education level: Not on file   Occupational History   • Not on file   Tobacco Use   • Smoking status: Former     Types: Cigarettes     Quit date:      Years since quittin 8   • Smokeless tobacco: Current   Vaping Use   • Vaping Use: Every day   • Substances: Nicotine, THC (at time), CBD (at times), Flavoring   Substance and Sexual Activity   • Alcohol use: Yes     Comment: socially - 2 month   • Drug use: Yes     Frequency: 1 0 times per week     Types: Marijuana   • Sexual activity: Not on file   Other Topics Concern   • Not on file   Social History Narrative   • Not on file Social Determinants of Health     Financial Resource Strain: Not on file   Food Insecurity: Not on file   Transportation Needs: Not on file   Physical Activity: Not on file   Stress: Not on file   Social Connections: Not on file   Intimate Partner Violence: Not on file   Housing Stability: Not on file       Current Medications:   Current Outpatient Medications   Medication Sig Dispense Refill   • clindamycin (CLINDAGEL) 1 % gel Apply topically 2 (two) times a day 30 g 1   • docusate sodium (COLACE) 100 mg capsule Take 1 capsule (100 mg total) by mouth 2 (two) times a day for 10 days (Patient not taking: Reported on 10/12/2022)  0   • gabapentin (NEURONTIN) 300 mg capsule Take 1 capsule (300 mg total) by mouth 3 (three) times a day for 21 days (Patient not taking: No sig reported) 63 capsule 0   • ibuprofen (MOTRIN) 600 mg tablet Take 1 tablet (600 mg total) by mouth 3 (three) times a day with meals for 7 days (Patient not taking: No sig reported) 21 tablet 0   • Multiple Vitamins-Minerals (MENS MULTIVITAMIN PO) Take by mouth in the morning     • omeprazole (PriLOSEC) 20 mg delayed release capsule Take 1 capsule (20 mg total) by mouth daily 30 capsule 3   • ondansetron (Zofran ODT) 8 mg disintegrating tablet Take 1 tablet (8 mg total) by mouth every 8 (eight) hours as needed for nausea or vomiting 20 tablet 0   • promethazine (PHENERGAN) 12 5 MG tablet Take 1 tablet (12 5 mg total) by mouth every 6 (six) hours as needed for nausea or vomiting 30 tablet 1   • sildenafil (VIAGRA) 50 MG tablet Take 1 tablet (50 mg total) by mouth as needed for erectile dysfunction 10 tablet 0     No current facility-administered medications for this visit         Allergies: No Known Allergies    Physical Exam:  /80 (BP Location: Right arm, Patient Position: Sitting, Cuff Size: Adult)   Pulse 100   Temp 98 °F (36 7 °C) (Temporal)   Resp 16   Ht 5' 7" (1 702 m)   Wt 71 7 kg (158 lb)   SpO2 98%   BMI 24 75 kg/m²   Body surface area is 1 83 meters squared  Wt Readings from Last 3 Encounters:   11/22/22 71 7 kg (158 lb)   11/15/22 72 8 kg (160 lb 8 oz)   11/01/22 72 6 kg (160 lb)           Physical Exam  Constitutional:       General: He is not in acute distress  Appearance: Normal appearance  HENT:      Head: Normocephalic and atraumatic  Eyes:      General: No scleral icterus  Right eye: No discharge  Left eye: No discharge  Conjunctiva/sclera: Conjunctivae normal    Cardiovascular:      Rate and Rhythm: Normal rate and regular rhythm  Pulmonary:      Effort: Pulmonary effort is normal  No respiratory distress  Breath sounds: Normal breath sounds  Abdominal:      General: Bowel sounds are normal  There is no distension  Palpations: Abdomen is soft  There is no mass  Tenderness: There is no abdominal tenderness  Musculoskeletal:         General: Normal range of motion  Lymphadenopathy:      Cervical: No cervical adenopathy  Upper Body:      Right upper body: No supraclavicular, axillary or pectoral adenopathy  Left upper body: No supraclavicular, axillary or pectoral adenopathy  Skin:     General: Skin is warm and dry  Findings: Rash (Mild acne form on face) present  Neurological:      General: No focal deficit present  Mental Status: He is alert and oriented to person, place, and time  Psychiatric:         Mood and Affect: Mood normal          Behavior: Behavior normal          Assessment / Plan:    1   Colon cancer metastasized to liver [facility])      The patient is a 51 yo male with stage IV colon cancer  He was diagnosed in January 2018   He was treated with  modified FOLFox 6 and Erbitux  He then went for surgery on 06/21/2018 with a nice response   Liver resection was positive for residual malignancy   After surgery, treatment with 5 FU bolus, leucovorin, 5 FU pump and Erbitux was restarted on 07/24/2018 and he received 12 cycles of chemotherapy  [facility] was switched to single agent Erbitux for 6 doses  This was completed in January 2019  He was placed on observation     PET CT completed 5/21/20 showed that there were hypermetabolic left lateral liver metastases with minimal activity in the hepatic dome treated metastases   He is s/p  Resection  on June 8, 2020  Pathology revealed metastatic adenocarcinoma consistent with the patient's known colon primary   Margins were negative for carcinoma  He was treated with adjuvant with FOLFIRI plus Erbitux for 6 months, this was completed in early 2021      He was again on observation until more recently  He had surveillance imaging completed in 7/2022 which showed a slowly growing lung nodule up to 7 mm concerning for a growing metastatic lesion  He underwent   a right thoracoscopic therapeutic lower lobe wedge resection on 8/9/22 and pathology was consistent with a   metastatic colon adenocarcinoma with tumor necrosis immunohistochemically consistent with metastases from the patient's known colon primary measuring 0 8 x 0 5 x 0 5 cm which was completely excised  He is currently receiving 3 months of adjuvant treatment with FOLFIRI plus Erbitux  He has completed 5 cycles, cycle 6 is scheduled for 11/29  His blood work has remained in acceptable treatment range  Overall, he is tolerated treatment well without any significant side effects  Once he completes 6 cycles he will go on observation  I have placed orders for CT scan to be completed prior to his follow-up visit with Dr Rosy Taylor in January  Patient is in agreement with this plan of care  He knows to call at any time with questions or concerns  Goals and Barriers:  Current Goal:  Prolong Survival from metastatic colon cancer  Barriers: None  Patient's Capacity to Self Care:  Patient able to self care  Portions of the record may have been created with voice recognition software    Occasional wrong word or "sound a like" substitutions may have occurred due to the inherent limitations of voice recognition software  Read the chart carefully and recognize, using context, where substitutions have occurred

## 2022-11-22 ENCOUNTER — TELEPHONE (OUTPATIENT)
Dept: HEMATOLOGY ONCOLOGY | Facility: CLINIC | Age: 52
End: 2022-11-22

## 2022-11-22 ENCOUNTER — OFFICE VISIT (OUTPATIENT)
Dept: HEMATOLOGY ONCOLOGY | Facility: CLINIC | Age: 52
End: 2022-11-22

## 2022-11-22 VITALS
BODY MASS INDEX: 24.8 KG/M2 | TEMPERATURE: 98 F | RESPIRATION RATE: 16 BRPM | HEIGHT: 67 IN | WEIGHT: 158 LBS | OXYGEN SATURATION: 98 % | SYSTOLIC BLOOD PRESSURE: 138 MMHG | HEART RATE: 100 BPM | DIASTOLIC BLOOD PRESSURE: 80 MMHG

## 2022-11-22 DIAGNOSIS — D70.1 CHEMOTHERAPY INDUCED NEUTROPENIA (HCC): Primary | ICD-10-CM

## 2022-11-22 DIAGNOSIS — C78.7 COLON CANCER METASTASIZED TO LIVER (HCC): ICD-10-CM

## 2022-11-22 DIAGNOSIS — C18.9 COLON CANCER METASTASIZED TO LIVER (HCC): Primary | ICD-10-CM

## 2022-11-22 DIAGNOSIS — C18.9 METASTASIS FROM COLON CANCER (HCC): ICD-10-CM

## 2022-11-22 DIAGNOSIS — C79.9 METASTASIS FROM COLON CANCER (HCC): ICD-10-CM

## 2022-11-22 DIAGNOSIS — C78.01 MALIGNANT NEOPLASM METASTATIC TO RIGHT LUNG (HCC): ICD-10-CM

## 2022-11-22 DIAGNOSIS — T45.1X5A CHEMOTHERAPY INDUCED NEUTROPENIA (HCC): Primary | ICD-10-CM

## 2022-11-22 DIAGNOSIS — C78.7 COLON CANCER METASTASIZED TO LIVER (HCC): Primary | ICD-10-CM

## 2022-11-22 DIAGNOSIS — C18.9 COLON CANCER METASTASIZED TO LIVER (HCC): ICD-10-CM

## 2022-11-22 NOTE — TELEPHONE ENCOUNTER
Spoke with patient  Made him aware to have port flushes scheduled when he comes in for his last tx next week

## 2022-11-28 ENCOUNTER — APPOINTMENT (OUTPATIENT)
Dept: LAB | Facility: CLINIC | Age: 52
End: 2022-11-28

## 2022-11-28 DIAGNOSIS — D70.1 CHEMOTHERAPY INDUCED NEUTROPENIA (HCC): ICD-10-CM

## 2022-11-28 DIAGNOSIS — C78.7 COLON CANCER METASTASIZED TO LIVER (HCC): ICD-10-CM

## 2022-11-28 DIAGNOSIS — C18.9 METASTASIS FROM COLON CANCER (HCC): ICD-10-CM

## 2022-11-28 DIAGNOSIS — C18.9 COLON CANCER METASTASIZED TO LIVER (HCC): ICD-10-CM

## 2022-11-28 DIAGNOSIS — C78.01 MALIGNANT NEOPLASM METASTATIC TO RIGHT LUNG (HCC): ICD-10-CM

## 2022-11-28 DIAGNOSIS — T45.1X5A CHEMOTHERAPY INDUCED NEUTROPENIA (HCC): ICD-10-CM

## 2022-11-28 DIAGNOSIS — C79.9 METASTASIS FROM COLON CANCER (HCC): ICD-10-CM

## 2022-11-28 LAB
ALBUMIN SERPL BCP-MCNC: 4.2 G/DL (ref 3.5–5)
ALP SERPL-CCNC: 157 U/L (ref 34–104)
ALT SERPL W P-5'-P-CCNC: 32 U/L (ref 7–52)
ANION GAP SERPL CALCULATED.3IONS-SCNC: 7 MMOL/L (ref 4–13)
AST SERPL W P-5'-P-CCNC: 20 U/L (ref 13–39)
BASOPHILS # BLD AUTO: 0.07 THOUSANDS/ÂΜL (ref 0–0.1)
BASOPHILS NFR BLD AUTO: 0 % (ref 0–1)
BILIRUB SERPL-MCNC: 0.3 MG/DL (ref 0.2–1)
BUN SERPL-MCNC: 8 MG/DL (ref 5–25)
CALCIUM SERPL-MCNC: 8.8 MG/DL (ref 8.4–10.2)
CHLORIDE SERPL-SCNC: 106 MMOL/L (ref 96–108)
CO2 SERPL-SCNC: 24 MMOL/L (ref 21–32)
CREAT SERPL-MCNC: 0.84 MG/DL (ref 0.6–1.3)
EOSINOPHIL # BLD AUTO: 0.13 THOUSAND/ÂΜL (ref 0–0.61)
EOSINOPHIL NFR BLD AUTO: 1 % (ref 0–6)
ERYTHROCYTE [DISTWIDTH] IN BLOOD BY AUTOMATED COUNT: 16.3 % (ref 11.6–15.1)
GFR SERPL CREATININE-BSD FRML MDRD: 102 ML/MIN/1.73SQ M
GLUCOSE SERPL-MCNC: 107 MG/DL (ref 65–140)
HCT VFR BLD AUTO: 39.6 % (ref 36.5–49.3)
HGB BLD-MCNC: 14 G/DL (ref 12–17)
IMM GRANULOCYTES # BLD AUTO: 0.24 THOUSAND/UL (ref 0–0.2)
IMM GRANULOCYTES NFR BLD AUTO: 1 % (ref 0–2)
LYMPHOCYTES # BLD AUTO: 2.34 THOUSANDS/ÂΜL (ref 0.6–4.47)
LYMPHOCYTES NFR BLD AUTO: 14 % (ref 14–44)
MAGNESIUM SERPL-MCNC: 1.8 MG/DL (ref 1.9–2.7)
MCH RBC QN AUTO: 34.1 PG (ref 26.8–34.3)
MCHC RBC AUTO-ENTMCNC: 35.4 G/DL (ref 31.4–37.4)
MCV RBC AUTO: 97 FL (ref 82–98)
MONOCYTES # BLD AUTO: 1.03 THOUSAND/ÂΜL (ref 0.17–1.22)
MONOCYTES NFR BLD AUTO: 6 % (ref 4–12)
NEUTROPHILS # BLD AUTO: 12.95 THOUSANDS/ÂΜL (ref 1.85–7.62)
NEUTS SEG NFR BLD AUTO: 78 % (ref 43–75)
NRBC BLD AUTO-RTO: 0 /100 WBCS
PLATELET # BLD AUTO: 210 THOUSANDS/UL (ref 149–390)
PMV BLD AUTO: 11 FL (ref 8.9–12.7)
POTASSIUM SERPL-SCNC: 3.4 MMOL/L (ref 3.5–5.3)
PROT SERPL-MCNC: 7.4 G/DL (ref 6.4–8.4)
RBC # BLD AUTO: 4.1 MILLION/UL (ref 3.88–5.62)
SODIUM SERPL-SCNC: 137 MMOL/L (ref 135–147)
WBC # BLD AUTO: 16.76 THOUSAND/UL (ref 4.31–10.16)

## 2022-11-29 ENCOUNTER — HOSPITAL ENCOUNTER (OUTPATIENT)
Dept: INFUSION CENTER | Facility: CLINIC | Age: 52
Discharge: HOME | End: 2022-11-29

## 2022-11-29 VITALS
OXYGEN SATURATION: 98 % | BODY MASS INDEX: 25.11 KG/M2 | HEART RATE: 88 BPM | TEMPERATURE: 98.3 F | SYSTOLIC BLOOD PRESSURE: 108 MMHG | HEIGHT: 67 IN | WEIGHT: 160 LBS | RESPIRATION RATE: 18 BRPM | DIASTOLIC BLOOD PRESSURE: 62 MMHG

## 2022-11-29 DIAGNOSIS — C18.9 COLON CANCER METASTASIZED TO LIVER (HCC): Primary | ICD-10-CM

## 2022-11-29 DIAGNOSIS — C78.01 MALIGNANT NEOPLASM METASTATIC TO RIGHT LUNG (HCC): ICD-10-CM

## 2022-11-29 DIAGNOSIS — D70.1 CHEMOTHERAPY INDUCED NEUTROPENIA (HCC): ICD-10-CM

## 2022-11-29 DIAGNOSIS — T45.1X5A CHEMOTHERAPY INDUCED NEUTROPENIA (HCC): ICD-10-CM

## 2022-11-29 DIAGNOSIS — C78.7 COLON CANCER METASTASIZED TO LIVER (HCC): Primary | ICD-10-CM

## 2022-11-29 DIAGNOSIS — C79.9 METASTASIS FROM COLON CANCER (HCC): ICD-10-CM

## 2022-11-29 DIAGNOSIS — C18.9 METASTASIS FROM COLON CANCER (HCC): ICD-10-CM

## 2022-11-29 RX ORDER — SODIUM CHLORIDE 9 MG/ML
20 INJECTION, SOLUTION INTRAVENOUS ONCE
Status: COMPLETED | OUTPATIENT
Start: 2022-11-29 | End: 2022-11-29

## 2022-11-29 RX ORDER — FLUOROURACIL 50 MG/ML
730 INJECTION, SOLUTION INTRAVENOUS ONCE
Status: COMPLETED | OUTPATIENT
Start: 2022-11-29 | End: 2022-11-29

## 2022-11-29 RX ORDER — PALONOSETRON 0.05 MG/ML
0.25 INJECTION, SOLUTION INTRAVENOUS ONCE
Status: COMPLETED | OUTPATIENT
Start: 2022-11-29 | End: 2022-11-29

## 2022-11-29 RX ORDER — MAGNESIUM SULFATE HEPTAHYDRATE 40 MG/ML
2 INJECTION, SOLUTION INTRAVENOUS ONCE AS NEEDED
Status: DISCONTINUED | OUTPATIENT
Start: 2022-11-29 | End: 2022-12-02 | Stop reason: HOSPADM

## 2022-11-29 RX ORDER — ATROPINE SULFATE 1 MG/ML
0.25 INJECTION, SOLUTION INTRAVENOUS ONCE
Status: COMPLETED | OUTPATIENT
Start: 2022-11-29 | End: 2022-11-29

## 2022-11-29 RX ADMIN — Medication 900 MG: at 09:43

## 2022-11-29 RX ADMIN — MAGNESIUM SULFATE HEPTAHYDRATE 2 G: 40 INJECTION, SOLUTION INTRAVENOUS at 12:08

## 2022-11-29 RX ADMIN — IRINOTECAN HYDROCHLORIDE 320 MG: 20 INJECTION, SOLUTION INTRAVENOUS at 12:08

## 2022-11-29 RX ADMIN — FLUOROURACIL 730 MG: 50 INJECTION, SOLUTION INTRAVENOUS at 13:44

## 2022-11-29 RX ADMIN — PALONOSETRON 0.25 MG: 0.05 INJECTION, SOLUTION INTRAVENOUS at 11:55

## 2022-11-29 RX ADMIN — DEXAMETHASONE SODIUM PHOSPHATE 10 MG: 10 INJECTION, SOLUTION INTRAMUSCULAR; INTRAVENOUS at 10:43

## 2022-11-29 RX ADMIN — SODIUM CHLORIDE 20 ML/HR: 0.9 INJECTION, SOLUTION INTRAVENOUS at 08:55

## 2022-11-29 RX ADMIN — DIPHENHYDRAMINE HYDROCHLORIDE 25 MG: 50 INJECTION, SOLUTION INTRAMUSCULAR; INTRAVENOUS at 08:57

## 2022-11-29 RX ADMIN — FOSAPREPITANT 150 MG: 150 INJECTION, POWDER, LYOPHILIZED, FOR SOLUTION INTRAVENOUS at 11:06

## 2022-11-29 RX ADMIN — ATROPINE SULFATE 0.25 MG: 1 INJECTION, SOLUTION INTRAVENOUS at 11:56

## 2022-11-29 RX ADMIN — LEUCOVORIN CALCIUM 700 MG: 500 INJECTION, POWDER, LYOPHILIZED, FOR SOLUTION INTRAMUSCULAR; INTRAVENOUS at 12:08

## 2022-11-29 NOTE — PROGRESS NOTES
Patient tolerated treatment without any problems  Good blood return before, during, and after treatment  Patient connected to Elastomeric pump for 46 hour infusion of 5FU  All connections secured  All claps open and verified by a second RN  Patient aware to return on 12/1 at 1200 for disconnect  [facility] declined

## 2022-11-29 NOTE — PROGRESS NOTES
Patient is here for [facility]   Labs reviewed from 11/28, within treatment parameters  Mg 1 8, 2 grams IV will be given  Port was accessed with good blood return noted  Patient resting with no complains  Will continue to monitor

## 2022-12-01 ENCOUNTER — TELEPHONE (OUTPATIENT)
Dept: HEMATOLOGY ONCOLOGY | Facility: CLINIC | Age: 52
End: 2022-12-01

## 2022-12-01 ENCOUNTER — HOSPITAL ENCOUNTER (OUTPATIENT)
Dept: INFUSION CENTER | Facility: CLINIC | Age: 52
Discharge: HOME | End: 2022-12-01

## 2022-12-01 DIAGNOSIS — C18.9 COLON CANCER METASTASIZED TO LIVER (HCC): Primary | ICD-10-CM

## 2022-12-01 DIAGNOSIS — T45.1X5A CHEMOTHERAPY INDUCED NEUTROPENIA (HCC): ICD-10-CM

## 2022-12-01 DIAGNOSIS — D70.1 CHEMOTHERAPY INDUCED NEUTROPENIA (HCC): ICD-10-CM

## 2022-12-01 DIAGNOSIS — C18.9 METASTASIS FROM COLON CANCER (HCC): ICD-10-CM

## 2022-12-01 DIAGNOSIS — C79.9 METASTASIS FROM COLON CANCER (HCC): ICD-10-CM

## 2022-12-01 DIAGNOSIS — C78.01 MALIGNANT NEOPLASM METASTATIC TO RIGHT LUNG (HCC): ICD-10-CM

## 2022-12-01 DIAGNOSIS — C78.7 COLON CANCER METASTASIZED TO LIVER (HCC): Primary | ICD-10-CM

## 2022-12-01 RX ADMIN — PEGFILGRASTIM 6 MG: KIT SUBCUTANEOUS at 12:45

## 2022-12-01 NOTE — PROGRESS NOTES
Patient arrived for elastomeric pump D/C  Offers no complaints  Patient tolerated 46 hour infusion of 5FU at home without complications  Port flushed per protocol  Neulasta onpro applied to patients left upper arm  Green indicator light verified flashing before d/c  Patient advised on timing of medication and when to remove patch  Patient has completed all ordered cycles of chemotherapy  Patient verified follow up appointment with Dr Cl Yarbrough in January and  is aware to schedule port flush appointments at the  before he leaves  [facility] declined

## 2022-12-27 ENCOUNTER — HOSPITAL ENCOUNTER (OUTPATIENT)
Dept: CT IMAGING | Facility: HOSPITAL | Age: 52
Discharge: HOME | End: 2022-12-27

## 2022-12-27 DIAGNOSIS — C18.9 COLON CANCER METASTASIZED TO LIVER (HCC): ICD-10-CM

## 2022-12-27 DIAGNOSIS — C78.7 COLON CANCER METASTASIZED TO LIVER (HCC): ICD-10-CM

## 2022-12-27 RX ADMIN — IOHEXOL 100 ML: 350 INJECTION, SOLUTION INTRAVENOUS at 14:10

## 2023-01-03 ENCOUNTER — OFFICE VISIT (OUTPATIENT)
Dept: HEMATOLOGY ONCOLOGY | Facility: CLINIC | Age: 53
End: 2023-01-03

## 2023-01-03 ENCOUNTER — TELEPHONE (OUTPATIENT)
Dept: SURGICAL ONCOLOGY | Facility: CLINIC | Age: 53
End: 2023-01-03

## 2023-01-03 VITALS
TEMPERATURE: 98 F | BODY MASS INDEX: 25.58 KG/M2 | HEIGHT: 67 IN | WEIGHT: 163 LBS | SYSTOLIC BLOOD PRESSURE: 120 MMHG | HEART RATE: 103 BPM | DIASTOLIC BLOOD PRESSURE: 76 MMHG | OXYGEN SATURATION: 98 % | RESPIRATION RATE: 14 BRPM

## 2023-01-03 DIAGNOSIS — D70.1 CHEMOTHERAPY INDUCED NEUTROPENIA (HCC): ICD-10-CM

## 2023-01-03 DIAGNOSIS — T45.1X5A CHEMOTHERAPY INDUCED NEUTROPENIA (HCC): ICD-10-CM

## 2023-01-03 DIAGNOSIS — C78.01 MALIGNANT NEOPLASM METASTATIC TO RIGHT LUNG (HCC): ICD-10-CM

## 2023-01-03 DIAGNOSIS — C79.9 METASTASIS FROM COLON CANCER (HCC): Primary | ICD-10-CM

## 2023-01-03 DIAGNOSIS — C18.9 METASTASIS FROM COLON CANCER (HCC): Primary | ICD-10-CM

## 2023-01-03 NOTE — TELEPHONE ENCOUNTER
Patient's emergency contact called the office to give us the patient's current insurance ID#   [facility])  Could not verify because RTI was not responding  Patient did not have insurance card at time of visit

## 2023-01-03 NOTE — PROGRESS NOTES
Hematology/Oncology Outpatient Follow- up Note  Glory Alicea 50 y o  male MRN: @ Encounter: [id number]        Date:  1/3/2023    Presenting Complaint/Diagnosis :    HPI:    Brandon Gannon a 49 yo male with stage IV colon cancer  He was diagnosed in January 2018   He was treated with  modified FOLFox 6 and Erbitux  He then went for surgery on 06/21/2018 with a nice response   Liver resection was positive for residual malignancy   After surgery, treatment with 5 FU bolus, leucovorin, 5 FU pump and Erbitux was restarted on 07/24/2018 and he received 12 cycles of chemotherapy   He was switched to single agent Erbitux for 6 doses  This was completed in January 2019  He was placed on observation     PET CT completed 5/21/20 showed that there were hypermetabolic left lateral liver metastases with minimal activity in the hepatic dome treated metastases   He is s/p  Resection  on June 8, 2020  Pathology revealed metastatic adenocarcinoma consistent with the patient's known colon primary   Margins were negative for carcinoma  He was treated with adjuvant with FOLFIRI plus Erbitux for 6 months, this was completed in early 2021      He was again on observation until more recently  He had surveillance imaging completed in 7/2022 which showed a slowly growing lung nodule up to 7 mm concerning for a growing metastatic lesion   He underwent   a right thoracoscopic therapeutic lower lobe wedge resection on 8/9/22 and pathology was consistent with a   metastatic colon adenocarcinoma with tumor necrosis immunohistochemically consistent with metastases from the patient's known colon primary measuring 0 8 x 0 5 x 0 5 cm which was completely excised      He then got 3 months of adjuvant treatment with FOLFIRI plus Erbitux  Previous Hematologic/ Oncologic History:    Oncology History   Colon cancer metastasized to liver ([facility] [address] )   1/2018 Initial Diagnosis    Malignant neoplasm of sigmoid colon ([facility] [address] )     1/22/2018 Biopsy    Large Intestine, Sigmoid Colon, Recto-sigmoid tumor bx:    - Invasive colonic adenocarcinoma, moderately differentiated     2/6/2018 - 10/16/2018 Chemotherapy    Modified FOLFOX6 x 12 cycles  Added Erbitux on 3/20/18      6/21/2018 Surgery    Segment 7 liver resection/ablation, hemicolectomy     10/30/2018 -  Chemotherapy    Continuation of Single agent Erbitux  With 10/30/18 chemo, it was Cycle #14  Plan was for 6 additional cycles of Erbitux alone       3/2020 Genetic Testing    NEGATIVE for genes tested:    Test(s): [employer] + [employer] (34 genes): APC, ETHAN, BARD1, BRCA1, BRCA2, BRIP1, BMPR1A, CDH1, CDK4, CDKN2A, CHEK2, DICER1, EPCAM, GREM1, HOXB13, MLH1, MRE11A, MSH2, MSH6, MUTYH, NBN, NF1, PALB2, PMS2, POLD1, POLE, PTEN, RAD50, RAD51C, RAD51D, SMAD4, SMARCA4, STK11, TP53      6/8/2020 Surgery    Liver, segment 3 (wedge resection):  - Metastatic adenocarcinoma, consistent with the patient's known colon primary  - Margins negative for carcinoma      7/27/2020 - 1/11/2021 Chemotherapy    fluorouracil (ADRUCIL), 745 mg, Intravenous, Once, 6 of 6 cycles  Administration: 750 mg (7/27/2020), 750 mg (8/10/2020), 745 mg (8/24/2020), 745 mg (9/8/2020), 745 mg (9/21/2020), 745 mg (10/5/2020)  pegfilgrastim (NEULASTA), 6 mg, Subcutaneous, Once, 1 of 1 cycle  Administration: 6 mg (9/24/2020)  pegfilgrastim (NEULASTA ONPRO), 6 mg, Subcutaneous, Once, 6 of 6 cycles  Administration: 6 mg (7/29/2020), 6 mg (8/12/2020), 6 mg (8/26/2020), 6 mg (9/10/2020), 6 mg (10/7/2020)  cetuximab (ERBITUX), 930 mg, Intravenous, Once, 12 of 12 cycles  Administration: 900 mg (7/27/2020), 900 mg (8/10/2020), 900 mg (8/24/2020), 900 mg (9/8/2020), 900 mg (9/21/2020), 900 mg (10/5/2020), 900 mg (10/19/2020), 900 mg (11/2/2020), 900 mg (11/16/2020), 900 mg (11/30/2020), 900 mg (12/28/2020), 900 mg (1/11/2021)  irinotecan (CAMPTOSAR) chemo infusion, 335 mg, Intravenous, Once, 6 of 6 cycles  Administration: 340 mg (7/27/2020), 340 mg (8/10/2020), 340 mg (8/24/2020), 340 mg (9/8/2020), 340 mg (9/21/2020), 340 mg (10/5/2020)  leucovorin calcium IVPB, 744 mg, Intravenous, Once, 6 of 6 cycles  Administration: 750 mg (7/27/2020), 750 mg (8/10/2020), 750 mg (8/24/2020), 750 mg (9/8/2020), 750 mg (9/21/2020), 740 mg (10/5/2020)  fluorouracil (ADRUCIL) ambulatory infusion Soln, 1,200 mg/m2/day = 4,465 mg, Intravenous, Over 46 hours, 6 of 6 cycles  Dose modification: 1,200 mg/m2/day (original dose 1,200 mg/m2/day, Cycle 3)     9/20/2022 -  Chemotherapy    palonosetron (ALOXI), 0 25 mg, Intravenous, Once, 4 of 4 cycles  Administration: 0 25 mg (10/18/2022), 0 25 mg (11/1/2022), 0 25 mg (11/15/2022), 0 25 mg (11/29/2022)  pegfilgrastim (Laurell Check), 6 mg, Subcutaneous, Once, 6 of 6 cycles  Administration: 6 mg (9/22/2022), 6 mg (10/20/2022), 6 mg (11/3/2022), 6 mg (11/17/2022), 6 mg (12/1/2022)  Pegfilgrastim-bmez (Veena Bruce), 6 mg, Subcutaneous, Once, 1 of 1 cycle  Administration: 6 mg (10/7/2022)  fosaprepitant (EMEND) IVPB, 150 mg, Intravenous, Once, 4 of 4 cycles  Administration: 150 mg (10/18/2022), 150 mg (11/1/2022), 150 mg (11/15/2022), 150 mg (11/29/2022)  fluorouracil (ADRUCIL), 400 mg/m2 = 735 mg, Intravenous, Once, 6 of 6 cycles  Administration: 735 mg (9/20/2022), 735 mg (10/4/2022), 730 mg (10/18/2022), 730 mg (11/1/2022), 730 mg (11/15/2022), 730 mg (11/29/2022)  cetuximab (ERBITUX), 920 mg, Intravenous, Once, 6 of 6 cycles  Administration: 900 mg (9/20/2022), 900 mg (10/4/2022), 900 mg (10/18/2022), 900 mg (11/1/2022), 900 mg (11/15/2022), 900 mg (11/29/2022)  irinotecan (CAMPTOSAR) chemo infusion, 331 mg, Intravenous, Once, 6 of 6 cycles  Administration: 320 mg (9/20/2022), 320 mg (10/4/2022), 320 mg (10/18/2022), 320 mg (11/1/2022), 320 mg (11/15/2022), 320 mg (11/29/2022)  leucovorin calcium IVPB, 736 mg, Intravenous, Once, 6 of 6 cycles  Administration: 700 mg (9/20/2022), 700 mg (10/4/2022), 750 mg (10/18/2022), 750 mg (11/1/2022), 700 mg (11/15/2022), 700 mg (11/29/2022)  fluorouracil (ADRUCIL) ambulatory infusion Soln, 1,200 mg/m2/day = 4,415 mg, Intravenous, Over 46 hours, 6 of 6 cycles     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 ([facility] [address] )   4/11/2022 Initial Diagnosis    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     9/20/2022 -  Chemotherapy    palonosetron (ALOXI), 0 25 mg, Intravenous, Once, 4 of 4 cycles  Administration: 0 25 mg (10/18/2022), 0 25 mg (11/1/2022), 0 25 mg (11/15/2022), 0 25 mg (11/29/2022)  pegfilgrastim (Akash Levasy), 6 mg, Subcutaneous, Once, 6 of 6 cycles  Administration: 6 mg (9/22/2022), 6 mg (10/20/2022), 6 mg (11/3/2022), 6 mg (11/17/2022), 6 mg (12/1/2022)  Pegfilgrastim-bmez (Jones Gilford), 6 mg, Subcutaneous, Once, 1 of 1 cycle  Administration: 6 mg (10/7/2022)  fosaprepitant (EMEND) IVPB, 150 mg, Intravenous, Once, 4 of 4 cycles  Administration: 150 mg (10/18/2022), 150 mg (11/1/2022), 150 mg (11/15/2022), 150 mg (11/29/2022)  fluorouracil (ADRUCIL), 400 mg/m2 = 735 mg, Intravenous, Once, 6 of 6 cycles  Administration: 735 mg (9/20/2022), 735 mg (10/4/2022), 730 mg (10/18/2022), 730 mg (11/1/2022), 730 mg (11/15/2022), 730 mg (11/29/2022)  cetuximab (ERBITUX), 920 mg, Intravenous, Once, 6 of 6 cycles  Administration: 900 mg (9/20/2022), 900 mg (10/4/2022), 900 mg (10/18/2022), 900 mg (11/1/2022), 900 mg (11/15/2022), 900 mg (11/29/2022)  irinotecan (CAMPTOSAR) chemo infusion, 331 mg, Intravenous, Once, 6 of 6 cycles  Administration: 320 mg (9/20/2022), 320 mg (10/4/2022), 320 mg (10/18/2022), 320 mg (11/1/2022), 320 mg (11/15/2022), 320 mg (11/29/2022)  leucovorin calcium IVPB, 736 mg, Intravenous, Once, 6 of 6 cycles  Administration: 700 mg (9/20/2022), 700 mg (10/4/2022), 750 mg (10/18/2022), 750 mg (11/1/2022), 700 mg (11/15/2022), 700 mg (11/29/2022)  fluorouracil (ADRUCIL) ambulatory infusion Soln, 1,200 mg/m2/day = 4,415 mg, Intravenous, Over 46 hours, 6 of 6 cycles     Malignant neoplasm metastatic to right lung ([facility] [address] )   8/4/2022 Initial Diagnosis    Malignant neoplasm metastatic to right lung ([facility] [address] )     8/9/2022 Surgery    Flexible bronchoscopy, right thoracoscopic therapeutic wedge resection      9/20/2022 -  Chemotherapy    palonosetron (ALOXI), 0 25 mg, Intravenous, Once, 4 of 4 cycles  Administration: 0 25 mg (10/18/2022), 0 25 mg (11/1/2022), 0 25 mg (11/15/2022), 0 25 mg (11/29/2022)  pegfilgrastim (Nancey Sivan), 6 mg, Subcutaneous, Once, 6 of 6 cycles  Administration: 6 mg (9/22/2022), 6 mg (10/20/2022), 6 mg (11/3/2022), 6 mg (11/17/2022), 6 mg (12/1/2022)  Cecilia Avilez (Lenard Ahumada), 6 mg, Subcutaneous, Once, 1 of 1 cycle  Administration: 6 mg (10/7/2022)  fosaprepitant (EMEND) IVPB, 150 mg, Intravenous, Once, 4 of 4 cycles  Administration: 150 mg (10/18/2022), 150 mg (11/1/2022), 150 mg (11/15/2022), 150 mg (11/29/2022)  fluorouracil (ADRUCIL), 400 mg/m2 = 735 mg, Intravenous, Once, 6 of 6 cycles  Administration: 735 mg (9/20/2022), 735 mg (10/4/2022), 730 mg (10/18/2022), 730 mg (11/1/2022), 730 mg (11/15/2022), 730 mg (11/29/2022)  cetuximab (ERBITUX), 920 mg, Intravenous, Once, 6 of 6 cycles  Administration: 900 mg (9/20/2022), 900 mg (10/4/2022), 900 mg (10/18/2022), 900 mg (11/1/2022), 900 mg (11/15/2022), 900 mg (11/29/2022)  irinotecan (CAMPTOSAR) chemo infusion, 331 mg, Intravenous, Once, 6 of 6 cycles  Administration: 320 mg (9/20/2022), 320 mg (10/4/2022), 320 mg (10/18/2022), 320 mg (11/1/2022), 320 mg (11/15/2022), 320 mg (11/29/2022)  leucovorin calcium IVPB, 736 mg, Intravenous, Once, 6 of 6 cycles  Administration: 700 mg (9/20/2022), 700 mg (10/4/2022), 750 mg (10/18/2022), 750 mg (11/1/2022), 700 mg (11/15/2022), 700 mg (11/29/2022)  fluorouracil (ADRUCIL) ambulatory infusion Soln, 1,200 mg/m2/day = 4,415 mg, Intravenous, Over 46 hours, 6 of 6 cycles     MFOLFOX6 (5-FU 2400 mg/m² over 46 hours, 5- mg meter squared bolus, leucovorin 400 mg meter squared bolus along with oxaliplatin at 85 mg/m²) with Neulasta Support  Dose #1 2/6/2018  CARIS testing performed   KRAS and NRAS WildType   Erbitux 500mg IV every 2 weeks  This was added on 20th of March 2018 (4th cycle)  In total he received 8 doses of modified FOLFOX 6, 5 of which he got with Erbitux      Patient received 5-FU 2400 mg/m², Erbitux 500 mg meter square, Leucovorin 400 mg meter square, 5- M square, Dose #12 in aggregate On 16 October 2019      Single agent Erbitux  Dose #6 was on 8 January 2019       liver metastatic disease which was resected     FOLFIRI plus Erbitux completed 6 cycles on October 7, 2020    Single agent Erbitux for 3 months completed on the 11th of January 2021    3 months of  FOLFIRI plus Erbitux after liver resection in the end of 2022    Current Hematologic/ Oncologic Treatment:      Observation    Interval History:      Patient turns for follow-up visit  He states he is doing reasonly well  Imaging shows a small thyroid nodule for which I have advised him to see his surgical oncologist and a small lung nodule which was 3 mm and possibly slightly larger than previous imaging  Imaging in 3 months was recommended which I agree with  Patient is doing much better  Rash is resolving  Denies any nausea denies any vomiting denies any diarrhea  The rest of his 14 point review of systems today was negative  Cancer Staging:  Cancer Staging   Colon cancer metastasized to liver [facility])  Staging form: Colon and Rectum, AJCC 8th Edition  - Pathologic stage from 6/21/2018: Stage WERO (ypT0, pN0, cM1a) - Unsigned  Stage prefix: Post-therapy  Total positive nodes: 0  Sites of metastasis: Liver    Test Results:    Imaging: CT chest abdomen pelvis w contrast    Result Date: 1/2/2023  Narrative: CT CHEST, ABDOMEN AND PELVIS WITH IV CONTRAST INDICATION:   C18 9: Malignant neoplasm of colon, unspecified C78 7: Secondary malignant neoplasm of liver and intrahepatic bile duct    Sigmoid colon neoplasm with hepatic metastasis status post resection and right lower lobe metastatic nodule status post recent resection COMPARISON:  MRI abdomen 10/7/2022, CT chest 7/14/2022, 3/25/2022, MRI abdomen 9/17/2021, CT chest, abdomen and pelvis 5/7/2020 TECHNIQUE: CT examination of the chest, abdomen and pelvis was performed  Axial, sagittal, and coronal 2D reformatted images were created from the source data and submitted for interpretation  Radiation dose length product (DLP) for this visit:  598 mGy-cm   This examination, like all CT scans performed in the [facility], was performed utilizing techniques to minimize radiation dose exposure, including the use of iterative reconstruction and automated exposure control  IV Contrast:  100 mL of iohexol (OMNIPAQUE) Enteric Contrast: Enteric contrast was administered  FINDINGS: CHEST LUNGS:  Lungs are clear  Emphysematous changes are seen bilaterally  Status post resection of previously seen right lower lobe medial pulmonary nodule with postsurgical changes  Multiple additional stable pulmonary nodules are seen as described below  *  A 0 3 x 0 2 cm right lower lobe pulmonary nodule (series 4 image 84), minimally increased in size since 3/25/2022 (measuring 0 2 x 0 1 cm) *  There is a stable 0 3 cm peripheral right lower lobe pulmonary nodule (series 4 image 99) compared to 5/7/2020 *  Stable 0 3 cm right middle lobe pulmonary nodule compared to 5/7/2020 *  Stable 2 mm right upper lobe pulmonary nodule (series 4 image 50), since 5/7/2020  *  Stable 0 2 cm peripheral left lower lobe pulmonary nodule (series 4 image 77) *  Stable 0 3 cm left lower lobe pulmonary nodule (series 4 image 87) *  Stable 2 mm subpleural left lower lobe pulmonary nodule (series 4 image 90) compared to 5/7/2020  There is no tracheal or endobronchial lesion  PLEURA:  Unremarkable  HEART/GREAT VESSELS: Heart is unremarkable for patient's age  No thoracic aortic aneurysm   MEDIASTINUM AND MAHENDRA:  Stable calcified mediastinal lymph nodes  CHEST WALL AND LOWER NECK:  A 0 5 cm low-attenuation right thyroid lesion, new compared to 3/25/2022  Right-sided Mediport with the distal tip at the superior cavoatrial junction ABDOMEN LIVER/BILIARY TREE:  Again seen is resection cavity in hepatic segment 7 measuring 2 8 x 1 9 cm, grossly stable in size since 7/14/2022  Again seen are a few sub-5 mm low-attenuation lesions in the liver, too small to characterize and stable in size since prior MRI abdomen 9/17/2021, characterized as cysts on that examination  GALLBLADDER:  There are gallstone(s) within the gallbladder, without pericholecystic inflammatory changes  SPLEEN:  Unremarkable  PANCREAS:  Unremarkable  ADRENAL GLANDS:  Unremarkable  KIDNEYS/URETERS:  Stable size of the large left renal parapelvic cyst with stable mild fullness of the left renal collecting system due to mass effect  Subcentimeter low-attenuation lesion in the left renal upper pole, too small to characterize  STOMACH AND BOWEL:  Sigmoid resection with anastomosis  Moderate stool burden throughout the colon, which can represent constipation  APPENDIX:  A normal appendix was visualized  ABDOMINOPELVIC CAVITY:  No ascites  No pneumoperitoneum  No lymphadenopathy  VESSELS:  Unremarkable for patient's age  PELVIS REPRODUCTIVE ORGANS:  Unremarkable for patient's age  URINARY BLADDER:  Unremarkable  ABDOMINAL WALL/INGUINAL REGIONS:  There is a small fat-containing umbilical hernia, unchanged from previous examination  OSSEOUS STRUCTURES:  No acute fracture or destructive osseous lesion  Mild degenerative changes of the visualized spine, predominantly involving the lower thoracic spine  Impression: *  Status post resection of right lower lobe metastatic pulmonary nodule with expected surrounding postoperative changes  *  A 0 3 cm right lower lobe pulmonary nodule, slowly increasing in size since 3/25/2022  Follow-up chest CT in 3 months is recommended    Additional multiple bilateral stable pulmonary nodules compared to 5/7/2020  *  A 0 5 cm new low-attenuation right thyroid lesion compared to 3/25/2022  Given the history of metastatic colon cancer, further evaluation with ultrasound thyroid is recommended  *  Grossly stable size of the resection cavity in the right hepatic lobe compared to 7/14/2022  Evaluation for internal viable tumor is suboptimal on this single phase examination  *  No imaging findings of recurrent or new metastatic lesions or lymphadenopathy in the abdomen and pelvis  *  Stable large left renal parapelvic cyst with mass effect and fullness of the left renal collecting system  *  Moderate stool burden throughout the colon, which can represent constipation in appropriate clinical settings  The study was marked in EPIC for significant notification  Workstation performed: [id number]       Labs:   Lab Results   Component Value Date    WBC 16 76 (H) 11/28/2022    HGB 14 0 11/28/2022    HCT 39 6 11/28/2022    MCV 97 11/28/2022     11/28/2022     Lab Results   Component Value Date     08/26/2017    K 3 4 (L) 11/28/2022     11/28/2022    CO2 24 11/28/2022    BUN 8 11/28/2022    CREATININE 0 84 11/28/2022    GLUCOSE 124 12/01/2017    GLUF 109 (H) 09/16/2022    CALCIUM 8 8 11/28/2022    CORRECTEDCA 10 0 10/17/2020    AST 20 11/28/2022    ALT 32 11/28/2022    ALKPHOS 157 (H) 11/28/2022    PROT 7 2 08/26/2017    BILITOT 0 6 08/26/2017    EGFR 102 11/28/2022       Lab Results   Component Value Date    PSA 1 2 09/16/2022       Lab Results   Component Value Date    CEA 0 5 10/12/2022     ROS: As stated in the history of present illness otherwise his 14 point review of systems today was negative        Active Problems:   Patient Active Problem List   Diagnosis   • ED (erectile dysfunction)   • Colon cancer metastasized to liver [facility])   • GERD (gastroesophageal reflux disease)   • Pulmonary nodule, right   • Acneiform rash   • Encounter for follow-up examination after completed treatment for malignant neoplasm   • Other hydronephrosis   • Annual physical exam   • Vapes nicotine containing substance   • 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 [facility])   • Malignant neoplasm metastatic to right lung [facility])   • Platelets decreased (HCC)   • Chemotherapy induced neutropenia (HCC)       Past Medical History:   Past Medical History:   Diagnosis Date   • Cancer ([facility] [address] )    • Colon cancer ([facility] [address] )    • Dysuria     Last Assessed:8/24/17   • GERD (gastroesophageal reflux disease)    • [facility]    • Liver nodule    • Pulmonary nodule, right    • Ureteropelvic junction (UPJ) obstruction 01/29/2020   • Wears glasses        Surgical History:   Past Surgical History:   Procedure Laterality Date   • ABDOMINAL SURGERY     • COLON SURGERY     • COLONOSCOPY N/A 01/22/2018    Procedure: COLONOSCOPY;  Surgeon: Junior Russel MD;  [facility]: [facility]; Service: Colorectal   • DENTAL SURGERY  2016    [place]rown with bridge work   • FLEXIBLE SIGMOIDOSCOPY N/A 06/15/2018    Procedure: SIGMOIDOSCOPY FLEXIBLE w/o sedation w/ tattoo;  Surgeon: Junior Russel MD;  [facility]: [facility];   Service: Colorectal   • IR PORT PLACEMENT Right    • LAPAROTOMY N/A 06/08/2020    Procedure: LAPAROTOMY EXPLORATORY, LYSIS OF ADHESIONS;  Surgeon: Maranda Bello MD;  [facility];  Service: Surgical Oncology   • LIVER LOBECTOMY N/A 06/21/2018    Procedure: liver resection/ablation, intraoperative ultrasound of liver;  Surgeon: Maranda Bello MD;  [facility];  Service: Surgical Oncology   • LIVER LOBECTOMY N/A 06/08/2020    Procedure: LIVER RESECTION SEGMENT 3 WITH  INTRAOPERATIVE U/S OF LIVER;  Surgeon: Maranda Bello MD;  [facility];  Service: Surgical Oncology   • LUNG SEGMENTECTOMY Right 8/9/2022    Procedure: RESECTION WEDGE LUNG, THERAPEUTIC;  Surgeon: Lian Petersen MD;  [facility]: [facility];  Service: Thoracic   • [place] [address] INCL FLUOR GDNCE DX W/CELL WASHG [facility] N/A 8/9/2022    Procedure: BRONCHOSCOPY FLEXIBLE;  Surgeon: Lian Petersen MD;  [facility];  Service: Thoracic   • SD COLECTOMY PARTIAL W/ANASTOMOSIS Left 06/21/2018    Procedure: hemicolectomy, low anterior resection (open) SPY fluorescence angiography;  Surgeon: Junior Russel MD;  [facility];  Service: Colorectal   • SD EXPLORATORY LAPAROTOMY CELIOTOMY W/WO BIOPSY SPX N/A 06/21/2018    Procedure: LAPAROTOMY EXPLORATORY;  Surgeon: Junior Russel MD;  [facility];  Service: Colorectal   • SD INSJ PRPH CTR VAD W/SUBQ PORT AGE 5 YR/> N/A 01/25/2018    Procedure: PORT-A-CATHETER PLACEMENT  Fluoroscopy for performance/interpretation;  Surgeon: Junior Russel MD;  [facility]: [facility];  Service: Colorectal   • SD PRCTECT PRTL RESCJ RECTUM TABDL APPR N/A 06/21/2018    Procedure: Partial proctectomy ;  Surgeon: Junior Russel MD;  [facility];  Service: Colorectal   • SD SIGMOIDOSCOPY FLX DX W/COLLJ SPEC BR/WA IF PFRMD N/A 06/21/2018    Procedure: Nicolas Del;  Surgeon: Junior Russel MD;  [facility];  Service: Colorectal   • SD THORACOSCOPY W/THERA WEDGE RESEXN INITIAL UNILAT Right 8/9/2022    Procedure: THORACOSCOPY VIDEO ASSISTED SURGERY (VATS); Surgeon: Lian Petersen MD;  [facility];  Service: Thoracic   • ROOT CANAL  2015   • TESTICLE SURGERY      Exploration of Undescended Testis rIght, age 8 had surgery for undescended testicle; Last Assessed:8/24/17   • TOOTH EXTRACTION  2015       Family History:    Family History   Problem Relation Age of Onset   • No Known Problems Father    • Cancer Mother         unknown   • Cancer Brother         pt  reports brother was diagnosed with stage 4 throat cancer   • Throat cancer Brother    • Breast cancer Maternal Aunt        Cancer-related family history includes Breast cancer in his maternal aunt; Cancer in his brother and mother      Social History:   Social History     Socioeconomic History   • Marital status: Single     Spouse name: Not on file   • Number of children: Not on file   • Years of education: Not on file   • Highest education level: Not on file   Occupational History   • Not on file   Tobacco Use   • Smoking status: Former     Types: Cigarettes     Quit date:      Years since quittin 0   • Smokeless tobacco: Current   Vaping Use   • Vaping Use: Every day   • Substances: Nicotine, THC (at time), CBD (at times), Flavoring   Substance and Sexual Activity   • Alcohol use: Yes     Comment: socially - 2 month   • Drug use: Yes     Frequency: 1 0 times per week     Types: Marijuana   • Sexual activity: Not on file   Other Topics Concern   • Not on file   Social History Narrative   • Not on file     Social Determinants of Health     Financial Resource Strain: Not on file   Food Insecurity: Not on file   Transportation Needs: Not on file   Physical Activity: Not on file   Stress: Not on file   Social Connections: Not on file   Intimate Partner Violence: Not on file   Housing Stability: Not on file       Current Medications:   Current Outpatient Medications   Medication Sig Dispense Refill   • clindamycin (CLINDAGEL) 1 % gel Apply topically 2 (two) times a day 30 g 1   • Multiple Vitamins-Minerals (MENS MULTIVITAMIN PO) Take by mouth in the morning     • omeprazole (PriLOSEC) 20 mg delayed release capsule Take 1 capsule (20 mg total) by mouth daily 30 capsule 3   • ondansetron (Zofran ODT) 8 mg disintegrating tablet Take 1 tablet (8 mg total) by mouth every 8 (eight) hours as needed for nausea or vomiting 20 tablet 0   • promethazine (PHENERGAN) 12 5 MG tablet Take 1 tablet (12 5 mg total) by mouth every 6 (six) hours as needed for nausea or vomiting 30 tablet 1   • sildenafil (VIAGRA) 50 MG tablet Take 1 tablet (50 mg total) by mouth as needed for erectile dysfunction 10 tablet 0   • docusate sodium (COLACE) 100 mg capsule Take 1 capsule (100 mg total) by mouth 2 (two) times a day for 10 days (Patient not taking: Reported on 10/12/2022)  0   • gabapentin (NEURONTIN) 300 mg capsule Take 1 capsule (300 mg total) by mouth 3 (three) times a day for 21 days (Patient not taking: Reported on 9/1/2022) 63 capsule 0   • ibuprofen (MOTRIN) 600 mg tablet Take 1 tablet (600 mg total) by mouth 3 (three) times a day with meals for 7 days (Patient not taking: Reported on 9/1/2022) 21 tablet 0     No current facility-administered medications for this visit  Allergies: No Known Allergies    Physical Exam:    Body surface area is 1 85 meters squared  Wt Readings from Last 3 Encounters:   01/03/23 73 9 kg (163 lb)   11/29/22 72 6 kg (160 lb)   11/22/22 71 7 kg (158 lb)        Temp Readings from Last 3 Encounters:   01/03/23 98 °F (36 7 °C) (Temporal)   11/29/22 98 3 °F (36 8 °C) (Temporal)   11/22/22 98 °F (36 7 °C) (Temporal)        BP Readings from Last 3 Encounters:   01/03/23 120/76   11/29/22 108/62   11/22/22 138/80         Pulse Readings from Last 3 Encounters:   01/03/23 103   11/29/22 88   11/22/22 100         Physical Exam     Constitutional   General appearance: No acute distress, well appearing and well nourished  Eyes   Conjunctiva and lids: No swelling, erythema or discharge  Pupils and irises: Equal, round and reactive to light  Ears, Nose, Mouth, and Throat   External inspection of ears and nose: Normal     Nasal mucosa, septum, and turbinates: Normal without edema or erythema  Oropharynx: Normal with no erythema, edema, exudate or lesions  Pulmonary   Respiratory effort: No increased work of breathing or signs of respiratory distress  Auscultation of lungs: Clear to auscultation  Cardiovascular   Palpation of heart: Normal PMI, no thrills  Auscultation of heart: Normal rate and rhythm, normal S1 and S2, without murmurs  Examination of extremities for edema and/or varicosities: Normal     Carotid pulses: Normal     Abdomen   Abdomen: Non-tender, no masses      Liver and spleen: No hepatomegaly or splenomegaly  Lymphatic   Palpation of lymph nodes in neck: No lymphadenopathy  Musculoskeletal   Gait and station: Normal     Digits and nails: Normal without clubbing or cyanosis  Inspection/palpation of joints, bones, and muscles: Normal     Skin   Skin and subcutaneous tissue: Grade 1 Erbitux rash which is improving  Neurologic   Cranial nerves: Cranial nerves 2-12 intact  Sensation: No sensory loss  Psychiatric   Orientation to person, place, and time: Normal     Mood and affect: Normal         Assessment / Plan:      The patient is a 51 yo male with stage IV colon cancer  He was diagnosed in January 2018   He was treated with  modified FOLFox 6 and Erbitux  He then went for surgery on 06/21/2018 with a nice response   Liver resection was positive for residual malignancy   After surgery, treatment with 5 FU bolus, leucovorin, 5 FU pump and Erbitux was restarted on 07/24/2018 and he received 12 cycles of chemotherapy   He was switched to single agent Erbitux for 6 doses  This was completed in January 2019  He was placed on observation     PET CT completed 5/21/20 showed that there were hypermetabolic left lateral liver metastases with minimal activity in the hepatic dome treated metastases   He is s/p  Resection  on June 8, 2020  Pathology revealed metastatic adenocarcinoma consistent with the patient's known colon primary   Margins were negative for carcinoma  He was treated with adjuvant with FOLFIRI plus Erbitux for 6 months, this was completed in early 2021      He was again on observation until more recently  He had surveillance imaging completed in 7/2022 which showed a slowly growing lung nodule up to 7 mm concerning for a growing metastatic lesion   He underwent   a right thoracoscopic therapeutic lower lobe wedge resection on 8/9/22 and pathology was consistent with a   metastatic colon adenocarcinoma with tumor necrosis immunohistochemically consistent with metastases from the patient's known colon primary measuring 0 8 x 0 5 x 0 5 cm which was completely excised  The patient was started on FOLFIRI plus Erbitux with a plan to complete 3 months of adjuvant treatment  He finished this up and his posttreatment scan shows a slowly increasing 0 3 cm right lower lobe pulmonary nodule compared to March for which a follow-up scan in 3 months was recommended  There was a small 0 5 cm low-attenuation right thyroid lesion seen  The resection cavity in the right hepatic lobe was grossly stable with no imaging findings of recurrent or new metastatic lesions or lymphadenopathy  At this point we will repeat imaging in 3 months as directed to make sure the lung nodule is not progressively increasing and if it does get to a size that is biopsy able we will consider this  I will refer him to one of our colleagues in surgery for an evaluation of the thyroid nodule  I will see him back in 3 to 4 months with results of imaging  He will see his surgical oncologist for the small thyroid nodule and states he has an appointment but will move it sooner  Goals and Barriers:  Current Goal:  Prolong Survival from stage IV colon cancer  Barriers: None  Patient's Capacity to Self Care:  Patient able to self care  Portions of the record may have been created with voice recognition software  Occasional wrong word or "sound a like" substitutions may have occurred due to the inherent limitations of voice recognition software  Read the chart carefully and recognize, using context, where substitutions have occurred

## 2023-01-05 ENCOUNTER — TELEPHONE (OUTPATIENT)
Dept: HEMATOLOGY ONCOLOGY | Facility: CLINIC | Age: 53
End: 2023-01-05

## 2023-01-05 NOTE — TELEPHONE ENCOUNTER
CALL RETURN FORM   Reason for patient call? Employer asking about "light duty" for patient   Patient's primary oncologist? Lev Gallardo    Name of person the patient was calling for? Lev Gallardo   Any additional information to add, if applicable? [phone]   Informed patient that the message will be forwarded to the team and someone will get back to them as soon as possible    Did you relay this information to the patient?  yes

## 2023-01-05 NOTE — TELEPHONE ENCOUNTER
01/05/23    Revised letter sent and faxed  Confirmed with pt that he only requests 8-hr shift  No more STD forms  Pt noted

## 2023-01-25 ENCOUNTER — TELEPHONE (OUTPATIENT)
Dept: INTERNAL MEDICINE CLINIC | Facility: OTHER | Age: 53
End: 2023-01-25

## 2023-01-25 NOTE — TELEPHONE ENCOUNTER
LMOM for pt to call me at [facility], [place] office    Smoking status not completely documented.       Please find out how many packs a day patient used to smoke and how many years of his life he smoked total

## 2023-02-02 NOTE — TELEPHONE ENCOUNTER
I spoke with Roxie Palma and he is aware that he needs his labs drawn today prior to tx tomorrow 
No indicators present

## 2023-02-09 ENCOUNTER — RA CDI HCC (OUTPATIENT)
Dept: OTHER | Facility: HOSPITAL | Age: 53
End: 2023-02-09

## 2023-02-09 NOTE — PROGRESS NOTES
[facility]  coding opportunities       Chart reviewed, no opportunity found: CHART REVIEWED, NO OPPORTUNITY FOUND        Patients Insurance        Commercial Insurance: [address] [place] [address]

## 2023-02-16 ENCOUNTER — OFFICE VISIT (OUTPATIENT)
Dept: INTERNAL MEDICINE CLINIC | Facility: OTHER | Age: 53
End: 2023-02-16

## 2023-02-16 VITALS
SYSTOLIC BLOOD PRESSURE: 102 MMHG | WEIGHT: 164.6 LBS | BODY MASS INDEX: 25.83 KG/M2 | HEIGHT: 67 IN | DIASTOLIC BLOOD PRESSURE: 66 MMHG | HEART RATE: 82 BPM | OXYGEN SATURATION: 97 % | TEMPERATURE: 98.9 F | RESPIRATION RATE: 18 BRPM

## 2023-02-16 DIAGNOSIS — Z12.5 PROSTATE CANCER SCREENING: ICD-10-CM

## 2023-02-16 DIAGNOSIS — D69.6 PLATELETS DECREASED (HCC): ICD-10-CM

## 2023-02-16 DIAGNOSIS — E04.1 THYROID NODULE: ICD-10-CM

## 2023-02-16 DIAGNOSIS — Z00.00 ANNUAL PHYSICAL EXAM: Primary | ICD-10-CM

## 2023-02-16 DIAGNOSIS — Z13.6 ENCOUNTER FOR LIPID SCREENING FOR CARDIOVASCULAR DISEASE: ICD-10-CM

## 2023-02-16 DIAGNOSIS — Z72.0 VAPES NICOTINE CONTAINING SUBSTANCE: ICD-10-CM

## 2023-02-16 DIAGNOSIS — E87.6 HYPOKALEMIA: ICD-10-CM

## 2023-02-16 DIAGNOSIS — E83.42 HYPOMAGNESEMIA: ICD-10-CM

## 2023-02-16 DIAGNOSIS — Z13.220 ENCOUNTER FOR LIPID SCREENING FOR CARDIOVASCULAR DISEASE: ICD-10-CM

## 2023-02-16 NOTE — PROGRESS NOTES
[facility] [place]    NAME: King Myrtle  AGE: 50 y o  SEX: male  : 1970     DATE: 2023     Assessment and Plan:     Problem List Items Addressed This Visit        Endocrine    Thyroid nodule     We will check TSH  He has a follow-up CT scan scheduled  Relevant Orders    TSH, 3rd generation with Free T4 reflex       Other    Annual physical exam - Primary     Discussed diet and exercise  He would like to defer on influenza immunization at this time  He is up-to-date on COVID immunization  PSA ordered for prostate cancer screening  Lipid ordered for lipid cardiovascular screening  Vapes nicotine containing substance     Discussed smoking cessation  Platelets decreased ([address] )    Hypokalemia     He would like to increase his potassium via dietary intake  We will recheck levels in 1 month  Relevant Orders    Basic metabolic panel    Hypomagnesemia     Discussed starting magnesium supplementation  We will recheck levels in 1 month  Relevant Orders    Magnesium   Other Visit Diagnoses     Prostate cancer screening        Relevant Orders    PSA, Total Screen    Encounter for lipid screening for cardiovascular disease        Relevant Orders    Lipid panel          Immunizations and preventive care screenings were discussed with patient today  Appropriate education was printed on patient's after visit summary  Discussed risks and benefits of prostate cancer screening  We discussed the controversial history of PSA screening for prostate cancer in the [place] as well as the risk of over detection and over treatment of prostate cancer by way of PSA screening    The patient understands that PSA blood testing is an imperfect way to screen for prostate cancer and that elevated PSA levels in the blood may also be caused by infection, inflammation, prostatic trauma or manipulation, urological procedures, or by benign prostatic enlargement  The role of the digital rectal examination in prostate cancer screening was also discussed and I discussed with him that there is large interobserver variability in the findings of digital rectal examination  Counseling:  Alcohol/drug use: discussed moderation in alcohol intake, the recommendations for healthy alcohol use, and avoidance of illicit drug use  Dental Health: discussed importance of regular tooth brushing, flossing, and dental visits  Injury prevention: discussed safety/seat belts, safety helmets, smoke detectors, carbon dioxide detectors, and smoking near bedding or upholstery  Sexual health: discussed sexually transmitted diseases, partner selection, use of condoms, avoidance of unintended pregnancy, and contraceptive alternatives  Exercise: the importance of regular exercise/physical activity was discussed  Recommend exercise 3-5 times per week for at least 30 minutes  BMI Counseling: Body mass index is 25 78 kg/m²  The BMI is above normal  Nutrition recommendations include encouraging healthy choices of fruits and vegetables, decreasing fast food intake, consuming healthier snacks, limiting drinks that contain sugar, moderation in carbohydrate intake, increasing intake of lean protein, reducing intake of saturated and trans fat and reducing intake of cholesterol  Exercise recommendations include moderate physical activity 150 minutes/week and exercising 3-5 times per week  No pharmacotherapy was ordered  Patient referred to PCP  Rationale for BMI follow-up plan is due to patient being overweight or obese  Lung Cancer Screening Shared Decision Making: I discussed with him that he is a candidate for lung cancer CT screening  The following Shared Decision-Making points were covered:  1  Benefits of screening were discussed, including the rates of reduction in death from lung cancer and other causes    Harms of screening were reviewed, including false positive tests, radiation exposure levels, risks of invasive procedures, risks of complications of screening, and risk of overdiagnosis  2  I counseled on the importance of adherence to annual lung cancer LDCT screening, impact of co-morbidities, and ability or willingness to undergo diagnosis and treatment  3  I counseled on the importance of maintaining abstinence as a former smoker or was counseled on the importance of smoking cessation if a current smoker    Review of Eligibility Criteria: He meets all of the criteria for Lung Cancer Screening    - He is 50 y o    - He has 20 pack year tobacco history and is a current smoker or has quit within the past 15 years  - He presents no signs or symptoms of lung cancer    After discussion, the patient decided to elect lung cancer screening  Return in about 1 year (around 2/16/2024) for Annual physical      Chief Complaint:     Chief Complaint   Patient presents with   • Physical Exam   • Follow-up     6 month, no labs due  No issues   • hm     Bmi f/u plan      History of Present Illness:     Adult Annual Physical   Patient here for a comprehensive physical exam  The patient reports no problems  Diet and Physical Activity  Diet/Nutrition: well balanced diet, consuming 3-5 servings of fruits/vegetables daily, adequate fiber intake and adequate whole grain intake  Exercise: walking  Depression Screening  PHQ-2/9 Depression Screening         General Health  Sleep: sleeps well and gets 7-8 hours of sleep on average  Hearing: normal - bilateral   Vision: no vision problems, goes for regular eye exams, wears glasses and wears contacts  Dental: regular dental visits, brushes teeth twice daily and flosses teeth occasionally          Health  Symptoms include: erectile dysfunction     Review of Systems:     Review of Systems   Constitutional: Negative for activity change, appetite change, chills, diaphoresis, fatigue and fever  HENT: Negative for congestion, postnasal drip, rhinorrhea, sinus pressure, sinus pain, sneezing and sore throat  Eyes: Negative for visual disturbance  Respiratory: Negative for apnea, cough, choking, chest tightness, shortness of breath and wheezing  Cardiovascular: Negative for chest pain, palpitations and leg swelling  Gastrointestinal: Negative for abdominal distention, abdominal pain, anal bleeding, blood in stool, constipation, diarrhea, nausea and vomiting  Endocrine: Negative for cold intolerance and heat intolerance  Genitourinary: Negative for difficulty urinating, dysuria and hematuria  Musculoskeletal: Negative  Skin: Negative  Neurological: Negative for dizziness, weakness, light-headedness, numbness and headaches  Hematological: Negative for adenopathy  Psychiatric/Behavioral: Negative for agitation, sleep disturbance and suicidal ideas  All other systems reviewed and are negative  Past Medical History:     Past Medical History:   Diagnosis Date   • Cancer [facility])    • Colon cancer [facility])    • Dysuria     Last Assessed:8/24/17   • GERD (gastroesophageal reflux disease)    • Liver cancer [facility])    • Liver nodule    • Pulmonary nodule, right    • Ureteropelvic junction (UPJ) obstruction 01/29/2020   • Wears glasses       Past Surgical History:     Past Surgical History:   Procedure Laterality Date   • ABDOMINAL SURGERY     • COLON SURGERY     • COLONOSCOPY N/A 01/22/2018    Procedure: COLONOSCOPY;  Surgeon: Arun Galvan MD;  [facility]; Service: Colorectal   • DENTAL SURGERY  2016    Crown with bridge work   • FLEXIBLE SIGMOIDOSCOPY N/A 06/15/2018    Procedure: SIGMOIDOSCOPY FLEXIBLE w/o sedation w/ tattoo;  Surgeon: Arun Galvan MD;  [facility];   Service: Colorectal   • IR PORT PLACEMENT Right    • LAPAROTOMY N/A 06/08/2020    Procedure: LAPAROTOMY EXPLORATORY, LYSIS OF ADHESIONS;  Surgeon: Annie Hernandez MD;  [facility];  Service: Surgical Oncology   • LIVER LOBECTOMY N/A 06/21/2018    Procedure: liver resection/ablation, intraoperative ultrasound of liver;  Surgeon: Scooter Noriega MD;  [facility];  Service: Surgical Oncology   • LIVER LOBECTOMY N/A 06/08/2020    Procedure: LIVER RESECTION SEGMENT 3 WITH  INTRAOPERATIVE U/S OF LIVER;  Surgeon: Scooter Noriega MD;  [facility];  Service: Surgical Oncology   • LUNG SEGMENTECTOMY Right 8/9/2022    Procedure: RESECTION WEDGE LUNG, THERAPEUTIC;  Surgeon: Veronica Martinez MD;  [facility];  Service: Thoracic   • DE [address] INCL FLUOR GDNCE DX [address] [facility] N/A 8/9/2022    Procedure: BRONCHOSCOPY FLEXIBLE;  Surgeon: Veronica Martinez MD;  [facility];  Service: Thoracic   • DE COLECTOMY PARTIAL W/ANASTOMOSIS Left 06/21/2018    Procedure: hemicolectomy, low anterior resection (open) SPY fluorescence angiography;  Surgeon: Hardeep Stephen MD;  [facility];  Service: Colorectal   • DE EXPLORATORY LAPAROTOMY CELIOTOMY W/WO BIOPSY SPX N/A 06/21/2018    Procedure: LAPAROTOMY EXPLORATORY;  Surgeon: Hardeep Stephen MD;  [facility];  Service: Colorectal   • DE INSJ PRPH CTR VAD W/SUBQ PORT AGE 5 YR/> N/A 01/25/2018    Procedure: PORT-A-CATHETER PLACEMENT  Fluoroscopy for performance/interpretation;  Surgeon: Hardeep Stephen MD;  [facility];  Service: Colorectal   • DE PRCTECT PRTL RESCJ RECTUM TABDL APPR N/A 06/21/2018    Procedure: Partial proctectomy ;  Surgeon: Hardeep Stephen MD;  [facility];  Service: Colorectal   • DE SIGMOIDOSCOPY FLX DX W/COLLJ SPEC BR/WA IF PFRMD N/A 06/21/2018    Procedure: SIGMOIDOSCOPY FLEXIBLE;  Surgeon: Hardeep Stephen MD;  [facility];  Service: Colorectal   • DE THORACOSCOPY W/THERA WEDGE RESEXN INITIAL UNILAT Right 8/9/2022    Procedure: THORACOSCOPY VIDEO ASSISTED SURGERY (VATS);   Surgeon: Veronica Martinez MD;  [facility];  Service: Thoracic   • ROOT CANAL     • TESTICLE SURGERY      Exploration of Undescended Testis rIght, age 8 had surgery for undescended testicle;  Last Assessed:17   • TOOTH EXTRACTION        Family History:     Family History   Problem Relation Age of Onset   • No Known Problems Father    • Cancer Mother         unknown   • Cancer Brother         pt  reports brother was diagnosed with stage 4 throat cancer   • Throat cancer Brother    • Breast cancer Maternal Aunt       Social History:     Social History     Socioeconomic History   • Marital status: Single     Spouse name: None   • Number of children: None   • Years of education: None   • Highest education level: None   Occupational History   • None   Tobacco Use   • Smoking status: Former     Packs/day: 0 50     Years: 25 00     Pack years: 12 50     Types: Cigarettes     Start date:      Quit date:      Years since quittin 1   • Smokeless tobacco: Never   Vaping Use   • Vaping Use: Every day   • Start date: 10/10/2016   • Substances: Nicotine, THC (at time), CBD (at times), Flavoring   Substance and Sexual Activity   • Alcohol use: Yes     Comment: socially - 2 month   • Drug use: Yes     Frequency: 1 0 times per week     Types: Marijuana   • Sexual activity: None   Other Topics Concern   • None   Social History Narrative   • None     Social Determinants of Health     Financial Resource Strain: Not on file   Food Insecurity: Not on file   Transportation Needs: Not on file   Physical Activity: Not on file   Stress: Not on file   Social Connections: Not on file   Intimate Partner Violence: Not on file   Housing Stability: Not on file      Current Medications:     Current Outpatient Medications   Medication Sig Dispense Refill   • Multiple Vitamins-Minerals (MENS MULTIVITAMIN PO) Take by mouth in the morning     • omeprazole (PriLOSEC) 20 mg delayed release capsule Take 1 capsule (20 mg total) by mouth daily 30 capsule 3   • sildenafil (VIAGRA) 50 MG tablet Take 1 tablet (50 mg total) by mouth as needed for erectile dysfunction 10 tablet 0     No current facility-administered medications for this visit  Allergies:     No Known Allergies   Physical Exam:     /66 (BP Location: Left arm, Patient Position: Sitting, Cuff Size: Standard)   Pulse 82   Temp 98 9 °F (37 2 °C) (Temporal)   Resp 18   Ht 5' 7" (1 702 m)   Wt 74 7 kg (164 lb 9 6 oz)   SpO2 97%   BMI 25 78 kg/m²     Physical Exam  Vitals and nursing note reviewed  Constitutional:       General: He is not in acute distress  Appearance: Normal appearance  He is normal weight  He is not ill-appearing, toxic-appearing or diaphoretic  HENT:      Head: Normocephalic and atraumatic  Right Ear: Tympanic membrane, ear canal and external ear normal  There is no impacted cerumen  Left Ear: Tympanic membrane, ear canal and external ear normal  There is no impacted cerumen  Nose: Nose normal  No congestion or rhinorrhea  Mouth/Throat:      Mouth: Mucous membranes are moist       Pharynx: Oropharynx is clear  No oropharyngeal exudate or posterior oropharyngeal erythema  Eyes:      General: No scleral icterus  Right eye: No discharge  Left eye: No discharge  Extraocular Movements: Extraocular movements intact  Conjunctiva/sclera: Conjunctivae normal       Pupils: Pupils are equal, round, and reactive to light  Neck:      Vascular: No carotid bruit  Cardiovascular:      Rate and Rhythm: Normal rate and regular rhythm  Pulses: Normal pulses  Heart sounds: Normal heart sounds  No murmur heard  No friction rub  No gallop  Pulmonary:      Effort: Pulmonary effort is normal  No respiratory distress  Breath sounds: Normal breath sounds  No wheezing or rales  Abdominal:      General: Abdomen is flat  Bowel sounds are normal  There is no distension  Palpations: Abdomen is soft  There is no mass  Tenderness:  There is no abdominal tenderness  There is no guarding  Hernia: No hernia is present  Musculoskeletal:         General: No swelling, tenderness, deformity or signs of injury  Normal range of motion  Cervical back: Normal range of motion and neck supple  No rigidity  No muscular tenderness  Right lower leg: No edema  Left lower leg: No edema  Lymphadenopathy:      Cervical: No cervical adenopathy  Skin:     General: Skin is warm and dry  Capillary Refill: Capillary refill takes less than 2 seconds  Coloration: Skin is not jaundiced or pale  Findings: No bruising, erythema, lesion or rash  Neurological:      General: No focal deficit present  Mental Status: He is alert and oriented to person, place, and time  Mental status is at baseline  Cranial Nerves: No cranial nerve deficit  Sensory: No sensory deficit  Motor: No weakness  Coordination: Coordination normal       Gait: Gait normal       Deep Tendon Reflexes: Reflexes normal    Psychiatric:         Mood and Affect: Mood normal          Behavior: Behavior normal          Thought Content:  Thought content normal          Judgment: Judgment normal           Braxton Sethi MD    [address] [facility]

## 2023-02-16 NOTE — ASSESSMENT & PLAN NOTE
Discussed diet and exercise  He would like to defer on influenza immunization at this time  He is up-to-date on COVID immunization  PSA ordered for prostate cancer screening  Lipid ordered for lipid cardiovascular screening

## 2023-03-09 ENCOUNTER — TELEPHONE (OUTPATIENT)
Dept: HEMATOLOGY ONCOLOGY | Facility: CLINIC | Age: 53
End: 2023-03-09

## 2023-03-09 NOTE — TELEPHONE ENCOUNTER
CALL RETURN FORM   Reason for patient call?  while rescheduling the patient's MARY the patient asked if he can get a hold of a nurse to discuss setting up an appointment for his port flush  Patient's primary oncologist? Dr Pearlie Olszewski    Name of person the patient was calling for? Dr Severiano Budge team   Any additional information to add, if applicable? N/A   Informed patient that the message will be forwarded to the team and someone will get back to them as soon as possible    Did you relay this information to the patient?  YES

## 2023-03-09 NOTE — TELEPHONE ENCOUNTER
MARY  Route to [facility]                        Patient appointment rescheduled due to Transfer of care      Speaking with   Patient   Physician patient is established with Dr Tre Marmolejo appointment date and time 04/11/2023 @3PM    Physician patient is transferring care to   Dr Jacki Stevens appointment date and time 05/01/2023 @9AM    Call back number [phone]   Reason for TORITO CINTRON  Provider is leaving network

## 2023-03-14 ENCOUNTER — HOSPITAL ENCOUNTER (OUTPATIENT)
Dept: INFUSION CENTER | Facility: CLINIC | Age: 53
Discharge: HOME | End: 2023-03-14

## 2023-03-14 DIAGNOSIS — C78.7 COLON CANCER METASTASIZED TO LIVER (HCC): ICD-10-CM

## 2023-03-14 DIAGNOSIS — E87.6 HYPOKALEMIA: Primary | ICD-10-CM

## 2023-03-14 DIAGNOSIS — E83.42 HYPOMAGNESEMIA: ICD-10-CM

## 2023-03-14 DIAGNOSIS — E04.1 THYROID NODULE: ICD-10-CM

## 2023-03-14 DIAGNOSIS — C18.9 COLON CANCER METASTASIZED TO LIVER (HCC): ICD-10-CM

## 2023-03-14 LAB
ANION GAP SERPL CALCULATED.3IONS-SCNC: 6 MMOL/L (ref 4–13)
BUN SERPL-MCNC: 14 MG/DL (ref 5–25)
CALCIUM SERPL-MCNC: 9.3 MG/DL (ref 8.4–10.2)
CHLORIDE SERPL-SCNC: 105 MMOL/L (ref 96–108)
CO2 SERPL-SCNC: 25 MMOL/L (ref 21–32)
CREAT SERPL-MCNC: 0.96 MG/DL (ref 0.6–1.3)
GFR SERPL CREATININE-BSD FRML MDRD: 91 ML/MIN/1.73SQ M
GLUCOSE SERPL-MCNC: 112 MG/DL (ref 65–140)
MAGNESIUM SERPL-MCNC: 2 MG/DL (ref 1.9–2.7)
POTASSIUM SERPL-SCNC: 3.9 MMOL/L (ref 3.5–5.3)
SODIUM SERPL-SCNC: 136 MMOL/L (ref 135–147)
TSH SERPL DL<=0.05 MIU/L-ACNC: 1.39 UIU/ML (ref 0.45–4.5)

## 2023-03-14 NOTE — PROGRESS NOTES
Patient arrives for lab draw and port flush today  R PAC accessed without issue, brisk blood return noted  Labs drawn and sent as per orders  Port flushed well without resistance, deaccesed - bandaid in place  Patient aware to schedule next port flush - will call to schedule next appointment   Ambulatory upon DC

## 2023-04-03 ENCOUNTER — APPOINTMENT (OUTPATIENT)
Dept: LAB | Facility: CLINIC | Age: 53
End: 2023-04-03

## 2023-04-03 ENCOUNTER — HOSPITAL ENCOUNTER (OUTPATIENT)
Dept: CT IMAGING | Facility: HOSPITAL | Age: 53
Discharge: HOME | End: 2023-04-03
Attending: INTERNAL MEDICINE

## 2023-04-03 DIAGNOSIS — C79.9 METASTASIS FROM COLON CANCER (HCC): ICD-10-CM

## 2023-04-03 DIAGNOSIS — C78.01 MALIGNANT NEOPLASM METASTATIC TO RIGHT LUNG (HCC): ICD-10-CM

## 2023-04-03 DIAGNOSIS — C18.9 METASTASIS FROM COLON CANCER (HCC): ICD-10-CM

## 2023-04-03 LAB
ALBUMIN SERPL BCP-MCNC: 4.1 G/DL (ref 3.5–5)
ALP SERPL-CCNC: 50 U/L (ref 34–104)
ALT SERPL W P-5'-P-CCNC: 15 U/L (ref 7–52)
ANION GAP SERPL CALCULATED.3IONS-SCNC: 7 MMOL/L (ref 4–13)
AST SERPL W P-5'-P-CCNC: 17 U/L (ref 13–39)
BASOPHILS # BLD AUTO: 0.03 THOUSANDS/ÂΜL (ref 0–0.1)
BASOPHILS NFR BLD AUTO: 1 % (ref 0–1)
BILIRUB SERPL-MCNC: 0.4 MG/DL (ref 0.2–1)
BUN SERPL-MCNC: 12 MG/DL (ref 5–25)
CALCIUM SERPL-MCNC: 9.1 MG/DL (ref 8.4–10.2)
CEA SERPL-MCNC: 0.7 NG/ML (ref 0–3)
CHLORIDE SERPL-SCNC: 107 MMOL/L (ref 96–108)
CO2 SERPL-SCNC: 24 MMOL/L (ref 21–32)
CREAT SERPL-MCNC: 1.02 MG/DL (ref 0.6–1.3)
EOSINOPHIL # BLD AUTO: 0.09 THOUSAND/ÂΜL (ref 0–0.61)
EOSINOPHIL NFR BLD AUTO: 2 % (ref 0–6)
ERYTHROCYTE [DISTWIDTH] IN BLOOD BY AUTOMATED COUNT: 12.1 % (ref 11.6–15.1)
GFR SERPL CREATININE-BSD FRML MDRD: 85 ML/MIN/1.73SQ M
GLUCOSE SERPL-MCNC: 112 MG/DL (ref 65–140)
HCT VFR BLD AUTO: 41.9 % (ref 36.5–49.3)
HGB BLD-MCNC: 14.7 G/DL (ref 12–17)
IMM GRANULOCYTES # BLD AUTO: 0.01 THOUSAND/UL (ref 0–0.2)
IMM GRANULOCYTES NFR BLD AUTO: 0 % (ref 0–2)
LYMPHOCYTES # BLD AUTO: 1.92 THOUSANDS/ÂΜL (ref 0.6–4.47)
LYMPHOCYTES NFR BLD AUTO: 38 % (ref 14–44)
MCH RBC QN AUTO: 32 PG (ref 26.8–34.3)
MCHC RBC AUTO-ENTMCNC: 35.1 G/DL (ref 31.4–37.4)
MCV RBC AUTO: 91 FL (ref 82–98)
MONOCYTES # BLD AUTO: 0.32 THOUSAND/ÂΜL (ref 0.17–1.22)
MONOCYTES NFR BLD AUTO: 6 % (ref 4–12)
NEUTROPHILS # BLD AUTO: 2.75 THOUSANDS/ÂΜL (ref 1.85–7.62)
NEUTS SEG NFR BLD AUTO: 53 % (ref 43–75)
NRBC BLD AUTO-RTO: 0 /100 WBCS
PLATELET # BLD AUTO: 167 THOUSANDS/UL (ref 149–390)
PMV BLD AUTO: 11.2 FL (ref 8.9–12.7)
POTASSIUM SERPL-SCNC: 3.5 MMOL/L (ref 3.5–5.3)
PROT SERPL-MCNC: 7.3 G/DL (ref 6.4–8.4)
RBC # BLD AUTO: 4.59 MILLION/UL (ref 3.88–5.62)
SODIUM SERPL-SCNC: 138 MMOL/L (ref 135–147)
WBC # BLD AUTO: 5.12 THOUSAND/UL (ref 4.31–10.16)

## 2023-04-03 RX ADMIN — IOHEXOL 100 ML: 350 INJECTION, SOLUTION INTRAVENOUS at 14:35

## 2023-04-24 ENCOUNTER — HOSPITAL ENCOUNTER (OUTPATIENT)
Dept: MRI IMAGING | Facility: HOSPITAL | Age: 53
Discharge: HOME | End: 2023-04-24
Attending: SURGERY

## 2023-04-24 RX ADMIN — GADOBUTROL 7 ML: 604.72 INJECTION INTRAVENOUS at 15:33

## 2023-04-25 ENCOUNTER — TELEPHONE (OUTPATIENT)
Dept: SURGICAL ONCOLOGY | Facility: CLINIC | Age: 53
End: 2023-04-25

## 2023-04-25 ENCOUNTER — DOCUMENTATION (OUTPATIENT)
Dept: HEMATOLOGY ONCOLOGY | Facility: CLINIC | Age: 53
End: 2023-04-25

## 2023-04-25 NOTE — TELEPHONE ENCOUNTER
Called and left message to offer earlier appointment time today with Dr Mijares due to several cancellations  Provided call back number in case he wants to come in sooner

## 2023-04-25 NOTE — PROGRESS NOTES
Patient had an appointment in [facility] today with Dr Lucas Tyler at 3:30pm but he came to [facility]  Patient has been rescheduled to tomorrow at 3:15pm at [facility]

## 2023-04-26 ENCOUNTER — OFFICE VISIT (OUTPATIENT)
Dept: SURGICAL ONCOLOGY | Facility: CLINIC | Age: 53
End: 2023-04-26

## 2023-04-26 VITALS
SYSTOLIC BLOOD PRESSURE: 100 MMHG | TEMPERATURE: 98.1 F | WEIGHT: 162 LBS | OXYGEN SATURATION: 98 % | BODY MASS INDEX: 25.43 KG/M2 | DIASTOLIC BLOOD PRESSURE: 66 MMHG | HEART RATE: 81 BPM | RESPIRATION RATE: 16 BRPM | HEIGHT: 67 IN

## 2023-04-26 DIAGNOSIS — C78.7 COLON CANCER METASTASIZED TO LIVER (HCC): Primary | ICD-10-CM

## 2023-04-26 DIAGNOSIS — C18.9 COLON CANCER METASTASIZED TO LIVER (HCC): Primary | ICD-10-CM

## 2023-04-26 NOTE — PROGRESS NOTES
Surgical Oncology Follow Up       [address] [place] [address] SURGICAL ONCOLOGY [address] [place] [address]    Paty Bauer  1970  [id number]  [address] [facility] SURGICAL ONCOLOGY [place]  [address] [facility]  [place] [address]    Diagnoses and all orders for this visit:    Colon cancer metastasized to liver [facility])  -     MRI abdomen w wo contrast; Future  -     BUN; Future  -     Creatinine, serum; Future        Chief Complaint   Patient presents with   • Follow-up       Return in about 6 months (around 10/26/2023) for Office Visit, Imaging - See orders, with Oksana  Oncology History   Colon cancer metastasized to liver ([facility] [address] )   1/2018 Initial Diagnosis    Malignant neoplasm of sigmoid colon ([facility] [address] )     1/22/2018 Biopsy    Large Intestine, Sigmoid Colon, Recto-sigmoid tumor bx:    - Invasive colonic adenocarcinoma, moderately differentiated     2/6/2018 - 10/16/2018 Chemotherapy    Modified FOLFOX6 x 12 cycles  Added Erbitux on 3/20/18      6/21/2018 Surgery    Segment 7 liver resection/ablation, hemicolectomy     10/30/2018 -  Chemotherapy    Continuation of Single agent Erbitux  With 10/30/18 chemo, it was Cycle #14  Plan was for 6 additional cycles of Erbitux alone       3/2020 Genetic Testing    NEGATIVE for genes tested:    Test(s): [employer] + [employer] (34 genes): APC, ETHAN, BARD1, BRCA1, BRCA2, BRIP1, BMPR1A, CDH1, CDK4, CDKN2A, CHEK2, DICER1, EPCAM, GREM1, HOXB13, MLH1, MRE11A, MSH2, MSH6, MUTYH, NBN, NF1, PALB2, PMS2, POLD1, POLE, PTEN, RAD50, RAD51C, RAD51D, SMAD4, SMARCA4, STK11, TP53      6/8/2020 Surgery    Liver, segment 3 (wedge resection):  - Metastatic adenocarcinoma, consistent with the patient's known colon primary  - Margins negative for carcinoma      7/27/2020 - 1/11/2021 Chemotherapy    fluorouracil (ADRUCIL), 745 mg, Intravenous, Once, 6 of 6 cycles  Administration: 750 mg (7/27/2020), 750 mg (8/10/2020), 745 mg (8/24/2020), 745 mg (9/8/2020), 745 mg (9/21/2020), 745 mg (10/5/2020)  pegfilgrastim (Bernardo Fulling), 6 mg, Subcutaneous, Once, 1 of 1 cycle  Administration: 6 mg (9/24/2020)  pegfilgrastim (Charla ), 6 mg, Subcutaneous, Once, 6 of 6 cycles  Administration: 6 mg (7/29/2020), 6 mg (8/12/2020), 6 mg (8/26/2020), 6 mg (9/10/2020), 6 mg (10/7/2020)  cetuximab (ERBITUX), 930 mg, Intravenous, Once, 12 of 12 cycles  Administration: 900 mg (7/27/2020), 900 mg (8/10/2020), 900 mg (8/24/2020), 900 mg (9/8/2020), 900 mg (9/21/2020), 900 mg (10/5/2020), 900 mg (10/19/2020), 900 mg (11/2/2020), 900 mg (11/16/2020), 900 mg (11/30/2020), 900 mg (12/28/2020), 900 mg (1/11/2021)  irinotecan (CAMPTOSAR) chemo infusion, 335 mg, Intravenous, Once, 6 of 6 cycles  Administration: 340 mg (7/27/2020), 340 mg (8/10/2020), 340 mg (8/24/2020), 340 mg (9/8/2020), 340 mg (9/21/2020), 340 mg (10/5/2020)  leucovorin calcium IVPB, 744 mg, Intravenous, Once, 6 of 6 cycles  Administration: 750 mg (7/27/2020), 750 mg (8/10/2020), 750 mg (8/24/2020), 750 mg (9/8/2020), 750 mg (9/21/2020), 740 mg (10/5/2020)  fluorouracil (ADRUCIL) ambulatory infusion Soln, 1,200 mg/m2/day = 4,465 mg, Intravenous, Over 46 hours, 6 of 6 cycles  Dose modification: 1,200 mg/m2/day (original dose 1,200 mg/m2/day, Cycle 3)     9/20/2022 - 12/1/2022 Chemotherapy    palonosetron (ALOXI), 0 25 mg, Intravenous, Once, 4 of 4 cycles  Administration: 0 25 mg (10/18/2022), 0 25 mg (11/1/2022), 0 25 mg (11/15/2022), 0 25 mg (11/29/2022)  pegfilgrastim (Charla ), 6 mg, Subcutaneous, Once, 6 of 6 cycles  Administration: 6 mg (9/22/2022), 6 mg (10/20/2022), 6 mg (11/3/2022), 6 mg (11/17/2022), 6 mg (12/1/2022)  Pegfilgrastim-bmez (Tad Salk), 6 mg, Subcutaneous, Once, 1 of 1 cycle  Administration: 6 mg (10/7/2022)  fosaprepitant (EMEND) IVPB, 150 mg, Intravenous, Once, 4 of 4 cycles  Administration: 150 mg (10/18/2022), 150 mg (11/1/2022), 150 mg (11/15/2022), 150 mg (11/29/2022)  fluorouracil (ADRUCIL), 400 mg/m2 = 735 mg, Intravenous, Once, 6 of 6 cycles  Administration: 735 mg (9/20/2022), 735 mg (10/4/2022), 730 mg (10/18/2022), 730 mg (11/1/2022), 730 mg (11/15/2022), 730 mg (11/29/2022)  cetuximab (ERBITUX), 920 mg, Intravenous, Once, 6 of 6 cycles  Administration: 900 mg (9/20/2022), 900 mg (10/4/2022), 900 mg (10/18/2022), 900 mg (11/1/2022), 900 mg (11/15/2022), 900 mg (11/29/2022)  irinotecan (CAMPTOSAR) chemo infusion, 331 mg, Intravenous, Once, 6 of 6 cycles  Administration: 320 mg (9/20/2022), 320 mg (10/4/2022), 320 mg (10/18/2022), 320 mg (11/1/2022), 320 mg (11/15/2022), 320 mg (11/29/2022)  leucovorin calcium IVPB, 736 mg, Intravenous, Once, 6 of 6 cycles  Administration: 700 mg (9/20/2022), 700 mg (10/4/2022), 750 mg (10/18/2022), 750 mg (11/1/2022), 700 mg (11/15/2022), 700 mg (11/29/2022)  fluorouracil (ADRUCIL) ambulatory infusion Soln, 1,200 mg/m2/day = 4,415 mg, Intravenous, Over 46 hours, 6 of 6 cycles     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 ([facility] [address] )   4/11/2022 Initial Diagnosis    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 ([facility] [address] )     9/20/2022 - 12/1/2022 Chemotherapy    palonosetron (ALOXI), 0 25 mg, Intravenous, Once, 4 of 4 cycles  Administration: 0 25 mg (10/18/2022), 0 25 mg (11/1/2022), 0 25 mg (11/15/2022), 0 25 mg (11/29/2022)  pegfilgrastim ([place]), 6 mg, Subcutaneous, Once, 6 of 6 cycles  Administration: 6 mg (9/22/2022), 6 mg (10/20/2022), 6 mg (11/3/2022), 6 mg (11/17/2022), 6 mg (12/1/2022)  Pegfilgrastim-bmez (Carlos Bakersson), 6 mg, Subcutaneous, Once, 1 of 1 cycle  Administration: 6 mg (10/7/2022)  fosaprepitant (EMEND) IVPB, 150 mg, Intravenous, Once, 4 of 4 cycles  Administration: 150 mg (10/18/2022), 150 mg (11/1/2022), 150 mg (11/15/2022), 150 mg (11/29/2022)  fluorouracil (ADRUCIL), 400 mg/m2 = 735 mg, Intravenous, Once, 6 of 6 cycles  Administration: 735 mg (9/20/2022), 735 mg (10/4/2022), 730 mg (10/18/2022), 730 mg (11/1/2022), 730 mg (11/15/2022), 730 mg (11/29/2022)  cetuximab (ERBITUX), 920 mg, Intravenous, Once, 6 of 6 cycles  Administration: 900 mg (9/20/2022), 900 mg (10/4/2022), 900 mg (10/18/2022), 900 mg (11/1/2022), 900 mg (11/15/2022), 900 mg (11/29/2022)  irinotecan (CAMPTOSAR) chemo infusion, 331 mg, Intravenous, Once, 6 of 6 cycles  Administration: 320 mg (9/20/2022), 320 mg (10/4/2022), 320 mg (10/18/2022), 320 mg (11/1/2022), 320 mg (11/15/2022), 320 mg (11/29/2022)  leucovorin calcium IVPB, 736 mg, Intravenous, Once, 6 of 6 cycles  Administration: 700 mg (9/20/2022), 700 mg (10/4/2022), 750 mg (10/18/2022), 750 mg (11/1/2022), 700 mg (11/15/2022), 700 mg (11/29/2022)  fluorouracil (ADRUCIL) ambulatory infusion Soln, 1,200 mg/m2/day = 4,415 mg, Intravenous, Over 46 hours, 6 of 6 cycles     Malignant neoplasm metastatic to right lung ([facility] [address] )   8/4/2022 Initial Diagnosis    Malignant neoplasm metastatic to right lung ([facility] [address] )     8/9/2022 Surgery    Flexible bronchoscopy, right thoracoscopic therapeutic wedge resection      9/20/2022 - 12/1/2022 Chemotherapy    palonosetron (ALOXI), 0 25 mg, Intravenous, Once, 4 of 4 cycles  Administration: 0 25 mg (10/18/2022), 0 25 mg (11/1/2022), 0 25 mg (11/15/2022), 0 25 mg (11/29/2022)  pegfilgrastim (Chloe Soda), 6 mg, Subcutaneous, Once, 6 of 6 cycles  Administration: 6 mg (9/22/2022), 6 mg (10/20/2022), 6 mg (11/3/2022), 6 mg (11/17/2022), 6 mg (12/1/2022)  Pegfilgrastim-bmez (Nessa Sin), 6 mg, Subcutaneous, Once, 1 of 1 cycle  Administration: 6 mg (10/7/2022)  fosaprepitant (EMEND) IVPB, 150 mg, Intravenous, Once, 4 of 4 cycles  Administration: 150 mg (10/18/2022), 150 mg (11/1/2022), 150 mg (11/15/2022), 150 mg (11/29/2022)  fluorouracil (ADRUCIL), 400 mg/m2 = 735 mg, Intravenous, Once, 6 of 6 cycles  Administration: 735 mg (9/20/2022), 735 mg (10/4/2022), 730 mg (10/18/2022), 730 mg (11/1/2022), 730 mg (11/15/2022), 730 mg (11/29/2022)  cetuximab (ERBITUX), 920 mg, Intravenous, Once, 6 of 6 cycles  Administration: 900 mg (9/20/2022), 900 mg (10/4/2022), 900 mg (10/18/2022), 900 mg (11/1/2022), 900 mg (11/15/2022), 900 mg (11/29/2022)  irinotecan (CAMPTOSAR) chemo infusion, 331 mg, Intravenous, Once, 6 of 6 cycles  Administration: 320 mg (9/20/2022), 320 mg (10/4/2022), 320 mg (10/18/2022), 320 mg (11/1/2022), 320 mg (11/15/2022), 320 mg (11/29/2022)  leucovorin calcium IVPB, 736 mg, Intravenous, Once, 6 of 6 cycles  Administration: 700 mg (9/20/2022), 700 mg (10/4/2022), 750 mg (10/18/2022), 750 mg (11/1/2022), 700 mg (11/15/2022), 700 mg (11/29/2022)  fluorouracil (ADRUCIL) ambulatory infusion Soln, 1,200 mg/m2/day = 4,415 mg, Intravenous, Over 46 hours, 6 of 6 cycles         Staging:  Metastatic rectosigmoid cancer  [id number]  Treatment history:   Modified FOLFOX 6   Erbitux added 03/20/2018   Segment 7 liver resection, June 2018  Segment 3 mass liver resection, June 2020  Resection of a right lower lobe pulmonary metastasis  Chemotherapy with Erbitux  Current treatment:  FOLFIRI plus cetuximab  Disease status:       History of Present Illness: Patient returns in follow-up of his metastatic colon cancer  He is doing very well at this time with no complaints  He denies any abdominal pain, nausea or vomiting  His appetite is good  He completed chemotherapy in December 2022  MRI from April 24, 2023 shows no evidence of recurrence  CT from April 3, 2023 shows stable pulmonary nodules without evidence of metastatic disease  I personally reviewed the films  CEA level is normal     Review of Systems  Complete ROS Surg Onc:   Complete ROS Surg Onc:   Constitutional: The patient denies new or recent history of general fatigue, no recent weight loss, no change in appetite  Eyes: No complaints of visual problems, no scleral icterus  ENT: no complaints of ear pain, no hoarseness, no difficulty swallowing,  no tinnitus and no new masses in head, oral cavity, or neck     Cardiovascular: No complaints of chest pain, no palpitations, no ankle edema  Respiratory: No complaints of shortness of breath, no cough  Gastrointestinal: No complaints of jaundice, no bloody stools, no pale stools  Genitourinary: No complaints of dysuria, no hematuria, no nocturia, no frequent urination, no urethral discharge  Musculoskeletal: No complaints of weakness, paralysis, joint stiffness or arthralgias  Integumentary: No complaints of rash, no new lesions  Neurological: No complaints of convulsions, no seizures, no dizziness  Hematologic/Lymphatic: No complaints of easy bruising  Endocrine:  No hot or cold intolerance  No polydipsia, polyphagia, or polyuria  Allergy/immunology:  No environmental allergies  No food allergies  Not immunocompromised  Skin:  No pallor or rash  No wound          Patient Active Problem List   Diagnosis   • ED (erectile dysfunction)   • Colon cancer metastasized to liver [facility])   • GERD (gastroesophageal reflux disease)   • Pulmonary nodule, right   • Acneiform rash   • Encounter for follow-up examination after completed treatment for malignant neoplasm   • Other hydronephrosis   • Annual physical exam   • Vapes nicotine containing substance   • 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 [facility])   • Malignant neoplasm metastatic to right lung ([facility] [address] )   • Platelets [facility] ([facility]   • Chemotherapy induced neutropenia (HCC)   • Thyroid nodule   • Hypokalemia   • Hypomagnesemia     Past Medical History:   Diagnosis Date   • Cancer [facility])    • Colon cancer ([facility] [address] )    • Dysuria     Last Assessed:8/24/17   • GERD (gastroesophageal reflux disease)    • Liver cancer ([facility] )    • Liver nodule    • Pulmonary nodule, right    • Ureteropelvic junction (UPJ) obstruction 01/29/2020   • Wears glasses      Past Surgical History:   Procedure Laterality Date   • ABDOMINAL SURGERY     • COLON SURGERY     • COLONOSCOPY N/A 01/22/2018    Procedure: COLONOSCOPY;  Surgeon: Felisa House MD;  [facility]: [facility]; Service: Colorectal   • DENTAL SURGERY  2016    Crown with bridge work   • FLEXIBLE SIGMOIDOSCOPY N/A 06/15/2018    Procedure: SIGMOIDOSCOPY FLEXIBLE w/o sedation w/ tattoo;  Surgeon: Hannah Evans MD;  [facility]: [facility];   Service: Colorectal   • IR PORT PLACEMENT Right    • LAPAROTOMY N/A 06/08/2020    Procedure: LAPAROTOMY EXPLORATORY, LYSIS OF ADHESIONS;  Surgeon: Larisa Forde MD;  [facility];  Service: Surgical Oncology   • LIVER LOBECTOMY N/A 06/21/2018    Procedure: liver resection/ablation, intraoperative ultrasound of liver;  Surgeon: Larisa Forde MD;  [facility];  Service: Surgical Oncology   • LIVER LOBECTOMY N/A 06/08/2020    Procedure: LIVER RESECTION SEGMENT 3 WITH  INTRAOPERATIVE U/S OF LIVER;  Surgeon: Larisa Forde MD;  [facility];  Service: Surgical Oncology   • LUNG SEGMENTECTOMY Right 8/9/2022    Procedure: RESECTION WEDGE LUNG, THERAPEUTIC;  Surgeon: Fiona Chase MD;  [facility]: [facility];  Service: Thoracic   • NM [address] INCL FLUOR GDNCE DX [address] [facility] N/A 8/9/2022    Procedure: BRONCHOSCOPY FLEXIBLE;  Surgeon: Fiona Chase MD;  [facility];  Service: Thoracic   • NM COLECTOMY PARTIAL W/ANASTOMOSIS Left 06/21/2018    Procedure: hemicolectomy, low anterior resection (open) SPY fluorescence angiography;  Surgeon: Hannah Evans MD;  [facility];  Service: Colorectal   • NM EXPLORATORY LAPAROTOMY CELIOTOMY W/WO BIOPSY SPX N/A 06/21/2018    Procedure: LAPAROTOMY EXPLORATORY;  Surgeon: Hannah Evans MD;  [facility];  Service: Colorectal   • NM INSJ PRPH CTR VAD W/SUBQ PORT AGE 7 YR/> N/A 01/25/2018    Procedure: PORT-A-CATHETER PLACEMENT  Fluoroscopy for performance/interpretation;  Surgeon: Hannah Evans MD;  [facility];  Service: Colorectal   • NM PRCTECT PRTL RESCJ RECTUM TABDL APPR N/A 06/21/2018    Procedure: Partial proctectomy ;  Surgeon: Hannah Evans MD;  [facility];  Service: Colorectal    WA SIGMOIDOSCOPY FLX DX W/COLLJ SPEC BR/WA IF PFRMD N/A 2018    Procedure: Zoe Line;  Surgeon: Meg Gonzalez MD;  [facility];  Service: Colorectal    WA THORACOSCOPY W/THERA WEDGE RESEXN INITIAL UNILAT Right 2022    Procedure: THORACOSCOPY VIDEO ASSISTED SURGERY (VATS); Surgeon: Romulo Ro MD;  Location[facility];  Service: Thoracic    ROOT CANAL      TESTICLE SURGERY      Exploration of Undescended Testis rIght, age 8 had surgery for undescended testicle;  Last Assessed:17    TOOTH EXTRACTION       Family History   Problem Relation Age of Onset    No Known Problems Father     Cancer Mother         unknown    Cancer Brother         pt  reports brother was diagnosed with stage 4 throat cancer    Throat cancer Brother     Breast cancer Maternal Aunt      Social History     Socioeconomic History    Marital status: Single     Spouse name: Not on file    Number of children: Not on file    Years of education: Not on file    Highest education level: Not on file   Occupational History    Not on file   Tobacco Use    Smoking status: Former     Packs/day: 0 50     Years: 25 00     Pack years: 12 50     Types: Cigarettes     Start date:      Quit date:      Years since quittin 3    Smokeless tobacco: Never   Vaping Use    Vaping Use: Every day    Start date: 10/10/2016    Substances: Nicotine, THC (at time), CBD (at times), Flavoring   Substance and Sexual Activity    Alcohol use: Yes     Comment: socially - 2 month    Drug use: Yes     Frequency: 1 0 times per week     Types: Marijuana    Sexual activity: Not on file   Other Topics Concern    Not on file   Social History Narrative    Not on file     Social Determinants of Health     Financial Resource Strain: Not on file   Food Insecurity: Not on file   Transportation Needs: Not on file   Physical Activity: Not on file   Stress: Not on file   Social Connections: Not on file   Intimate Partner Violence: Not on file   Housing Stability: Not on file       Current Outpatient Medications:   •  Multiple Vitamins-Minerals (MENS MULTIVITAMIN PO), Take by mouth in the morning, Disp: , Rfl:   •  omeprazole (PriLOSEC) 20 mg delayed release capsule, Take 1 capsule (20 mg total) by mouth daily, Disp: 30 capsule, Rfl: 3  •  sildenafil (VIAGRA) 50 MG tablet, Take 1 tablet (50 mg total) by mouth as needed for erectile dysfunction, Disp: 10 tablet, Rfl: 0  No Known Allergies  Vitals:    04/26/23 1510   BP: 100/66   Pulse: 81   Resp: 16   Temp: 98 1 °F (36 7 °C)   SpO2: 98%       Physical Exam  Constitutional: General appearance: The Patient is well-developed and well-nourished who appears the stated age in no acute distress  Patient is pleasant and talkative  HEENT:  Normocephalic  Sclerae are anicteric  Mucous membranes are moist  Neck is supple without adenopathy  No JVD  Chest: The lungs are clear to auscultation  Cardiac: Heart is regular rate  Abdomen: Abdomen is soft, non-tender, non-distended and without masses  Extremities: There is no clubbing or cyanosis  There is no edema  Symmetric  Neuro: Grossly nonfocal  Gait is normal      Lymphatic: No evidence of cervical adenopathy bilaterally  No evidence of axillary adenopathy bilaterally  No evidence of inguinal adenopathy bilaterally  Skin: Warm, anicteric  Psych:  Patient is pleasant and talkative  Breasts:        Pathology:  [unfilled]    Labs:  Lab Results   Component Value Date    CEA 0 7 04/03/2023         Imaging  MRI abdomen w wo contrast    Result Date: 4/24/2023  Narrative: MRI - ABDOMEN - WITH AND WITHOUT CONTRAST INDICATION: 50 years / Male  C18 9: Malignant neoplasm of colon, unspecified C78 7: Secondary malignant neoplasm of liver and intrahepatic bile duct  History of stage IV colon cancer diagnosed in January 2018    Patient underwent surgery on 6/21/2018, and liver resection was positive for residual malignancy  After receiving additional chemotherapy, 3 hyper metabolic lesions were noted in the left lateral segment of the liver which were resected in June 2020 and or metastatic colon cancer  In July 2022, a slowly growing right lower lobe lung nodule was identified for which the patient underwent thorascopic resection and pathology was consistent with metastatic colon cancer  Patient underwent more chemotherapy  COMPARISON: Most recent imaging exam is dated April 3, 2023  Most recent MRI of the abdomen is dated October 7, 2022  Field kidneys multiple left renal sinus cysts unchanged  The largest is in the lower pole and measures approximately 5 5 cm  TECHNIQUE:  Multiplanar/multisequence MRI of the abdomen was performed before and after administration of contrast  IV Contrast:  7 mL of Gadobutrol injection (SINGLE-DOSE) FINDINGS: LOWER CHEST:   Unremarkable  LIVER: Normal in size and morphology Stable appearance of segment 7 hepatic resection site with no evidence of recurrent tumor  No evidence of recurrent tumor at the segment 3 resection site  The hepatic veins and portal veins are patent  BILE DUCTS: No intrahepatic or extrahepatic bile duct dilation  GALLBLADDER:  Normal  PANCREAS:  Unremarkable  ADRENAL GLANDS:  Normal  SPLEEN:  Normal  KIDNEYS/PROXIMAL URETERS: No hydroureteronephrosis  No suspicious renal mass  BOWEL:   No dilated loops of bowel  PERITONEUM/RETROPERITONEUM: No mass  No ascites  LYMPH NODES: No abdominal lymphadenopathy  VASCULAR STRUCTURES:  No aneurysm  ABDOMINAL WALL:  Fat-containing ventral hernia  OSSEOUS STRUCTURES:  No suspicious osseous lesion  Impression: Status post segment 7 and 3 liver resection with no evidence of recurrent tumor and no new metastatic disease   Workstation performed: [id number]     CT chest abdomen pelvis w contrast    Result Date: 4/11/2023  Narrative: CT CHEST, ABDOMEN AND PELVIS WITH IV CONTRAST INDICATION: C78 01: Secondary malignant neoplasm of right lung C79 9: Secondary malignant neoplasm of unspecified site C18 9: Malignant neoplasm of colon, unspecified  Dr Geoff Osei note from 1/3/2023 was reviewed  Patient has history of stage IV colon cancer status post chemotherapy followed by surgery  Patient had liver metastases treated with surgical resection  In July 2022, surveillance imaging demonstrated a growing lung nodule, and thorascopic lower lobe wedge resection on 8/9/2022 demonstrated metastatic adenocarcinoma  COMPARISON:  Chest, abdomen, pelvic CT from 12/27/2022  TECHNIQUE: CT examination of the chest, abdomen and pelvis was performed  Multiplanar 2D reformatted images were created from the source data  Radiation dose length product (DLP) for this visit:  830 mGy-cm   This examination, like all CT scans performed in the [facility], was performed utilizing techniques to minimize radiation dose exposure, including the use of iterative reconstruction and automated exposure control  IV Contrast:  100 mL of iohexol (OMNIPAQUE) Enteric Contrast: Enteric contrast was administered  FINDINGS: CHEST LUNGS: Again seen are postoperative changes from a medial right lower lobe pulmonary nodule resection  There are multiple pulmonary nodules, which will be described on series 6: Image 80, right lower lobe, solid, smooth bordered, 3 mm, unchanged  (This was the nodule that may have mildly increased in size on the prior study ) Image 94, right lower lobe, solid, smooth bordered, 3 mm, unchanged  Image 49, right upper lobe, solid, smooth bordered, 2 mm, unchanged  Image 70, left lower lobe, solid, smooth bordered, 3 mm, unchanged  Image 80, left lower lobe, solid, smooth bordered, 2 mm, unchanged  No new pulmonary nodules  PLEURA:  Unremarkable  HEART/GREAT VESSELS: Heart is unremarkable for patient's age  No thoracic aortic aneurysm  Again seen is a Port-A-Cath  MEDIASTINUM AND MAHENDRA:  Unremarkable   CHEST WALL AND LOWER NECK:  Unchanged 5 mm right thyroid nodule (series 2 image 2 ) ABDOMEN LIVER/BILIARY TREE:  Again seen is resection cavity in hepatic segment 7 measuring 2 8 x 1 9 cm, unchanged (series 2 image 102 )  Again seen are several tiny well-defined liver lesions, demonstrating long-term stability therefore benign  No new suspicious liver lesions  GALLBLADDER:  There are gallstones within the gallbladder, without pericholecystic inflammatory changes  SPLEEN:  Unremarkable  PANCREAS:  Unremarkable  ADRENAL GLANDS:  Unremarkable  KIDNEYS/URETERS:  Several simple parapelvic cysts in the left renal sinus  Kidneys and ureters otherwise unremarkable  No hydronephrosis  STOMACH AND BOWEL:  Anastamotic bowel sutures seen in the sigmoid colon  Bowel loops otherwise are unremarkable  APPENDIX:  No findings to suggest appendicitis  ABDOMINOPELVIC CAVITY:  No ascites  No pneumoperitoneum  No lymphadenopathy  VESSELS:  Unremarkable for patient's age  PELVIS REPRODUCTIVE ORGANS:  Unremarkable for patient's age  URINARY BLADDER:  Unremarkable  ABDOMINAL WALL/INGUINAL REGIONS:  Unremarkable  OSSEOUS STRUCTURES:  No acute fracture or destructive osseous lesion  Impression: Multiple tiny pulmonary nodules are stable compared to 12/27/2022, including the 3 mm right lower lobe nodule which may have mildly increased in size on the prior study  Based on current Fleischner Society 2017 Guidelines on incidental pulmonary nodule, patients with a known malignancy are at increased risk of metastasis and should receive followup CT at intervals appropriate for the type of cancer and its risk of pulmonary metastases  No evidence of metastatic disease in the abdomen or pelvis    Workstation performed: [id number]     I reviewed the above laboratory and imaging data  Discussion/Summary: 50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  His liver MRI is stable    His CEA level is normal   He has had all his metastatic disease surgically resected  He completed chemotherapy  I will plan on repeating his liver MRI in 6 months  I will see him again at that time for another clinical exam   He is agreeable to this  All his questions were answered

## 2023-05-01 ENCOUNTER — OFFICE VISIT (OUTPATIENT)
Dept: HEMATOLOGY ONCOLOGY | Facility: CLINIC | Age: 53
End: 2023-05-01

## 2023-05-01 VITALS
HEART RATE: 75 BPM | TEMPERATURE: 97 F | HEIGHT: 67 IN | DIASTOLIC BLOOD PRESSURE: 68 MMHG | BODY MASS INDEX: 26.21 KG/M2 | SYSTOLIC BLOOD PRESSURE: 104 MMHG | RESPIRATION RATE: 20 BRPM | OXYGEN SATURATION: 98 % | WEIGHT: 167 LBS

## 2023-05-01 DIAGNOSIS — C18.9 COLON CANCER METASTASIZED TO LIVER (HCC): Primary | ICD-10-CM

## 2023-05-01 DIAGNOSIS — C78.7 COLON CANCER METASTASIZED TO LIVER (HCC): Primary | ICD-10-CM

## 2023-05-01 NOTE — PROGRESS NOTES
Hematology Outpatient Follow - Up Note  Brielle Sykes 50 y o  male MRN: @ Encounter: [id number]        Date:  5/1/2023        Assessment/ Plan:      In January 2018 diagnosed with stage IV colon cancer with liver metastasis, K-jennifer wild-type, NRAS wild-type, MSI stable, no evidence of actionable mutations    He received FOLFOX/cetuximab with surgical resection 1/6/2018, the liver resection performed colon cancer, he received if he will not be toxic 1/7/2018 for total of 12 cycles and later on switched to cetuximab 2019    Because of the liver lobe metastasis on 5/2020 status post resection treated with FOLFIRI and cetuximab    Slowly growing lung nodules 1/7/2022 status post resection consistent with metastatic colorectal cancer in the lower lobe of the lung status post FOLFIRI and cetuximab    Also liver resection in the end of 2022 currently on observation    No evidence of disease by MRI    PET scan on April 2023 showed stable lung nodules no new nodules, CEA is normal at 0 6, continue watchful observation and follow-up in 3 to 4 months with CT scan of the chest abdomen pelvis, CBC, CMP, CEA      Labs and imaging studies are reviewed by ordering provider once results are available  If there are findings that need immediate attention, you will be contacted when results available  Discussing results and the implication on your healthcare is best discussed in person at your follow-up visit  HPI:    Anastacia Fagan W 23 KH male with stage IV colon cancer  He was diagnosed in January 2018   He was treated with  modified FOLFox 6 and Erbitux  He then went for surgery on 06/21/2018 with a nice response   Liver resection was positive for residual malignancy   After surgery, treatment with 5 FU bolus, leucovorin, 5 FU pump and Erbitux was restarted on 07/24/2018 and he received 12 cycles of chemotherapy   He was switched to single agent Erbitux for 6 doses   This was completed in January 2019  He was placed on observation     PET CT completed 5/21/20 showed that there were hypermetabolic left lateral liver metastases with minimal activity in the hepatic dome treated metastases   He is s/p  Resection  on June 8, 2020  Pathology revealed metastatic adenocarcinoma consistent with the patient's known colon primary   Margins were negative for carcinoma  He was treated with adjuvant with FOLFIRI plus Erbitux for 6 months, this was completed in early 2021      He was again on observation until more recently  He had surveillance imaging completed in 7/2022 which showed a slowly growing lung nodule up to 7 mm concerning for a growing metastatic lesion  He underwent   a right thoracoscopic therapeutic lower lobe wedge resection on 8/9/22 and pathology was consistent with a   metastatic colon adenocarcinoma with tumor necrosis immunohistochemically consistent with metastases from the patient's known colon primary measuring 0 8 x 0 5 x 0 5 cm which was completely excised      He then got 3 months of adjuvant treatment with FOLFIRI plus Erbitux  Previous Hematologic/ Oncologic History:        Oncology History   Colon cancer metastasized to liver ([facility] [address] )   1/2018 Initial Diagnosis     Malignant neoplasm of sigmoid colon ([facility] [address] )      1/22/2018 Biopsy     Large Intestine, Sigmoid Colon, Recto-sigmoid tumor bx:     - Invasive colonic adenocarcinoma, moderately differentiated      2/6/2018 - 10/16/2018 Chemotherapy     Modified FOLFOX6 x 12 cycles  Added Erbitux on 3/20/18       6/21/2018 Surgery     Segment 7 liver resection/ablation, hemicolectomy      10/30/2018 -  Chemotherapy     Continuation of Single agent Erbitux  With 10/30/18 chemo, it was Cycle #14    Plan was for 6 additional cycles of Erbitux alone       3/2020 Genetic Testing     NEGATIVE for genes tested:     Test(s): [employer] + [employer] (34 genes): APC, ETHAN, BARD1, BRCA1, BRCA2, BRIP1, BMPR1A, CDH1, CDK4, CDKN2A, CHEK2, DICER1, EPCAM, GREM1, HOXB13, MLH1, MRE11A, MSH2, MSH6, MUTYH, NBN, NF1, PALB2, PMS2, POLD1, POLE, PTEN, RAD50, RAD51C, RAD51D, SMAD4, SMARCA4, STK11, TP53       6/8/2020 Surgery     Liver, segment 3 (wedge resection):  - Metastatic adenocarcinoma, consistent with the patient's known colon primary  - Margins negative for carcinoma       7/27/2020 - 1/11/2021 Chemotherapy     fluorouracil (ADRUCIL), 745 mg, Intravenous, Once, 6 of 6 cycles  Administration: 750 mg (7/27/2020), 750 mg (8/10/2020), 745 mg (8/24/2020), 745 mg (9/8/2020), 745 mg (9/21/2020), 745 mg (10/5/2020)  pegfilgrastim (NEULASTA), 6 mg, Subcutaneous, Once, 1 of 1 cycle  Administration: 6 mg (9/24/2020)  pegfilgrastim (Mary Brim), 6 mg, Subcutaneous, Once, 6 of 6 cycles  Administration: 6 mg (7/29/2020), 6 mg (8/12/2020), 6 mg (8/26/2020), 6 mg (9/10/2020), 6 mg (10/7/2020)  cetuximab (ERBITUX), 930 mg, Intravenous, Once, 12 of 12 cycles  Administration: 900 mg (7/27/2020), 900 mg (8/10/2020), 900 mg (8/24/2020), 900 mg (9/8/2020), 900 mg (9/21/2020), 900 mg (10/5/2020), 900 mg (10/19/2020), 900 mg (11/2/2020), 900 mg (11/16/2020), 900 mg (11/30/2020), 900 mg (12/28/2020), 900 mg (1/11/2021)  irinotecan (CAMPTOSAR) chemo infusion, 335 mg, Intravenous, Once, 6 of 6 cycles  Administration: 340 mg (7/27/2020), 340 mg (8/10/2020), 340 mg (8/24/2020), 340 mg (9/8/2020), 340 mg (9/21/2020), 340 mg (10/5/2020)  leucovorin calcium IVPB, 744 mg, Intravenous, Once, 6 of 6 cycles  Administration: 750 mg (7/27/2020), 750 mg (8/10/2020), 750 mg (8/24/2020), 750 mg (9/8/2020), 750 mg (9/21/2020), 740 mg (10/5/2020)  fluorouracil (ADRUCIL) ambulatory infusion Soln, 1,200 mg/m2/day = 4,465 mg, Intravenous, Over 46 hours, 6 of 6 cycles  Dose modification: 1,200 mg/m2/day (original dose 1,200 mg/m2/day, Cycle 3)      9/20/2022 -  Chemotherapy     palonosetron (ALOXI), 0 25 mg, Intravenous, Once, 4 of 4 cycles  Administration: 0 25 mg (10/18/2022), 0 25 mg (11/1/2022), 0 25 mg (11/15/2022), 0 25 mg (11/29/2022)  pegfilgrastim (Lexine Cook), 6 mg, Subcutaneous, Once, 6 of 6 cycles  Administration: 6 mg (9/22/2022), 6 mg (10/20/2022), 6 mg (11/3/2022), 6 mg (11/17/2022), 6 mg (12/1/2022)  Pegfilgrastim-bmez (Pamla Seeds), 6 mg, Subcutaneous, Once, 1 of 1 cycle  Administration: 6 mg (10/7/2022)  fosaprepitant (EMEND) IVPB, 150 mg, Intravenous, Once, 4 of 4 cycles  Administration: 150 mg (10/18/2022), 150 mg (11/1/2022), 150 mg (11/15/2022), 150 mg (11/29/2022)  fluorouracil (ADRUCIL), 400 mg/m2 = 735 mg, Intravenous, Once, 6 of 6 cycles  Administration: 735 mg (9/20/2022), 735 mg (10/4/2022), 730 mg (10/18/2022), 730 mg (11/1/2022), 730 mg (11/15/2022), 730 mg (11/29/2022)  cetuximab (ERBITUX), 920 mg, Intravenous, Once, 6 of 6 cycles  Administration: 900 mg (9/20/2022), 900 mg (10/4/2022), 900 mg (10/18/2022), 900 mg (11/1/2022), 900 mg (11/15/2022), 900 mg (11/29/2022)  irinotecan (CAMPTOSAR) chemo infusion, 331 mg, Intravenous, Once, 6 of 6 cycles  Administration: 320 mg (9/20/2022), 320 mg (10/4/2022), 320 mg (10/18/2022), 320 mg (11/1/2022), 320 mg (11/15/2022), 320 mg (11/29/2022)  leucovorin calcium IVPB, 736 mg, Intravenous, Once, 6 of 6 cycles  Administration: 700 mg (9/20/2022), 700 mg (10/4/2022), 750 mg (10/18/2022), 750 mg (11/1/2022), 700 mg (11/15/2022), 700 mg (11/29/2022)  fluorouracil (ADRUCIL) ambulatory infusion Soln, 1,200 mg/m2/day = 4,415 mg, Intravenous, Over 46 hours, 6 of 6 cycles      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 ([facility] [address] )   4/11/2022 Initial Diagnosis     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 [facility])      9/20/2022 -  Chemotherapy     palonosetron (ALOXI), 0 25 mg, Intravenous, Once, 4 of 4 cycles  Administration: 0 25 mg (10/18/2022), 0 25 mg (11/1/2022), 0 25 mg (11/15/2022), 0 25 mg (11/29/2022)  pegfilgrastim (Lexine Cook), 6 mg, Subcutaneous, Once, 6 of 6 cycles  Administration: 6 mg (9/22/2022), 6 mg (10/20/2022), 6 mg (11/3/2022), 6 mg (11/17/2022), 6 mg (12/1/2022)  Georgi Stevenson (ZIEXTENZO), 6 mg, Subcutaneous, Once, 1 of 1 cycle  Administration: 6 mg (10/7/2022)  fosaprepitant (EMEND) IVPB, 150 mg, Intravenous, Once, 4 of 4 cycles  Administration: 150 mg (10/18/2022), 150 mg (11/1/2022), 150 mg (11/15/2022), 150 mg (11/29/2022)  fluorouracil (ADRUCIL), 400 mg/m2 = 735 mg, Intravenous, Once, 6 of 6 cycles  Administration: 735 mg (9/20/2022), 735 mg (10/4/2022), 730 mg (10/18/2022), 730 mg (11/1/2022), 730 mg (11/15/2022), 730 mg (11/29/2022)  cetuximab (ERBITUX), 920 mg, Intravenous, Once, 6 of 6 cycles  Administration: 900 mg (9/20/2022), 900 mg (10/4/2022), 900 mg (10/18/2022), 900 mg (11/1/2022), 900 mg (11/15/2022), 900 mg (11/29/2022)  irinotecan (CAMPTOSAR) chemo infusion, 331 mg, Intravenous, Once, 6 of 6 cycles  Administration: 320 mg (9/20/2022), 320 mg (10/4/2022), 320 mg (10/18/2022), 320 mg (11/1/2022), 320 mg (11/15/2022), 320 mg (11/29/2022)  leucovorin calcium IVPB, 736 mg, Intravenous, Once, 6 of 6 cycles  Administration: 700 mg (9/20/2022), 700 mg (10/4/2022), 750 mg (10/18/2022), 750 mg (11/1/2022), 700 mg (11/15/2022), 700 mg (11/29/2022)  fluorouracil (ADRUCIL) ambulatory infusion Soln, 1,200 mg/m2/day = 4,415 mg, Intravenous, Over 46 hours, 6 of 6 cycles      Malignant neoplasm metastatic to right lung ([facility] [address] )   8/4/2022 Initial Diagnosis     Malignant neoplasm metastatic to right lung [facility])      8/9/2022 Surgery     Flexible bronchoscopy, right thoracoscopic therapeutic wedge resection       9/20/2022 -  Chemotherapy     palonosetron (ALOXI), 0 25 mg, Intravenous, Once, 4 of 4 cycles  Administration: 0 25 mg (10/18/2022), 0 25 mg (11/1/2022), 0 25 mg (11/15/2022), 0 25 mg (11/29/2022)  pegfilgrastim (Stefano Arellano), 6 mg, Subcutaneous, Once, 6 of 6 cycles  Administration: 6 mg (9/22/2022), 6 mg (10/20/2022), 6 mg (11/3/2022), 6 mg (11/17/2022), 6 mg (12/1/2022)  Pegfilgrastim-bmez (ZIEXTENZO), 6 mg, Subcutaneous, Once, 1 of 1 cycle  Administration: 6 mg (10/7/2022)  fosaprepitant (EMEND) IVPB, 150 mg, Intravenous, Once, 4 of 4 cycles  Administration: 150 mg (10/18/2022), 150 mg (11/1/2022), 150 mg (11/15/2022), 150 mg (11/29/2022)  fluorouracil (ADRUCIL), 400 mg/m2 = 735 mg, Intravenous, Once, 6 of 6 cycles  Administration: 735 mg (9/20/2022), 735 mg (10/4/2022), 730 mg (10/18/2022), 730 mg (11/1/2022), 730 mg (11/15/2022), 730 mg (11/29/2022)  cetuximab (ERBITUX), 920 mg, Intravenous, Once, 6 of 6 cycles  Administration: 900 mg (9/20/2022), 900 mg (10/4/2022), 900 mg (10/18/2022), 900 mg (11/1/2022), 900 mg (11/15/2022), 900 mg (11/29/2022)  irinotecan (CAMPTOSAR) chemo infusion, 331 mg, Intravenous, Once, 6 of 6 cycles  Administration: 320 mg (9/20/2022), 320 mg (10/4/2022), 320 mg (10/18/2022), 320 mg (11/1/2022), 320 mg (11/15/2022), 320 mg (11/29/2022)  leucovorin calcium IVPB, 736 mg, Intravenous, Once, 6 of 6 cycles  Administration: 700 mg (9/20/2022), 700 mg (10/4/2022), 750 mg (10/18/2022), 750 mg (11/1/2022), 700 mg (11/15/2022), 700 mg (11/29/2022)  fluorouracil (ADRUCIL) ambulatory infusion Soln, 1,200 mg/m2/day = 4,415 mg, Intravenous, Over 46 hours, 6 of 6 cycles      MFOLFOX6 (5-FU 2400 mg/m² over 46 hours, 5- mg meter squared bolus, leucovorin 400 mg meter squared bolus along with oxaliplatin at 85 mg/m²) with Neulasta Support  Dose #1 2/6/2018  CARIS testing performed   KRAS and NRAS WildType   Erbitux 500mg IV every 2 weeks  This was added on 20th of March 2018 (4th cycle)  In total he received 8 doses of modified FOLFOX 6, 5 of which he got with Erbitux      Patient received 5-FU 2400 mg/m², Erbitux 500 mg meter square, Leucovorin 400 mg meter square, 5- M square, Dose #12 in aggregate On 16 October 2019      Single agent Erbitux   Dose #6 was on 8 January 2019       liver metastatic disease which was resected     FOLFIRI plus Erbitux completed 6 cycles on October 7, 2020     Single agent Erbitux for 3 months completed on the 11th of January 2021     3 months of  FOLFIRI plus Erbitux after liver resection in the end of 2022       Interval History:        Previous Treatment:         Test Results:    Imaging: MRI abdomen w wo contrast    Result Date: 4/24/2023  Narrative: MRI - ABDOMEN - WITH AND WITHOUT CONTRAST INDICATION: 50 years / Male  C18 9: Malignant neoplasm of colon, unspecified C78 7: Secondary malignant neoplasm of liver and intrahepatic bile duct  History of stage IV colon cancer diagnosed in January 2018  Patient underwent surgery on 6/21/2018, and liver resection was positive for residual malignancy  After receiving additional chemotherapy, 3 hyper metabolic lesions were noted in the left lateral segment of the liver which were resected in June 2020 and or metastatic colon cancer  In July 2022, a slowly growing right lower lobe lung nodule was identified for which the patient underwent thorascopic resection and pathology was consistent with metastatic colon cancer  Patient underwent more chemotherapy  COMPARISON: Most recent imaging exam is dated April 3, 2023  Most recent MRI of the abdomen is dated October 7, 2022  Field kidneys multiple left renal sinus cysts unchanged  The largest is in the lower pole and measures approximately 5 5 cm  TECHNIQUE:  Multiplanar/multisequence MRI of the abdomen was performed before and after administration of contrast  IV Contrast:  7 mL of Gadobutrol injection (SINGLE-DOSE) FINDINGS: LOWER CHEST:   Unremarkable  LIVER: Normal in size and morphology Stable appearance of segment 7 hepatic resection site with no evidence of recurrent tumor  No evidence of recurrent tumor at the segment 3 resection site  The hepatic veins and portal veins are patent  BILE DUCTS: No intrahepatic or extrahepatic bile duct dilation  GALLBLADDER:  Normal  PANCREAS:  Unremarkable  ADRENAL GLANDS:  Normal  SPLEEN:  Normal  KIDNEYS/PROXIMAL URETERS: No hydroureteronephrosis  No suspicious renal mass   BOWEL:   No dilated loops of bowel  PERITONEUM/RETROPERITONEUM: No mass  No ascites  LYMPH NODES: No abdominal lymphadenopathy  VASCULAR STRUCTURES:  No aneurysm  ABDOMINAL WALL:  Fat-containing ventral hernia  OSSEOUS STRUCTURES:  No suspicious osseous lesion  Impression: Status post segment 7 and 3 liver resection with no evidence of recurrent tumor and no new metastatic disease  Workstation performed: [id number]     CT chest abdomen pelvis w contrast    Result Date: 4/11/2023  Narrative: CT CHEST, ABDOMEN AND PELVIS WITH IV CONTRAST INDICATION:   C78 01: Secondary malignant neoplasm of right lung C79 9: Secondary malignant neoplasm of unspecified site C18 9: Malignant neoplasm of colon, unspecified  Dr Adams Sanchez note from 1/3/2023 was reviewed  Patient has history of stage IV colon cancer status post chemotherapy followed by surgery  Patient had liver metastases treated with surgical resection  In July 2022, surveillance imaging demonstrated a growing lung nodule, and thorascopic lower lobe wedge resection on 8/9/2022 demonstrated metastatic adenocarcinoma  COMPARISON:  Chest, abdomen, pelvic CT from 12/27/2022  TECHNIQUE: CT examination of the chest, abdomen and pelvis was performed  Multiplanar 2D reformatted images were created from the source data  Radiation dose length product (DLP) for this visit:  830 mGy-cm   This examination, like all CT scans performed in the [facility], was performed utilizing techniques to minimize radiation dose exposure, including the use of iterative reconstruction and automated exposure control  IV Contrast:  100 mL of iohexol (OMNIPAQUE) Enteric Contrast: Enteric contrast was administered  FINDINGS: CHEST LUNGS: Again seen are postoperative changes from a medial right lower lobe pulmonary nodule resection  There are multiple pulmonary nodules, which will be described on series 6: Image 80, right lower lobe, solid, smooth bordered, 3 mm, unchanged    (This was the nodule that may have mildly increased in size on the prior study ) Image 94, right lower lobe, solid, smooth bordered, 3 mm, unchanged  Image 49, right upper lobe, solid, smooth bordered, 2 mm, unchanged  Image 70, left lower lobe, solid, smooth bordered, 3 mm, unchanged  Image 80, left lower lobe, solid, smooth bordered, 2 mm, unchanged  No new pulmonary nodules  PLEURA:  Unremarkable  HEART/GREAT VESSELS: Heart is unremarkable for patient's age  No thoracic aortic aneurysm  Again seen is a Port-A-Cath  MEDIASTINUM AND MAHENDRA:  Unremarkable  CHEST WALL AND LOWER NECK:  Unchanged 5 mm right thyroid nodule (series 2 image 2 ) ABDOMEN LIVER/BILIARY TREE:  Again seen is resection cavity in hepatic segment 7 measuring 2 8 x 1 9 cm, unchanged (series 2 image 102 )  Again seen are several tiny well-defined liver lesions, demonstrating long-term stability therefore benign  No new suspicious liver lesions  GALLBLADDER:  There are gallstones within the gallbladder, without pericholecystic inflammatory changes  SPLEEN:  Unremarkable  PANCREAS:  Unremarkable  ADRENAL GLANDS:  Unremarkable  KIDNEYS/URETERS:  Several simple parapelvic cysts in the left renal sinus  Kidneys and ureters otherwise unremarkable  No hydronephrosis  STOMACH AND BOWEL:  Anastamotic bowel sutures seen in the sigmoid colon  Bowel loops otherwise are unremarkable  APPENDIX:  No findings to suggest appendicitis  ABDOMINOPELVIC CAVITY:  No ascites  No pneumoperitoneum  No lymphadenopathy  VESSELS:  Unremarkable for patient's age  PELVIS REPRODUCTIVE ORGANS:  Unremarkable for patient's age  URINARY BLADDER:  Unremarkable  ABDOMINAL WALL/INGUINAL REGIONS:  Unremarkable  OSSEOUS STRUCTURES:  No acute fracture or destructive osseous lesion  Impression: Multiple tiny pulmonary nodules are stable compared to 12/27/2022, including the 3 mm right lower lobe nodule which may have mildly increased in size on the prior study   Based on current Fleischner Society 2017 Guidelines on incidental pulmonary nodule, patients with a known malignancy are at increased risk of metastasis and should receive followup CT at intervals appropriate for the type of cancer and its risk of pulmonary metastases  No evidence of metastatic disease in the abdomen or pelvis    Workstation performed: [id number]       Labs:   Lab Results   Component Value Date    WBC 5 12 04/03/2023    HGB 14 7 04/03/2023    HCT 41 9 04/03/2023    MCV 91 04/03/2023     04/03/2023     Lab Results   Component Value Date     08/26/2017    K 3 5 04/03/2023     04/03/2023    CO2 24 04/03/2023    BUN 12 04/03/2023    CREATININE 1 02 04/03/2023    GLUCOSE 124 12/01/2017    GLUF 109 (H) 09/16/2022    CALCIUM 9 1 04/03/2023    CORRECTEDCA 10 0 10/17/2020    AST 17 04/03/2023    ALT 15 04/03/2023    ALKPHOS 50 04/03/2023    PROT 7 2 08/26/2017    BILITOT 0 6 08/26/2017    EGFR 85 04/03/2023       No results found for: IRON, TIBC, FERRITIN    No results found for: [id number]      ROS: Review of Systems   Constitutional: Negative  Negative for appetite change, chills, diaphoresis, fatigue, fever and unexpected weight change  HENT:   Negative for hearing loss, lump/mass, mouth sores, nosebleeds, sore throat, trouble swallowing and voice change  Eyes: Negative  Negative for eye problems and icterus  Respiratory: Negative  Negative for chest tightness, cough, hemoptysis and shortness of breath  Cardiovascular: Negative for chest pain and leg swelling  Gastrointestinal: Negative for abdominal distention, abdominal pain, blood in stool, constipation, diarrhea and nausea  Endocrine: Negative  Genitourinary: Negative for dysuria, frequency, hematuria and pelvic pain  Musculoskeletal: Negative  Negative for arthralgias, back pain, flank pain, gait problem, myalgias and neck stiffness  Skin: Negative for itching and rash     Neurological: Negative for dizziness, gait problem, headaches, light-headedness, numbness and speech difficulty  Hematological: Negative for adenopathy  Does not bruise/bleed easily  Psychiatric/Behavioral: Negative for confusion, decreased concentration, depression and sleep disturbance  The patient is not nervous/anxious  Current Medications: Reviewed  Allergies: Reviewed  PMH/FH/SH:  Reviewed      Physical Exam:    Body surface area is 1 87 meters squared  Wt Readings from Last 3 Encounters:   05/01/23 75 8 kg (167 lb)   04/26/23 73 5 kg (162 lb)   02/16/23 74 7 kg (164 lb 9 6 oz)        Temp Readings from Last 3 Encounters:   05/01/23 (!) 97 °F (36 1 °C)   04/26/23 98 1 °F (36 7 °C) (Temporal)   02/16/23 98 9 °F (37 2 °C) (Temporal)        BP Readings from Last 3 Encounters:   05/01/23 104/68   04/26/23 100/66   02/16/23 102/66         Pulse Readings from Last 3 Encounters:   05/01/23 75   04/26/23 81   02/16/23 82        Physical Exam  Vitals reviewed  Constitutional:       General: He is not in acute distress  Appearance: He is well-developed  He is not diaphoretic  HENT:      Head: Normocephalic and atraumatic  Eyes:      Conjunctiva/sclera: Conjunctivae normal    Neck:      Trachea: No tracheal deviation  Cardiovascular:      Rate and Rhythm: Normal rate and regular rhythm  Heart sounds: No murmur heard  No friction rub  No gallop  Pulmonary:      Effort: Pulmonary effort is normal  No respiratory distress  Breath sounds: Normal breath sounds  No wheezing or rales  Chest:      Chest wall: No tenderness  Abdominal:      General: There is no distension  Palpations: Abdomen is soft  Tenderness: There is no abdominal tenderness  Musculoskeletal:      Cervical back: Normal range of motion and neck supple  Lymphadenopathy:      Cervical: No cervical adenopathy  Skin:     General: Skin is warm and dry  Coloration: Skin is not pale  Findings: No erythema     Neurological:      Mental Status: He is alert and oriented to person, place, and time  Psychiatric:         Behavior: Behavior normal          Thought Content: Thought content normal          Judgment: Judgment normal          ECO  Goals and Barriers:  Current Goal: Minimize effects of disease  Barriers: None  Patient's Capacity to Self Care:  Patient is able to self care      Code Status: [unfilled]

## 2023-06-05 DIAGNOSIS — N52.8 OTHER MALE ERECTILE DYSFUNCTION: ICD-10-CM

## 2023-06-05 RX ORDER — SILDENAFIL 50 MG/1
50 TABLET, FILM COATED ORAL AS NEEDED
Qty: 10 TABLET | Refills: 0 | Status: SHIPPED | OUTPATIENT
Start: 2023-06-05

## 2023-07-06 NOTE — ADDENDUM NOTE
Addendum  created 06/25/18 5030 by Dunia Reyna MD    Anesthesia Intra LDAs edited, LDA properties accepted PT Evaluation     Today's date: 2023  Patient name: Kendal Burnett  : 1945  MRN: [id number]  Referring provider: Paty Bruno DO  Dx:   Encounter Diagnosis     ICD-10-CM    1. Dysfunction of left rotator cuff  M67.912 Ambulatory referral to Physical Therapy                     Assessment  Assessment details: Pt at this time demonstrates what appears to be full recovery from her initial injury between time allowing healing and performance of the CSI on 7/3/23. Pt at this time was not provoked in any way during session, had equal range, equal strength and was offered to follow up in 3 weeks to determine if CSI wears off vs call back as needed. Pt chose the latter and all her questions were answered. If pt is to have a decline in any form she is welcome to return as needed. Thank you very much for this kind and motivated referral.      Understanding of Dx/Px/POC: good   Prognosis: good    Goals  STG 4 Weeks: Follow up prn. LTG 8 Weeks: Follow up prn. Plan  Duration in weeks: 6  Treatment plan discussed with: patient        Subjective Evaluation    History of Present Illness  Date of onset: 2023  Mechanism of injury: Pt is a 78 yofemale who is RHD and presents today stating that she tripped over her chocolate lab in the dark. Pt reports that she has had a history of falls, but none resulted in an injury. Pt reports this time she tripped on her dog reached out to her table, hit her L chest and armpit against the bench of the table and then fell to the floor. Pt reports that she had pain right away, she states that rested the arm and chest for about a week, and then on 23 she went to this hospital and she had x-rays performed (-) of fracture, and was referred to orthopedics where she met with Dr. Pratibha Anaya. She was in a sling for 1 day and decided not for her. Pt was given options of CSI and PT and then she went with this options.   They discussed also having MRI and potential surgery and chose the latter. Pt reports today stating she received her injection on Monday and overall is doing well. States that she is definitely improving since the initial episode. Pt reports that today she is without pain, however prior to fall she was feeling better. Pt reports pain at worst was 7-8/10, and it was focal on R shoulder, down to elbow but not beyond could go up to her neck occasionally, but with rest she was without pain. Pt reports that sleeping was comfortable with acetaminophen. Pt reports largest difficulty was pushing up from a chair or re adjusting in bed. Pt reports that her most recent episode of pain was approximately 2 days ago. Pt reports no neck pain, no head aches, no symptoms in R UE. Pt reports that she is feeling quite well. Pt reports her goals are to return to as she was prior to fall. Quality of life: good    Pain  Current pain ratin  At best pain ratin  At worst pain ratin  Quality: dull ache  Relieving factors: medications, rest and change in position  Aggravating factors: overhead activity  Progression: improved      Diagnostic Tests  X-ray: normal  Treatments  Previous treatment: injection treatment  Patient Goals  Patient goals for therapy: increased strength, return to sport/leisure activities, increased motion and decreased pain          Objective     Active Range of Motion   Left Shoulder   Flexion: 160 degrees   Extension: 80 degrees   Abduction: 155 degrees   External rotation BTH: C7   Internal rotation BTB: T3     Right Shoulder   Flexion: 160 degrees   Extension: 80 degrees   Abduction: 155 degrees   External rotation BTH: C7   Internal rotation BTB: T3     Additional Active Range of Motion Details  Forward head, rounded shoulders  B/L Sensation intact to light touch C3,4,5,6,7,8,T1,T2  Postural correction   UE Screen  L all 5/5, elbow and  strong and painless. R all 5/5. Elbow and  strong and painless.     PROM WFL B/L, OP in Flex and and Ext without pain B/L.    CS Repeated motion   Flex / protraction Nil  Retraction Min  Ext Min  SB /Rot R nil  SB / Rot L nil  Joint Mobility  Palpation  Sts    Repeated motions UE NP               Precautions: Falls, Anemia Hx, Osteoporosis, RA       Manuals 7/6/23                                                                Neuro Re-Ed             Education and progression education x 10 min                                                                                          Ther Ex                                                                                                                     Ther Activity                                                                              Modalities             CP/HP to L shoulder prn

## 2023-08-01 ENCOUNTER — HOSPITAL ENCOUNTER (OUTPATIENT)
Dept: CT IMAGING | Facility: HOSPITAL | Age: 53
Discharge: HOME | End: 2023-08-01
Attending: INTERNAL MEDICINE
Payer: COMMERCIAL

## 2023-08-01 DIAGNOSIS — C78.7 COLON CANCER METASTASIZED TO LIVER (HCC): ICD-10-CM

## 2023-08-01 DIAGNOSIS — C18.9 COLON CANCER METASTASIZED TO LIVER (HCC): ICD-10-CM

## 2023-08-01 PROCEDURE — G1004 CDSM NDSC: HCPCS

## 2023-08-01 PROCEDURE — 74177 CT ABD & PELVIS W/CONTRAST: CPT

## 2023-08-01 PROCEDURE — 71260 CT THORAX DX C+: CPT

## 2023-08-01 RX ADMIN — IOHEXOL 100 ML: 350 INJECTION, SOLUTION INTRAVENOUS at 15:12

## 2023-09-08 ENCOUNTER — TELEPHONE (OUTPATIENT)
Dept: HEMATOLOGY ONCOLOGY | Facility: CLINIC | Age: 53
End: 2023-09-08

## 2023-09-08 NOTE — TELEPHONE ENCOUNTER
Patient Call    Who are you speaking with? Patient    If it is not the patient, are they listed on an active communication consent form? N/A   What is the reason for this call? Patient calling to reschedule appointment on 9/11/23 with Dr. Inna Portillo because patient was unaware he needed labs drawn for the appointment. Patient is going to be out of town this weekend and didn't know if he'd be able to get labs drawn. The appointment was rescheduled for 9/28/23, patient then stated he wanted to be seen sooner. Patient asked to be rescheduled for 9/11/23. I informed patient there is a [facility] lab in [address] [place] [address] that he could get his labs done at. Patient verbalized his understanding and stated he was get his labs drawn on Monday. Does this require a call back? No   If a call back is required, please list best call back number [phone]   If a call back is required, advise that a message will be forwarded to their care team and someone will return their call as soon as possible. Did you relay this information to the patient?  No

## 2023-09-11 ENCOUNTER — APPOINTMENT (OUTPATIENT)
Dept: LAB | Facility: HOSPITAL | Age: 53
End: 2023-09-11
Payer: COMMERCIAL

## 2023-09-11 ENCOUNTER — OFFICE VISIT (OUTPATIENT)
Dept: HEMATOLOGY ONCOLOGY | Facility: CLINIC | Age: 53
End: 2023-09-11
Payer: COMMERCIAL

## 2023-09-11 VITALS
DIASTOLIC BLOOD PRESSURE: 70 MMHG | HEART RATE: 83 BPM | OXYGEN SATURATION: 98 % | TEMPERATURE: 98.4 F | HEIGHT: 67 IN | WEIGHT: 162.5 LBS | RESPIRATION RATE: 17 BRPM | SYSTOLIC BLOOD PRESSURE: 112 MMHG | BODY MASS INDEX: 25.51 KG/M2

## 2023-09-11 DIAGNOSIS — C78.7 COLON CANCER METASTASIZED TO LIVER (HCC): Primary | ICD-10-CM

## 2023-09-11 DIAGNOSIS — C18.9 COLON CANCER METASTASIZED TO LIVER (HCC): Primary | ICD-10-CM

## 2023-09-11 DIAGNOSIS — C18.9 COLON CANCER METASTASIZED TO LIVER (HCC): ICD-10-CM

## 2023-09-11 DIAGNOSIS — C78.7 COLON CANCER METASTASIZED TO LIVER (HCC): ICD-10-CM

## 2023-09-11 LAB
ALBUMIN SERPL BCP-MCNC: 4.3 G/DL (ref 3.5–5)
ALP SERPL-CCNC: 42 U/L (ref 34–104)
ALT SERPL W P-5'-P-CCNC: 17 U/L (ref 7–52)
ANION GAP SERPL CALCULATED.3IONS-SCNC: 4 MMOL/L
AST SERPL W P-5'-P-CCNC: 17 U/L (ref 13–39)
BASOPHILS # BLD AUTO: 0.03 THOUSANDS/ÂΜL (ref 0–0.1)
BASOPHILS NFR BLD AUTO: 1 % (ref 0–1)
BILIRUB SERPL-MCNC: 0.38 MG/DL (ref 0.2–1)
BUN SERPL-MCNC: 18 MG/DL (ref 5–25)
CALCIUM SERPL-MCNC: 9.3 MG/DL (ref 8.4–10.2)
CEA SERPL-MCNC: 1.1 NG/ML (ref 0–3)
CHLORIDE SERPL-SCNC: 110 MMOL/L (ref 96–108)
CO2 SERPL-SCNC: 25 MMOL/L (ref 21–32)
CREAT SERPL-MCNC: 0.94 MG/DL (ref 0.6–1.3)
EOSINOPHIL # BLD AUTO: 0.06 THOUSAND/ÂΜL (ref 0–0.61)
EOSINOPHIL NFR BLD AUTO: 1 % (ref 0–6)
ERYTHROCYTE [DISTWIDTH] IN BLOOD BY AUTOMATED COUNT: 12.5 % (ref 11.6–15.1)
GFR SERPL CREATININE-BSD FRML MDRD: 94 ML/MIN/1.73SQ M
GLUCOSE P FAST SERPL-MCNC: 101 MG/DL (ref 65–99)
HCT VFR BLD AUTO: 40.8 % (ref 36.5–49.3)
HGB BLD-MCNC: 14.2 G/DL (ref 12–17)
IMM GRANULOCYTES # BLD AUTO: 0.01 THOUSAND/UL (ref 0–0.2)
IMM GRANULOCYTES NFR BLD AUTO: 0 % (ref 0–2)
LYMPHOCYTES # BLD AUTO: 1.64 THOUSANDS/ÂΜL (ref 0.6–4.47)
LYMPHOCYTES NFR BLD AUTO: 31 % (ref 14–44)
MCH RBC QN AUTO: 32.5 PG (ref 26.8–34.3)
MCHC RBC AUTO-ENTMCNC: 34.8 G/DL (ref 31.4–37.4)
MCV RBC AUTO: 93 FL (ref 82–98)
MONOCYTES # BLD AUTO: 0.39 THOUSAND/ÂΜL (ref 0.17–1.22)
MONOCYTES NFR BLD AUTO: 8 % (ref 4–12)
NEUTROPHILS # BLD AUTO: 3.09 THOUSANDS/ÂΜL (ref 1.85–7.62)
NEUTS SEG NFR BLD AUTO: 59 % (ref 43–75)
NRBC BLD AUTO-RTO: 0 /100 WBCS
PLATELET # BLD AUTO: 157 THOUSANDS/UL (ref 149–390)
PMV BLD AUTO: 10.8 FL (ref 8.9–12.7)
POTASSIUM SERPL-SCNC: 4 MMOL/L (ref 3.5–5.3)
PROT SERPL-MCNC: 7.5 G/DL (ref 6.4–8.4)
RBC # BLD AUTO: 4.37 MILLION/UL (ref 3.88–5.62)
SODIUM SERPL-SCNC: 139 MMOL/L (ref 135–147)
WBC # BLD AUTO: 5.22 THOUSAND/UL (ref 4.31–10.16)

## 2023-09-11 PROCEDURE — 36415 COLL VENOUS BLD VENIPUNCTURE: CPT

## 2023-09-11 PROCEDURE — 99214 OFFICE O/P EST MOD 30 MIN: CPT | Performed by: INTERNAL MEDICINE

## 2023-09-11 PROCEDURE — 82378 CARCINOEMBRYONIC ANTIGEN: CPT

## 2023-09-11 PROCEDURE — 85025 COMPLETE CBC W/AUTO DIFF WBC: CPT

## 2023-09-11 PROCEDURE — 80053 COMPREHEN METABOLIC PANEL: CPT

## 2023-09-11 NOTE — PROGRESS NOTES
Hematology Outpatient Follow - Up Note  Esequiel December 50 y.o. male MRN: @ Encounter: [id number]        Date:  9/11/2023        Assessment/ Plan:    In January 2018 diagnosed with stage IV colon cancer with liver metastasis, K-jennifer wild-type, NRAS wild-type, MSI stable, no evidence of actionable mutations     He received FOLFOX/cetuximab with surgical resection 1/6/2018, the liver resection performed colon cancer, he received if he will not be toxic 1/7/2018 for total of 12 cycles and later on switched to cetuximab 2019     Because of the liver lobe metastasis on 5/2020 status post resection treated with FOLFIRI and cetuximab     Slowly growing lung nodules 1/7/2022 status post resection consistent with metastatic colorectal cancer in the lower lobe of the lung status post FOLFIRI and cetuximab     Also liver resection in the end of 2022 currently on observation    CAT scan on 8/2023 showed no evidence of recurrence of disease he has stable pulmonary nodules    Await for CEA, scheduled for MRI of the abdomen in October    Follow-up in 4 months with CBC, CMP, CEA or early. depends on the MRI or the CEA level        Labs and imaging studies are reviewed by ordering provider once results are available. If there are findings that need immediate attention, you will be contacted when results available. Discussing results and the implication on your healthcare is best discussed in person at your follow-up visit. HPI:    Irwin Justin N 66 VU male with stage IV colon cancer. He was diagnosed in January 2018.  He was treated with  modified FOLFox 6 and Erbitux. He then went for surgery on 06/21/2018 with a nice response.  Liver resection was positive for residual malignancy.  After surgery, treatment with 5 FU bolus, leucovorin, 5 FU pump and Erbitux was restarted on 07/24/2018 and he received 12 cycles of chemotherapy.  He was switched to single agent Erbitux for 6 doses.  This was completed in January 2019. He was placed on observation     PET CT completed 5/21/20 showed that there were hypermetabolic left lateral liver metastases with minimal activity in the hepatic dome treated metastases.  He is s/p  Resection  on June 8, 2020. Pathology revealed metastatic adenocarcinoma consistent with the patient's known colon primary.  Margins were negative for carcinoma. He was treated with adjuvant with FOLFIRI plus Erbitux for 6 months, this was completed in early 2021.     He was again on observation until more recently. He had surveillance imaging completed in 7/2022 which showed a slowly growing lung nodule up to 7 mm concerning for a growing metastatic lesion.  He underwent   a right thoracoscopic therapeutic lower lobe wedge resection on 8/9/22 and pathology was consistent with a   metastatic colon adenocarcinoma with tumor necrosis immunohistochemically consistent with metastases from the patient's known colon primary measuring 0.8 x 0.5 x 0.5 cm which was completely excised.     He then got 3 months of adjuvant treatment with FOLFIRI plus Erbitux  Previous Hematologic/ Oncologic History:          Oncology History   Colon cancer metastasized to liver ([address])   1/2018 Initial Diagnosis     Malignant neoplasm of sigmoid colon ([address])      1/22/2018 Biopsy     Large Intestine, Sigmoid Colon, Recto-sigmoid tumor bx:     - Invasive colonic adenocarcinoma, moderately differentiated      2/6/2018 - 10/16/2018 Chemotherapy     Modified FOLFOX6 x 12 cycles  Added Erbitux on 3/20/18       6/21/2018 Surgery     Segment 7 liver resection/ablation, hemicolectomy      10/30/2018 -  Chemotherapy     Continuation of Single agent Erbitux.  With 10/30/18 chemo, it was Cycle #14.  Plan was for 6 additional cycles of Erbitux alone.      3/2020 Genetic Testing     NEGATIVE for genes tested:     Test(s): CancerNext + RNAinsight (34 genes): APC, ETHAN, BARD1, BRCA1, BRCA2, BRIP1, BMPR1A, CDH1, CDK4, CDKN2A, CHEK2, DICER1, EPCAM, GREM1, HOXB13, MLH1, MRE11A, MSH2, MSH6, MUTYH, NBN, NF1, PALB2, PMS2, POLD1, POLE, PTEN, RAD50, RAD51C, RAD51D, SMAD4, SMARCA4, STK11, TP53       6/8/2020 Surgery     Liver, segment 3 (wedge resection):  - Metastatic adenocarcinoma, consistent with the patient's known colon primary  - Margins negative for carcinoma       7/27/2020 - 1/11/2021 Chemotherapy     fluorouracil (ADRUCIL), 745 mg, Intravenous, Once, 6 of 6 cycles  Administration: 750 mg (7/27/2020), 750 mg (8/10/2020), 745 mg (8/24/2020), 745 mg (9/8/2020), 745 mg (9/21/2020), 745 mg (10/5/2020)  pegfilgrastim (NEULASTA), 6 mg, Subcutaneous, Once, 1 of 1 cycle  Administration: 6 mg (9/24/2020)  pegfilgrastim (Thelbert Marrow), 6 mg, Subcutaneous, Once, 6 of 6 cycles  Administration: 6 mg (7/29/2020), 6 mg (8/12/2020), 6 mg (8/26/2020), 6 mg (9/10/2020), 6 mg (10/7/2020)  cetuximab (ERBITUX), 930 mg, Intravenous, Once, 12 of 12 cycles  Administration: 900 mg (7/27/2020), 900 mg (8/10/2020), 900 mg (8/24/2020), 900 mg (9/8/2020), 900 mg (9/21/2020), 900 mg (10/5/2020), 900 mg (10/19/2020), 900 mg (11/2/2020), 900 mg (11/16/2020), 900 mg (11/30/2020), 900 mg (12/28/2020), 900 mg (1/11/2021)  irinotecan (CAMPTOSAR) chemo infusion, 335 mg, Intravenous, Once, 6 of 6 cycles  Administration: 340 mg (7/27/2020), 340 mg (8/10/2020), 340 mg (8/24/2020), 340 mg (9/8/2020), 340 mg (9/21/2020), 340 mg (10/5/2020)  leucovorin calcium IVPB, 744 mg, Intravenous, Once, 6 of 6 cycles  Administration: 750 mg (7/27/2020), 750 mg (8/10/2020), 750 mg (8/24/2020), 750 mg (9/8/2020), 750 mg (9/21/2020), 740 mg (10/5/2020)  fluorouracil (ADRUCIL) ambulatory infusion Soln, 1,200 mg/m2/day = 4,465 mg, Intravenous, Over 46 hours, 6 of 6 cycles  Dose modification: 1,200 mg/m2/day (original dose 1,200 mg/m2/day, Cycle 3)      9/20/2022 -  Chemotherapy     palonosetron (ALOXI), 0.25 mg, Intravenous, Once, 4 of 4 cycles  Administration: 0.25 mg (10/18/2022), 0.25 mg (11/1/2022), 0.25 mg (11/15/2022), 0.25 mg (11/29/2022)  pegfilgrastim ([employer] [place]), 6 mg, Subcutaneous, Once, 6 of 6 cycles  Administration: 6 mg (9/22/2022), 6 mg (10/20/2022), 6 mg (11/3/2022), 6 mg (11/17/2022), 6 mg (12/1/2022)  Pegfilgrastim-bmez (Horace Murrieta), 6 mg, Subcutaneous, Once, 1 of 1 cycle  Administration: 6 mg (10/7/2022)  fosaprepitant (EMEND) IVPB, 150 mg, Intravenous, Once, 4 of 4 cycles  Administration: 150 mg (10/18/2022), 150 mg (11/1/2022), 150 mg (11/15/2022), 150 mg (11/29/2022)  fluorouracil (ADRUCIL), 400 mg/m2 = 735 mg, Intravenous, Once, 6 of 6 cycles  Administration: 735 mg (9/20/2022), 735 mg (10/4/2022), 730 mg (10/18/2022), 730 mg (11/1/2022), 730 mg (11/15/2022), 730 mg (11/29/2022)  cetuximab (ERBITUX), 920 mg, Intravenous, Once, 6 of 6 cycles  Administration: 900 mg (9/20/2022), 900 mg (10/4/2022), 900 mg (10/18/2022), 900 mg (11/1/2022), 900 mg (11/15/2022), 900 mg (11/29/2022)  irinotecan (CAMPTOSAR) chemo infusion, 331 mg, Intravenous, Once, 6 of 6 cycles  Administration: 320 mg (9/20/2022), 320 mg (10/4/2022), 320 mg (10/18/2022), 320 mg (11/1/2022), 320 mg (11/15/2022), 320 mg (11/29/2022)  leucovorin calcium IVPB, 736 mg, Intravenous, Once, 6 of 6 cycles  Administration: 700 mg (9/20/2022), 700 mg (10/4/2022), 750 mg (10/18/2022), 750 mg (11/1/2022), 700 mg (11/15/2022), 700 mg (11/29/2022)  fluorouracil (ADRUCIL) ambulatory infusion Soln, 1,200 mg/m2/day = 4,415 mg, Intravenous, Over 46 hours, 6 of 6 cycles      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 ([address])   4/11/2022 Initial Diagnosis     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 [facility])      9/20/2022 -  Chemotherapy     palonosetron (ALOXI), 0.25 mg, Intravenous, Once, 4 of 4 cycles  Administration: 0.25 mg (10/18/2022), 0.25 mg (11/1/2022), 0.25 mg (11/15/2022), 0.25 mg (11/29/2022)  pegfilgrastim ([employer] [place]), 6 mg, Subcutaneous, Once, 6 of 6 cycles  Administration: 6 mg (9/22/2022), 6 mg (10/20/2022), 6 mg (11/3/2022), 6 mg (11/17/2022), 6 mg (12/1/2022)  Daniel Vaughn (ZIEXTENZO), 6 mg, Subcutaneous, Once, 1 of 1 cycle  Administration: 6 mg (10/7/2022)  fosaprepitant (EMEND) IVPB, 150 mg, Intravenous, Once, 4 of 4 cycles  Administration: 150 mg (10/18/2022), 150 mg (11/1/2022), 150 mg (11/15/2022), 150 mg (11/29/2022)  fluorouracil (ADRUCIL), 400 mg/m2 = 735 mg, Intravenous, Once, 6 of 6 cycles  Administration: 735 mg (9/20/2022), 735 mg (10/4/2022), 730 mg (10/18/2022), 730 mg (11/1/2022), 730 mg (11/15/2022), 730 mg (11/29/2022)  cetuximab (ERBITUX), 920 mg, Intravenous, Once, 6 of 6 cycles  Administration: 900 mg (9/20/2022), 900 mg (10/4/2022), 900 mg (10/18/2022), 900 mg (11/1/2022), 900 mg (11/15/2022), 900 mg (11/29/2022)  irinotecan (CAMPTOSAR) chemo infusion, 331 mg, Intravenous, Once, 6 of 6 cycles  Administration: 320 mg (9/20/2022), 320 mg (10/4/2022), 320 mg (10/18/2022), 320 mg (11/1/2022), 320 mg (11/15/2022), 320 mg (11/29/2022)  leucovorin calcium IVPB, 736 mg, Intravenous, Once, 6 of 6 cycles  Administration: 700 mg (9/20/2022), 700 mg (10/4/2022), 750 mg (10/18/2022), 750 mg (11/1/2022), 700 mg (11/15/2022), 700 mg (11/29/2022)  fluorouracil (ADRUCIL) ambulatory infusion Soln, 1,200 mg/m2/day = 4,415 mg, Intravenous, Over 46 hours, 6 of 6 cycles      Malignant neoplasm metastatic to right lung ([address])   8/4/2022 Initial Diagnosis     Malignant neoplasm metastatic to right lung [facility])      8/9/2022 Surgery     Flexible bronchoscopy, right thoracoscopic therapeutic wedge resection       9/20/2022 -  Chemotherapy     palonosetron (ALOXI), 0.25 mg, Intravenous, Once, 4 of 4 cycles  Administration: 0.25 mg (10/18/2022), 0.25 mg (11/1/2022), 0.25 mg (11/15/2022), 0.25 mg (11/29/2022)  pegfilgrastim (Fredda Hunting), 6 mg, Subcutaneous, Once, 6 of 6 cycles  Administration: 6 mg (9/22/2022), 6 mg (10/20/2022), 6 mg (11/3/2022), 6 mg (11/17/2022), 6 mg (12/1/2022)  Pegfilgrastim-bmez (ZIEXTENZO), 6 mg, Subcutaneous, Once, 1 of 1 cycle  Administration: 6 mg (10/7/2022)  fosaprepitant (EMEND) IVPB, 150 mg, Intravenous, Once, 4 of 4 cycles  Administration: 150 mg (10/18/2022), 150 mg (11/1/2022), 150 mg (11/15/2022), 150 mg (11/29/2022)  fluorouracil (ADRUCIL), 400 mg/m2 = 735 mg, Intravenous, Once, 6 of 6 cycles  Administration: 735 mg (9/20/2022), 735 mg (10/4/2022), 730 mg (10/18/2022), 730 mg (11/1/2022), 730 mg (11/15/2022), 730 mg (11/29/2022)  cetuximab (ERBITUX), 920 mg, Intravenous, Once, 6 of 6 cycles  Administration: 900 mg (9/20/2022), 900 mg (10/4/2022), 900 mg (10/18/2022), 900 mg (11/1/2022), 900 mg (11/15/2022), 900 mg (11/29/2022)  irinotecan (CAMPTOSAR) chemo infusion, 331 mg, Intravenous, Once, 6 of 6 cycles  Administration: 320 mg (9/20/2022), 320 mg (10/4/2022), 320 mg (10/18/2022), 320 mg (11/1/2022), 320 mg (11/15/2022), 320 mg (11/29/2022)  leucovorin calcium IVPB, 736 mg, Intravenous, Once, 6 of 6 cycles  Administration: 700 mg (9/20/2022), 700 mg (10/4/2022), 750 mg (10/18/2022), 750 mg (11/1/2022), 700 mg (11/15/2022), 700 mg (11/29/2022)  fluorouracil (ADRUCIL) ambulatory infusion Soln, 1,200 mg/m2/day = 4,415 mg, Intravenous, Over 46 hours, 6 of 6 cycles      MFOLFOX6 (5-FU 2400 mg/m² over 46 hours, 5- mg meter squared bolus, leucovorin 400 mg meter squared bolus along with oxaliplatin at 85 mg/m²) with Neulasta Support  Dose #1 2/6/2018. CARIS testing performed.  KRAS and NRAS WildType.  Erbitux 500mg IV every 2 weeks. This was added on 20th of March 2018 (4th cycle). In total he received 8 doses of modified FOLFOX 6, 5 of which he got with Erbitux.     Patient received 5-FU 2400 mg/m², Erbitux 500 mg meter square, Leucovorin 400 mg meter square, 5- M square, Dose #12 in aggregate On 16 October 2019.     Single agent Erbitux.  Dose #6 was on 8 January 2019.      liver metastatic disease which was resected     FOLFIRI plus Erbitux completed 6 cycles on October 7, 2020     Single agent Erbitux for 3 months completed on the 11th of January 2021     3 months of  FOLFIRI plus Erbitux after liver resection in the end of 2022       Interval History:        Previous Treatment:         Test Results:    Imaging: No results found. Labs:   Lab Results   Component Value Date    WBC 5.22 09/11/2023    HGB 14.2 09/11/2023    HCT 40.8 09/11/2023    MCV 93 09/11/2023     09/11/2023     Lab Results   Component Value Date     08/26/2017    K 4.0 09/11/2023     (H) 09/11/2023    CO2 25 09/11/2023    BUN 18 09/11/2023    CREATININE 0.94 09/11/2023    GLUCOSE 124 12/01/2017    GLUF 101 (H) 09/11/2023    CALCIUM 9.3 09/11/2023    CORRECTEDCA 10.0 10/17/2020    AST 17 09/11/2023    ALT 17 09/11/2023    ALKPHOS 42 09/11/2023    PROT 7.2 08/26/2017    BILITOT 0.6 08/26/2017    EGFR 94 09/11/2023       No results found for: "IRON", "TIBC", "FERRITIN"    No results found for: "[id number]"      ROS: Review of Systems   Constitutional: Negative. Negative for appetite change, chills, diaphoresis, fatigue, fever and unexpected weight change. HENT:   Negative for hearing loss, lump/mass, mouth sores, nosebleeds, sore throat, trouble swallowing and voice change. Eyes: Negative. Negative for eye problems and icterus. Respiratory: Negative. Negative for chest tightness, cough, hemoptysis and shortness of breath. Cardiovascular: Negative for chest pain and leg swelling. Gastrointestinal: Negative for abdominal distention, abdominal pain, blood in stool, constipation, diarrhea and nausea. Endocrine: Negative. Genitourinary: Negative for dysuria, frequency, hematuria and pelvic pain. Musculoskeletal: Negative. Negative for arthralgias, back pain, flank pain, gait problem, myalgias and neck stiffness. Skin: Negative for itching and rash. Neurological: Negative for dizziness, gait problem, headaches, light-headedness, numbness and speech difficulty. Hematological: Negative for adenopathy. Does not bruise/bleed easily. Psychiatric/Behavioral: Negative for confusion, decreased concentration, depression and sleep disturbance. The patient is not nervous/anxious. Current Medications: Reviewed  Allergies: Reviewed  PMH/FH/SH:  Reviewed      Physical Exam:    Body surface area is 1.85 meters squared. Wt Readings from Last 3 Encounters:   09/11/23 73.7 kg (162 lb 8 oz)   05/01/23 75.8 kg (167 lb)   04/26/23 73.5 kg (162 lb)        Temp Readings from Last 3 Encounters:   09/11/23 98.4 °F (36.9 °C) (Temporal)   05/01/23 (!) 97 °F (36.1 °C)   04/26/23 98.1 °F (36.7 °C) (Temporal)        BP Readings from Last 3 Encounters:   09/11/23 112/70   05/01/23 104/68   04/26/23 100/66         Pulse Readings from Last 3 Encounters:   09/11/23 83   05/01/23 75   04/26/23 81        Physical Exam    ECOG:    Goals and Barriers:  Current Goal: Minimize effects of disease. Barriers: None. Patient's Capacity to Self Care:  Patient is able to self care.     Code Status: [unfilled]

## 2023-10-17 ENCOUNTER — HOSPITAL ENCOUNTER (OUTPATIENT)
Dept: MRI IMAGING | Facility: HOSPITAL | Age: 53
Discharge: HOME | End: 2023-10-17
Attending: SURGERY
Payer: COMMERCIAL

## 2023-10-17 DIAGNOSIS — C78.7 COLON CANCER METASTASIZED TO LIVER (HCC): ICD-10-CM

## 2023-10-17 DIAGNOSIS — C18.9 COLON CANCER METASTASIZED TO LIVER (HCC): ICD-10-CM

## 2023-10-17 PROCEDURE — 74183 MRI ABD W/O CNTR FLWD CNTR: CPT

## 2023-10-17 PROCEDURE — A9585 GADOBUTROL INJECTION: HCPCS | Performed by: SURGERY

## 2023-10-17 PROCEDURE — G1004 CDSM NDSC: HCPCS

## 2023-10-17 RX ORDER — GADOBUTROL 604.72 MG/ML
7 INJECTION INTRAVENOUS
Status: COMPLETED | OUTPATIENT
Start: 2023-10-17 | End: 2023-10-17

## 2023-10-17 RX ADMIN — GADOBUTROL 7 ML: 604.72 INJECTION INTRAVENOUS at 14:45

## 2023-10-26 ENCOUNTER — OFFICE VISIT (OUTPATIENT)
Dept: SURGICAL ONCOLOGY | Facility: CLINIC | Age: 53
End: 2023-10-26
Payer: COMMERCIAL

## 2023-10-26 VITALS
SYSTOLIC BLOOD PRESSURE: 110 MMHG | BODY MASS INDEX: 25.27 KG/M2 | WEIGHT: 161 LBS | HEIGHT: 67 IN | HEART RATE: 72 BPM | OXYGEN SATURATION: 98 % | DIASTOLIC BLOOD PRESSURE: 60 MMHG

## 2023-10-26 DIAGNOSIS — C18.9 COLON CANCER METASTASIZED TO LIVER (HCC): ICD-10-CM

## 2023-10-26 DIAGNOSIS — Z85.038 HISTORY OF COLON CANCER: ICD-10-CM

## 2023-10-26 DIAGNOSIS — C78.7 COLON CANCER METASTASIZED TO LIVER (HCC): ICD-10-CM

## 2023-10-26 DIAGNOSIS — Z08 ENCOUNTER FOR FOLLOW-UP EXAMINATION AFTER COMPLETED TREATMENT FOR MALIGNANT NEOPLASM: Primary | ICD-10-CM

## 2023-10-26 PROCEDURE — 99213 OFFICE O/P EST LOW 20 MIN: CPT

## 2023-10-26 NOTE — PROGRESS NOTES
Surgical Oncology Follow Up       [address] [facility]  [facility]  [address] [facility] [id number]    Pearle Cande  1970  [id number]  [address] [facility]  [facility]  [address] [facility]  [place] [id number]    Diagnoses and all orders for this visit:    Encounter for follow-up examination after completed treatment for malignant neoplasm    Colon cancer metastasized to liver [facility])  -     MRI abdomen w wo contrast; Future  -     BUN; Future  -     Creatinine, serum; Future  -     CEA; Future    History of colon cancer  -     Ambulatory Referral to Colorectal Surgery; Future        Chief Complaint   Patient presents with    Follow-up     6 mo metastatic colon ca f/u - MRI done 10/17, CEA 1.1 (September)- staging done       Return in about 6 months (around 4/26/2024) for Office visit with Dr Kevin Torres, Imaging - See orders, Labs - See Treatment Plan. Oncology History   Colon cancer metastasized to liver ([address])   1/2018 Initial Diagnosis    Malignant neoplasm of sigmoid colon ([address])     1/22/2018 Biopsy    Large Intestine, Sigmoid Colon, Recto-sigmoid tumor bx:    - Invasive colonic adenocarcinoma, moderately differentiated     2/6/2018 - 10/16/2018 Chemotherapy    Modified FOLFOX6 x 12 cycles  Added Erbitux on 3/20/18      6/21/2018 Surgery    Segment 7 liver resection/ablation, hemicolectomy     6/21/2018 -  Cancer Staged    Staging form: Colon and Rectum, AJCC 8th Edition  - Pathologic stage from 6/21/2018: Stage WERO (ypT0, pN0, cM1a) - Signed by Samira Dillard on 10/16/2023  Stage prefix: Post-therapy  Total positive nodes: 0  Sites of metastasis: Liver       10/30/2018 -  Chemotherapy    Continuation of Single agent Erbitux. With 10/30/18 chemo, it was Cycle #14. Plan was for 6 additional cycles of Erbitux alone.      3/2020 Genetic Testing    NEGATIVE for genes tested:    Test(s): CancerNext + RNAinsight (34 genes): APC, ETHAN, BARD1, BRCA1, BRCA2, BRIP1, BMPR1A, CDH1, CDK4, CDKN2A, CHEK2, DICER1, EPCAM, GREM1, HOXB13, MLH1, MRE11A, MSH2, MSH6, MUTYH, NBN, NF1, PALB2, PMS2, POLD1, POLE, PTEN, RAD50, RAD51C, RAD51D, SMAD4, SMARCA4, STK11, TP53      6/8/2020 Surgery    Liver, segment 3 (wedge resection):  - Metastatic adenocarcinoma, consistent with the patient's known colon primary  - Margins negative for carcinoma      7/27/2020 - 1/11/2021 Chemotherapy    fluorouracil (ADRUCIL), 745 mg, Intravenous, Once, 6 of 6 cycles  Administration: 750 mg (7/27/2020), 750 mg (8/10/2020), 745 mg (8/24/2020), 745 mg (9/8/2020), 745 mg (9/21/2020), 745 mg (10/5/2020)  pegfilgrastim (NEULASTA), 6 mg, Subcutaneous, Once, 1 of 1 cycle  Administration: 6 mg (9/24/2020)  pegfilgrastim (Urban Gavel), 6 mg, Subcutaneous, Once, 6 of 6 cycles  Administration: 6 mg (7/29/2020), 6 mg (8/12/2020), 6 mg (8/26/2020), 6 mg (9/10/2020), 6 mg (10/7/2020)  cetuximab (ERBITUX), 930 mg, Intravenous, Once, 12 of 12 cycles  Administration: 900 mg (7/27/2020), 900 mg (8/10/2020), 900 mg (8/24/2020), 900 mg (9/8/2020), 900 mg (9/21/2020), 900 mg (10/5/2020), 900 mg (10/19/2020), 900 mg (11/2/2020), 900 mg (11/16/2020), 900 mg (11/30/2020), 900 mg (12/28/2020), 900 mg (1/11/2021)  irinotecan (CAMPTOSAR) chemo infusion, 335 mg, Intravenous, Once, 6 of 6 cycles  Administration: 340 mg (7/27/2020), 340 mg (8/10/2020), 340 mg (8/24/2020), 340 mg (9/8/2020), 340 mg (9/21/2020), 340 mg (10/5/2020)  leucovorin calcium IVPB, 744 mg, Intravenous, Once, 6 of 6 cycles  Administration: 750 mg (7/27/2020), 750 mg (8/10/2020), 750 mg (8/24/2020), 750 mg (9/8/2020), 750 mg (9/21/2020), 740 mg (10/5/2020)  fluorouracil (ADRUCIL) ambulatory infusion Soln, 1,200 mg/m2/day = 4,465 mg, Intravenous, Over 46 hours, 6 of 6 cycles  Dose modification: 1,200 mg/m2/day (original dose 1,200 mg/m2/day, Cycle 3)     9/20/2022 - 12/1/2022 Chemotherapy    palonosetron (ALOXI), 0.25 mg, Intravenous, Once, 4 of 4 cycles  Administration: 0.25 mg (10/18/2022), 0.25 mg (11/1/2022), 0.25 mg (11/15/2022), 0.25 mg (11/29/2022)  pegfilgrastim (Glenora Apa), 6 mg, Subcutaneous, Once, 6 of 6 cycles  Administration: 6 mg (9/22/2022), 6 mg (10/20/2022), 6 mg (11/3/2022), 6 mg (11/17/2022), 6 mg (12/1/2022)  Pegfilgrastim-bmez (Shefali detention), 6 mg, Subcutaneous, Once, 1 of 1 cycle  Administration: 6 mg (10/7/2022)  fosaprepitant (EMEND) IVPB, 150 mg, Intravenous, Once, 4 of 4 cycles  Administration: 150 mg (10/18/2022), 150 mg (11/1/2022), 150 mg (11/15/2022), 150 mg (11/29/2022)  fluorouracil (ADRUCIL), 400 mg/m2 = 735 mg, Intravenous, Once, 6 of 6 cycles  Administration: 735 mg (9/20/2022), 735 mg (10/4/2022), 730 mg (10/18/2022), 730 mg (11/1/2022), 730 mg (11/15/2022), 730 mg (11/29/2022)  cetuximab (ERBITUX), 920 mg, Intravenous, Once, 6 of 6 cycles  Administration: 900 mg (9/20/2022), 900 mg (10/4/2022), 900 mg (10/18/2022), 900 mg (11/1/2022), 900 mg (11/15/2022), 900 mg (11/29/2022)  irinotecan (CAMPTOSAR) chemo infusion, 331 mg, Intravenous, Once, 6 of 6 cycles  Administration: 320 mg (9/20/2022), 320 mg (10/4/2022), 320 mg (10/18/2022), 320 mg (11/1/2022), 320 mg (11/15/2022), 320 mg (11/29/2022)  leucovorin calcium IVPB, 736 mg, Intravenous, Once, 6 of 6 cycles  Administration: 700 mg (9/20/2022), 700 mg (10/4/2022), 750 mg (10/18/2022), 750 mg (11/1/2022), 700 mg (11/15/2022), 700 mg (11/29/2022)  fluorouracil (ADRUCIL) ambulatory infusion Soln, 1,200 mg/m2/day = 4,415 mg, Intravenous, Over 46 hours, 6 of 6 cycles     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 ([address])   4/11/2022 Initial Diagnosis    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 ([address])     9/20/2022 - 12/1/2022 Chemotherapy    palonosetron (ALOXI), 0.25 mg, Intravenous, Once, 4 of 4 cycles  Administration: 0.25 mg (10/18/2022), 0.25 mg (11/1/2022), 0.25 mg (11/15/2022), 0.25 mg (11/29/2022)  pegfilgrastim (NEULASTA ONPRO), 6 mg, Subcutaneous, Once, 6 of 6 cycles  Administration: 6 mg (9/22/2022), 6 mg (10/20/2022), 6 mg (11/3/2022), 6 mg (11/17/2022), 6 mg (12/1/2022)  Pegfilgrastim-bmez (Trell Thomson), 6 mg, Subcutaneous, Once, 1 of 1 cycle  Administration: 6 mg (10/7/2022)  fosaprepitant (EMEND) IVPB, 150 mg, Intravenous, Once, 4 of 4 cycles  Administration: 150 mg (10/18/2022), 150 mg (11/1/2022), 150 mg (11/15/2022), 150 mg (11/29/2022)  fluorouracil (ADRUCIL), 400 mg/m2 = 735 mg, Intravenous, Once, 6 of 6 cycles  Administration: 735 mg (9/20/2022), 735 mg (10/4/2022), 730 mg (10/18/2022), 730 mg (11/1/2022), 730 mg (11/15/2022), 730 mg (11/29/2022)  cetuximab (ERBITUX), 920 mg, Intravenous, Once, 6 of 6 cycles  Administration: 900 mg (9/20/2022), 900 mg (10/4/2022), 900 mg (10/18/2022), 900 mg (11/1/2022), 900 mg (11/15/2022), 900 mg (11/29/2022)  irinotecan (CAMPTOSAR) chemo infusion, 331 mg, Intravenous, Once, 6 of 6 cycles  Administration: 320 mg (9/20/2022), 320 mg (10/4/2022), 320 mg (10/18/2022), 320 mg (11/1/2022), 320 mg (11/15/2022), 320 mg (11/29/2022)  leucovorin calcium IVPB, 736 mg, Intravenous, Once, 6 of 6 cycles  Administration: 700 mg (9/20/2022), 700 mg (10/4/2022), 750 mg (10/18/2022), 750 mg (11/1/2022), 700 mg (11/15/2022), 700 mg (11/29/2022)  fluorouracil (ADRUCIL) ambulatory infusion Soln, 1,200 mg/m2/day = 4,415 mg, Intravenous, Over 46 hours, 6 of 6 cycles     Malignant neoplasm metastatic to right lung ([address])   8/4/2022 Initial Diagnosis    Malignant neoplasm metastatic to right lung ([address])     8/9/2022 Surgery    Flexible bronchoscopy, right thoracoscopic therapeutic wedge resection      9/20/2022 - 12/1/2022 Chemotherapy    palonosetron (ALOXI), 0.25 mg, Intravenous, Once, 4 of 4 cycles  Administration: 0.25 mg (10/18/2022), 0.25 mg (11/1/2022), 0.25 mg (11/15/2022), 0.25 mg (11/29/2022)  pegfilgrastim (Tito Abts), 6 mg, Subcutaneous, Once, 6 of 6 cycles  Administration: 6 mg (9/22/2022), 6 mg (10/20/2022), 6 mg (11/3/2022), 6 mg (11/17/2022), 6 mg (12/1/2022)  Pegfilgrastim-bmez (ZIEXTENZO), 6 mg, Subcutaneous, Once, 1 of 1 cycle  Administration: 6 mg (10/7/2022)  fosaprepitant (EMEND) IVPB, 150 mg, Intravenous, Once, 4 of 4 cycles  Administration: 150 mg (10/18/2022), 150 mg (11/1/2022), 150 mg (11/15/2022), 150 mg (11/29/2022)  fluorouracil (ADRUCIL), 400 mg/m2 = 735 mg, Intravenous, Once, 6 of 6 cycles  Administration: 735 mg (9/20/2022), 735 mg (10/4/2022), 730 mg (10/18/2022), 730 mg (11/1/2022), 730 mg (11/15/2022), 730 mg (11/29/2022)  cetuximab (ERBITUX), 920 mg, Intravenous, Once, 6 of 6 cycles  Administration: 900 mg (9/20/2022), 900 mg (10/4/2022), 900 mg (10/18/2022), 900 mg (11/1/2022), 900 mg (11/15/2022), 900 mg (11/29/2022)  irinotecan (CAMPTOSAR) chemo infusion, 331 mg, Intravenous, Once, 6 of 6 cycles  Administration: 320 mg (9/20/2022), 320 mg (10/4/2022), 320 mg (10/18/2022), 320 mg (11/1/2022), 320 mg (11/15/2022), 320 mg (11/29/2022)  leucovorin calcium IVPB, 736 mg, Intravenous, Once, 6 of 6 cycles  Administration: 700 mg (9/20/2022), 700 mg (10/4/2022), 750 mg (10/18/2022), 750 mg (11/1/2022), 700 mg (11/15/2022), 700 mg (11/29/2022)  fluorouracil (ADRUCIL) ambulatory infusion Soln, 1,200 mg/m2/day = 4,415 mg, Intravenous, Over 46 hours, 6 of 6 cycles         Staging:  Metastatic rectosigmoid cancer  [id number]  Treatment history:   Modified FOLFOX 6   Erbitux added 03/20/2018   Segment 7 liver resection, June 2018  Segment 3 mass liver resection, June 2020  Resection of a right lower lobe pulmonary metastasis, August 2022  Chemotherapy with Erbitux  Current treatment:  Observation  Disease status:   LUTHER    History of Present Illness: The patient returns to the office today in follow-up for metastatic colon cancer. He is currently LUTHER at just over 1 year from his most recent metastatic resection. He states he feels well overall.   He denies any abdominal pain, weight loss, or change in bowel habits, and reports his appetite is good. He does reports more frequent headaches in the past 6 months. He states that they occur more toward the end of the week, which he attributes to the dust that he is exposed to at work. Otherwise he has no complaints. A CEA level was drawn last month, and MRI of the abdomen was performed on October 17. I have reviewed these results myself and discussed them with the patient today. Review of Systems   Constitutional:  Negative for activity change, appetite change, fatigue and unexpected weight change. HENT: Negative. Respiratory: Negative. Negative for cough and shortness of breath. Cardiovascular: Negative. Gastrointestinal: Negative. Negative for abdominal distention, abdominal pain, diarrhea, nausea and vomiting. Musculoskeletal: Negative. Skin: Negative. Negative for color change. Neurological:  Positive for headaches. Negative for dizziness and light-headedness. Hematological: Negative. Negative for adenopathy. Psychiatric/Behavioral: Negative.                Patient Active Problem List   Diagnosis    ED (erectile dysfunction)    Colon cancer metastasized to liver ([facility])    GERD (gastroesophageal reflux disease)    Pulmonary nodule, right    Acneiform rash    Encounter for follow-up examination after completed treatment for malignant neoplasm    Other hydronephrosis    Annual physical exam    Vapes nicotine containing substance    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 ([address])    Malignant neoplasm metastatic to right lung ([address])    Platelets decreased ([address])    Chemotherapy induced neutropenia     Thyroid nodule    Hypokalemia    Hypomagnesemia     Past Medical History:   Diagnosis Date    Cancer ([address])     Colon cancer ([address])     Dysuria     Last Assessed:8/24/17    GERD (gastroesophageal reflux disease)     Liver cancer ([address])     Liver nodule     Pulmonary nodule, right     Ureteropelvic junction (UPJ) obstruction 01/29/2020    Wears glasses      Past Surgical History: Procedure Laterality Date    ABDOMINAL SURGERY      COLON SURGERY      COLONOSCOPY N/A 01/22/2018    Procedure: COLONOSCOPY;  Surgeon: Vamshi Roy MD;  Location: [facility]; Service: Colorectal    DENTAL SURGERY  2016    Crown with bridge work    FLEXIBLE SIGMOIDOSCOPY N/A 06/15/2018    Procedure: SIGMOIDOSCOPY FLEXIBLE w/o sedation w/ tattoo;  Surgeon: Vamshi Roy MD;  [facility]: [facility];   Service: Colorectal    IR PORT PLACEMENT Right     LAPAROTOMY N/A 06/08/2020    Procedure: LAPAROTOMY EXPLORATORY, LYSIS OF ADHESIONS;  Surgeon: Tito Overton MD;  [facility];  Service: Surgical Oncology    LIVER LOBECTOMY N/A 06/21/2018    Procedure: liver resection/ablation, intraoperative ultrasound of liver;  Surgeon: Tito Overton MD;  [facility];  Service: Surgical Oncology    LIVER LOBECTOMY N/A 06/08/2020    Procedure: LIVER RESECTION SEGMENT 3 WITH  INTRAOPERATIVE U/S OF LIVER;  Surgeon: Tito Overton MD;  [facility];  Service: Surgical Oncology    LUNG SEGMENTECTOMY Right 8/9/2022    Procedure: RESECTION WEDGE LUNG, THERAPEUTIC;  Surgeon: Erika Colindres MD;  [facility];  Service: Thoracic     [facility] INCL FLUOR GDNCE DX [address] Fredirick Dubin [address] N/A 8/9/2022    Procedure: BRONCHOSCOPY FLEXIBLE;  Surgeon: Erika Colindres MD;  [facility];  Service: Thoracic    VT COLECTOMY PARTIAL W/ANASTOMOSIS Left 06/21/2018    Procedure: hemicolectomy, low anterior resection (open) SPY fluorescence angiography;  Surgeon: Vamshi Roy MD;  [facility];  Service: Colorectal    VT EXPLORATORY LAPAROTOMY CELIOTOMY W/WO BIOPSY SPX N/A 06/21/2018    Procedure: LAPAROTOMY EXPLORATORY;  Surgeon: Vamshi Roy MD;  [facility];  Service: Colorectal    VT INSJ PRPH CTR VAD W/SUBQ PORT AGE 5 YR/> N/A 01/25/2018    Procedure: PORT-A-CATHETER PLACEMENT  Fluoroscopy for performance/interpretation;  Surgeon: Vamshi Roy MD;  [facility];  Service: Colorectal    VT PRCTECT PRTL RESCJ RECTUM TABDL APPR N/A 2018    Procedure: Partial proctectomy.;  Surgeon: Yuniel Bautista MD;  [facility];  Service: Colorectal    VT SIGMOIDOSCOPY FLX DX W/COLLJ SPEC BR/WA IF PFRMD N/A 2018    Procedure: Gerhardt Corazon;  Surgeon: Yuniel Bautista MD;  [facility];  Service: Colorectal    VT THORACOSCOPY W/THERA WEDGE RESEXN INITIAL UNILAT Right 2022    Procedure: THORACOSCOPY VIDEO ASSISTED SURGERY (VATS); Surgeon: Montserrat Walker MD;  [facility];  Service: Thoracic    ROOT CANAL      TESTICLE SURGERY      Exploration of Undescended Testis rIght, age 8 had surgery for undescended testicle;  Last Assessed:17    TOOTH EXTRACTION       Family History   Problem Relation Age of Onset    No Known Problems Father     Cancer Mother         unknown    Cancer Brother         pt. reports brother was diagnosed with stage 4 throat cancer    Throat cancer Brother     Breast cancer Maternal Aunt      Social History     Socioeconomic History    Marital status: Single     Spouse name: Not on file    Number of children: Not on file    Years of education: Not on file    Highest education level: Not on file   Occupational History    Not on file   Tobacco Use    Smoking status: Former     Packs/day: 0.50     Years: 25.00     Total pack years: 12.50     Types: Cigarettes     Start date:      Quit date:      Years since quittin.8    Smokeless tobacco: Never   Vaping Use    Vaping Use: Every day    Start date: 10/10/2016    Substances: Nicotine, THC (at time), CBD (at times), Flavoring   Substance and Sexual Activity    Alcohol use: Yes     Comment: socially - 2 month    Drug use: Yes     Frequency: 1.0 times per week     Types: Marijuana    Sexual activity: Not on file   Other Topics Concern    Not on file   Social History Narrative    Not on file     Social Determinants of Health     Financial Resource Strain: Not on file   Food Insecurity: Not on file   Transportation Needs: Not on file   Physical Activity: Not on file   Stress: Not on file   Social Connections: Not on file   Intimate Partner Violence: Not on file   Housing Stability: Not on file       Current Outpatient Medications:     Multiple Vitamins-Minerals (MENS MULTIVITAMIN PO), Take by mouth in the morning, Disp: , Rfl:     omeprazole (PriLOSEC) 20 mg delayed release capsule, Take 1 capsule (20 mg total) by mouth daily, Disp: 30 capsule, Rfl: 3    sildenafil (VIAGRA) 50 MG tablet, Take 1 tablet (50 mg total) by mouth as needed for erectile dysfunction, Disp: 10 tablet, Rfl: 0  No Known Allergies  Vitals:    10/26/23 1525   BP: 110/60   Pulse: 72   SpO2: 98%       Physical Exam  Vitals reviewed. Constitutional:       General: He is not in acute distress. Appearance: Normal appearance. He is normal weight. He is not ill-appearing or toxic-appearing. HENT:      Head: Normocephalic and atraumatic. Nose: Nose normal.      Mouth/Throat:      Mouth: Mucous membranes are moist.   Eyes:      General: No scleral icterus. Conjunctiva/sclera: Conjunctivae normal.   Cardiovascular:      Rate and Rhythm: Normal rate. Pulmonary:      Effort: Pulmonary effort is normal.   Abdominal:      General: Abdomen is flat. There is no distension. Palpations: Abdomen is soft. There is no mass. Tenderness: There is no abdominal tenderness. There is no guarding. Musculoskeletal:         General: Normal range of motion. Cervical back: Normal range of motion and neck supple. Lymphadenopathy:      Cervical: No cervical adenopathy. Skin:     General: Skin is warm and dry. Coloration: Skin is not jaundiced. Neurological:      General: No focal deficit present. Mental Status: He is alert and oriented to person, place, and time.    Psychiatric:         Mood and Affect: Mood normal.         Behavior: Behavior normal. Thought Content: Thought content normal.         Judgment: Judgment normal.             Labs:  CEA  Collected 9/11/2023        Component Ref Range & Units 1 mo ago   CEA 0.0 - 3.0 ng/mL 1.1          Imaging  MRI abdomen w wo contrast    Result Date: 10/25/2023  Narrative: MRI - ABDOMEN - WITH AND WITHOUT CONTRAST INDICATION: 50 years / Male  C18.9: Malignant neoplasm of colon, unspecified C78.7: Secondary malignant neoplasm of liver and intrahepatic bile duct. Stage IV metastatic colon cancer to the liver status post posttreatment changes, for surveillance COMPARISON: MRI abdomen 4/24/2023, 1/7/2021 TECHNIQUE:  Multiplanar/multisequence MRI of the abdomen was performed before and after administration of contrast. Imaging performed on 1.5T MRI IV Contrast:  7 mL of Gadobutrol injection (SINGLE-DOSE) FINDINGS: LOWER CHEST:   Unremarkable. LIVER: Normal in size and configuration. Irregular contour from prior wedge resection involving hepatic segment 3 (series 14 image 154). T1 hypointense nonenhancing hepatic segment 7 lesion measuring 1.8 cm (series 10 image 22), representing posttreatment changes and grossly stable since 1/7/2021. No recurrent or new focal hepatic lesion suspicious for metastasis. No suspicious mass. The hepatic veins and portal veins are patent. BILE DUCTS: No intrahepatic or extrahepatic bile duct dilation. GALLBLADDER:  Normal. PANCREAS:  Unremarkable. ADRENAL GLANDS:  Unremarkable. SPLEEN:  Normal. KIDNEYS/PROXIMAL URETERS:. No suspicious renal mass. Left renal parapelvic cysts, likely causing mass effect and mild dilation of the left renal upper pole collecting system, stable in appearance since 1/7/2021. No left hydroureter. . BOWEL:   No dilated loops of bowel. PERITONEUM/RETROPERITONEUM: No mass. No ascites. LYMPH NODES: No abdominal lymphadenopathy. VASCULAR STRUCTURES:  No aneurysm. ABDOMINAL WALL: A small fat-containing umbilical hernia. Zhao Lute  Postsurgical changes in the anterior abdominal wall. OSSEOUS STRUCTURES:  No suspicious osseous lesion. Impression: Posttreatment changes in hepatic segment 3 and segment 7 without evidence of new or recurrent metastatic hepatic lesions. Workstation performed: [id number]     I personally reviewed and interpreted the above laboratory and imaging data. Discussion/Summary: This is a 49 y/o male who presents to the office today for continued surveillance following treatment for recurrent metastatic colon cancer. He is doing well overall, and there is no evidence of disease recurrence by imaging or tumor marker. I am somewhat concerned about the increasing frequency of his headaches, although he feels these are sinus-related. I have explained that I will discuss this with Dr Anjali Marquez and Dr Dustin Teresa, and I will reach out to set up additional imaging now if needed. Otherwise we will plan to repeat his MRI and CEA in 6 levels, and see him again at that time for another clinical exam.  Also, he is overdue for colonoscopy. I will help coordinate an appointment with Dr Isabel Banks for him. He is agreeable, all questions have been answered.

## 2023-10-26 NOTE — LETTER
October 26, 2023     Jose Emery MD  [facility] [id number]    Patient: Chayito Nesbitt   YOB: 1970   Date of Visit: 10/26/2023       Dear Dr. Starr Armendariz: Thank you for referring Arvind Merino to me for evaluation. Below are my notes for this consultation. If you have questions, please do not hesitate to call me. I look forward to following your patient along with you. Sincerely,        STEPHANIE Max        CC: MD Aleks Crow MD Perri Govern, CRNP  10/26/2023  4:14 PM  Cosign Needed Addendum               Surgical Oncology Follow Up       [address] [facility] [address] [address]    Bill Waller  1970  [id number]  [address] [facility] [address] [address]    Diagnoses and all orders for this visit:    Encounter for follow-up examination after completed treatment for malignant neoplasm    Colon cancer metastasized to liver [facility])  -     MRI abdomen w wo contrast; Future  -     BUN; Future  -     Creatinine, serum; Future  -     CEA; Future    History of colon cancer  -     Ambulatory Referral to Colorectal Surgery; Future        Chief Complaint   Patient presents with   • Follow-up     6 mo metastatic colon ca f/u - MRI done 10/17, CEA 1.1 (September)- staging done       Return in about 6 months (around 4/26/2024) for Office visit with Dr Phyllis Burger, Imaging - See orders, Labs - See Treatment Plan.       Oncology History   Colon cancer metastasized to liver ([address])   1/2018 Initial Diagnosis    Malignant neoplasm of sigmoid colon ([address])     1/22/2018 Biopsy    Large Intestine, Sigmoid Colon, Recto-sigmoid tumor bx:    - Invasive colonic adenocarcinoma, moderately differentiated     2/6/2018 - 10/16/2018 Chemotherapy    Modified FOLFOX6 x 12 cycles  Added Erbitux on 3/20/18      6/21/2018 Surgery    Segment 7 liver resection/ablation, hemicolectomy     6/21/2018 -  Cancer Staged    Staging form: Colon and Rectum, AJCC 8th Edition  - Pathologic stage from 6/21/2018: Stage WERO (ypT0, pN0, cM1a) - Signed by STEPHANIE Reinoso on 10/16/2023  Stage prefix: Post-therapy  Total positive nodes: 0  Sites of metastasis: Liver       10/30/2018 -  Chemotherapy    Continuation of Single agent Erbitux. With 10/30/18 chemo, it was Cycle #14. Plan was for 6 additional cycles of Erbitux alone.      3/2020 Genetic Testing    NEGATIVE for genes tested:    Test(s): CancerNext + RNAinsight (34 genes): APC, ETHAN, BARD1, BRCA1, BRCA2, BRIP1, BMPR1A, CDH1, CDK4, CDKN2A, CHEK2, DICER1, EPCAM, GREM1, HOXB13, MLH1, MRE11A, MSH2, MSH6, MUTYH, NBN, NF1, PALB2, PMS2, POLD1, POLE, PTEN, RAD50, RAD51C, RAD51D, SMAD4, SMARCA4, STK11, TP53      6/8/2020 Surgery    Liver, segment 3 (wedge resection):  - Metastatic adenocarcinoma, consistent with the patient's known colon primary  - Margins negative for carcinoma      7/27/2020 - 1/11/2021 Chemotherapy    fluorouracil (ADRUCIL), 745 mg, Intravenous, Once, 6 of 6 cycles  Administration: 750 mg (7/27/2020), 750 mg (8/10/2020), 745 mg (8/24/2020), 745 mg (9/8/2020), 745 mg (9/21/2020), 745 mg (10/5/2020)  pegfilgrastim (NEULASTA), 6 mg, Subcutaneous, Once, 1 of 1 cycle  Administration: 6 mg (9/24/2020)  pegfilgrastim (NEULASTA ONPRO), 6 mg, Subcutaneous, Once, 6 of 6 cycles  Administration: 6 mg (7/29/2020), 6 mg (8/12/2020), 6 mg (8/26/2020), 6 mg (9/10/2020), 6 mg (10/7/2020)  cetuximab (ERBITUX), 930 mg, Intravenous, Once, 12 of 12 cycles  Administration: 900 mg (7/27/2020), 900 mg (8/10/2020), 900 mg (8/24/2020), 900 mg (9/8/2020), 900 mg (9/21/2020), 900 mg (10/5/2020), 900 mg (10/19/2020), 900 mg (11/2/2020), 900 mg (11/16/2020), 900 mg (11/30/2020), 900 mg (12/28/2020), 900 mg (1/11/2021)  irinotecan (CAMPTOSAR) chemo infusion, 335 mg, Intravenous, Once, 6 of 6 cycles  Administration: 340 mg (7/27/2020), 340 mg (8/10/2020), 340 mg (8/24/2020), 340 mg (9/8/2020), 340 mg (9/21/2020), 340 mg (10/5/2020)  leucovorin calcium IVPB, 744 mg, Intravenous, Once, 6 of 6 cycles  Administration: 750 mg (7/27/2020), 750 mg (8/10/2020), 750 mg (8/24/2020), 750 mg (9/8/2020), 750 mg (9/21/2020), 740 mg (10/5/2020)  fluorouracil (ADRUCIL) ambulatory infusion Soln, 1,200 mg/m2/day = 4,465 mg, Intravenous, Over 46 hours, 6 of 6 cycles  Dose modification: 1,200 mg/m2/day (original dose 1,200 mg/m2/day, Cycle 3)     9/20/2022 - 12/1/2022 Chemotherapy    palonosetron (ALOXI), 0.25 mg, Intravenous, Once, 4 of 4 cycles  Administration: 0.25 mg (10/18/2022), 0.25 mg (11/1/2022), 0.25 mg (11/15/2022), 0.25 mg (11/29/2022)  pegfilgrastim (Beverly Hurt), 6 mg, Subcutaneous, Once, 6 of 6 cycles  Administration: 6 mg (9/22/2022), 6 mg (10/20/2022), 6 mg (11/3/2022), 6 mg (11/17/2022), 6 mg (12/1/2022)  Pegfilgrastim-bmez (Curly Kotyk), 6 mg, Subcutaneous, Once, 1 of 1 cycle  Administration: 6 mg (10/7/2022)  fosaprepitant (EMEND) IVPB, 150 mg, Intravenous, Once, 4 of 4 cycles  Administration: 150 mg (10/18/2022), 150 mg (11/1/2022), 150 mg (11/15/2022), 150 mg (11/29/2022)  fluorouracil (ADRUCIL), 400 mg/m2 = 735 mg, Intravenous, Once, 6 of 6 cycles  Administration: 735 mg (9/20/2022), 735 mg (10/4/2022), 730 mg (10/18/2022), 730 mg (11/1/2022), 730 mg (11/15/2022), 730 mg (11/29/2022)  cetuximab (ERBITUX), 920 mg, Intravenous, Once, 6 of 6 cycles  Administration: 900 mg (9/20/2022), 900 mg (10/4/2022), 900 mg (10/18/2022), 900 mg (11/1/2022), 900 mg (11/15/2022), 900 mg (11/29/2022)  irinotecan (CAMPTOSAR) chemo infusion, 331 mg, Intravenous, Once, 6 of 6 cycles  Administration: 320 mg (9/20/2022), 320 mg (10/4/2022), 320 mg (10/18/2022), 320 mg (11/1/2022), 320 mg (11/15/2022), 320 mg (11/29/2022)  leucovorin calcium IVPB, 736 mg, Intravenous, Once, 6 of 6 cycles  Administration: 700 mg (9/20/2022), 700 mg (10/4/2022), 750 mg (10/18/2022), 750 mg (11/1/2022), 700 mg (11/15/2022), 700 mg (11/29/2022)  fluorouracil (ADRUCIL) ambulatory infusion Soln, 1,200 mg/m2/day = 4,415 mg, Intravenous, Over 46 hours, 6 of 6 cycles     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 ([address])   4/11/2022 Initial Diagnosis    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 ([address])     9/20/2022 - 12/1/2022 Chemotherapy    palonosetron (ALOXI), 0.25 mg, Intravenous, Once, 4 of 4 cycles  Administration: 0.25 mg (10/18/2022), 0.25 mg (11/1/2022), 0.25 mg (11/15/2022), 0.25 mg (11/29/2022)  pegfilgrastim (Zigswitch), 6 mg, Subcutaneous, Once, 6 of 6 cycles  Administration: 6 mg (9/22/2022), 6 mg (10/20/2022), 6 mg (11/3/2022), 6 mg (11/17/2022), 6 mg (12/1/2022)  Pegfilgrastim-bmez (Kath Suarez), 6 mg, Subcutaneous, Once, 1 of 1 cycle  Administration: 6 mg (10/7/2022)  fosaprepitant (EMEND) IVPB, 150 mg, Intravenous, Once, 4 of 4 cycles  Administration: 150 mg (10/18/2022), 150 mg (11/1/2022), 150 mg (11/15/2022), 150 mg (11/29/2022)  fluorouracil (ADRUCIL), 400 mg/m2 = 735 mg, Intravenous, Once, 6 of 6 cycles  Administration: 735 mg (9/20/2022), 735 mg (10/4/2022), 730 mg (10/18/2022), 730 mg (11/1/2022), 730 mg (11/15/2022), 730 mg (11/29/2022)  cetuximab (ERBITUX), 920 mg, Intravenous, Once, 6 of 6 cycles  Administration: 900 mg (9/20/2022), 900 mg (10/4/2022), 900 mg (10/18/2022), 900 mg (11/1/2022), 900 mg (11/15/2022), 900 mg (11/29/2022)  irinotecan (CAMPTOSAR) chemo infusion, 331 mg, Intravenous, Once, 6 of 6 cycles  Administration: 320 mg (9/20/2022), 320 mg (10/4/2022), 320 mg (10/18/2022), 320 mg (11/1/2022), 320 mg (11/15/2022), 320 mg (11/29/2022)  leucovorin calcium IVPB, 736 mg, Intravenous, Once, 6 of 6 cycles  Administration: 700 mg (9/20/2022), 700 mg (10/4/2022), 750 mg (10/18/2022), 750 mg (11/1/2022), 700 mg (11/15/2022), 700 mg (11/29/2022)  fluorouracil (ADRUCIL) ambulatory infusion Soln, 1,200 mg/m2/day = 4,415 mg, Intravenous, Over 46 hours, 6 of 6 cycles     Malignant neoplasm metastatic to right lung ([address])   8/4/2022 Initial Diagnosis    Malignant neoplasm metastatic to right lung ([address])     8/9/2022 Surgery    Flexible bronchoscopy, right thoracoscopic therapeutic wedge resection      9/20/2022 - 12/1/2022 Chemotherapy    palonosetron (ALOXI), 0.25 mg, Intravenous, Once, 4 of 4 cycles  Administration: 0.25 mg (10/18/2022), 0.25 mg (11/1/2022), 0.25 mg (11/15/2022), 0.25 mg (11/29/2022)  pegfilgrastim (Christina Pronto), 6 mg, Subcutaneous, Once, 6 of 6 cycles  Administration: 6 mg (9/22/2022), 6 mg (10/20/2022), 6 mg (11/3/2022), 6 mg (11/17/2022), 6 mg (12/1/2022)  Pegfilgrastim-bmez ([facility]), 6 mg, Subcutaneous, Once, 1 of 1 cycle  Administration: 6 mg (10/7/2022)  fosaprepitant (EMEND) IVPB, 150 mg, Intravenous, Once, 4 of 4 cycles  Administration: 150 mg (10/18/2022), 150 mg (11/1/2022), 150 mg (11/15/2022), 150 mg (11/29/2022)  fluorouracil (ADRUCIL), 400 mg/m2 = 735 mg, Intravenous, Once, 6 of 6 cycles  Administration: 735 mg (9/20/2022), 735 mg (10/4/2022), 730 mg (10/18/2022), 730 mg (11/1/2022), 730 mg (11/15/2022), 730 mg (11/29/2022)  cetuximab (ERBITUX), 920 mg, Intravenous, Once, 6 of 6 cycles  Administration: 900 mg (9/20/2022), 900 mg (10/4/2022), 900 mg (10/18/2022), 900 mg (11/1/2022), 900 mg (11/15/2022), 900 mg (11/29/2022)  irinotecan (CAMPTOSAR) chemo infusion, 331 mg, Intravenous, Once, 6 of 6 cycles  Administration: 320 mg (9/20/2022), 320 mg (10/4/2022), 320 mg (10/18/2022), 320 mg (11/1/2022), 320 mg (11/15/2022), 320 mg (11/29/2022)  leucovorin calcium IVPB, 736 mg, Intravenous, Once, 6 of 6 cycles  Administration: 700 mg (9/20/2022), 700 mg (10/4/2022), 750 mg (10/18/2022), 750 mg (11/1/2022), 700 mg (11/15/2022), 700 mg (11/29/2022)  fluorouracil (ADRUCIL) ambulatory infusion Soln, 1,200 mg/m2/day = 4,415 mg, Intravenous, Over 46 hours, 6 of 6 cycles         Staging:  Metastatic rectosigmoid cancer  [id number]  Treatment history: Modified FOLFOX 6   Erbitux added 03/20/2018   Segment 7 liver resection, June 2018  Segment 3 mass liver resection, June 2020  Resection of a right lower lobe pulmonary metastasis, August 2022  Chemotherapy with Erbitux  Current treatment:  Observation  Disease status:   LUTHER    History of Present Illness: The patient returns to the office today in follow-up for metastatic colon cancer. He is currently LUTHER at just over 1 year from his most recent metastatic resection. He states he feels well overall. He denies any abdominal pain, weight loss, or change in bowel habits, and reports his appetite is good. He does reports more frequent headaches in the past 6 months. He states that they occur more toward the end of the week, which he attributes to the dust that he is exposed to at work. Otherwise he has no complaints. A CEA level was drawn last month, and MRI of the abdomen was performed on October 17. I have reviewed these results myself and discussed them with the patient today. Review of Systems   Constitutional:  Negative for activity change, appetite change, fatigue and unexpected weight change. HENT: Negative. Respiratory: Negative. Negative for cough and shortness of breath. Cardiovascular: Negative. Gastrointestinal: Negative. Negative for abdominal distention, abdominal pain, diarrhea, nausea and vomiting. Musculoskeletal: Negative. Skin: Negative. Negative for color change. Neurological:  Positive for headaches. Negative for dizziness and light-headedness. Hematological: Negative. Negative for adenopathy. Psychiatric/Behavioral: Negative.                Patient Active Problem List   Diagnosis   • ED (erectile dysfunction)   • Colon cancer metastasized to liver [facility])   • GERD (gastroesophageal reflux disease)   • Pulmonary nodule, right   • Acneiform rash   • Encounter for follow-up examination after completed treatment for malignant neoplasm   • Other hydronephrosis   • Annual physical exam   • Vapes nicotine containing substance   • 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 [facility])   • Malignant neoplasm metastatic to right lung [facility])   • Platelets decreased (HCC)   • Chemotherapy induced neutropenia    • Thyroid nodule   • Hypokalemia   • Hypomagnesemia     Past Medical History:   Diagnosis Date   • Cancer [facility])    • Colon cancer ([address])    • Dysuria     Last Assessed:8/24/17   • GERD (gastroesophageal reflux disease)    • Liver cancer [facility])    • Liver nodule    • Pulmonary nodule, right    • Ureteropelvic junction (UPJ) obstruction 01/29/2020   • Wears glasses      Past Surgical History:   Procedure Laterality Date   • ABDOMINAL SURGERY     • COLON SURGERY     • COLONOSCOPY N/A 01/22/2018    Procedure: COLONOSCOPY;  Surgeon: Chela Lake MD;  [facility]; Service: Colorectal   • DENTAL SURGERY  2016    Crown with bridge work   • FLEXIBLE SIGMOIDOSCOPY N/A 06/15/2018    Procedure: SIGMOIDOSCOPY FLEXIBLE w/o sedation w/ tattoo;  Surgeon: Chela Lake MD;  [facility];   Service: Colorectal   • IR PORT PLACEMENT Right    • LAPAROTOMY N/A 06/08/2020    Procedure: LAPAROTOMY EXPLORATORY, LYSIS OF ADHESIONS;  Surgeon: Gallito iLn MD;  [facility];  Service: Surgical Oncology   • LIVER LOBECTOMY N/A 06/21/2018    Procedure: liver resection/ablation, intraoperative ultrasound of liver;  Surgeon: Gallito Lin MD;  [facility];  Service: Surgical Oncology   • LIVER LOBECTOMY N/A 06/08/2020    Procedure: LIVER RESECTION SEGMENT 3 WITH  INTRAOPERATIVE U/S OF LIVER;  Surgeon: Gallito Lin MD;  [facility];  Service: Surgical Oncology   • LUNG SEGMENTECTOMY Right 8/9/2022    Procedure: RESECTION WEDGE LUNG, THERAPEUTIC;  Surgeon: Denae Frank MD;  [facility];  Service: Thoracic   •  [address] INCL FLUOR GDNCE DX W/CELL WASHG [address] N/A 8/9/2022    Procedure: BRONCHOSCOPY FLEXIBLE;  Surgeon: Denae Frank MD;  [facility];  Service: Thoracic   • WV COLECTOMY PARTIAL W/ANASTOMOSIS Left 06/21/2018    Procedure: hemicolectomy, low anterior resection (open) SPY fluorescence angiography;  Surgeon: Haydee Marquez MD;  [facility];  Service: Colorectal   • WV EXPLORATORY LAPAROTOMY CELIOTOMY W/WO BIOPSY SPX N/A 06/21/2018    Procedure: LAPAROTOMY EXPLORATORY;  Surgeon: Haydee Marquez MD;  [facility];  Service: Colorectal   • WV INSJ PRPH CTR VAD W/SUBQ PORT AGE 7 YR/> N/A 01/25/2018    Procedure: PORT-A-CATHETER PLACEMENT  Fluoroscopy for performance/interpretation;  Surgeon: Haydee Marquez MD;  [facility];  Service: Colorectal   • WV PRCTECT PRTL RESCJ RECTUM TABDL APPR N/A 06/21/2018    Procedure: Partial proctectomy.;  Surgeon: Haydee Marquez MD;  [facility];  Service: Colorectal   • WV SIGMOIDOSCOPY FLX DX W/COLLJ SPEC BR/WA IF PFRMD N/A 06/21/2018    Procedure: Diana Can;  Surgeon: Haydee Marquez MD;  [facility];  Service: Colorectal   • WV THORACOSCOPY W/THERA WEDGE RESEXN INITIAL UNILAT Right 8/9/2022    Procedure: THORACOSCOPY VIDEO ASSISTED SURGERY (VATS); Surgeon: Rossana Ortiz MD;  [facility];  Service: Thoracic   • ROOT CANAL  2015   • TESTICLE SURGERY      Exploration of Undescended Testis rIght, age 8 had surgery for undescended testicle;  Last Assessed:8/24/17   • TOOTH EXTRACTION  2015     Family History   Problem Relation Age of Onset   • No Known Problems Father    • Cancer Mother         unknown   • Cancer Brother         pt. reports brother was diagnosed with stage 4 throat cancer   • Throat cancer Brother    • Breast cancer Maternal Aunt      Social History     Socioeconomic History   • Marital status: Single     Spouse name: Not on file   • Number of children: Not on file   • Years of education: Not on file   • Highest education level: Not on file   Occupational History   • Not on file   Tobacco Use   • Smoking status: Former     Packs/day: 0.50     Years: 25.00     Total pack years: 12.50     Types: Cigarettes     Start date: 14     Quit date: 2016     Years since quittin.8   • Smokeless tobacco: Never   Vaping Use   • Vaping Use: Every day   • Start date: 10/10/2016   • Substances: Nicotine, THC (at time), CBD (at times), Flavoring   Substance and Sexual Activity   • Alcohol use: Yes     Comment: socially - 2 month   • Drug use: Yes     Frequency: 1.0 times per week     Types: Marijuana   • Sexual activity: Not on file   Other Topics Concern   • Not on file   Social History Narrative   • Not on file     Social Determinants of Health     Financial Resource Strain: Not on file   Food Insecurity: Not on file   Transportation Needs: Not on file   Physical Activity: Not on file   Stress: Not on file   Social Connections: Not on file   Intimate Partner Violence: Not on file   Housing Stability: Not on file       Current Outpatient Medications:   •  Multiple Vitamins-Minerals (MENS MULTIVITAMIN PO), Take by mouth in the morning, Disp: , Rfl:   •  omeprazole (PriLOSEC) 20 mg delayed release capsule, Take 1 capsule (20 mg total) by mouth daily, Disp: 30 capsule, Rfl: 3  •  sildenafil (VIAGRA) 50 MG tablet, Take 1 tablet (50 mg total) by mouth as needed for erectile dysfunction, Disp: 10 tablet, Rfl: 0  No Known Allergies  Vitals:    10/26/23 1525   BP: 110/60   Pulse: 72   SpO2: 98%       Physical Exam  Vitals reviewed. Constitutional:       General: He is not in acute distress. Appearance: Normal appearance. He is normal weight. He is not ill-appearing or toxic-appearing. HENT:      Head: Normocephalic and atraumatic. Nose: Nose normal.      Mouth/Throat:      Mouth: Mucous membranes are moist.   Eyes:      General: No scleral icterus. Conjunctiva/sclera: Conjunctivae normal.   Cardiovascular:      Rate and Rhythm: Normal rate.    Pulmonary:      Effort: Pulmonary effort is normal.   Abdominal:      General: Abdomen is flat. There is no distension. Palpations: Abdomen is soft. There is no mass. Tenderness: There is no abdominal tenderness. There is no guarding. Musculoskeletal:         General: Normal range of motion. Cervical back: Normal range of motion and neck supple. Lymphadenopathy:      Cervical: No cervical adenopathy. Skin:     General: Skin is warm and dry. Coloration: Skin is not jaundiced. Neurological:      General: No focal deficit present. Mental Status: He is alert and oriented to person, place, and time. Psychiatric:         Mood and Affect: Mood normal.         Behavior: Behavior normal.         Thought Content: Thought content normal.         Judgment: Judgment normal.             Labs:  CEA  Collected 9/11/2023        Component Ref Range & Units 1 mo ago   CEA 0.0 - 3.0 ng/mL 1.1          Imaging  MRI abdomen w wo contrast    Result Date: 10/25/2023  Narrative: MRI - ABDOMEN - WITH AND WITHOUT CONTRAST INDICATION: 50 years / Male  C18.9: Malignant neoplasm of colon, unspecified C78.7: Secondary malignant neoplasm of liver and intrahepatic bile duct. Stage IV metastatic colon cancer to the liver status post posttreatment changes, for surveillance COMPARISON: MRI abdomen 4/24/2023, 1/7/2021 TECHNIQUE:  Multiplanar/multisequence MRI of the abdomen was performed before and after administration of contrast. Imaging performed on 1.5T MRI IV Contrast:  7 mL of Gadobutrol injection (SINGLE-DOSE) FINDINGS: LOWER CHEST:   Unremarkable. LIVER: Normal in size and configuration. Irregular contour from prior wedge resection involving hepatic segment 3 (series 14 image 154). T1 hypointense nonenhancing hepatic segment 7 lesion measuring 1.8 cm (series 10 image 22), representing posttreatment changes and grossly stable since 1/7/2021. No recurrent or new focal hepatic lesion suspicious for metastasis. No suspicious mass. The hepatic veins and portal veins are patent.  BILE DUCTS: No intrahepatic or extrahepatic bile duct dilation. GALLBLADDER:  Normal. PANCREAS:  Unremarkable. ADRENAL GLANDS:  Unremarkable. SPLEEN:  Normal. KIDNEYS/PROXIMAL URETERS:. No suspicious renal mass. Left renal parapelvic cysts, likely causing mass effect and mild dilation of the left renal upper pole collecting system, stable in appearance since 1/7/2021. No left hydroureter. . BOWEL:   No dilated loops of bowel. PERITONEUM/RETROPERITONEUM: No mass. No ascites. LYMPH NODES: No abdominal lymphadenopathy. VASCULAR STRUCTURES:  No aneurysm. ABDOMINAL WALL: A small fat-containing umbilical hernia. Zhao Lute Postsurgical changes in the anterior abdominal wall. OSSEOUS STRUCTURES:  No suspicious osseous lesion. Impression: Posttreatment changes in hepatic segment 3 and segment 7 without evidence of new or recurrent metastatic hepatic lesions. Workstation performed: [id number]     I personally reviewed and interpreted the above laboratory and imaging data. Discussion/Summary: This is a 51 y/o male who presents to the office today for continued surveillance following treatment for recurrent metastatic colon cancer. He is doing well overall, and there is no evidence of disease recurrence by imaging or tumor marker. I am somewhat concerned about the increasing frequency of his headaches, although he feels these are sinus-related. I have explained that I will discuss this with Dr Nadya Seals and Dr Ruel Walters, and I will reach out to set up additional imaging now if needed. Otherwise we will plan to repeat his MRI and CEA in 6 levels, and see him again at that time for another clinical exam.  Also, he is overdue for colonoscopy. I will help coordinate an appointment with Dr Ale Hernandez for him. He is agreeable, all questions have been answered.

## 2023-10-30 ENCOUNTER — TELEPHONE (OUTPATIENT)
Age: 53
End: 2023-10-30

## 2023-10-30 NOTE — TELEPHONE ENCOUNTER
Patient overdue for colonoscopy. Message received from Dr. Sanjuanita Moore to reach out to schedule. DE 3 yr recall, last fc 7/6/19 personal history of colon ca, previous surgery in colon, BMI 25     Attempted to contact patient to schedule colonoscopy ,no answer. Left v/m requesting call back. Waiting on response from patient.

## 2023-12-08 ENCOUNTER — TELEPHONE (OUTPATIENT)
Dept: HEMATOLOGY ONCOLOGY | Facility: CLINIC | Age: 53
End: 2023-12-08

## 2023-12-08 NOTE — TELEPHONE ENCOUNTER
GIOVANNA for patient that due to a change in Dr. Linda Bhatti schedule his appt. On 1/12/24 has been rescheduled until 2/2/24.

## 2023-12-12 ENCOUNTER — TELEPHONE (OUTPATIENT)
Age: 53
End: 2023-12-12

## 2023-12-12 ENCOUNTER — PREP FOR PROCEDURE (OUTPATIENT)
Age: 53
End: 2023-12-12

## 2023-12-12 DIAGNOSIS — Z85.038 PERSONAL HISTORY OF COLON CANCER: Primary | ICD-10-CM

## 2023-12-12 NOTE — TELEPHONE ENCOUNTER
12/12/23  Screened by: Lianet Crawford    Referring Provider Randee Bar    Pre- Screening: There is no height or weight on file to calculate BMI. Has patient been referred for a routine screening Colonoscopy? yes  Is the patient between 43-73 years old? yes      Previous Colonoscopy yes   If yes:    Date: 07/2019    Facility:     Reason:       SCHEDULING STAFF: If the patient is between 45yrs-49yrs, please advise patient to confirm benefits/coverage with their insurance company for a routine screening colonoscopy, some insurance carriers will only cover at [address] [place] [address] or older. If the patient is over 66years old, please schedule an office visit. Does the patient want to see a Gastroenterologist prior to their procedure OR are they having any GI symptoms? no    Has the patient been hospitalized or had abdominal surgery in the past 6 months? no    Does the patient use supplemental oxygen? no    Does the patient take Coumadin, Lovenox, Plavix, Elliquis, Xarelto, or other blood thinning medication? no    Has the patient had a stroke, cardiac event, or stent placed in the past year? no    SCHEDULING STAFF: If patient answers NO to above questions, then schedule procedure. If patient answers YES to above questions, then schedule office appointment. PASSED OA    If patient is between 45yrs - 49yrs, please advise patient that we will have to confirm benefits & coverage with their insurance company for a routine screening colonoscopy.

## 2023-12-12 NOTE — TELEPHONE ENCOUNTER
Scheduled date of colonoscopy (as of today): 1/12/24    Physician performing colonoscopy: Levine, Susan. \Hospital [facility] a New Name and Outlook.\""    Location of colonoscopy:  [facility]    Bowel prep reviewed with patient: JOHNIE/VERONICA    Instructions reviewed with patient by:ws    PASSED OA  PERSONAL HX OF CHARLES MYRICK

## 2024-01-12 ENCOUNTER — ANESTHESIA EVENT (OUTPATIENT)
Dept: GASTROENTEROLOGY | Facility: AMBULARY SURGERY CENTER | Age: 54
End: 2024-01-12

## 2024-01-12 ENCOUNTER — ANESTHESIA (OUTPATIENT)
Dept: GASTROENTEROLOGY | Facility: AMBULARY SURGERY CENTER | Age: 54
End: 2024-01-12

## 2024-01-12 ENCOUNTER — HOSPITAL ENCOUNTER (OUTPATIENT)
Dept: GASTROENTEROLOGY | Facility: AMBULARY SURGERY CENTER | Age: 54
Setting detail: OUTPATIENT SURGERY
Discharge: HOME | End: 2024-01-12
Attending: COLON & RECTAL SURGERY
Payer: COMMERCIAL

## 2024-01-12 VITALS
HEIGHT: 66 IN | OXYGEN SATURATION: 97 % | WEIGHT: 155 LBS | RESPIRATION RATE: 16 BRPM | DIASTOLIC BLOOD PRESSURE: 56 MMHG | BODY MASS INDEX: 24.91 KG/M2 | SYSTOLIC BLOOD PRESSURE: 109 MMHG | TEMPERATURE: 97.4 F | HEART RATE: 57 BPM

## 2024-01-12 DIAGNOSIS — Z85.038 PERSONAL HISTORY OF COLON CANCER: ICD-10-CM

## 2024-01-12 PROCEDURE — G0105 COLORECTAL SCRN; HI RISK IND: HCPCS | Performed by: COLON & RECTAL SURGERY

## 2024-01-12 RX ORDER — PROPOFOL 10 MG/ML
INJECTION, EMULSION INTRAVENOUS AS NEEDED
Status: DISCONTINUED | OUTPATIENT
Start: 2024-01-12 | End: 2024-01-12

## 2024-01-12 RX ORDER — SODIUM CHLORIDE, SODIUM LACTATE, POTASSIUM CHLORIDE, CALCIUM CHLORIDE 600; 310; 30; 20 MG/100ML; MG/100ML; MG/100ML; MG/100ML
INJECTION, SOLUTION INTRAVENOUS CONTINUOUS PRN
Status: DISCONTINUED | OUTPATIENT
Start: 2024-01-12 | End: 2024-01-12

## 2024-01-12 RX ADMIN — PROPOFOL 20 MG: 10 INJECTION, EMULSION INTRAVENOUS at 10:02

## 2024-01-12 RX ADMIN — PROPOFOL 20 MG: 10 INJECTION, EMULSION INTRAVENOUS at 10:06

## 2024-01-12 RX ADMIN — PROPOFOL 20 MG: 10 INJECTION, EMULSION INTRAVENOUS at 10:08

## 2024-01-12 RX ADMIN — PROPOFOL 150 MG: 10 INJECTION, EMULSION INTRAVENOUS at 09:56

## 2024-01-12 RX ADMIN — PROPOFOL 20 MG: 10 INJECTION, EMULSION INTRAVENOUS at 10:00

## 2024-01-12 RX ADMIN — PROPOFOL 20 MG: 10 INJECTION, EMULSION INTRAVENOUS at 10:04

## 2024-01-12 RX ADMIN — SODIUM CHLORIDE, SODIUM LACTATE, POTASSIUM CHLORIDE, AND CALCIUM CHLORIDE: .6; .31; .03; .02 INJECTION, SOLUTION INTRAVENOUS at 09:02

## 2024-01-12 RX ADMIN — PROPOFOL 20 MG: 10 INJECTION, EMULSION INTRAVENOUS at 09:58

## 2024-01-12 NOTE — H&P
History and Physical   Colon and Rectal Surgery   Matthieu Da Silva 53 y.o. male MRN: [id number]  Unit/Bed#:  Encounter: [id number]  01/12/24   9:50 AM      No chief complaint on file.      ASSESSMENT:  Matthieu Da Silva is a 53 y.o. 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  Screening colonoscopy,discussed in a face-to-face, personal, informed consent process, the benefits, alternatives, risks including not limited to bleeding, missed lesion, perforation requiring emergent surgery discussed/understood.    PLAN:  Colonoscopy    History of Present Illness   HPI:  Matthieu Da Silva is a 53 y.o. male who presents for colonoscopy.      Historical Information   Past Medical History:   Diagnosis Date    Cancer (HCC)     Colon cancer (HCC)     Dysuria     Last Assessed:8/24/17    GERD (gastroesophageal reflux disease)     Liver cancer (HCC)     Liver nodule     Pulmonary nodule, right     Thyroid nodule 02/16/2023    Ureteropelvic junction (UPJ) obstruction 01/29/2020    Wears glasses      Past Surgical History:   Procedure Laterality Date    ABDOMINAL SURGERY      COLON SURGERY      COLONOSCOPY N/A 01/22/2018    Procedure: COLONOSCOPY;  Surgeon: Casey Justice MD;  Location: [facility];  Service: Colorectal    DENTAL SURGERY  2016    Crown with bridge work    FLEXIBLE SIGMOIDOSCOPY N/A 06/15/2018    Procedure: SIGMOIDOSCOPY FLEXIBLE w/o sedation w/ tattoo;  Surgeon: Casey Justice MD;  Location: [facility];  Service: Colorectal    IR PORT PLACEMENT Right     LAPAROTOMY N/A 06/08/2020    Procedure: LAPAROTOMY EXPLORATORY, LYSIS OF ADHESIONS;  Surgeon: Esequiel Mijares MD;  Location: [facility];  Service: Surgical Oncology    LIVER LOBECTOMY N/A 06/21/2018    Procedure: liver resection/ablation, intraoperative ultrasound of liver;  Surgeon: Esequiel Mijares MD;  Location: [facility];  Service: Surgical Oncology    LIVER LOBECTOMY N/A 06/08/2020    Procedure:  LIVER RESECTION SEGMENT 3 WITH  INTRAOPERATIVE U/S OF LIVER;  Surgeon: Esequiel Mijares MD;  [facility];  Service: Surgical Oncology    LUNG SEGMENTECTOMY Right 8/9/2022    Procedure: RESECTION WEDGE LUNG, THERAPEUTIC;  Surgeon: Mary Reid MD;  [facility];  Service: Thoracic    [facility] [place] W/CELL WASHG SPX N/A 8/9/2022    Procedure: BRONCHOSCOPY FLEXIBLE;  Surgeon: Mary Reid MD;  [facility];  Service: Thoracic    DE COLECTOMY PARTIAL W/ANASTOMOSIS Left 06/21/2018    Procedure: hemicolectomy, low anterior resection (open) SPY fluorescence angiography;  Surgeon: Casey Justice MD;  [facility];  Service: Colorectal    DE EXPLORATORY LAPAROTOMY CELIOTOMY W/WO BIOPSY SPX N/A 06/21/2018    Procedure: LAPAROTOMY EXPLORATORY;  Surgeon: Casey Justice MD;  [facility];  Service: Colorectal    DE INSJ PRPH CTR VAD W/SUBQ PORT AGE 7 YR/> N/A 01/25/2018    Procedure: PORT-A-CATHETER PLACEMENT  Fluoroscopy for performance/interpretation;  Surgeon: Casey Justice MD;  [facility];  Service: Colorectal    DE PRCTECT PRTL RESCJ RECTUM TABDL APPR N/A 06/21/2018    Procedure: Partial proctectomy.;  Surgeon: Casey Justice MD;  [facility];  Service: Colorectal    DE SIGMOIDOSCOPY FLX DX W/COLLJ SPEC BR/WA IF PFRMD N/A 06/21/2018    Procedure: SIGMOIDOSCOPY FLEXIBLE;  Surgeon: Casey Justice MD;  [facility];  Service: Colorectal    DE THORACOSCOPY W/THERA WEDGE RESEXN INITIAL UNILAT Right 8/9/2022    Procedure: THORACOSCOPY VIDEO ASSISTED SURGERY (VATS);  Surgeon: Mary Reid MD;  [facility];  Service: Thoracic    ROOT CANAL  2015    TESTICLE SURGERY      Exploration of Undescended Testis rIght, age 10 had surgery for undescended testicle; Last Assessed:8/24/17    TOOTH EXTRACTION  2015       Meds/Allergies     (Not in a hospital admission)        Current Outpatient Medications:      "Multiple Vitamins-Minerals (MENS MULTIVITAMIN PO), Take by mouth in the morning, Disp: , Rfl:     omeprazole (PriLOSEC) 20 mg delayed release capsule, Take 1 capsule (20 mg total) by mouth daily, Disp: 30 capsule, Rfl: 3    sildenafil (VIAGRA) 50 MG tablet, Take 1 tablet (50 mg total) by mouth as needed for erectile dysfunction, Disp: 10 tablet, Rfl: 0  No current facility-administered medications for this encounter.    No Known Allergies      Social History   Social History     Substance and Sexual Activity   Alcohol Use Yes    Comment: socially - 2 month     Social History     Substance and Sexual Activity   Drug Use Yes    Frequency: 1.0 times per week    Types: Marijuana     Social History     Tobacco Use   Smoking Status Former    Current packs/day: 0.00    Average packs/day: 0.5 packs/day for 25.0 years (12.5 ttl pk-yrs)    Types: Cigarettes    Start date:     Quit date:     Years since quittin.0   Smokeless Tobacco Never         Family History:   Family History   Problem Relation Age of Onset    No Known Problems Father     Cancer Mother         unknown    Cancer Brother         pt. reports brother was diagnosed with stage 4 throat cancer    Throat cancer Brother     Breast cancer Maternal Aunt          Objective     Current Vitals:   Blood Pressure: 108/66 (24)  Pulse: 73 (24)  Temperature: 97.7 °F (36.5 °C) (24)  Temp Source: Temporal (24)  Respirations: 16 (24)  Height: 5' 6\" (167.6 cm) (24)  Weight - Scale: 70.3 kg (155 lb) (24)  SpO2: 97 % (24)  No intake or output data in the 24 hours ending 24    Physical Exam:  General:no distress  Eyes:perrla/eomi  ENT:moist mucus membranes  Neck:supple  Pulm:no increased work of breathing  CV:sinus  Abdomen:soft,nontender  Extremities:no edema    "

## 2024-01-12 NOTE — ANESTHESIA POSTPROCEDURE EVALUATION
Post-Op Assessment Note    CV Status:  Stable  Pain Score: 0    Pain management: adequate       Mental Status:  Sleepy and arousable   PONV Controlled:  None   Airway Patency:  Patent     Post Op Vitals Reviewed: Yes      Staff: CRNA               BP   101/59   Temp 97   Pulse 61   Resp 14   SpO2 98

## 2024-01-12 NOTE — ANESTHESIA PREPROCEDURE EVALUATION
Procedure:  COLONOSCOPY    Relevant Problems   GI/HEPATIC   (+) Colon cancer metastasized to liver (HCC)   (+) GERD (gastroesophageal reflux disease)   (+) 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 (HCC)      /RENAL   (+) Other hydronephrosis        Physical Exam    Airway    Mallampati score: II  TM Distance: >3 FB  Neck ROM: full     Dental       Cardiovascular      Pulmonary      Other Findings        Anesthesia Plan  ASA Score- 2     Anesthesia Type- IV sedation with anesthesia with ASA Monitors.         Additional Monitors:     Airway Plan:            Plan Factors-    Chart reviewed.                      Induction- intravenous.    Postoperative Plan-     Informed Consent- Anesthetic plan and risks discussed with patient.  I personally reviewed this patient with the CRNA. Discussed and agreed on the Anesthesia Plan with the CRNA..

## 2024-01-29 ENCOUNTER — TELEPHONE (OUTPATIENT)
Dept: HEMATOLOGY ONCOLOGY | Facility: CLINIC | Age: 54
End: 2024-01-29

## 2024-01-30 ENCOUNTER — APPOINTMENT (OUTPATIENT)
Dept: LAB | Facility: MEDICAL CENTER | Age: 54
End: 2024-01-30
Payer: COMMERCIAL

## 2024-01-30 DIAGNOSIS — C18.9 COLON CANCER METASTASIZED TO LIVER (HCC): ICD-10-CM

## 2024-01-30 DIAGNOSIS — C78.7 COLON CANCER METASTASIZED TO LIVER (HCC): ICD-10-CM

## 2024-01-30 LAB
ALBUMIN SERPL BCP-MCNC: 4.3 G/DL (ref 3.5–5)
ALP SERPL-CCNC: 43 U/L (ref 34–104)
ALT SERPL W P-5'-P-CCNC: 17 U/L (ref 7–52)
ANION GAP SERPL CALCULATED.3IONS-SCNC: 8 MMOL/L
AST SERPL W P-5'-P-CCNC: 20 U/L (ref 13–39)
BASOPHILS # BLD AUTO: 0.03 THOUSANDS/ÂΜL (ref 0–0.1)
BASOPHILS NFR BLD AUTO: 1 % (ref 0–1)
BILIRUB SERPL-MCNC: 0.31 MG/DL (ref 0.2–1)
BUN SERPL-MCNC: 14 MG/DL (ref 5–25)
CALCIUM SERPL-MCNC: 9.2 MG/DL (ref 8.4–10.2)
CHLORIDE SERPL-SCNC: 105 MMOL/L (ref 96–108)
CO2 SERPL-SCNC: 26 MMOL/L (ref 21–32)
CREAT SERPL-MCNC: 1.03 MG/DL (ref 0.6–1.3)
EOSINOPHIL # BLD AUTO: 0.09 THOUSAND/ÂΜL (ref 0–0.61)
EOSINOPHIL NFR BLD AUTO: 2 % (ref 0–6)
ERYTHROCYTE [DISTWIDTH] IN BLOOD BY AUTOMATED COUNT: 12.7 % (ref 11.6–15.1)
GFR SERPL CREATININE-BSD FRML MDRD: 83 ML/MIN/1.73SQ M
GLUCOSE P FAST SERPL-MCNC: 128 MG/DL (ref 65–99)
HCT VFR BLD AUTO: 42.3 % (ref 36.5–49.3)
HGB BLD-MCNC: 14.5 G/DL (ref 12–17)
IMM GRANULOCYTES # BLD AUTO: 0.01 THOUSAND/UL (ref 0–0.2)
IMM GRANULOCYTES NFR BLD AUTO: 0 % (ref 0–2)
LYMPHOCYTES # BLD AUTO: 1.88 THOUSANDS/ÂΜL (ref 0.6–4.47)
LYMPHOCYTES NFR BLD AUTO: 32 % (ref 14–44)
MCH RBC QN AUTO: 33.1 PG (ref 26.8–34.3)
MCHC RBC AUTO-ENTMCNC: 34.3 G/DL (ref 31.4–37.4)
MCV RBC AUTO: 97 FL (ref 82–98)
MONOCYTES # BLD AUTO: 0.52 THOUSAND/ÂΜL (ref 0.17–1.22)
MONOCYTES NFR BLD AUTO: 9 % (ref 4–12)
NEUTROPHILS # BLD AUTO: 3.31 THOUSANDS/ÂΜL (ref 1.85–7.62)
NEUTS SEG NFR BLD AUTO: 56 % (ref 43–75)
NRBC BLD AUTO-RTO: 0 /100 WBCS
PLATELET # BLD AUTO: 163 THOUSANDS/UL (ref 149–390)
PMV BLD AUTO: 12 FL (ref 8.9–12.7)
POTASSIUM SERPL-SCNC: 4 MMOL/L (ref 3.5–5.3)
PROT SERPL-MCNC: 7.4 G/DL (ref 6.4–8.4)
RBC # BLD AUTO: 4.38 MILLION/UL (ref 3.88–5.62)
SODIUM SERPL-SCNC: 139 MMOL/L (ref 135–147)
WBC # BLD AUTO: 5.84 THOUSAND/UL (ref 4.31–10.16)

## 2024-01-30 PROCEDURE — 80053 COMPREHEN METABOLIC PANEL: CPT

## 2024-01-30 PROCEDURE — 85025 COMPLETE CBC W/AUTO DIFF WBC: CPT

## 2024-01-30 PROCEDURE — 36415 COLL VENOUS BLD VENIPUNCTURE: CPT

## 2024-02-02 ENCOUNTER — OFFICE VISIT (OUTPATIENT)
Dept: HEMATOLOGY ONCOLOGY | Facility: CLINIC | Age: 54
End: 2024-02-02
Payer: COMMERCIAL

## 2024-02-02 VITALS
HEIGHT: 66 IN | HEART RATE: 70 BPM | DIASTOLIC BLOOD PRESSURE: 86 MMHG | TEMPERATURE: 98.8 F | OXYGEN SATURATION: 97 % | SYSTOLIC BLOOD PRESSURE: 122 MMHG | RESPIRATION RATE: 17 BRPM | BODY MASS INDEX: 26.68 KG/M2 | WEIGHT: 166 LBS

## 2024-02-02 DIAGNOSIS — C18.9 COLON CANCER METASTASIZED TO LIVER (HCC): Primary | ICD-10-CM

## 2024-02-02 DIAGNOSIS — C78.7 COLON CANCER METASTASIZED TO LIVER (HCC): Primary | ICD-10-CM

## 2024-02-02 PROCEDURE — 99213 OFFICE O/P EST LOW 20 MIN: CPT | Performed by: INTERNAL MEDICINE

## 2024-02-02 NOTE — PROGRESS NOTES
Hematology Outpatient Follow - Up Note  Matthieu Da Silva 53 y.o. male MRN: @ Encounter: [id number]        Date:  2/2/2024        Assessment/ Plan:    He is 53-year-old  male who in January 2018 diagnosed with stage IV colon cancer with liver metastasis, K-jennifer wild-type, NRAS wild-type, MSI stable, no evidence of actionable mutation, genetic test did not show any inherited mutation, status post cetuximab and FOLFOX with surgical resection as both the primary and the liver metastasis on 5/2020 status post resection treated with FOLFIRI and cetuximab    Slowly growing lung nodules on January 2022 status post resection consistent with metastatic colorectal cancer of the right lower lobe of the lung and he received also FOLFIRI and cetuximab also another liver resection by the end of 2022    MRI on 11/2023 showed no evidence of disease    He has another MRI in April 2024, will follow-up in June 2024 with CBC, CMP, CEA                                 Labs and imaging studies are reviewed by ordering provider once results are available. If there are findings that need immediate attention, you will be contacted when results available.   Discussing results and the implication on your healthcare is best discussed in person at your follow-up visit.       HPI:    Matthieu Da Silva is a 52 yo male with stage IV colon cancer. He was diagnosed in January 2018.  He was treated with  modified FOLFox 6 and Erbitux. He then went for surgery on 06/21/2018 with a nice response.  Liver resection was positive for residual malignancy.  After surgery, treatment with 5 FU bolus, leucovorin, 5 FU pump and Erbitux was restarted on 07/24/2018 and he received 12 cycles of chemotherapy.  He was switched to single agent Erbitux for 6 doses. This was completed in January 2019. He was placed on observation     PET CT completed 5/21/20 showed that there were hypermetabolic left lateral liver metastases with minimal activity in the hepatic dome  treated metastases.  He is s/p  Resection  on June 8, 2020. Pathology revealed metastatic adenocarcinoma consistent with the patient's known colon primary.  Margins were negative for carcinoma.   He was treated with adjuvant with FOLFIRI plus Erbitux for 6 months, this was completed in early 2021.     He was again on observation until more recently. He had surveillance imaging completed in 7/2022 which showed a slowly growing lung nodule up to 7 mm concerning for a growing metastatic lesion. He underwent   a right thoracoscopic therapeutic lower lobe wedge resection on 8/9/22 and pathology was consistent with a   metastatic colon adenocarcinoma with tumor necrosis immunohistochemically consistent with metastases from the patient's known colon primary measuring 0.8 x 0.5 x 0.5 cm which was completely excised.     He then got 3 months of adjuvant treatment with FOLFIRI plus Erbitux  Previous Hematologic/ Oncologic History:          Oncology History   Colon cancer metastasized to liver (HCC)   1/2018 Initial Diagnosis     Malignant neoplasm of sigmoid colon (HCC)      1/22/2018 Biopsy     Large Intestine, Sigmoid Colon, Recto-sigmoid tumor bx:     - Invasive colonic adenocarcinoma, moderately differentiated      2/6/2018 - 10/16/2018 Chemotherapy     Modified FOLFOX6 x 12 cycles  Added Erbitux on 3/20/18       6/21/2018 Surgery     Segment 7 liver resection/ablation, hemicolectomy      10/30/2018 -  Chemotherapy     Continuation of Single agent Erbitux.  With 10/30/18 chemo, it was Cycle #14.  Plan was for 6 additional cycles of Erbitux alone.      3/2020 Genetic Testing     NEGATIVE for genes tested:     Test(s): [employer] + [employer] (34 genes): APC, ETHAN, BARD1, BRCA1, BRCA2, BRIP1, BMPR1A, CDH1, CDK4, CDKN2A, CHEK2, DICER1, EPCAM, GREM1, HOXB13, MLH1, MRE11A, MSH2, MSH6, MUTYH, NBN, NF1, PALB2, PMS2, POLD1, POLE, PTEN, RAD50, RAD51C, RAD51D, SMAD4, SMARCA4, STK11, TP53       6/8/2020 Surgery     Liver, segment 3  (wedge resection):  - Metastatic adenocarcinoma, consistent with the patient's known colon primary  - Margins negative for carcinoma       7/27/2020 - 1/11/2021 Chemotherapy     fluorouracil (ADRUCIL), 745 mg, Intravenous, Once, 6 of 6 cycles  Administration: 750 mg (7/27/2020), 750 mg (8/10/2020), 745 mg (8/24/2020), 745 mg (9/8/2020), 745 mg (9/21/2020), 745 mg (10/5/2020)  pegfilgrastim (NEULASTA), 6 mg, Subcutaneous, Once, 1 of 1 cycle  Administration: 6 mg (9/24/2020)  pegfilgrastim (NEULASTA ONPRO), 6 mg, Subcutaneous, Once, 6 of 6 cycles  Administration: 6 mg (7/29/2020), 6 mg (8/12/2020), 6 mg (8/26/2020), 6 mg (9/10/2020), 6 mg (10/7/2020)  cetuximab (ERBITUX), 930 mg, Intravenous, Once, 12 of 12 cycles  Administration: 900 mg (7/27/2020), 900 mg (8/10/2020), 900 mg (8/24/2020), 900 mg (9/8/2020), 900 mg (9/21/2020), 900 mg (10/5/2020), 900 mg (10/19/2020), 900 mg (11/2/2020), 900 mg (11/16/2020), 900 mg (11/30/2020), 900 mg (12/28/2020), 900 mg (1/11/2021)  irinotecan (CAMPTOSAR) chemo infusion, 335 mg, Intravenous, Once, 6 of 6 cycles  Administration: 340 mg (7/27/2020), 340 mg (8/10/2020), 340 mg (8/24/2020), 340 mg (9/8/2020), 340 mg (9/21/2020), 340 mg (10/5/2020)  leucovorin calcium IVPB, 744 mg, Intravenous, Once, 6 of 6 cycles  Administration: 750 mg (7/27/2020), 750 mg (8/10/2020), 750 mg (8/24/2020), 750 mg (9/8/2020), 750 mg (9/21/2020), 740 mg (10/5/2020)  fluorouracil (ADRUCIL) ambulatory infusion Soln, 1,200 mg/m2/day = 4,465 mg, Intravenous, Over 46 hours, 6 of 6 cycles  Dose modification: 1,200 mg/m2/day (original dose 1,200 mg/m2/day, Cycle 3)      9/20/2022 -  Chemotherapy     palonosetron (ALOXI), 0.25 mg, Intravenous, Once, 4 of 4 cycles  Administration: 0.25 mg (10/18/2022), 0.25 mg (11/1/2022), 0.25 mg (11/15/2022), 0.25 mg (11/29/2022)  pegfilgrastim (NEULASTA ONPRO), 6 mg, Subcutaneous, Once, 6 of 6 cycles  Administration: 6 mg (9/22/2022), 6 mg (10/20/2022), 6 mg (11/3/2022), 6 mg  (11/17/2022), 6 mg (12/1/2022)  Pegfilgrastim-bmez (ZIEXTENZO), 6 mg, Subcutaneous, Once, 1 of 1 cycle  Administration: 6 mg (10/7/2022)  fosaprepitant (EMEND) IVPB, 150 mg, Intravenous, Once, 4 of 4 cycles  Administration: 150 mg (10/18/2022), 150 mg (11/1/2022), 150 mg (11/15/2022), 150 mg (11/29/2022)  fluorouracil (ADRUCIL), 400 mg/m2 = 735 mg, Intravenous, Once, 6 of 6 cycles  Administration: 735 mg (9/20/2022), 735 mg (10/4/2022), 730 mg (10/18/2022), 730 mg (11/1/2022), 730 mg (11/15/2022), 730 mg (11/29/2022)  cetuximab (ERBITUX), 920 mg, Intravenous, Once, 6 of 6 cycles  Administration: 900 mg (9/20/2022), 900 mg (10/4/2022), 900 mg (10/18/2022), 900 mg (11/1/2022), 900 mg (11/15/2022), 900 mg (11/29/2022)  irinotecan (CAMPTOSAR) chemo infusion, 331 mg, Intravenous, Once, 6 of 6 cycles  Administration: 320 mg (9/20/2022), 320 mg (10/4/2022), 320 mg (10/18/2022), 320 mg (11/1/2022), 320 mg (11/15/2022), 320 mg (11/29/2022)  leucovorin calcium IVPB, 736 mg, Intravenous, Once, 6 of 6 cycles  Administration: 700 mg (9/20/2022), 700 mg (10/4/2022), 750 mg (10/18/2022), 750 mg (11/1/2022), 700 mg (11/15/2022), 700 mg (11/29/2022)  fluorouracil (ADRUCIL) ambulatory infusion Soln, 1,200 mg/m2/day = 4,415 mg, Intravenous, Over 46 hours, 6 of 6 cycles      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 (HCC)   4/11/2022 Initial Diagnosis     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 (HCC)      9/20/2022 -  Chemotherapy     palonosetron (ALOXI), 0.25 mg, Intravenous, Once, 4 of 4 cycles  Administration: 0.25 mg (10/18/2022), 0.25 mg (11/1/2022), 0.25 mg (11/15/2022), 0.25 mg (11/29/2022)  pegfilgrastim (NEULASTA ONPRO), 6 mg, Subcutaneous, Once, 6 of 6 cycles  Administration: 6 mg (9/22/2022), 6 mg (10/20/2022), 6 mg (11/3/2022), 6 mg (11/17/2022), 6 mg (12/1/2022)  Pegfilgrastim-bmez (ZIEXTENZO), 6 mg, Subcutaneous, Once, 1 of 1 cycle  Administration: 6 mg (10/7/2022)  fosaprepitant (EMEND) IVPB, 150 mg, Intravenous, Once, 4 of 4  cycles  Administration: 150 mg (10/18/2022), 150 mg (11/1/2022), 150 mg (11/15/2022), 150 mg (11/29/2022)  fluorouracil (ADRUCIL), 400 mg/m2 = 735 mg, Intravenous, Once, 6 of 6 cycles  Administration: 735 mg (9/20/2022), 735 mg (10/4/2022), 730 mg (10/18/2022), 730 mg (11/1/2022), 730 mg (11/15/2022), 730 mg (11/29/2022)  cetuximab (ERBITUX), 920 mg, Intravenous, Once, 6 of 6 cycles  Administration: 900 mg (9/20/2022), 900 mg (10/4/2022), 900 mg (10/18/2022), 900 mg (11/1/2022), 900 mg (11/15/2022), 900 mg (11/29/2022)  irinotecan (CAMPTOSAR) chemo infusion, 331 mg, Intravenous, Once, 6 of 6 cycles  Administration: 320 mg (9/20/2022), 320 mg (10/4/2022), 320 mg (10/18/2022), 320 mg (11/1/2022), 320 mg (11/15/2022), 320 mg (11/29/2022)  leucovorin calcium IVPB, 736 mg, Intravenous, Once, 6 of 6 cycles  Administration: 700 mg (9/20/2022), 700 mg (10/4/2022), 750 mg (10/18/2022), 750 mg (11/1/2022), 700 mg (11/15/2022), 700 mg (11/29/2022)  fluorouracil (ADRUCIL) ambulatory infusion Soln, 1,200 mg/m2/day = 4,415 mg, Intravenous, Over 46 hours, 6 of 6 cycles      Malignant neoplasm metastatic to right lung (HCC)   8/4/2022 Initial Diagnosis     Malignant neoplasm metastatic to right lung (HCC)      8/9/2022 Surgery     Flexible bronchoscopy, right thoracoscopic therapeutic wedge resection       9/20/2022 -  Chemotherapy     palonosetron (ALOXI), 0.25 mg, Intravenous, Once, 4 of 4 cycles  Administration: 0.25 mg (10/18/2022), 0.25 mg (11/1/2022), 0.25 mg (11/15/2022), 0.25 mg (11/29/2022)  pegfilgrastim (NEULASTA ONPRO), 6 mg, Subcutaneous, Once, 6 of 6 cycles  Administration: 6 mg (9/22/2022), 6 mg (10/20/2022), 6 mg (11/3/2022), 6 mg (11/17/2022), 6 mg (12/1/2022)  Pegfilgrastim-bmez (ZIEXTENZO), 6 mg, Subcutaneous, Once, 1 of 1 cycle  Administration: 6 mg (10/7/2022)  fosaprepitant (EMEND) IVPB, 150 mg, Intravenous, Once, 4 of 4 cycles  Administration: 150 mg (10/18/2022), 150 mg (11/1/2022), 150 mg (11/15/2022), 150  mg (11/29/2022)  fluorouracil (ADRUCIL), 400 mg/m2 = 735 mg, Intravenous, Once, 6 of 6 cycles  Administration: 735 mg (9/20/2022), 735 mg (10/4/2022), 730 mg (10/18/2022), 730 mg (11/1/2022), 730 mg (11/15/2022), 730 mg (11/29/2022)  cetuximab (ERBITUX), 920 mg, Intravenous, Once, 6 of 6 cycles  Administration: 900 mg (9/20/2022), 900 mg (10/4/2022), 900 mg (10/18/2022), 900 mg (11/1/2022), 900 mg (11/15/2022), 900 mg (11/29/2022)  irinotecan (CAMPTOSAR) chemo infusion, 331 mg, Intravenous, Once, 6 of 6 cycles  Administration: 320 mg (9/20/2022), 320 mg (10/4/2022), 320 mg (10/18/2022), 320 mg (11/1/2022), 320 mg (11/15/2022), 320 mg (11/29/2022)  leucovorin calcium IVPB, 736 mg, Intravenous, Once, 6 of 6 cycles  Administration: 700 mg (9/20/2022), 700 mg (10/4/2022), 750 mg (10/18/2022), 750 mg (11/1/2022), 700 mg (11/15/2022), 700 mg (11/29/2022)  fluorouracil (ADRUCIL) ambulatory infusion Soln, 1,200 mg/m2/day = 4,415 mg, Intravenous, Over 46 hours, 6 of 6 cycles      MFOLFOX6 (5-FU 2400 mg/m² over 46 hours, 5- mg meter squared bolus, leucovorin 400 mg meter squared bolus along with oxaliplatin at 85 mg/m²) with Neulasta Support  Dose #1 2/6/2018. CARIS testing performed.  KRAS and NRAS WildType.  Erbitux 500mg IV every 2 weeks. This was added on 20th of March 2018 (4th cycle).In total he received 8 doses of modified FOLFOX 6, 5 of which he got with Erbitux.     Patient received 5-FU 2400 mg/m², Erbitux 500 mg meter square, Leucovorin 400 mg meter square, 5- M square, Dose #12 in aggregate On 16 October 2019.     Single agent Erbitux. Dose #6 was on 8 January 2019.      liver metastatic disease which was resected     FOLFIRI plus Erbitux completed 6 cycles on October 7, 2020     Single agent Erbitux for 3 months completed on the 11th of January 2021     3 months of  FOLFIRI plus Erbitux after liver resection in the end of 2022  Interval History:        Previous Treatment:         Test  Results:    Imaging: Colonoscopy    Result Date: 1/12/2024  Narrative: Table formatting from the original result was not included. [facility] [place] [address] [facility] [address] [phone] DATE OF SERVICE: 1/12/24 PHYSICIAN(S): Attending: Casey Justice MD INDICATION: Personal history of colon cancer POST-OP DIAGNOSIS: See the impression below. HISTORY: Prior colonoscopy: 5 years ago. BOWEL PREPARATION: Miralax/Dulcolax REPROCEDURE: Informed consent was obtained for the procedure, including sedation. Risks including but not limited to bleeding, infection, perforation, adverse drug reaction and aspiration were explained in detail. Also explained about less than 100% sensitivity with the exam and other alternatives. The patient was placed in the left lateral decubitus position. Procedure: Colonoscopy DETAILS OF PROCEDURE: Patient was taken to the procedure room where a time out was performed to confirm correct patient and correct procedure. The patient underwent monitored anesthesia care, which was administered by an anesthesia professional. The patient's blood pressure, heart rate, level of consciousness, oxygen, respirations and ECG were monitored throughout the procedure. A digital rectal exam was performed. A perianal exam was performed. The scope was introduced through the anus and advanced to the cecum. Retroflexion was performed in the rectum. The quality of bowel preparation was evaluated using the [place] Bowel Preparation Scale with scores of: right colon = 2, transverse colon = 2, left colon = 2. The total BBPS score was 6. Bowel prep was adequate. The patient experienced no blood loss. The procedure was not difficult. The patient tolerated the procedure well. There were no apparent adverse events. Fair preparation on the left side, flushed to adequate. ANESTHESIA INFORMATION: ASA: II Anesthesia Type: IV Sedation with Anesthesia MEDICATIONS: No administrations  "occurring from 0951 to 1013 on 01/12/24 FINDINGS: All observed locations appeared normal. Healthy end-to-end colorectal anastomosis in the rectum EVENTS: Procedure Events Event Event Time ENDO CECUM REACHED 1/12/2024  9:59 AM ENDO SCOPE OUT TIME 1/12/2024 10:12 AM SPECIMENS: * No specimens in log * EQUIPMENT: Colonoscope - 9197     Impression: Normal. Healthy end-to-end colorectal anastomosis in the rectum RECOMMENDATION:  Repeat colonoscopy in 3 years  Personal history of colon cancer    Casey Justice MD       Labs:   Lab Results   Component Value Date    WBC 5.84 01/30/2024    HGB 14.5 01/30/2024    HCT 42.3 01/30/2024    MCV 97 01/30/2024     01/30/2024     Lab Results   Component Value Date     08/26/2017    K 4.0 01/30/2024     01/30/2024    CO2 26 01/30/2024    BUN 14 01/30/2024    CREATININE 1.03 01/30/2024    GLUCOSE 124 12/01/2017    GLUF 128 (H) 01/30/2024    CALCIUM 9.2 01/30/2024    CORRECTEDCA 10.0 10/17/2020    AST 20 01/30/2024    ALT 17 01/30/2024    ALKPHOS 43 01/30/2024    PROT 7.2 08/26/2017    BILITOT 0.6 08/26/2017    EGFR 83 01/30/2024       No results found for: \"IRON\", \"TIBC\", \"FERRITIN\"    No results found for: \"CMYFINKR96\"      ROS: Review of Systems   Constitutional: Negative.  Negative for appetite change, chills, diaphoresis, fatigue, fever and unexpected weight change.   HENT:   Negative for hearing loss, lump/mass, mouth sores, nosebleeds, sore throat, trouble swallowing and voice change.    Eyes: Negative.  Negative for eye problems and icterus.   Respiratory: Negative.  Negative for chest tightness, cough, hemoptysis and shortness of breath.    Cardiovascular:  Negative for chest pain and leg swelling.   Gastrointestinal:  Negative for abdominal distention, abdominal pain, blood in stool, constipation, diarrhea and nausea.   Endocrine: Negative.    Genitourinary:  Negative for dysuria, frequency, hematuria and pelvic pain.    Musculoskeletal: Negative.  " Negative for arthralgias, back pain, flank pain, gait problem, myalgias and neck stiffness.   Skin:  Negative for itching and rash.   Neurological:  Negative for dizziness, gait problem, headaches, light-headedness, numbness and speech difficulty.   Hematological:  Negative for adenopathy. Does not bruise/bleed easily.   Psychiatric/Behavioral:  Negative for confusion, decreased concentration, depression and sleep disturbance. The patient is not nervous/anxious.           Current Medications: Reviewed  Allergies: Reviewed  PMH/FH/SH:  Reviewed      Physical Exam:    Body surface area is 1.85 meters squared.    Wt Readings from Last 3 Encounters:   02/02/24 75.3 kg (166 lb)   01/12/24 70.3 kg (155 lb)   10/26/23 73 kg (161 lb)        Temp Readings from Last 3 Encounters:   02/02/24 98.8 °F (37.1 °C) (Temporal)   01/12/24 (!) 97.4 °F (36.3 °C) (Tympanic)   09/11/23 98.4 °F (36.9 °C) (Temporal)        BP Readings from Last 3 Encounters:   02/02/24 122/86   01/12/24 109/56   10/26/23 110/60         Pulse Readings from Last 3 Encounters:   02/02/24 70   01/12/24 57   10/26/23 72        Physical Exam  Vitals reviewed.   Constitutional:       General: He is not in acute distress.     Appearance: He is well-developed. He is not diaphoretic.   HENT:      Head: Normocephalic and atraumatic.   Eyes:      Conjunctiva/sclera: Conjunctivae normal.   Neck:      Trachea: No tracheal deviation.   Cardiovascular:      Rate and Rhythm: Normal rate and regular rhythm.      Heart sounds: No murmur heard.     No friction rub. No gallop.   Pulmonary:      Effort: Pulmonary effort is normal. No respiratory distress.      Breath sounds: Normal breath sounds. No wheezing or rales.   Chest:      Chest wall: No tenderness.   Abdominal:      General: There is no distension.      Palpations: Abdomen is soft.      Tenderness: There is no abdominal tenderness.   Musculoskeletal:      Cervical back: Normal range of motion and neck supple.       Right lower leg: No edema.      Left lower leg: No edema.   Lymphadenopathy:      Cervical: No cervical adenopathy.   Skin:     General: Skin is warm and dry.      Coloration: Skin is not pale.      Findings: No erythema.   Neurological:      Mental Status: He is alert and oriented to person, place, and time.   Psychiatric:         Behavior: Behavior normal.         Thought Content: Thought content normal.         Judgment: Judgment normal.         ECO    Goals and Barriers:  Current Goal: Minimize effects of disease.   Barriers: None.      Patient's Capacity to Self Care:  Patient is able to self care.    Code Status: [unfilled]

## 2024-02-20 ENCOUNTER — OFFICE VISIT (OUTPATIENT)
Dept: INTERNAL MEDICINE CLINIC | Facility: OTHER | Age: 54
End: 2024-02-20
Payer: COMMERCIAL

## 2024-02-20 VITALS
OXYGEN SATURATION: 97 % | RESPIRATION RATE: 18 BRPM | BODY MASS INDEX: 26.36 KG/M2 | SYSTOLIC BLOOD PRESSURE: 98 MMHG | DIASTOLIC BLOOD PRESSURE: 62 MMHG | HEIGHT: 66 IN | WEIGHT: 164 LBS | HEART RATE: 71 BPM | TEMPERATURE: 98.9 F

## 2024-02-20 DIAGNOSIS — T45.1X5A CHEMOTHERAPY INDUCED NEUTROPENIA: ICD-10-CM

## 2024-02-20 DIAGNOSIS — D70.1 CHEMOTHERAPY INDUCED NEUTROPENIA: ICD-10-CM

## 2024-02-20 DIAGNOSIS — Z13.220 ENCOUNTER FOR LIPID SCREENING FOR CARDIOVASCULAR DISEASE: ICD-10-CM

## 2024-02-20 DIAGNOSIS — E04.1 THYROID NODULE: ICD-10-CM

## 2024-02-20 DIAGNOSIS — K21.9 GASTROESOPHAGEAL REFLUX DISEASE WITHOUT ESOPHAGITIS: ICD-10-CM

## 2024-02-20 DIAGNOSIS — D69.6 PLATELETS DECREASED (HCC): ICD-10-CM

## 2024-02-20 DIAGNOSIS — Z12.2 SCREENING FOR LUNG CANCER: ICD-10-CM

## 2024-02-20 DIAGNOSIS — Z12.5 PROSTATE CANCER SCREENING: ICD-10-CM

## 2024-02-20 DIAGNOSIS — Z00.00 ANNUAL PHYSICAL EXAM: Primary | ICD-10-CM

## 2024-02-20 DIAGNOSIS — Z13.6 ENCOUNTER FOR LIPID SCREENING FOR CARDIOVASCULAR DISEASE: ICD-10-CM

## 2024-02-20 PROBLEM — E83.42 HYPOMAGNESEMIA: Status: RESOLVED | Noted: 2023-02-16 | Resolved: 2024-02-20

## 2024-02-20 PROBLEM — E87.6 HYPOKALEMIA: Status: RESOLVED | Noted: 2023-02-16 | Resolved: 2024-02-20

## 2024-02-20 PROCEDURE — 99396 PREV VISIT EST AGE 40-64: CPT | Performed by: INTERNAL MEDICINE

## 2024-02-20 RX ORDER — OMEPRAZOLE 20 MG/1
20 CAPSULE, DELAYED RELEASE ORAL DAILY
Qty: 30 CAPSULE | Refills: 3 | Status: SHIPPED | OUTPATIENT
Start: 2024-02-20

## 2024-02-20 NOTE — PROGRESS NOTES
ADULT ANNUAL PHYSICAL  [facility] [place]    NAME: Matthieu Da Silva  AGE: 53 y.o. SEX: male  : 1970     DATE: 2024     Assessment and Plan:     Problem List Items Addressed This Visit          Digestive    GERD (gastroesophageal reflux disease)    Relevant Medications    omeprazole (PriLOSEC) 20 mg delayed release capsule       Endocrine    Thyroid nodule    Relevant Orders    TSH, 3rd generation with Free T4 reflex       Other    Annual physical exam - Primary     Discussed diet and exercise.  Lipid panel ordered for cardiovascular lipid screening.  PSA ordered for prostate cancer screening.  He is up-to-date on immunizations.  He would like to defer on influenza immunization at this time.         Chemotherapy induced neutropenia     RESOLVED: Platelets decreased (HCC)     Other Visit Diagnoses       Screening for lung cancer        Encounter for lipid screening for cardiovascular disease        Relevant Orders    Lipid panel    Prostate cancer screening        Relevant Orders    PSA, Total Screen            Immunizations and preventive care screenings were discussed with patient today. Appropriate education was printed on patient's after visit summary.    Discussed risks and benefits of prostate cancer screening. We discussed the controversial history of PSA screening for prostate cancer in the [place] as well as the risk of over detection and over treatment of prostate cancer by way of PSA screening.  The patient understands that PSA blood testing is an imperfect way to screen for prostate cancer and that elevated PSA levels in the blood may also be caused by infection, inflammation, prostatic trauma or manipulation, urological procedures, or by benign prostatic enlargement.    The role of the digital rectal examination in prostate cancer screening was also discussed and I discussed with him that there is large interobserver  variability in the findings of digital rectal examination.    Counseling:  Alcohol/drug use: discussed moderation in alcohol intake, the recommendations for healthy alcohol use, and avoidance of illicit drug use.  Dental Health: discussed importance of regular tooth brushing, flossing, and dental visits.  Injury prevention: discussed safety/seat belts, safety helmets, smoke detectors, carbon dioxide detectors, and smoking near bedding or upholstery.  Sexual health: discussed sexually transmitted diseases, partner selection, use of condoms, avoidance of unintended pregnancy, and contraceptive alternatives.  Exercise: the importance of regular exercise/physical activity was discussed. Recommend exercise 3-5 times per week for at least 30 minutes.       Depression Screening and Follow-up Plan: Patient was screened for depression during today's encounter. They screened negative with a PHQ-2 score of 0.    Tobacco Cessation Counseling: Tobacco cessation counseling was provided. The patient is sincerely urged to quit consumption of tobacco. He is ready to quit tobacco. Medication options and side effects of medication discussed. Patient refused medication. Vapes        Return in about 1 year (around 2/20/2025) for Annual physical.     Chief Complaint:     Chief Complaint   Patient presents with    Physical Exam     Annual - no labs     hm     Phq, shingles shot      History of Present Illness:     Adult Annual Physical   Patient here for a comprehensive physical exam. The patient reports no problems.    Diet and Physical Activity  Diet/Nutrition: well balanced diet.   Exercise: no formal exercise.      Depression Screening  PHQ-2/9 Depression Screening    Little interest or pleasure in doing things: 0 - not at all  Feeling down, depressed, or hopeless: 0 - not at all  PHQ-2 Score: 0  PHQ-2 Interpretation: Negative depression screen       General Health  Sleep: sleeps well and gets 7-8 hours of sleep on average.   Hearing:  normal - bilateral.  Vision: no vision problems, goes for regular eye exams, and wears glasses.   Dental: regular dental visits, brushes teeth twice daily, and flosses teeth occasionally.        Health  Symptoms include: erectile dysfunction     Review of Systems:     Review of Systems   Constitutional:  Negative for activity change, appetite change, chills, diaphoresis, fatigue and fever.   HENT:  Negative for congestion, postnasal drip, rhinorrhea, sinus pressure, sinus pain, sneezing and sore throat.    Eyes:  Negative for visual disturbance.   Respiratory:  Negative for apnea, cough, choking, chest tightness, shortness of breath and wheezing.    Cardiovascular:  Negative for chest pain, palpitations and leg swelling.   Gastrointestinal:  Negative for abdominal distention, abdominal pain, anal bleeding, blood in stool, constipation, diarrhea, nausea and vomiting.   Endocrine: Negative for cold intolerance and heat intolerance.   Genitourinary:  Negative for difficulty urinating, dysuria and hematuria.   Musculoskeletal: Negative.    Skin: Negative.    Neurological:  Negative for dizziness, weakness, light-headedness, numbness and headaches.   Hematological:  Negative for adenopathy.   Psychiatric/Behavioral:  Negative for agitation, sleep disturbance and suicidal ideas.    All other systems reviewed and are negative.     Past Medical History:     Past Medical History:   Diagnosis Date    Cancer (HCC)     Colon cancer (HCC)     Dysuria     Last Assessed:8/24/17    GERD (gastroesophageal reflux disease)     Liver cancer (HCC)     Liver nodule     Pulmonary nodule, right     Thyroid nodule 02/16/2023    Ureteropelvic junction (UPJ) obstruction 01/29/2020    Wears glasses       Past Surgical History:     Past Surgical History:   Procedure Laterality Date    ABDOMINAL SURGERY      COLON SURGERY      COLONOSCOPY N/A 01/22/2018    Procedure: COLONOSCOPY;  Surgeon: Casey Justice MD;  Location: [facility];  Service:  Colorectal    DENTAL SURGERY  2016    Crown with bridge work    FLEXIBLE SIGMOIDOSCOPY N/A 06/15/2018    Procedure: SIGMOIDOSCOPY FLEXIBLE w/o sedation w/ tattoo;  Surgeon: Casey Justice MD;  Location: [facility];  Service: Colorectal    IR PORT PLACEMENT Right     LAPAROTOMY N/A 06/08/2020    Procedure: LAPAROTOMY EXPLORATORY, LYSIS OF ADHESIONS;  Surgeon: Esequiel Mijares MD;  [facility];  Service: Surgical Oncology    LIVER LOBECTOMY N/A 06/21/2018    Procedure: liver resection/ablation, intraoperative ultrasound of liver;  Surgeon: Esequiel Mijares MD;  [facility];  Service: Surgical Oncology    LIVER LOBECTOMY N/A 06/08/2020    Procedure: LIVER RESECTION SEGMENT 3 WITH  INTRAOPERATIVE U/S OF LIVER;  Surgeon: Esequiel Mijares MD;  [facility];  Service: Surgical Oncology    LUNG SEGMENTECTOMY Right 8/9/2022    Procedure: RESECTION WEDGE LUNG, THERAPEUTIC;  Surgeon: Mary Reid MD;  [facility]: [facility];  Service: Thoracic    NC BRNCC INCL FLUOR GDNCE DX W/CELL WASHG SPX N/A 8/9/2022    Procedure: BRONCHOSCOPY FLEXIBLE;  Surgeon: Mary Reid MD;  [facility];  Service: Thoracic    NC COLECTOMY PARTIAL W/ANASTOMOSIS Left 06/21/2018    Procedure: hemicolectomy, low anterior resection (open) SPY fluorescence angiography;  Surgeon: Casey Justice MD;  [facility];  Service: Colorectal    NC EXPLORATORY LAPAROTOMY CELIOTOMY W/WO BIOPSY SPX N/A 06/21/2018    Procedure: LAPAROTOMY EXPLORATORY;  Surgeon: Casey Justice MD;  [facility];  Service: Colorectal    NC INSJ PRPH CTR VAD W/SUBQ PORT AGE 5 YR/> N/A 01/25/2018    Procedure: PORT-A-CATHETER PLACEMENT  Fluoroscopy for performance/interpretation;  Surgeon: Casey Justice MD;  [facility];  Service: Colorectal    NC PRCTECT PRTL RESCJ RECTUM TABDL APPR N/A 06/21/2018    Procedure: Partial proctectomy.;  Surgeon: Casey Justice MD;  [facility];  Service:  Colorectal    AR SIGMOIDOSCOPY FLX DX W/COLLJ SPEC BR/WA IF PFRMD N/A 2018    Procedure: SIGMOIDOSCOPY FLEXIBLE;  Surgeon: Casey Justice MD;  [facility];  Service: Colorectal    AR THORACOSCOPY W/THERA WEDGE RESEXN INITIAL UNILAT Right 2022    Procedure: THORACOSCOPY VIDEO ASSISTED SURGERY (VATS);  Surgeon: Mary Reid MD;  [facility];  Service: Thoracic    ROOT CANAL      TESTICLE SURGERY      Exploration of Undescended Testis rIght, age 10 had surgery for undescended testicle; Last Assessed:17    TOOTH EXTRACTION        Family History:     Family History   Problem Relation Age of Onset    No Known Problems Father     Cancer Mother         unknown    Cancer Brother         pt. reports brother was diagnosed with stage 4 throat cancer    Throat cancer Brother     Breast cancer Maternal Aunt       Social History:     Social History     Socioeconomic History    Marital status: Single     Spouse name: None    Number of children: None    Years of education: None    Highest education level: None   Occupational History    None   Tobacco Use    Smoking status: Former     Current packs/day: 0.00     Average packs/day: 0.5 packs/day for 25.0 years (12.5 ttl pk-yrs)     Types: Cigarettes     Start date:      Quit date: 2016     Years since quittin.1    Smokeless tobacco: Never   Vaping Use    Vaping status: Every Day    Start date: 10/10/2016    Substances: Nicotine, THC (at time), CBD (at times), Flavoring   Substance and Sexual Activity    Alcohol use: Yes     Comment: socially - 2 month    Drug use: Yes     Frequency: 1.0 times per week     Types: Marijuana    Sexual activity: None   Other Topics Concern    None   Social History Narrative    None     Social Determinants of Health     Financial Resource Strain: Not on file   Food Insecurity: Not on file   Transportation Needs: Not on file   Physical Activity: Not on file   Stress: Not on file   Social  "Connections: Not on file   Intimate Partner Violence: Not on file   Housing Stability: Not on file      Current Medications:     Current Outpatient Medications   Medication Sig Dispense Refill    Multiple Vitamins-Minerals (MENS MULTIVITAMIN PO) Take by mouth in the morning      omeprazole (PriLOSEC) 20 mg delayed release capsule Take 1 capsule (20 mg total) by mouth daily 30 capsule 3    sildenafil (VIAGRA) 50 MG tablet Take 1 tablet (50 mg total) by mouth as needed for erectile dysfunction 10 tablet 0     No current facility-administered medications for this visit.      Allergies:     No Known Allergies   Physical Exam:     BP 98/62 (BP Location: Left arm, Patient Position: Sitting, Cuff Size: Standard)   Pulse 71   Temp 98.9 °F (37.2 °C) (Temporal)   Resp 18   Ht 5' 6\" (1.676 m)   Wt 74.4 kg (164 lb)   SpO2 97%   BMI 26.47 kg/m²     Physical Exam  Vitals and nursing note reviewed.   Constitutional:       General: He is not in acute distress.     Appearance: Normal appearance. He is normal weight. He is not ill-appearing, toxic-appearing or diaphoretic.   HENT:      Head: Normocephalic and atraumatic.      Right Ear: Tympanic membrane, ear canal and external ear normal. There is no impacted cerumen.      Left Ear: Tympanic membrane, ear canal and external ear normal. There is no impacted cerumen.      Nose: Nose normal. No congestion or rhinorrhea.      Mouth/Throat:      Mouth: Mucous membranes are moist.      Pharynx: Oropharynx is clear. No oropharyngeal exudate or posterior oropharyngeal erythema.   Eyes:      General: No scleral icterus.        Right eye: No discharge.         Left eye: No discharge.      Extraocular Movements: Extraocular movements intact.      Conjunctiva/sclera: Conjunctivae normal.      Pupils: Pupils are equal, round, and reactive to light.   Neck:      Vascular: No carotid bruit.   Cardiovascular:      Rate and Rhythm: Normal rate and regular rhythm.      Pulses: Normal pulses. "      Heart sounds: Normal heart sounds. No murmur heard.     No friction rub. No gallop.   Pulmonary:      Effort: Pulmonary effort is normal. No respiratory distress.      Breath sounds: Normal breath sounds. No wheezing or rales.   Abdominal:      General: Abdomen is flat. Bowel sounds are normal. There is no distension.      Palpations: Abdomen is soft. There is no mass.      Tenderness: There is no abdominal tenderness. There is no guarding.      Hernia: No hernia is present.   Musculoskeletal:         General: No swelling, tenderness, deformity or signs of injury. Normal range of motion.      Cervical back: Normal range of motion and neck supple. No rigidity. No muscular tenderness.      Right lower leg: No edema.      Left lower leg: No edema.   Lymphadenopathy:      Cervical: No cervical adenopathy.   Skin:     General: Skin is warm and dry.      Capillary Refill: Capillary refill takes less than 2 seconds.      Coloration: Skin is not jaundiced or pale.      Findings: No bruising, erythema, lesion or rash.   Neurological:      General: No focal deficit present.      Mental Status: He is alert and oriented to person, place, and time. Mental status is at baseline.      Cranial Nerves: No cranial nerve deficit.      Sensory: No sensory deficit.      Motor: No weakness.      Coordination: Coordination normal.      Gait: Gait normal.      Deep Tendon Reflexes: Reflexes normal.   Psychiatric:         Mood and Affect: Mood normal.         Behavior: Behavior normal.         Thought Content: Thought content normal.         Judgment: Judgment normal.          Truong Berman MD  [facility]

## 2024-02-20 NOTE — ASSESSMENT & PLAN NOTE
Discussed diet and exercise.  Lipid panel ordered for cardiovascular lipid screening.  PSA ordered for prostate cancer screening.  He is up-to-date on immunizations.  He would like to defer on influenza immunization at this time.

## 2024-04-23 ENCOUNTER — TELEPHONE (OUTPATIENT)
Dept: SURGICAL ONCOLOGY | Facility: CLINIC | Age: 54
End: 2024-04-23

## 2024-04-23 ENCOUNTER — HOSPITAL ENCOUNTER (OUTPATIENT)
Dept: MRI IMAGING | Facility: HOSPITAL | Age: 54
Discharge: HOME | End: 2024-04-23
Payer: COMMERCIAL

## 2024-04-23 DIAGNOSIS — C78.7 COLON CANCER METASTASIZED TO LIVER (HCC): ICD-10-CM

## 2024-04-23 DIAGNOSIS — C18.9 COLON CANCER METASTASIZED TO LIVER (HCC): ICD-10-CM

## 2024-04-23 PROCEDURE — G1004 CDSM NDSC: HCPCS

## 2024-04-23 PROCEDURE — 74183 MRI ABD W/O CNTR FLWD CNTR: CPT

## 2024-04-23 PROCEDURE — A9585 GADOBUTROL INJECTION: HCPCS

## 2024-04-23 RX ORDER — GADOBUTROL 604.72 MG/ML
7 INJECTION INTRAVENOUS
Status: COMPLETED | OUTPATIENT
Start: 2024-04-23 | End: 2024-04-23

## 2024-04-23 RX ADMIN — GADOBUTROL 7 ML: 604.72 INJECTION INTRAVENOUS at 15:09

## 2024-04-30 ENCOUNTER — APPOINTMENT (OUTPATIENT)
Dept: LAB | Facility: MEDICAL CENTER | Age: 54
End: 2024-04-30
Payer: COMMERCIAL

## 2024-04-30 DIAGNOSIS — C18.9 COLON CANCER METASTASIZED TO LIVER (HCC): ICD-10-CM

## 2024-04-30 DIAGNOSIS — C78.7 COLON CANCER METASTASIZED TO LIVER (HCC): ICD-10-CM

## 2024-04-30 LAB
BUN SERPL-MCNC: 13 MG/DL (ref 5–25)
CEA SERPL-MCNC: 1.3 NG/ML (ref 0–3)
CREAT SERPL-MCNC: 0.92 MG/DL (ref 0.6–1.3)
GFR SERPL CREATININE-BSD FRML MDRD: 95 ML/MIN/1.73SQ M

## 2024-04-30 PROCEDURE — 84520 ASSAY OF UREA NITROGEN: CPT

## 2024-04-30 PROCEDURE — 82378 CARCINOEMBRYONIC ANTIGEN: CPT

## 2024-04-30 PROCEDURE — 82565 ASSAY OF CREATININE: CPT

## 2024-04-30 PROCEDURE — 36415 COLL VENOUS BLD VENIPUNCTURE: CPT

## 2024-05-30 ENCOUNTER — TELEPHONE (OUTPATIENT)
Dept: SURGICAL ONCOLOGY | Facility: CLINIC | Age: 54
End: 2024-05-30

## 2024-06-01 ENCOUNTER — APPOINTMENT (OUTPATIENT)
Dept: LAB | Facility: MEDICAL CENTER | Age: 54
End: 2024-06-01
Payer: COMMERCIAL

## 2024-06-01 DIAGNOSIS — C18.9 COLON CANCER METASTASIZED TO LIVER (HCC): ICD-10-CM

## 2024-06-01 DIAGNOSIS — C78.7 COLON CANCER METASTASIZED TO LIVER (HCC): ICD-10-CM

## 2024-06-01 LAB
ALBUMIN SERPL BCP-MCNC: 4.5 G/DL (ref 3.5–5)
ALP SERPL-CCNC: 45 U/L (ref 34–104)
ALT SERPL W P-5'-P-CCNC: 15 U/L (ref 7–52)
ANION GAP SERPL CALCULATED.3IONS-SCNC: 8 MMOL/L (ref 4–13)
AST SERPL W P-5'-P-CCNC: 19 U/L (ref 13–39)
BASOPHILS # BLD AUTO: 0.04 THOUSANDS/ÂΜL (ref 0–0.1)
BASOPHILS NFR BLD AUTO: 1 % (ref 0–1)
BILIRUB SERPL-MCNC: 0.59 MG/DL (ref 0.2–1)
BUN SERPL-MCNC: 17 MG/DL (ref 5–25)
CALCIUM SERPL-MCNC: 9.4 MG/DL (ref 8.4–10.2)
CEA SERPL-MCNC: 1 NG/ML (ref 0–3)
CHLORIDE SERPL-SCNC: 105 MMOL/L (ref 96–108)
CO2 SERPL-SCNC: 27 MMOL/L (ref 21–32)
CREAT SERPL-MCNC: 0.95 MG/DL (ref 0.6–1.3)
EOSINOPHIL # BLD AUTO: 0.08 THOUSAND/ÂΜL (ref 0–0.61)
EOSINOPHIL NFR BLD AUTO: 2 % (ref 0–6)
ERYTHROCYTE [DISTWIDTH] IN BLOOD BY AUTOMATED COUNT: 12.7 % (ref 11.6–15.1)
GFR SERPL CREATININE-BSD FRML MDRD: 92 ML/MIN/1.73SQ M
GLUCOSE P FAST SERPL-MCNC: 112 MG/DL (ref 65–99)
HCT VFR BLD AUTO: 43.9 % (ref 36.5–49.3)
HGB BLD-MCNC: 15.1 G/DL (ref 12–17)
IMM GRANULOCYTES # BLD AUTO: 0.01 THOUSAND/UL (ref 0–0.2)
IMM GRANULOCYTES NFR BLD AUTO: 0 % (ref 0–2)
LYMPHOCYTES # BLD AUTO: 1.7 THOUSANDS/ÂΜL (ref 0.6–4.47)
LYMPHOCYTES NFR BLD AUTO: 34 % (ref 14–44)
MCH RBC QN AUTO: 33.1 PG (ref 26.8–34.3)
MCHC RBC AUTO-ENTMCNC: 34.4 G/DL (ref 31.4–37.4)
MCV RBC AUTO: 96 FL (ref 82–98)
MONOCYTES # BLD AUTO: 0.39 THOUSAND/ÂΜL (ref 0.17–1.22)
MONOCYTES NFR BLD AUTO: 8 % (ref 4–12)
NEUTROPHILS # BLD AUTO: 2.73 THOUSANDS/ÂΜL (ref 1.85–7.62)
NEUTS SEG NFR BLD AUTO: 55 % (ref 43–75)
NRBC BLD AUTO-RTO: 0 /100 WBCS
PLATELET # BLD AUTO: 165 THOUSANDS/UL (ref 149–390)
PMV BLD AUTO: 11.7 FL (ref 8.9–12.7)
POTASSIUM SERPL-SCNC: 4.9 MMOL/L (ref 3.5–5.3)
PROT SERPL-MCNC: 7.5 G/DL (ref 6.4–8.4)
RBC # BLD AUTO: 4.56 MILLION/UL (ref 3.88–5.62)
SODIUM SERPL-SCNC: 140 MMOL/L (ref 135–147)
WBC # BLD AUTO: 4.95 THOUSAND/UL (ref 4.31–10.16)

## 2024-06-01 PROCEDURE — 80053 COMPREHEN METABOLIC PANEL: CPT

## 2024-06-01 PROCEDURE — 36415 COLL VENOUS BLD VENIPUNCTURE: CPT

## 2024-06-01 PROCEDURE — 85025 COMPLETE CBC W/AUTO DIFF WBC: CPT

## 2024-06-01 PROCEDURE — 82378 CARCINOEMBRYONIC ANTIGEN: CPT

## 2024-06-03 ENCOUNTER — OFFICE VISIT (OUTPATIENT)
Dept: HEMATOLOGY ONCOLOGY | Facility: CLINIC | Age: 54
End: 2024-06-03
Payer: COMMERCIAL

## 2024-06-03 VITALS
RESPIRATION RATE: 18 BRPM | DIASTOLIC BLOOD PRESSURE: 72 MMHG | BODY MASS INDEX: 25.23 KG/M2 | HEIGHT: 66 IN | OXYGEN SATURATION: 99 % | TEMPERATURE: 98.2 F | HEART RATE: 75 BPM | WEIGHT: 157 LBS | SYSTOLIC BLOOD PRESSURE: 100 MMHG

## 2024-06-03 DIAGNOSIS — C78.7 COLON CANCER METASTASIZED TO LIVER (HCC): Primary | ICD-10-CM

## 2024-06-03 DIAGNOSIS — C18.9 COLON CANCER METASTASIZED TO LIVER (HCC): Primary | ICD-10-CM

## 2024-06-03 PROBLEM — L70.8 ACNEIFORM RASH: Status: RESOLVED | Noted: 2018-11-29 | Resolved: 2024-06-03

## 2024-06-03 PROBLEM — C79.9 METASTASIS FROM COLON CANCER (HCC): Status: RESOLVED | Noted: 2022-04-11 | Resolved: 2024-06-03

## 2024-06-03 PROBLEM — Z08 ENCOUNTER FOR FOLLOW-UP EXAMINATION AFTER COMPLETED TREATMENT FOR MALIGNANT NEOPLASM: Status: RESOLVED | Noted: 2019-07-22 | Resolved: 2024-06-03

## 2024-06-03 PROCEDURE — 99213 OFFICE O/P EST LOW 20 MIN: CPT | Performed by: INTERNAL MEDICINE

## 2024-06-03 NOTE — PROGRESS NOTES
Hematology Outpatient Follow - Up Note  Matthieu Da Silva 53 y.o. male MRN: @ Encounter: [id number]        Date:  6/3/2024        Assessment/ Plan:    53-year-old  male who in January 2018 diagnosed with stage IV colon cancer with liver metastasis, K-jennifer wild-type, NRAS wild-type, MSI stable, no evidence of actionable mutation, genetic test did not show any inherited mutation, status post cetuximab and FOLFOX with surgical resection as both the primary and the liver metastasis on 5/2020 status post resection treated with FOLFIRI and cetuximab     Slowly growing lung nodules on January 2022 status post resection consistent with metastatic colorectal cancer of the right lower lobe of the lung and he received also FOLFIRI and cetuximab also another liver resection by the end of 2022 MRI on 4/2024 showed no evidence of recurrence of disease, he has normal CEA    Follow-up in 6 months with CAT scan of the chest abdomen pelvis, CBC, CMP, CEA    Flush the Port-A-Cath every 2-month        Labs and imaging studies are reviewed by ordering provider once results are available. If there are findings that need immediate attention, you will be contacted when results available.   Discussing results and the implication on your healthcare is best discussed in person at your follow-up visit.       HPI:  Matthieu Da Silva is a 54 yo male with stage IV colon cancer. He was diagnosed in January 2018.  He was treated with  modified FOLFox 6 and Erbitux. He then went for surgery on 06/21/2018 with a nice response.  Liver resection was positive for residual malignancy.  After surgery, treatment with 5 FU bolus, leucovorin, 5 FU pump and Erbitux was restarted on 07/24/2018 and he received 12 cycles of chemotherapy.  He was switched to single agent Erbitux for 6 doses. This was completed in January 2019. He was placed on observation     PET CT completed 5/21/20 showed that there were hypermetabolic left lateral liver metastases with minimal  activity in the hepatic dome treated metastases.  He is s/p  Resection  on June 8, 2020. Pathology revealed metastatic adenocarcinoma consistent with the patient's known colon primary.  Margins were negative for carcinoma.   He was treated with adjuvant with FOLFIRI plus Erbitux for 6 months, this was completed in early 2021.     He was again on observation until more recently. He had surveillance imaging completed in 7/2022 which showed a slowly growing lung nodule up to 7 mm concerning for a growing metastatic lesion. He underwent   a right thoracoscopic therapeutic lower lobe wedge resection on 8/9/22 and pathology was consistent with a   metastatic colon adenocarcinoma with tumor necrosis immunohistochemically consistent with metastases from the patient's known colon primary measuring 0.8 x 0.5 x 0.5 cm which was completely excised.     He then got 3 months of adjuvant treatment with FOLFIRI plus Erbitux  Previous Hematologic/ Oncologic History:          Oncology History   Colon cancer metastasized to liver (HCC)   1/2018 Initial Diagnosis     Malignant neoplasm of sigmoid colon (HCC)      1/22/2018 Biopsy     Large Intestine, Sigmoid Colon, Recto-sigmoid tumor bx:     - Invasive colonic adenocarcinoma, moderately differentiated      2/6/2018 - 10/16/2018 Chemotherapy     Modified FOLFOX6 x 12 cycles  Added Erbitux on 3/20/18       6/21/2018 Surgery     Segment 7 liver resection/ablation, hemicolectomy      10/30/2018 -  Chemotherapy     Continuation of Single agent Erbitux.  With 10/30/18 chemo, it was Cycle #14.  Plan was for 6 additional cycles of Erbitux alone.      3/2020 Genetic Testing     NEGATIVE for genes tested:     Test(s): [employer] + [employer] (34 genes): APC, ETHAN, BARD1, BRCA1, BRCA2, BRIP1, BMPR1A, CDH1, CDK4, CDKN2A, CHEK2, DICER1, EPCAM, GREM1, HOXB13, MLH1, MRE11A, MSH2, MSH6, MUTYH, NBN, NF1, PALB2, PMS2, POLD1, POLE, PTEN, RAD50, RAD51C, RAD51D, SMAD4, SMARCA4, STK11, TP53       6/8/2020  Surgery     Liver, segment 3 (wedge resection):  - Metastatic adenocarcinoma, consistent with the patient's known colon primary  - Margins negative for carcinoma       7/27/2020 - 1/11/2021 Chemotherapy     fluorouracil (ADRUCIL), 745 mg, Intravenous, Once, 6 of 6 cycles  Administration: 750 mg (7/27/2020), 750 mg (8/10/2020), 745 mg (8/24/2020), 745 mg (9/8/2020), 745 mg (9/21/2020), 745 mg (10/5/2020)  pegfilgrastim (NEULASTA), 6 mg, Subcutaneous, Once, 1 of 1 cycle  Administration: 6 mg (9/24/2020)  pegfilgrastim (NEULASTA ONPRO), 6 mg, Subcutaneous, Once, 6 of 6 cycles  Administration: 6 mg (7/29/2020), 6 mg (8/12/2020), 6 mg (8/26/2020), 6 mg (9/10/2020), 6 mg (10/7/2020)  cetuximab (ERBITUX), 930 mg, Intravenous, Once, 12 of 12 cycles  Administration: 900 mg (7/27/2020), 900 mg (8/10/2020), 900 mg (8/24/2020), 900 mg (9/8/2020), 900 mg (9/21/2020), 900 mg (10/5/2020), 900 mg (10/19/2020), 900 mg (11/2/2020), 900 mg (11/16/2020), 900 mg (11/30/2020), 900 mg (12/28/2020), 900 mg (1/11/2021)  irinotecan (CAMPTOSAR) chemo infusion, 335 mg, Intravenous, Once, 6 of 6 cycles  Administration: 340 mg (7/27/2020), 340 mg (8/10/2020), 340 mg (8/24/2020), 340 mg (9/8/2020), 340 mg (9/21/2020), 340 mg (10/5/2020)  leucovorin calcium IVPB, 744 mg, Intravenous, Once, 6 of 6 cycles  Administration: 750 mg (7/27/2020), 750 mg (8/10/2020), 750 mg (8/24/2020), 750 mg (9/8/2020), 750 mg (9/21/2020), 740 mg (10/5/2020)  fluorouracil (ADRUCIL) ambulatory infusion Soln, 1,200 mg/m2/day = 4,465 mg, Intravenous, Over 46 hours, 6 of 6 cycles  Dose modification: 1,200 mg/m2/day (original dose 1,200 mg/m2/day, Cycle 3)      9/20/2022 -  Chemotherapy     palonosetron (ALOXI), 0.25 mg, Intravenous, Once, 4 of 4 cycles  Administration: 0.25 mg (10/18/2022), 0.25 mg (11/1/2022), 0.25 mg (11/15/2022), 0.25 mg (11/29/2022)  pegfilgrastim (NEULASTA ONPRO), 6 mg, Subcutaneous, Once, 6 of 6 cycles  Administration: 6 mg (9/22/2022), 6 mg  (10/20/2022), 6 mg (11/3/2022), 6 mg (11/17/2022), 6 mg (12/1/2022)  Pegfilgrastim-bmez (ZIEXTENZO), 6 mg, Subcutaneous, Once, 1 of 1 cycle  Administration: 6 mg (10/7/2022)  fosaprepitant (EMEND) IVPB, 150 mg, Intravenous, Once, 4 of 4 cycles  Administration: 150 mg (10/18/2022), 150 mg (11/1/2022), 150 mg (11/15/2022), 150 mg (11/29/2022)  fluorouracil (ADRUCIL), 400 mg/m2 = 735 mg, Intravenous, Once, 6 of 6 cycles  Administration: 735 mg (9/20/2022), 735 mg (10/4/2022), 730 mg (10/18/2022), 730 mg (11/1/2022), 730 mg (11/15/2022), 730 mg (11/29/2022)  cetuximab (ERBITUX), 920 mg, Intravenous, Once, 6 of 6 cycles  Administration: 900 mg (9/20/2022), 900 mg (10/4/2022), 900 mg (10/18/2022), 900 mg (11/1/2022), 900 mg (11/15/2022), 900 mg (11/29/2022)  irinotecan (CAMPTOSAR) chemo infusion, 331 mg, Intravenous, Once, 6 of 6 cycles  Administration: 320 mg (9/20/2022), 320 mg (10/4/2022), 320 mg (10/18/2022), 320 mg (11/1/2022), 320 mg (11/15/2022), 320 mg (11/29/2022)  leucovorin calcium IVPB, 736 mg, Intravenous, Once, 6 of 6 cycles  Administration: 700 mg (9/20/2022), 700 mg (10/4/2022), 750 mg (10/18/2022), 750 mg (11/1/2022), 700 mg (11/15/2022), 700 mg (11/29/2022)  fluorouracil (ADRUCIL) ambulatory infusion Soln, 1,200 mg/m2/day = 4,415 mg, Intravenous, Over 46 hours, 6 of 6 cycles      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 (HCC)   4/11/2022 Initial Diagnosis     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 (HCC)      9/20/2022 -  Chemotherapy     palonosetron (ALOXI), 0.25 mg, Intravenous, Once, 4 of 4 cycles  Administration: 0.25 mg (10/18/2022), 0.25 mg (11/1/2022), 0.25 mg (11/15/2022), 0.25 mg (11/29/2022)  pegfilgrastim (NEULASTA ONPRO), 6 mg, Subcutaneous, Once, 6 of 6 cycles  Administration: 6 mg (9/22/2022), 6 mg (10/20/2022), 6 mg (11/3/2022), 6 mg (11/17/2022), 6 mg (12/1/2022)  Pegfilgrastim-bmez (ZIEXTENZO), 6 mg, Subcutaneous, Once, 1 of 1 cycle  Administration: 6 mg (10/7/2022)  fosaprepitant (EMEND) IVPB, 150 mg,  Intravenous, Once, 4 of 4 cycles  Administration: 150 mg (10/18/2022), 150 mg (11/1/2022), 150 mg (11/15/2022), 150 mg (11/29/2022)  fluorouracil (ADRUCIL), 400 mg/m2 = 735 mg, Intravenous, Once, 6 of 6 cycles  Administration: 735 mg (9/20/2022), 735 mg (10/4/2022), 730 mg (10/18/2022), 730 mg (11/1/2022), 730 mg (11/15/2022), 730 mg (11/29/2022)  cetuximab (ERBITUX), 920 mg, Intravenous, Once, 6 of 6 cycles  Administration: 900 mg (9/20/2022), 900 mg (10/4/2022), 900 mg (10/18/2022), 900 mg (11/1/2022), 900 mg (11/15/2022), 900 mg (11/29/2022)  irinotecan (CAMPTOSAR) chemo infusion, 331 mg, Intravenous, Once, 6 of 6 cycles  Administration: 320 mg (9/20/2022), 320 mg (10/4/2022), 320 mg (10/18/2022), 320 mg (11/1/2022), 320 mg (11/15/2022), 320 mg (11/29/2022)  leucovorin calcium IVPB, 736 mg, Intravenous, Once, 6 of 6 cycles  Administration: 700 mg (9/20/2022), 700 mg (10/4/2022), 750 mg (10/18/2022), 750 mg (11/1/2022), 700 mg (11/15/2022), 700 mg (11/29/2022)  fluorouracil (ADRUCIL) ambulatory infusion Soln, 1,200 mg/m2/day = 4,415 mg, Intravenous, Over 46 hours, 6 of 6 cycles      Malignant neoplasm metastatic to right lung (HCC)   8/4/2022 Initial Diagnosis     Malignant neoplasm metastatic to right lung (HCC)      8/9/2022 Surgery     Flexible bronchoscopy, right thoracoscopic therapeutic wedge resection       9/20/2022 -  Chemotherapy     palonosetron (ALOXI), 0.25 mg, Intravenous, Once, 4 of 4 cycles  Administration: 0.25 mg (10/18/2022), 0.25 mg (11/1/2022), 0.25 mg (11/15/2022), 0.25 mg (11/29/2022)  pegfilgrastim (NEULASTA ONPRO), 6 mg, Subcutaneous, Once, 6 of 6 cycles  Administration: 6 mg (9/22/2022), 6 mg (10/20/2022), 6 mg (11/3/2022), 6 mg (11/17/2022), 6 mg (12/1/2022)  Pegfilgrastim-bmez (ZIEXTENZO), 6 mg, Subcutaneous, Once, 1 of 1 cycle  Administration: 6 mg (10/7/2022)  fosaprepitant (EMEND) IVPB, 150 mg, Intravenous, Once, 4 of 4 cycles  Administration: 150 mg (10/18/2022), 150 mg (11/1/2022),  150 mg (11/15/2022), 150 mg (11/29/2022)  fluorouracil (ADRUCIL), 400 mg/m2 = 735 mg, Intravenous, Once, 6 of 6 cycles  Administration: 735 mg (9/20/2022), 735 mg (10/4/2022), 730 mg (10/18/2022), 730 mg (11/1/2022), 730 mg (11/15/2022), 730 mg (11/29/2022)  cetuximab (ERBITUX), 920 mg, Intravenous, Once, 6 of 6 cycles  Administration: 900 mg (9/20/2022), 900 mg (10/4/2022), 900 mg (10/18/2022), 900 mg (11/1/2022), 900 mg (11/15/2022), 900 mg (11/29/2022)  irinotecan (CAMPTOSAR) chemo infusion, 331 mg, Intravenous, Once, 6 of 6 cycles  Administration: 320 mg (9/20/2022), 320 mg (10/4/2022), 320 mg (10/18/2022), 320 mg (11/1/2022), 320 mg (11/15/2022), 320 mg (11/29/2022)  leucovorin calcium IVPB, 736 mg, Intravenous, Once, 6 of 6 cycles  Administration: 700 mg (9/20/2022), 700 mg (10/4/2022), 750 mg (10/18/2022), 750 mg (11/1/2022), 700 mg (11/15/2022), 700 mg (11/29/2022)  fluorouracil (ADRUCIL) ambulatory infusion Soln, 1,200 mg/m2/day = 4,415 mg, Intravenous, Over 46 hours, 6 of 6 cycles      MFOLFOX6 (5-FU 2400 mg/m² over 46 hours, 5- mg meter squared bolus, leucovorin 400 mg meter squared bolus along with oxaliplatin at 85 mg/m²) with Neulasta Support  Dose #1 2/6/2018. CARIS testing performed.  KRAS and NRAS WildType.  Erbitux 500mg IV every 2 weeks. This was added on 20th of March 2018 (4th cycle).In total he received 8 doses of modified FOLFOX 6, 5 of which he got with Erbitux.     Patient received 5-FU 2400 mg/m², Erbitux 500 mg meter square, Leucovorin 400 mg meter square, 5- M square, Dose #12 in aggregate On 16 October 2019.     Single agent Erbitux. Dose #6 was on 8 January 2019.      liver metastatic disease which was resected     FOLFIRI plus Erbitux completed 6 cycles on October 7, 2020     Single agent Erbitux for 3 months completed on the 11th of January 2021     3 months of  FOLFIRI plus Erbitux after liver resection in the end of 2022    Interval History:        Previous Treatment:   "       Test Results:    Imaging: No results found.    Labs:   Lab Results   Component Value Date    WBC 4.95 06/01/2024    HGB 15.1 06/01/2024    HCT 43.9 06/01/2024    MCV 96 06/01/2024     06/01/2024     Lab Results   Component Value Date     08/26/2017    K 4.9 06/01/2024     06/01/2024    CO2 27 06/01/2024    BUN 17 06/01/2024    CREATININE 0.95 06/01/2024    GLUCOSE 124 12/01/2017    GLUF 112 (H) 06/01/2024    CALCIUM 9.4 06/01/2024    CORRECTEDCA 10.0 10/17/2020    AST 19 06/01/2024    ALT 15 06/01/2024    ALKPHOS 45 06/01/2024    PROT 7.2 08/26/2017    BILITOT 0.6 08/26/2017    EGFR 92 06/01/2024       No results found for: \"IRON\", \"TIBC\", \"FERRITIN\"    No results found for: \"RZGAJYAI84\"      ROS: Review of Systems   Constitutional: Negative.  Negative for appetite change, chills, diaphoresis, fatigue, fever and unexpected weight change.   HENT:   Negative for hearing loss, lump/mass, mouth sores, nosebleeds, sore throat, trouble swallowing and voice change.    Eyes: Negative.  Negative for eye problems and icterus.   Respiratory: Negative.  Negative for chest tightness, cough, hemoptysis and shortness of breath.    Cardiovascular:  Negative for chest pain and leg swelling.   Gastrointestinal:  Negative for abdominal distention, abdominal pain, blood in stool, constipation, diarrhea and nausea.   Endocrine: Negative.    Genitourinary:  Negative for dysuria, frequency, hematuria and pelvic pain.    Musculoskeletal: Negative.  Negative for arthralgias, back pain, flank pain, gait problem, myalgias and neck stiffness.   Skin:  Negative for itching and rash.   Neurological:  Negative for dizziness, gait problem, headaches, light-headedness, numbness and speech difficulty.   Hematological:  Negative for adenopathy. Does not bruise/bleed easily.   Psychiatric/Behavioral:  Negative for confusion, decreased concentration, depression and sleep disturbance. The patient is not nervous/anxious.     "       Current Medications: Reviewed  Allergies: Reviewed  PMH/FH/SH:  Reviewed      Physical Exam:    Body surface area is 1.8 meters squared.    Wt Readings from Last 3 Encounters:   06/03/24 71.2 kg (157 lb)   02/20/24 74.4 kg (164 lb)   02/02/24 75.3 kg (166 lb)        Temp Readings from Last 3 Encounters:   06/03/24 98.2 °F (36.8 °C) (Tympanic)   02/20/24 98.9 °F (37.2 °C) (Temporal)   02/02/24 98.8 °F (37.1 °C) (Temporal)        BP Readings from Last 3 Encounters:   06/03/24 100/72   02/20/24 98/62   02/02/24 122/86         Pulse Readings from Last 3 Encounters:   06/03/24 75   02/20/24 71   02/02/24 70        Physical Exam  Vitals reviewed.   Constitutional:       General: He is not in acute distress.     Appearance: He is well-developed. He is not diaphoretic.   HENT:      Head: Normocephalic and atraumatic.   Eyes:      Conjunctiva/sclera: Conjunctivae normal.   Neck:      Trachea: No tracheal deviation.   Cardiovascular:      Rate and Rhythm: Normal rate and regular rhythm.      Heart sounds: No murmur heard.     No friction rub. No gallop.   Pulmonary:      Effort: Pulmonary effort is normal. No respiratory distress.      Breath sounds: Normal breath sounds. No wheezing or rales.   Chest:      Chest wall: No tenderness.   Abdominal:      General: There is no distension.      Palpations: Abdomen is soft.      Tenderness: There is no abdominal tenderness.   Musculoskeletal:      Cervical back: Normal range of motion and neck supple.      Right lower leg: No edema.      Left lower leg: No edema.   Lymphadenopathy:      Cervical: No cervical adenopathy.   Skin:     General: Skin is warm and dry.      Coloration: Skin is not pale.      Findings: No erythema.   Neurological:      Mental Status: He is alert. Mental status is at baseline.   Psychiatric:         Behavior: Behavior normal.         Thought Content: Thought content normal.         ECOG PS:0    Goals and Barriers:  Current Goal: Minimize effects of  disease.   Barriers: None.      Patient's Capacity to Self Care:  Patient is able to self care.    Code Status: @[facility]@

## 2024-06-06 ENCOUNTER — OFFICE VISIT (OUTPATIENT)
Dept: SURGICAL ONCOLOGY | Facility: CLINIC | Age: 54
End: 2024-06-06
Payer: COMMERCIAL

## 2024-06-06 VITALS
HEART RATE: 69 BPM | WEIGHT: 160 LBS | DIASTOLIC BLOOD PRESSURE: 70 MMHG | BODY MASS INDEX: 25.71 KG/M2 | TEMPERATURE: 99.1 F | HEIGHT: 66 IN | OXYGEN SATURATION: 98 % | SYSTOLIC BLOOD PRESSURE: 110 MMHG

## 2024-06-06 DIAGNOSIS — Z85.89 HISTORY OF KNOWN METASTASIS TO LIVER: ICD-10-CM

## 2024-06-06 DIAGNOSIS — Z85.038 HISTORY OF COLON CANCER, STAGE IV: ICD-10-CM

## 2024-06-06 DIAGNOSIS — Z08 ENCOUNTER FOR FOLLOW-UP EXAMINATION AFTER COMPLETED TREATMENT FOR MALIGNANT NEOPLASM: Primary | ICD-10-CM

## 2024-06-06 PROCEDURE — 99213 OFFICE O/P EST LOW 20 MIN: CPT

## 2024-06-06 NOTE — PROGRESS NOTES
Surgical Oncology Follow Up       [facility] SURGICAL ONCOLOGY EN  [address]  [place] [address]    Matthieu Da Silva  1970  [id number]  [facility] SURGICAL ONCOLOGY EN  [address]  [place] [address]    Diagnoses and all orders for this visit:    Encounter for follow-up examination after completed treatment for malignant neoplasm    History of colon cancer, stage IV  -     MRI abdomen w wo contrast; Future  -     BUN; Future  -     Creatinine, serum; Future  -     CEA; Future    History of known metastasis to liver  -     MRI abdomen w wo contrast; Future        Chief Complaint   Patient presents with    Follow-up       Return in about 6 months (around 12/6/2024) for Office visit with Dr Mijares, Labs - See Treatment Plan, Imaging - See orders.      Oncology History   History of colon cancer, stage IV   1/2018 Initial Diagnosis    Malignant neoplasm of sigmoid colon (HCC)     1/22/2018 Biopsy    Large Intestine, Sigmoid Colon, Recto-sigmoid tumor bx:    - Invasive colonic adenocarcinoma, moderately differentiated     2/6/2018 - 10/16/2018 Chemotherapy    Modified FOLFOX6 x 12 cycles  Added Erbitux on 3/20/18      6/21/2018 Surgery    Segment 7 liver resection/ablation, hemicolectomy     6/21/2018 -  Cancer Staged    Staging form: Colon and Rectum, AJCC 8th Edition  - Pathologic stage from 6/21/2018: Stage WERO (ypT0, pN0, cM1a) - Signed by STEPHANIE Wills on 10/16/2023  Stage prefix: Post-therapy  Total positive nodes: 0  Sites of metastasis: Liver       10/30/2018 -  Chemotherapy    Continuation of Single agent Erbitux.  With 10/30/18 chemo, it was Cycle #14.  Plan was for 6 additional cycles of Erbitux alone.     3/2020 Genetic Testing    NEGATIVE for genes tested:    Test(s): Cancer[employer] + RNA[employer]ight (34 genes): APC, ETHAN, BARD1, BRCA1, BRCA2, BRIP1, BMPR1A, CDH1, CDK4, CDKN2A, CHEK2,  DICER1, EPCAM, GREM1, HOXB13, MLH1, MRE11A, MSH2, MSH6, MUTYH, NBN, NF1, PALB2, PMS2, POLD1, POLE, PTEN, RAD50, RAD51C, RAD51D, SMAD4, SMARCA4, STK11, TP53      6/8/2020 Surgery    Liver, segment 3 (wedge resection):  - Metastatic adenocarcinoma, consistent with the patient's known colon primary  - Margins negative for carcinoma      7/27/2020 - 1/11/2021 Chemotherapy    fluorouracil (ADRUCIL), 745 mg, Intravenous, Once, 6 of 6 cycles  Administration: 750 mg (7/27/2020), 750 mg (8/10/2020), 745 mg (8/24/2020), 745 mg (9/8/2020), 745 mg (9/21/2020), 745 mg (10/5/2020)  pegfilgrastim (NEULASTA), 6 mg, Subcutaneous, Once, 1 of 1 cycle  Administration: 6 mg (9/24/2020)  pegfilgrastim (NEULASTA ONPRO), 6 mg, Subcutaneous, Once, 6 of 6 cycles  Administration: 6 mg (7/29/2020), 6 mg (8/12/2020), 6 mg (8/26/2020), 6 mg (9/10/2020), 6 mg (10/7/2020)  cetuximab (ERBITUX), 930 mg, Intravenous, Once, 12 of 12 cycles  Administration: 900 mg (7/27/2020), 900 mg (8/10/2020), 900 mg (8/24/2020), 900 mg (9/8/2020), 900 mg (9/21/2020), 900 mg (10/5/2020), 900 mg (10/19/2020), 900 mg (11/2/2020), 900 mg (11/16/2020), 900 mg (11/30/2020), 900 mg (12/28/2020), 900 mg (1/11/2021)  irinotecan (CAMPTOSAR) chemo infusion, 335 mg, Intravenous, Once, 6 of 6 cycles  Administration: 340 mg (7/27/2020), 340 mg (8/10/2020), 340 mg (8/24/2020), 340 mg (9/8/2020), 340 mg (9/21/2020), 340 mg (10/5/2020)  leucovorin calcium IVPB, 744 mg, Intravenous, Once, 6 of 6 cycles  Administration: 750 mg (7/27/2020), 750 mg (8/10/2020), 750 mg (8/24/2020), 750 mg (9/8/2020), 750 mg (9/21/2020), 740 mg (10/5/2020)  fluorouracil (ADRUCIL) ambulatory infusion Soln, 1,200 mg/m2/day = 4,465 mg, Intravenous, Over 46 hours, 6 of 6 cycles  Dose modification: 1,200 mg/m2/day (original dose 1,200 mg/m2/day, Cycle 3)     9/20/2022 - 12/1/2022 Chemotherapy    palonosetron (ALOXI), 0.25 mg, Intravenous, Once, 4 of 4 cycles  Administration: 0.25 mg (10/18/2022), 0.25 mg  (11/1/2022), 0.25 mg (11/15/2022), 0.25 mg (11/29/2022)  pegfilgrastim (NEULASTA ONPRO), 6 mg, Subcutaneous, Once, 6 of 6 cycles  Administration: 6 mg (9/22/2022), 6 mg (10/20/2022), 6 mg (11/3/2022), 6 mg (11/17/2022), 6 mg (12/1/2022)  Pegfilgrastim-bmez (ZIEXTENZO), 6 mg, Subcutaneous, Once, 1 of 1 cycle  Administration: 6 mg (10/7/2022)  fosaprepitant (EMEND) IVPB, 150 mg, Intravenous, Once, 4 of 4 cycles  Administration: 150 mg (10/18/2022), 150 mg (11/1/2022), 150 mg (11/15/2022), 150 mg (11/29/2022)  fluorouracil (ADRUCIL), 400 mg/m2 = 735 mg, Intravenous, Once, 6 of 6 cycles  Administration: 735 mg (9/20/2022), 735 mg (10/4/2022), 730 mg (10/18/2022), 730 mg (11/1/2022), 730 mg (11/15/2022), 730 mg (11/29/2022)  cetuximab (ERBITUX), 920 mg, Intravenous, Once, 6 of 6 cycles  Administration: 900 mg (9/20/2022), 900 mg (10/4/2022), 900 mg (10/18/2022), 900 mg (11/1/2022), 900 mg (11/15/2022), 900 mg (11/29/2022)  irinotecan (CAMPTOSAR) chemo infusion, 331 mg, Intravenous, Once, 6 of 6 cycles  Administration: 320 mg (9/20/2022), 320 mg (10/4/2022), 320 mg (10/18/2022), 320 mg (11/1/2022), 320 mg (11/15/2022), 320 mg (11/29/2022)  leucovorin calcium IVPB, 736 mg, Intravenous, Once, 6 of 6 cycles  Administration: 700 mg (9/20/2022), 700 mg (10/4/2022), 750 mg (10/18/2022), 750 mg (11/1/2022), 700 mg (11/15/2022), 700 mg (11/29/2022)  fluorouracil (ADRUCIL) ambulatory infusion Soln, 1,200 mg/m2/day = 4,415 mg, Intravenous, Over 46 hours, 6 of 6 cycles     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 (HCC) (Resolved)   4/11/2022 Initial Diagnosis    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 (HCC)     9/20/2022 - 12/1/2022 Chemotherapy    palonosetron (ALOXI), 0.25 mg, Intravenous, Once, 4 of 4 cycles  Administration: 0.25 mg (10/18/2022), 0.25 mg (11/1/2022), 0.25 mg (11/15/2022), 0.25 mg (11/29/2022)  pegfilgrastim (NEULASTA ONPRO), 6 mg, Subcutaneous, Once, 6 of 6 cycles  Administration: 6 mg (9/22/2022), 6 mg (10/20/2022), 6 mg (11/3/2022),  6 mg (11/17/2022), 6 mg (12/1/2022)  Pegfilgrastim-bmez (ZIEXTENZO), 6 mg, Subcutaneous, Once, 1 of 1 cycle  Administration: 6 mg (10/7/2022)  fosaprepitant (EMEND) IVPB, 150 mg, Intravenous, Once, 4 of 4 cycles  Administration: 150 mg (10/18/2022), 150 mg (11/1/2022), 150 mg (11/15/2022), 150 mg (11/29/2022)  fluorouracil (ADRUCIL), 400 mg/m2 = 735 mg, Intravenous, Once, 6 of 6 cycles  Administration: 735 mg (9/20/2022), 735 mg (10/4/2022), 730 mg (10/18/2022), 730 mg (11/1/2022), 730 mg (11/15/2022), 730 mg (11/29/2022)  cetuximab (ERBITUX), 920 mg, Intravenous, Once, 6 of 6 cycles  Administration: 900 mg (9/20/2022), 900 mg (10/4/2022), 900 mg (10/18/2022), 900 mg (11/1/2022), 900 mg (11/15/2022), 900 mg (11/29/2022)  irinotecan (CAMPTOSAR) chemo infusion, 331 mg, Intravenous, Once, 6 of 6 cycles  Administration: 320 mg (9/20/2022), 320 mg (10/4/2022), 320 mg (10/18/2022), 320 mg (11/1/2022), 320 mg (11/15/2022), 320 mg (11/29/2022)  leucovorin calcium IVPB, 736 mg, Intravenous, Once, 6 of 6 cycles  Administration: 700 mg (9/20/2022), 700 mg (10/4/2022), 750 mg (10/18/2022), 750 mg (11/1/2022), 700 mg (11/15/2022), 700 mg (11/29/2022)  fluorouracil (ADRUCIL) ambulatory infusion Soln, 1,200 mg/m2/day = 4,415 mg, Intravenous, Over 46 hours, 6 of 6 cycles     Malignant neoplasm metastatic to right lung (HCC)   8/4/2022 Initial Diagnosis    Malignant neoplasm metastatic to right lung (HCC)     8/9/2022 Surgery    Flexible bronchoscopy, right thoracoscopic therapeutic wedge resection      9/20/2022 - 12/1/2022 Chemotherapy    palonosetron (ALOXI), 0.25 mg, Intravenous, Once, 4 of 4 cycles  Administration: 0.25 mg (10/18/2022), 0.25 mg (11/1/2022), 0.25 mg (11/15/2022), 0.25 mg (11/29/2022)  pegfilgrastim (NEULASTA ONPRO), 6 mg, Subcutaneous, Once, 6 of 6 cycles  Administration: 6 mg (9/22/2022), 6 mg (10/20/2022), 6 mg (11/3/2022), 6 mg (11/17/2022), 6 mg (12/1/2022)  Pegfilgrastim-bmez (ZIEXTENZO), 6 mg,  Subcutaneous, Once, 1 of 1 cycle  Administration: 6 mg (10/7/2022)  fosaprepitant (EMEND) IVPB, 150 mg, Intravenous, Once, 4 of 4 cycles  Administration: 150 mg (10/18/2022), 150 mg (11/1/2022), 150 mg (11/15/2022), 150 mg (11/29/2022)  fluorouracil (ADRUCIL), 400 mg/m2 = 735 mg, Intravenous, Once, 6 of 6 cycles  Administration: 735 mg (9/20/2022), 735 mg (10/4/2022), 730 mg (10/18/2022), 730 mg (11/1/2022), 730 mg (11/15/2022), 730 mg (11/29/2022)  cetuximab (ERBITUX), 920 mg, Intravenous, Once, 6 of 6 cycles  Administration: 900 mg (9/20/2022), 900 mg (10/4/2022), 900 mg (10/18/2022), 900 mg (11/1/2022), 900 mg (11/15/2022), 900 mg (11/29/2022)  irinotecan (CAMPTOSAR) chemo infusion, 331 mg, Intravenous, Once, 6 of 6 cycles  Administration: 320 mg (9/20/2022), 320 mg (10/4/2022), 320 mg (10/18/2022), 320 mg (11/1/2022), 320 mg (11/15/2022), 320 mg (11/29/2022)  leucovorin calcium IVPB, 736 mg, Intravenous, Once, 6 of 6 cycles  Administration: 700 mg (9/20/2022), 700 mg (10/4/2022), 750 mg (10/18/2022), 750 mg (11/1/2022), 700 mg (11/15/2022), 700 mg (11/29/2022)  fluorouracil (ADRUCIL) ambulatory infusion Soln, 1,200 mg/m2/day = 4,415 mg, Intravenous, Over 46 hours, 6 of 6 cycles         Staging:  Metastatic rectosigmoid cancer  [id number]  Treatment history:   Modified FOLFOX 6   Erbitux added 03/20/2018   Segment 7 liver resection, June 2018  Segment 3 mass liver resection, June 2020  Resection of a right lower lobe pulmonary metastasis, August 2022  Chemotherapy with Erbitux  Current treatment:  Observation  Disease status:   LUTHER      History of Present Illness: The patient returns to the office today in follow-up for his history of metastatic colon cancer.  He is currently LUTHER at just under 2 years from his most recent recurrence in the right lung.  He reports he is feeling well overall.  He denies any weight loss or abdominal pain and is maintaining his weight.  He denies any new shortness of breath, headaches,  dizziness or bowel changes.  Colonoscopy performed in January showed no abnormalities.  MRI of the abdomen was performed on April 23, and a CEA level has been drawn.  I have reviewed these results myself and discussed them with the patient today.      Review of Systems   Constitutional:  Negative for activity change, appetite change, fatigue and unexpected weight change.   HENT: Negative.     Respiratory: Negative.  Negative for shortness of breath.    Cardiovascular: Negative.    Gastrointestinal: Negative.  Negative for abdominal distention, abdominal pain, nausea and vomiting.   Musculoskeletal: Negative.    Skin: Negative.  Negative for color change and wound.   Neurological: Negative.  Negative for dizziness and headaches.   Hematological: Negative.  Negative for adenopathy.   Psychiatric/Behavioral: Negative.               Patient Active Problem List   Diagnosis    ED (erectile dysfunction)    History of colon cancer, stage IV    GERD (gastroesophageal reflux disease)    Pulmonary nodule, right    Encounter for follow-up examination after completed treatment for malignant neoplasm    Other hydronephrosis    Annual physical exam    Vapes nicotine containing substance    Malignant neoplasm metastatic to right lung (HCC)    Chemotherapy induced neutropenia (HCC)    Thyroid nodule    History of known metastasis to liver     Past Medical History:   Diagnosis Date    Cancer (HCC)     Colon cancer (HCC)     Dysuria     Last Assessed:8/24/17    GERD (gastroesophageal reflux disease)     Liver cancer (HCC)     Liver nodule     Pulmonary nodule, right     Thyroid nodule 02/16/2023    Ureteropelvic junction (UPJ) obstruction 01/29/2020    Wears glasses      Past Surgical History:   Procedure Laterality Date    ABDOMINAL SURGERY      COLON SURGERY      COLONOSCOPY N/A 01/22/2018    Procedure: COLONOSCOPY;  Surgeon: Casey Justice MD;  Location: [facility];  Service: Colorectal    DENTAL SURGERY  2016    [place] with  bridge work    FLEXIBLE SIGMOIDOSCOPY N/A 06/15/2018    Procedure: SIGMOIDOSCOPY FLEXIBLE w/o sedation w/ tattoo;  Surgeon: Casey Justice MD;  Location: [facility];  Service: Colorectal    IR PORT PLACEMENT Right     LAPAROTOMY N/A 06/08/2020    Procedure: LAPAROTOMY EXPLORATORY, LYSIS OF ADHESIONS;  Surgeon: Esequiel Mijares MD;  [facility];  Service: Surgical Oncology    LIVER LOBECTOMY N/A 06/21/2018    Procedure: liver resection/ablation, intraoperative ultrasound of liver;  Surgeon: Esequiel Mijares MD;  [facility];  Service: Surgical Oncology    LIVER LOBECTOMY N/A 06/08/2020    Procedure: LIVER RESECTION SEGMENT 3 WITH  INTRAOPERATIVE U/S OF LIVER;  Surgeon: Esequiel Mijares MD;  [facility];  Service: Surgical Oncology    LUNG SEGMENTECTOMY Right 8/9/2022    Procedure: RESECTION WEDGE LUNG, THERAPEUTIC;  Surgeon: Mary Reid MD;  [facility];  Service: Thoracic    [facility]/CELL WASHG SPX N/A 8/9/2022    Procedure: BRONCHOSCOPY FLEXIBLE;  Surgeon: Mary Reid MD;  [facility];  Service: Thoracic    TX COLECTOMY PARTIAL W/ANASTOMOSIS Left 06/21/2018    Procedure: hemicolectomy, low anterior resection (open) SPY fluorescence angiography;  Surgeon: Casey Justice MD;  [facility];  Service: Colorectal    TX EXPLORATORY LAPAROTOMY CELIOTOMY W/WO BIOPSY SPX N/A 06/21/2018    Procedure: LAPAROTOMY EXPLORATORY;  Surgeon: Casey Justice MD;  [facility];  Service: Colorectal    TX INSJ PRPH CTR VAD W/SUBQ PORT AGE 5 YR/> N/A 01/25/2018    Procedure: PORT-A-CATHETER PLACEMENT  Fluoroscopy for performance/interpretation;  Surgeon: Casey Justice MD;  [facility];  Service: Colorectal    TX PRCTECT PRTL RESCJ RECTUM TABDL APPR N/A 06/21/2018    Procedure: Partial proctectomy.;  Surgeon: Casey Justice MD;  [facility];  Service: Colorectal    TX SIGMOIDOSCOPY FLX DX W/COLLJ SPEC BR/WA  IF PFRMD N/A 2018    Procedure: SIGMOIDOSCOPY FLEXIBLE;  Surgeon: Casey Justice MD;  [facility];  Service: Colorectal    MT THORACOSCOPY W/THERA WEDGE RESEXN INITIAL UNILAT Right 2022    Procedure: THORACOSCOPY VIDEO ASSISTED SURGERY (VATS);  Surgeon: Mary Reid MD;  [facility];  Service: Thoracic    ROOT CANAL      TESTICLE SURGERY      Exploration of Undescended Testis rIght, age 10 had surgery for undescended testicle; Last Assessed:17    TOOTH EXTRACTION       Family History   Problem Relation Age of Onset    No Known Problems Father     Cancer Mother         unknown    Cancer Brother         pt. reports brother was diagnosed with stage 4 throat cancer    Throat cancer Brother     Breast cancer Maternal Aunt      Social History     Socioeconomic History    Marital status: Single     Spouse name: Not on file    Number of children: Not on file    Years of education: Not on file    Highest education level: Not on file   Occupational History    Not on file   Tobacco Use    Smoking status: Former     Current packs/day: 0.00     Average packs/day: 0.5 packs/day for 25.0 years (12.5 ttl pk-yrs)     Types: Cigarettes     Start date:      Quit date: 2016     Years since quittin.4    Smokeless tobacco: Never   Vaping Use    Vaping status: Every Day    Start date: 10/10/2016    Substances: Nicotine, THC (at time), CBD (at times), Flavoring   Substance and Sexual Activity    Alcohol use: Yes     Comment: socially - 2 month    Drug use: Yes     Frequency: 1.0 times per week     Types: Marijuana    Sexual activity: Not on file   Other Topics Concern    Not on file   Social History Narrative    Not on file     Social Determinants of Health     Financial Resource Strain: Not on file   Food Insecurity: Not on file   Transportation Needs: Not on file   Physical Activity: Not on file   Stress: Not on file   Social Connections: Not on file   Intimate Partner Violence:  Not on file   Housing Stability: Not on file       Current Outpatient Medications:     Multiple Vitamins-Minerals (MENS MULTIVITAMIN PO), Take by mouth in the morning, Disp: , Rfl:     omeprazole (PriLOSEC) 20 mg delayed release capsule, Take 1 capsule (20 mg total) by mouth daily, Disp: 30 capsule, Rfl: 3    sildenafil (VIAGRA) 50 MG tablet, Take 1 tablet (50 mg total) by mouth as needed for erectile dysfunction, Disp: 10 tablet, Rfl: 0  No Known Allergies  Vitals:    06/06/24 1429   BP: 110/70   Pulse: 69   Temp: 99.1 °F (37.3 °C)   SpO2: 98%       Physical Exam  Vitals reviewed.   Constitutional:       General: He is not in acute distress.     Appearance: Normal appearance. He is normal weight. He is not ill-appearing or toxic-appearing.   HENT:      Head: Normocephalic and atraumatic.      Nose: Nose normal.      Mouth/Throat:      Pharynx: Oropharynx is clear.   Eyes:      General: No scleral icterus.  Cardiovascular:      Rate and Rhythm: Normal rate.   Pulmonary:      Effort: Pulmonary effort is normal.   Abdominal:      General: Abdomen is flat. A surgical scar is present. There is no distension.      Palpations: Abdomen is soft. There is no hepatomegaly or mass.      Tenderness: There is no abdominal tenderness. There is no guarding.   Musculoskeletal:         General: Normal range of motion.      Cervical back: Normal range of motion and neck supple.   Skin:     General: Skin is warm and dry.      Coloration: Skin is not jaundiced.   Neurological:      General: No focal deficit present.      Mental Status: He is alert and oriented to person, place, and time.   Psychiatric:         Mood and Affect: Mood normal.         Behavior: Behavior normal.         Thought Content: Thought content normal.         Judgment: Judgment normal.           Labs:  Collected 06/01/2024  CEA - 1.0 ng/ml      Imaging  MRI abdomen w wo contrast  04/23/2024  Narrative & Impression   MRI - ABDOMEN - WITH AND WITHOUT CONTRAST      INDICATION: 53 years / Male. C18.9: Malignant neoplasm of colon, unspecified  C78.7: Secondary malignant neoplasm of liver and intrahepatic bile duct. Stage IV metastatic colon cancer to the liver status post posttreatment changes, for surveillance.     COMPARISON: MRI abdomen 10/17/2023, 5/11/2021     TECHNIQUE: Multiplanar/multisequence MRI of the abdomen was performed before and after administration of contrast. Imaging performed on 1.5T MRI.     IV Contrast: 7 mL of Gadobutrol injection (SINGLE-DOSE)     FINDINGS:     LOWER CHEST: Unremarkable.     LIVER:  Normal in size and configuration.  Irregular contour from prior wedge resection involving hepatic segment 3 (12/154). T1 hypointense nonenhancing hepatic segment 7 lesion measuring 1.7 cm and without postcontrast enhancement, representing posttreatment changes. It is grossly stable in   size since 10/17/2023. No suspicious recurrent or new hepatic lesion.. A 3 mm nonenhancing T2 hyperintense lesion in the right hepatic lobe (5/14), representing a simple cyst, stable since at least 5/11/2021.  Patent hepatic and portal veins.     BILE DUCTS: No intrahepatic or extrahepatic bile duct dilation.     GALLBLADDER: Normal     PANCREAS: Unremarkable.     ADRENAL GLANDS: Unremarkable.     SPLEEN: Normal.     KIDNEYS/PROXIMAL URETERS: No hydroureteronephrosis. No suspicious renal mass. Again seen is left renal parapelvic cyst with stable mass effect on left renal upper pole collecting system.     BOWEL: No dilated loops of bowel.     PERITONEUM/RETROPERITONEUM: No ascites.     LYMPH NODES: No abdominal lymphadenopathy.     VESSELS: No aneurysm.     ABDOMINAL WALL: A small umbilical fat-containing hernia. Postsurgical changes in the anterior abdominal wall.     BONES: No suspicious osseous lesion.     IMPRESSION:     Posttreatment changes in hepatic segments 3 and 7 without evidence of recurrent/new metastatic hepatic lesions.        I personally reviewed and  interpreted the above laboratory and imaging data.    Discussion/Summary: This is a 54 y/o male who presents today for metastatic colon cancer surveillance.  He is doing well and there is no evidence of disease recurrence at this time.  We will see him again in 6 months with repeat MRI and CEA level, or sooner should the need arise.  He is agreeable to the plan, all questions were answered today.

## 2024-06-20 NOTE — NURSING NOTE
Pt called stating that he is having nausea and diarrhea with the abx that he was prescribed. Pt states the provider told him that if he has some intolerance to this abx then he should call in and get a different abx prescribed due to having a amox allergy.     Pt states his symptoms are improving but would like to still have a new abx called in. Per Skylar the provider she will call in cefdinir and encouraged pt to take a probiotic and have yogurt. Pt was also made aware that this abx may also cause nausea and diarrhea.     Pt verbalized understanding of this information.    Spoke with Dr Royer Ochoa, Resident  She is aware of the output from the [facility] and the patient's vomiting and no bowel sounds  Patient is now on clear fluids and may require a NG tube depending on how he does

## 2024-11-13 ENCOUNTER — HOSPITAL ENCOUNTER (OUTPATIENT)
Dept: MRI IMAGING | Facility: HOSPITAL | Age: 54
Discharge: HOME | End: 2024-11-13
Payer: COMMERCIAL

## 2024-11-13 DIAGNOSIS — Z85.038 HISTORY OF COLON CANCER, STAGE IV: ICD-10-CM

## 2024-11-13 DIAGNOSIS — Z85.89 HISTORY OF KNOWN METASTASIS TO LIVER: ICD-10-CM

## 2024-11-13 PROCEDURE — 74183 MRI ABD W/O CNTR FLWD CNTR: CPT

## 2024-11-13 PROCEDURE — A9585 GADOBUTROL INJECTION: HCPCS

## 2024-11-13 RX ORDER — GADOBUTROL 604.72 MG/ML
7 INJECTION INTRAVENOUS
Status: COMPLETED | OUTPATIENT
Start: 2024-11-13 | End: 2024-11-13

## 2024-11-13 RX ADMIN — GADOBUTROL 7 ML: 604.72 INJECTION INTRAVENOUS at 14:32

## 2024-11-29 ENCOUNTER — HOSPITAL ENCOUNTER (OUTPATIENT)
Dept: CT IMAGING | Facility: HOSPITAL | Age: 54
Discharge: HOME | End: 2024-11-29
Attending: INTERNAL MEDICINE
Payer: COMMERCIAL

## 2024-11-29 DIAGNOSIS — C78.7 COLON CANCER METASTASIZED TO LIVER (HCC): ICD-10-CM

## 2024-11-29 DIAGNOSIS — C18.9 COLON CANCER METASTASIZED TO LIVER (HCC): ICD-10-CM

## 2024-11-29 PROCEDURE — 71260 CT THORAX DX C+: CPT

## 2024-11-29 PROCEDURE — 74160 CT ABDOMEN W/CONTRAST: CPT

## 2024-11-29 RX ADMIN — IOHEXOL 85 ML: 350 INJECTION, SOLUTION INTRAVENOUS at 14:45

## 2024-12-05 ENCOUNTER — APPOINTMENT (OUTPATIENT)
Dept: LAB | Facility: MEDICAL CENTER | Age: 54
End: 2024-12-05
Payer: COMMERCIAL

## 2024-12-05 DIAGNOSIS — Z85.038 HISTORY OF COLON CANCER, STAGE IV: ICD-10-CM

## 2024-12-05 DIAGNOSIS — C18.9 COLON CANCER METASTASIZED TO LIVER (HCC): ICD-10-CM

## 2024-12-05 DIAGNOSIS — C78.7 COLON CANCER METASTASIZED TO LIVER (HCC): ICD-10-CM

## 2024-12-05 LAB
ALBUMIN SERPL BCG-MCNC: 4.8 G/DL (ref 3.5–5)
ALP SERPL-CCNC: 47 U/L (ref 34–104)
ALT SERPL W P-5'-P-CCNC: 22 U/L (ref 7–52)
ANION GAP SERPL CALCULATED.3IONS-SCNC: 11 MMOL/L (ref 4–13)
AST SERPL W P-5'-P-CCNC: 23 U/L (ref 13–39)
BASOPHILS # BLD AUTO: 0.03 THOUSANDS/ÂΜL (ref 0–0.1)
BASOPHILS NFR BLD AUTO: 0 % (ref 0–1)
BILIRUB SERPL-MCNC: 0.44 MG/DL (ref 0.2–1)
BUN SERPL-MCNC: 20 MG/DL (ref 5–25)
CALCIUM SERPL-MCNC: 9.7 MG/DL (ref 8.4–10.2)
CEA SERPL-MCNC: 1.1 NG/ML (ref 0–3)
CHLORIDE SERPL-SCNC: 104 MMOL/L (ref 96–108)
CO2 SERPL-SCNC: 23 MMOL/L (ref 21–32)
CREAT SERPL-MCNC: 1.02 MG/DL (ref 0.6–1.3)
EOSINOPHIL # BLD AUTO: 0.01 THOUSAND/ÂΜL (ref 0–0.61)
EOSINOPHIL NFR BLD AUTO: 0 % (ref 0–6)
ERYTHROCYTE [DISTWIDTH] IN BLOOD BY AUTOMATED COUNT: 12.8 % (ref 11.6–15.1)
GFR SERPL CREATININE-BSD FRML MDRD: 84 ML/MIN/1.73SQ M
GLUCOSE P FAST SERPL-MCNC: 118 MG/DL (ref 65–99)
HCT VFR BLD AUTO: 45.5 % (ref 36.5–49.3)
HGB BLD-MCNC: 15.6 G/DL (ref 12–17)
IMM GRANULOCYTES # BLD AUTO: 0.01 THOUSAND/UL (ref 0–0.2)
IMM GRANULOCYTES NFR BLD AUTO: 0 % (ref 0–2)
LYMPHOCYTES # BLD AUTO: 1.91 THOUSANDS/ÂΜL (ref 0.6–4.47)
LYMPHOCYTES NFR BLD AUTO: 27 % (ref 14–44)
MCH RBC QN AUTO: 32.8 PG (ref 26.8–34.3)
MCHC RBC AUTO-ENTMCNC: 34.3 G/DL (ref 31.4–37.4)
MCV RBC AUTO: 96 FL (ref 82–98)
MONOCYTES # BLD AUTO: 0.48 THOUSAND/ÂΜL (ref 0.17–1.22)
MONOCYTES NFR BLD AUTO: 7 % (ref 4–12)
NEUTROPHILS # BLD AUTO: 4.71 THOUSANDS/ÂΜL (ref 1.85–7.62)
NEUTS SEG NFR BLD AUTO: 66 % (ref 43–75)
NRBC BLD AUTO-RTO: 0 /100 WBCS
PLATELET # BLD AUTO: 175 THOUSANDS/UL (ref 149–390)
PMV BLD AUTO: 11.6 FL (ref 8.9–12.7)
POTASSIUM SERPL-SCNC: 4.1 MMOL/L (ref 3.5–5.3)
PROT SERPL-MCNC: 8.4 G/DL (ref 6.4–8.4)
RBC # BLD AUTO: 4.76 MILLION/UL (ref 3.88–5.62)
SODIUM SERPL-SCNC: 138 MMOL/L (ref 135–147)
WBC # BLD AUTO: 7.15 THOUSAND/UL (ref 4.31–10.16)

## 2024-12-05 PROCEDURE — 36415 COLL VENOUS BLD VENIPUNCTURE: CPT

## 2024-12-05 PROCEDURE — 82378 CARCINOEMBRYONIC ANTIGEN: CPT

## 2024-12-05 PROCEDURE — 80053 COMPREHEN METABOLIC PANEL: CPT

## 2024-12-05 PROCEDURE — 85025 COMPLETE CBC W/AUTO DIFF WBC: CPT

## 2024-12-06 ENCOUNTER — OFFICE VISIT (OUTPATIENT)
Dept: HEMATOLOGY ONCOLOGY | Facility: CLINIC | Age: 54
End: 2024-12-06
Payer: COMMERCIAL

## 2024-12-06 VITALS
SYSTOLIC BLOOD PRESSURE: 110 MMHG | TEMPERATURE: 97.3 F | DIASTOLIC BLOOD PRESSURE: 70 MMHG | RESPIRATION RATE: 20 BRPM | OXYGEN SATURATION: 98 % | HEIGHT: 66 IN | WEIGHT: 158 LBS | HEART RATE: 90 BPM | BODY MASS INDEX: 25.39 KG/M2

## 2024-12-06 DIAGNOSIS — C78.01 MALIGNANT NEOPLASM METASTATIC TO RIGHT LUNG (HCC): ICD-10-CM

## 2024-12-06 DIAGNOSIS — Z85.038 HISTORY OF COLON CANCER, STAGE IV: Primary | ICD-10-CM

## 2024-12-06 DIAGNOSIS — Z85.89 HISTORY OF KNOWN METASTASIS TO LIVER: ICD-10-CM

## 2024-12-06 PROCEDURE — 99215 OFFICE O/P EST HI 40 MIN: CPT | Performed by: PHYSICIAN ASSISTANT
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
## 2024-12-10 ENCOUNTER — OFFICE VISIT (OUTPATIENT)
Dept: SURGICAL ONCOLOGY | Facility: CLINIC | Age: 54
End: 2024-12-10
Payer: COMMERCIAL

## 2024-12-10 VITALS
HEART RATE: 70 BPM | HEIGHT: 66 IN | RESPIRATION RATE: 20 BRPM | SYSTOLIC BLOOD PRESSURE: 110 MMHG | WEIGHT: 156 LBS | TEMPERATURE: 97.9 F | OXYGEN SATURATION: 99 % | DIASTOLIC BLOOD PRESSURE: 80 MMHG | BODY MASS INDEX: 25.07 KG/M2

## 2024-12-10 DIAGNOSIS — Z85.038 HISTORY OF COLON CANCER, STAGE IV: ICD-10-CM

## 2024-12-10 DIAGNOSIS — Z08 ENCOUNTER FOR FOLLOW-UP EXAMINATION AFTER COMPLETED TREATMENT FOR MALIGNANT NEOPLASM: Primary | ICD-10-CM

## 2024-12-10 PROCEDURE — 99214 OFFICE O/P EST MOD 30 MIN: CPT | Performed by: SURGERY

## 2024-12-10 NOTE — ASSESSMENT & PLAN NOTE
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  His liver MRI is stable.  His CEA level is normal.  There is no evidence of recurrence in the liver.  There is a concern for recurrence adjacent to his staple line in the lung.  PET/CT is currently pending.  I told him I agree with this.  I will let Dr. Reid know as well.  We also discussed that if his circulating tumor DNA is negative after resection, he may be able to forego further chemotherapy, but he will discuss this with medical oncology.  I will see him again in 6 months with a repeat MRI of the liver.  He is agreeable to this.  All his questions were answered.

## 2024-12-10 NOTE — LETTER
December 10, 2024     Truong Berman MD  [address] [facility] [address]    Patient: Matthieu Da Silva   YOB: 1970   Date of Visit: 12/10/2024       Dear Dr. Berman:    Thank you for referring Matthieu Da Silva to me for evaluation. Below are my notes for this consultation.    If you have questions, please do not hesitate to call me. I look forward to following your patient along with you.         Sincerely,        Esequiel Mijares MD        CC: MD Niranjan Headley MD William Harris Smith, MD Meredith A Harrison, MD Darius Desai, MD  12/10/2024  3:25 PM  Sign when Signing Visit               Surgical Oncology Follow Up       [address] [place] [address] SURGICAL ONCOLOGY [address] [id number]    Matthieu Da Silva  1970  [id number]  [facility] SURGICAL ONCOLOGY [address] [address]    1. 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  His liver MRI is stable.  His CEA level is normal.  There is no evidence of recurrence in the liver.  There is a concern for recurrence adjacent to his staple line in the lung.  PET/CT is currently pending.  I told him I agree with this.  I will let Dr. Reid know as well.  We also discussed that if his circulating tumor DNA is negative after resection, he may be able to forego further chemotherapy, but he will discuss this with medical oncology.  I will see him again in 6 months with a repeat MRI of the liver.  He is agreeable to this.  All his questions were answered.   2. History of colon cancer, stage IV  -     MRI abdomen w wo contrast; Future; Expected date: 06/10/2025  -     BUN; Future; Expected date: 06/01/2025  -     Creatinine, serum; Future; Expected date: 06/01/2025         Chief Complaint   Patient presents  with   • office visit       Return for Ofice visit short, Imaging - See orders, with Oksana.      Oncology History   History of colon cancer, stage IV   1/2018 Initial Diagnosis    Malignant neoplasm of sigmoid colon (HCC)     1/22/2018 Biopsy    Large Intestine, Sigmoid Colon, Recto-sigmoid tumor bx:    - Invasive colonic adenocarcinoma, moderately differentiated     2/6/2018 - 10/16/2018 Chemotherapy    Modified FOLFOX6 x 12 cycles  Added Erbitux on 3/20/18      6/21/2018 Surgery    Segment 7 liver resection/ablation, hemicolectomy     6/21/2018 -  Cancer Staged    Staging form: Colon and Rectum, AJCC 8th Edition  - Pathologic stage from 6/21/2018: Stage WERO (ypT0, pN0, cM1a) - Signed by Oksana STEPHANIE Dubois on 10/16/2023  Stage prefix: Post-therapy  Total positive nodes: 0  Sites of metastasis: Liver       10/30/2018 -  Chemotherapy    Continuation of Single agent Erbitux.  With 10/30/18 chemo, it was Cycle #14.  Plan was for 6 additional cycles of Erbitux alone.     3/2020 Genetic Testing    NEGATIVE for genes tested:    Test(s): CancerNext + RNAinsight (34 genes): APC, ETHAN, BARD1, BRCA1, BRCA2, BRIP1, BMPR1A, CDH1, CDK4, CDKN2A, CHEK2, DICER1, EPCAM, GREM1, HOXB13, MLH1, MRE11A, MSH2, MSH6, MUTYH, NBN, NF1, PALB2, PMS2, POLD1, POLE, PTEN, RAD50, RAD51C, RAD51D, SMAD4, SMARCA4, STK11, TP53      6/8/2020 Surgery    Liver, segment 3 (wedge resection):  - Metastatic adenocarcinoma, consistent with the patient's known colon primary  - Margins negative for carcinoma      7/27/2020 - 1/11/2021 Chemotherapy    fluorouracil (ADRUCIL), 745 mg, Intravenous, Once, 6 of 6 cycles  Administration: 750 mg (7/27/2020), 750 mg (8/10/2020), 745 mg (8/24/2020), 745 mg (9/8/2020), 745 mg (9/21/2020), 745 mg (10/5/2020)  pegfilgrastim (NEULASTA), 6 mg, Subcutaneous, Once, 1 of 1 cycle  Administration: 6 mg (9/24/2020)  pegfilgrastim (NEULASTA ONPRO), 6 mg, Subcutaneous, Once, 6 of 6 cycles  Administration: 6 mg (7/29/2020), 6 mg  (8/12/2020), 6 mg (8/26/2020), 6 mg (9/10/2020), 6 mg (10/7/2020)  cetuximab (ERBITUX), 930 mg, Intravenous, Once, 12 of 12 cycles  Administration: 900 mg (7/27/2020), 900 mg (8/10/2020), 900 mg (8/24/2020), 900 mg (9/8/2020), 900 mg (9/21/2020), 900 mg (10/5/2020), 900 mg (10/19/2020), 900 mg (11/2/2020), 900 mg (11/16/2020), 900 mg (11/30/2020), 900 mg (12/28/2020), 900 mg (1/11/2021)  irinotecan (CAMPTOSAR) chemo infusion, 335 mg, Intravenous, Once, 6 of 6 cycles  Administration: 340 mg (7/27/2020), 340 mg (8/10/2020), 340 mg (8/24/2020), 340 mg (9/8/2020), 340 mg (9/21/2020), 340 mg (10/5/2020)  leucovorin calcium IVPB, 744 mg, Intravenous, Once, 6 of 6 cycles  Administration: 750 mg (7/27/2020), 750 mg (8/10/2020), 750 mg (8/24/2020), 750 mg (9/8/2020), 750 mg (9/21/2020), 740 mg (10/5/2020)  fluorouracil (ADRUCIL) ambulatory infusion Soln, 1,200 mg/m2/day = 4,465 mg, Intravenous, Over 46 hours, 6 of 6 cycles  Dose modification: 1,200 mg/m2/day (original dose 1,200 mg/m2/day, Cycle 3)     9/20/2022 - 12/1/2022 Chemotherapy    palonosetron (ALOXI), 0.25 mg, Intravenous, Once, 4 of 4 cycles  Administration: 0.25 mg (10/18/2022), 0.25 mg (11/1/2022), 0.25 mg (11/15/2022), 0.25 mg (11/29/2022)  pegfilgrastim (NEULASTA ONPRO), 6 mg, Subcutaneous, Once, 6 of 6 cycles  Administration: 6 mg (9/22/2022), 6 mg (10/20/2022), 6 mg (11/3/2022), 6 mg (11/17/2022), 6 mg (12/1/2022)  Pegfilgrastim-bmez (ZIEXTENZO), 6 mg, Subcutaneous, Once, 1 of 1 cycle  Administration: 6 mg (10/7/2022)  fosaprepitant (EMEND) IVPB, 150 mg, Intravenous, Once, 4 of 4 cycles  Administration: 150 mg (10/18/2022), 150 mg (11/1/2022), 150 mg (11/15/2022), 150 mg (11/29/2022)  fluorouracil (ADRUCIL), 400 mg/m2 = 735 mg, Intravenous, Once, 6 of 6 cycles  Administration: 735 mg (9/20/2022), 735 mg (10/4/2022), 730 mg (10/18/2022), 730 mg (11/1/2022), 730 mg (11/15/2022), 730 mg (11/29/2022)  cetuximab (ERBITUX), 920 mg, Intravenous, Once, 6 of 6  cycles  Administration: 900 mg (9/20/2022), 900 mg (10/4/2022), 900 mg (10/18/2022), 900 mg (11/1/2022), 900 mg (11/15/2022), 900 mg (11/29/2022)  irinotecan (CAMPTOSAR) chemo infusion, 331 mg, Intravenous, Once, 6 of 6 cycles  Administration: 320 mg (9/20/2022), 320 mg (10/4/2022), 320 mg (10/18/2022), 320 mg (11/1/2022), 320 mg (11/15/2022), 320 mg (11/29/2022)  leucovorin calcium IVPB, 736 mg, Intravenous, Once, 6 of 6 cycles  Administration: 700 mg (9/20/2022), 700 mg (10/4/2022), 750 mg (10/18/2022), 750 mg (11/1/2022), 700 mg (11/15/2022), 700 mg (11/29/2022)  fluorouracil (ADRUCIL) ambulatory infusion Soln, 1,200 mg/m2/day = 4,415 mg, Intravenous, Over 46 hours, 6 of 6 cycles     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 (HCC) (Resolved)   4/11/2022 Initial Diagnosis    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 (HCC)     9/20/2022 - 12/1/2022 Chemotherapy    palonosetron (ALOXI), 0.25 mg, Intravenous, Once, 4 of 4 cycles  Administration: 0.25 mg (10/18/2022), 0.25 mg (11/1/2022), 0.25 mg (11/15/2022), 0.25 mg (11/29/2022)  pegfilgrastim (NEULASTA ONPRO), 6 mg, Subcutaneous, Once, 6 of 6 cycles  Administration: 6 mg (9/22/2022), 6 mg (10/20/2022), 6 mg (11/3/2022), 6 mg (11/17/2022), 6 mg (12/1/2022)  Pegfilgrastim-bmez (ZIEXTENZO), 6 mg, Subcutaneous, Once, 1 of 1 cycle  Administration: 6 mg (10/7/2022)  fosaprepitant (EMEND) IVPB, 150 mg, Intravenous, Once, 4 of 4 cycles  Administration: 150 mg (10/18/2022), 150 mg (11/1/2022), 150 mg (11/15/2022), 150 mg (11/29/2022)  fluorouracil (ADRUCIL), 400 mg/m2 = 735 mg, Intravenous, Once, 6 of 6 cycles  Administration: 735 mg (9/20/2022), 735 mg (10/4/2022), 730 mg (10/18/2022), 730 mg (11/1/2022), 730 mg (11/15/2022), 730 mg (11/29/2022)  cetuximab (ERBITUX), 920 mg, Intravenous, Once, 6 of 6 cycles  Administration: 900 mg (9/20/2022), 900 mg (10/4/2022), 900 mg (10/18/2022), 900 mg (11/1/2022), 900 mg (11/15/2022), 900 mg (11/29/2022)  irinotecan (CAMPTOSAR) chemo infusion, 331 mg,  Intravenous, Once, 6 of 6 cycles  Administration: 320 mg (9/20/2022), 320 mg (10/4/2022), 320 mg (10/18/2022), 320 mg (11/1/2022), 320 mg (11/15/2022), 320 mg (11/29/2022)  leucovorin calcium IVPB, 736 mg, Intravenous, Once, 6 of 6 cycles  Administration: 700 mg (9/20/2022), 700 mg (10/4/2022), 750 mg (10/18/2022), 750 mg (11/1/2022), 700 mg (11/15/2022), 700 mg (11/29/2022)  fluorouracil (ADRUCIL) ambulatory infusion Soln, 1,200 mg/m2/day = 4,415 mg, Intravenous, Over 46 hours, 6 of 6 cycles     Malignant neoplasm metastatic to right lung (HCC)   8/4/2022 Initial Diagnosis    Malignant neoplasm metastatic to right lung (HCC)     8/9/2022 Surgery    Flexible bronchoscopy, right thoracoscopic therapeutic wedge resection      9/20/2022 - 12/1/2022 Chemotherapy    palonosetron (ALOXI), 0.25 mg, Intravenous, Once, 4 of 4 cycles  Administration: 0.25 mg (10/18/2022), 0.25 mg (11/1/2022), 0.25 mg (11/15/2022), 0.25 mg (11/29/2022)  pegfilgrastim (NEULASTA ONPRO), 6 mg, Subcutaneous, Once, 6 of 6 cycles  Administration: 6 mg (9/22/2022), 6 mg (10/20/2022), 6 mg (11/3/2022), 6 mg (11/17/2022), 6 mg (12/1/2022)  Pegfilgrastim-bmez (ZIEXTENZO), 6 mg, Subcutaneous, Once, 1 of 1 cycle  Administration: 6 mg (10/7/2022)  fosaprepitant (EMEND) IVPB, 150 mg, Intravenous, Once, 4 of 4 cycles  Administration: 150 mg (10/18/2022), 150 mg (11/1/2022), 150 mg (11/15/2022), 150 mg (11/29/2022)  fluorouracil (ADRUCIL), 400 mg/m2 = 735 mg, Intravenous, Once, 6 of 6 cycles  Administration: 735 mg (9/20/2022), 735 mg (10/4/2022), 730 mg (10/18/2022), 730 mg (11/1/2022), 730 mg (11/15/2022), 730 mg (11/29/2022)  cetuximab (ERBITUX), 920 mg, Intravenous, Once, 6 of 6 cycles  Administration: 900 mg (9/20/2022), 900 mg (10/4/2022), 900 mg (10/18/2022), 900 mg (11/1/2022), 900 mg (11/15/2022), 900 mg (11/29/2022)  irinotecan (CAMPTOSAR) chemo infusion, 331 mg, Intravenous, Once, 6 of 6 cycles  Administration: 320 mg (9/20/2022), 320 mg  (10/4/2022), 320 mg (10/18/2022), 320 mg (11/1/2022), 320 mg (11/15/2022), 320 mg (11/29/2022)  leucovorin calcium IVPB, 736 mg, Intravenous, Once, 6 of 6 cycles  Administration: 700 mg (9/20/2022), 700 mg (10/4/2022), 750 mg (10/18/2022), 750 mg (11/1/2022), 700 mg (11/15/2022), 700 mg (11/29/2022)  fluorouracil (ADRUCIL) ambulatory infusion Soln, 1,200 mg/m2/day = 4,415 mg, Intravenous, Over 46 hours, 6 of 6 cycles         Staging:  Metastatic rectosigmoid cancer  [id number]  Treatment history:   Modified FOLFOX 6   Erbitux added 03/20/2018   Segment 7 liver resection, June 2018  Segment 3 mass liver resection, June 2020  Resection of a right lower lobe pulmonary metastasis, August 2022  Chemotherapy with Erbitux  Current treatment:  Observation  Disease status:   LUTHER    History of Present Illness: Returns in follow-up of his colon cancer.  He is doing well at this time with no complaints.  He denies abdominal pain, nausea, vomiting, back pain, bone pain, headaches, or cough.  No change in appetite or unintentional weight loss.  MRI from November 13, 2024 reveals treatment related changes to the liver.  No evidence of recurrence in the liver.  CT from November 29, 2024 shows nodular soft tissue along the right lower lobe staple line.  No worrisome lesions in the liver.  I personally reviewed the films.  CEA is 1.1 ng/mL.    Review of Systems  Complete ROS Surg Onc:   Complete ROS Surg Onc:   Constitutional: The patient denies new or recent history of general fatigue, no recent weight loss, no change in appetite.   Eyes: No complaints of visual problems, no scleral icterus.   ENT: no complaints of ear pain, no hoarseness, no difficulty swallowing,  no tinnitus and no new masses in head, oral cavity, or neck.   Cardiovascular: No complaints of chest pain, no palpitations, no ankle edema.   Respiratory: No complaints of shortness of breath, no cough.   Gastrointestinal: No complaints of jaundice, no bloody stools, no  pale stools.   Genitourinary: No complaints of dysuria, no hematuria, no nocturia, no frequent urination, no urethral discharge.   Musculoskeletal: No complaints of weakness, paralysis, joint stiffness or arthralgias.  Integumentary: No complaints of rash, no new lesions.   Neurological: No complaints of convulsions, no seizures, no dizziness.   Hematologic/Lymphatic: No complaints of easy bruising.   Endocrine:  No hot or cold intolerance.  No polydipsia, polyphagia, or polyuria.  Allergy/immunology:  No environmental allergies.  No food allergies.  Not immunocompromised.  Skin:  No pallor or rash.  No wound.        Patient Active Problem List   Diagnosis   • ED (erectile dysfunction)   • History of colon cancer, stage IV   • GERD (gastroesophageal reflux disease)   • Pulmonary nodule, right   • Encounter for follow-up examination after completed treatment for malignant neoplasm   • Other hydronephrosis   • Annual physical exam   • Vapes nicotine containing substance   • Malignant neoplasm metastatic to right lung (HCC)   • Chemotherapy induced neutropenia (HCC)   • Thyroid nodule   • History of known metastasis to liver     Past Medical History:   Diagnosis Date   • Cancer (HCC)    • Colon cancer (HCC)    • Dysuria     Last Assessed:8/24/17   • GERD (gastroesophageal reflux disease)    • Liver cancer (HCC)    • Liver nodule    • Pulmonary nodule, right    • Thyroid nodule 02/16/2023   • Ureteropelvic junction (UPJ) obstruction 01/29/2020   • Wears glasses      Past Surgical History:   Procedure Laterality Date   • ABDOMINAL SURGERY     • COLON SURGERY     • COLONOSCOPY N/A 01/22/2018    Procedure: COLONOSCOPY;  Surgeon: Casey Justice MD;  Location: [facility];  Service: Colorectal   • DENTAL SURGERY  2016    Crown with bridge work   • FLEXIBLE SIGMOIDOSCOPY N/A 06/15/2018    Procedure: SIGMOIDOSCOPY FLEXIBLE w/o sedation w/ tattoo;  Surgeon: Casey Justice MD;  Location: [facility];  Service: Colorectal   •  IR PORT PLACEMENT Right    • LAPAROTOMY N/A 06/08/2020    Procedure: LAPAROTOMY EXPLORATORY, LYSIS OF ADHESIONS;  Surgeon: Esequiel Mijares MD;  [facility];  Service: Surgical Oncology   • LIVER LOBECTOMY N/A 06/21/2018    Procedure: liver resection/ablation, intraoperative ultrasound of liver;  Surgeon: Esequiel Mijares MD;  [facility];  Service: Surgical Oncology   • LIVER LOBECTOMY N/A 06/08/2020    Procedure: LIVER RESECTION SEGMENT 3 WITH  INTRAOPERATIVE U/S OF LIVER;  Surgeon: Esequiel Mijares MD;  [facility];  Service: Surgical Oncology   • LUNG SEGMENTECTOMY Right 8/9/2022    Procedure: RESECTION WEDGE LUNG, THERAPEUTIC;  Surgeon: Mary Reid MD;  [facility];  Service: Thoracic   • DE BRNCHSC INCL FLUOR GDNCE DX W/CELL WASHG SPX N/A 8/9/2022    Procedure: BRONCHOSCOPY FLEXIBLE;  Surgeon: Mary Reid MD;  [facility];  Service: Thoracic   • DE COLECTOMY PARTIAL W/ANASTOMOSIS Left 06/21/2018    Procedure: hemicolectomy, low anterior resection (open) SPY fluorescence angiography;  Surgeon: Casey Justice MD;  [facility];  Service: Colorectal   • DE EXPLORATORY LAPAROTOMY CELIOTOMY W/WO BIOPSY SPX N/A 06/21/2018    Procedure: LAPAROTOMY EXPLORATORY;  Surgeon: Casey Justice MD;  [facility];  Service: Colorectal   • DE INSJ PRPH CTR VAD W/SUBQ PORT AGE 7 YR/> N/A 01/25/2018    Procedure: PORT-A-CATHETER PLACEMENT  Fluoroscopy for performance/interpretation;  Surgeon: Casey Justice MD;  [facility];  Service: Colorectal   • DE PRCTECT PRTL RESCJ RECTUM TABDL APPR N/A 06/21/2018    Procedure: Partial proctectomy.;  Surgeon: Casey Justice MD;  [facility];  Service: Colorectal   • DE SIGMOIDOSCOPY FLX DX W/COLLJ SPEC BR/WA IF PFRMD N/A 06/21/2018    Procedure: SIGMOIDOSCOPY FLEXIBLE;  Surgeon: Casey Justice MD;  [facility];  Service: Colorectal   • DE THORACOSCOPY W/THERA WEDGE RESEXN  INITIAL UNILAT Right 2022    Procedure: THORACOSCOPY VIDEO ASSISTED SURGERY (VATS);  Surgeon: Mary Reid MD;  [facility];  Service: Thoracic   • ROOT CANAL     • TESTICLE SURGERY      Exploration of Undescended Testis rIght, age 10 had surgery for undescended testicle; Last Assessed:17   • TOOTH EXTRACTION       Family History   Problem Relation Age of Onset   • No Known Problems Father    • Cancer Mother         unknown   • Cancer Brother         pt. reports brother was diagnosed with stage 4 throat cancer   • Throat cancer Brother    • Breast cancer Maternal Aunt      Social History     Socioeconomic History   • Marital status: Single     Spouse name: Not on file   • Number of children: Not on file   • Years of education: Not on file   • Highest education level: Not on file   Occupational History   • Not on file   Tobacco Use   • Smoking status: Former     Current packs/day: 0.00     Average packs/day: 0.5 packs/day for 25.0 years (12.5 ttl pk-yrs)     Types: Cigarettes     Start date:      Quit date: 2016     Years since quittin.9   • Smokeless tobacco: Never   Vaping Use   • Vaping status: Every Day   • Start date: 10/10/2016   • Substances: Nicotine, THC (at time), CBD (at times), Flavoring   Substance and Sexual Activity   • Alcohol use: Yes     Comment: socially - 2 month   • Drug use: Yes     Frequency: 1.0 times per week     Types: Marijuana   • Sexual activity: Not on file   Other Topics Concern   • Not on file   Social History Narrative   • Not on file     Social Drivers of Health     Financial Resource Strain: Not on file   Food Insecurity: Not on file   Transportation Needs: Not on file   Physical Activity: Not on file   Stress: Not on file   Social Connections: Not on file   Intimate Partner Violence: Not on file   Housing Stability: Not on file       Current Outpatient Medications:   •  Multiple Vitamins-Minerals (MENS MULTIVITAMIN PO), Take by mouth in the  morning, Disp: , Rfl:   •  omeprazole (PriLOSEC) 20 mg delayed release capsule, Take 1 capsule (20 mg total) by mouth daily, Disp: 30 capsule, Rfl: 3  •  sildenafil (VIAGRA) 50 MG tablet, Take 1 tablet (50 mg total) by mouth as needed for erectile dysfunction, Disp: 10 tablet, Rfl: 0  No Known Allergies  Vitals:    12/10/24 1506   BP: 110/80   Pulse: 70   Resp: 20   Temp: 97.9 °F (36.6 °C)   SpO2: 99%       Physical Exam  Constitutional: General appearance: The Patient is well-developed and well-nourished who appears the stated age in no acute distress. Patient is pleasant and talkative.     HEENT:  Normocephalic.  Sclerae are anicteric. Mucous membranes are moist. Neck is supple without adenopathy. No JVD.     Chest: The lungs are clear to auscultation.     Cardiac: Heart is regular rate.     Abdomen: Abdomen is soft, non-tender, non-distended and without masses.     Extremities: There is no clubbing or cyanosis. There is no edema.  Symmetric.  Neuro: Grossly nonfocal. Gait is normal.     Lymphatic: No evidence of cervical adenopathy bilaterally.   No evidence of axillary adenopathy bilaterally.   Skin: Warm, anicteric.    Psych:  Patient is pleasant and talkative.  Breasts:        Pathology:  [unfilled]    Labs:  Component  Ref Range & Units (hover) 12/5/24  2:24 PM 6/1/24 10:28 AM 4/30/24  1:48 PM 9/11/23 12:02 PM 4/3/23  1:27 PM 10/12/22  9:17 AM 8/30/22  1:13 PM   CEA 1.1 1.0 CM 1.3 CM 1.1 CM 0.7 CM 0.5 CM 0.6 CM       Imaging  CT chest and abdomen w contrast  Result Date: 12/4/2024  Narrative: CT CHEST AND ABDOMEN WITH IV CONTRAST INDICATION: C18.9: Malignant neoplasm of colon, unspecified C78.7: Secondary malignant neoplasm of liver and intrahepatic bile duct. . COMPARISON: CTA chest abdomen pelvis 8/1/2023, 12/27/2022 TECHNIQUE: CT examination of the chest and abdomen was performed. Multiplanar 2D reformatted images were created from the source data. This examination, like all CT scans performed in the  Critical access hospital, was performed utilizing techniques to minimize radiation dose exposure, including the use of iterative reconstruction and automated exposure control. Radiation dose length product (DLP) for this visit: 430 mGy-cm IV Contrast: 85 mL of iohexol (OMNIPAQUE) Enteric Contrast: Administered. FINDINGS: CHEST LUNGS: Mild upper lobe predominant paraseptal emphysema. Postsurgical changes from medial right lower lobe resection. New nodular soft tissue along the suture line measuring 1.3 x 0.7 cm (series 4 image 160) concerning for recurrent tumor. There are multiple pulmonary nodules which will be described on series 4, stable since 12/1/2017: Image 162, right lower lobe, solid, smooth bordered, 3 mm. Image 191, right lower lobe, solid, smooth bordered, 4 mm. Image 175, left lower lobe, solid, smooth bordered subpleural, 2 mm. Image 170, left lower lobe, solid, smooth bordered, 4 mm. Right middle lobe curvilinear opacities grossly similar to prior may be due to scarring. PLEURA: Unremarkable. HEART/GREAT VESSELS: Heart is unremarkable for patient's age. No thoracic aortic aneurysm. Right chest port terminates at the cavoatrial junction. MEDIASTINUM AND MAHENDRA: Unremarkable. CHEST WALL AND LOWER NECK: Small right fat-containing Bochdalek hernia. ABDOMEN LIVER/BILIARY TREE: Stable 2.7 x 1.9 cm hepatic dome hypodense resection cavity. Redemonstrated tiny subcentimeter hypodensities, for example measuring 3 mm in segment 4A (series 601 image 33), which are stable since at least 12/27/2022. GALLBLADDER: Cholelithiasis without findings of acute cholecystitis. SPLEEN: Unremarkable. PANCREAS: Unremarkable. ADRENAL GLANDS: Unremarkable. KIDNEYS/VISUALIZED URETERS: Stable simple 6 cm left renal sinus cyst resulting in chronic left renal calyectasis. No right hydronephrosis. Subcentimeter left renal hypodensity too small to characterize but statistically likely benign. STOMACH AND VISUALIZED BOWEL:  Unremarkable. ABDOMINAL CAVITY: No ascites. No pneumoperitoneum. No lymphadenopathy. VESSELS: Unremarkable for patient's age. ABDOMINAL WALL: Small fat-containing umbilical hernia. BONES: No acute fracture or suspicious osseous lesion. Spinal degenerative changes.     Impression: 1. New nodular soft tissue along the right lower lobe suture line measuring 1.3 x 0.7 cm concerning for recurrent tumor. 2. Stable hepatic dome resection cavity. 3. No new findings of metastatic disease in the abdomen. The study was marked in EPIC for significant notification. Resident: NISHANT ANDREA I, the attending radiologist, have reviewed the images and agree with the final report above. Workstation performed: [id number]     MRI abdomen w wo contrast  Result Date: 11/19/2024  Narrative: MRI - ABDOMEN - WITH AND WITHOUT CONTRAST INDICATION: 54 years / Male. Z85.038: Personal history of other malignant neoplasm of large intestine Z85.89: Personal history of malignant neoplasm of other organs and systems. COMPARISON: 4/23/2024 TECHNIQUE: Multiplanar/multisequence MRI of the abdomen was performed before and after administration of contrast. Imaging performed on 1.5T MRI. IV Contrast: 7 mL of Gadobutrol injection (SINGLE-DOSE) FINDINGS: LOWER CHEST: Unremarkable. LIVER: Normal in size and configuration. No suspicious mass. Again seen is a surgical defect in segment 7 at the dome, series 12 image 29 without enhancing soft tissue. This measures approximately 2.5 cm and is stable. Contour abnormality in along the margin of segment 3 is unchanged and consistent with prior resection. No recurrent tumor identified. Patent hepatic and portal veins. BILE DUCTS: No intrahepatic or extrahepatic bile duct dilation. GALLBLADDER: There is cholelithiasis. PANCREAS: Unremarkable. ADRENAL GLANDS: Unremarkable. SPLEEN: Normal. KIDNEYS/PROXIMAL URETERS: There is stable left-sided peripelvic cysts. No suspicious renal mass. BOWEL: No dilated loops of bowel.  PERITONEUM/RETROPERITONEUM: No ascites. LYMPH NODES: No abdominal lymphadenopathy. VESSELS: No aneurysm. ABDOMINAL WALL: Unremarkable BONES: No suspicious osseous lesion.     Impression: Posttreatment changes in segments 3 and 7 as described without evidence of recurrent or metastatic tumor. Workstation performed: [id number]     I personally reviewed and interpreted the above laboratory and imaging data.

## 2024-12-10 NOTE — PROGRESS NOTES
Surgical Oncology Follow Up       [facility] SURGICAL ONCOLOGY EN  [address]  [place] [address]    Matthieu Da Silva  1970  [id number]  [facility] SURGICAL ONCOLOGY EN  [address]  [place] [address]    1. 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  His liver MRI is stable.  His CEA level is normal.  There is no evidence of recurrence in the liver.  There is a concern for recurrence adjacent to his staple line in the lung.  PET/CT is currently pending.  I told him I agree with this.  I will let Dr. Reid know as well.  We also discussed that if his circulating tumor DNA is negative after resection, he may be able to forego further chemotherapy, but he will discuss this with medical oncology.  I will see him again in 6 months with a repeat MRI of the liver.  He is agreeable to this.  All his questions were answered.   2. History of colon cancer, stage IV  -     MRI abdomen w wo contrast; Future; Expected date: 06/10/2025  -     BUN; Future; Expected date: 06/01/2025  -     Creatinine, serum; Future; Expected date: 06/01/2025         Chief Complaint   Patient presents with    office visit       Return for Ofice visit short, Imaging - See orders, with Oksana.      Oncology History   History of colon cancer, stage IV   1/2018 Initial Diagnosis    Malignant neoplasm of sigmoid colon (HCC)     1/22/2018 Biopsy    Large Intestine, Sigmoid Colon, Recto-sigmoid tumor bx:    - Invasive colonic adenocarcinoma, moderately differentiated     2/6/2018 - 10/16/2018 Chemotherapy    Modified FOLFOX6 x 12 cycles  Added Erbitux on 3/20/18      6/21/2018 Surgery    Segment 7 liver resection/ablation, hemicolectomy     6/21/2018 -  Cancer Staged    Staging form: Colon and Rectum, AJCC 8th  Edition  - Pathologic stage from 6/21/2018: Stage WERO (ypT0, pN0, cM1a) - Signed by STEPHANIE Wills on 10/16/2023  Stage prefix: Post-therapy  Total positive nodes: 0  Sites of metastasis: Liver       10/30/2018 -  Chemotherapy    Continuation of Single agent Erbitux.  With 10/30/18 chemo, it was Cycle #14.  Plan was for 6 additional cycles of Erbitux alone.     3/2020 Genetic Testing    NEGATIVE for genes tested:    Test(s): Cancer[employer] + RNA[employer]ight (34 genes): APC, ETHAN, BARD1, BRCA1, BRCA2, BRIP1, BMPR1A, CDH1, CDK4, CDKN2A, CHEK2, DICER1, EPCAM, GREM1, HOXB13, MLH1, MRE11A, MSH2, MSH6, MUTYH, NBN, NF1, PALB2, PMS2, POLD1, POLE, PTEN, RAD50, RAD51C, RAD51D, SMAD4, SMARCA4, STK11, TP53      6/8/2020 Surgery    Liver, segment 3 (wedge resection):  - Metastatic adenocarcinoma, consistent with the patient's known colon primary  - Margins negative for carcinoma      7/27/2020 - 1/11/2021 Chemotherapy    fluorouracil (ADRUCIL), 745 mg, Intravenous, Once, 6 of 6 cycles  Administration: 750 mg (7/27/2020), 750 mg (8/10/2020), 745 mg (8/24/2020), 745 mg (9/8/2020), 745 mg (9/21/2020), 745 mg (10/5/2020)  pegfilgrastim (NEULASTA), 6 mg, Subcutaneous, Once, 1 of 1 cycle  Administration: 6 mg (9/24/2020)  pegfilgrastim (NEULASTA ONPRO), 6 mg, Subcutaneous, Once, 6 of 6 cycles  Administration: 6 mg (7/29/2020), 6 mg (8/12/2020), 6 mg (8/26/2020), 6 mg (9/10/2020), 6 mg (10/7/2020)  cetuximab (ERBITUX), 930 mg, Intravenous, Once, 12 of 12 cycles  Administration: 900 mg (7/27/2020), 900 mg (8/10/2020), 900 mg (8/24/2020), 900 mg (9/8/2020), 900 mg (9/21/2020), 900 mg (10/5/2020), 900 mg (10/19/2020), 900 mg (11/2/2020), 900 mg (11/16/2020), 900 mg (11/30/2020), 900 mg (12/28/2020), 900 mg (1/11/2021)  irinotecan (CAMPTOSAR) chemo infusion, 335 mg, Intravenous, Once, 6 of 6 cycles  Administration: 340 mg (7/27/2020), 340 mg (8/10/2020), 340 mg (8/24/2020), 340 mg (9/8/2020), 340 mg (9/21/2020), 340 mg (10/5/2020)  leucovorin  calcium IVPB, 744 mg, Intravenous, Once, 6 of 6 cycles  Administration: 750 mg (7/27/2020), 750 mg (8/10/2020), 750 mg (8/24/2020), 750 mg (9/8/2020), 750 mg (9/21/2020), 740 mg (10/5/2020)  fluorouracil (ADRUCIL) ambulatory infusion Soln, 1,200 mg/m2/day = 4,465 mg, Intravenous, Over 46 hours, 6 of 6 cycles  Dose modification: 1,200 mg/m2/day (original dose 1,200 mg/m2/day, Cycle 3)     9/20/2022 - 12/1/2022 Chemotherapy    palonosetron (ALOXI), 0.25 mg, Intravenous, Once, 4 of 4 cycles  Administration: 0.25 mg (10/18/2022), 0.25 mg (11/1/2022), 0.25 mg (11/15/2022), 0.25 mg (11/29/2022)  pegfilgrastim (NEULASTA ONPRO), 6 mg, Subcutaneous, Once, 6 of 6 cycles  Administration: 6 mg (9/22/2022), 6 mg (10/20/2022), 6 mg (11/3/2022), 6 mg (11/17/2022), 6 mg (12/1/2022)  Pegfilgrastim-bmez (ZIEXTENZO), 6 mg, Subcutaneous, Once, 1 of 1 cycle  Administration: 6 mg (10/7/2022)  fosaprepitant (EMEND) IVPB, 150 mg, Intravenous, Once, 4 of 4 cycles  Administration: 150 mg (10/18/2022), 150 mg (11/1/2022), 150 mg (11/15/2022), 150 mg (11/29/2022)  fluorouracil (ADRUCIL), 400 mg/m2 = 735 mg, Intravenous, Once, 6 of 6 cycles  Administration: 735 mg (9/20/2022), 735 mg (10/4/2022), 730 mg (10/18/2022), 730 mg (11/1/2022), 730 mg (11/15/2022), 730 mg (11/29/2022)  cetuximab (ERBITUX), 920 mg, Intravenous, Once, 6 of 6 cycles  Administration: 900 mg (9/20/2022), 900 mg (10/4/2022), 900 mg (10/18/2022), 900 mg (11/1/2022), 900 mg (11/15/2022), 900 mg (11/29/2022)  irinotecan (CAMPTOSAR) chemo infusion, 331 mg, Intravenous, Once, 6 of 6 cycles  Administration: 320 mg (9/20/2022), 320 mg (10/4/2022), 320 mg (10/18/2022), 320 mg (11/1/2022), 320 mg (11/15/2022), 320 mg (11/29/2022)  leucovorin calcium IVPB, 736 mg, Intravenous, Once, 6 of 6 cycles  Administration: 700 mg (9/20/2022), 700 mg (10/4/2022), 750 mg (10/18/2022), 750 mg (11/1/2022), 700 mg (11/15/2022), 700 mg (11/29/2022)  fluorouracil (ADRUCIL) ambulatory infusion Soln,  1,200 mg/m2/day = 4,415 mg, Intravenous, Over 46 hours, 6 of 6 cycles     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 (HCC) (Resolved)   4/11/2022 Initial Diagnosis    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 (HCC)     9/20/2022 - 12/1/2022 Chemotherapy    palonosetron (ALOXI), 0.25 mg, Intravenous, Once, 4 of 4 cycles  Administration: 0.25 mg (10/18/2022), 0.25 mg (11/1/2022), 0.25 mg (11/15/2022), 0.25 mg (11/29/2022)  pegfilgrastim (NEULASTA ONPRO), 6 mg, Subcutaneous, Once, 6 of 6 cycles  Administration: 6 mg (9/22/2022), 6 mg (10/20/2022), 6 mg (11/3/2022), 6 mg (11/17/2022), 6 mg (12/1/2022)  Pegfilgrastim-bmez (ZIEXTENZO), 6 mg, Subcutaneous, Once, 1 of 1 cycle  Administration: 6 mg (10/7/2022)  fosaprepitant (EMEND) IVPB, 150 mg, Intravenous, Once, 4 of 4 cycles  Administration: 150 mg (10/18/2022), 150 mg (11/1/2022), 150 mg (11/15/2022), 150 mg (11/29/2022)  fluorouracil (ADRUCIL), 400 mg/m2 = 735 mg, Intravenous, Once, 6 of 6 cycles  Administration: 735 mg (9/20/2022), 735 mg (10/4/2022), 730 mg (10/18/2022), 730 mg (11/1/2022), 730 mg (11/15/2022), 730 mg (11/29/2022)  cetuximab (ERBITUX), 920 mg, Intravenous, Once, 6 of 6 cycles  Administration: 900 mg (9/20/2022), 900 mg (10/4/2022), 900 mg (10/18/2022), 900 mg (11/1/2022), 900 mg (11/15/2022), 900 mg (11/29/2022)  irinotecan (CAMPTOSAR) chemo infusion, 331 mg, Intravenous, Once, 6 of 6 cycles  Administration: 320 mg (9/20/2022), 320 mg (10/4/2022), 320 mg (10/18/2022), 320 mg (11/1/2022), 320 mg (11/15/2022), 320 mg (11/29/2022)  leucovorin calcium IVPB, 736 mg, Intravenous, Once, 6 of 6 cycles  Administration: 700 mg (9/20/2022), 700 mg (10/4/2022), 750 mg (10/18/2022), 750 mg (11/1/2022), 700 mg (11/15/2022), 700 mg (11/29/2022)  fluorouracil (ADRUCIL) ambulatory infusion Soln, 1,200 mg/m2/day = 4,415 mg, Intravenous, Over 46 hours, 6 of 6 cycles     Malignant neoplasm metastatic to right lung (HCC)   8/4/2022 Initial Diagnosis    Malignant neoplasm metastatic to right lung  (HCC)     8/9/2022 Surgery    Flexible bronchoscopy, right thoracoscopic therapeutic wedge resection      9/20/2022 - 12/1/2022 Chemotherapy    palonosetron (ALOXI), 0.25 mg, Intravenous, Once, 4 of 4 cycles  Administration: 0.25 mg (10/18/2022), 0.25 mg (11/1/2022), 0.25 mg (11/15/2022), 0.25 mg (11/29/2022)  pegfilgrastim (NEULASTA ONPRO), 6 mg, Subcutaneous, Once, 6 of 6 cycles  Administration: 6 mg (9/22/2022), 6 mg (10/20/2022), 6 mg (11/3/2022), 6 mg (11/17/2022), 6 mg (12/1/2022)  Pegfilgrastim-bmez (ZIEXTENZO), 6 mg, Subcutaneous, Once, 1 of 1 cycle  Administration: 6 mg (10/7/2022)  fosaprepitant (EMEND) IVPB, 150 mg, Intravenous, Once, 4 of 4 cycles  Administration: 150 mg (10/18/2022), 150 mg (11/1/2022), 150 mg (11/15/2022), 150 mg (11/29/2022)  fluorouracil (ADRUCIL), 400 mg/m2 = 735 mg, Intravenous, Once, 6 of 6 cycles  Administration: 735 mg (9/20/2022), 735 mg (10/4/2022), 730 mg (10/18/2022), 730 mg (11/1/2022), 730 mg (11/15/2022), 730 mg (11/29/2022)  cetuximab (ERBITUX), 920 mg, Intravenous, Once, 6 of 6 cycles  Administration: 900 mg (9/20/2022), 900 mg (10/4/2022), 900 mg (10/18/2022), 900 mg (11/1/2022), 900 mg (11/15/2022), 900 mg (11/29/2022)  irinotecan (CAMPTOSAR) chemo infusion, 331 mg, Intravenous, Once, 6 of 6 cycles  Administration: 320 mg (9/20/2022), 320 mg (10/4/2022), 320 mg (10/18/2022), 320 mg (11/1/2022), 320 mg (11/15/2022), 320 mg (11/29/2022)  leucovorin calcium IVPB, 736 mg, Intravenous, Once, 6 of 6 cycles  Administration: 700 mg (9/20/2022), 700 mg (10/4/2022), 750 mg (10/18/2022), 750 mg (11/1/2022), 700 mg (11/15/2022), 700 mg (11/29/2022)  fluorouracil (ADRUCIL) ambulatory infusion Soln, 1,200 mg/m2/day = 4,415 mg, Intravenous, Over 46 hours, 6 of 6 cycles         Staging:  Metastatic rectosigmoid cancer  [id number]  Treatment history:   Modified FOLFOX 6   Erbitux added 03/20/2018   Segment 7 liver resection, June 2018  Segment 3 mass liver resection, [place]  2020  Resection of a right lower lobe pulmonary metastasis, August 2022  Chemotherapy with Erbitux  Current treatment:  Observation  Disease status:   LUTHER    History of Present Illness: Returns in follow-up of his colon cancer.  He is doing well at this time with no complaints.  He denies abdominal pain, nausea, vomiting, back pain, bone pain, headaches, or cough.  No change in appetite or unintentional weight loss.  MRI from November 13, 2024 reveals treatment related changes to the liver.  No evidence of recurrence in the liver.  CT from November 29, 2024 shows nodular soft tissue along the right lower lobe staple line.  No worrisome lesions in the liver.  I personally reviewed the films.  CEA is 1.1 ng/mL.    Review of Systems  Complete ROS Surg Onc:   Complete ROS Surg Onc:   Constitutional: The patient denies new or recent history of general fatigue, no recent weight loss, no change in appetite.   Eyes: No complaints of visual problems, no scleral icterus.   ENT: no complaints of ear pain, no hoarseness, no difficulty swallowing,  no tinnitus and no new masses in head, oral cavity, or neck.   Cardiovascular: No complaints of chest pain, no palpitations, no ankle edema.   Respiratory: No complaints of shortness of breath, no cough.   Gastrointestinal: No complaints of jaundice, no bloody stools, no pale stools.   Genitourinary: No complaints of dysuria, no hematuria, no nocturia, no frequent urination, no urethral discharge.   Musculoskeletal: No complaints of weakness, paralysis, joint stiffness or arthralgias.  Integumentary: No complaints of rash, no new lesions.   Neurological: No complaints of convulsions, no seizures, no dizziness.   Hematologic/Lymphatic: No complaints of easy bruising.   Endocrine:  No hot or cold intolerance.  No polydipsia, polyphagia, or polyuria.  Allergy/immunology:  No environmental allergies.  No food allergies.  Not immunocompromised.  Skin:  No pallor or rash.  No  wound.        Patient Active Problem List   Diagnosis    ED (erectile dysfunction)    History of colon cancer, stage IV    GERD (gastroesophageal reflux disease)    Pulmonary nodule, right    Encounter for follow-up examination after completed treatment for malignant neoplasm    Other hydronephrosis    Annual physical exam    Vapes nicotine containing substance    Malignant neoplasm metastatic to right lung (HCC)    Chemotherapy induced neutropenia (HCC)    Thyroid nodule    History of known metastasis to liver     Past Medical History:   Diagnosis Date    Cancer (HCC)     Colon cancer (HCC)     Dysuria     Last Assessed:8/24/17    GERD (gastroesophageal reflux disease)     Liver cancer (HCC)     Liver nodule     Pulmonary nodule, right     Thyroid nodule 02/16/2023    Ureteropelvic junction (UPJ) obstruction 01/29/2020    Wears glasses      Past Surgical History:   Procedure Laterality Date    ABDOMINAL SURGERY      COLON SURGERY      COLONOSCOPY N/A 01/22/2018    Procedure: COLONOSCOPY;  Surgeon: Casey Justice MD;  Location: [facility];  Service: Colorectal    DENTAL SURGERY  2016    Crown with bridge work    FLEXIBLE SIGMOIDOSCOPY N/A 06/15/2018    Procedure: SIGMOIDOSCOPY FLEXIBLE w/o sedation w/ tattoo;  Surgeon: Casey Justice MD;  Location: [facility];  Service: Colorectal    IR PORT PLACEMENT Right     LAPAROTOMY N/A 06/08/2020    Procedure: LAPAROTOMY EXPLORATORY, LYSIS OF ADHESIONS;  Surgeon: Esequiel Mijares MD;  [facility];  Service: Surgical Oncology    LIVER LOBECTOMY N/A 06/21/2018    Procedure: liver resection/ablation, intraoperative ultrasound of liver;  Surgeon: Esequiel Mijares MD;  [facility];  Service: Surgical Oncology    LIVER LOBECTOMY N/A 06/08/2020    Procedure: LIVER RESECTION SEGMENT 3 WITH  INTRAOPERATIVE U/S OF LIVER;  Surgeon: Esequiel Mijares MD;  [facility];  Service: Surgical Oncology    LUNG SEGMENTECTOMY Right 8/9/2022    Procedure: RESECTION WEDGE  LUNG, THERAPEUTIC;  Surgeon: Mary Reid MD;  [facility];  Service: Thoracic    OR BRNCHSC INCL FLUOR GDNCE DX W/CELL WASHG SPX N/A 8/9/2022    Procedure: BRONCHOSCOPY FLEXIBLE;  Surgeon: Mary Reid MD;  [facility];  Service: Thoracic    OR COLECTOMY PARTIAL W/ANASTOMOSIS Left 06/21/2018    Procedure: hemicolectomy, low anterior resection (open) SPY fluorescence angiography;  Surgeon: Casey Justice MD;  [facility];  Service: Colorectal    OR EXPLORATORY LAPAROTOMY CELIOTOMY W/WO BIOPSY SPX N/A 06/21/2018    Procedure: LAPAROTOMY EXPLORATORY;  Surgeon: Casey Justice MD;  [facility];  Service: Colorectal    OR INSJ PRPH CTR VAD W/SUBQ PORT AGE 5 YR/> N/A 01/25/2018    Procedure: PORT-A-CATHETER PLACEMENT  Fluoroscopy for performance/interpretation;  Surgeon: Casey Justice MD;  [facility];  Service: Colorectal    OR PRCTECT PRTL RESCJ RECTUM TABDL APPR N/A 06/21/2018    Procedure: Partial proctectomy.;  Surgeon: Casey Justice MD;  [facility];  Service: Colorectal    OR SIGMOIDOSCOPY FLX DX W/COLLJ SPEC BR/WA IF PFRMD N/A 06/21/2018    Procedure: SIGMOIDOSCOPY FLEXIBLE;  Surgeon: Casey Justice MD;  [facility];  Service: Colorectal    OR THORACOSCOPY W/THERA WEDGE RESEXN INITIAL UNILAT Right 8/9/2022    Procedure: THORACOSCOPY VIDEO ASSISTED SURGERY (VATS);  Surgeon: Mary Reid MD;  [facility];  Service: Thoracic    ROOT CANAL  2015    TESTICLE SURGERY      Exploration of Undescended Testis rIght, age 10 had surgery for undescended testicle; Last Assessed:8/24/17    TOOTH EXTRACTION  2015     Family History   Problem Relation Age of Onset    No Known Problems Father     Cancer Mother         unknown    Cancer Brother         pt. reports brother was diagnosed with stage 4 throat cancer    Throat cancer Brother     Breast cancer Maternal Aunt      Social History     Socioeconomic History     Marital status: Single     Spouse name: Not on file    Number of children: Not on file    Years of education: Not on file    Highest education level: Not on file   Occupational History    Not on file   Tobacco Use    Smoking status: Former     Current packs/day: 0.00     Average packs/day: 0.5 packs/day for 25.0 years (12.5 ttl pk-yrs)     Types: Cigarettes     Start date:      Quit date:      Years since quittin.9    Smokeless tobacco: Never   Vaping Use    Vaping status: Every Day    Start date: 10/10/2016    Substances: Nicotine, THC (at time), CBD (at times), Flavoring   Substance and Sexual Activity    Alcohol use: Yes     Comment: socially - 2 month    Drug use: Yes     Frequency: 1.0 times per week     Types: Marijuana    Sexual activity: Not on file   Other Topics Concern    Not on file   Social History Narrative    Not on file     Social Drivers of Health     Financial Resource Strain: Not on file   Food Insecurity: Not on file   Transportation Needs: Not on file   Physical Activity: Not on file   Stress: Not on file   Social Connections: Not on file   Intimate Partner Violence: Not on file   Housing Stability: Not on file       Current Outpatient Medications:     Multiple Vitamins-Minerals (MENS MULTIVITAMIN PO), Take by mouth in the morning, Disp: , Rfl:     omeprazole (PriLOSEC) 20 mg delayed release capsule, Take 1 capsule (20 mg total) by mouth daily, Disp: 30 capsule, Rfl: 3    sildenafil (VIAGRA) 50 MG tablet, Take 1 tablet (50 mg total) by mouth as needed for erectile dysfunction, Disp: 10 tablet, Rfl: 0  No Known Allergies  Vitals:    12/10/24 1506   BP: 110/80   Pulse: 70   Resp: 20   Temp: 97.9 °F (36.6 °C)   SpO2: 99%       Physical Exam  Constitutional: General appearance: The Patient is well-developed and well-nourished who appears the stated age in no acute distress. Patient is pleasant and talkative.     HEENT:  Normocephalic.  Sclerae are anicteric. Mucous membranes are moist.  Neck is supple without adenopathy. No JVD.     Chest: The lungs are clear to auscultation.     Cardiac: Heart is regular rate.     Abdomen: Abdomen is soft, non-tender, non-distended and without masses.     Extremities: There is no clubbing or cyanosis. There is no edema.  Symmetric.  Neuro: Grossly nonfocal. Gait is normal.     Lymphatic: No evidence of cervical adenopathy bilaterally.   No evidence of axillary adenopathy bilaterally.   Skin: Warm, anicteric.    Psych:  Patient is pleasant and talkative.  Breasts:        Pathology:  [unfilled]    Labs:  Component  Ref Range & Units (hover) 12/5/24  2:24 PM 6/1/24 10:28 AM 4/30/24  1:48 PM 9/11/23 12:02 PM 4/3/23  1:27 PM 10/12/22  9:17 AM 8/30/22  1:13 PM   CEA 1.1 1.0 CM 1.3 CM 1.1 CM 0.7 CM 0.5 CM 0.6 CM       Imaging  CT chest and abdomen w contrast  Result Date: 12/4/2024  Narrative: CT CHEST AND ABDOMEN WITH IV CONTRAST INDICATION: C18.9: Malignant neoplasm of colon, unspecified C78.7: Secondary malignant neoplasm of liver and intrahepatic bile duct. . COMPARISON: CTA chest abdomen pelvis 8/1/2023, 12/27/2022 TECHNIQUE: CT examination of the chest and abdomen was performed. Multiplanar 2D reformatted images were created from the source data. This examination, like all CT scans performed in the [facility], was performed utilizing techniques to minimize radiation dose exposure, including the use of iterative reconstruction and automated exposure control. Radiation dose length product (DLP) for this visit: 430 mGy-cm IV Contrast: 85 mL of iohexol (OMNIPAQUE) Enteric Contrast: Administered. FINDINGS: CHEST LUNGS: Mild upper lobe predominant paraseptal emphysema. Postsurgical changes from medial right lower lobe resection. New nodular soft tissue along the suture line measuring 1.3 x 0.7 cm (series 4 image 160) concerning for recurrent tumor. There are multiple pulmonary nodules which will be described on series 4, stable since 12/1/2017: Image  162, right lower lobe, solid, smooth bordered, 3 mm. Image 191, right lower lobe, solid, smooth bordered, 4 mm. Image 175, left lower lobe, solid, smooth bordered subpleural, 2 mm. Image 170, left lower lobe, solid, smooth bordered, 4 mm. Right middle lobe curvilinear opacities grossly similar to prior may be due to scarring. PLEURA: Unremarkable. HEART/GREAT VESSELS: Heart is unremarkable for patient's age. No thoracic aortic aneurysm. Right chest port terminates at the cavoatrial junction. MEDIASTINUM AND MAHENDRA: Unremarkable. CHEST WALL AND LOWER NECK: Small right fat-containing Bochdalek hernia. ABDOMEN LIVER/BILIARY TREE: Stable 2.7 x 1.9 cm hepatic dome hypodense resection cavity. Redemonstrated tiny subcentimeter hypodensities, for example measuring 3 mm in segment 4A (series 601 image 33), which are stable since at least 12/27/2022. GALLBLADDER: Cholelithiasis without findings of acute cholecystitis. SPLEEN: Unremarkable. PANCREAS: Unremarkable. ADRENAL GLANDS: Unremarkable. KIDNEYS/VISUALIZED URETERS: Stable simple 6 cm left renal sinus cyst resulting in chronic left renal calyectasis. No right hydronephrosis. Subcentimeter left renal hypodensity too small to characterize but statistically likely benign. STOMACH AND VISUALIZED BOWEL: Unremarkable. ABDOMINAL CAVITY: No ascites. No pneumoperitoneum. No lymphadenopathy. VESSELS: Unremarkable for patient's age. ABDOMINAL WALL: Small fat-containing umbilical hernia. BONES: No acute fracture or suspicious osseous lesion. Spinal degenerative changes.     Impression: 1. New nodular soft tissue along the right lower lobe suture line measuring 1.3 x 0.7 cm concerning for recurrent tumor. 2. Stable hepatic dome resection cavity. 3. No new findings of metastatic disease in the abdomen. The study was marked in EPIC for significant notification. Resident: NISHANT ANDREA I, the attending radiologist, have reviewed the images and agree with the final report above. Workstation  performed: [id number]     MRI abdomen w wo contrast  Result Date: 11/19/2024  Narrative: MRI - ABDOMEN - WITH AND WITHOUT CONTRAST INDICATION: 54 years / Male. Z85.038: Personal history of other malignant neoplasm of large intestine Z85.89: Personal history of malignant neoplasm of other organs and systems. COMPARISON: 4/23/2024 TECHNIQUE: Multiplanar/multisequence MRI of the abdomen was performed before and after administration of contrast. Imaging performed on 1.5T MRI. IV Contrast: 7 mL of Gadobutrol injection (SINGLE-DOSE) FINDINGS: LOWER CHEST: Unremarkable. LIVER: Normal in size and configuration. No suspicious mass. Again seen is a surgical defect in segment 7 at the dome, series 12 image 29 without enhancing soft tissue. This measures approximately 2.5 cm and is stable. Contour abnormality in along the margin of segment 3 is unchanged and consistent with prior resection. No recurrent tumor identified. Patent hepatic and portal veins. BILE DUCTS: No intrahepatic or extrahepatic bile duct dilation. GALLBLADDER: There is cholelithiasis. PANCREAS: Unremarkable. ADRENAL GLANDS: Unremarkable. SPLEEN: Normal. KIDNEYS/PROXIMAL URETERS: There is stable left-sided peripelvic cysts. No suspicious renal mass. BOWEL: No dilated loops of bowel. PERITONEUM/RETROPERITONEUM: No ascites. LYMPH NODES: No abdominal lymphadenopathy. VESSELS: No aneurysm. ABDOMINAL WALL: Unremarkable BONES: No suspicious osseous lesion.     Impression: Posttreatment changes in segments 3 and 7 as described without evidence of recurrent or metastatic tumor. Workstation performed: [id number]     I personally reviewed and interpreted the above laboratory and imaging data.

## 2024-12-12 ENCOUNTER — DOCUMENTATION (OUTPATIENT)
Dept: HEMATOLOGY ONCOLOGY | Facility: CLINIC | Age: 54
End: 2024-12-12

## 2024-12-12 NOTE — PROGRESS NOTES
In-basket message received from Irena Wylie PA-C to add patient to the thoracic [facility] on 12/23/2024. Chart reviewed and prep completed.

## 2024-12-19 ENCOUNTER — DOCUMENTATION (OUTPATIENT)
Dept: HEMATOLOGY ONCOLOGY | Facility: CLINIC | Age: 54
End: 2024-12-19

## 2024-12-19 NOTE — PROGRESS NOTES
Chart reviewed in preparation of Thoracic Tumor Conference presentation by Irena Wylie PA-C on 12/23/24.  Patient with Stage IV colon cancer with liver metastasis, K-jennifer wild-type, NRAS wild-type, MSI stable, no evidence of actionable mutation, genetic test did not show any inherited mutation dx January 2018. He had 4 months neoadjuvant Cetuximab and FOLFOX. 06/2018 he underwent left hemicolectomy and liver resection, segment 7 followed by adjuvant 5FU and ERBITUX then single agent ERBITUX completed 1/2019. He had recurrence 6/8/2020 with liver metastasis, segment 3. He received FOLFIRI plus ERBITUX for 6 months, this was completed in early 2021. Second recurrence was slowing growing lung nodules. Had consult with Dr. Chadwick re: possible SBRT. Ultimately underwent VATS right lower lobe wedge resection 08/09/22 pathology consistent with metastatic colorectal cancer. Followed by months of adjuvant treatment with FOLFIRI plus ERBITUX (9/20/22-11/29/22). He is currently on surveillance imaging.  11/29/24 CT chest and abdomen shows new nodular soft tissue along the right lower lobe suture line measuring 1.3 x 0.7 cm concerning for recurrent tumor.

## 2024-12-20 NOTE — TELEPHONE ENCOUNTER
211/80    Subjective:     India Lobo  is a 59 y.o. female who presents for follow-up about 8 days following band placed over gastric bypass. She has lost a total of 2 pounds since surgery.  Body mass index is 35.99 kg/m²..  Her UGI was normal 48 hours ago    Pain assessment - 0/10    Surgery related complication: NA    Surgical Pathology/Bx have been discussed - LIVER WITH NO PATHOLOGIC CHANGE.     UGI done 12/18/24.    First adjustment scheduled for 1/22/24.     She reports no real issues and denies vomiting, abdominal pain, cough, and difficulty breathing.      The patient's exercise level: not active.    Changes in her medical history and medications have been reviewed.    Patient Active Problem List   Diagnosis    Polymyositis (HCC)    History of gastric bypass    Intestinal malabsorption    Endometrial cancer (HCC)    Severe obesity (BMI 35.0-39.9) with comorbidity    Sleep disorder    Neuropathy    Essential hypertension    Low ferritin    Severe obesity    Body mass index 36.0-36.9, adult    Hypertension    Adhesion of diaphragm    NAFLD (nonalcoholic fatty liver disease)    Superficial gastritis without hemorrhage     Past Medical History:   Diagnosis Date    Anesthesia     pt states needs head elevated when awaking from anesthesia/ states gasps and feels sob coming out of anesthesia    Anxiety     Arthritis     Chronic pain     legs knees headaches    Depression     Endometrial cancer (HCC)     history of - pt had hysterectomy    Exercise tolerance finding 2024    denies sob with 2 flights of stairs    History of gastric bypass     Hypertension     Intestinal malabsorption     Neuropathy     sees a neurologist    Severe obesity (BMI 35.0-39.9) with comorbidity     Sleep disorder     uses PO sleep aid     Past Surgical History:   Procedure Laterality Date    CARPAL TUNNEL RELEASE Left     CHOLECYSTECTOMY      GASTRIC BAND N/A 12/12/2024    PLACEMENT OF LAPAROSCOPIC GASTRIC BAND AND PORT OVER EXISTING  Spoke with patient to let him know that his appt with Dr Sony Crandall on 1/4 has been moved to 1/3  Patient is aware and okay with this change

## 2024-12-23 ENCOUNTER — HOSPITAL ENCOUNTER (OUTPATIENT)
Dept: RADIOLOGY | Age: 54
Discharge: HOME | End: 2024-12-23

## 2024-12-23 ENCOUNTER — DOCUMENTATION (OUTPATIENT)
Dept: HEMATOLOGY ONCOLOGY | Facility: CLINIC | Age: 54
End: 2024-12-23

## 2024-12-23 NOTE — PROGRESS NOTES
Patient's PET CT scheduled for today 12/23/2024 was rescheduled. Secure chat received from Irena Wylie PA-C to move patient to the thoracic [facility] on 1/6/2025. Chart reviewed and prep completed.

## 2025-01-06 ENCOUNTER — HOSPITAL ENCOUNTER (OUTPATIENT)
Dept: RADIOLOGY | Age: 55
Discharge: HOME | End: 2025-01-06
Payer: COMMERCIAL

## 2025-01-06 ENCOUNTER — DOCUMENTATION (OUTPATIENT)
Dept: HEMATOLOGY ONCOLOGY | Facility: CLINIC | Age: 55
End: 2025-01-06

## 2025-01-06 DIAGNOSIS — Z85.038 HISTORY OF COLON CANCER, STAGE IV: ICD-10-CM

## 2025-01-06 DIAGNOSIS — Z85.89 HISTORY OF KNOWN METASTASIS TO LIVER: ICD-10-CM

## 2025-01-06 LAB — GLUCOSE SERPL-MCNC: 93 MG/DL (ref 65–140)

## 2025-01-06 PROCEDURE — 78815 PET IMAGE W/CT SKULL-THIGH: CPT

## 2025-01-06 PROCEDURE — A9552 F18 FDG: HCPCS

## 2025-01-06 PROCEDURE — 82948 REAGENT STRIP/BLOOD GLUCOSE: CPT

## 2025-01-06 NOTE — PROGRESS NOTES
THORACIC ONCOLOGY MULTIDISCIPLINARY CASE REVIEW    DATE:  1/6/2025    PRESENTING DOCTOR:  Irena Wylie PA-C    DIAGNOSIS:  Metastatic Rectosigmoid Cancer with RLL nodule  STAGING: Stage IV    Matthieu Da Silva is a 54 y.o. male who was presented at the Thoracic Oncology Multidisciplinary Tumor Conference today.  Patient with Stage IV colon cancer with liver metastasis, K-jennifer wild-type, NRAS wild-type, MSI stable, no evidence of actionable mutation, genetic test did not show any inherited mutation dx January 2018. He had 4 months neoadjuvant Cetuximab and FOLFOX. 06/2018 he underwent left hemicolectomy and liver resection, segment 7 followed by adjuvant 5FU and ERBITUX then single agent ERBITUX completed 1/2019. He had recurrence 6/8/2020 with liver metastasis, segment 3. He received FOLFIRI plus ERBITUX for 6 months, this was completed in early 2021. Second recurrence was slowing growing lung nodules. Had consult with Dr. Chadwick re: possible SBRT. Ultimately underwent VATS right lower lobe wedge resection 08/09/22 pathology consistent with metastatic colorectal cancer. Followed by months of adjuvant treatment with FOLFIRI plus ERBITUX (9/20/22-11/29/22). He is currently on surveillance imaging.  11/29/24 CT chest and abdomen shows new nodular soft tissue along the right lower lobe suture line measuring 1.3 x 0.7 cm concerning for recurrent tumor.    PHYSICIAN RECOMMENDED PLAN:  - IR biopsy  - Follow up with Dr. Reid after IR biopsy       Imaging reviewed:   01/06/25 PET CT  11/29/24 CT chest and abdomen w contrast   08/01/23 CT chest abdomen pelvis w contrast   07/14/22 CT chest wo contrast     Pathology reviewed: No    PFT's reviewed: No    Future imaging: No    Referrals: IR    Clinical Trials Reviewed:  No  Clinical Trial Eligibility:     Team agreed to plan.  NCCN guidelines were readily available for review at this discussion    The final treatment plan will be left to the discretion of the patient and  the treating physician.     DISCLAIMERS:  TO THE TREATING PHYSICIAN:  This conference is a meeting of clinicians from various specialty areas who evaluate and discuss patients for whom a multidisciplinary treatment approach is being considered. Please note that the above opinion was a consensus of the conference attendees and is intended only to assist in quality care of the discussed patient.  The responsibility for follow up on the input given during the conference, along with any final decisions regarding plan of care, is that of the patient and the patient's provider. Accordingly, appointments have only been recommended based on this information and have NOT been scheduled unless otherwise noted.      TO THE PATIENT:  This summary is a brief record of major aspects of your cancer treatment. You may choose to share a copy with any of your doctors or nurses. However, this is not a detailed or comprehensive record of your care.

## 2025-01-06 NOTE — PROGRESS NOTES
Call placed to radiology reading room and spoke with Dannie. Requested PET CT read STAT for tumor board discussion today at noon. He confirmed it is being looked at currently and will be ready by noon.

## 2025-01-07 DIAGNOSIS — Z85.038 HISTORY OF COLON CANCER, STAGE IV: Primary | ICD-10-CM

## 2025-01-07 DIAGNOSIS — C78.01 MALIGNANT NEOPLASM METASTATIC TO RIGHT LUNG (HCC): ICD-10-CM

## 2025-01-07 NOTE — PROGRESS NOTES
Case reviewed at tumor board.    IR believes area can be reached for bx.  Recommend bx and f/u with thoracic oncology.    1/6/2025 pet/ct -  Previously noted soft tissue density along the right medial lower lobe resection line demonstrates only minimal FDG activity, however recurrence of low FDG avidity tumor cannot be excluded given its increasing size. Consider follow-up CT chest in 3 months or tissue sampling depending on clinical suspicion for malignancy.    Called and spoke with patient.  He is feeling very well.    Discussed pet/ct.  Low glucose avidity.    He is agreeable to IR BX and thoracic surgery f/u.  Will arrange for f/u with Dr. Harrison as well.

## 2025-01-08 ENCOUNTER — LAB REQUISITION (OUTPATIENT)
Dept: LAB | Facility: HOSPITAL | Age: 55
End: 2025-01-08

## 2025-01-08 ENCOUNTER — PREP FOR PROCEDURE (OUTPATIENT)
Dept: INTERVENTIONAL RADIOLOGY/VASCULAR | Facility: CLINIC | Age: 55
End: 2025-01-08

## 2025-01-08 DIAGNOSIS — C78.01 MALIGNANT NEOPLASM METASTATIC TO RIGHT LUNG (HCC): ICD-10-CM

## 2025-01-08 DIAGNOSIS — R91.1 PULMONARY NODULE, RIGHT: Primary | ICD-10-CM

## 2025-01-08 DIAGNOSIS — C78.01 SECONDARY MALIGNANT NEOPLASM OF RIGHT LUNG (HCC): ICD-10-CM

## 2025-01-08 LAB — SCAN RESULT: NORMAL

## 2025-01-09 ENCOUNTER — TELEPHONE (OUTPATIENT)
Age: 55
End: 2025-01-09

## 2025-01-09 ENCOUNTER — TELEPHONE (OUTPATIENT)
Dept: HEMATOLOGY ONCOLOGY | Facility: CLINIC | Age: 55
End: 2025-01-09

## 2025-01-09 NOTE — TELEPHONE ENCOUNTER
Pt was returning a call. I did not see any notes and pt was also confused that GI would be calling him. Pt will call hem/onc to see if it was them that left a message

## 2025-01-09 NOTE — TELEPHONE ENCOUNTER
Received in basket regarding getting pt follow up appts       -messaged thoracic for approval of an appt  -messaged onc for approval for [facility] pt

## 2025-01-28 NOTE — PROGRESS NOTES
Spoke with pt regarding their procedure on 2/4. Instructions provided on arrival time of 0730 to [facility]; NPO after midnight, AM meds ok with sips of water; need for a  post-procedure. Pt verbalized understanding, questions answered as offered.

## 2025-02-04 ENCOUNTER — HOSPITAL ENCOUNTER (OUTPATIENT)
Dept: CT IMAGING | Facility: HOSPITAL | Age: 55
Discharge: HOME | End: 2025-02-04
Attending: RADIOLOGY
Payer: COMMERCIAL

## 2025-02-04 ENCOUNTER — HOSPITAL ENCOUNTER (OUTPATIENT)
Dept: RADIOLOGY | Facility: HOSPITAL | Age: 55
Discharge: HOME | End: 2025-02-04
Payer: COMMERCIAL

## 2025-02-04 VITALS
OXYGEN SATURATION: 100 % | TEMPERATURE: 97.3 F | RESPIRATION RATE: 17 BRPM | SYSTOLIC BLOOD PRESSURE: 111 MMHG | HEART RATE: 60 BPM | DIASTOLIC BLOOD PRESSURE: 69 MMHG

## 2025-02-04 DIAGNOSIS — C78.01 MALIGNANT NEOPLASM METASTATIC TO RIGHT LUNG (HCC): ICD-10-CM

## 2025-02-04 DIAGNOSIS — R91.1 PULMONARY NODULE, RIGHT: ICD-10-CM

## 2025-02-04 LAB
BASOPHILS # BLD AUTO: 0.03 THOUSANDS/ΜL (ref 0–0.1)
BASOPHILS NFR BLD AUTO: 1 % (ref 0–1)
EOSINOPHIL # BLD AUTO: 0.13 THOUSAND/ΜL (ref 0–0.61)
EOSINOPHIL NFR BLD AUTO: 2 % (ref 0–6)
ERYTHROCYTE [DISTWIDTH] IN BLOOD BY AUTOMATED COUNT: 13.1 % (ref 11.6–15.1)
HCT VFR BLD AUTO: 43.2 % (ref 36.5–49.3)
HGB BLD-MCNC: 15.2 G/DL (ref 12–17)
IMM GRANULOCYTES # BLD AUTO: 0.01 THOUSAND/UL (ref 0–0.2)
IMM GRANULOCYTES NFR BLD AUTO: 0 % (ref 0–2)
INR PPP: 0.89 (ref 0.85–1.19)
LYMPHOCYTES # BLD AUTO: 1.4 THOUSANDS/ΜL (ref 0.6–4.47)
LYMPHOCYTES NFR BLD AUTO: 24 % (ref 14–44)
MCH RBC QN AUTO: 32.8 PG (ref 26.8–34.3)
MCHC RBC AUTO-ENTMCNC: 35.2 G/DL (ref 31.4–37.4)
MCV RBC AUTO: 93 FL (ref 82–98)
MONOCYTES # BLD AUTO: 0.46 THOUSAND/ΜL (ref 0.17–1.22)
MONOCYTES NFR BLD AUTO: 8 % (ref 4–12)
NEUTROPHILS # BLD AUTO: 3.71 THOUSANDS/ΜL (ref 1.85–7.62)
NEUTS SEG NFR BLD AUTO: 65 % (ref 43–75)
NRBC BLD AUTO-RTO: 0 /100 WBCS
PLATELET # BLD AUTO: 166 THOUSANDS/UL (ref 149–390)
PMV BLD AUTO: 10.6 FL (ref 8.9–12.7)
PROTHROMBIN TIME: 12.7 SECONDS (ref 12.3–15)
RBC # BLD AUTO: 4.63 MILLION/UL (ref 3.88–5.62)
WBC # BLD AUTO: 5.74 THOUSAND/UL (ref 4.31–10.16)

## 2025-02-04 PROCEDURE — 99152 MOD SED SAME PHYS/QHP 5/>YRS: CPT

## 2025-02-04 PROCEDURE — 99153 MOD SED SAME PHYS/QHP EA: CPT

## 2025-02-04 PROCEDURE — 88341 IMHCHEM/IMCYTCHM EA ADD ANTB: CPT | Performed by: PATHOLOGY

## 2025-02-04 PROCEDURE — 88305 TISSUE EXAM BY PATHOLOGIST: CPT | Performed by: PATHOLOGY

## 2025-02-04 PROCEDURE — 88342 IMHCHEM/IMCYTCHM 1ST ANTB: CPT | Performed by: PATHOLOGY

## 2025-02-04 PROCEDURE — 32408 CORE NDL BX LNG/MED PERQ: CPT | Performed by: RADIOLOGY

## 2025-02-04 PROCEDURE — 99152 MOD SED SAME PHYS/QHP 5/>YRS: CPT | Performed by: RADIOLOGY

## 2025-02-04 PROCEDURE — 77012 CT SCAN FOR NEEDLE BIOPSY: CPT

## 2025-02-04 PROCEDURE — 85025 COMPLETE CBC W/AUTO DIFF WBC: CPT | Performed by: RADIOLOGY

## 2025-02-04 PROCEDURE — 32408 CORE NDL BX LNG/MED PERQ: CPT

## 2025-02-04 PROCEDURE — 85610 PROTHROMBIN TIME: CPT | Performed by: RADIOLOGY

## 2025-02-04 PROCEDURE — 71046 X-RAY EXAM CHEST 2 VIEWS: CPT

## 2025-02-04 PROCEDURE — 88333 PATH CONSLTJ SURG CYTO XM 1: CPT | Performed by: PATHOLOGY

## 2025-02-04 RX ORDER — FENTANYL CITRATE 50 UG/ML
INJECTION, SOLUTION INTRAMUSCULAR; INTRAVENOUS AS NEEDED
Status: COMPLETED | OUTPATIENT
Start: 2025-02-04 | End: 2025-02-04

## 2025-02-04 RX ORDER — LIDOCAINE WITH 8.4% SOD BICARB 0.9%(10ML)
SYRINGE (ML) INJECTION AS NEEDED
Status: COMPLETED | OUTPATIENT
Start: 2025-02-04 | End: 2025-02-04

## 2025-02-04 RX ORDER — MIDAZOLAM HYDROCHLORIDE 2 MG/2ML
INJECTION, SOLUTION INTRAMUSCULAR; INTRAVENOUS AS NEEDED
Status: COMPLETED | OUTPATIENT
Start: 2025-02-04 | End: 2025-02-04

## 2025-02-04 RX ADMIN — FENTANYL CITRATE 25 MCG: 50 INJECTION INTRAMUSCULAR; INTRAVENOUS at 08:55

## 2025-02-04 RX ADMIN — Medication 10 ML: at 09:09

## 2025-02-04 RX ADMIN — MIDAZOLAM 1 MG: 1 INJECTION INTRAMUSCULAR; INTRAVENOUS at 08:55

## 2025-02-04 NOTE — DISCHARGE INSTRUCTIONS
Needle Biopsy of the Lung    WHAT YOU NEED TO KNOW:  A needle biopsy of the lung is a procedure to remove cells or tissue from your lung. You may have a fine needle aspiration biopsy (FNAB), or a core needle biopsy (CNB). A FNAB is used to remove cells through a thin needle. CNB uses a thicker needle to remove lung tissue. The samples are collected and tested for inflammation, infection, or cancer.     DISCHARGE INSTRUCTIONS:   Resume your normal diet. Small sips of flat soda will help with nausea. Limit your activity for 24 hours.    Wound Care:      - Remove band aid in 24 hours.    Contact Interventional Radiology at [phone] ([facility] PATIENTS: Contact Interventional Radiology at [phone]) ([facility] PATIENTS: Contact Interventional Radiology at [phone]) if any of the following occur:    - You have a fever greater than 101*    - You cough up large amounts of bright red blood     - You have chest pain with breathing    - You have shortness of breath    -You have persistent nausea and vomiting    - You have pus, redness or swelling around your biopsy site    - You have questions or concerns about your condition or care           Moderate Sedation   WHAT YOU NEED TO KNOW:   Moderate sedation, or conscious sedation, is medicine used during procedures to help you feel relaxed and calm. You will be awake and able to follow directions without anxiety or pain. You will remember little to none of the procedure. You may feel tired, weak, or unsteady on your feet after you get sedation. You may also have trouble concentrating or short-term memory loss. These symptoms should go away in 24 hours or less.   DISCHARGE INSTRUCTIONS:   Call 911 or have someone else call for any of the following:   You have sudden trouble breathing.     You cannot be woken.  Seek care immediately if:   You have a severe headache or dizziness.     Your heart is beating faster than usual.  Contact your healthcare provider if:    You have a fever.     You have nausea or are vomiting for more than 8 hours after the procedure.      Your skin is itchy, swollen, or you have a rash.     You have questions or concerns about your condition or care.  Self-care:   Have someone stay with you for 24 hours. This person can drive you to errands and help you do things around the house. This person can also watch for problems.      Rest and do quiet activities for 24 hours. Do not exercise, ride a bike, or play sports. Stand up slowly to prevent dizziness and falls. Take short walks around the house with another person. Slowly return to your usual activities the next day.      Do not drive or use dangerous machines or tools for 24 hours. You may injure yourself or others. Examples include a lawnmower, saw, or drill. Do not return to work for 24 hours if you use dangerous machines or tools for work.      Do not make important decisions for 24 hours. For example, do not sign important papers or invest money.      Drink liquids as directed. Liquids help flush the sedation medicine out of your body. Ask how much liquid to drink each day and which liquids are best for you.      Eat small, frequent meals to prevent nausea and vomiting. Start with clear liquids such as juice or broth. If you do not vomit after clear liquids, you can eat your usual foods.      Do not drink alcohol or take medicines that make you drowsy. This includes medicines that help you sleep and anxiety medicines. Ask your healthcare provider if it is safe for you to take pain medicine.  Follow up with your healthcare provider as directed: Write down your questions so you remember to ask them during your visits.   © 2017 [employer] Information is for End User's use only and may not be sold, redistributed or otherwise used for commercial purposes. All illustrations and images included in CareNotes® are the copyrighted property of [employer] or [employer].  The above information is an  only. It is not intended as medical advice for individual conditions or treatments. Talk to your doctor, nurse or pharmacist before following any medical regimen to see if it is safe and effective for you.

## 2025-02-04 NOTE — SEDATION DOCUMENTATION
RLL lung nodule biopsy completed by Dr. Loza. Specimen sent to lab. Band-aid to site. Patient tolerated well. CXR to be obtained in one hour.

## 2025-02-04 NOTE — BRIEF OP NOTE (RAD/CATH)
IR BIOPSY LUNG Procedure Note    PATIENT NAME: Matthieu Da Silva  : 1970  MRN: [id number]    Pre-op Diagnosis:   1. Pulmonary nodule, right    2. Malignant neoplasm metastatic to right lung (HCC)      Post-op Diagnosis:   1. Pulmonary nodule, right    2. Malignant neoplasm metastatic to right lung (HCC)        Surgeon:     Dr. Carolyn Loza MD    Assistants:     Resident: Brennon Leblanc MD    Estimated Blood Loss: Minimal    Findings: Successful right lower lobe nodule biopsy    Specimens: Four core biopsies    Complications:  None    Anesthesia: conscious sedation    Brennon Leblanc MD     Date: 2025  Time: 9:35 AM

## 2025-02-04 NOTE — INTERVAL H&P NOTE
The history and physical were reviewed, along with progress notes, and the patient was examined. There are no changes since it was written.    /71   Pulse 63   Temp (!) 97 °F (36.1 °C) (Temporal)   Resp 20   SpO2 100%        Carolyn Loza MD/February 4, 2025/8:59 AM

## 2025-02-05 PROCEDURE — 88305 TISSUE EXAM BY PATHOLOGIST: CPT | Performed by: PATHOLOGY

## 2025-02-05 PROCEDURE — 88333 PATH CONSLTJ SURG CYTO XM 1: CPT | Performed by: PATHOLOGY

## 2025-02-05 PROCEDURE — 88342 IMHCHEM/IMCYTCHM 1ST ANTB: CPT | Performed by: PATHOLOGY

## 2025-02-05 PROCEDURE — 88341 IMHCHEM/IMCYTCHM EA ADD ANTB: CPT | Performed by: PATHOLOGY

## 2025-02-20 ENCOUNTER — OFFICE VISIT (OUTPATIENT)
Dept: CARDIAC SURGERY | Facility: CLINIC | Age: 55
End: 2025-02-20
Payer: COMMERCIAL

## 2025-02-20 VITALS
RESPIRATION RATE: 15 BRPM | HEART RATE: 85 BPM | SYSTOLIC BLOOD PRESSURE: 125 MMHG | DIASTOLIC BLOOD PRESSURE: 81 MMHG | WEIGHT: 162.26 LBS | OXYGEN SATURATION: 98 % | TEMPERATURE: 98.7 F | HEIGHT: 66 IN | BODY MASS INDEX: 26.08 KG/M2

## 2025-02-20 DIAGNOSIS — R91.1 PULMONARY NODULE, RIGHT: Primary | ICD-10-CM

## 2025-02-20 DIAGNOSIS — C78.01 MALIGNANT NEOPLASM METASTATIC TO RIGHT LUNG (HCC): ICD-10-CM

## 2025-02-20 PROCEDURE — 99214 OFFICE O/P EST MOD 30 MIN: CPT | Performed by: THORACIC SURGERY (CARDIOTHORACIC VASCULAR SURGERY)

## 2025-02-20 NOTE — PROGRESS NOTES
Name: Matthieu Da Silva      : 1970      MRN: [id number]  Encounter Provider: Mary Reid MD  Encounter Date: 2025   Encounter department: [facility] [place]  :  Assessment & Plan  Pulmonary nodule, right    Orders:    Complete PFT with post bronchodilator; Future    CT chest wo contrast; Future    Malignant neoplasm metastatic to right lung (HCC)  Today in the office, we had a long conversation regarding this recurrence.  At this time, I believe that going back in to do either a redo right lower lobe wedge resection versus a completion lobectomy is indicated.  However, I am unsure about the PET avid right upper lobe nodule.  I believe this is likely infectious or inflammatory in nature.  I would like to repeat a CT scan of the chest since it has been 2 months since his last 1 to see if this nodule is resolving or resolved completely.  Additionally, the patient has not had pulmonary function testing.  I have ordered this for today in preparation for a lobectomy, if needed.  After the above is completed, the patient will come back and see me in the office to discuss the next steps             Thoracic History     Oncology History   History of colon cancer, stage IV   2018 Initial Diagnosis    Malignant neoplasm of sigmoid colon (HCC)     2018 Biopsy    Large Intestine, Sigmoid Colon, Recto-sigmoid tumor bx:    - Invasive colonic adenocarcinoma, moderately differentiated     2018 - 10/16/2018 Chemotherapy    Modified FOLFOX6 x 12 cycles  Added Erbitux on 3/20/18      2018 Surgery    Segment 7 liver resection/ablation, hemicolectomy     2018 -  Cancer Staged    Staging form: Colon and Rectum, AJCC 8th Edition  - Pathologic stage from 2018: Stage WERO (ypT0, pN0, cM1a) - Signed by STEPHANIE Wills on 10/16/2023  Stage prefix: Post-therapy  Total positive nodes: 0  Sites of metastasis: Liver       10/30/2018 -  Chemotherapy    Continuation  of Single agent Erbitux.  With 10/30/18 chemo, it was Cycle #14.  Plan was for 6 additional cycles of Erbitux alone.     3/2020 Genetic Testing    NEGATIVE for genes tested:    Test(s): CancerNext + RNAinsight (34 genes): APC, ETHAN, BARD1, BRCA1, BRCA2, BRIP1, BMPR1A, CDH1, CDK4, CDKN2A, CHEK2, DICER1, EPCAM, GREM1, HOXB13, MLH1, MRE11A, MSH2, MSH6, MUTYH, NBN, NF1, PALB2, PMS2, POLD1, POLE, PTEN, RAD50, RAD51C, RAD51D, SMAD4, SMARCA4, STK11, TP53      6/8/2020 Surgery    Liver, segment 3 (wedge resection):  - Metastatic adenocarcinoma, consistent with the patient's known colon primary  - Margins negative for carcinoma      7/27/2020 - 1/11/2021 Chemotherapy    fluorouracil (ADRUCIL), 745 mg, Intravenous, Once, 6 of 6 cycles  Administration: 750 mg (7/27/2020), 750 mg (8/10/2020), 745 mg (8/24/2020), 745 mg (9/8/2020), 745 mg (9/21/2020), 745 mg (10/5/2020)  pegfilgrastim (NEULASTA), 6 mg, Subcutaneous, Once, 1 of 1 cycle  Administration: 6 mg (9/24/2020)  pegfilgrastim (NEULASTA ONPRO), 6 mg, Subcutaneous, Once, 6 of 6 cycles  Administration: 6 mg (7/29/2020), 6 mg (8/12/2020), 6 mg (8/26/2020), 6 mg (9/10/2020), 6 mg (10/7/2020)  cetuximab (ERBITUX), 930 mg, Intravenous, Once, 12 of 12 cycles  Administration: 900 mg (7/27/2020), 900 mg (8/10/2020), 900 mg (8/24/2020), 900 mg (9/8/2020), 900 mg (9/21/2020), 900 mg (10/5/2020), 900 mg (10/19/2020), 900 mg (11/2/2020), 900 mg (11/16/2020), 900 mg (11/30/2020), 900 mg (12/28/2020), 900 mg (1/11/2021)  irinotecan (CAMPTOSAR) chemo infusion, 335 mg, Intravenous, Once, 6 of 6 cycles  Administration: 340 mg (7/27/2020), 340 mg (8/10/2020), 340 mg (8/24/2020), 340 mg (9/8/2020), 340 mg (9/21/2020), 340 mg (10/5/2020)  leucovorin calcium IVPB, 744 mg, Intravenous, Once, 6 of 6 cycles  Administration: 750 mg (7/27/2020), 750 mg (8/10/2020), 750 mg (8/24/2020), 750 mg (9/8/2020), 750 mg (9/21/2020), 740 mg (10/5/2020)  fluorouracil (ADRUCIL) ambulatory infusion Soln, 1,200  mg/m2/day = 4,465 mg, Intravenous, Over 46 hours, 6 of 6 cycles  Dose modification: 1,200 mg/m2/day (original dose 1,200 mg/m2/day, Cycle 3)     9/20/2022 - 12/1/2022 Chemotherapy    palonosetron (ALOXI), 0.25 mg, Intravenous, Once, 4 of 4 cycles  Administration: 0.25 mg (10/18/2022), 0.25 mg (11/1/2022), 0.25 mg (11/15/2022), 0.25 mg (11/29/2022)  pegfilgrastim (NEULASTA ONPRO), 6 mg, Subcutaneous, Once, 6 of 6 cycles  Administration: 6 mg (9/22/2022), 6 mg (10/20/2022), 6 mg (11/3/2022), 6 mg (11/17/2022), 6 mg (12/1/2022)  Pegfilgrastim-bmez (ZIEXTENZO), 6 mg, Subcutaneous, Once, 1 of 1 cycle  Administration: 6 mg (10/7/2022)  fosaprepitant (EMEND) IVPB, 150 mg, Intravenous, Once, 4 of 4 cycles  Administration: 150 mg (10/18/2022), 150 mg (11/1/2022), 150 mg (11/15/2022), 150 mg (11/29/2022)  fluorouracil (ADRUCIL), 400 mg/m2 = 735 mg, Intravenous, Once, 6 of 6 cycles  Administration: 735 mg (9/20/2022), 735 mg (10/4/2022), 730 mg (10/18/2022), 730 mg (11/1/2022), 730 mg (11/15/2022), 730 mg (11/29/2022)  cetuximab (ERBITUX), 920 mg, Intravenous, Once, 6 of 6 cycles  Administration: 900 mg (9/20/2022), 900 mg (10/4/2022), 900 mg (10/18/2022), 900 mg (11/1/2022), 900 mg (11/15/2022), 900 mg (11/29/2022)  irinotecan (CAMPTOSAR) chemo infusion, 331 mg, Intravenous, Once, 6 of 6 cycles  Administration: 320 mg (9/20/2022), 320 mg (10/4/2022), 320 mg (10/18/2022), 320 mg (11/1/2022), 320 mg (11/15/2022), 320 mg (11/29/2022)  leucovorin calcium IVPB, 736 mg, Intravenous, Once, 6 of 6 cycles  Administration: 700 mg (9/20/2022), 700 mg (10/4/2022), 750 mg (10/18/2022), 750 mg (11/1/2022), 700 mg (11/15/2022), 700 mg (11/29/2022)  fluorouracil (ADRUCIL) ambulatory infusion Soln, 1,200 mg/m2/day = 4,415 mg, Intravenous, Over 46 hours, 6 of 6 cycles     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 (HCC) (Resolved)   4/11/2022 Initial Diagnosis    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 (HCC)     9/20/2022 - 12/1/2022 Chemotherapy    palonosetron  (ALOXI), 0.25 mg, Intravenous, Once, 4 of 4 cycles  Administration: 0.25 mg (10/18/2022), 0.25 mg (11/1/2022), 0.25 mg (11/15/2022), 0.25 mg (11/29/2022)  pegfilgrastim (NEULASTA ONPRO), 6 mg, Subcutaneous, Once, 6 of 6 cycles  Administration: 6 mg (9/22/2022), 6 mg (10/20/2022), 6 mg (11/3/2022), 6 mg (11/17/2022), 6 mg (12/1/2022)  Pegfilgrastim-bmez (ZIEXTENZO), 6 mg, Subcutaneous, Once, 1 of 1 cycle  Administration: 6 mg (10/7/2022)  fosaprepitant (EMEND) IVPB, 150 mg, Intravenous, Once, 4 of 4 cycles  Administration: 150 mg (10/18/2022), 150 mg (11/1/2022), 150 mg (11/15/2022), 150 mg (11/29/2022)  fluorouracil (ADRUCIL), 400 mg/m2 = 735 mg, Intravenous, Once, 6 of 6 cycles  Administration: 735 mg (9/20/2022), 735 mg (10/4/2022), 730 mg (10/18/2022), 730 mg (11/1/2022), 730 mg (11/15/2022), 730 mg (11/29/2022)  cetuximab (ERBITUX), 920 mg, Intravenous, Once, 6 of 6 cycles  Administration: 900 mg (9/20/2022), 900 mg (10/4/2022), 900 mg (10/18/2022), 900 mg (11/1/2022), 900 mg (11/15/2022), 900 mg (11/29/2022)  irinotecan (CAMPTOSAR) chemo infusion, 331 mg, Intravenous, Once, 6 of 6 cycles  Administration: 320 mg (9/20/2022), 320 mg (10/4/2022), 320 mg (10/18/2022), 320 mg (11/1/2022), 320 mg (11/15/2022), 320 mg (11/29/2022)  leucovorin calcium IVPB, 736 mg, Intravenous, Once, 6 of 6 cycles  Administration: 700 mg (9/20/2022), 700 mg (10/4/2022), 750 mg (10/18/2022), 750 mg (11/1/2022), 700 mg (11/15/2022), 700 mg (11/29/2022)  fluorouracil (ADRUCIL) ambulatory infusion Soln, 1,200 mg/m2/day = 4,415 mg, Intravenous, Over 46 hours, 6 of 6 cycles     Malignant neoplasm metastatic to right lung (HCC)   8/4/2022 Initial Diagnosis    Malignant neoplasm metastatic to right lung (HCC)     8/9/2022 Surgery    Flexible bronchoscopy, right thoracoscopic therapeutic wedge resection      9/20/2022 - 12/1/2022 Chemotherapy    palonosetron (ALOXI), 0.25 mg, Intravenous, Once, 4 of 4 cycles  Administration: 0.25 mg  (10/18/2022), 0.25 mg (11/1/2022), 0.25 mg (11/15/2022), 0.25 mg (11/29/2022)  pegfilgrastim (NEULASTA ONPRO), 6 mg, Subcutaneous, Once, 6 of 6 cycles  Administration: 6 mg (9/22/2022), 6 mg (10/20/2022), 6 mg (11/3/2022), 6 mg (11/17/2022), 6 mg (12/1/2022)  Pegfilgrastim-bmez (ZIEXTENZO), 6 mg, Subcutaneous, Once, 1 of 1 cycle  Administration: 6 mg (10/7/2022)  fosaprepitant (EMEND) IVPB, 150 mg, Intravenous, Once, 4 of 4 cycles  Administration: 150 mg (10/18/2022), 150 mg (11/1/2022), 150 mg (11/15/2022), 150 mg (11/29/2022)  fluorouracil (ADRUCIL), 400 mg/m2 = 735 mg, Intravenous, Once, 6 of 6 cycles  Administration: 735 mg (9/20/2022), 735 mg (10/4/2022), 730 mg (10/18/2022), 730 mg (11/1/2022), 730 mg (11/15/2022), 730 mg (11/29/2022)  cetuximab (ERBITUX), 920 mg, Intravenous, Once, 6 of 6 cycles  Administration: 900 mg (9/20/2022), 900 mg (10/4/2022), 900 mg (10/18/2022), 900 mg (11/1/2022), 900 mg (11/15/2022), 900 mg (11/29/2022)  irinotecan (CAMPTOSAR) chemo infusion, 331 mg, Intravenous, Once, 6 of 6 cycles  Administration: 320 mg (9/20/2022), 320 mg (10/4/2022), 320 mg (10/18/2022), 320 mg (11/1/2022), 320 mg (11/15/2022), 320 mg (11/29/2022)  leucovorin calcium IVPB, 736 mg, Intravenous, Once, 6 of 6 cycles  Administration: 700 mg (9/20/2022), 700 mg (10/4/2022), 750 mg (10/18/2022), 750 mg (11/1/2022), 700 mg (11/15/2022), 700 mg (11/29/2022)  fluorouracil (ADRUCIL) ambulatory infusion Soln, 1,200 mg/m2/day = 4,415 mg, Intravenous, Over 46 hours, 6 of 6 cycles          History of Present Illness   HPI  Matthieu Da Silva is a 54 y.o. male who is known to me.  He actually underwent a VATS right lower lobe wedge resection in 2022 for a colonic metastatic pulmonary nodule.  He presents today, unfortunately, with a recurrence at his staple line in his right lower lobe.  I have personally reviewed his most recent CT scan as well as PET CT scan in PACS.  The right lower lobe recurrent metastatic cancer has been  biopsied.  This has a minimal SUV.  However, on his PET CT scan, he also had a right upper lobe PET avid nodule that was not there on a previous CT scan.  This is likely inflammatory or infectious in nature.    Today in the office, the patient is doing very well.  He denies any shortness of breath or chest pain.  He does report a cough that he associates with his postnasal drip.  He denies any recent upper respiratory faction or pneumonia.    I have personally discussed this case with Dr. Mijares.  We are leaning towards a completion right lower lobectomy to remove the nodule.    Review of Systems   Constitutional: Negative.  Negative for activity change, chills, fatigue, fever and unexpected weight change.   HENT:  Positive for postnasal drip. Negative for sore throat, trouble swallowing and voice change.    Eyes: Negative.  Negative for visual disturbance.   Respiratory:  Positive for cough. Negative for chest tightness, shortness of breath, wheezing and stridor.    Cardiovascular:  Negative for chest pain and leg swelling.   Gastrointestinal: Negative.    Endocrine: Negative.    Genitourinary: Negative.    Musculoskeletal: Negative.    Skin: Negative.    Allergic/Immunologic: Negative.    Neurological:  Negative for seizures, syncope, speech difficulty, weakness, light-headedness and headaches.   Hematological:  Negative for adenopathy.   Psychiatric/Behavioral: Negative.        Medical History Reviewed by provider this encounter:     .  Past Medical History   Past Medical History:   Diagnosis Date    Cancer (HCC)     Colon cancer (HCC)     Dysuria     Last Assessed:8/24/17    GERD (gastroesophageal reflux disease)     Liver cancer (HCC)     Liver nodule     Pulmonary nodule, right     Thyroid nodule 02/16/2023    Ureteropelvic junction (UPJ) obstruction 01/29/2020    Wears glasses      Past Surgical History:   Procedure Laterality Date    ABDOMINAL SURGERY      COLON SURGERY      COLONOSCOPY N/A 01/22/2018     Procedure: COLONOSCOPY;  Surgeon: Casey Justice MD;  Location: [facility];  Service: Colorectal    DENTAL SURGERY  2016    Crown with bridge work    FLEXIBLE SIGMOIDOSCOPY N/A 06/15/2018    Procedure: SIGMOIDOSCOPY FLEXIBLE w/o sedation w/ tattoo;  Surgeon: Casey Justice MD;  Location: [facility];  Service: Colorectal    IR BIOPSY LUNG  2/4/2025    IR PORT PLACEMENT Right     LAPAROTOMY N/A 06/08/2020    Procedure: LAPAROTOMY EXPLORATORY, LYSIS OF ADHESIONS;  Surgeon: Esequiel Mijares MD;  [facility];  Service: Surgical Oncology    LIVER LOBECTOMY N/A 06/21/2018    Procedure: liver resection/ablation, intraoperative ultrasound of liver;  Surgeon: Esequiel Mijares MD;  [facility];  Service: Surgical Oncology    LIVER LOBECTOMY N/A 06/08/2020    Procedure: LIVER RESECTION SEGMENT 3 WITH  INTRAOPERATIVE U/S OF LIVER;  Surgeon: Esequiel Mijares MD;  [facility];  Service: Surgical Oncology    LUNG SEGMENTECTOMY Right 8/9/2022    Procedure: RESECTION WEDGE LUNG, THERAPEUTIC;  Surgeon: Mary Reid MD;  [facility];  Service: Thoracic    SC [facility] INCL FLUOR GDNCE DX W/CELL WASHG SPX N/A 8/9/2022    Procedure: BRONCHOSCOPY FLEXIBLE;  Surgeon: Mary Reid MD;  [facility];  Service: Thoracic    SC COLECTOMY PARTIAL W/ANASTOMOSIS Left 06/21/2018    Procedure: hemicolectomy, low anterior resection (open) SPY fluorescence angiography;  Surgeon: Casey Justice MD;  [facility];  Service: Colorectal    SC EXPLORATORY LAPAROTOMY CELIOTOMY W/WO BIOPSY SPX N/A 06/21/2018    Procedure: LAPAROTOMY EXPLORATORY;  Surgeon: Casey Justice MD;  [facility];  Service: Colorectal    SC INSJ PRPH CTR VAD W/SUBQ PORT AGE 5 YR/> N/A 01/25/2018    Procedure: PORT-A-CATHETER PLACEMENT  Fluoroscopy for performance/interpretation;  Surgeon: Casey Justice MD;  [facility];  Service: Colorectal    SC PRCTECT PRTL RESCJ RECTUM TABDL APPR N/A  06/21/2018    Procedure: Partial proctectomy.;  Surgeon: Casey Justice MD;  [facility];  Service: Colorectal    GA SIGMOIDOSCOPY FLX DX W/COLLJ SPEC BR/WA IF PFRMD N/A 06/21/2018    Procedure: SIGMOIDOSCOPY FLEXIBLE;  Surgeon: Casey Justice MD;  [facility];  Service: Colorectal    GA THORACOSCOPY W/THERA WEDGE RESEXN INITIAL UNILAT Right 8/9/2022    Procedure: THORACOSCOPY VIDEO ASSISTED SURGERY (VATS);  Surgeon: Mary Reid MD;  [facility];  Service: Thoracic    ROOT CANAL  2015    TESTICLE SURGERY      Exploration of Undescended Testis rIght, age 10 had surgery for undescended testicle; Last Assessed:8/24/17    TOOTH EXTRACTION  2015     Family History   Problem Relation Age of Onset    No Known Problems Father     Cancer Mother         unknown    Cancer Brother         pt. reports brother was diagnosed with stage 4 throat cancer    Throat cancer Brother     Breast cancer Maternal Aunt       reports that he quit smoking about 9 years ago. His smoking use included cigarettes. He started smoking about 34 years ago. He has a 12.5 pack-year smoking history. He has never used smokeless tobacco. He reports current alcohol use. He reports current drug use. Frequency: 1.00 time per week. Drug: Marijuana.  Current Outpatient Medications   Medication Instructions    Multiple Vitamins-Minerals (MENS MULTIVITAMIN PO) Daily    omeprazole (PRILOSEC) 20 mg, Oral, Daily    sildenafil (VIAGRA) 50 mg, Oral, As needed   No Known Allergies   Current Outpatient Medications on File Prior to Visit   Medication Sig Dispense Refill    Multiple Vitamins-Minerals (MENS MULTIVITAMIN PO) Take by mouth in the morning      omeprazole (PriLOSEC) 20 mg delayed release capsule Take 1 capsule (20 mg total) by mouth daily 30 capsule 3    sildenafil (VIAGRA) 50 MG tablet Take 1 tablet (50 mg total) by mouth as needed for erectile dysfunction 10 tablet 0     No current facility-administered medications  "on file prior to visit.      Social History     Tobacco Use    Smoking status: Former     Current packs/day: 0.00     Average packs/day: 0.5 packs/day for 25.0 years (12.5 ttl pk-yrs)     Types: Cigarettes     Start date:      Quit date: 2016     Years since quittin.1    Smokeless tobacco: Never   Vaping Use    Vaping status: Every Day    Start date: 10/10/2016    Substances: Nicotine, THC (at time), CBD (at times), Flavoring   Substance and Sexual Activity    Alcohol use: Yes     Comment: socially - 2 month    Drug use: Yes     Frequency: 1.0 times per week     Types: Marijuana    Sexual activity: Not on file        Objective   /81 (BP Location: Left arm, Patient Position: Sitting, Cuff Size: Standard)   Pulse 85   Temp 98.7 °F (37.1 °C) (Temporal)   Resp 15   Ht 5' 6\" (1.676 m)   Wt 73.6 kg (162 lb 4.1 oz)   SpO2 98%   BMI 26.19 kg/m²     Pain Screening:  Pain Score: 0-No pain  ECOG    Physical Exam  Vitals and nursing note reviewed.   Constitutional:       Appearance: Normal appearance. He is well-developed. He is not diaphoretic.   HENT:      Head: Normocephalic and atraumatic.      Nose: Nose normal. No congestion.      Mouth/Throat:      Mouth: Mucous membranes are moist.      Pharynx: Oropharynx is clear.   Eyes:      Extraocular Movements: Extraocular movements intact.      Pupils: Pupils are equal, round, and reactive to light.   Neck:      Trachea: No tracheal deviation.   Cardiovascular:      Rate and Rhythm: Normal rate and regular rhythm.      Heart sounds: Normal heart sounds. No murmur heard.  Pulmonary:      Effort: Pulmonary effort is normal. No respiratory distress.      Breath sounds: Normal breath sounds. No stridor. No wheezing or rales.   Chest:      Chest wall: No tenderness.   Abdominal:      General: Bowel sounds are normal. There is no distension.      Palpations: Abdomen is soft.      Tenderness: There is no abdominal tenderness.   Musculoskeletal:         General: " Normal range of motion.      Cervical back: Normal range of motion.   Lymphadenopathy:      Cervical: No cervical adenopathy.   Skin:     General: Skin is warm and dry.      Coloration: Skin is not pale.      Findings: No erythema or rash.   Neurological:      Mental Status: He is alert and oriented to person, place, and time.   Psychiatric:         Behavior: Behavior normal.         Thought Content: Thought content normal.         Judgment: Judgment normal.         Labs:   Results for orders placed or performed during the hospital encounter of 02/04/25   Protime-INR   Result Value Ref Range    Protime 12.7 12.3 - 15.0 seconds    INR 0.89 0.85 - 1.19   CBC and differential   Result Value Ref Range    WBC 5.74 4.31 - 10.16 Thousand/uL    RBC 4.63 3.88 - 5.62 Million/uL    Hemoglobin 15.2 12.0 - 17.0 g/dL    Hematocrit 43.2 36.5 - 49.3 %    MCV 93 82 - 98 fL    MCH 32.8 26.8 - 34.3 pg    MCHC 35.2 31.4 - 37.4 g/dL    RDW 13.1 11.6 - 15.1 %    MPV 10.6 8.9 - 12.7 fL    Platelets 166 149 - 390 Thousands/uL    nRBC 0 /100 WBCs    Segmented % 65 43 - 75 %    Immature Grans % 0 0 - 2 %    Lymphocytes % 24 14 - 44 %    Monocytes % 8 4 - 12 %    Eosinophils Relative 2 0 - 6 %    Basophils Relative 1 0 - 1 %    Absolute Neutrophils 3.71 1.85 - 7.62 Thousands/µL    Absolute Immature Grans 0.01 0.00 - 0.20 Thousand/uL    Absolute Lymphocytes 1.40 0.60 - 4.47 Thousands/µL    Absolute Monocytes 0.46 0.17 - 1.22 Thousand/µL    Eosinophils Absolute 0.13 0.00 - 0.61 Thousand/µL    Basophils Absolute 0.03 0.00 - 0.10 Thousands/µL   Tissue Exam   Result Value Ref Range    Case Report       Surgical Pathology Report                         Case: [id number]                                  Authorizing Provider:  Niranjan Harrison MD          Collected:           02/04/2025 7520              Ordering Location:     [facility]        Received:            02/04/2025 0935                                     [facility] CAT Scan                                                             Pathologist:           Karl James MD                                                          Intraop:               Karl James MD                                                          Specimen:    Lung, Right Lower Lobe, nodule                                                             Final Diagnosis       A.  Lung, right lower lobe nodule, biopsy:  -Metastatic adenocarcinoma compatible with colonic primary.  -Immunohistochemical stains performed with appropriate controls show the tumor to be CK20 (weak), CDX2, and SATB2 and negative for CK7, TTF-1, and Napsin; supporting the diagnosis.      Additional Information       All reported additional testing was performed with appropriately reactive controls.  These tests were developed and their performance characteristics determined by [facility] or appropriate performing facility, though some tests may be performed on tissues which have not been validated for performance characteristics (such as staining performed on alcohol exposed cell blocks and decalcified tissues).  Results should be interpreted with caution and in the context of the patients’ clinical condition. These tests may not be cleared or approved by the U.S. Food and Drug Administration, though the FDA has determined that such clearance or approval is not necessary. These tests are used for clinical purposes and they should not be regarded as investigational or for research. This laboratory has been approved by CLIA 88, designated as a high-complexity laboratory and is qualified to perform these tests.  Interpretation performed at [facility], [address] [address]        Intraoperative Consultation       Neoplastic, defer to permanents.  Karl James MD.  Interpretation performed at [facility], [address] [address]          Gross Description       A. The specimen is received in  "formalin, labeled with the patient's name and hospital number, and is designated \" lung, right lower lobe nodule\".  The specimen consists of 4 tan cores of filiform and friable soft tissue measuring less than 0.1 cm-0.1 cm in diameter and ranging from 0.6-1.2 cm in length. Entirely submitted. Four cassettes. Between sponges.  Note: due to the size and consistency of the specimen, the tissue may not survive histologic processing.    Note: The estimated total formalin fixation time based upon information provided by the submitting clinician and the standard processing schedule is 8.75 hours.    -KEmeigh       *Note: Due to a large number of results and/or encounters for the requested time period, some results have not been displayed. A complete set of results can be found in Results Review.        Radiology Results Review : I have reviewed images/report studies in PACS as described above (in the HPI).  Pathology: I have reviewed pathology reports described above.      "

## 2025-02-20 NOTE — ASSESSMENT & PLAN NOTE
Today in the office, we had a long conversation regarding this recurrence.  At this time, I believe that going back in to do either a redo right lower lobe wedge resection versus a completion lobectomy is indicated.  However, I am unsure about the PET avid right upper lobe nodule.  I believe this is likely infectious or inflammatory in nature.  I would like to repeat a CT scan of the chest since it has been 2 months since his last 1 to see if this nodule is resolving or resolved completely.  Additionally, the patient has not had pulmonary function testing.  I have ordered this for today in preparation for a lobectomy, if needed.  After the above is completed, the patient will come back and see me in the office to discuss the next steps

## 2025-02-24 ENCOUNTER — OFFICE VISIT (OUTPATIENT)
Dept: HEMATOLOGY ONCOLOGY | Facility: CLINIC | Age: 55
End: 2025-02-24
Payer: COMMERCIAL

## 2025-02-24 VITALS
BODY MASS INDEX: 26.36 KG/M2 | HEART RATE: 65 BPM | TEMPERATURE: 98.7 F | HEIGHT: 66 IN | DIASTOLIC BLOOD PRESSURE: 64 MMHG | SYSTOLIC BLOOD PRESSURE: 102 MMHG | RESPIRATION RATE: 16 BRPM | OXYGEN SATURATION: 99 % | WEIGHT: 164 LBS

## 2025-02-24 DIAGNOSIS — C18.9 COLON CANCER METASTASIZED TO LIVER (HCC): ICD-10-CM

## 2025-02-24 DIAGNOSIS — C78.7 COLON CANCER METASTASIZED TO LIVER (HCC): ICD-10-CM

## 2025-02-24 DIAGNOSIS — C78.01 MALIGNANT NEOPLASM METASTATIC TO RIGHT LUNG (HCC): Primary | ICD-10-CM

## 2025-02-24 PROCEDURE — 99213 OFFICE O/P EST LOW 20 MIN: CPT | Performed by: INTERNAL MEDICINE

## 2025-02-24 NOTE — ASSESSMENT & PLAN NOTE
Initially diagnosed in 2018, s/p 4 months 'neoadjuvant' cetuximab and FOLFOX followed by Left hemicolectomy and liver resection, segment 7 6/2018. He was then started on Adjuvant 5FU + Erbitux followed by single agent Erbitux, completed 1/2019    Recurrence #1  June 8, 2020 Resection Liver metastases, segment 3.    FOLFIRI plus Erbitux for 6 months, this was completed in early 2021.     Recurrence #2   Slowly growing lung nodules   Had consult with Dr. Chadwick re: possible SBRT.  Ultimately underwent resection.    January 2022 status post resection RLL lung- pathology consistent with metastatic colorectal cancer   9/20/2022- 11/29/2022 3 months of adjuvant treatment with FOLFIRI plus Erbitu    Recurrence #3  CT chest abdomen pelvis from 11/29/2024 was remarkable for new nodular soft tissue lesion along the right lower lobe suture line measuring 1.3 x 0.7 cm concerning for recurrent tumor.  PET scan from 1/6/2025 was remarkable for minimal FDG activity and soft tissue density noted along right medial lower lobe resection 9 however there was right upper lobe consolidation with increased FDG activity,  S/P biopsy of rt lower nodule that came positive for metastatic colon adenocarcinoma.  Patient evaluated by Dr. Reid, tentative plan for lobectomy versus wedge resection in March.  Repeat CT chest scheduled for 3/3/2025.  At this time no indication for further chemotherapy.  Will repeat Signatera/CT DNA in June.

## 2025-02-24 NOTE — PROGRESS NOTES
Name: Matthieu Da Silva      : 1970      MRN: [id number]  Encounter Provider: Niranjan Harrison MD  Encounter Date: 2025   Encounter department: [facility]  :  Assessment & Plan  Malignant neoplasm metastatic to right lung (HCC)  Initially diagnosed in , s/p 4 months 'neoadjuvant' cetuximab and FOLFOX followed by Left hemicolectomy and liver resection, segment 7 2018. He was then started on Adjuvant 5FU + Erbitux followed by single agent Erbitux, completed 2019    Recurrence #1  2020 Resection Liver metastases, segment 3.    FOLFIRI plus Erbitux for 6 months, this was completed in early .     Recurrence #2   Slowly growing lung nodules   Had consult with Dr. Chadwick re: possible SBRT.  Ultimately underwent resection.    2022 status post resection RLL lung- pathology consistent with metastatic colorectal cancer   2022- 2022 3 months of adjuvant treatment with FOLFIRI plus Erbitu    Recurrence #3  CT chest abdomen pelvis from 2024 was remarkable for new nodular soft tissue lesion along the right lower lobe suture line measuring 1.3 x 0.7 cm concerning for recurrent tumor.  PET scan from 2025 was remarkable for minimal FDG activity and soft tissue density noted along right medial lower lobe resection 9 however there was right upper lobe consolidation with increased FDG activity,  S/P biopsy of rt lower nodule that came positive for metastatic colon adenocarcinoma.  Patient evaluated by Dr. Reid, tentative plan for lobectomy versus wedge resection in March.  Repeat CT chest scheduled for 3/3/2025.  At this time no indication for further chemotherapy.  Will repeat Signatera/CT DNA in .         Colon cancer metastasized to liver (HCC)  S/p resection in 2020.  Recent PET scan from 2025 redemonstrated partial liver resection, no evidence of liver metastatic lesion.           Return in about 4 months (around  6/24/2025).    History of Present Illness   Chief Complaint   Patient presents with    Follow-up   Matthieu Da Silva is a 53 yo male with stage IV colon cancer. He was diagnosed in January 2018.  He was treated with neoadjuvant chemotherapy, modified FOLFox 6 and Erbitux. He then went for surgery on 06/21/2018 with a nice response.  Liver resection was positive for residual malignancy.  After surgery, adjuvant treatment with 5 FU bolus, leucovorin, 5 FU pump and Erbitux was restarted on 07/24/2018 and he received 12 cycles of chemotherapy.  He was switched to single agent Erbitux for 6 doses. This was completed in January 2019. He was placed on observation     PET CT completed 5/21/20 showed that there were hypermetabolic left lateral liver metastases with minimal activity in the hepatic dome treated metastases.  He is s/p  Resection  on June 8, 2020. Pathology revealed metastatic adenocarcinoma consistent with the patient's known colon primary.  Margins were negative for carcinoma.   He was treated with adjuvant with FOLFIRI plus Erbitux for 6 months, this was completed in early 2021.     He was again on observation until more recently. He had surveillance imaging completed in 7/2022 which showed a slowly growing lung nodule up to 7 mm concerning for a growing metastatic lesion. He underwent a right thoracoscopic therapeutic lower lobe wedge resection on 8/9/22 and pathology was consistent with a   metastatic colon adenocarcinoma with tumor necrosis immunohistochemically consistent with metastases from the patient's known colon primary measuring 0.8 x 0.5 x 0.5 cm which was completely excised.  Status post 3 months of FOLFIRI plus cetuximab from 9/22 to 11/22.    Patient had been on surveillance since November 2022, CT chest abdomen pelvis from 11/29/2024 was remarkable for new nodular soft tissue lesion along the right lower lobe suture line measuring 1.3 x 0.7 cm concerning for recurrent tumor.  PET scan from  1/6/2025 was remarkable for minimal FDG activity and soft tissue density noted along right medial lower lobe resection 9 however there was right upper lobe consolidation with increased FDG activity, patient's case was discussed and tumor board, and recommendations were made for IR guided biopsy and thoracic surgery follow-up.  S/p biopsy of right lower lobe nodule on 2//2025 is positive for metastatic colon adenocarcinoma.       Oncology History   Cancer Staging   History of colon cancer, stage IV  Staging form: Colon and Rectum, AJCC 8th Edition  - Pathologic stage from 6/21/2018: Stage WERO (ypT0, pN0, cM1a) - Signed by STEPHANIE Wills on 10/16/2023  Stage prefix: Post-therapy  Total positive nodes: 0  Sites of metastasis: Liver  Oncology History   History of colon cancer, stage IV   1/2018 Initial Diagnosis    Malignant neoplasm of sigmoid colon (HCC)     1/22/2018 Biopsy    Large Intestine, Sigmoid Colon, Recto-sigmoid tumor bx:    - Invasive colonic adenocarcinoma, moderately differentiated     2/6/2018 - 10/16/2018 Chemotherapy    Modified FOLFOX6 x 12 cycles  Added Erbitux on 3/20/18      6/21/2018 Surgery    Segment 7 liver resection/ablation, hemicolectomy     6/21/2018 -  Cancer Staged    Staging form: Colon and Rectum, AJCC 8th Edition  - Pathologic stage from 6/21/2018: Stage WERO (ypT0, pN0, cM1a) - Signed by STEPHANIE Wills on 10/16/2023  Stage prefix: Post-therapy  Total positive nodes: 0  Sites of metastasis: Liver       10/30/2018 -  Chemotherapy    Continuation of Single agent Erbitux.  With 10/30/18 chemo, it was Cycle #14.  Plan was for 6 additional cycles of Erbitux alone.     3/2020 Genetic Testing    NEGATIVE for genes tested:    Test(s): [employer] + [employer] (34 genes): APC, ETHAN, BARD1, BRCA1, BRCA2, BRIP1, BMPR1A, CDH1, CDK4, CDKN2A, CHEK2, DICER1, EPCAM, GREM1, HOXB13, MLH1, MRE11A, MSH2, MSH6, MUTYH, NBN, NF1, PALB2, PMS2, POLD1, POLE, PTEN, RAD50, RAD51C, RAD51D, SMAD4,  SMARCA4, STK11, TP53      6/8/2020 Surgery    Liver, segment 3 (wedge resection):  - Metastatic adenocarcinoma, consistent with the patient's known colon primary  - Margins negative for carcinoma      7/27/2020 - 1/11/2021 Chemotherapy    fluorouracil (ADRUCIL), 745 mg, Intravenous, Once, 6 of 6 cycles  Administration: 750 mg (7/27/2020), 750 mg (8/10/2020), 745 mg (8/24/2020), 745 mg (9/8/2020), 745 mg (9/21/2020), 745 mg (10/5/2020)  pegfilgrastim (NEULASTA), 6 mg, Subcutaneous, Once, 1 of 1 cycle  Administration: 6 mg (9/24/2020)  pegfilgrastim (NEULASTA ONPRO), 6 mg, Subcutaneous, Once, 6 of 6 cycles  Administration: 6 mg (7/29/2020), 6 mg (8/12/2020), 6 mg (8/26/2020), 6 mg (9/10/2020), 6 mg (10/7/2020)  cetuximab (ERBITUX), 930 mg, Intravenous, Once, 12 of 12 cycles  Administration: 900 mg (7/27/2020), 900 mg (8/10/2020), 900 mg (8/24/2020), 900 mg (9/8/2020), 900 mg (9/21/2020), 900 mg (10/5/2020), 900 mg (10/19/2020), 900 mg (11/2/2020), 900 mg (11/16/2020), 900 mg (11/30/2020), 900 mg (12/28/2020), 900 mg (1/11/2021)  irinotecan (CAMPTOSAR) chemo infusion, 335 mg, Intravenous, Once, 6 of 6 cycles  Administration: 340 mg (7/27/2020), 340 mg (8/10/2020), 340 mg (8/24/2020), 340 mg (9/8/2020), 340 mg (9/21/2020), 340 mg (10/5/2020)  leucovorin calcium IVPB, 744 mg, Intravenous, Once, 6 of 6 cycles  Administration: 750 mg (7/27/2020), 750 mg (8/10/2020), 750 mg (8/24/2020), 750 mg (9/8/2020), 750 mg (9/21/2020), 740 mg (10/5/2020)  fluorouracil (ADRUCIL) ambulatory infusion Soln, 1,200 mg/m2/day = 4,465 mg, Intravenous, Over 46 hours, 6 of 6 cycles  Dose modification: 1,200 mg/m2/day (original dose 1,200 mg/m2/day, Cycle 3)     9/20/2022 - 12/1/2022 Chemotherapy    palonosetron (ALOXI), 0.25 mg, Intravenous, Once, 4 of 4 cycles  Administration: 0.25 mg (10/18/2022), 0.25 mg (11/1/2022), 0.25 mg (11/15/2022), 0.25 mg (11/29/2022)  pegfilgrastim (NEULASTA ONPRO), 6 mg, Subcutaneous, Once, 6 of 6  cycles  Administration: 6 mg (9/22/2022), 6 mg (10/20/2022), 6 mg (11/3/2022), 6 mg (11/17/2022), 6 mg (12/1/2022)  Pegfilgrastim-bmez (ZIEXTENZO), 6 mg, Subcutaneous, Once, 1 of 1 cycle  Administration: 6 mg (10/7/2022)  fosaprepitant (EMEND) IVPB, 150 mg, Intravenous, Once, 4 of 4 cycles  Administration: 150 mg (10/18/2022), 150 mg (11/1/2022), 150 mg (11/15/2022), 150 mg (11/29/2022)  fluorouracil (ADRUCIL), 400 mg/m2 = 735 mg, Intravenous, Once, 6 of 6 cycles  Administration: 735 mg (9/20/2022), 735 mg (10/4/2022), 730 mg (10/18/2022), 730 mg (11/1/2022), 730 mg (11/15/2022), 730 mg (11/29/2022)  cetuximab (ERBITUX), 920 mg, Intravenous, Once, 6 of 6 cycles  Administration: 900 mg (9/20/2022), 900 mg (10/4/2022), 900 mg (10/18/2022), 900 mg (11/1/2022), 900 mg (11/15/2022), 900 mg (11/29/2022)  irinotecan (CAMPTOSAR) chemo infusion, 331 mg, Intravenous, Once, 6 of 6 cycles  Administration: 320 mg (9/20/2022), 320 mg (10/4/2022), 320 mg (10/18/2022), 320 mg (11/1/2022), 320 mg (11/15/2022), 320 mg (11/29/2022)  leucovorin calcium IVPB, 736 mg, Intravenous, Once, 6 of 6 cycles  Administration: 700 mg (9/20/2022), 700 mg (10/4/2022), 750 mg (10/18/2022), 750 mg (11/1/2022), 700 mg (11/15/2022), 700 mg (11/29/2022)  fluorouracil (ADRUCIL) ambulatory infusion Soln, 1,200 mg/m2/day = 4,415 mg, Intravenous, Over 46 hours, 6 of 6 cycles     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 (HCC) (Resolved)   4/11/2022 Initial Diagnosis    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 (HCC)     9/20/2022 - 12/1/2022 Chemotherapy    palonosetron (ALOXI), 0.25 mg, Intravenous, Once, 4 of 4 cycles  Administration: 0.25 mg (10/18/2022), 0.25 mg (11/1/2022), 0.25 mg (11/15/2022), 0.25 mg (11/29/2022)  pegfilgrastim (NEULASTA ONPRO), 6 mg, Subcutaneous, Once, 6 of 6 cycles  Administration: 6 mg (9/22/2022), 6 mg (10/20/2022), 6 mg (11/3/2022), 6 mg (11/17/2022), 6 mg (12/1/2022)  Pegfilgrastim-bmez (ZIEXTENZO), 6 mg, Subcutaneous, Once, 1 of 1  cycle  Administration: 6 mg (10/7/2022)  fosaprepitant (EMEND) IVPB, 150 mg, Intravenous, Once, 4 of 4 cycles  Administration: 150 mg (10/18/2022), 150 mg (11/1/2022), 150 mg (11/15/2022), 150 mg (11/29/2022)  fluorouracil (ADRUCIL), 400 mg/m2 = 735 mg, Intravenous, Once, 6 of 6 cycles  Administration: 735 mg (9/20/2022), 735 mg (10/4/2022), 730 mg (10/18/2022), 730 mg (11/1/2022), 730 mg (11/15/2022), 730 mg (11/29/2022)  cetuximab (ERBITUX), 920 mg, Intravenous, Once, 6 of 6 cycles  Administration: 900 mg (9/20/2022), 900 mg (10/4/2022), 900 mg (10/18/2022), 900 mg (11/1/2022), 900 mg (11/15/2022), 900 mg (11/29/2022)  irinotecan (CAMPTOSAR) chemo infusion, 331 mg, Intravenous, Once, 6 of 6 cycles  Administration: 320 mg (9/20/2022), 320 mg (10/4/2022), 320 mg (10/18/2022), 320 mg (11/1/2022), 320 mg (11/15/2022), 320 mg (11/29/2022)  leucovorin calcium IVPB, 736 mg, Intravenous, Once, 6 of 6 cycles  Administration: 700 mg (9/20/2022), 700 mg (10/4/2022), 750 mg (10/18/2022), 750 mg (11/1/2022), 700 mg (11/15/2022), 700 mg (11/29/2022)  fluorouracil (ADRUCIL) ambulatory infusion Soln, 1,200 mg/m2/day = 4,415 mg, Intravenous, Over 46 hours, 6 of 6 cycles     Malignant neoplasm metastatic to right lung (HCC)   8/4/2022 Initial Diagnosis    Malignant neoplasm metastatic to right lung (HCC)     8/9/2022 Surgery    Flexible bronchoscopy, right thoracoscopic therapeutic wedge resection      9/20/2022 - 12/1/2022 Chemotherapy    palonosetron (ALOXI), 0.25 mg, Intravenous, Once, 4 of 4 cycles  Administration: 0.25 mg (10/18/2022), 0.25 mg (11/1/2022), 0.25 mg (11/15/2022), 0.25 mg (11/29/2022)  pegfilgrastim (NEULASTA ONPRO), 6 mg, Subcutaneous, Once, 6 of 6 cycles  Administration: 6 mg (9/22/2022), 6 mg (10/20/2022), 6 mg (11/3/2022), 6 mg (11/17/2022), 6 mg (12/1/2022)  Pegfilgrastim-bmez (ZIEXTENZO), 6 mg, Subcutaneous, Once, 1 of 1 cycle  Administration: 6 mg (10/7/2022)  fosaprepitant (EMEND) IVPB, 150 mg,  Intravenous, Once, 4 of 4 cycles  Administration: 150 mg (10/18/2022), 150 mg (11/1/2022), 150 mg (11/15/2022), 150 mg (11/29/2022)  fluorouracil (ADRUCIL), 400 mg/m2 = 735 mg, Intravenous, Once, 6 of 6 cycles  Administration: 735 mg (9/20/2022), 735 mg (10/4/2022), 730 mg (10/18/2022), 730 mg (11/1/2022), 730 mg (11/15/2022), 730 mg (11/29/2022)  cetuximab (ERBITUX), 920 mg, Intravenous, Once, 6 of 6 cycles  Administration: 900 mg (9/20/2022), 900 mg (10/4/2022), 900 mg (10/18/2022), 900 mg (11/1/2022), 900 mg (11/15/2022), 900 mg (11/29/2022)  irinotecan (CAMPTOSAR) chemo infusion, 331 mg, Intravenous, Once, 6 of 6 cycles  Administration: 320 mg (9/20/2022), 320 mg (10/4/2022), 320 mg (10/18/2022), 320 mg (11/1/2022), 320 mg (11/15/2022), 320 mg (11/29/2022)  leucovorin calcium IVPB, 736 mg, Intravenous, Once, 6 of 6 cycles  Administration: 700 mg (9/20/2022), 700 mg (10/4/2022), 750 mg (10/18/2022), 750 mg (11/1/2022), 700 mg (11/15/2022), 700 mg (11/29/2022)  fluorouracil (ADRUCIL) ambulatory infusion Soln, 1,200 mg/m2/day = 4,415 mg, Intravenous, Over 46 hours, 6 of 6 cycles        Pertinent Medical History     02/24/25: Patient is seen for follow up of metastatic colon cancer. He recently had rt lower lobe nidule biopsy done that was postive for metastatic colon adenocarcinoma. Was recently seen by Dr Reid, planning to get lobectomy vs wedge resection.       Review of Systems   Constitutional:  Negative for appetite change and unexpected weight change.   Respiratory:  Negative for cough and shortness of breath.    Cardiovascular:  Negative for chest pain and palpitations.   Gastrointestinal:  Negative for abdominal pain, constipation and vomiting.   Musculoskeletal:  Negative for back pain and myalgias.   Skin:  Negative for color change.   All other systems reviewed and are negative.    Social History     Tobacco Use    Smoking status: Former     Current packs/day: 0.00     Average packs/day: 0.5  "packs/day for 25.0 years (12.5 ttl pk-yrs)     Types: Cigarettes     Start date:      Quit date: 2016     Years since quittin.1    Smokeless tobacco: Never   Vaping Use    Vaping status: Every Day    Start date: 10/10/2016    Substances: Nicotine, THC (at time), CBD (at times), Flavoring   Substance and Sexual Activity    Alcohol use: Yes     Comment: socially - 2 month    Drug use: Yes     Frequency: 1.0 times per week     Types: Marijuana    Sexual activity: Not on file         Objective   /64 (BP Location: Left arm, Patient Position: Sitting, Cuff Size: Adult)   Pulse 65   Temp 98.7 °F (37.1 °C) (Temporal)   Resp 16   Ht 5' 6\" (1.676 m)   Wt 74.4 kg (164 lb)   SpO2 99%   BMI 26.47 kg/m²     Pain Screening:  Pain Score: 0-No pain  ECOG   0  Physical Exam  Constitutional:       Appearance: Normal appearance.   HENT:      Head: Normocephalic and atraumatic.      Mouth/Throat:      Mouth: Mucous membranes are moist.      Pharynx: Oropharynx is clear.   Eyes:      Conjunctiva/sclera: Conjunctivae normal.      Pupils: Pupils are equal, round, and reactive to light.   Cardiovascular:      Rate and Rhythm: Normal rate and regular rhythm.      Pulses: Normal pulses.      Heart sounds: Normal heart sounds.   Pulmonary:      Effort: Pulmonary effort is normal.      Breath sounds: Normal breath sounds.   Abdominal:      General: Abdomen is flat. Bowel sounds are normal.   Musculoskeletal:         General: Normal range of motion.   Skin:     General: Skin is warm and dry.   Neurological:      General: No focal deficit present.      Mental Status: He is alert and oriented to person, place, and time.         Labs: I have reviewed the following labs:  Lab Results   Component Value Date/Time    WBC 5.74 2025 07:56 AM    RBC 4.63 2025 07:56 AM    Hemoglobin 15.2 2025 07:56 AM    Hematocrit 43.2 2025 07:56 AM    MCV 93 2025 07:56 AM    MCH 32.8 2025 07:56 AM    RDW 13.1 " 02/04/2025 07:56 AM    Platelets 166 02/04/2025 07:56 AM    Segmented % 65 02/04/2025 07:56 AM    Lymphocytes % 24 02/04/2025 07:56 AM    Monocytes % 8 02/04/2025 07:56 AM    Eosinophils Relative 2 02/04/2025 07:56 AM    Basophils Relative 1 02/04/2025 07:56 AM    Immature Grans % 0 02/04/2025 07:56 AM    Absolute Neutrophils 3.71 02/04/2025 07:56 AM     Lab Results   Component Value Date/Time    Potassium 4.1 12/05/2024 02:24 PM    Chloride 104 12/05/2024 02:24 PM    CO2 23 12/05/2024 02:24 PM    BUN 20 12/05/2024 02:24 PM    Creatinine 1.02 12/05/2024 02:24 PM    Glucose, Fasting 118 (H) 12/05/2024 02:24 PM    Calcium 9.7 12/05/2024 02:24 PM    AST 23 12/05/2024 02:24 PM    ALT 22 12/05/2024 02:24 PM    Alkaline Phosphatase 47 12/05/2024 02:24 PM    Total Protein 8.4 12/05/2024 02:24 PM    Albumin 4.8 12/05/2024 02:24 PM    Total Bilirubin 0.44 12/05/2024 02:24 PM    eGFR 84 12/05/2024 02:24 PM     Lab Results   Component Value Date/Time    CEA 1.1 12/05/2024 02:24 PM        CT CAP 11/29/24:  IMPRESSION:     1. New nodular soft tissue along the right lower lobe suture line measuring 1.3 x 0.7 cm concerning for recurrent tumor.     2. Stable hepatic dome resection cavity.     3. No new findings of metastatic disease in the abdomen.     PET scan 1/6/25:  IMPRESSION:     1. Previously noted soft tissue density along the right medial lower lobe resection line demonstrates only minimal FDG activity, however recurrence of low FDG avidity tumor cannot be excluded given its increasing size. Consider follow-up CT chest in 3   months or tissue sampling depending on clinical suspicion for malignancy.     2. Right upper lobe consolidation with increased FDG activity, new from November 29, 2024. Findings favor infectious/inflammatory etiology. Recommend follow-up CT of the chest in 3 months to ensure resolution.     3. No findings suspicious of recurrent or metastatic disease in the head and neck, abdomen, or pelvis.     4.  Left upper molar dental carry. Recommend dental evaluation

## 2025-03-03 ENCOUNTER — HOSPITAL ENCOUNTER (OUTPATIENT)
Dept: PULMONOLOGY | Facility: HOSPITAL | Age: 55
Discharge: HOME | End: 2025-03-03
Attending: THORACIC SURGERY (CARDIOTHORACIC VASCULAR SURGERY)
Payer: COMMERCIAL

## 2025-03-03 ENCOUNTER — HOSPITAL ENCOUNTER (OUTPATIENT)
Dept: RADIOLOGY | Facility: HOSPITAL | Age: 55
Discharge: HOME | End: 2025-03-03
Attending: THORACIC SURGERY (CARDIOTHORACIC VASCULAR SURGERY)
Payer: COMMERCIAL

## 2025-03-03 ENCOUNTER — RA CDI HCC (OUTPATIENT)
Dept: OTHER | Facility: HOSPITAL | Age: 55
End: 2025-03-03

## 2025-03-03 DIAGNOSIS — R91.1 PULMONARY NODULE, RIGHT: ICD-10-CM

## 2025-03-03 PROCEDURE — 94060 EVALUATION OF WHEEZING: CPT | Performed by: INTERNAL MEDICINE

## 2025-03-03 PROCEDURE — 94726 PLETHYSMOGRAPHY LUNG VOLUMES: CPT

## 2025-03-03 PROCEDURE — 71250 CT THORAX DX C-: CPT

## 2025-03-03 PROCEDURE — 94729 DIFFUSING CAPACITY: CPT

## 2025-03-03 PROCEDURE — 94060 EVALUATION OF WHEEZING: CPT

## 2025-03-03 PROCEDURE — 94760 N-INVAS EAR/PLS OXIMETRY 1: CPT

## 2025-03-03 PROCEDURE — 94729 DIFFUSING CAPACITY: CPT | Performed by: INTERNAL MEDICINE

## 2025-03-03 PROCEDURE — 94726 PLETHYSMOGRAPHY LUNG VOLUMES: CPT | Performed by: INTERNAL MEDICINE

## 2025-03-03 RX ORDER — ALBUTEROL SULFATE 0.83 MG/ML
2.5 SOLUTION RESPIRATORY (INHALATION) ONCE
Status: COMPLETED | OUTPATIENT
Start: 2025-03-03 | End: 2025-03-03

## 2025-03-03 RX ADMIN — ALBUTEROL SULFATE 2.5 MG: 2.5 SOLUTION RESPIRATORY (INHALATION) at 07:33

## 2025-03-13 ENCOUNTER — OFFICE VISIT (OUTPATIENT)
Dept: CARDIAC SURGERY | Facility: CLINIC | Age: 55
End: 2025-03-13
Payer: COMMERCIAL

## 2025-03-13 ENCOUNTER — PREP FOR PROCEDURE (OUTPATIENT)
Dept: CARDIAC SURGERY | Facility: CLINIC | Age: 55
End: 2025-03-13

## 2025-03-13 VITALS
SYSTOLIC BLOOD PRESSURE: 115 MMHG | HEART RATE: 82 BPM | OXYGEN SATURATION: 99 % | DIASTOLIC BLOOD PRESSURE: 76 MMHG | RESPIRATION RATE: 15 BRPM | TEMPERATURE: 98.6 F | WEIGHT: 163.58 LBS | HEIGHT: 66 IN | BODY MASS INDEX: 26.29 KG/M2

## 2025-03-13 DIAGNOSIS — C78.01 MALIGNANT NEOPLASM METASTATIC TO RIGHT LUNG (HCC): ICD-10-CM

## 2025-03-13 DIAGNOSIS — R91.1 LUNG NODULE: Primary | ICD-10-CM

## 2025-03-13 PROCEDURE — 99214 OFFICE O/P EST MOD 30 MIN: CPT | Performed by: THORACIC SURGERY (CARDIOTHORACIC VASCULAR SURGERY)

## 2025-03-13 RX ORDER — HEPARIN SODIUM 5000 [USP'U]/ML
5000 INJECTION, SOLUTION INTRAVENOUS; SUBCUTANEOUS
OUTPATIENT
Start: 2025-03-13 | End: 2025-03-14

## 2025-03-13 RX ORDER — CEFAZOLIN SODIUM 2 G/50ML
2000 SOLUTION INTRAVENOUS ONCE
OUTPATIENT
Start: 2025-03-13 | End: 2025-03-13

## 2025-03-13 RX ORDER — GABAPENTIN 300 MG/1
600 CAPSULE ORAL ONCE
OUTPATIENT
Start: 2025-03-13 | End: 2025-03-13

## 2025-03-13 RX ORDER — CELECOXIB 200 MG/1
200 CAPSULE ORAL ONCE
OUTPATIENT
Start: 2025-03-13 | End: 2025-03-13

## 2025-03-13 RX ORDER — ACETAMINOPHEN 325 MG/1
975 TABLET ORAL ONCE
OUTPATIENT
Start: 2025-03-13 | End: 2025-03-13

## 2025-03-13 NOTE — H&P (VIEW-ONLY)
Name: Matthieu Da Silva      : 1970      MRN: [id number]  Encounter Provider: Mary Reid MD  Encounter Date: 3/13/2025   Encounter department: [facility] [place]  :  Assessment & Plan  Lung nodule    Orders:    Case request operating room: LOBECTOMY LUNG THORACOSCOPIC W/ ROBOTICS, possible wedge, lysis of adhesions, flexible bronchoscopy; Standing    Type and screen; Future    Comprehensive metabolic panel; Future    CBC and differential; Future    EKG 12 lead; Future    APTT; Future    Protime-INR; Future    Malignant neoplasm metastatic to right lung (HCC)  In the office, we discussed proceeding with a robotic right lower lobe wedge, possible lobectomy.  We discussed the results of his most recent CT scan.  It appears that the area of PET avidity has resolved on this most recent CT scan and he is safe to proceed.  Will plan to proceed on 3/25/2025.  We discussed the risks of surgery, specifically that this is a redo operation and if there is a significant amount of scar tissue, then the dissection may be more difficult and it may require a longer stay in the hospital if he has a prolonged airleak.  The other risks discussed include pneumonia.  He will undergo some preoperative lab work and EKG prior to the procedure.  All of his questions were answered and consent was obtained for the above procedure             Thoracic History     Oncology History   History of colon cancer, stage IV   2018 Initial Diagnosis    Malignant neoplasm of sigmoid colon (HCC)     2018 Biopsy    Large Intestine, Sigmoid Colon, Recto-sigmoid tumor bx:    - Invasive colonic adenocarcinoma, moderately differentiated     2018 - 10/16/2018 Chemotherapy    Modified FOLFOX6 x 12 cycles  Added Erbitux on 3/20/18      2018 Surgery    Segment 7 liver resection/ablation, hemicolectomy     2018 -  Cancer Staged    Staging form: Colon and Rectum, AJCC 8th Edition  - Pathologic stage  from 6/21/2018: Stage WERO (ypT0, pN0, cM1a) - Signed by STEPHANIE Wills on 10/16/2023  Stage prefix: Post-therapy  Total positive nodes: 0  Sites of metastasis: Liver       10/30/2018 -  Chemotherapy    Continuation of Single agent Erbitux.  With 10/30/18 chemo, it was Cycle #14.  Plan was for 6 additional cycles of Erbitux alone.     3/2020 Genetic Testing    NEGATIVE for genes tested:    Test(s): CancerNext + RNA[employer]ight (34 genes): APC, ETHAN, BARD1, BRCA1, BRCA2, BRIP1, BMPR1A, CDH1, CDK4, CDKN2A, CHEK2, DICER1, EPCAM, GREM1, HOXB13, MLH1, MRE11A, MSH2, MSH6, MUTYH, NBN, NF1, PALB2, PMS2, POLD1, POLE, PTEN, RAD50, RAD51C, RAD51D, SMAD4, SMARCA4, STK11, TP53      6/8/2020 Surgery    Liver, segment 3 (wedge resection):  - Metastatic adenocarcinoma, consistent with the patient's known colon primary  - Margins negative for carcinoma      7/27/2020 - 1/11/2021 Chemotherapy    fluorouracil (ADRUCIL), 745 mg, Intravenous, Once, 6 of 6 cycles  Administration: 750 mg (7/27/2020), 750 mg (8/10/2020), 745 mg (8/24/2020), 745 mg (9/8/2020), 745 mg (9/21/2020), 745 mg (10/5/2020)  pegfilgrastim (NEULASTA), 6 mg, Subcutaneous, Once, 1 of 1 cycle  Administration: 6 mg (9/24/2020)  pegfilgrastim (NEULASTA ONPRO), 6 mg, Subcutaneous, Once, 6 of 6 cycles  Administration: 6 mg (7/29/2020), 6 mg (8/12/2020), 6 mg (8/26/2020), 6 mg (9/10/2020), 6 mg (10/7/2020)  cetuximab (ERBITUX), 930 mg, Intravenous, Once, 12 of 12 cycles  Administration: 900 mg (7/27/2020), 900 mg (8/10/2020), 900 mg (8/24/2020), 900 mg (9/8/2020), 900 mg (9/21/2020), 900 mg (10/5/2020), 900 mg (10/19/2020), 900 mg (11/2/2020), 900 mg (11/16/2020), 900 mg (11/30/2020), 900 mg (12/28/2020), 900 mg (1/11/2021)  irinotecan (CAMPTOSAR) chemo infusion, 335 mg, Intravenous, Once, 6 of 6 cycles  Administration: 340 mg (7/27/2020), 340 mg (8/10/2020), 340 mg (8/24/2020), 340 mg (9/8/2020), 340 mg (9/21/2020), 340 mg (10/5/2020)  leucovorin calcium IVPB, 744 mg,  Intravenous, Once, 6 of 6 cycles  Administration: 750 mg (7/27/2020), 750 mg (8/10/2020), 750 mg (8/24/2020), 750 mg (9/8/2020), 750 mg (9/21/2020), 740 mg (10/5/2020)  fluorouracil (ADRUCIL) ambulatory infusion Soln, 1,200 mg/m2/day = 4,465 mg, Intravenous, Over 46 hours, 6 of 6 cycles  Dose modification: 1,200 mg/m2/day (original dose 1,200 mg/m2/day, Cycle 3)     9/20/2022 - 12/1/2022 Chemotherapy    palonosetron (ALOXI), 0.25 mg, Intravenous, Once, 4 of 4 cycles  Administration: 0.25 mg (10/18/2022), 0.25 mg (11/1/2022), 0.25 mg (11/15/2022), 0.25 mg (11/29/2022)  pegfilgrastim (NEULASTA ONPRO), 6 mg, Subcutaneous, Once, 6 of 6 cycles  Administration: 6 mg (9/22/2022), 6 mg (10/20/2022), 6 mg (11/3/2022), 6 mg (11/17/2022), 6 mg (12/1/2022)  Pegfilgrastim-bmez (ZIEXTENZO), 6 mg, Subcutaneous, Once, 1 of 1 cycle  Administration: 6 mg (10/7/2022)  fosaprepitant (EMEND) IVPB, 150 mg, Intravenous, Once, 4 of 4 cycles  Administration: 150 mg (10/18/2022), 150 mg (11/1/2022), 150 mg (11/15/2022), 150 mg (11/29/2022)  fluorouracil (ADRUCIL), 400 mg/m2 = 735 mg, Intravenous, Once, 6 of 6 cycles  Administration: 735 mg (9/20/2022), 735 mg (10/4/2022), 730 mg (10/18/2022), 730 mg (11/1/2022), 730 mg (11/15/2022), 730 mg (11/29/2022)  cetuximab (ERBITUX), 920 mg, Intravenous, Once, 6 of 6 cycles  Administration: 900 mg (9/20/2022), 900 mg (10/4/2022), 900 mg (10/18/2022), 900 mg (11/1/2022), 900 mg (11/15/2022), 900 mg (11/29/2022)  irinotecan (CAMPTOSAR) chemo infusion, 331 mg, Intravenous, Once, 6 of 6 cycles  Administration: 320 mg (9/20/2022), 320 mg (10/4/2022), 320 mg (10/18/2022), 320 mg (11/1/2022), 320 mg (11/15/2022), 320 mg (11/29/2022)  leucovorin calcium IVPB, 736 mg, Intravenous, Once, 6 of 6 cycles  Administration: 700 mg (9/20/2022), 700 mg (10/4/2022), 750 mg (10/18/2022), 750 mg (11/1/2022), 700 mg (11/15/2022), 700 mg (11/29/2022)  fluorouracil (ADRUCIL) ambulatory infusion Soln, 1,200 mg/m2/day = 4,415  mg, Intravenous, Over 46 hours, 6 of 6 cycles     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 (HCC) (Resolved)   4/11/2022 Initial Diagnosis    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 (HCC)     9/20/2022 - 12/1/2022 Chemotherapy    palonosetron (ALOXI), 0.25 mg, Intravenous, Once, 4 of 4 cycles  Administration: 0.25 mg (10/18/2022), 0.25 mg (11/1/2022), 0.25 mg (11/15/2022), 0.25 mg (11/29/2022)  pegfilgrastim (NEULASTA ONPRO), 6 mg, Subcutaneous, Once, 6 of 6 cycles  Administration: 6 mg (9/22/2022), 6 mg (10/20/2022), 6 mg (11/3/2022), 6 mg (11/17/2022), 6 mg (12/1/2022)  Pegfilgrastim-bmez (ZIEXTENZO), 6 mg, Subcutaneous, Once, 1 of 1 cycle  Administration: 6 mg (10/7/2022)  fosaprepitant (EMEND) IVPB, 150 mg, Intravenous, Once, 4 of 4 cycles  Administration: 150 mg (10/18/2022), 150 mg (11/1/2022), 150 mg (11/15/2022), 150 mg (11/29/2022)  fluorouracil (ADRUCIL), 400 mg/m2 = 735 mg, Intravenous, Once, 6 of 6 cycles  Administration: 735 mg (9/20/2022), 735 mg (10/4/2022), 730 mg (10/18/2022), 730 mg (11/1/2022), 730 mg (11/15/2022), 730 mg (11/29/2022)  cetuximab (ERBITUX), 920 mg, Intravenous, Once, 6 of 6 cycles  Administration: 900 mg (9/20/2022), 900 mg (10/4/2022), 900 mg (10/18/2022), 900 mg (11/1/2022), 900 mg (11/15/2022), 900 mg (11/29/2022)  irinotecan (CAMPTOSAR) chemo infusion, 331 mg, Intravenous, Once, 6 of 6 cycles  Administration: 320 mg (9/20/2022), 320 mg (10/4/2022), 320 mg (10/18/2022), 320 mg (11/1/2022), 320 mg (11/15/2022), 320 mg (11/29/2022)  leucovorin calcium IVPB, 736 mg, Intravenous, Once, 6 of 6 cycles  Administration: 700 mg (9/20/2022), 700 mg (10/4/2022), 750 mg (10/18/2022), 750 mg (11/1/2022), 700 mg (11/15/2022), 700 mg (11/29/2022)  fluorouracil (ADRUCIL) ambulatory infusion Soln, 1,200 mg/m2/day = 4,415 mg, Intravenous, Over 46 hours, 6 of 6 cycles     Malignant neoplasm metastatic to right lung (HCC)   8/4/2022 Initial Diagnosis    Malignant neoplasm metastatic to right lung (HCC)     8/9/2022  Surgery    Flexible bronchoscopy, right thoracoscopic therapeutic wedge resection      9/20/2022 - 12/1/2022 Chemotherapy    palonosetron (ALOXI), 0.25 mg, Intravenous, Once, 4 of 4 cycles  Administration: 0.25 mg (10/18/2022), 0.25 mg (11/1/2022), 0.25 mg (11/15/2022), 0.25 mg (11/29/2022)  pegfilgrastim (NEULASTA ONPRO), 6 mg, Subcutaneous, Once, 6 of 6 cycles  Administration: 6 mg (9/22/2022), 6 mg (10/20/2022), 6 mg (11/3/2022), 6 mg (11/17/2022), 6 mg (12/1/2022)  Pegfilgrastim-bmez (ZIEXTENZO), 6 mg, Subcutaneous, Once, 1 of 1 cycle  Administration: 6 mg (10/7/2022)  fosaprepitant (EMEND) IVPB, 150 mg, Intravenous, Once, 4 of 4 cycles  Administration: 150 mg (10/18/2022), 150 mg (11/1/2022), 150 mg (11/15/2022), 150 mg (11/29/2022)  fluorouracil (ADRUCIL), 400 mg/m2 = 735 mg, Intravenous, Once, 6 of 6 cycles  Administration: 735 mg (9/20/2022), 735 mg (10/4/2022), 730 mg (10/18/2022), 730 mg (11/1/2022), 730 mg (11/15/2022), 730 mg (11/29/2022)  cetuximab (ERBITUX), 920 mg, Intravenous, Once, 6 of 6 cycles  Administration: 900 mg (9/20/2022), 900 mg (10/4/2022), 900 mg (10/18/2022), 900 mg (11/1/2022), 900 mg (11/15/2022), 900 mg (11/29/2022)  irinotecan (CAMPTOSAR) chemo infusion, 331 mg, Intravenous, Once, 6 of 6 cycles  Administration: 320 mg (9/20/2022), 320 mg (10/4/2022), 320 mg (10/18/2022), 320 mg (11/1/2022), 320 mg (11/15/2022), 320 mg (11/29/2022)  leucovorin calcium IVPB, 736 mg, Intravenous, Once, 6 of 6 cycles  Administration: 700 mg (9/20/2022), 700 mg (10/4/2022), 750 mg (10/18/2022), 750 mg (11/1/2022), 700 mg (11/15/2022), 700 mg (11/29/2022)  fluorouracil (ADRUCIL) ambulatory infusion Soln, 1,200 mg/m2/day = 4,415 mg, Intravenous, Over 46 hours, 6 of 6 cycles          History of Present Illness   HPI Preop ECOG 0  Matthieu Da Silva is a 54 y.o. male who presents back to my office today to discuss the results of his CT scan of the chest as well as his pulmonary function testing.  I have  personally reviewed his CT scan in PACS.  This demonstrates that there have been no new nodules that have appeared.  The consolidation that he had on his PET CT scan in January has resolved.  The biopsy-proven metastatic colorectal cancer remains the same size.    I have also personally reviewed his pulmonary function testing.  He has more than adequate pulmonary function testing for resection.    Today in the office, he has had no changes in his health since his last visit.  He denies any recent upper respiratory infection or pneumonia.  He denies a fever.    Review of Systems   Constitutional: Negative.  Negative for activity change, chills, fatigue, fever and unexpected weight change.   HENT:  Negative for sore throat, trouble swallowing and voice change.    Eyes: Negative.  Negative for visual disturbance.   Respiratory:  Negative for cough, chest tightness, shortness of breath, wheezing and stridor.    Cardiovascular:  Negative for chest pain and leg swelling.   Gastrointestinal: Negative.    Endocrine: Negative.    Genitourinary: Negative.    Musculoskeletal: Negative.    Skin: Negative.    Allergic/Immunologic: Negative.    Neurological:  Negative for seizures, syncope, speech difficulty, weakness, light-headedness and headaches.   Hematological:  Negative for adenopathy.   Psychiatric/Behavioral: Negative.        Medical History Reviewed by provider this encounter:     .  Past Medical History   Past Medical History:   Diagnosis Date    Cancer (HCC)     Colon cancer (HCC)     Dysuria     Last Assessed:8/24/17    GERD (gastroesophageal reflux disease)     Liver cancer (HCC)     Liver nodule     Pulmonary nodule, right     Thyroid nodule 02/16/2023    Ureteropelvic junction (UPJ) obstruction 01/29/2020    Wears glasses      Past Surgical History:   Procedure Laterality Date    ABDOMINAL SURGERY      COLON SURGERY      COLONOSCOPY N/A 01/22/2018    Procedure: COLONOSCOPY;  Surgeon: Casey Justice MD;   Location: [facility];  Service: Colorectal    DENTAL SURGERY  2016    Crown with bridge work    FLEXIBLE SIGMOIDOSCOPY N/A 06/15/2018    Procedure: SIGMOIDOSCOPY FLEXIBLE w/o sedation w/ tattoo;  Surgeon: Casey Justice MD;  Location: [facility];  Service: Colorectal    IR BIOPSY LUNG  2/4/2025    IR PORT PLACEMENT Right     LAPAROTOMY N/A 06/08/2020    Procedure: LAPAROTOMY EXPLORATORY, LYSIS OF ADHESIONS;  Surgeon: Esequiel Mijares MD;  [facility];  Service: Surgical Oncology    LIVER LOBECTOMY N/A 06/21/2018    Procedure: liver resection/ablation, intraoperative ultrasound of liver;  Surgeon: Esequiel Mijares MD;  [facility];  Service: Surgical Oncology    LIVER LOBECTOMY N/A 06/08/2020    Procedure: LIVER RESECTION SEGMENT 3 WITH  INTRAOPERATIVE U/S OF LIVER;  Surgeon: Esequiel Mijares MD;  [facility];  Service: Surgical Oncology    LUNG SEGMENTECTOMY Right 8/9/2022    Procedure: RESECTION WEDGE LUNG, THERAPEUTIC;  Surgeon: Mary Reid MD;  [facility];  Service: Thoracic    ME BRNCHSC INCL FLUOR GDNCE DX W/CELL WASHG SPX N/A 8/9/2022    Procedure: BRONCHOSCOPY FLEXIBLE;  Surgeon: Mary Reid MD;  [facility];  Service: Thoracic    ME COLECTOMY PARTIAL W/ANASTOMOSIS Left 06/21/2018    Procedure: hemicolectomy, low anterior resection (open) SPY fluorescence angiography;  Surgeon: Casey Justice MD;  [facility];  Service: Colorectal    ME EXPLORATORY LAPAROTOMY CELIOTOMY W/WO BIOPSY SPX N/A 06/21/2018    Procedure: LAPAROTOMY EXPLORATORY;  Surgeon: Casey Justice MD;  [facility];  Service: Colorectal    ME INSJ PRPH CTR VAD W/SUBQ PORT AGE 5 YR/> N/A 01/25/2018    Procedure: PORT-A-CATHETER PLACEMENT  Fluoroscopy for performance/interpretation;  Surgeon: Casey Justice MD;  [facility];  Service: Colorectal    ME PRCTECT PRTL RESCJ RECTUM TABDL APPR N/A 06/21/2018    Procedure: Partial proctectomy.;  Surgeon: Casey  MD Reshma;  [facility];  Service: Colorectal    NJ SIGMOIDOSCOPY FLX DX W/COLLJ SPEC BR/WA IF PFRMD N/A 06/21/2018    Procedure: SIGMOIDOSCOPY FLEXIBLE;  Surgeon: Casey Justice MD;  [facility];  Service: Colorectal    NJ THORACOSCOPY W/THERA WEDGE RESEXN INITIAL UNILAT Right 8/9/2022    Procedure: THORACOSCOPY VIDEO ASSISTED SURGERY (VATS);  Surgeon: Mary Reid MD;  [facility];  Service: Thoracic    ROOT CANAL  2015    TESTICLE SURGERY      Exploration of Undescended Testis rIght, age 10 had surgery for undescended testicle; Last Assessed:8/24/17    TOOTH EXTRACTION  2015     Family History   Problem Relation Age of Onset    No Known Problems Father     Cancer Mother         unknown    Cancer Brother         pt. reports brother was diagnosed with stage 4 throat cancer    Throat cancer Brother     Breast cancer Maternal Aunt       reports that he quit smoking about 9 years ago. His smoking use included cigarettes. He started smoking about 34 years ago. He has a 12.5 pack-year smoking history. He has never used smokeless tobacco. He reports current alcohol use. He reports current drug use. Frequency: 1.00 time per week. Drug: Marijuana.  Current Outpatient Medications   Medication Instructions    Multiple Vitamins-Minerals (MENS MULTIVITAMIN PO) Daily    omeprazole (PRILOSEC) 20 mg, Oral, Daily    sildenafil (VIAGRA) 50 mg, Oral, As needed   No Known Allergies   Current Outpatient Medications on File Prior to Visit   Medication Sig Dispense Refill    Multiple Vitamins-Minerals (MENS MULTIVITAMIN PO) Take by mouth in the morning      omeprazole (PriLOSEC) 20 mg delayed release capsule Take 1 capsule (20 mg total) by mouth daily 30 capsule 3    sildenafil (VIAGRA) 50 MG tablet Take 1 tablet (50 mg total) by mouth as needed for erectile dysfunction 10 tablet 0     No current facility-administered medications on file prior to visit.      Social History     Tobacco Use     "Smoking status: Former     Current packs/day: 0.00     Average packs/day: 0.5 packs/day for 25.0 years (12.5 ttl pk-yrs)     Types: Cigarettes     Start date:      Quit date: 2016     Years since quittin.2    Smokeless tobacco: Never   Vaping Use    Vaping status: Every Day    Start date: 10/10/2016    Substances: Nicotine, THC (at time), CBD (at times), Flavoring   Substance and Sexual Activity    Alcohol use: Yes     Comment: socially - 2 month    Drug use: Yes     Frequency: 1.0 times per week     Types: Marijuana    Sexual activity: Not on file        Objective   /76 (BP Location: Left arm, Patient Position: Sitting, Cuff Size: Large)   Pulse 82   Temp 98.6 °F (37 °C) (Temporal)   Resp 15   Ht 5' 6\" (1.676 m)   Wt 74.2 kg (163 lb 9.3 oz)   SpO2 99%   BMI 26.40 kg/m²     Pain Screening:  Pain Score: 0-No pain  ECOG    Physical Exam  Vitals and nursing note reviewed.   Constitutional:       Appearance: He is well-developed. He is not diaphoretic.   HENT:      Head: Normocephalic and atraumatic.      Nose: Nose normal. No congestion.      Mouth/Throat:      Mouth: Mucous membranes are moist.      Pharynx: Oropharynx is clear.   Eyes:      Extraocular Movements: Extraocular movements intact.      Pupils: Pupils are equal, round, and reactive to light.   Neck:      Trachea: No tracheal deviation.   Cardiovascular:      Rate and Rhythm: Normal rate and regular rhythm.      Heart sounds: Normal heart sounds. No murmur heard.  Pulmonary:      Effort: Pulmonary effort is normal. No respiratory distress.      Breath sounds: Normal breath sounds. No stridor. No wheezing or rales.   Chest:      Chest wall: No tenderness.   Abdominal:      General: Bowel sounds are normal. There is no distension.      Palpations: Abdomen is soft.      Tenderness: There is no abdominal tenderness.   Musculoskeletal:         General: Normal range of motion.      Cervical back: Normal range of motion.   Lymphadenopathy:     "  Cervical: No cervical adenopathy.   Skin:     General: Skin is warm and dry.      Coloration: Skin is not pale.      Findings: No erythema or rash.   Neurological:      Mental Status: He is alert and oriented to person, place, and time.   Psychiatric:         Behavior: Behavior normal.         Thought Content: Thought content normal.         Judgment: Judgment normal.         Labs:   Results for orders placed or performed during the hospital encounter of 02/04/25   Protime-INR   Result Value Ref Range    Protime 12.7 12.3 - 15.0 seconds    INR 0.89 0.85 - 1.19   CBC and differential   Result Value Ref Range    WBC 5.74 4.31 - 10.16 Thousand/uL    RBC 4.63 3.88 - 5.62 Million/uL    Hemoglobin 15.2 12.0 - 17.0 g/dL    Hematocrit 43.2 36.5 - 49.3 %    MCV 93 82 - 98 fL    MCH 32.8 26.8 - 34.3 pg    MCHC 35.2 31.4 - 37.4 g/dL    RDW 13.1 11.6 - 15.1 %    MPV 10.6 8.9 - 12.7 fL    Platelets 166 149 - 390 Thousands/uL    nRBC 0 /100 WBCs    Segmented % 65 43 - 75 %    Immature Grans % 0 0 - 2 %    Lymphocytes % 24 14 - 44 %    Monocytes % 8 4 - 12 %    Eosinophils Relative 2 0 - 6 %    Basophils Relative 1 0 - 1 %    Absolute Neutrophils 3.71 1.85 - 7.62 Thousands/µL    Absolute Immature Grans 0.01 0.00 - 0.20 Thousand/uL    Absolute Lymphocytes 1.40 0.60 - 4.47 Thousands/µL    Absolute Monocytes 0.46 0.17 - 1.22 Thousand/µL    Eosinophils Absolute 0.13 0.00 - 0.61 Thousand/µL    Basophils Absolute 0.03 0.00 - 0.10 Thousands/µL   Tissue Exam   Result Value Ref Range    Case Report       Surgical Pathology Report                         Case: [id number]                                  Authorizing Provider:  Niranjan Harrison MD          Collected:           02/04/2025 0930              Ordering Location:     [facility]        Received:            02/04/2025 0935                                     [facility] CAT Scan                                                            Pathologist:           Karl James MD  "                                                         Intraop:               Karl James MD                                                          Specimen:    Lung, Right Lower Lobe, nodule                                                             Final Diagnosis       A.  Lung, right lower lobe nodule, biopsy:  -Metastatic adenocarcinoma compatible with colonic primary.  -Immunohistochemical stains performed with appropriate controls show the tumor to be CK20 (weak), CDX2, and SATB2 and negative for CK7, TTF-1, and Napsin; supporting the diagnosis.      Additional Information       All reported additional testing was performed with appropriately reactive controls.  These tests were developed and their performance characteristics determined by [facility] or appropriate performing facility, though some tests may be performed on tissues which have not been validated for performance characteristics (such as staining performed on alcohol exposed cell blocks and decalcified tissues).  Results should be interpreted with caution and in the context of the patients’ clinical condition. These tests may not be cleared or approved by the U.S. Food and Drug Administration, though the FDA has determined that such clearance or approval is not necessary. These tests are used for clinical purposes and they should not be regarded as investigational or for research. This laboratory has been approved by [place] 88, designated as a high-complexity laboratory and is qualified to perform these tests.  Interpretation performed at [facility], [address] [facility] [address]        Intraoperative Consultation       Neoplastic, defer to permanents.  Karl James MD.  Interpretation performed at [facility], [address] [address] [facility] [address]          Gross Description       A. The specimen is received in formalin, labeled with the patient's name and hospital number, and is designated \" lung, " "right lower lobe nodule\".  The specimen consists of 4 tan cores of filiform and friable soft tissue measuring less than 0.1 cm-0.1 cm in diameter and ranging from 0.6-1.2 cm in length. Entirely submitted. Four cassettes. Between sponges.  Note: due to the size and consistency of the specimen, the tissue may not survive histologic processing.    Note: The estimated total formalin fixation time based upon information provided by the submitting clinician and the standard processing schedule is 8.75 hours.    -[facility]          Radiology Results Review : I have reviewed images/report studies in PACS as described above (in the HPI).  Pathology: I have reviewed pathology reports described above.      "

## 2025-03-13 NOTE — ASSESSMENT & PLAN NOTE
In the office, we discussed proceeding with a robotic right lower lobe wedge, possible lobectomy.  We discussed the results of his most recent CT scan.  It appears that the area of PET avidity has resolved on this most recent CT scan and he is safe to proceed.  Will plan to proceed on 3/25/2025.  We discussed the risks of surgery, specifically that this is a redo operation and if there is a significant amount of scar tissue, then the dissection may be more difficult and it may require a longer stay in the hospital if he has a prolonged airleak.  The other risks discussed include pneumonia.  He will undergo some preoperative lab work and EKG prior to the procedure.  All of his questions were answered and consent was obtained for the above procedure

## 2025-03-13 NOTE — PROGRESS NOTES
Name: Matthieu Da Silva      : 1970      MRN: [id number]  Encounter Provider: Mary Reid MD  Encounter Date: 3/13/2025   Encounter department: [facility] [place]  :  Assessment & Plan  Lung nodule    Orders:    Case request operating room: LOBECTOMY LUNG THORACOSCOPIC W/ ROBOTICS, possible wedge, lysis of adhesions, flexible bronchoscopy; Standing    Type and screen; Future    Comprehensive metabolic panel; Future    CBC and differential; Future    EKG 12 lead; Future    APTT; Future    Protime-INR; Future    Malignant neoplasm metastatic to right lung (HCC)  In the office, we discussed proceeding with a robotic right lower lobe wedge, possible lobectomy.  We discussed the results of his most recent CT scan.  It appears that the area of PET avidity has resolved on this most recent CT scan and he is safe to proceed.  Will plan to proceed on 3/25/2025.  We discussed the risks of surgery, specifically that this is a redo operation and if there is a significant amount of scar tissue, then the dissection may be more difficult and it may require a longer stay in the hospital if he has a prolonged airleak.  The other risks discussed include pneumonia.  He will undergo some preoperative lab work and EKG prior to the procedure.  All of his questions were answered and consent was obtained for the above procedure             Thoracic History     Oncology History   History of colon cancer, stage IV   2018 Initial Diagnosis    Malignant neoplasm of sigmoid colon (HCC)     2018 Biopsy    Large Intestine, Sigmoid Colon, Recto-sigmoid tumor bx:    - Invasive colonic adenocarcinoma, moderately differentiated     2018 - 10/16/2018 Chemotherapy    Modified FOLFOX6 x 12 cycles  Added Erbitux on 3/20/18      2018 Surgery    Segment 7 liver resection/ablation, hemicolectomy     2018 -  Cancer Staged    Staging form: Colon and Rectum, AJCC 8th Edition  - Pathologic stage  from 6/21/2018: Stage WERO (ypT0, pN0, cM1a) - Signed by STEPHANIE Wills on 10/16/2023  Stage prefix: Post-therapy  Total positive nodes: 0  Sites of metastasis: Liver       10/30/2018 -  Chemotherapy    Continuation of Single agent Erbitux.  With 10/30/18 chemo, it was Cycle #14.  Plan was for 6 additional cycles of Erbitux alone.     3/2020 Genetic Testing    NEGATIVE for genes tested:    Test(s): CancerNext + [employer] (34 genes): APC, ETHAN, BARD1, BRCA1, BRCA2, BRIP1, BMPR1A, CDH1, CDK4, CDKN2A, CHEK2, DICER1, EPCAM, GREM1, HOXB13, MLH1, MRE11A, MSH2, MSH6, MUTYH, NBN, NF1, PALB2, PMS2, POLD1, POLE, PTEN, RAD50, RAD51C, RAD51D, SMAD4, SMARCA4, STK11, TP53      6/8/2020 Surgery    Liver, segment 3 (wedge resection):  - Metastatic adenocarcinoma, consistent with the patient's known colon primary  - Margins negative for carcinoma      7/27/2020 - 1/11/2021 Chemotherapy    fluorouracil (ADRUCIL), 745 mg, Intravenous, Once, 6 of 6 cycles  Administration: 750 mg (7/27/2020), 750 mg (8/10/2020), 745 mg (8/24/2020), 745 mg (9/8/2020), 745 mg (9/21/2020), 745 mg (10/5/2020)  pegfilgrastim (NEULASTA), 6 mg, Subcutaneous, Once, 1 of 1 cycle  Administration: 6 mg (9/24/2020)  pegfilgrastim (NEULASTA ONPRO), 6 mg, Subcutaneous, Once, 6 of 6 cycles  Administration: 6 mg (7/29/2020), 6 mg (8/12/2020), 6 mg (8/26/2020), 6 mg (9/10/2020), 6 mg (10/7/2020)  cetuximab (ERBITUX), 930 mg, Intravenous, Once, 12 of 12 cycles  Administration: 900 mg (7/27/2020), 900 mg (8/10/2020), 900 mg (8/24/2020), 900 mg (9/8/2020), 900 mg (9/21/2020), 900 mg (10/5/2020), 900 mg (10/19/2020), 900 mg (11/2/2020), 900 mg (11/16/2020), 900 mg (11/30/2020), 900 mg (12/28/2020), 900 mg (1/11/2021)  irinotecan (CAMPTOSAR) chemo infusion, 335 mg, Intravenous, Once, 6 of 6 cycles  Administration: 340 mg (7/27/2020), 340 mg (8/10/2020), 340 mg (8/24/2020), 340 mg (9/8/2020), 340 mg (9/21/2020), 340 mg (10/5/2020)  leucovorin calcium IVPB, 744 mg,  Intravenous, Once, 6 of 6 cycles  Administration: 750 mg (7/27/2020), 750 mg (8/10/2020), 750 mg (8/24/2020), 750 mg (9/8/2020), 750 mg (9/21/2020), 740 mg (10/5/2020)  fluorouracil (ADRUCIL) ambulatory infusion Soln, 1,200 mg/m2/day = 4,465 mg, Intravenous, Over 46 hours, 6 of 6 cycles  Dose modification: 1,200 mg/m2/day (original dose 1,200 mg/m2/day, Cycle 3)     9/20/2022 - 12/1/2022 Chemotherapy    palonosetron (ALOXI), 0.25 mg, Intravenous, Once, 4 of 4 cycles  Administration: 0.25 mg (10/18/2022), 0.25 mg (11/1/2022), 0.25 mg (11/15/2022), 0.25 mg (11/29/2022)  pegfilgrastim (NEULASTA ONPRO), 6 mg, Subcutaneous, Once, 6 of 6 cycles  Administration: 6 mg (9/22/2022), 6 mg (10/20/2022), 6 mg (11/3/2022), 6 mg (11/17/2022), 6 mg (12/1/2022)  Pegfilgrastim-bmez (ZIEXTENZO), 6 mg, Subcutaneous, Once, 1 of 1 cycle  Administration: 6 mg (10/7/2022)  fosaprepitant (EMEND) IVPB, 150 mg, Intravenous, Once, 4 of 4 cycles  Administration: 150 mg (10/18/2022), 150 mg (11/1/2022), 150 mg (11/15/2022), 150 mg (11/29/2022)  fluorouracil (ADRUCIL), 400 mg/m2 = 735 mg, Intravenous, Once, 6 of 6 cycles  Administration: 735 mg (9/20/2022), 735 mg (10/4/2022), 730 mg (10/18/2022), 730 mg (11/1/2022), 730 mg (11/15/2022), 730 mg (11/29/2022)  cetuximab (ERBITUX), 920 mg, Intravenous, Once, 6 of 6 cycles  Administration: 900 mg (9/20/2022), 900 mg (10/4/2022), 900 mg (10/18/2022), 900 mg (11/1/2022), 900 mg (11/15/2022), 900 mg (11/29/2022)  irinotecan (CAMPTOSAR) chemo infusion, 331 mg, Intravenous, Once, 6 of 6 cycles  Administration: 320 mg (9/20/2022), 320 mg (10/4/2022), 320 mg (10/18/2022), 320 mg (11/1/2022), 320 mg (11/15/2022), 320 mg (11/29/2022)  leucovorin calcium IVPB, 736 mg, Intravenous, Once, 6 of 6 cycles  Administration: 700 mg (9/20/2022), 700 mg (10/4/2022), 750 mg (10/18/2022), 750 mg (11/1/2022), 700 mg (11/15/2022), 700 mg (11/29/2022)  fluorouracil (ADRUCIL) ambulatory infusion Soln, 1,200 mg/m2/day = 4,415  mg, Intravenous, Over 46 hours, 6 of 6 cycles     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 (HCC) (Resolved)   4/11/2022 Initial Diagnosis    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 (HCC)     9/20/2022 - 12/1/2022 Chemotherapy    palonosetron (ALOXI), 0.25 mg, Intravenous, Once, 4 of 4 cycles  Administration: 0.25 mg (10/18/2022), 0.25 mg (11/1/2022), 0.25 mg (11/15/2022), 0.25 mg (11/29/2022)  pegfilgrastim (NEULASTA ONPRO), 6 mg, Subcutaneous, Once, 6 of 6 cycles  Administration: 6 mg (9/22/2022), 6 mg (10/20/2022), 6 mg (11/3/2022), 6 mg (11/17/2022), 6 mg (12/1/2022)  Pegfilgrastim-bmez (ZIEXTENZO), 6 mg, Subcutaneous, Once, 1 of 1 cycle  Administration: 6 mg (10/7/2022)  fosaprepitant (EMEND) IVPB, 150 mg, Intravenous, Once, 4 of 4 cycles  Administration: 150 mg (10/18/2022), 150 mg (11/1/2022), 150 mg (11/15/2022), 150 mg (11/29/2022)  fluorouracil (ADRUCIL), 400 mg/m2 = 735 mg, Intravenous, Once, 6 of 6 cycles  Administration: 735 mg (9/20/2022), 735 mg (10/4/2022), 730 mg (10/18/2022), 730 mg (11/1/2022), 730 mg (11/15/2022), 730 mg (11/29/2022)  cetuximab (ERBITUX), 920 mg, Intravenous, Once, 6 of 6 cycles  Administration: 900 mg (9/20/2022), 900 mg (10/4/2022), 900 mg (10/18/2022), 900 mg (11/1/2022), 900 mg (11/15/2022), 900 mg (11/29/2022)  irinotecan (CAMPTOSAR) chemo infusion, 331 mg, Intravenous, Once, 6 of 6 cycles  Administration: 320 mg (9/20/2022), 320 mg (10/4/2022), 320 mg (10/18/2022), 320 mg (11/1/2022), 320 mg (11/15/2022), 320 mg (11/29/2022)  leucovorin calcium IVPB, 736 mg, Intravenous, Once, 6 of 6 cycles  Administration: 700 mg (9/20/2022), 700 mg (10/4/2022), 750 mg (10/18/2022), 750 mg (11/1/2022), 700 mg (11/15/2022), 700 mg (11/29/2022)  fluorouracil (ADRUCIL) ambulatory infusion Soln, 1,200 mg/m2/day = 4,415 mg, Intravenous, Over 46 hours, 6 of 6 cycles     Malignant neoplasm metastatic to right lung (HCC)   8/4/2022 Initial Diagnosis    Malignant neoplasm metastatic to right lung (HCC)     8/9/2022  Surgery    Flexible bronchoscopy, right thoracoscopic therapeutic wedge resection      9/20/2022 - 12/1/2022 Chemotherapy    palonosetron (ALOXI), 0.25 mg, Intravenous, Once, 4 of 4 cycles  Administration: 0.25 mg (10/18/2022), 0.25 mg (11/1/2022), 0.25 mg (11/15/2022), 0.25 mg (11/29/2022)  pegfilgrastim (NEULASTA ONPRO), 6 mg, Subcutaneous, Once, 6 of 6 cycles  Administration: 6 mg (9/22/2022), 6 mg (10/20/2022), 6 mg (11/3/2022), 6 mg (11/17/2022), 6 mg (12/1/2022)  Pegfilgrastim-bmez (ZIEXTENZO), 6 mg, Subcutaneous, Once, 1 of 1 cycle  Administration: 6 mg (10/7/2022)  fosaprepitant (EMEND) IVPB, 150 mg, Intravenous, Once, 4 of 4 cycles  Administration: 150 mg (10/18/2022), 150 mg (11/1/2022), 150 mg (11/15/2022), 150 mg (11/29/2022)  fluorouracil (ADRUCIL), 400 mg/m2 = 735 mg, Intravenous, Once, 6 of 6 cycles  Administration: 735 mg (9/20/2022), 735 mg (10/4/2022), 730 mg (10/18/2022), 730 mg (11/1/2022), 730 mg (11/15/2022), 730 mg (11/29/2022)  cetuximab (ERBITUX), 920 mg, Intravenous, Once, 6 of 6 cycles  Administration: 900 mg (9/20/2022), 900 mg (10/4/2022), 900 mg (10/18/2022), 900 mg (11/1/2022), 900 mg (11/15/2022), 900 mg (11/29/2022)  irinotecan (CAMPTOSAR) chemo infusion, 331 mg, Intravenous, Once, 6 of 6 cycles  Administration: 320 mg (9/20/2022), 320 mg (10/4/2022), 320 mg (10/18/2022), 320 mg (11/1/2022), 320 mg (11/15/2022), 320 mg (11/29/2022)  leucovorin calcium IVPB, 736 mg, Intravenous, Once, 6 of 6 cycles  Administration: 700 mg (9/20/2022), 700 mg (10/4/2022), 750 mg (10/18/2022), 750 mg (11/1/2022), 700 mg (11/15/2022), 700 mg (11/29/2022)  fluorouracil (ADRUCIL) ambulatory infusion Soln, 1,200 mg/m2/day = 4,415 mg, Intravenous, Over 46 hours, 6 of 6 cycles          History of Present Illness   HPI Preop ECOG 0  Matthieu Da Silva is a 54 y.o. male who presents back to my office today to discuss the results of his CT scan of the chest as well as his pulmonary function testing.  I have  personally reviewed his CT scan in PACS.  This demonstrates that there have been no new nodules that have appeared.  The consolidation that he had on his PET CT scan in January has resolved.  The biopsy-proven metastatic colorectal cancer remains the same size.    I have also personally reviewed his pulmonary function testing.  He has more than adequate pulmonary function testing for resection.    Today in the office, he has had no changes in his health since his last visit.  He denies any recent upper respiratory infection or pneumonia.  He denies a fever.    Review of Systems   Constitutional: Negative.  Negative for activity change, chills, fatigue, fever and unexpected weight change.   HENT:  Negative for sore throat, trouble swallowing and voice change.    Eyes: Negative.  Negative for visual disturbance.   Respiratory:  Negative for cough, chest tightness, shortness of breath, wheezing and stridor.    Cardiovascular:  Negative for chest pain and leg swelling.   Gastrointestinal: Negative.    Endocrine: Negative.    Genitourinary: Negative.    Musculoskeletal: Negative.    Skin: Negative.    Allergic/Immunologic: Negative.    Neurological:  Negative for seizures, syncope, speech difficulty, weakness, light-headedness and headaches.   Hematological:  Negative for adenopathy.   Psychiatric/Behavioral: Negative.        Medical History Reviewed by provider this encounter:     .  Past Medical History   Past Medical History:   Diagnosis Date    Cancer (HCC)     Colon cancer (HCC)     Dysuria     Last Assessed:8/24/17    GERD (gastroesophageal reflux disease)     Liver cancer (HCC)     Liver nodule     Pulmonary nodule, right     Thyroid nodule 02/16/2023    Ureteropelvic junction (UPJ) obstruction 01/29/2020    Wears glasses      Past Surgical History:   Procedure Laterality Date    ABDOMINAL SURGERY      COLON SURGERY      COLONOSCOPY N/A 01/22/2018    Procedure: COLONOSCOPY;  Surgeon: Casey Justice MD;   Location: [facility];  Service: Colorectal    DENTAL SURGERY  2016    Crown with bridge work    FLEXIBLE SIGMOIDOSCOPY N/A 06/15/2018    Procedure: SIGMOIDOSCOPY FLEXIBLE w/o sedation w/ tattoo;  Surgeon: Casey Justice MD;  Location: [facility];  Service: Colorectal    IR BIOPSY LUNG  2/4/2025    IR PORT PLACEMENT Right     LAPAROTOMY N/A 06/08/2020    Procedure: LAPAROTOMY EXPLORATORY, LYSIS OF ADHESIONS;  Surgeon: Esequiel Mijares MD;  [facility];  Service: Surgical Oncology    LIVER LOBECTOMY N/A 06/21/2018    Procedure: liver resection/ablation, intraoperative ultrasound of liver;  Surgeon: Esequiel Mijares MD;  [facility];  Service: Surgical Oncology    LIVER LOBECTOMY N/A 06/08/2020    Procedure: LIVER RESECTION SEGMENT 3 WITH  INTRAOPERATIVE U/S OF LIVER;  Surgeon: Esequiel Mijares MD;  [facility];  Service: Surgical Oncology    LUNG SEGMENTECTOMY Right 8/9/2022    Procedure: RESECTION WEDGE LUNG, THERAPEUTIC;  Surgeon: Mary Reid MD;  [facility];  Service: Thoracic    AR BRNCHSC INCL FLUOR GDNCE DX W/CELL WASHG SPX N/A 8/9/2022    Procedure: BRONCHOSCOPY FLEXIBLE;  Surgeon: Mary Reid MD;  [facility];  Service: Thoracic    AR COLECTOMY PARTIAL W/ANASTOMOSIS Left 06/21/2018    Procedure: hemicolectomy, low anterior resection (open) SPY fluorescence angiography;  Surgeon: Casey Justice MD;  [facility];  Service: Colorectal    AR EXPLORATORY LAPAROTOMY CELIOTOMY W/WO BIOPSY SPX N/A 06/21/2018    Procedure: LAPAROTOMY EXPLORATORY;  Surgeon: Casey Justice MD;  [facility];  Service: Colorectal    AR INSJ PRPH CTR VAD W/SUBQ PORT AGE 5 YR/> N/A 01/25/2018    Procedure: PORT-A-CATHETER PLACEMENT  Fluoroscopy for performance/interpretation;  Surgeon: Casey Justice MD;  [facility];  Service: Colorectal    AR PRCTECT PRTL RESCJ RECTUM TABDL APPR N/A 06/21/2018    Procedure: Partial proctectomy.;  Surgeon: Casey  MD Reshma;  [facility];  Service: Colorectal    SD SIGMOIDOSCOPY FLX DX W/COLLJ SPEC BR/WA IF PFRMD N/A 06/21/2018    Procedure: SIGMOIDOSCOPY FLEXIBLE;  Surgeon: Casey Justice MD;  [facility];  Service: Colorectal    SD THORACOSCOPY W/THERA WEDGE RESEXN INITIAL UNILAT Right 8/9/2022    Procedure: THORACOSCOPY VIDEO ASSISTED SURGERY (VATS);  Surgeon: Mary Reid MD;  [facility];  Service: Thoracic    ROOT CANAL  2015    TESTICLE SURGERY      Exploration of Undescended Testis rIght, age 10 had surgery for undescended testicle; Last Assessed:8/24/17    TOOTH EXTRACTION  2015     Family History   Problem Relation Age of Onset    No Known Problems Father     Cancer Mother         unknown    Cancer Brother         pt. reports brother was diagnosed with stage 4 throat cancer    Throat cancer Brother     Breast cancer Maternal Aunt       reports that he quit smoking about 9 years ago. His smoking use included cigarettes. He started smoking about 34 years ago. He has a 12.5 pack-year smoking history. He has never used smokeless tobacco. He reports current alcohol use. He reports current drug use. Frequency: 1.00 time per week. Drug: Marijuana.  Current Outpatient Medications   Medication Instructions    Multiple Vitamins-Minerals (MENS MULTIVITAMIN PO) Daily    omeprazole (PRILOSEC) 20 mg, Oral, Daily    sildenafil (VIAGRA) 50 mg, Oral, As needed   No Known Allergies   Current Outpatient Medications on File Prior to Visit   Medication Sig Dispense Refill    Multiple Vitamins-Minerals (MENS MULTIVITAMIN PO) Take by mouth in the morning      omeprazole (PriLOSEC) 20 mg delayed release capsule Take 1 capsule (20 mg total) by mouth daily 30 capsule 3    sildenafil (VIAGRA) 50 MG tablet Take 1 tablet (50 mg total) by mouth as needed for erectile dysfunction 10 tablet 0     No current facility-administered medications on file prior to visit.      Social History     Tobacco Use     "Smoking status: Former     Current packs/day: 0.00     Average packs/day: 0.5 packs/day for 25.0 years (12.5 ttl pk-yrs)     Types: Cigarettes     Start date:      Quit date: 2016     Years since quittin.2    Smokeless tobacco: Never   Vaping Use    Vaping status: Every Day    Start date: 10/10/2016    Substances: Nicotine, THC (at time), CBD (at times), Flavoring   Substance and Sexual Activity    Alcohol use: Yes     Comment: socially - 2 month    Drug use: Yes     Frequency: 1.0 times per week     Types: Marijuana    Sexual activity: Not on file        Objective   /76 (BP Location: Left arm, Patient Position: Sitting, Cuff Size: Large)   Pulse 82   Temp 98.6 °F (37 °C) (Temporal)   Resp 15   Ht 5' 6\" (1.676 m)   Wt 74.2 kg (163 lb 9.3 oz)   SpO2 99%   BMI 26.40 kg/m²     Pain Screening:  Pain Score: 0-No pain  ECOG    Physical Exam  Vitals and nursing note reviewed.   Constitutional:       Appearance: He is well-developed. He is not diaphoretic.   HENT:      Head: Normocephalic and atraumatic.      Nose: Nose normal. No congestion.      Mouth/Throat:      Mouth: Mucous membranes are moist.      Pharynx: Oropharynx is clear.   Eyes:      Extraocular Movements: Extraocular movements intact.      Pupils: Pupils are equal, round, and reactive to light.   Neck:      Trachea: No tracheal deviation.   Cardiovascular:      Rate and Rhythm: Normal rate and regular rhythm.      Heart sounds: Normal heart sounds. No murmur heard.  Pulmonary:      Effort: Pulmonary effort is normal. No respiratory distress.      Breath sounds: Normal breath sounds. No stridor. No wheezing or rales.   Chest:      Chest wall: No tenderness.   Abdominal:      General: Bowel sounds are normal. There is no distension.      Palpations: Abdomen is soft.      Tenderness: There is no abdominal tenderness.   Musculoskeletal:         General: Normal range of motion.      Cervical back: Normal range of motion.   Lymphadenopathy:     "  Cervical: No cervical adenopathy.   Skin:     General: Skin is warm and dry.      Coloration: Skin is not pale.      Findings: No erythema or rash.   Neurological:      Mental Status: He is alert and oriented to person, place, and time.   Psychiatric:         Behavior: Behavior normal.         Thought Content: Thought content normal.         Judgment: Judgment normal.         Labs:   Results for orders placed or performed during the hospital encounter of 02/04/25   Protime-INR   Result Value Ref Range    Protime 12.7 12.3 - 15.0 seconds    INR 0.89 0.85 - 1.19   CBC and differential   Result Value Ref Range    WBC 5.74 4.31 - 10.16 Thousand/uL    RBC 4.63 3.88 - 5.62 Million/uL    Hemoglobin 15.2 12.0 - 17.0 g/dL    Hematocrit 43.2 36.5 - 49.3 %    MCV 93 82 - 98 fL    MCH 32.8 26.8 - 34.3 pg    MCHC 35.2 31.4 - 37.4 g/dL    RDW 13.1 11.6 - 15.1 %    MPV 10.6 8.9 - 12.7 fL    Platelets 166 149 - 390 Thousands/uL    nRBC 0 /100 WBCs    Segmented % 65 43 - 75 %    Immature Grans % 0 0 - 2 %    Lymphocytes % 24 14 - 44 %    Monocytes % 8 4 - 12 %    Eosinophils Relative 2 0 - 6 %    Basophils Relative 1 0 - 1 %    Absolute Neutrophils 3.71 1.85 - 7.62 Thousands/µL    Absolute Immature Grans 0.01 0.00 - 0.20 Thousand/uL    Absolute Lymphocytes 1.40 0.60 - 4.47 Thousands/µL    Absolute Monocytes 0.46 0.17 - 1.22 Thousand/µL    Eosinophils Absolute 0.13 0.00 - 0.61 Thousand/µL    Basophils Absolute 0.03 0.00 - 0.10 Thousands/µL   Tissue Exam   Result Value Ref Range    Case Report       Surgical Pathology Report                         Case: [id number]                                  Authorizing Provider:  Niranjan Harrison MD          Collected:           02/04/2025 0930              Ordering Location:     [facility]        Received:            02/04/2025 0935                                     [facility] CAT Scan                                                            Pathologist:           Karl James MD  "                                                         Intraop:               Karl James MD                                                          Specimen:    Lung, Right Lower Lobe, nodule                                                             Final Diagnosis       A.  Lung, right lower lobe nodule, biopsy:  -Metastatic adenocarcinoma compatible with colonic primary.  -Immunohistochemical stains performed with appropriate controls show the tumor to be CK20 (weak), CDX2, and SATB2 and negative for CK7, TTF-1, and Napsin; supporting the diagnosis.      Additional Information       All reported additional testing was performed with appropriately reactive controls.  These tests were developed and their performance characteristics determined by [facility] or appropriate performing facility, though some tests may be performed on tissues which have not been validated for performance characteristics (such as staining performed on alcohol exposed cell blocks and decalcified tissues).  Results should be interpreted with caution and in the context of the patients’ clinical condition. These tests may not be cleared or approved by the U.S. Food and Drug Administration, though the FDA has determined that such clearance or approval is not necessary. These tests are used for clinical purposes and they should not be regarded as investigational or for research. This laboratory has been approved by [place] 88, designated as a high-complexity laboratory and is qualified to perform these tests.  Interpretation performed at [facility], [address] [address]        Intraoperative Consultation       Neoplastic, defer to permanents.  Karl James MD.  Interpretation performed at [facility], [address] [address]          Gross Description       A. The specimen is received in formalin, labeled with the patient's name and hospital number, and is designated \" lung, " "right lower lobe nodule\".  The specimen consists of 4 tan cores of filiform and friable soft tissue measuring less than 0.1 cm-0.1 cm in diameter and ranging from 0.6-1.2 cm in length. Entirely submitted. Four cassettes. Between sponges.  Note: due to the size and consistency of the specimen, the tissue may not survive histologic processing.    Note: The estimated total formalin fixation time based upon information provided by the submitting clinician and the standard processing schedule is 8.75 hours.    -[facility]          Radiology Results Review : I have reviewed images/report studies in PACS as described above (in the HPI).  Pathology: I have reviewed pathology reports described above.      "

## 2025-03-17 ENCOUNTER — APPOINTMENT (OUTPATIENT)
Dept: LAB | Age: 55
End: 2025-03-17
Payer: COMMERCIAL

## 2025-03-17 DIAGNOSIS — R91.1 LUNG NODULE: ICD-10-CM

## 2025-03-17 LAB
ABO GROUP BLD: NORMAL
ALBUMIN SERPL BCG-MCNC: 4.5 G/DL (ref 3.5–5)
ALP SERPL-CCNC: 44 U/L (ref 34–104)
ALT SERPL W P-5'-P-CCNC: 16 U/L (ref 7–52)
ANION GAP SERPL CALCULATED.3IONS-SCNC: 11 MMOL/L (ref 4–13)
APTT PPP: 27 SECONDS (ref 23–34)
AST SERPL W P-5'-P-CCNC: 18 U/L (ref 13–39)
BASOPHILS # BLD AUTO: 0.02 THOUSANDS/ÂΜL (ref 0–0.1)
BASOPHILS NFR BLD AUTO: 0 % (ref 0–1)
BILIRUB SERPL-MCNC: 0.35 MG/DL (ref 0.2–1)
BLD GP AB SCN SERPL QL: NEGATIVE
BUN SERPL-MCNC: 17 MG/DL (ref 5–25)
CALCIUM SERPL-MCNC: 9.5 MG/DL (ref 8.4–10.2)
CHLORIDE SERPL-SCNC: 102 MMOL/L (ref 96–108)
CO2 SERPL-SCNC: 26 MMOL/L (ref 21–32)
CREAT SERPL-MCNC: 0.99 MG/DL (ref 0.6–1.3)
EOSINOPHIL # BLD AUTO: 0.06 THOUSAND/ÂΜL (ref 0–0.61)
EOSINOPHIL NFR BLD AUTO: 1 % (ref 0–6)
ERYTHROCYTE [DISTWIDTH] IN BLOOD BY AUTOMATED COUNT: 12.6 % (ref 11.6–15.1)
GFR SERPL CREATININE-BSD FRML MDRD: 87 ML/MIN/1.73SQ M
GLUCOSE P FAST SERPL-MCNC: 95 MG/DL (ref 65–99)
HCT VFR BLD AUTO: 41.1 % (ref 36.5–49.3)
HGB BLD-MCNC: 14.4 G/DL (ref 12–17)
IMM GRANULOCYTES # BLD AUTO: 0.01 THOUSAND/UL (ref 0–0.2)
IMM GRANULOCYTES NFR BLD AUTO: 0 % (ref 0–2)
INR PPP: 0.91 (ref 0.85–1.19)
LYMPHOCYTES # BLD AUTO: 2.11 THOUSANDS/ÂΜL (ref 0.6–4.47)
LYMPHOCYTES NFR BLD AUTO: 36 % (ref 14–44)
MCH RBC QN AUTO: 33.3 PG (ref 26.8–34.3)
MCHC RBC AUTO-ENTMCNC: 35 G/DL (ref 31.4–37.4)
MCV RBC AUTO: 95 FL (ref 82–98)
MONOCYTES # BLD AUTO: 0.48 THOUSAND/ÂΜL (ref 0.17–1.22)
MONOCYTES NFR BLD AUTO: 8 % (ref 4–12)
NEUTROPHILS # BLD AUTO: 3.13 THOUSANDS/ÂΜL (ref 1.85–7.62)
NEUTS SEG NFR BLD AUTO: 55 % (ref 43–75)
NRBC BLD AUTO-RTO: 0 /100 WBCS
PLATELET # BLD AUTO: 164 THOUSANDS/UL (ref 149–390)
PMV BLD AUTO: 12.1 FL (ref 8.9–12.7)
POTASSIUM SERPL-SCNC: 4.1 MMOL/L (ref 3.5–5.3)
PROT SERPL-MCNC: 7.6 G/DL (ref 6.4–8.4)
PROTHROMBIN TIME: 12.5 SECONDS (ref 12.3–15)
RBC # BLD AUTO: 4.33 MILLION/UL (ref 3.88–5.62)
RH BLD: NEGATIVE
SODIUM SERPL-SCNC: 139 MMOL/L (ref 135–147)
SPECIMEN EXPIRATION DATE: NORMAL
WBC # BLD AUTO: 5.81 THOUSAND/UL (ref 4.31–10.16)

## 2025-03-17 PROCEDURE — 86901 BLOOD TYPING SEROLOGIC RH(D): CPT

## 2025-03-17 PROCEDURE — 85025 COMPLETE CBC W/AUTO DIFF WBC: CPT

## 2025-03-17 PROCEDURE — 86850 RBC ANTIBODY SCREEN: CPT

## 2025-03-17 PROCEDURE — 36415 COLL VENOUS BLD VENIPUNCTURE: CPT

## 2025-03-17 PROCEDURE — 86900 BLOOD TYPING SEROLOGIC ABO: CPT

## 2025-03-17 PROCEDURE — 85730 THROMBOPLASTIN TIME PARTIAL: CPT

## 2025-03-17 PROCEDURE — 85610 PROTHROMBIN TIME: CPT

## 2025-03-17 PROCEDURE — 80053 COMPREHEN METABOLIC PANEL: CPT

## 2025-03-18 LAB
ATRIAL RATE: 64 BPM
P AXIS: 62 DEGREES
PR INTERVAL: 140 MS
QRS AXIS: 38 DEGREES
QRSD INTERVAL: 86 MS
QT INTERVAL: 368 MS
QTC INTERVAL: 380 MS
T WAVE AXIS: 60 DEGREES
VENTRICULAR RATE: 64 BPM

## 2025-03-18 PROCEDURE — 93010 ELECTROCARDIOGRAM REPORT: CPT | Performed by: INTERNAL MEDICINE

## 2025-03-18 NOTE — PRE-PROCEDURE INSTRUCTIONS
Pre-Surgery Instructions:   Medication Instructions    Multiple Vitamins-Minerals (MENS MULTIVITAMIN PO) Stop taking 7 days prior to surgery.    sildenafil (VIAGRA) 50 MG tablet Per anesthesia guidelines - no use of this medication 24 hrs prior to surgery     Pt did confirm has 3 pre surgery drinks from office /aware of instructions of use for DOS.    Medication instructions for day of surgery reviewed. Please take all instructed medications with only a sip of water.       You will receive a call one business day prior to surgery with an arrival time and hospital directions. If your surgery is scheduled on a Monday, the hospital will be calling you on the Friday prior to your surgery. If you have not heard from anyone by 8pm, please call the hospital supervisor through the hospital  at [phone]. ([place] [phone] or [place] [phone]).    Do not eat or drink anything after midnight the night before your surgery, including candy, mints, lifesavers, or chewing gum. Do not drink alcohol 24hrs before your surgery. Try not to smoke at least 24hrs before your surgery.       Follow the pre surgery showering instructions as listed in the “My Surgical Experience Booklet” or otherwise provided by your surgeon's office. Do not use a blade to shave the surgical area 1 week before surgery. It is okay to use a clean electric clippers up to 24 hours before surgery. Do not apply any lotions, creams, including makeup, cologne, deodorant, or perfumes after showering on the day of your surgery. Do not use dry shampoo, hair spray, hair gel, or any type of hair products.     No contact lenses, eye make-up, or artificial eyelashes. Remove nail polish, including gel polish, and any artificial, gel, or acrylic nails if possible. Remove all jewelry including rings and body piercing jewelry.     Wear causal clothing that is easy to take on and off. Consider your type of surgery.    Keep any valuables, jewelry, piercings  at home. Please bring any specially ordered equipment (sling, braces) if indicated.    Arrange for a responsible person to drive you to and from the hospital on the day of your surgery. Please confirm the visitor policy for the day of your procedure when you receive your phone call with an arrival time.     Call the surgeon's office with any new illnesses, exposures, or additional questions prior to surgery.    Please reference your “My Surgical Experience Booklet” for additional information to prepare for your upcoming surgery.

## 2025-03-24 ENCOUNTER — ANESTHESIA EVENT (OUTPATIENT)
Dept: PERIOP | Facility: HOSPITAL | Age: 55
DRG: 165 | End: 2025-03-24
Payer: COMMERCIAL

## 2025-03-24 NOTE — ANESTHESIA PREPROCEDURE EVALUATION
Procedure:  LOBECTOMY LUNG THORACOSCOPIC W/ ROBOTICS, possible wedge, lysis of adhesions (Right: Chest)  BRONCHOSCOPY FLEXIBLE ([place])    Relevant Problems   ANESTHESIA (within normal limits)      CARDIO (within normal limits)      ENDO (within normal limits)      GI/HEPATIC   (+) GERD (gastroesophageal reflux disease)      /RENAL   (+) Other hydronephrosis      HEMATOLOGY (within normal limits)      NEURO/PSYCH (within normal limits)      PULMONARY (within normal limits)      Endocrine   (+) Thyroid nodule      Behavioral Health   (+) Vapes nicotine containing substance      Oncology   (+) Chemotherapy induced neutropenia (HCC)   (+) History of colon cancer, stage IV   (+) History of known metastasis to liver   (+) Malignant neoplasm metastatic to right lung (HCC)      EKG 3/17/2025:  Normal sinus rhythm with sinus arrhythmia  Normal ECG  When compared with ECG of 29-May-2020 10:20,  No significant change was found    CT Chest 3/3/2025:  Redemonstrated 1.3 x 0.7 cm soft tissue density along the wedge resection suture line approximately unchanged from November 29, 2024. This was shown to represent metastatic tumor on biopsy performed February 4, 2025.     Additional sub-6 mm pulmonary nodules, not significantly changed from previous examination.    PFT 2/20/2025:  Interpretation:  Normal spirometry.  No post bronchodilator response.  Normal lung volumes.  Normal corrected diffusion capacity.  Normal flow-volume loop    Lab Results   Component Value Date    WBC 5.81 03/17/2025    HGB 14.4 03/17/2025    HCT 41.1 03/17/2025    MCV 95 03/17/2025     03/17/2025     Lab Results   Component Value Date    SODIUM 139 03/17/2025    K 4.1 03/17/2025     03/17/2025    CO2 26 03/17/2025    BUN 17 03/17/2025    CREATININE 0.99 03/17/2025    GLUC 112 04/03/2023    CALCIUM 9.5 03/17/2025     Lab Results   Component Value Date    INR 0.91 03/17/2025    INR 0.89 02/04/2025    INR 0.97 05/29/2020    PROTIME 12.5  03/17/2025    PROTIME 12.7 02/04/2025    PROTIME 12.3 05/29/2020     Lab Results   Component Value Date    HGBA1C 5.4 05/29/2020          Physical Exam    Airway    Mallampati score: II  TM Distance: >3 FB  Neck ROM: full     Dental   No notable dental hx     Cardiovascular  Cardiovascular exam normal    Pulmonary  Pulmonary exam normal     Other Findings        Anesthesia Plan  ASA Score- 3     Anesthesia Type- general with ASA Monitors.         Additional Monitors:     Airway Plan: ETT.           Plan Factors-Exercise tolerance (METS): >4 METS.    Chart reviewed. EKG reviewed.  Existing labs reviewed. Patient summary reviewed.                  Induction- intravenous.    Postoperative Plan- Plan for postoperative opioid use. Planned trial extubation        Informed Consent- Anesthetic plan and risks discussed with patient.  I personally reviewed this patient with the CRNA. Discussed and agreed on the Anesthesia Plan with the CRNA..      NPO Status:  No vitals data found for the desired time range.

## 2025-03-25 ENCOUNTER — APPOINTMENT (INPATIENT)
Dept: RADIOLOGY | Facility: HOSPITAL | Age: 55
DRG: 165 | End: 2025-03-25
Payer: COMMERCIAL

## 2025-03-25 ENCOUNTER — ANESTHESIA (OUTPATIENT)
Dept: PERIOP | Facility: HOSPITAL | Age: 55
DRG: 165 | End: 2025-03-25
Payer: COMMERCIAL

## 2025-03-25 ENCOUNTER — HOSPITAL ENCOUNTER (INPATIENT)
Facility: HOSPITAL | Age: 55
LOS: 2 days | Discharge: HOME | DRG: 165 | End: 2025-03-27
Attending: THORACIC SURGERY (CARDIOTHORACIC VASCULAR SURGERY) | Admitting: THORACIC SURGERY (CARDIOTHORACIC VASCULAR SURGERY)
Payer: COMMERCIAL

## 2025-03-25 DIAGNOSIS — C78.01 MALIGNANT NEOPLASM METASTATIC TO RIGHT LUNG (HCC): Primary | ICD-10-CM

## 2025-03-25 DIAGNOSIS — R91.1 LUNG NODULE: ICD-10-CM

## 2025-03-25 LAB
ANION GAP SERPL CALCULATED.3IONS-SCNC: 7 MMOL/L (ref 4–13)
BUN SERPL-MCNC: 12 MG/DL (ref 5–25)
CALCIUM SERPL-MCNC: 8.6 MG/DL (ref 8.4–10.2)
CHLORIDE SERPL-SCNC: 108 MMOL/L (ref 96–108)
CO2 SERPL-SCNC: 24 MMOL/L (ref 21–32)
CREAT SERPL-MCNC: 0.93 MG/DL (ref 0.6–1.3)
ERYTHROCYTE [DISTWIDTH] IN BLOOD BY AUTOMATED COUNT: 13 % (ref 11.6–15.1)
GFR SERPL CREATININE-BSD FRML MDRD: 94 ML/MIN/1.73SQ M
GLUCOSE SERPL-MCNC: 139 MG/DL (ref 65–140)
HCT VFR BLD AUTO: 42.5 % (ref 36.5–49.3)
HGB BLD-MCNC: 14.3 G/DL (ref 12–17)
MAGNESIUM SERPL-MCNC: 1.8 MG/DL (ref 1.9–2.7)
MCH RBC QN AUTO: 33.2 PG (ref 26.8–34.3)
MCHC RBC AUTO-ENTMCNC: 33.6 G/DL (ref 31.4–37.4)
MCV RBC AUTO: 99 FL (ref 82–98)
PLATELET # BLD AUTO: 154 THOUSANDS/UL (ref 149–390)
PMV BLD AUTO: 11.4 FL (ref 8.9–12.7)
POTASSIUM SERPL-SCNC: 4 MMOL/L (ref 3.5–5.3)
RBC # BLD AUTO: 4.31 MILLION/UL (ref 3.88–5.62)
SODIUM SERPL-SCNC: 139 MMOL/L (ref 135–147)
WBC # BLD AUTO: 12.42 THOUSAND/UL (ref 4.31–10.16)

## 2025-03-25 PROCEDURE — C2615 SEALANT, PULMONARY, LIQUID: HCPCS | Performed by: THORACIC SURGERY (CARDIOTHORACIC VASCULAR SURGERY)

## 2025-03-25 PROCEDURE — 80048 BASIC METABOLIC PNL TOTAL CA: CPT | Performed by: PHYSICIAN ASSISTANT

## 2025-03-25 PROCEDURE — 94664 DEMO&/EVAL PT USE INHALER: CPT

## 2025-03-25 PROCEDURE — 0BJ08ZZ INSPECTION OF TRACHEOBRONCHIAL TREE, VIA NATURAL OR ARTIFICIAL OPENING ENDOSCOPIC: ICD-10-PCS | Performed by: THORACIC SURGERY (CARDIOTHORACIC VASCULAR SURGERY)

## 2025-03-25 PROCEDURE — 8E0W4CZ ROBOTIC ASSISTED PROCEDURE OF TRUNK REGION, PERCUTANEOUS ENDOSCOPIC APPROACH: ICD-10-PCS | Performed by: THORACIC SURGERY (CARDIOTHORACIC VASCULAR SURGERY)

## 2025-03-25 PROCEDURE — 71045 X-RAY EXAM CHEST 1 VIEW: CPT

## 2025-03-25 PROCEDURE — 83735 ASSAY OF MAGNESIUM: CPT | Performed by: PHYSICIAN ASSISTANT

## 2025-03-25 PROCEDURE — 07T74ZZ RESECTION OF THORAX LYMPHATIC, PERCUTANEOUS ENDOSCOPIC APPROACH: ICD-10-PCS | Performed by: THORACIC SURGERY (CARDIOTHORACIC VASCULAR SURGERY)

## 2025-03-25 PROCEDURE — 0BTF4ZZ RESECTION OF RIGHT LOWER LUNG LOBE, PERCUTANEOUS ENDOSCOPIC APPROACH: ICD-10-PCS | Performed by: THORACIC SURGERY (CARDIOTHORACIC VASCULAR SURGERY)

## 2025-03-25 PROCEDURE — 85027 COMPLETE CBC AUTOMATED: CPT | Performed by: PHYSICIAN ASSISTANT

## 2025-03-25 RX ORDER — SENNOSIDES 8.6 MG
1 TABLET ORAL DAILY
Status: DISCONTINUED | OUTPATIENT
Start: 2025-03-26 | End: 2025-03-27 | Stop reason: HOSPADM

## 2025-03-25 RX ORDER — HYDROMORPHONE HCL/PF 1 MG/ML
0.2 SYRINGE (ML) INJECTION
Status: DISCONTINUED | OUTPATIENT
Start: 2025-03-25 | End: 2025-03-27 | Stop reason: HOSPADM

## 2025-03-25 RX ORDER — SODIUM CHLORIDE, SODIUM LACTATE, POTASSIUM CHLORIDE, CALCIUM CHLORIDE 600; 310; 30; 20 MG/100ML; MG/100ML; MG/100ML; MG/100ML
60 INJECTION, SOLUTION INTRAVENOUS CONTINUOUS
Status: DISCONTINUED | OUTPATIENT
Start: 2025-03-25 | End: 2025-03-26

## 2025-03-25 RX ORDER — SODIUM CHLORIDE 9 MG/ML
60 INJECTION, SOLUTION INTRAVENOUS CONTINUOUS
Status: DISCONTINUED | OUTPATIENT
Start: 2025-03-25 | End: 2025-03-25

## 2025-03-25 RX ORDER — DOCUSATE SODIUM 100 MG/1
100 CAPSULE, LIQUID FILLED ORAL 2 TIMES DAILY
Status: DISCONTINUED | OUTPATIENT
Start: 2025-03-25 | End: 2025-03-27 | Stop reason: HOSPADM

## 2025-03-25 RX ORDER — HYDROMORPHONE HCL/PF 1 MG/ML
0.5 SYRINGE (ML) INJECTION
Status: DISCONTINUED | OUTPATIENT
Start: 2025-03-25 | End: 2025-03-25 | Stop reason: HOSPADM

## 2025-03-25 RX ORDER — PROPOFOL 10 MG/ML
INJECTION, EMULSION INTRAVENOUS CONTINUOUS PRN
Status: DISCONTINUED | OUTPATIENT
Start: 2025-03-25 | End: 2025-03-25

## 2025-03-25 RX ORDER — CEFAZOLIN SODIUM 2 G/50ML
2000 SOLUTION INTRAVENOUS ONCE
Status: COMPLETED | OUTPATIENT
Start: 2025-03-25 | End: 2025-03-25

## 2025-03-25 RX ORDER — ONDANSETRON 2 MG/ML
INJECTION INTRAMUSCULAR; INTRAVENOUS AS NEEDED
Status: DISCONTINUED | OUTPATIENT
Start: 2025-03-25 | End: 2025-03-25

## 2025-03-25 RX ORDER — MAGNESIUM SULFATE HEPTAHYDRATE 40 MG/ML
2 INJECTION, SOLUTION INTRAVENOUS ONCE
Status: COMPLETED | OUTPATIENT
Start: 2025-03-25 | End: 2025-03-25

## 2025-03-25 RX ORDER — SODIUM CHLORIDE, SODIUM LACTATE, POTASSIUM CHLORIDE, CALCIUM CHLORIDE 600; 310; 30; 20 MG/100ML; MG/100ML; MG/100ML; MG/100ML
125 INJECTION, SOLUTION INTRAVENOUS CONTINUOUS
Status: DISCONTINUED | OUTPATIENT
Start: 2025-03-25 | End: 2025-03-25

## 2025-03-25 RX ORDER — POLYETHYLENE GLYCOL 3350 17 G/17G
17 POWDER, FOR SOLUTION ORAL DAILY PRN
Status: DISCONTINUED | OUTPATIENT
Start: 2025-03-26 | End: 2025-03-27 | Stop reason: HOSPADM

## 2025-03-25 RX ORDER — HYDROMORPHONE HYDROCHLORIDE 2 MG/ML
INJECTION, SOLUTION INTRAMUSCULAR; INTRAVENOUS; SUBCUTANEOUS AS NEEDED
Status: DISCONTINUED | OUTPATIENT
Start: 2025-03-25 | End: 2025-03-25

## 2025-03-25 RX ORDER — DEXAMETHASONE SODIUM PHOSPHATE 10 MG/ML
INJECTION, SOLUTION INTRAMUSCULAR; INTRAVENOUS AS NEEDED
Status: DISCONTINUED | OUTPATIENT
Start: 2025-03-25 | End: 2025-03-25

## 2025-03-25 RX ORDER — LIDOCAINE HYDROCHLORIDE 10 MG/ML
INJECTION, SOLUTION EPIDURAL; INFILTRATION; INTRACAUDAL; PERINEURAL AS NEEDED
Status: DISCONTINUED | OUTPATIENT
Start: 2025-03-25 | End: 2025-03-25

## 2025-03-25 RX ORDER — ACETAMINOPHEN 325 MG/1
975 TABLET ORAL ONCE
Status: COMPLETED | OUTPATIENT
Start: 2025-03-25 | End: 2025-03-25

## 2025-03-25 RX ORDER — ONDANSETRON 2 MG/ML
4 INJECTION INTRAMUSCULAR; INTRAVENOUS EVERY 6 HOURS PRN
Status: DISCONTINUED | OUTPATIENT
Start: 2025-03-25 | End: 2025-03-27 | Stop reason: HOSPADM

## 2025-03-25 RX ORDER — GABAPENTIN 300 MG/1
300 CAPSULE ORAL 3 TIMES DAILY
Status: DISCONTINUED | OUTPATIENT
Start: 2025-03-25 | End: 2025-03-27 | Stop reason: HOSPADM

## 2025-03-25 RX ORDER — CELECOXIB 100 MG/1
100 CAPSULE ORAL 2 TIMES DAILY
Status: DISCONTINUED | OUTPATIENT
Start: 2025-03-25 | End: 2025-03-27 | Stop reason: HOSPADM

## 2025-03-25 RX ORDER — ENOXAPARIN SODIUM 100 MG/ML
40 INJECTION SUBCUTANEOUS DAILY
Status: DISCONTINUED | OUTPATIENT
Start: 2025-03-26 | End: 2025-03-27 | Stop reason: HOSPADM

## 2025-03-25 RX ORDER — HEPARIN SODIUM 5000 [USP'U]/ML
5000 INJECTION, SOLUTION INTRAVENOUS; SUBCUTANEOUS
Status: COMPLETED | OUTPATIENT
Start: 2025-03-25 | End: 2025-03-25

## 2025-03-25 RX ORDER — MIDAZOLAM HYDROCHLORIDE 2 MG/2ML
INJECTION, SOLUTION INTRAMUSCULAR; INTRAVENOUS AS NEEDED
Status: DISCONTINUED | OUTPATIENT
Start: 2025-03-25 | End: 2025-03-25

## 2025-03-25 RX ORDER — OXYCODONE HYDROCHLORIDE 5 MG/1
5 TABLET ORAL EVERY 4 HOURS PRN
Refills: 0 | Status: DISCONTINUED | OUTPATIENT
Start: 2025-03-25 | End: 2025-03-27 | Stop reason: HOSPADM

## 2025-03-25 RX ORDER — PROPOFOL 10 MG/ML
INJECTION, EMULSION INTRAVENOUS AS NEEDED
Status: DISCONTINUED | OUTPATIENT
Start: 2025-03-25 | End: 2025-03-25

## 2025-03-25 RX ORDER — ACETAMINOPHEN 325 MG/1
975 TABLET ORAL EVERY 6 HOURS
Status: DISCONTINUED | OUTPATIENT
Start: 2025-03-25 | End: 2025-03-27 | Stop reason: HOSPADM

## 2025-03-25 RX ORDER — ONDANSETRON 2 MG/ML
4 INJECTION INTRAMUSCULAR; INTRAVENOUS ONCE AS NEEDED
Status: DISCONTINUED | OUTPATIENT
Start: 2025-03-25 | End: 2025-03-25 | Stop reason: HOSPADM

## 2025-03-25 RX ORDER — CELECOXIB 200 MG/1
200 CAPSULE ORAL ONCE
Status: COMPLETED | OUTPATIENT
Start: 2025-03-25 | End: 2025-03-25

## 2025-03-25 RX ORDER — FENTANYL CITRATE/PF 50 MCG/ML
25 SYRINGE (ML) INJECTION
Status: DISCONTINUED | OUTPATIENT
Start: 2025-03-25 | End: 2025-03-25 | Stop reason: HOSPADM

## 2025-03-25 RX ORDER — SODIUM CHLORIDE, SODIUM LACTATE, POTASSIUM CHLORIDE, CALCIUM CHLORIDE 600; 310; 30; 20 MG/100ML; MG/100ML; MG/100ML; MG/100ML
INJECTION, SOLUTION INTRAVENOUS CONTINUOUS PRN
Status: DISCONTINUED | OUTPATIENT
Start: 2025-03-25 | End: 2025-03-25

## 2025-03-25 RX ORDER — ROCURONIUM BROMIDE 10 MG/ML
INJECTION, SOLUTION INTRAVENOUS AS NEEDED
Status: DISCONTINUED | OUTPATIENT
Start: 2025-03-25 | End: 2025-03-25

## 2025-03-25 RX ORDER — GABAPENTIN 300 MG/1
600 CAPSULE ORAL ONCE
Status: COMPLETED | OUTPATIENT
Start: 2025-03-25 | End: 2025-03-25

## 2025-03-25 RX ORDER — FENTANYL CITRATE 50 UG/ML
INJECTION, SOLUTION INTRAMUSCULAR; INTRAVENOUS AS NEEDED
Status: DISCONTINUED | OUTPATIENT
Start: 2025-03-25 | End: 2025-03-25

## 2025-03-25 RX ORDER — SODIUM CHLORIDE 9 MG/ML
INJECTION, SOLUTION INTRAVENOUS CONTINUOUS PRN
Status: DISCONTINUED | OUTPATIENT
Start: 2025-03-25 | End: 2025-03-25

## 2025-03-25 RX ADMIN — ACETAMINOPHEN 975 MG: 325 TABLET, FILM COATED ORAL at 17:42

## 2025-03-25 RX ADMIN — FENTANYL CITRATE 25 MCG: 50 INJECTION INTRAMUSCULAR; INTRAVENOUS at 12:29

## 2025-03-25 RX ADMIN — PHENYLEPHRINE HYDROCHLORIDE 20 MCG/MIN: 10 INJECTION INTRAVENOUS at 08:00

## 2025-03-25 RX ADMIN — ACETAMINOPHEN 975 MG: 325 TABLET, FILM COATED ORAL at 13:23

## 2025-03-25 RX ADMIN — GABAPENTIN 600 MG: 300 CAPSULE ORAL at 06:32

## 2025-03-25 RX ADMIN — HYDROMORPHONE HYDROCHLORIDE 0.5 MG: 2 INJECTION, SOLUTION INTRAMUSCULAR; INTRAVENOUS; SUBCUTANEOUS at 10:37

## 2025-03-25 RX ADMIN — DEXMEDETOMIDINE HYDROCHLORIDE 8 MCG: 100 INJECTION, SOLUTION INTRAVENOUS at 07:36

## 2025-03-25 RX ADMIN — GABAPENTIN 300 MG: 300 CAPSULE ORAL at 20:25

## 2025-03-25 RX ADMIN — ACETAMINOPHEN 975 MG: 325 TABLET, FILM COATED ORAL at 23:43

## 2025-03-25 RX ADMIN — MIDAZOLAM 2 MG: 1 INJECTION INTRAMUSCULAR; INTRAVENOUS at 07:25

## 2025-03-25 RX ADMIN — LIDOCAINE HYDROCHLORIDE 80 MG: 10 INJECTION, SOLUTION EPIDURAL; INFILTRATION; INTRACAUDAL; PERINEURAL at 07:36

## 2025-03-25 RX ADMIN — ROCURONIUM BROMIDE 10 MG: 10 INJECTION, SOLUTION INTRAVENOUS at 08:35

## 2025-03-25 RX ADMIN — SODIUM CHLORIDE 60 ML/HR: 0.9 INJECTION, SOLUTION INTRAVENOUS at 13:13

## 2025-03-25 RX ADMIN — HYDROMORPHONE HYDROCHLORIDE 0.5 MG: 2 INJECTION, SOLUTION INTRAMUSCULAR; INTRAVENOUS; SUBCUTANEOUS at 08:51

## 2025-03-25 RX ADMIN — ONDANSETRON 4 MG: 2 INJECTION INTRAMUSCULAR; INTRAVENOUS at 11:22

## 2025-03-25 RX ADMIN — MAGNESIUM SULFATE HEPTAHYDRATE 2 G: 40 INJECTION, SOLUTION INTRAVENOUS at 17:44

## 2025-03-25 RX ADMIN — ROCURONIUM BROMIDE 10 MG: 10 INJECTION, SOLUTION INTRAVENOUS at 09:17

## 2025-03-25 RX ADMIN — CELECOXIB 200 MG: 200 CAPSULE ORAL at 06:32

## 2025-03-25 RX ADMIN — SODIUM CHLORIDE: 0.9 INJECTION, SOLUTION INTRAVENOUS at 07:38

## 2025-03-25 RX ADMIN — GABAPENTIN 300 MG: 300 CAPSULE ORAL at 17:42

## 2025-03-25 RX ADMIN — SODIUM CHLORIDE, SODIUM LACTATE, POTASSIUM CHLORIDE, AND CALCIUM CHLORIDE: .6; .31; .03; .02 INJECTION, SOLUTION INTRAVENOUS at 07:23

## 2025-03-25 RX ADMIN — PROPOFOL 70 MCG/KG/MIN: 10 INJECTION, EMULSION INTRAVENOUS at 07:41

## 2025-03-25 RX ADMIN — DEXAMETHASONE SODIUM PHOSPHATE 10 MG: 10 INJECTION, SOLUTION INTRAMUSCULAR; INTRAVENOUS at 07:39

## 2025-03-25 RX ADMIN — PROPOFOL 70 MCG/KG/MIN: 10 INJECTION, EMULSION INTRAVENOUS at 11:03

## 2025-03-25 RX ADMIN — ROCURONIUM BROMIDE 10 MG: 10 INJECTION, SOLUTION INTRAVENOUS at 10:20

## 2025-03-25 RX ADMIN — FENTANYL CITRATE 100 MCG: 50 INJECTION INTRAMUSCULAR; INTRAVENOUS at 07:36

## 2025-03-25 RX ADMIN — SODIUM CHLORIDE, SODIUM LACTATE, POTASSIUM CHLORIDE, AND CALCIUM CHLORIDE 125 ML/HR: .6; .31; .03; .02 INJECTION, SOLUTION INTRAVENOUS at 13:21

## 2025-03-25 RX ADMIN — SODIUM CHLORIDE, SODIUM LACTATE, POTASSIUM CHLORIDE, AND CALCIUM CHLORIDE 60 ML/HR: .6; .31; .03; .02 INJECTION, SOLUTION INTRAVENOUS at 17:35

## 2025-03-25 RX ADMIN — CELECOXIB 100 MG: 100 CAPSULE ORAL at 17:43

## 2025-03-25 RX ADMIN — ROCURONIUM BROMIDE 10 MG: 10 INJECTION, SOLUTION INTRAVENOUS at 11:18

## 2025-03-25 RX ADMIN — DOCUSATE SODIUM 100 MG: 100 CAPSULE, LIQUID FILLED ORAL at 17:42

## 2025-03-25 RX ADMIN — PROPOFOL 70 MCG/KG/MIN: 10 INJECTION, EMULSION INTRAVENOUS at 09:28

## 2025-03-25 RX ADMIN — HEPARIN SODIUM 5000 UNITS: 5000 INJECTION INTRAVENOUS; SUBCUTANEOUS at 07:09

## 2025-03-25 RX ADMIN — ROCURONIUM BROMIDE 10 MG: 10 INJECTION, SOLUTION INTRAVENOUS at 10:44

## 2025-03-25 RX ADMIN — ROCURONIUM BROMIDE 20 MG: 10 INJECTION, SOLUTION INTRAVENOUS at 09:50

## 2025-03-25 RX ADMIN — ACETAMINOPHEN 975 MG: 325 TABLET, FILM COATED ORAL at 06:32

## 2025-03-25 RX ADMIN — Medication 2.5 MG: at 20:25

## 2025-03-25 RX ADMIN — ROCURONIUM BROMIDE 80 MG: 10 INJECTION, SOLUTION INTRAVENOUS at 07:36

## 2025-03-25 RX ADMIN — CEFAZOLIN SODIUM 2000 MG: 2 SOLUTION INTRAVENOUS at 08:00

## 2025-03-25 RX ADMIN — GABAPENTIN 300 MG: 300 CAPSULE ORAL at 13:24

## 2025-03-25 RX ADMIN — PROPOFOL 200 MG: 10 INJECTION, EMULSION INTRAVENOUS at 07:36

## 2025-03-25 RX ADMIN — SUGAMMADEX 200 MG: 100 INJECTION, SOLUTION INTRAVENOUS at 11:56

## 2025-03-25 NOTE — INTERVAL H&P NOTE
H&P reviewed. After examining the patient I find no changes in the patients condition since the H&P had been written.    Vitals:    03/25/25 0551   BP: 115/75   Pulse: 71   Resp: 18   Temp: (!) 97.3 °F (36.3 °C)   SpO2: 97%     Safe to proceed. Robotic RLL wedge vs lobectomy    Mary Reid MD  Thoracic Surgery  Office: [phone]

## 2025-03-25 NOTE — DISCHARGE INSTR - AVS FIRST PAGE
Lobectomy  Incision Care:  - Your incisions were closed with stitches underneath of the skin which will dissolve. There is purple surgical glue on top of the incisions. The surgical glue will flake off over the next few weeks. There is a non-dissolvable stitch at your chest tube site which will be removed in the office.   - After your chest tube is removed there will be gauze placed over the incision. This gauze can be removed in 24 hours. After this time you may place a bandaid over your chest tube site if it is leaking some fluid.  - You do not need dressings over your other incisions.  Do not apply any cream or ointments to the incisions unless instructed by your surgeon.  - You may shower daily - no soaking in a tub bath. Wash your incisions gently with soap and water and pat dry. Do not scrub the incisions.  - Bruising at your incisions is normal. This will resolve over the next few weeks.     Activity  - No lifting greater than 10lbs until you are evaluated in the office.   - Walking and light activity is encouraged. Recommend you are active during the day and using your Incentive Spirometer when you are sitting for a prolonged period.   - No driving while you are using narcotic pain medications. If you are no longer taking narcotics and considering driving, you must make sure you can quickly react to changes on the road. This can be challenging in the first few weeks after surgery.     Pain:  - Some degree of pain or discomfort after surgery is expected. This pain may become more noticeable after discharge as you start moving around more.   - Your pain regiment includes the following:   - Tylenol 650 mg tablet. Take 1 tablet, every 6 hours for 1 week.    -  Gabapentin 300 mg tablet. Take 1 tablet, 3x a day for 3 weeks.   - Ibuprofen 600 mg tablet, every 8 hours for 1 week.    - Oxycodone (Narcotic) 5mg tablet. Take 1 tablet, every 4-6 hours as needed for pain.     Medications:  - Continue on your home  medications including any blood thinner and aspirin, as instructed.     Diet:  - You should continue on your normal diet.     Bowel Regiment:  - Constipation after surgery while on narcotic pain medications is normal.  - You should continue taking a bowel regiment while on a narcotic pain medication to keep your bowel movements soft. Your discharge bowel regiment:   - Docusate (Colace) 100mg tablet. Take 1 tablet, twice a day.  (Purchase over the counter)   - Senna 8.6mg tablet. Take 1 tablet daily. (Purchase over the counter)   - Miralax as needed daily if you are still constipated despite taking Colace and Senna   - If your bowel movements become lose, stop taking the bowel regiment above.     Follow-up:  - You have a scheduled follow-up appointment on: 4/10/2025 at 1:20pm  - Obtain your Chest X-ray prior to your scheduled post-operative appointment.   - A couple of days after discharge our office will call you to check in with how you are recovering at home.     Concerns:  - Call the office for any questions or concerns. Many postoperative issues can be sorted out over the phone.   - Call the office or go to the Emergency Department if you develop a fever greater than 101, persistent chills, persistent nausea/vomiting, worsening or uncontrolled pain, and/or increasing redness or foul smelling drainage from an incision.     Thoracic Surgery Office: [phone]    MD Janeen Jerome PAOttonielC  Dr. Meredith Harrison, MD Amylyn Mortimer, PA-C  MD Dr. Wilfred Holt, DO

## 2025-03-25 NOTE — OP NOTE
OPERATIVE REPORT  PATIENT NAME: Matthieu Da Silva    :  1970  MRN: [id number]  Pt Location: [facility] OR ROOM 15    SURGERY DATE: 3/25/2025    Surgeons and Role:     * Mary Reid MD - Primary     * Amylyn Jena Mortimer, PA-C - Assisting    Preop Diagnosis:  Lung nodule [R91.1]    Post-Op Diagnosis Codes:     * Lung nodule [R91.1]    Procedure(s):  Right - LOBECTOMY LUNG THORACOSCOPIC W/ ROBOTICS. RIGHT LOWER LOBE WEDGE WITH COMPLETION LOBECTOMY  BRONCHOSCOPY FLEXIBLE, MEDIASTINAL LYMPH NODE DISSECTION    Specimen(s):  ID Type Source Tests Collected by Time Destination   1 : Level 9 Tissue Lymph Node TISSUE EXAM Mary Reid MD 3/25/2025 0835    2 : wedge Tissue Lung, Right Lower Lobe TISSUE EXAM Mary Reid MD 3/25/2025 0852    3 : Level 11 Tissue Lymph Node TISSUE EXAM Mary Reid MD 3/25/2025 0952    4 : Level 11 posterior Tissue Lymph Node TISSUE EXAM Mary Reid MD 3/25/2025 0954    5 : Level 12 Tissue Lymph Node TISSUE EXAM Mary Reid MD 3/25/2025 1019    6 : Level 10 Tissue Lymph Node TISSUE EXAM Mary Reid MD 3/25/2025 1034    7 : Completion right lower lobectomy Tissue Lung, Right Lower Lobe TISSUE EXAM Mary Reid MD 3/25/2025 1119    8 : Mediastinal nodule Tissue Mediastinum TISSUE EXAM Mary Reid MD 3/25/2025 1135        Estimated Blood Loss:   Minimal    Drains:  Chest Tube 1 Right Pleural 24 Fr. (Active)   Function To water seal 25 1232   Chest Tube Air Leak No 25 1232   Drainage Description Serosanguineous 25 1232   Dressing Status Clean;Dry;Intact 25 1232   Site Assessment Unable to assess 25 1210   Surrounding Skin Dry;Intact 25 1232   Number of days: 0       Anesthesia Type:   General    Operative Indications:  Lung nodule [R91.1]    Operative Findings:  Known metastatic colon cancer, recurrence at previous wedge staple line. Attempted repeat wedge resection.  Margin was very close  to the staple line.  The decision was made to proceed with a completion lobectomy      Complications:   None    Procedure and Technique:  DETAILS OF PROCEDURE:  The patient was correctly identified by name and medical record number in the holding area and brought to the operative suite, where she was placed supine on the operative table.      After satisfactory induction of general endotracheal anesthesia, a flexible bronchoscope was passed through the endotracheal tube visualizing the distal trachea, errol, right and left main stem bronchi, including all of the primary and secondary divisions. No evidence of any endobronchial tumor was noted.  No suspicion or identified risk for TB or other airborne infectious disease; bronchoscopy procedure being performed for diagnostic purposes. Flexible bronchoscopy was then terminated and the scope was withdrawn.      The patient was positioned in the left lateral decubitus position, prepped and draped in the usual fashion. A time-out was performed to confirm procedure and laterality. A 1cm incision was made in the 8th intercostal space, in the intercostal line. A second 8mm port was placed in the 8th intercostal space, the most posterior. An 8mm port was then placed in the 9th ICS between the two 8mm ports. A second 12mm port was placed in the most anterior location, the 7th intercostal space. And the assistant port was placed in the 8th, between the camera and anterior port. An intercostal nerve block was then applied using Exparel into rib spaces 3-10.    The robot was then docked coming in from the right. Once docked, a tip-up was placed in 1. Caudiere in port 2. And a long bipolar grasper was placed in arm 4. I then broke scrub and sat at the console.      The entire lung was then mobilized.  The inferior pulmonary ligament was taken down using electrocautery. Level 9 lymph nodes were dissected free and sent for pathology..  Upon evaluation, it seemed like a wedge  resection may be possible in order to remove the recurrence.  Using serial firings of a tissue load stapler, a large extended wedge resection was taken with to include the nodule.  This was placed in a specimen bag and was removed through the assistant port.  Upon palpation, the nodule was identified in the wedge.  The wedge was sent for frozen.  Unfortunately, the margin was in fact very close to the staple line and so the decision was made to proceed with a completion lower lobectomy.     The overlying pleura was incised up the posterior hilum to the level of the superior edge of the inferior pulmonary vein. The posterior hilum continued to be opened. The space between the RUL bronchus and the BI was opened to facilitate future passing of the stapler.  The lung was then retracted superiorly and posteriorly, exposing the inferior mediastinal structures. The inferior pulmonary vein was dissected free and encircled. Using a vascular load stapler, the inferior pulmonary vein was then divided.  This then exposed the bronchus.  The right lower lobe bronchus was dissected free and encircled.  A stapler was then placed around the right lower lobe bronchus. The andrzej tissue in station 10 and 11 around the artery and bronchus was dissected and removed and sent for pathology.  After the bronchus was taken, the basilar and superior segment pulmonary artery branches were readily identified. These were circumferentially dissected and then ligated and divided with a vascular load stapler.  After this division, the lobectomy was completed with serial firings of the white vascular stapler. The lobe was placed in a Lap-Sac and removed from the thoracic cavity. The specimen was sent off the table.       The resection bed and all dissected areas were examined and hemostasis was deemed as adequate.  Progel was sprayed over the staple line.  All ports were removed under direct visualization.  Hemostasis was achieved using bipolar  electrocautery.  Prior to placing the chest tube, there was a small mediastinal nodule that was noted and appeared irregular.  This was removed in its complete form and sent for routine pathology.    A posterior apical 24F chest tube was then placed and secured to the skin with 0 Prolene sutures. The lung was re-expanded under direct visualization. The wounds were closed in layers using Vicryl suture followed by subcuticular monofilament suture. Dermabond was applied to each wound.      At the end of the procedure, the instrument, sponge and needle counts were confirmed to be correct x2.      The patient tolerated the procedure well and was delivered to the PACU.    I was present for the entire procedure., A qualified resident physician was not available., and A physician assistant was required during the procedure for retraction, tissue handling, dissection and suturing.    Patient Disposition:  PACU  and extubated and stable      SIGNATURE: Mary Reid MD  DATE: March 25, 2025  TIME: 12:54 PM

## 2025-03-25 NOTE — ANESTHESIA POSTPROCEDURE EVALUATION
Post-Op Assessment Note    CV Status:  Stable    Pain management: adequate       Mental Status:  Alert and awake   Hydration Status:  Euvolemic   PONV Controlled:  Controlled   Airway Patency:  Patent     Post Op Vitals Reviewed: Yes    No anethesia notable event occurred.    Staff: Anesthesiologist           Last Filed PACU Vitals:  Vitals Value Taken Time   Temp     Pulse 61 03/25/25 1207   /81 03/25/25 1207   Resp 10 03/25/25 1207   SpO2 100 % 03/25/25 1207   Vitals shown include unfiled device data.

## 2025-03-26 ENCOUNTER — APPOINTMENT (INPATIENT)
Dept: RADIOLOGY | Facility: HOSPITAL | Age: 55
DRG: 165 | End: 2025-03-26
Payer: COMMERCIAL

## 2025-03-26 LAB
ANION GAP SERPL CALCULATED.3IONS-SCNC: 7 MMOL/L (ref 4–13)
BUN SERPL-MCNC: 13 MG/DL (ref 5–25)
CALCIUM SERPL-MCNC: 8.3 MG/DL (ref 8.4–10.2)
CHLORIDE SERPL-SCNC: 109 MMOL/L (ref 96–108)
CO2 SERPL-SCNC: 23 MMOL/L (ref 21–32)
CREAT SERPL-MCNC: 0.87 MG/DL (ref 0.6–1.3)
ERYTHROCYTE [DISTWIDTH] IN BLOOD BY AUTOMATED COUNT: 13 % (ref 11.6–15.1)
GFR SERPL CREATININE-BSD FRML MDRD: 99 ML/MIN/1.73SQ M
GLUCOSE SERPL-MCNC: 143 MG/DL (ref 65–140)
HCT VFR BLD AUTO: 36.5 % (ref 36.5–49.3)
HGB BLD-MCNC: 12.9 G/DL (ref 12–17)
MAGNESIUM SERPL-MCNC: 2.1 MG/DL (ref 1.9–2.7)
MCH RBC QN AUTO: 33.5 PG (ref 26.8–34.3)
MCHC RBC AUTO-ENTMCNC: 35.3 G/DL (ref 31.4–37.4)
MCV RBC AUTO: 95 FL (ref 82–98)
PLATELET # BLD AUTO: 132 THOUSANDS/UL (ref 149–390)
PMV BLD AUTO: 11.2 FL (ref 8.9–12.7)
POTASSIUM SERPL-SCNC: 4.2 MMOL/L (ref 3.5–5.3)
RBC # BLD AUTO: 3.85 MILLION/UL (ref 3.88–5.62)
SODIUM SERPL-SCNC: 139 MMOL/L (ref 135–147)
WBC # BLD AUTO: 16.62 THOUSAND/UL (ref 4.31–10.16)

## 2025-03-26 PROCEDURE — 80048 BASIC METABOLIC PNL TOTAL CA: CPT | Performed by: PHYSICIAN ASSISTANT

## 2025-03-26 PROCEDURE — 97163 PT EVAL HIGH COMPLEX 45 MIN: CPT

## 2025-03-26 PROCEDURE — 71046 X-RAY EXAM CHEST 2 VIEWS: CPT

## 2025-03-26 PROCEDURE — 83735 ASSAY OF MAGNESIUM: CPT | Performed by: PHYSICIAN ASSISTANT

## 2025-03-26 PROCEDURE — 99024 POSTOP FOLLOW-UP VISIT: CPT | Performed by: THORACIC SURGERY (CARDIOTHORACIC VASCULAR SURGERY)

## 2025-03-26 PROCEDURE — 85027 COMPLETE CBC AUTOMATED: CPT | Performed by: PHYSICIAN ASSISTANT

## 2025-03-26 PROCEDURE — 97166 OT EVAL MOD COMPLEX 45 MIN: CPT

## 2025-03-26 RX ORDER — IBUPROFEN 600 MG/1
600 TABLET, FILM COATED ORAL EVERY 8 HOURS SCHEDULED
Qty: 21 TABLET | Refills: 0 | Status: SHIPPED | OUTPATIENT
Start: 2025-03-26 | End: 2025-04-02

## 2025-03-26 RX ORDER — OXYCODONE HYDROCHLORIDE 5 MG/1
5 TABLET ORAL EVERY 6 HOURS PRN
Qty: 20 TABLET | Refills: 0 | Status: SHIPPED | OUTPATIENT
Start: 2025-03-26 | End: 2025-04-05

## 2025-03-26 RX ORDER — SENNOSIDES 8.6 MG
650 CAPSULE ORAL EVERY 6 HOURS
Qty: 28 TABLET | Refills: 0 | Status: SHIPPED | OUTPATIENT
Start: 2025-03-26 | End: 2025-04-02

## 2025-03-26 RX ORDER — GABAPENTIN 300 MG/1
300 CAPSULE ORAL 3 TIMES DAILY
Qty: 63 CAPSULE | Refills: 0 | Status: SHIPPED | OUTPATIENT
Start: 2025-03-26 | End: 2025-04-16

## 2025-03-26 RX ADMIN — DOCUSATE SODIUM 100 MG: 100 CAPSULE, LIQUID FILLED ORAL at 17:14

## 2025-03-26 RX ADMIN — Medication 2.5 MG: at 05:47

## 2025-03-26 RX ADMIN — GABAPENTIN 300 MG: 300 CAPSULE ORAL at 21:29

## 2025-03-26 RX ADMIN — ACETAMINOPHEN 975 MG: 325 TABLET, FILM COATED ORAL at 13:23

## 2025-03-26 RX ADMIN — ENOXAPARIN SODIUM 40 MG: 40 INJECTION SUBCUTANEOUS at 09:59

## 2025-03-26 RX ADMIN — SENNOSIDES 8.6 MG: 8.6 TABLET, FILM COATED ORAL at 09:59

## 2025-03-26 RX ADMIN — Medication 2.5 MG: at 09:59

## 2025-03-26 RX ADMIN — GABAPENTIN 300 MG: 300 CAPSULE ORAL at 09:59

## 2025-03-26 RX ADMIN — CELECOXIB 100 MG: 100 CAPSULE ORAL at 09:59

## 2025-03-26 RX ADMIN — DOCUSATE SODIUM 100 MG: 100 CAPSULE, LIQUID FILLED ORAL at 09:59

## 2025-03-26 RX ADMIN — ACETAMINOPHEN 975 MG: 325 TABLET, FILM COATED ORAL at 18:28

## 2025-03-26 RX ADMIN — GABAPENTIN 300 MG: 300 CAPSULE ORAL at 17:14

## 2025-03-26 RX ADMIN — SODIUM CHLORIDE, SODIUM LACTATE, POTASSIUM CHLORIDE, AND CALCIUM CHLORIDE 60 ML/HR: .6; .31; .03; .02 INJECTION, SOLUTION INTRAVENOUS at 00:33

## 2025-03-26 RX ADMIN — CELECOXIB 100 MG: 100 CAPSULE ORAL at 17:14

## 2025-03-26 NOTE — CASE MANAGEMENT
Case Management Assessment & Discharge Planning Note    Patient name Matthieu Da Silva  Location [facility]-01 MRN [id number]  : 1970 Date 3/26/2025       Current Admission Date: 3/25/2025  Current Admission Diagnosis:Malignant neoplasm metastatic to right lung (HCC)   Patient Active Problem List    Diagnosis Date Noted Date Diagnosed    History of known metastasis to liver 2024     Thyroid nodule 2023     Chemotherapy induced neutropenia (HCC) 2022     Malignant neoplasm metastatic to right lung (HCC) 2022     Annual physical exam 2020     Vapes nicotine containing substance 2020     Other hydronephrosis 2020     Encounter for follow-up examination after completed treatment for malignant neoplasm 2019     History of colon cancer, stage IV 03/15/2018     ED (erectile dysfunction) 2018     Pulmonary nodule, right 2017     GERD (gastroesophageal reflux disease) 2017       LOS (days): 1  Geometric Mean LOS (GMLOS) (days):   Days to GMLOS:     OBJECTIVE:    Risk of Unplanned Readmission Score: 6.71         Current admission status: Inpatient       Preferred Pharmacy:   [id number] [place] [facility] [address] [facility] [address]  [address]  [place] [address]  Phone: [phone] Fax: [phone]    [address] - [address]  [address] [facility] [address]  Phone: [phone] Fax: [phone]    [facility] [address]  [address]  [facility] [address]  Phone: [phone] Fax: [phone]    [facility]  [place] - [address]  [address]  [place] [address]  Phone: [phone] Fax: [phone]    Primary Care Provider: Truong Berman MD    Primary Insurance: BLUE CROSS  Secondary Insurance: MEDICARE    ASSESSMENT:  Active Health Care Proxies    There are no active Health Care Proxies on file.                  Readmission Root Cause  30 Day Readmission: No    Patient Information  Admitted from:: Home  Mental Status: Alert  During Assessment patient was accompanied by: Not accompanied during assessment  Assessment information provided by:: Patient  Primary Caregiver: Self  Support Systems: Spouse/significant other, Family members  Home entry access options. Select all that apply.: Stairs  Number of steps to enter home.: 3  Do the steps have railings?: Yes  Type of Current Residence: Travel Trailer/ Mobile Home  Living Arrangements: Lives Alone (girlfriend lives next door)    Activities of Daily Living Prior to Admission  Functional Status: Independent  Completes ADLs independently?: Yes  Ambulates independently?: Yes  Does patient use assisted devices?: No  Does patient currently own DME?: No  Does patient have a history of Outpatient Therapy (PT/OT)?: No  Does the patient have a history of Short-Term Rehab?: No  Does patient have a history of HHC?: No  Does patient currently have HHC?: No         Patient Information Continued  Income Source: Employed  Does patient have prescription coverage?: Yes  Does patient have a history of substance abuse?: No  Does patient have a history of Mental Health Diagnosis?: No         Means of Transportation  Means of Transport to Appts:: Drives Self          DISCHARGE DETAILS:    Discharge planning discussed with:: patient  Freedom of Choice: Yes     CM contacted family/caregiver?: No- see comments  Were Treatment Team discharge recommendations reviewed with patient/caregiver?: Yes  Did patient/caregiver verbalize understanding of patient care needs?: Yes  Were patient/caregiver advised of the risks associated with not following Treatment Team discharge recommendations?: Yes    Contacts  Patient Contacts: JANESSA Mckeon  Relationship to Patient:: Family  Contact Method: Phone  Phone Number: [phone]  Reason/Outcome: Emergency Contact          Met with pt & explained cm role. Pt  lives in trailer home alone & his girlfriend lives next door. IPTA. No dme. Works & drives. Girlfriend to transport home when medically cleared.

## 2025-03-26 NOTE — OCCUPATIONAL THERAPY NOTE
Occupational Therapy Evaluation     Patient Name: Matthieu Da Silva  Today's Date: 3/26/2025  Problem List  Principal Problem:    Malignant neoplasm metastatic to right lung (HCC)    Past Medical History  Past Medical History:   Diagnosis Date    Cancer (HCC)     Lung and stage 4 colon cancer    Colon cancer (HCC)     Dysuria     Last Assessed:8/24/17    GERD (gastroesophageal reflux disease)     Liver cancer (HCC)     Liver nodule     Pulmonary nodule, right     Thyroid nodule 02/16/2023    Ureteropelvic junction (UPJ) obstruction 01/29/2020    Wears glasses      Past Surgical History  Past Surgical History:   Procedure Laterality Date    ABDOMINAL SURGERY      COLON SURGERY      COLONOSCOPY N/A 01/22/2018    Procedure: COLONOSCOPY;  Surgeon: Casey Justice MD;  [facility];  Service: Colorectal    DENTAL SURGERY  2016    Crown with bridge work    FLEXIBLE SIGMOIDOSCOPY N/A 06/15/2018    Procedure: SIGMOIDOSCOPY FLEXIBLE w/o sedation w/ tattoo;  Surgeon: Casey Justice MD;  [facility];  Service: Colorectal    IR BIOPSY LUNG  2/4/2025    IR PORT PLACEMENT Right     LAPAROTOMY N/A 06/08/2020    Procedure: LAPAROTOMY EXPLORATORY, LYSIS OF ADHESIONS;  Surgeon: Esequiel Mijares MD;  [facility];  Service: Surgical Oncology    LIVER LOBECTOMY N/A 06/21/2018    Procedure: liver resection/ablation, intraoperative ultrasound of liver;  Surgeon: Esequiel Mijares MD;  [facility];  Service: Surgical Oncology    LIVER LOBECTOMY N/A 06/08/2020    Procedure: LIVER RESECTION SEGMENT 3 WITH  INTRAOPERATIVE U/S OF LIVER;  Surgeon: Esequiel Mijares MD;  [facility];  Service: Surgical Oncology    LUNG LOBECTOMY Right 3/25/2025    Procedure: LOBECTOMY LUNG THORACOSCOPIC W/ ROBOTICS, RIGHT LOWER LOBE WEDGE WITH COMPLETION LOBECTOMY;  Surgeon: Mary Reid MD;  [facility];  Service: Thoracic    LUNG SEGMENTECTOMY Right 8/9/2022    Procedure: RESECTION WEDGE LUNG, THERAPEUTIC;   Surgeon: Mary Reid MD;  [facility];  Service: Thoracic    IL BRNCHSC INCL FLUOR GDNCE DX W/CELL WASHG SPX N/A 8/9/2022    Procedure: BRONCHOSCOPY FLEXIBLE;  Surgeon: Mary Reid MD;  [facility];  Service: Thoracic    IL BRNCHSC INCL FLUOR GDNCE DX W/CELL WASHG SPX N/A 3/25/2025    Procedure: BRONCHOSCOPY FLEXIBLE;  Surgeon: Mary Reid MD;  [facility];  Service: Thoracic    IL COLECTOMY PARTIAL W/ANASTOMOSIS Left 06/21/2018    Procedure: hemicolectomy, low anterior resection (open) SPY fluorescence angiography;  Surgeon: Casey Justice MD;  [facility];  Service: Colorectal    IL EXPLORATORY LAPAROTOMY CELIOTOMY W/WO BIOPSY SPX N/A 06/21/2018    Procedure: LAPAROTOMY EXPLORATORY;  Surgeon: Casey Justice MD;  [facility];  Service: Colorectal    IL INSJ PRPH CTR VAD W/SUBQ PORT AGE 5 YR/> N/A 01/25/2018    Procedure: PORT-A-CATHETER PLACEMENT  Fluoroscopy for performance/interpretation;  Surgeon: Casey Justice MD;  [facility];  Service: Colorectal    IL PRCTECT PRTL RESCJ RECTUM TABDL APPR N/A 06/21/2018    Procedure: Partial proctectomy.;  Surgeon: Casey Justice MD;  [facility];  Service: Colorectal    IL SIGMOIDOSCOPY FLX DX W/COLLJ SPEC BR/WA IF PFRMD N/A 06/21/2018    Procedure: SIGMOIDOSCOPY FLEXIBLE;  Surgeon: Casey Justice MD;  [facility];  Service: Colorectal    IL THORACOSCOPY W/THERA WEDGE RESEXN INITIAL UNILAT Right 8/9/2022    Procedure: THORACOSCOPY VIDEO ASSISTED SURGERY (VATS);  Surgeon: Mary Reid MD;  [facility];  Service: Thoracic    ROOT CANAL  2015    TESTICLE SURGERY      Exploration of Undescended Testis rIght, age 10 had surgery for undescended testicle; Last Assessed:8/24/17    TOOTH EXTRACTION  2015 03/26/25 0934   OT Last Visit   OT Visit Date 03/26/25   Note Type   Note type Evaluation   Pain Assessment   Pain Assessment Tool 0-10  "  Pain Score No Pain   Restrictions/Precautions   Weight Bearing Precautions Per Order No   Other Precautions Chair Alarm;Bed Alarm;Multiple lines;Telemetry;Fall Risk  (CT removed during session)   Home Living   Type of Home Mobile home   Home Layout One level;Access;Stairs to enter with rails  (3-4 MARGOT)   Bathroom Shower/Tub Tub/shower unit   Bathroom Toilet Standard   Bathroom Accessibility Accessible   Home Equipment Cane;Other (Comment)  (denies use)   Prior Function   Level of [place] Independent with ADLs;Independent with functional mobility;Independent with IADLS   Lives With Alone   Receives Help From Family  (local s/o)   IADLs Independent with driving;Independent with meal prep;Independent with medication management   Falls in the last 6 months 0   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   Intrinsic Gratification enjoys reading & star wars   General   Family/Caregiver Present No   Subjective   Subjective \"I am still numb near the chest cite.\"   ADL   Where Assessed Chair   Eating Assistance 5  Supervision/Setup   Grooming Assistance 5  Supervision/Setup   UB Bathing Assistance 5  Supervision/Setup   LB Bathing Assistance 5  Supervision/Setup   UB Dressing Assistance 5  Supervision/Setup   LB Dressing Assistance 5  Supervision/Setup   Toileting Assistance  5  Supervision/Setup   Functional Assistance 5  Supervision/Setup   Bed Mobility   Supine to Sit 5  Supervision   Additional items Increased time required;Verbal cues   Sit to Supine 5  Supervision   Additional items Increased time required;Verbal cues   Additional Comments pt was found supine in bed and was left in bedside chair with alarm on and all needs within reach.   Transfers   Sit to Stand 5  Supervision   Additional items Verbal cues   Stand to Sit 5  " Supervision   Additional items Verbal cues   Additional Comments no AD   Functional Mobility   Functional Mobility 5  Supervision   Additional Comments for long houshold distances w IV pole for support.   Balance   Static Sitting Normal   Dynamic Sitting Good   Static Standing Fair   Dynamic Standing Fair -   Ambulatory Fair -   Activity Tolerance   Activity Tolerance Patient limited by fatigue   Medical Staff Made Aware DPT 2' pt's med complexity, comorbidities, and regression from baseline.   Nurse Made Aware RN aware   RUE Assessment   RUE Assessment WFL   LUE Assessment   LUE Assessment WFL   Hand Function   Gross Motor Coordination Functional   Fine Motor Coordination Functional   Psychosocial   Psychosocial (WDL) WDL   Cognition   Overall Cognitive Status WFL   Arousal/Participation Alert;Responsive;Cooperative   Attention Within functional limits   Orientation Level Oriented X4   Memory Within functional limits   Following Commands Follows all commands and directions without difficulty   Comments Pt is pleasant and cooperative. Pt has good insight into med condition and G safety awareness.   Assessment   Prognosis Good   Assessment Patient is a 54 y.o. male admitted 3/25/2025 with Lung nodule (R91.1). Pt underwent R lobectomy w R lower lobe wedge on 3/25/2025. POD 1 with active OT eval and treat orders. Pt has a past medical history of Cancer (HCC), Colon cancer (HCC), Dysuria, GERD (gastroesophageal reflux disease), Liver cancer (HCC), Liver nodule, Pulmonary nodule, right, Thyroid nodule, Ureteropelvic junction (UPJ) obstruction, and Wears glasses. Patient lives in a mobile home with 3-4 MARGOT w railing. Patient has a tub shower and standard toilet. Prior to hospitalization, patient utilized no AD when ambulating short distances within the home and community. Pt was IND with ADL's/IADL's. Currently, patient requires SUP with UB ADL, SUP w LB ADL, and completed transfers with SUP, FM w SUP and IV pole.  Currently, patient is functioning close to baseline status, is expected to progress throughout hospital course, and has assist if needed, indicating no further acute OT needs at this time. D/C acute OT services. Patient presents with intact cognition. See flowsheet for details. The patient's raw score on the AM-PAC Daily Activity Inpatient Short Form is 22. A raw score of greater than or equal to 19 suggests the patient may benefit from discharge to home. Please refer to the recommendation of the Occupational Therapist for safe discharge planning. When medically stable, OT recommending Level 4 upon discharge.   Goals   Patient Goals to return home   Discharge Recommendation   Rehab Resource Intensity Level, OT No post-acute rehabilitation needs   AM-PAC Daily Activity Inpatient   Lower Body Dressing 3   Bathing 3   Toileting 4   Upper Body Dressing 4   Grooming 4   Eating 4   Daily Activity Raw Score 22   Daily Activity Standardized Score (Calc for Raw Score >=11) 47.1   AM-PAC Applied Cognition Inpatient   Following a Speech/Presentation 4   Understanding Ordinary Conversation 4   Taking Medications 4   Remembering Where Things Are Placed or Put Away 4   Remembering List of 4-5 Errands 4   Taking Care of Complicated Tasks 4   Applied Cognition Raw Score 24   Applied Cognition Standardized Score 62.21   Modified [place] Scale   Modified [place] Scale 3   End of Consult   Education Provided Yes   Patient Position at End of Consult Bedside chair;Bed/Chair alarm activated;All needs within reach   Nurse Communication Nurse aware of consult     MIRIAM August

## 2025-03-26 NOTE — QUICK NOTE
Assessment & Plan  Malignant neoplasm metastatic to right lung (HCC)  54-year-old male s/p robotic lower lobectomy with right lower lobe resection with completion lobectomy, flexible bronchoscopy and mediastinal lymph node dissection on 3/25    -Diet as tolerated  -Multimodal analgesia  -maintain chest tube to water seal  -monitor for air leak  -encourage IS use  -oob and ambulation    Post Op Check:    Patients pain is well controlled, they have mild discomfort at the chest tube insertino site. Tolerating po, no n/v. On room air.     Temp:  [97.3 °F (36.3 °C)-98.9 °F (37.2 °C)] 98.9 °F (37.2 °C)  HR:  [55-74] 67  BP: ()/(58-81) 105/70  Resp:  [16-20] 20  SpO2:  [95 %-100 %] 98 %  O2 Device: None (Room air)      Physical Exam:  General: No acute distress, alert and oriented  CV: Well perfused, regular rate  Chest Wall: R CT insertion site with mild strikethrough on dressing other port sites cdi, +AL 30-40cc at baseline and inc to 70-80cc on valsalva, ss drainage.   Lungs: Normal work of breathing, no increased respiratory effort  Abdomen: Soft, non tender, non-distended.   Extremities: No edema, clubbing or cyanosis  Skin: Warm, dry    Addi Lucas MD  PGY-2   03/26/25

## 2025-03-26 NOTE — QUICK NOTE
Thoracic Surgery Chest Tube Removal:     Chest tube removed in routine fashion without incident. The patient tolerated the procedure well. A dry, sterile dressing was placed. Will check a pa/lat chest x-ray.     [facility] unit #9 personally delivered to [facility] room in P5 ICU.     ---  Jimena Clay MD  General Surgery PGY-II

## 2025-03-26 NOTE — ASSESSMENT & PLAN NOTE
54-year-old male s/p robotic lower lobectomy with right lower lobe resection with completion lobectomy, flexible bronchoscopy and mediastinal lymph node dissection on 3/25

## 2025-03-26 NOTE — PHYSICAL THERAPY NOTE
Physical Therapy Evaluation     Patient's Name: Matthieu Da Silva    Admitting Diagnosis  Lung nodule [R91.1]    Problem List  Patient Active Problem List   Diagnosis    ED (erectile dysfunction)    History of colon cancer, stage IV    GERD (gastroesophageal reflux disease)    Pulmonary nodule, right    Encounter for follow-up examination after completed treatment for malignant neoplasm    Other hydronephrosis    Annual physical exam    Vapes nicotine containing substance    Malignant neoplasm metastatic to right lung (HCC)    Chemotherapy induced neutropenia (HCC)    Thyroid nodule    History of known metastasis to liver       Past Medical History  Past Medical History:   Diagnosis Date    Cancer (HCC)     Lung and stage 4 colon cancer    Colon cancer (HCC)     Dysuria     Last Assessed:8/24/17    GERD (gastroesophageal reflux disease)     Liver cancer (HCC)     Liver nodule     Pulmonary nodule, right     Thyroid nodule 02/16/2023    Ureteropelvic junction (UPJ) obstruction 01/29/2020    Wears glasses        Past Surgical History  Past Surgical History:   Procedure Laterality Date    ABDOMINAL SURGERY      COLON SURGERY      COLONOSCOPY N/A 01/22/2018    Procedure: COLONOSCOPY;  Surgeon: Casey Justice MD;  [facility];  Service: Colorectal    DENTAL SURGERY  2016    Crown with bridge work    FLEXIBLE SIGMOIDOSCOPY N/A 06/15/2018    Procedure: SIGMOIDOSCOPY FLEXIBLE w/o sedation w/ tattoo;  Surgeon: Casey Justice MD;  [facility];  Service: Colorectal    IR BIOPSY LUNG  2/4/2025    IR PORT PLACEMENT Right     LAPAROTOMY N/A 06/08/2020    Procedure: LAPAROTOMY EXPLORATORY, LYSIS OF ADHESIONS;  Surgeon: Esequiel Mijares MD;  [facility];  Service: Surgical Oncology    LIVER LOBECTOMY N/A 06/21/2018    Procedure: liver resection/ablation, intraoperative ultrasound of liver;  Surgeon: Esequiel Mijares MD;  [facility];  Service: Surgical Oncology    LIVER LOBECTOMY N/A 06/08/2020     Procedure: LIVER RESECTION SEGMENT 3 WITH  INTRAOPERATIVE U/S OF LIVER;  Surgeon: Esequiel Mijares MD;  [facility];  Service: Surgical Oncology    LUNG SEGMENTECTOMY Right 8/9/2022    Procedure: RESECTION WEDGE LUNG, THERAPEUTIC;  Surgeon: Mary Reid MD;  [facility];  Service: [facility]    [facility] W/CELL WASHG SPX N/A 8/9/2022    Procedure: BRONCHOSCOPY FLEXIBLE;  Surgeon: Mary Reid MD;  [facility];  Service: Thoracic    CT COLECTOMY PARTIAL W/ANASTOMOSIS Left 06/21/2018    Procedure: hemicolectomy, low anterior resection (open) SPY fluorescence angiography;  Surgeon: Casey Justice MD;  [facility];  Service: Colorectal    CT EXPLORATORY LAPAROTOMY CELIOTOMY W/WO BIOPSY SPX N/A 06/21/2018    Procedure: LAPAROTOMY EXPLORATORY;  Surgeon: Casey Justice MD;  [facility];  Service: Colorectal    CT INSJ PRPH CTR VAD W/SUBQ PORT AGE 5 YR/> N/A 01/25/2018    Procedure: PORT-A-CATHETER PLACEMENT  Fluoroscopy for performance/interpretation;  Surgeon: Casey Justice MD;  [facility];  Service: Colorectal    CT PRCTECT PRTL RESCJ RECTUM TABDL APPR N/A 06/21/2018    Procedure: Partial proctectomy.;  Surgeon: Casey Justice MD;  [facility];  Service: Colorectal    CT SIGMOIDOSCOPY FLX DX W/COLLJ SPEC BR/WA IF PFRMD N/A 06/21/2018    Procedure: SIGMOIDOSCOPY FLEXIBLE;  Surgeon: Casey Justice MD;  [facility];  Service: Colorectal    CT THORACOSCOPY W/THERA WEDGE RESEXN INITIAL UNILAT Right 8/9/2022    Procedure: THORACOSCOPY VIDEO ASSISTED SURGERY (VATS);  Surgeon: Mary Reid MD;  [facility];  Service: Thoracic    ROOT CANAL  2015    TESTICLE SURGERY      Exploration of Undescended Testis rIght, age 10 had surgery for undescended testicle; Last Assessed:8/24/17    TOOTH EXTRACTION  2015 03/26/25 0933   PT Last Visit   PT Visit Date 03/26/25   Note Type   Note type  Evaluation   Pain Assessment   Pain Assessment Tool 0-10   Pain Score No Pain   Restrictions/Precautions   Weight Bearing Precautions Per Order No   Other Precautions Fall Risk;Bed Alarm;Chair Alarm;Telemetry;Multiple lines   Home Living   Type of Home Mobile home   Home Layout One level;Access;Stairs to enter with rails  (3-4 MARGOT)   Bathroom Shower/Tub Tub/shower unit   Bathroom Toilet Standard   Bathroom Accessibility Accessible   Home Equipment Cane  (not used PTA)   Prior Function   Level of [place] Independent with ADLs;Independent with functional mobility;Independent with IADLS   Lives With Alone   Receives Help From Family  (S/O lives nearby)   IADLs Independent with driving;Independent with meal prep;Independent with medication management   Falls in the last 6 months 0   Vocational Full time employment   General   Family/Caregiver Present No   Cognition   Overall Cognitive Status WFL   Arousal/Participation Alert   Orientation Level Oriented X4   Memory Within functional limits   Following Commands Follows all commands and directions without difficulty   Comments Patient pleasant and cooperative   Subjective   Subjective Patient agreeable to PT eval   RUE Assessment   RUE Assessment WFL   LUE Assessment   LUE Assessment WFL   RLE Assessment   RLE Assessment X   Strength RLE   RLE Overall Strength 4/5   LLE Assessment   LLE Assessment X   Strength LLE   LLE Overall Strength 4/5   Bed Mobility   Supine to Sit 5  Supervision   Additional items Increased time required;Verbal cues   Sit to Supine 5  Supervision   Additional items Increased time required;Verbal cues   Transfers   Sit to Stand 5  Supervision   Additional items Increased time required;Verbal cues   Stand to Sit 5  Supervision   Additional items Increased time required;Verbal cues   Additional Comments no AD   Ambulation/Elevation   Gait pattern Decreased foot clearance;Short stride;Excessively slow;Inconsistent leni   Gait Assistance 5   Supervision   Additional items Verbal cues   Assistive Device   (IV pole)   Distance 50'x2   Balance   Static Sitting Good   Dynamic Sitting Fair +   Static Standing Fair   Dynamic Standing Fair -   Ambulatory Fair -   Endurance Deficit   Endurance Deficit Yes   Endurance Deficit Description fatigue, weakness   Activity Tolerance   Activity Tolerance Patient limited by fatigue   Medical Staff Made Aware OT, OTS   Nurse Made Aware RN cleared   Assessment   Prognosis Good   Problem List Decreased strength;Decreased endurance;Impaired balance;Decreased mobility;Pain   Assessment Pt is a 54 y.o. male seen for a high complexity PT evaluation due to Ongoing medical management for primary dx, Decreased activity tolerance compared to baseline, Fall risk, Increased assistance needed from caregiver at current time, Ongoing telemetry monitoring, s/p surgical intervention. Patient is s/p admit to [facility] on 3/25/2025 for Lung nodule (R91.1). Patient  has a past medical history of Cancer (HCC), Colon cancer (HCC), Dysuria, GERD (gastroesophageal reflux disease), Liver cancer (HCC), Liver nodule, Pulmonary nodule, right, Thyroid nodule, Ureteropelvic junction (UPJ) obstruction, and Wears glasses..     PT now consulted to assess functional mobility and needs for safe d/c planning. Prior to admission, pt independent with functional mobility, independent ADLs, and independent IADLs. Personal factors affecting status include fall risk, steps to enter home, lives alone, and medical status     Currently pt requires supervision for bed mobility, supervision for functional transfers with no AD ; supervision for ambulation with IV pole. Pt presents functioning below baseline and w/ overall mobility deficits 2* to: decreased strength, decreased endurance, decreased mobility, impaired balance. These impairments place pt at risk for falls.     Pt will continue to benefit from skilled PT interventions to address stated  impairments; to maximize functional potential; for ongoing pt/family education; and DME needs. The patient's AM-PAC Basic Mobility Inpatient Short Form Raw Score Is 18. PT is currently recommending no post acute rehab needs on d/c from hospital. Will continue to follow as able.   Goals   Patient Goals to go home   STG Expiration Date 04/09/25   Short Term Goal #1 In 14 days, patient will 1) increase strength in BUE/BLE by 1/2 to 1 full grade for increased strength and stability needed for functional mobility 2) improve bed mobility to MI for improved mobility and decreased need for assist 3) increase functional transfers to MI for improved safety and functional mobility 4) ambulate 250ft with MI using no AD for increased endurance and safety ambulating home and community environments 5) improve balance by 1 grade for improved safety and stability and decreased risk for falls. 6) ascend/descend at least 4 stairs using HR with MI in order to safely access home environment   PT Treatment Day 0   Plan   Treatment/Interventions ADL retraining;Functional transfer training;LE strengthening/ROM;Therapeutic exercise;Endurance training;Patient/family training;Equipment eval/education;Bed mobility;Gait training;Spoke to nursing;Spoke to case management;OT;Elevations   PT Frequency 2-3x/wk   Discharge Recommendation   Rehab Resource Intensity Level, PT No post-acute rehabilitation needs   AM-PAC Basic Mobility Inpatient   Turning in Flat Bed Without Bedrails 3   Lying on Back to Sitting on Edge of Flat Bed Without Bedrails 3   Moving Bed to Chair 3   Standing Up From Chair Using Arms 3   Walk in Room 3   Climb 3-5 Stairs With Railing 3   Basic Mobility Inpatient Raw Score 18   Basic Mobility Standardized Score 41.05   [facility] Highest Level Of Mobility   -HLM Goal 6: Walk 10 steps or more   -HLM Achieved 7: Walk 25 feet or more   Modified Tj Scale   Modified Tj Scale 4   End of Consult   Patient Position at End  of Consult All needs within reach;Bed/Chair alarm activated;Bedside chair         Alia Del Rosario, PT, DPT

## 2025-03-26 NOTE — PLAN OF CARE
Problem: PHYSICAL THERAPY ADULT  Goal: Performs mobility at highest level of function for planned discharge setting.  See evaluation for individualized goals.  Description: Treatment/Interventions: ADL retraining, Functional transfer training, LE strengthening/ROM, Therapeutic exercise, Endurance training, Patient/family training, Equipment eval/education, Bed mobility, Gait training, Spoke to nursing, Spoke to case management, OT, Elevations          See flowsheet documentation for full assessment, interventions and recommendations.  Outcome: Progressing  Note: Prognosis: Good  Problem List: Decreased strength, Decreased endurance, Impaired balance, Decreased mobility, Pain  Assessment: Pt is a 54 y.o. male seen for a high complexity PT evaluation due to Ongoing medical management for primary dx, Decreased activity tolerance compared to baseline, Fall risk, Increased assistance needed from caregiver at current time, Ongoing telemetry monitoring, s/p surgical intervention. Patient is s/p admit to [facility] on 3/25/2025 for Lung nodule (R91.1). Patient  has a past medical history of Cancer (HCC), Colon cancer (HCC), Dysuria, GERD (gastroesophageal reflux disease), Liver cancer (HCC), Liver nodule, Pulmonary nodule, right, Thyroid nodule, Ureteropelvic junction (UPJ) obstruction, and Wears glasses..     PT now consulted to assess functional mobility and needs for safe d/c planning. Prior to admission, pt independent with functional mobility, independent ADLs, and independent IADLs. Personal factors affecting status include fall risk, steps to enter home, lives alone, and medical status     Currently pt requires supervision for bed mobility, supervision for functional transfers with no AD ; supervision for ambulation with IV pole. Pt presents functioning below baseline and w/ overall mobility deficits 2* to: decreased strength, decreased endurance, decreased mobility, impaired balance. These impairments  place pt at risk for falls.     Pt will continue to benefit from skilled PT interventions to address stated impairments; to maximize functional potential; for ongoing pt/family education; and DME needs. The patient's AM-PAC Basic Mobility Inpatient Short Form Raw Score Is 18. PT is currently recommending no post acute rehab needs on d/c from hospital. Will continue to follow as able.        Rehab Resource Intensity Level, PT: No post-acute rehabilitation needs    See flowsheet documentation for full assessment.

## 2025-03-26 NOTE — UTILIZATION REVIEW
Initial Clinical Review    Elective Inpatient surgical procedure  Age/Sex: 54 y.o. male  Surgery Date: 3/25/2025   Procedure:   Right - LOBECTOMY LUNG THORACOSCOPIC W/ ROBOTICS. RIGHT LOWER LOBE WEDGE WITH COMPLETION LOBECTOMY  BRONCHOSCOPY FLEXIBLE, MEDIASTINAL LYMPH NODE DISSECTION  Anesthesia: General  Operative Findings:   Known metastatic colon cancer, recurrence at previous wedge staple line. Attempted repeat wedge resection.  Margin was very close to the staple line.  The decision was made to proceed with a completion lobectomy       POD#1 Progress Note: Patients pain is well controlled, they have mild discomfort at the chest tube insertino site. Tolerating po, no n/v.  R CT insertion site with mild strikethrough on dressing other port sites cdi, +AL 30-40cc at baseline and inc to 70-80cc on valsalva, ss drainage. CT - no air leak, 100 out.  Diet as tolerated. Multimodal analgesia. D/C chest tube. Check Pa/Lat CXR. D/C IVF. IS use. oob and ambulation.         Admission Orders: Date/Time/Statement:   Admission Orders (From admission, onward)       Ordered        03/25/25 1210  Inpatient Admission  Once                          Orders Placed This Encounter   Procedures    Inpatient Admission     Standing Status:   Standing     Number of Occurrences:   1     Level of Care:   Level 1 Stepdown [13]     Bed request comments:   PPHP5     Estimated length of stay:   Inpatient Only Surgery     Diet:   Mobility:   DVT Prophylaxis:     Medications/Pain Control:   Scheduled Medications:  acetaminophen, 975 mg, Oral, Q6H  celecoxib, 100 mg, Oral, BID  docusate sodium, 100 mg, Oral, BID  enoxaparin, 40 mg, Subcutaneous, Daily  gabapentin, 300 mg, Oral, TID  senna, 1 tablet, Oral, Daily      Continuous IV Infusions:     PRN Meds:  HYDROmorphone, 0.2 mg, Intravenous, Q3H PRN  ondansetron, 4 mg, Intravenous, Q6H PRN  oxyCODONE, 5 mg, Oral, Q4H PRN  oxyCODONE, 2.5 mg, Oral, Q4H PRN  polyethylene glycol, 17 g, Oral, Daily  PRN      Vital Signs (last 3 days)       Date/Time Temp Pulse Resp BP MAP (mmHg) SpO2 O2 Flow Rate (L/min) O2 Device Cardiac (WDL) Patient Position - Orthostatic VS Pain    03/26/25 0959 -- -- -- -- -- -- -- -- -- -- 3    03/26/25 0850 -- -- -- -- -- -- -- None (Room air) -- -- --    03/26/25 07:24:31 97.6 °F (36.4 °C) 57 17 105/65 77 96 % -- None (Room air) -- Sitting --    03/26/25 0547 -- -- -- -- -- -- -- -- -- -- 3    03/26/25 02:21:01 97.7 °F (36.5 °C) 61 18 99/64 73 96 % -- None (Room air) -- Lying --    03/26/25 0022 -- -- -- 105/70 82 -- -- -- -- -- --    03/25/25 2343 -- -- -- -- -- -- -- -- -- -- No Pain    03/25/25 2300 -- -- -- -- -- -- -- -- -- -- No Pain    03/25/25 2100 98.7 °F (37.1 °C) 67 16 101/63 74 98 % -- None (Room air) -- Lying --    03/25/25 2025 -- -- -- -- -- -- -- -- -- -- 5 03/25/25 19:14:51 98.9 °F (37.2 °C) 74 20 111/65 80 98 % -- None (Room air) -- Sitting --    03/25/25 1742 -- -- -- -- -- -- -- -- -- -- 5 03/25/25 1700 -- -- -- -- -- -- -- None (Room air) -- -- --    03/25/25 1500 97.7 °F (36.5 °C) 74 17 99/58 73 98 % -- None (Room air) -- Lying --    03/25/25 1335 -- -- -- -- -- 98 % -- None (Room air) -- -- --    03/25/25 1323 97.5 °F (36.4 °C) 72 16 98/65 75 96 % -- None (Room air) -- Lying 4    03/25/25 1300 -- -- 18 -- -- 98 % -- None (Room air) WDL -- --    03/25/25 1245 -- 70 18 115/65 83 95 % -- None (Room air) -- -- --    03/25/25 1238 -- 68 18 -- -- 97 % -- -- -- -- 2    03/25/25 1233 -- 62 18 111/67 83 98 % -- -- -- -- --    03/25/25 1232 -- 60 18 -- -- 99 % -- -- WDL -- --    03/25/25 1230 -- 64 18 111/67 -- 99 % -- None (Room air) -- -- --    03/25/25 1229 -- -- -- -- -- -- -- -- -- -- 5    03/25/25 1215 -- 55 20 139/81 -- 99 % -- None (Room air) -- -- No Pain    03/25/25 1210 97.3 °F (36.3 °C) 60 18 139/81 105 100 % 6 L/min Simple mask WDL -- No Pain    03/25/25 1206 97.3 °F (36.3 °C) 70 18 139/81 -- -- -- -- -- -- --    03/25/25 0632 -- -- -- -- -- -- -- -- --  -- Med Not Given for Pain - for MAR use only    03/25/25 0551 97.3 °F (36.3 °C) 71 18 115/75 -- 97 % -- None (Room air) -- -- No Pain          Weight (last 2 days)       Date/Time Weight    03/25/25 1323 73.5 (162)    03/25/25 0551 73.5 (162)            Pertinent Labs/Diagnostic Test Results:   Radiology:  XR chest pa and lateral   Final Interpretation by Chemo Zaidi MD (03/26 1028)      Small right-sided hydropneumothorax after chest tube removal. Recommend continued monitoring.      Increased soft tissue emphysema in the right chest wall and neck compared with prior.      No mediastinal shift.      The study was marked in EPIC for immediate notification.            Workstation performed: [id number]         XR chest portable   Final Interpretation by Len Petit MD (03/25 1448)      Small right apical pneumothorax. Right-sided chest tube present.      The study was marked in EPIC for immediate notification.      Workstation performed: [id number]           Cardiology:  No orders to display     GI:  No orders to display           Results from last 7 days   Lab Units 03/26/25  0447 03/25/25  1242   WBC Thousand/uL 16.62* 12.42*   HEMOGLOBIN g/dL 12.9 14.3   HEMATOCRIT % 36.5 42.5   PLATELETS Thousands/uL 132* 154         Results from last 7 days   Lab Units 03/26/25  0447 03/25/25  1242   SODIUM mmol/L 139 139   POTASSIUM mmol/L 4.2 4.0   CHLORIDE mmol/L 109* 108   CO2 mmol/L 23 24   ANION GAP mmol/L 7 7   BUN mg/dL 13 12   CREATININE mg/dL 0.87 0.93   EGFR ml/min/1.73sq m 99 94   CALCIUM mg/dL 8.3* 8.6   MAGNESIUM mg/dL 2.1 1.8*             Results from last 7 days   Lab Units 03/26/25  0447 03/25/25  1242   GLUCOSE RANDOM mg/dL 143* 139           Network Utilization Review Department  ATTENTION: Please call with any questions or concerns to [phone] and carefully listen to the prompts so that you are directed to the right person. All voicemails are confidential.   For Discharge needs, contact  Care Management DC Support Team at [phone] opt. 2  Send all requests for admission clinical reviews, approved or denied determinations and any other requests to dedicated fax number below belonging to the campus where the patient is receiving treatment. List of dedicated fax numbers for the Facilities:  FACILITY NAME UR FAX NUMBER   ADMISSION DENIALS (Administrative/Medical Necessity) [phone]   DISCHARGE SUPPORT TEAM (NETWORK) [phone]   PARENT CHILD HEALTH (Maternity/NICU/Pediatrics) [phone]   [facility] [phone]   [facility] [phone]   [facility] [phone]   [facility] [phone]   [facility] [phone]   [facility] [phone]   [facility] [phone]   [facility] [phone]   [facility] [phone]   [facility] [phone]   [facility] [phone]   [facility] [phone]

## 2025-03-26 NOTE — PROGRESS NOTES
Progress Note - Thoracic    Name: Matthieu Da Silva 54 y.o. male I MRN: [id number]  Unit/Bed#: [facility] 529-01 I Date of Admission: 3/25/2025   Date of Service: 3/26/2025 I Hospital Day: 1    Assessment & Plan  Malignant neoplasm metastatic to right lung (HCC)  54-year-old male s/p robotic lower lobectomy with right lower lobe resection with completion lobectomy, flexible bronchoscopy and mediastinal lymph node dissection on 3/25     Right CT: -2, -AL w/ provocation 100 cc ss    24 Hour Events : no acute events  Subjective : on Room air  in no acute distress.  Denies any chest pain or shortness of breath.  Pulling 1.3 L on I-S.  His right chest tube is causing some mild discomfort with some slight serosanguineous drainage at the chest tube insertion site.    Objective :  Temp:  [97.3 °F (36.3 °C)-98.9 °F (37.2 °C)] 97.7 °F (36.5 °C)  HR:  [55-74] 61  BP: ()/(58-81) 99/64  Resp:  [16-20] 18  SpO2:  [95 %-100 %] 96 %  O2 Device: None (Room air)    I/O         03/24 0701  03/25 0700 03/25 0701  03/26 0700    P.O.  220    I.V. (mL/kg)  2276.7 (31)    IV Piggyback  44.2    Total Intake(mL/kg)  2540.8 (34.6)    Urine (mL/kg/hr)  725 (0.4)    Chest Tube  100    Total Output  825    Net  +1715.8                Lines/Drains/Airways       Active Status       Name Placement date Placement time Site Days    Port A Cath 04/30/19 Right Chest 04/30/19  1516  Chest  2156    Chest Tube 1 Right Pleural 24 Fr. 03/25/25  1145  Pleural  less than 1                  Physical Exam  General: No acute distress, alert and oriented  CV: Well perfused, regular rate  Chest Wall: R CT insertion site with mild strikethrough on dressing other port sites cdi, 0 cc AL w/ valsalva, ss drainage.   Lungs: Normal work of breathing, no increased respiratory effort  Abdomen: Soft, non tender, non-distended.   Extremities: No edema, clubbing or cyanosis  Skin: Warm, dry    Lab Results: I have reviewed the following results:  Recent Labs     03/25/25  1247    WBC 12.42*   HGB 14.3   HCT 42.5      SODIUM 139   K 4.0      CO2 24   BUN 12   CREATININE 0.93   GLUC 139   MG 1.8*             VTE Pharmacologic Prophylaxis: VTE covered by:  enoxaparin, Subcutaneous     VTE Mechanical Prophylaxis: sequential compression device

## 2025-03-26 NOTE — ASSESSMENT & PLAN NOTE
54-year-old male s/p robotic lower lobectomy with right lower lobe resection with completion lobectomy, flexible bronchoscopy and mediastinal lymph node dissection on 3/25    -Diet as tolerated  -Multimodal analgesia  -maintain chest tube to water seal  -monitor for air leak  -encourage IS use  -oob and ambulation

## 2025-03-27 ENCOUNTER — APPOINTMENT (INPATIENT)
Dept: RADIOLOGY | Facility: HOSPITAL | Age: 55
DRG: 165 | End: 2025-03-27
Payer: COMMERCIAL

## 2025-03-27 VITALS
HEIGHT: 66 IN | RESPIRATION RATE: 18 BRPM | HEART RATE: 56 BPM | OXYGEN SATURATION: 97 % | DIASTOLIC BLOOD PRESSURE: 81 MMHG | BODY MASS INDEX: 26.03 KG/M2 | SYSTOLIC BLOOD PRESSURE: 133 MMHG | TEMPERATURE: 98.1 F | WEIGHT: 162 LBS

## 2025-03-27 PROCEDURE — 99024 POSTOP FOLLOW-UP VISIT: CPT

## 2025-03-27 PROCEDURE — 99024 POSTOP FOLLOW-UP VISIT: CPT | Performed by: THORACIC SURGERY (CARDIOTHORACIC VASCULAR SURGERY)

## 2025-03-27 PROCEDURE — 71046 X-RAY EXAM CHEST 2 VIEWS: CPT

## 2025-03-27 RX ADMIN — CELECOXIB 100 MG: 100 CAPSULE ORAL at 08:29

## 2025-03-27 RX ADMIN — ACETAMINOPHEN 975 MG: 325 TABLET, FILM COATED ORAL at 11:29

## 2025-03-27 RX ADMIN — ENOXAPARIN SODIUM 40 MG: 40 INJECTION SUBCUTANEOUS at 08:27

## 2025-03-27 RX ADMIN — DOCUSATE SODIUM 100 MG: 100 CAPSULE, LIQUID FILLED ORAL at 08:28

## 2025-03-27 RX ADMIN — GABAPENTIN 300 MG: 300 CAPSULE ORAL at 15:19

## 2025-03-27 RX ADMIN — ACETAMINOPHEN 975 MG: 325 TABLET, FILM COATED ORAL at 05:47

## 2025-03-27 RX ADMIN — POLYETHYLENE GLYCOL 3350 17 G: 17 POWDER, FOR SOLUTION ORAL at 08:29

## 2025-03-27 RX ADMIN — SENNOSIDES 8.6 MG: 8.6 TABLET, FILM COATED ORAL at 08:28

## 2025-03-27 RX ADMIN — GABAPENTIN 300 MG: 300 CAPSULE ORAL at 08:28

## 2025-03-27 NOTE — RESTORATIVE TECHNICIAN NOTE
Restorative Technician Note      Patient Name: Matthieu Da Silva     Restorative Tech Visit Date: 03/27/25  Note Type: Mobility  Patient Position Upon Consult: Bedside chair  Activity Performed: Ambulated  Assistive Device: Other (Comment) (none)  Patient Position at End of Consult: Bedside chair; All needs within reach

## 2025-03-27 NOTE — PROGRESS NOTES
Progress Note - Thoracic    Name: Matthieu Da Silva 54 y.o. male I MRN: [id number]  Unit/Bed#: [facility] 529-01 I Date of Admission: 3/25/2025   Date of Service: 3/27/2025 I Hospital Day: 2    Assessment & Plan  Malignant neoplasm metastatic to right lung (HCC)  54-year-old male s/p robotic lower lobectomy with right lower lobe resection with completion lobectomy, flexible bronchoscopy and mediastinal lymph node dissection on 3/25     Vital stable on room air    575 cc urine x 3 unmeasured  Chest tube DC'd yesterday      Plan   - Reg diet  - Check AM CXR   - If ptx stable or resolved plan for DC home today  - Analgesia and antiemetic as needed  - OOB and IS  - DVT ppx while inpatient    24 Hour Events : no acute events  Subjective : on Room air  in no acute distress.  Denies any pain, pulling 1.3L on I-S.  Hoping for DC today.    Objective :  Temp:  [97.6 °F (36.4 °C)-98.2 °F (36.8 °C)] 98 °F (36.7 °C)  HR:  [52-57] 52  BP: (105-119)/(65-72) 119/70  Resp:  [17-20] 20  SpO2:  [96 %-98 %] 98 %  O2 Device: None (Room air)    I/O         03/24 0701  03/25 0700 03/25 0701  03/26 0700    P.O.  220    I.V. (mL/kg)  2276.7 (31)    IV Piggyback  44.2    Total Intake(mL/kg)  2540.8 (34.6)    Urine (mL/kg/hr)  725 (0.4)    Chest Tube  100    Total Output  825    Net  +1715.8                Lines/Drains/Airways       Active Status       Name Placement date Placement time Site Days    Port A Cath 04/30/19 Right Chest 04/30/19  1516  Chest  2157                  Physical Exam  General: No acute distress, alert and oriented  CV: Well perfused, regular rate  Chest Wall: incisions c/d/i.   Lungs: Normal work of breathing, no increased respiratory effort  Abdomen: Soft, non tender, non-distended.   Extremities: No edema, clubbing or cyanosis  Skin: Warm, dry    Lab Results: I have reviewed the following results:  Recent Labs     03/26/25  0447   WBC 16.62*   HGB 12.9   HCT 36.5   *   SODIUM 139   K 4.2   *   CO2 23   BUN 13    CREATININE 0.87   GLUC 143*   MG 2.1             VTE Pharmacologic Prophylaxis: VTE covered by:  enoxaparin, Subcutaneous, 40 mg at 03/26/25 0959      VTE Mechanical Prophylaxis: sequential compression device

## 2025-03-27 NOTE — PLAN OF CARE
Problem: PAIN - ADULT  Goal: Verbalizes/displays adequate comfort level or baseline comfort level  Description: Interventions:- Encourage patient to monitor pain and request assistance- Assess pain using appropriate pain scale- Administer analgesics based on type and severity of pain and evaluate response- Implement non-pharmacological measures as appropriate and evaluate response- Consider cultural and social influences on pain and pain management- Notify physician/advanced practitioner if interventions unsuccessful or patient reports new pain  Outcome: Progressing     Problem: INFECTION - ADULT  Goal: Absence or prevention of progression during hospitalization  Description: INTERVENTIONS:- Assess and monitor for signs and symptoms of infection- Monitor lab/diagnostic results- Monitor all insertion sites, i.e. indwelling lines, tubes, and drains- Monitor endotracheal if appropriate and nasal secretions for changes in amount and color- [place] appropriate cooling/warming therapies per order- Administer medications as ordered- Instruct and encourage patient and family to use good hand hygiene technique- Identify and instruct in appropriate isolation precautions for identified infection/condition  Outcome: Progressing  Goal: Absence of fever/infection during neutropenic period  Description: INTERVENTIONS:- Monitor WBC  Outcome: Progressing     Problem: SAFETY ADULT  Goal: Patient will remain free of falls  Description: INTERVENTIONS:- Educate patient/family on patient safety including physical limitations- Instruct patient to call for assistance with activity - Consult OT/PT to assist with strengthening/mobility - Keep Call bell within reach- Keep bed low and locked with side rails adjusted as appropriate- Keep care items and personal belongings within reach- Initiate and maintain comfort rounds- Make Fall Risk Sign visible to staff- Offer Toileting every  Hours, in advance of need- Initiate/Maintain alarm- Obtain  necessary fall risk management equipment: - Apply yellow socks and bracelet for high fall risk patients- Consider moving patient to room near nurses station  Outcome: Progressing  Goal: Maintain or return to baseline ADL function  Description: INTERVENTIONS:-  Assess patient's ability to carry out ADLs; assess patient's baseline for ADL function and identify physical deficits which impact ability to perform ADLs (bathing, care of mouth/teeth, toileting, grooming, dressing, etc.)- Assess/evaluate cause of self-care deficits - Assess range of motion- Assess patient's mobility; develop plan if impaired- Assess patient's need for assistive devices and provide as appropriate- Encourage maximum independence but intervene and supervise when necessary- Involve family in performance of ADLs- Assess for home care needs following discharge - Consider OT consult to assist with ADL evaluation and planning for discharge- Provide patient education as appropriate  Outcome: Progressing  Goal: Maintains/Returns to pre admission functional level  Description: INTERVENTIONS:- Perform AM-PAC 6 Click Basic Mobility/ Daily Activity assessment daily.- Set and communicate daily mobility goal to care team and patient/family/caregiver. - Collaborate with rehabilitation services on mobility goals if consulted- Perform Range of Motion  times a day.- Reposition patient every  hours.- Dangle patient  times a day- Stand patient  times a day- Ambulate patient  times a day- Out of bed to chair  times a day - Out of bed for meals  times a day- Out of bed for toileting- Record patient progress and toleration of activity level   Outcome: Progressing     Problem: DISCHARGE PLANNING  Goal: Discharge to home or other facility with appropriate resources  Description: INTERVENTIONS:- Identify barriers to discharge w/patient and caregiver- Arrange for needed discharge resources and transportation as appropriate- Identify discharge learning needs (meds, wound  care, etc.)- Arrange for interpretive services to assist at discharge as needed- Refer to Case Management Department for coordinating discharge planning if the patient needs post-hospital services based on physician/advanced practitioner order or complex needs related to functional status, cognitive ability, or social support system  Outcome: Progressing     Problem: Knowledge Deficit  Goal: Patient/family/caregiver demonstrates understanding of disease process, treatment plan, medications, and discharge instructions  Description: Complete learning assessment and assess knowledge base.Interventions:- Provide teaching at level of understanding- Provide teaching via preferred learning methods  Outcome: Progressing     Problem: RESPIRATORY - ADULT  Goal: Achieves optimal ventilation and oxygenation  Description: INTERVENTIONS:- Assess for changes in respiratory status- Assess for changes in mentation and behavior- Position to facilitate oxygenation and minimize respiratory effort- Oxygen administered by appropriate delivery if ordered- Initiate smoking cessation education as indicated- Encourage broncho-pulmonary hygiene including cough, deep breathe, Incentive Spirometry- Assess the need for suctioning and aspirate as needed- Assess and instruct to report SOB or any respiratory difficulty- Respiratory Therapy support as indicated  Outcome: Progressing     Problem: SKIN/TISSUE INTEGRITY - ADULT  Goal: Incision(s), wounds(s) or drain site(s) healing without S/S of infection  Description: INTERVENTIONS- Assess and document dressing, incision, wound bed, drain sites and surrounding tissue- Provide patient and family education- Perform skin care/dressing changes every  Outcome: Progressing

## 2025-03-27 NOTE — DISCHARGE SUMMARY
Discharge Summary - Thoracic    Name: Matthieu Da Silva 54 y.o. male I MRN: [id number]  Unit/Bed#: [facility] 529-01 I Date of Admission: 3/25/2025   Date of Service: 3/27/2025 I Hospital Day: 2  { ?Quick Links I Problem List I PORCH I Billing Tip:62005}  Admission Date:   Admission Orders (From admission, onward)       Ordered        03/25/25 1210  Inpatient Admission  Once                           Discharge Date:      Admitting Diagnosis: Lung nodule [R91.1]  Discharge Diagnosis: ***    Medical Problems       Resolved Problems  Date Reviewed: 3/13/2025   None         Attending: ***    Consulting Physician(s): ***    Procedures Performed: No orders of the defined types were placed in this encounter.      Pathology: ***    Hospital Course: {Hospital Course:82780} ***    Condition at Discharge: {condition:45823}     Discharge instructions/Information to patient and family:   See after visit summary for information provided to patient and family.      Provisions for Follow-Up Care:  See after visit summary for information related to follow-up care and any pertinent home health orders.      Disposition: {Discharge Disposition:10793}          Planned Readmission: {EXAM; YES/NO:09282}    Discharge Statement:  I have spent a total time of *** minutes in caring for this patient on the day of the visit/encounter. {>30 minutes of time was spent on:90650}.    Discharge Medications:  See after visit summary for reconciled discharge medications provided to patient and family.     any significant increase in subcutaneous emphysema and the small pneumothorax on post pull chest x-ray was stable on repeat chest x-ray on postoperative day 2.  Patient was deemed stable for discharge with discharge instructions reviewed, discharge medication sent, and follow-up appointment in place.  Provided with office phone number for any questions in the interim.    Condition at Discharge: stable     Discharge instructions/Information to patient and family:   See after visit summary for information provided to patient and family.      Provisions for Follow-Up Care:  See after visit summary for information related to follow-up care and any pertinent home health orders.      Disposition: Home          Planned Readmission: No    Discharge Statement:  I have spent a total time of 20 minutes in caring for this patient on the day of the visit/encounter.     Discharge Medications:  See after visit summary for reconciled discharge medications provided to patient and family.

## 2025-03-27 NOTE — PLAN OF CARE
Problem: PAIN - ADULT  Goal: Verbalizes/displays adequate comfort level or baseline comfort level  Description: Interventions:- Encourage patient to monitor pain and request assistance- Assess pain using appropriate pain scale- Administer analgesics based on type and severity of pain and evaluate response- Implement non-pharmacological measures as appropriate and evaluate response- Consider cultural and social influences on pain and pain management- Notify physician/advanced practitioner if interventions unsuccessful or patient reports new pain  Outcome: Progressing     Problem: RESPIRATORY - ADULT  Goal: Achieves optimal ventilation and oxygenation  Description: INTERVENTIONS:- Assess for changes in respiratory status- Assess for changes in mentation and behavior- Position to facilitate oxygenation and minimize respiratory effort- Oxygen administered by appropriate delivery if ordered- Initiate smoking cessation education as indicated- Encourage broncho-pulmonary hygiene including cough, deep breathe, Incentive Spirometry- Assess the need for suctioning and aspirate as needed- Assess and instruct to report SOB or any respiratory difficulty- Respiratory Therapy support as indicated  Outcome: Progressing

## 2025-03-28 NOTE — UTILIZATION REVIEW
NOTIFICATION OF ADMISSION DISCHARGE   This is a Notification of Discharge from [facility]. Please be advised that this patient has been discharge from our facility. Below you will find the admission and discharge date and time including the patient’s disposition.   UTILIZATION REVIEW CONTACT:  Utilization Review Assistants  Network Utilization Review Department  Phone: [phone] x carefully listen to the prompts. All voicemails are confidential.  Email: [email or web address]     ADMISSION INFORMATION  PRESENTATION DATE: 3/25/2025  5:38 AM  OBERVATION ADMISSION DATE: N/A  INPATIENT ADMISSION DATE: 3/25/25 12:10 PM   DISCHARGE DATE: 3/27/2025  4:15 PM   DISPOSITION:Home/Self Care    Network Utilization Review Department  ATTENTION: Please call with any questions or concerns to [phone] and carefully listen to the prompts so that you are directed to the right person. All voicemails are confidential.   For Discharge needs, contact Care Management DC Support Team at [phone] opt. 2  Send all requests for admission clinical reviews, approved or denied determinations and any other requests to dedicated fax number below belonging to the campus where the patient is receiving treatment. List of dedicated fax numbers for the Facilities:  FACILITY NAME UR FAX NUMBER   ADMISSION DENIALS (Administrative/Medical Necessity) [phone]   DISCHARGE SUPPORT TEAM ([facility]) [phone]   PARENT CHILD HEALTH (Maternity/NICU/Pediatrics) [phone]   [facility] [phone]   [facility] [phone]   [facility] [phone]   [facility] [phone]   [facility] [phone]   [facility] [phone]   [facility] [phone]   [facility] [phone]   [facility]  [facility] [phone]   [facility] [phone]   [facility] [phone]   [facility] [phone]

## 2025-04-02 ENCOUNTER — DOCUMENTATION (OUTPATIENT)
Dept: HEMATOLOGY ONCOLOGY | Facility: CLINIC | Age: 55
End: 2025-04-02

## 2025-04-02 NOTE — PROGRESS NOTES
Received Paperwork   What type of form Disability   Scanned blank form into patient's Epic chart Yes   Method received form  [employer]   Provider responsible for form Dr. Reid   Informed patient our office turn around time for completing patient forms is 5-7 business days. Yes, informed patient of turn around time     Comments

## 2025-04-04 ENCOUNTER — TELEPHONE (OUTPATIENT)
Dept: CARDIAC SURGERY | Facility: CLINIC | Age: 55
End: 2025-04-04

## 2025-04-04 NOTE — TELEPHONE ENCOUNTER
"    Surgeon/Procedure/Date: ,  lobectomy   Follow up appt: 4/10/25          Constipation: (Yes: Colace 100 mg BID, Senokot 2 tabs QHS or Senokot S 2 tabs qhs   No: Dulcolax/Miralax/suppository/enema)    Last bowel movement was yesterday. He states he is taking a stool softener over the counter \"dulcolax\" advised to try colace or senna and to stay hydrated and drink water. He verbalized understanding and agreed.      2.   Pain Management: (Oxycodone 5-10 mg prn, Tylenol 650 mg q6 hrs and +/- Advil 600 mg TID for 7 days. Neurontin 300 mg TID for 21 days, or Neurontin 100 mg TID for patients over 70)    Pain is about a 1-2 on pain scale, he is taking ibuprofen and tylenol has only taken 2 oxy since home, He is taking the Neurontin as ordered.     3.  Fever/Chills/Cough/shortness of breath:  (Call for temp >100.4 )    Denies fever, chills or cough, has some shortness or breath but goes away with rest.     4. Incisions: (Warmth, redness, drainage? May shower, no baths. No creams, lotions, or ointments to incisions)    Denies warmth, redness or drainage. He is showering keeping areas clean and dry.     5.  Ambulation/Incentive Spirometry: (Any activity? Use IS every 15 min)     Ambulating normal resting with shortness of breath. Is using incentive spirometer about 10 times a day is getting to 1750 on meter.   "

## 2025-04-08 ENCOUNTER — TELEPHONE (OUTPATIENT)
Dept: SURGICAL ONCOLOGY | Facility: CLINIC | Age: 55
End: 2025-04-08

## 2025-04-08 NOTE — TELEPHONE ENCOUNTER
I called and spoke to the pt in regards to his post op appointment on 04/10/25 with Dr. Reid. I informed the pt that he will need to get a CXR prior to this appointment. The pt verbalized understanding and was appreciative of the call.

## 2025-04-09 ENCOUNTER — OFFICE VISIT (OUTPATIENT)
Dept: INTERNAL MEDICINE CLINIC | Facility: OTHER | Age: 55
End: 2025-04-09
Payer: COMMERCIAL

## 2025-04-09 ENCOUNTER — HOSPITAL ENCOUNTER (OUTPATIENT)
Dept: RADIOLOGY | Facility: IMAGING CENTER | Age: 55
Discharge: HOME | End: 2025-04-09
Payer: COMMERCIAL

## 2025-04-09 VITALS
WEIGHT: 164.4 LBS | OXYGEN SATURATION: 99 % | HEART RATE: 68 BPM | DIASTOLIC BLOOD PRESSURE: 60 MMHG | RESPIRATION RATE: 20 BRPM | TEMPERATURE: 99 F | BODY MASS INDEX: 26.42 KG/M2 | SYSTOLIC BLOOD PRESSURE: 98 MMHG | HEIGHT: 66 IN

## 2025-04-09 DIAGNOSIS — Z85.89 HISTORY OF KNOWN METASTASIS TO LIVER: ICD-10-CM

## 2025-04-09 DIAGNOSIS — Z13.6 ENCOUNTER FOR LIPID SCREENING FOR CARDIOVASCULAR DISEASE: ICD-10-CM

## 2025-04-09 DIAGNOSIS — Z13.220 ENCOUNTER FOR LIPID SCREENING FOR CARDIOVASCULAR DISEASE: ICD-10-CM

## 2025-04-09 DIAGNOSIS — R73.01 ELEVATED FASTING GLUCOSE: ICD-10-CM

## 2025-04-09 DIAGNOSIS — Z90.2 S/P LOBECTOMY OF LUNG: ICD-10-CM

## 2025-04-09 DIAGNOSIS — C78.01 MALIGNANT NEOPLASM METASTATIC TO RIGHT LUNG (HCC): ICD-10-CM

## 2025-04-09 DIAGNOSIS — C78.01 MALIGNANT NEOPLASM METASTATIC TO RIGHT LUNG (HCC): Primary | ICD-10-CM

## 2025-04-09 DIAGNOSIS — Z85.038 HISTORY OF COLON CANCER, STAGE IV: ICD-10-CM

## 2025-04-09 DIAGNOSIS — E04.1 THYROID NODULE: ICD-10-CM

## 2025-04-09 PROCEDURE — 99496 TRANSJ CARE MGMT HIGH F2F 7D: CPT | Performed by: INTERNAL MEDICINE

## 2025-04-09 PROCEDURE — 71046 X-RAY EXAM CHEST 2 VIEWS: CPT

## 2025-04-09 NOTE — PROGRESS NOTES
Transition of Care Visit:  Name: Matthieu Da Silva      : 1970      MRN: [id number]  Encounter Provider: Truong Berman MD  Encounter Date: 2025   Encounter department: [facility] [place]    Assessment & Plan  Malignant neoplasm metastatic to right lung (HCC)  Continue follow up with surgical oncology.        Elevated fasting glucose  Will check A1c.  Orders:  •  Hemoglobin A1C; Future    Encounter for lipid screening for cardiovascular disease    Orders:  •  Lipid panel; Future    Thyroid nodule  Continue to trend TSH.  Orders:  •  Comprehensive metabolic panel; Future  •  TSH, 3rd generation with Free T4 reflex; Future  •  CBC and differential; Future    S/P lobectomy of lung  Doing well.  No concerns.  He has follow-up scheduled with cardiothoracic surgery.       History of colon cancer, stage IV         History of known metastasis to liver              History of Present Illness     Transitional Care Management Review:   Matthieu Da Silva is a 54 y.o. male here for TCM follow up.     During the TCM phone call patient stated:  TCM Call (since 3/26/2025)     Date and time call was made  3/28/2025  9:35 AM    Hospital care reviewed  Records reviewed    Patient was hospitialized at  [facility]    Date of Admission  25    Date of discharge  25    Diagnosis  malignant neoplasm of right lung    Disposition  Home    Were the patients medications reviewed and updated  No    Current Symptoms  None      TCM Call (since 3/26/2025)     Post hospital issues  Reduced activity    Scheduled for follow up?  Yes    Did you obtain your prescribed medications  Yes    Do you need help managing your prescriptions or medications  No    Is transportation to your appointment needed  No    I have advised the patient to call PCP with any new or worsening symptoms  Da Morillo CMA    Living Arrangements  Spouse or Significiant other    Support System  Partner    Do you have  social support  Yes, as much as I need        Matthieu Da Silva is seen today for transition of care.  Hospitalization and discharge summary reviewed today.  He has a pertinent past medical history of metastatic colorectal cancer.  He recently underwent right lower lobe biopsy for suspicious metastatic colon cancer to which was positive.  He was admitted for planned right lower lobe wedge resection with lobectomy.  He underwent the procedure without complication, chest tube was placed.  Chest tube was removed and follow-up chest x-ray demonstrated small right-sided hydropneumothorax and increased soft tissue emphysema.  He remained hemodynamically stable with stable oxygen saturation on room air.  Repeat imaging did not show worsening subcutaneous emphysema and the small pneumothorax remained stable. He was taking tylenol and ibuprofen for pain, to which his pain has improved. He is still taking gabapentin which he is to take for 21 days.   Follow up CXR was completed today, awaiting radiology read.   He has follow-up scheduled with cardiothoracic surgery tomorrow.      Review of Systems   Constitutional: Negative.  Negative for chills, fatigue and fever.   HENT:  Negative for congestion, ear pain, postnasal drip, rhinorrhea and sore throat.    Eyes: Negative.    Respiratory:  Negative for cough, chest tightness, shortness of breath and wheezing.    Cardiovascular:  Negative for chest pain and palpitations.   Gastrointestinal:  Negative for abdominal distention, abdominal pain, blood in stool, constipation, diarrhea and nausea.   Endocrine: Negative.    Genitourinary:  Negative for difficulty urinating, dysuria and hematuria.   Musculoskeletal: Negative.    Skin: Negative.    Allergic/Immunologic: Negative for environmental allergies and food allergies.   Neurological: Negative.    Hematological:  Negative for adenopathy.   Psychiatric/Behavioral:  Negative for agitation, behavioral problems, confusion and sleep  "disturbance.    All other systems reviewed and are negative.    Objective   BP 98/60 (BP Location: Left arm, Patient Position: Sitting, Cuff Size: Standard)   Pulse 68   Temp 99 °F (37.2 °C) (Temporal)   Resp 20   Ht 5' 6\" (1.676 m)   Wt 74.6 kg (164 lb 6.4 oz)   SpO2 99%   BMI 26.53 kg/m²     Physical Exam  Vitals and nursing note reviewed.   Constitutional:       General: He is not in acute distress.     Appearance: He is well-developed. He is not diaphoretic.   HENT:      Head: Normocephalic and atraumatic.   Eyes:      General: No scleral icterus.        Right eye: No discharge.         Left eye: No discharge.      Conjunctiva/sclera: Conjunctivae normal.      Pupils: Pupils are equal, round, and reactive to light.   Neck:      Thyroid: No thyromegaly.      Vascular: No JVD.   Cardiovascular:      Rate and Rhythm: Normal rate and regular rhythm.      Heart sounds: Normal heart sounds. No murmur heard.     No friction rub. No gallop.   Pulmonary:      Effort: Pulmonary effort is normal. No respiratory distress.      Breath sounds: Normal breath sounds. No wheezing or rales.   Chest:      Chest wall: No tenderness.   Abdominal:      General: Bowel sounds are normal. There is no distension.      Palpations: Abdomen is soft. There is no mass.      Tenderness: There is no abdominal tenderness. There is no guarding or rebound.   Musculoskeletal:         General: No tenderness or deformity. Normal range of motion.      Cervical back: Normal range of motion and neck supple.   Lymphadenopathy:      Cervical: No cervical adenopathy.   Skin:     General: Skin is warm and dry.      Coloration: Skin is not pale.      Findings: No erythema or rash.          Neurological:      Mental Status: He is alert and oriented to person, place, and time.      Cranial Nerves: No cranial nerve deficit.      Coordination: Coordination normal.      Deep Tendon Reflexes: Reflexes are normal and symmetric.   Psychiatric:         " Behavior: Behavior normal.         Thought Content: Thought content normal.         Judgment: Judgment normal.       Medications have been reviewed by provider in current encounter

## 2025-04-09 NOTE — ASSESSMENT & PLAN NOTE
Continue to trend TSH.  Orders:  •  Comprehensive metabolic panel; Future  •  TSH, 3rd generation with Free T4 reflex; Future  •  CBC and differential; Future

## 2025-04-10 ENCOUNTER — TELEPHONE (OUTPATIENT)
Age: 55
End: 2025-04-10

## 2025-04-10 ENCOUNTER — OFFICE VISIT (OUTPATIENT)
Dept: CARDIAC SURGERY | Facility: CLINIC | Age: 55
End: 2025-04-10

## 2025-04-10 VITALS
SYSTOLIC BLOOD PRESSURE: 118 MMHG | OXYGEN SATURATION: 97 % | DIASTOLIC BLOOD PRESSURE: 80 MMHG | HEIGHT: 66 IN | HEART RATE: 84 BPM | RESPIRATION RATE: 15 BRPM | TEMPERATURE: 98.1 F | BODY MASS INDEX: 26.68 KG/M2 | WEIGHT: 166.01 LBS

## 2025-04-10 DIAGNOSIS — G89.18 POSTOPERATIVE PAIN: Primary | ICD-10-CM

## 2025-04-10 DIAGNOSIS — C78.01 MALIGNANT NEOPLASM METASTATIC TO RIGHT LUNG (HCC): ICD-10-CM

## 2025-04-10 DIAGNOSIS — R91.1 LUNG NODULE: ICD-10-CM

## 2025-04-10 PROCEDURE — 99024 POSTOP FOLLOW-UP VISIT: CPT | Performed by: THORACIC SURGERY (CARDIOTHORACIC VASCULAR SURGERY)

## 2025-04-10 RX ORDER — ACETAMINOPHEN 500 MG
1000 TABLET ORAL 3 TIMES DAILY
Qty: 42 TABLET | Refills: 0 | Status: SHIPPED | OUTPATIENT
Start: 2025-04-10 | End: 2025-04-17

## 2025-04-10 RX ORDER — IBUPROFEN 800 MG/1
800 TABLET, FILM COATED ORAL EVERY 8 HOURS SCHEDULED
Qty: 21 TABLET | Refills: 0 | Status: SHIPPED | OUTPATIENT
Start: 2025-04-10 | End: 2025-04-17

## 2025-04-10 NOTE — PROGRESS NOTES
Name: Matthieu Da Silva      : 1970      MRN: [id number]  Encounter Provider: Mary Reid MD  Encounter Date: 4/10/2025   Encounter department: [facility] [place]  :  Assessment & Plan  Postoperative pain    Orders:    acetaminophen (TYLENOL) 500 mg tablet; Take 2 tablets (1,000 mg total) by mouth 3 (three) times a day for 7 days    ibuprofen (MOTRIN) 800 mg tablet; Take 1 tablet (800 mg total) by mouth every 8 (eight) hours for 7 days    Lung nodule    Orders:    XR chest pa and lateral; Future    Malignant neoplasm metastatic to right lung (HCC)  He presents today for his first postoperative visit.  Today in the office, he is having increased discomfort.  I have prescribed 1000 mg of Tylenol Q8 around-the-clock as well as 800 mg of Motrin Q8 around-the-clock.  I have prescribed both these for the next 7 days.  I explained to him that he should take these whether he is having pain or not in order to get back on top of the pain.  He still has some oxycodone left at home.  He will continue to take these as needed.    We discussed his pathology in detail.  The colon cancer was removed in its entirety.  He had no positive lymph nodes.    Based upon his chest x-ray, it appears that he is not fully expanding his lung, likely secondary to his discomfort.  I have requested that he get another chest x-ray when he comes back and sees me in another 4 weeks.             Thoracic History     Oncology History   History of colon cancer, stage IV   2018 Initial Diagnosis    Malignant neoplasm of sigmoid colon (HCC)     2018 Biopsy    Large Intestine, Sigmoid Colon, Recto-sigmoid tumor bx:    - Invasive colonic adenocarcinoma, moderately differentiated     2018 - 10/16/2018 Chemotherapy    Modified FOLFOX6 x 12 cycles  Added Erbitux on 3/20/18      2018 Surgery    Segment 7 liver resection/ablation, hemicolectomy     2018 -  Cancer Staged    Staging form: Colon and  Rectum, AJCC 8th Edition  - Pathologic stage from 6/21/2018: Stage WERO (ypT0, pN0, cM1a) - Signed by STEPHANIE Wills on 10/16/2023  Stage prefix: Post-therapy  Total positive nodes: 0  Sites of metastasis: Liver       10/30/2018 -  Chemotherapy    Continuation of Single agent Erbitux.  With 10/30/18 chemo, it was Cycle #14.  Plan was for 6 additional cycles of Erbitux alone.     3/2020 Genetic Testing    NEGATIVE for genes tested:    Test(s): Cancer[employer] + [employer] (34 genes): APC, ETHAN, BARD1, BRCA1, BRCA2, BRIP1, BMPR1A, CDH1, CDK4, CDKN2A, CHEK2, DICER1, EPCAM, GREM1, HOXB13, MLH1, MRE11A, MSH2, MSH6, MUTYH, NBN, NF1, PALB2, PMS2, POLD1, POLE, PTEN, RAD50, RAD51C, RAD51D, SMAD4, SMARCA4, STK11, TP53      6/8/2020 Surgery    Liver, segment 3 (wedge resection):  - Metastatic adenocarcinoma, consistent with the patient's known colon primary  - Margins negative for carcinoma      7/27/2020 - 1/11/2021 Chemotherapy    fluorouracil (ADRUCIL), 745 mg, Intravenous, Once, 6 of 6 cycles  Administration: 750 mg (7/27/2020), 750 mg (8/10/2020), 745 mg (8/24/2020), 745 mg (9/8/2020), 745 mg (9/21/2020), 745 mg (10/5/2020)  pegfilgrastim (NEULASTA), 6 mg, Subcutaneous, Once, 1 of 1 cycle  Administration: 6 mg (9/24/2020)  pegfilgrastim (NEULASTA ONPRO), 6 mg, Subcutaneous, Once, 6 of 6 cycles  Administration: 6 mg (7/29/2020), 6 mg (8/12/2020), 6 mg (8/26/2020), 6 mg (9/10/2020), 6 mg (10/7/2020)  cetuximab (ERBITUX), 930 mg, Intravenous, Once, 12 of 12 cycles  Administration: 900 mg (7/27/2020), 900 mg (8/10/2020), 900 mg (8/24/2020), 900 mg (9/8/2020), 900 mg (9/21/2020), 900 mg (10/5/2020), 900 mg (10/19/2020), 900 mg (11/2/2020), 900 mg (11/16/2020), 900 mg (11/30/2020), 900 mg (12/28/2020), 900 mg (1/11/2021)  irinotecan (CAMPTOSAR) chemo infusion, 335 mg, Intravenous, Once, 6 of 6 cycles  Administration: 340 mg (7/27/2020), 340 mg (8/10/2020), 340 mg (8/24/2020), 340 mg (9/8/2020), 340 mg (9/21/2020), 340 mg  (10/5/2020)  leucovorin calcium IVPB, 744 mg, Intravenous, Once, 6 of 6 cycles  Administration: 750 mg (7/27/2020), 750 mg (8/10/2020), 750 mg (8/24/2020), 750 mg (9/8/2020), 750 mg (9/21/2020), 740 mg (10/5/2020)  fluorouracil (ADRUCIL) ambulatory infusion Soln, 1,200 mg/m2/day = 4,465 mg, Intravenous, Over 46 hours, 6 of 6 cycles  Dose modification: 1,200 mg/m2/day (original dose 1,200 mg/m2/day, Cycle 3)     9/20/2022 - 12/1/2022 Chemotherapy    palonosetron (ALOXI), 0.25 mg, Intravenous, Once, 4 of 4 cycles  Administration: 0.25 mg (10/18/2022), 0.25 mg (11/1/2022), 0.25 mg (11/15/2022), 0.25 mg (11/29/2022)  pegfilgrastim (NEULASTA ONPRO), 6 mg, Subcutaneous, Once, 6 of 6 cycles  Administration: 6 mg (9/22/2022), 6 mg (10/20/2022), 6 mg (11/3/2022), 6 mg (11/17/2022), 6 mg (12/1/2022)  Pegfilgrastim-bmez (ZIEXTENZO), 6 mg, Subcutaneous, Once, 1 of 1 cycle  Administration: 6 mg (10/7/2022)  fosaprepitant (EMEND) IVPB, 150 mg, Intravenous, Once, 4 of 4 cycles  Administration: 150 mg (10/18/2022), 150 mg (11/1/2022), 150 mg (11/15/2022), 150 mg (11/29/2022)  fluorouracil (ADRUCIL), 400 mg/m2 = 735 mg, Intravenous, Once, 6 of 6 cycles  Administration: 735 mg (9/20/2022), 735 mg (10/4/2022), 730 mg (10/18/2022), 730 mg (11/1/2022), 730 mg (11/15/2022), 730 mg (11/29/2022)  cetuximab (ERBITUX), 920 mg, Intravenous, Once, 6 of 6 cycles  Administration: 900 mg (9/20/2022), 900 mg (10/4/2022), 900 mg (10/18/2022), 900 mg (11/1/2022), 900 mg (11/15/2022), 900 mg (11/29/2022)  irinotecan (CAMPTOSAR) chemo infusion, 331 mg, Intravenous, Once, 6 of 6 cycles  Administration: 320 mg (9/20/2022), 320 mg (10/4/2022), 320 mg (10/18/2022), 320 mg (11/1/2022), 320 mg (11/15/2022), 320 mg (11/29/2022)  leucovorin calcium IVPB, 736 mg, Intravenous, Once, 6 of 6 cycles  Administration: 700 mg (9/20/2022), 700 mg (10/4/2022), 750 mg (10/18/2022), 750 mg (11/1/2022), 700 mg (11/15/2022), 700 mg (11/29/2022)  fluorouracil (ADRUCIL)  ambulatory infusion Soln, 1,200 mg/m2/day = 4,415 mg, Intravenous, Over 46 hours, 6 of 6 cycles     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 (HCC) (Resolved)   4/11/2022 Initial Diagnosis    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 (HCC)     9/20/2022 - 12/1/2022 Chemotherapy    palonosetron (ALOXI), 0.25 mg, Intravenous, Once, 4 of 4 cycles  Administration: 0.25 mg (10/18/2022), 0.25 mg (11/1/2022), 0.25 mg (11/15/2022), 0.25 mg (11/29/2022)  pegfilgrastim (NEULASTA ONPRO), 6 mg, Subcutaneous, Once, 6 of 6 cycles  Administration: 6 mg (9/22/2022), 6 mg (10/20/2022), 6 mg (11/3/2022), 6 mg (11/17/2022), 6 mg (12/1/2022)  Pegfilgrastim-bmez (ZIEXTENZO), 6 mg, Subcutaneous, Once, 1 of 1 cycle  Administration: 6 mg (10/7/2022)  fosaprepitant (EMEND) IVPB, 150 mg, Intravenous, Once, 4 of 4 cycles  Administration: 150 mg (10/18/2022), 150 mg (11/1/2022), 150 mg (11/15/2022), 150 mg (11/29/2022)  fluorouracil (ADRUCIL), 400 mg/m2 = 735 mg, Intravenous, Once, 6 of 6 cycles  Administration: 735 mg (9/20/2022), 735 mg (10/4/2022), 730 mg (10/18/2022), 730 mg (11/1/2022), 730 mg (11/15/2022), 730 mg (11/29/2022)  cetuximab (ERBITUX), 920 mg, Intravenous, Once, 6 of 6 cycles  Administration: 900 mg (9/20/2022), 900 mg (10/4/2022), 900 mg (10/18/2022), 900 mg (11/1/2022), 900 mg (11/15/2022), 900 mg (11/29/2022)  irinotecan (CAMPTOSAR) chemo infusion, 331 mg, Intravenous, Once, 6 of 6 cycles  Administration: 320 mg (9/20/2022), 320 mg (10/4/2022), 320 mg (10/18/2022), 320 mg (11/1/2022), 320 mg (11/15/2022), 320 mg (11/29/2022)  leucovorin calcium IVPB, 736 mg, Intravenous, Once, 6 of 6 cycles  Administration: 700 mg (9/20/2022), 700 mg (10/4/2022), 750 mg (10/18/2022), 750 mg (11/1/2022), 700 mg (11/15/2022), 700 mg (11/29/2022)  fluorouracil (ADRUCIL) ambulatory infusion Soln, 1,200 mg/m2/day = 4,415 mg, Intravenous, Over 46 hours, 6 of 6 cycles     Malignant neoplasm metastatic to right lung (HCC)   8/4/2022 Initial Diagnosis    Malignant neoplasm  metastatic to right lung (HCC)     8/9/2022 Surgery    Flexible bronchoscopy, right thoracoscopic therapeutic wedge resection      9/20/2022 - 12/1/2022 Chemotherapy    palonosetron (ALOXI), 0.25 mg, Intravenous, Once, 4 of 4 cycles  Administration: 0.25 mg (10/18/2022), 0.25 mg (11/1/2022), 0.25 mg (11/15/2022), 0.25 mg (11/29/2022)  pegfilgrastim (NEULASTA ONPRO), 6 mg, Subcutaneous, Once, 6 of 6 cycles  Administration: 6 mg (9/22/2022), 6 mg (10/20/2022), 6 mg (11/3/2022), 6 mg (11/17/2022), 6 mg (12/1/2022)  Pegfilgrastim-bmez (ZIEXTENZO), 6 mg, Subcutaneous, Once, 1 of 1 cycle  Administration: 6 mg (10/7/2022)  fosaprepitant (EMEND) IVPB, 150 mg, Intravenous, Once, 4 of 4 cycles  Administration: 150 mg (10/18/2022), 150 mg (11/1/2022), 150 mg (11/15/2022), 150 mg (11/29/2022)  fluorouracil (ADRUCIL), 400 mg/m2 = 735 mg, Intravenous, Once, 6 of 6 cycles  Administration: 735 mg (9/20/2022), 735 mg (10/4/2022), 730 mg (10/18/2022), 730 mg (11/1/2022), 730 mg (11/15/2022), 730 mg (11/29/2022)  cetuximab (ERBITUX), 920 mg, Intravenous, Once, 6 of 6 cycles  Administration: 900 mg (9/20/2022), 900 mg (10/4/2022), 900 mg (10/18/2022), 900 mg (11/1/2022), 900 mg (11/15/2022), 900 mg (11/29/2022)  irinotecan (CAMPTOSAR) chemo infusion, 331 mg, Intravenous, Once, 6 of 6 cycles  Administration: 320 mg (9/20/2022), 320 mg (10/4/2022), 320 mg (10/18/2022), 320 mg (11/1/2022), 320 mg (11/15/2022), 320 mg (11/29/2022)  leucovorin calcium IVPB, 736 mg, Intravenous, Once, 6 of 6 cycles  Administration: 700 mg (9/20/2022), 700 mg (10/4/2022), 750 mg (10/18/2022), 750 mg (11/1/2022), 700 mg (11/15/2022), 700 mg (11/29/2022)  fluorouracil (ADRUCIL) ambulatory infusion Soln, 1,200 mg/m2/day = 4,415 mg, Intravenous, Over 46 hours, 6 of 6 cycles     3/25/2025 Surgery    Robotic Completion right lower lobectomy, MLND          History of Present Illness   HPI preop ECOG 0  Matthieu Da Silva is a 54 y.o. male who presents to my office for  his first postoperative visit.  He underwent a robotic completion right lower lobectomy on 3/25/2025.  Today in the office, he looks uncomfortable and he is having some discomfort and having a difficult time taking deep breaths.  He had a chest x-ray, which I personally reviewed in PACS.  This demonstrates a small pleural effusion and some atelectasis of the lower lobe, consistent with the pain that he is having.  Otherwise, he feels okay.  He denies any significant shortness of breath.  He denies a chronic cough.    Review of Systems   Constitutional: Negative.  Negative for activity change, chills, fatigue, fever and unexpected weight change.   HENT:  Negative for sore throat, trouble swallowing and voice change.    Eyes: Negative.  Negative for visual disturbance.   Respiratory:  Positive for chest tightness. Negative for cough, shortness of breath, wheezing and stridor.    Cardiovascular:  Negative for chest pain and leg swelling.   Gastrointestinal: Negative.    Endocrine: Negative.    Genitourinary: Negative.    Musculoskeletal: Negative.    Skin: Negative.    Allergic/Immunologic: Negative.    Neurological:  Negative for seizures, syncope, speech difficulty, weakness, light-headedness and headaches.   Hematological:  Negative for adenopathy.   Psychiatric/Behavioral: Negative.        Medical History Reviewed by provider this encounter:     .  Past Medical History   Past Medical History:   Diagnosis Date    Cancer (HCC)     Lung and stage 4 colon cancer    Colon cancer (HCC)     Dysuria     Last Assessed:8/24/17    Erectile dysfunction     GERD (gastroesophageal reflux disease)     Liver cancer (HCC)     Liver nodule     Pulmonary nodule, right     Thyroid nodule 02/16/2023    Ureteropelvic junction (UPJ) obstruction 01/29/2020    Wears glasses      Past Surgical History:   Procedure Laterality Date    ABDOMINAL SURGERY      COLON SURGERY      COLONOSCOPY N/A 01/22/2018    Procedure: COLONOSCOPY;  Surgeon:  Casey Justice MD;  Location: [facility];  Service: Colorectal    DENTAL SURGERY  2016    Crown with bridge work    FLEXIBLE SIGMOIDOSCOPY N/A 06/15/2018    Procedure: SIGMOIDOSCOPY FLEXIBLE w/o sedation w/ tattoo;  Surgeon: Casey Justice MD;  Location: [facility];  Service: Colorectal    IR BIOPSY LUNG  2/4/2025    IR PORT PLACEMENT Right     LAPAROTOMY N/A 06/08/2020    Procedure: LAPAROTOMY EXPLORATORY, LYSIS OF ADHESIONS;  Surgeon: Esequiel Mijares MD;  [facility];  Service: Surgical Oncology    LIVER LOBECTOMY N/A 06/21/2018    Procedure: liver resection/ablation, intraoperative ultrasound of liver;  Surgeon: Esequiel Mijares MD;  [facility];  Service: Surgical Oncology    LIVER LOBECTOMY N/A 06/08/2020    Procedure: LIVER RESECTION SEGMENT 3 WITH  INTRAOPERATIVE U/S OF LIVER;  Surgeon: Esequiel Mijares MD;  [facility];  Service: Surgical Oncology    LUNG LOBECTOMY Right 3/25/2025    Procedure: LOBECTOMY LUNG THORACOSCOPIC W/ ROBOTICS, RIGHT LOWER LOBE WEDGE WITH COMPLETION LOBECTOMY;  Surgeon: Mary Reid MD;  [facility];  Service: Thoracic    LUNG SEGMENTECTOMY Right 8/9/2022    Procedure: RESECTION WEDGE LUNG, THERAPEUTIC;  Surgeon: Mary Reid MD;  [facility];  Service: Thoracic    KY BRNCC INCL FLUOR GDNCE DX W/CELL WASHG SPX N/A 8/9/2022    Procedure: BRONCHOSCOPY FLEXIBLE;  Surgeon: Mary Reid MD;  [facility];  Service: Thoracic    KY BRNCHSC INCL FLUOR GDNCE DX W/CELL WASHG SPX N/A 3/25/2025    Procedure: BRONCHOSCOPY FLEXIBLE;  Surgeon: Mary Reid MD;  [facility];  Service: Thoracic    KY COLECTOMY PARTIAL W/ANASTOMOSIS Left 06/21/2018    Procedure: hemicolectomy, low anterior resection (open) SPY fluorescence angiography;  Surgeon: Casey Justice MD;  [facility];  Service: Colorectal    KY EXPLORATORY LAPAROTOMY CELIOTOMY W/WO BIOPSY SPX N/A 06/21/2018    Procedure: LAPAROTOMY  EXPLORATORY;  Surgeon: Casey Justice MD;  [facility];  Service: Colorectal    VT INSJ PRPH CTR VAD W/SUBQ PORT AGE 5 YR/> N/A 01/25/2018    Procedure: PORT-A-CATHETER PLACEMENT  Fluoroscopy for performance/interpretation;  Surgeon: Casey Justice MD;  [facility];  Service: Colorectal    VT PRCTECT PRTL RESCJ RECTUM TABDL APPR N/A 06/21/2018    Procedure: Partial proctectomy.;  Surgeon: Casey Justice MD;  [facility];  Service: Colorectal    VT SIGMOIDOSCOPY FLX DX W/COLLJ SPEC BR/WA IF PFRMD N/A 06/21/2018    Procedure: SIGMOIDOSCOPY FLEXIBLE;  Surgeon: Casey Justice MD;  [facility];  Service: Colorectal    VT THORACOSCOPY W/THERA WEDGE RESEXN INITIAL UNILAT Right 8/9/2022    Procedure: THORACOSCOPY VIDEO ASSISTED SURGERY (VATS);  Surgeon: Mary Reid MD;  [facility];  Service: Thoracic    ROOT CANAL  2015    TESTICLE SURGERY      Exploration of Undescended Testis rIght, age 10 had surgery for undescended testicle; Last Assessed:8/24/17    TOOTH EXTRACTION  2015     Family History   Problem Relation Age of Onset    Cancer Mother         unknown    No Known Problems Father     Cancer Brother         pt. reports brother was diagnosed with stage 4 throat cancer    Throat cancer Brother     Breast cancer Maternal Aunt     Cancer Brother     Anesthesia problems Neg Hx       reports that he quit smoking about 9 years ago. His smoking use included cigarettes. He started smoking about 34 years ago. He has a 12.5 pack-year smoking history. He has never used smokeless tobacco. He reports current alcohol use. He reports current drug use. Frequency: 1.00 time per week. Drug: Marijuana.  Current Outpatient Medications   Medication Instructions    acetaminophen (TYLENOL) 1,000 mg, Oral, 3 times daily    gabapentin (NEURONTIN) 300 mg, Oral, 3 times daily    ibuprofen (MOTRIN) 800 mg, Oral, Every 8 hours scheduled    Multiple Vitamins-Minerals (MENS  "MULTIVITAMIN PO) Daily    sildenafil (VIAGRA) 50 mg, Oral, As needed   No Known Allergies   Current Outpatient Medications on File Prior to Visit   Medication Sig Dispense Refill    gabapentin (Neurontin) 300 mg capsule Take 1 capsule (300 mg total) by mouth 3 (three) times a day for 21 days 63 capsule 0    Multiple Vitamins-Minerals (MENS MULTIVITAMIN PO) Take by mouth in the morning      sildenafil (VIAGRA) 50 MG tablet Take 1 tablet (50 mg total) by mouth as needed for erectile dysfunction 10 tablet 0     No current facility-administered medications on file prior to visit.      Social History     Tobacco Use    Smoking status: Former     Current packs/day: 0.00     Average packs/day: 0.5 packs/day for 25.0 years (12.5 ttl pk-yrs)     Types: Cigarettes     Start date:      Quit date:      Years since quittin.2    Smokeless tobacco: Never   Vaping Use    Vaping status: Every Day    Start date: 10/10/2016    Substances: Nicotine, THC (at time), CBD (at times), Flavoring   Substance and Sexual Activity    Alcohol use: Yes     Comment: socially - 2 month    Drug use: Yes     Frequency: 1.0 times per week     Types: Marijuana     Comment: E cig with THC occasional - does use flower or smoke    Sexual activity: Yes     Partners: Female     Comment: Denies any chest pain or shortness of breath with activity        Objective   /80 (BP Location: Left arm, Patient Position: Sitting, Cuff Size: Standard)   Pulse 84   Temp 98.1 °F (36.7 °C) (Temporal)   Resp 15   Ht 5' 6\" (1.676 m)   Wt 75.3 kg (166 lb 0.1 oz)   SpO2 97%   BMI 26.79 kg/m²     Pain Screening:  Pain Score:   2  ECOG  0  Physical Exam  Vitals and nursing note reviewed.   Constitutional:       Appearance: He is well-developed. He is not diaphoretic.   HENT:      Head: Normocephalic and atraumatic.      Nose: Nose normal. No congestion.      Mouth/Throat:      Mouth: Mucous membranes are moist.      Pharynx: Oropharynx is clear.   Eyes:    "   Extraocular Movements: Extraocular movements intact.      Pupils: Pupils are equal, round, and reactive to light.   Neck:      Trachea: No tracheal deviation.   Cardiovascular:      Rate and Rhythm: Normal rate and regular rhythm.      Heart sounds: Normal heart sounds. No murmur heard.  Pulmonary:      Effort: Pulmonary effort is normal. No respiratory distress.      Breath sounds: Normal breath sounds. No stridor. No wheezing or rales.   Chest:      Chest wall: No tenderness.   Abdominal:      General: Bowel sounds are normal. There is no distension.      Palpations: Abdomen is soft.      Tenderness: There is no abdominal tenderness.   Musculoskeletal:         General: Normal range of motion.      Cervical back: Normal range of motion.   Lymphadenopathy:      Cervical: No cervical adenopathy.   Skin:     General: Skin is warm and dry.      Coloration: Skin is not pale.      Findings: No erythema or rash.      Comments: Chest tube stitch DC'd   Neurological:      Mental Status: He is alert and oriented to person, place, and time.   Psychiatric:         Behavior: Behavior normal.         Thought Content: Thought content normal.         Judgment: Judgment normal.         Labs:   Results for orders placed or performed during the hospital encounter of 03/25/25   CBC   Result Value Ref Range    WBC 12.42 (H) 4.31 - 10.16 Thousand/uL    RBC 4.31 3.88 - 5.62 Million/uL    Hemoglobin 14.3 12.0 - 17.0 g/dL    Hematocrit 42.5 36.5 - 49.3 %    MCV 99 (H) 82 - 98 fL    MCH 33.2 26.8 - 34.3 pg    MCHC 33.6 31.4 - 37.4 g/dL    RDW 13.0 11.6 - 15.1 %    Platelets 154 149 - 390 Thousands/uL    MPV 11.4 8.9 - 12.7 fL   Basic metabolic panel   Result Value Ref Range    Sodium 139 135 - 147 mmol/L    Potassium 4.0 3.5 - 5.3 mmol/L    Chloride 108 96 - 108 mmol/L    CO2 24 21 - 32 mmol/L    ANION GAP 7 4 - 13 mmol/L    BUN 12 5 - 25 mg/dL    Creatinine 0.93 0.60 - 1.30 mg/dL    Glucose 139 65 - 140 mg/dL    Calcium 8.6 8.4 - 10.2  mg/dL    eGFR 94 ml/min/1.73sq m   Result Value Ref Range    Magnesium 1.8 (L) 1.9 - 2.7 mg/dL   CBC   Result Value Ref Range    WBC 16.62 (H) 4.31 - 10.16 Thousand/uL    RBC 3.85 (L) 3.88 - 5.62 Million/uL    Hemoglobin 12.9 12.0 - 17.0 g/dL    Hematocrit 36.5 36.5 - 49.3 %    MCV 95 82 - 98 fL    MCH 33.5 26.8 - 34.3 pg    MCHC 35.3 31.4 - 37.4 g/dL    RDW 13.0 11.6 - 15.1 %    Platelets 132 (L) 149 - 390 Thousands/uL    MPV 11.2 8.9 - 12.7 fL   Basic metabolic panel   Result Value Ref Range    Sodium 139 135 - 147 mmol/L    Potassium 4.2 3.5 - 5.3 mmol/L    Chloride 109 (H) 96 - 108 mmol/L    CO2 23 21 - 32 mmol/L    ANION GAP 7 4 - 13 mmol/L    BUN 13 5 - 25 mg/dL    Creatinine 0.87 0.60 - 1.30 mg/dL    Glucose 143 (H) 65 - 140 mg/dL    Calcium 8.3 (L) 8.4 - 10.2 mg/dL    eGFR 99 ml/min/1.73sq m   Result Value Ref Range    Magnesium 2.1 1.9 - 2.7 mg/dL   Tissue Exam   Result Value Ref Range    Case Report       Surgical Pathology Report                         Case: [id number]                                  Authorizing Provider:  Mary Reid MD    Collected:           03/25/2025 0835              Ordering Location:     [facility]      Received:            03/25/2025 [facility]                                     Hospital Operating Room                                                      Pathologist:           Kentrell Hung MD                                                         Intraop:               Fabiola Perez MD                                                                 Specimens:   A) - Lymph Node, Level 9                                                                            B) - Lung, Right Lower Lobe, wedge                                                                  C) - Lymph Node, Level 11                                                                           D) - Lymph Node, Level 11 posterior                                                                  E) -  Lymph Node, Level 12                                                                           F) - Lymph Node, Level 10                                                                           G) - Lung, Right Lower Lobe, Completion right lower lobectomy                                       H) - Mediastinum, Mediastinal nodule                                                       Final Diagnosis       A.  Lymph node, level 9:     - Negative for malignancy (0/1).    B.  Lung, right lower lobe, wedge::     - Metastatic adenocarcinoma of the colon, 1.5 cm.     - Lymphatic channel invasion by tumor identified.    C.  Lymph node, level 11:     - Negative for malignancy (0/1).    D.  Lymph node, level 11, posterior:     - Negative for malignancy (0/1).    E.  Lymph node, level 12:     - Negative for malignancy (0/1).    F.  Lymph node, level 10:     - Negative for malignancy (0/1).    G.  Lung, right lower lobe:     - No significant pathologic abnormalities.     - No malignancy identified.     - Six lymph nodes identified; all negative for malignancy (0/6).     - Bronchial and vascular margins are negative for malignancy.    H.  Mediastinal nodule:     - Portion of necrotic adipose tissue encased by dense fibrous tissue.     - No malignancy identified.        Note       IF NEEDED FOR ADDITIONAL STUDIES, TUMOR IS BEST REPRESENTED IN BLOCK B5.    On immunostaining the tumor cells are positive for CK20, CDX2, and SATB2 but negative for CK7 and TTF-1 helping support the above diagnosis.        Additional Information       All reported additional testing was performed with appropriately reactive controls.  These tests were developed and their performance characteristics determined by [facility] or appropriate performing facility, though some tests may be performed on tissues which have not been validated for performance characteristics (such as staining performed on alcohol exposed cell blocks and  "decalcified tissues).  Results should be interpreted with caution and in the context of the patients’ clinical condition. These tests may not be cleared or approved by the U.S. Food and Drug Administration, though the FDA has determined that such clearance or approval is not necessary. These tests are used for clinical purposes and they should not be regarded as investigational or for research. This laboratory has been approved by [facility], designated as a high-complexity laboratory and is qualified to perform these tests.  .  - Interpretation performed at [facility], [address] [place], [place] [id number]      Intraoperative Consultation       BF1. Lung, Right Lower Lobe, wedge:  - Adenocarcinoma.     Dr. Reid is notified of the findings via phone on 3 /25/2025 at 9:26 am.  Interpretation performed at [place] [id number]  Reviewed by Fabiola Dunbar M.D.      Gross Description       A. The specimen is received in formalin, labeled with the patient's name and hospital number, and is designated \" lymph node level 9\".  It consists of a 0.5 x 0.4 x 0.2 cm tan-anthracotic potential lymph node.  The specimen is submitted in toto in cassette A1.  B.  The specimen is received fresh for frozen section, labeled with the patient's name and hospital number, and is designated \" right lower lobe lung wedge\".  It consists of a 7.7 x 3.6 x 2.6 cm previously sectioned right lower lobe lung wedge weighing 24.1 g (post fixation and frozen section).  The staple line is inked black and the serosal surface is inked blue.  Sectioning reveals a 1.5 x 1.2 x 1.0 cm ill-defined white-tan anthracotic mass underlying the pleura with multiple embedded sutures and less than 0.5 cm from the parenchymal resection margin.  The presumed frozen section remnant displays multiple metallic gray sutures.  The remainder of the parenchyma is tan-brown and spongy.  Frozen section is performed.  Representative sections are " "submitted as follows:    B1-B2: Frozen section remnant (split due to size of tissue)  B3-B4: Mass to parenchymal resection margin and pleura, perpendicular  B5: Additional mass  B6: Uninvolved parenchyma    C. The specimen is received in formalin, labeled with the patient's name and hospital number, and is designated \" lymph node level 11\".  It consists of a 0.6 x 0.3 x 0.2 cm tan-anthracotic potential lymph node.  The specimen is submitted in toto in cassette C1.  D. The specimen is received in formalin, labeled with the patient's name and hospital number, and is designated \" lymph node level 11 posterior\".  It consists of a 0.7 x 0.5 x 0.2 cm anthracotic potential lymph node.  The specimen is submitted in toto in cassette D1.  E. The specimen is received in formalin, labeled with the patient's name and hospital number, and is designated \" lymph node level 12\".  It consists of a 0.5 x 0.4 x 0.2 cm anthracotic potential lymph node.  Entirely submitted in cassette E1.  F. The specimen is received in formalin, labeled with the patient's name and hospital number, and is designated \" lymph node level 10\".  It consists of 3 fragments of disrupted anthracotic potential lymph node measuring 1.6 x 0.5 x 0.2 cm in aggregate.  The specimen is submitted in toto in cassette F1.  G. The specimen is received in formalin, labeled with the patient's name and hospital number, and is designated \" completion right lower lobectomy\".  It consists of a 202.0 g, 9.5 x 8.7 x 5.9 cm right lower lung lobectomy.  The hilum displays a 2.0 cm in diameter bronchus, a 1.3 cm in diameter vein, and two 0.5 cm in diameter arteries.  7 potential 0.3 cm - 0.8 cm anthracotic lymph nodes are grossly identified at the hilum.   The pleura is tan-brown, purple glistening to wrinkled with an 8.5 cm in length previous wedge resection staple line (inked blue) which measures 3.2 cm to the bronchial and vasculature margins.  Sectioning reveals red spongy " "parenchyma.  No lesion is grossly identified.  No intraparenchymal lymph nodes are grossly identified.  Digital images are acquired.  Representative sections are submitted as follows:    G1: Bronchial margin, en face  G2: Vasculature margins, en face  G3: 1 potential hilar lymph node, trisected  G4: 2 potential hilar lymph nodes, intact  G5: 1 potential hilar lymph node, bisected  G6: 3 potential hilar lymph nodes, intact  G7-G8: Parenchyma to previous wedge resection staple line, perpendicular  G9-G10: Additional parenchyma adjacent to previous wedge resection staple line  G11-G12: Uninvolved parenchyma    Per the pathologist's request, additional vasculature margin is submitted in cassette G13.  H. The specimen is received in formalin, labeled with the patient's name and hospital number, and is designated \" mediastinal nodule\".  It consists of a 1.0 x 0.6 x 0.5 cm tan-gray glistening soft tissue nodule.  The specimen is inked black and bisected to reveal a tan-white smooth lined multiloculated cystic structure containing brown friable material. Entirely submitted in cassette H1.    Note: The estimated total formalin fixation time based upon information provided by the submitting clinician and the standard processing schedule is 56.50 hours.  [facility]          Radiology Results Review : I have reviewed images/report studies in PACS as described above (in the HPI).  Pathology: I have reviewed pathology reports described above.      "

## 2025-04-10 NOTE — TELEPHONE ENCOUNTER
PA for acetaminophen 500 mg SUBMITTED to [employer]    via    []CMM-KEY:   [x]Surescripts-Case ID #   []Availity-Auth ID # NDC #   []Faxed to plan   []Other website   []Phone call Case ID #     []PA sent as URGENT    All office notes, labs and other pertaining documents and studies sent. Clinical questions answered. Awaiting determination from insurance company.     Turnaround time for your insurance to make a decision on your Prior Authorization can take 7-21 business days.

## 2025-04-10 NOTE — ASSESSMENT & PLAN NOTE
He presents today for his first postoperative visit.  Today in the office, he is having increased discomfort.  I have prescribed 1000 mg of Tylenol Q8 around-the-clock as well as 800 mg of Motrin Q8 around-the-clock.  I have prescribed both these for the next 7 days.  I explained to him that he should take these whether he is having pain or not in order to get back on top of the pain.  He still has some oxycodone left at home.  He will continue to take these as needed.    We discussed his pathology in detail.  The colon cancer was removed in its entirety.  He had no positive lymph nodes.    Based upon his chest x-ray, it appears that he is not fully expanding his lung, likely secondary to his discomfort.  I have requested that he get another chest x-ray when he comes back and sees me in another 4 weeks.

## 2025-04-15 NOTE — TELEPHONE ENCOUNTER
PA for acetaminophen 500 mg  DENIED    Reason:(Screenshot if applicable)    Per plan this is a OTC PRODUCT     Message sent to office clinical pool Yes    Denial letter scanned into [employer] no    Appeal started No (Provider will need to decide if appeal is warranted and send clinical documentation to Prior Authorization Team for initiation.)    **Please follow up with your patient regarding denial and next steps**

## 2025-04-30 ENCOUNTER — APPOINTMENT (OUTPATIENT)
Dept: RADIOLOGY | Age: 55
End: 2025-04-30
Payer: COMMERCIAL

## 2025-04-30 DIAGNOSIS — R91.1 LUNG NODULE: ICD-10-CM

## 2025-04-30 PROCEDURE — 71046 X-RAY EXAM CHEST 2 VIEWS: CPT

## 2025-05-01 ENCOUNTER — OFFICE VISIT (OUTPATIENT)
Dept: CARDIAC SURGERY | Facility: CLINIC | Age: 55
End: 2025-05-01

## 2025-05-01 VITALS
OXYGEN SATURATION: 98 % | WEIGHT: 162.48 LBS | BODY MASS INDEX: 26.22 KG/M2 | HEART RATE: 88 BPM | DIASTOLIC BLOOD PRESSURE: 78 MMHG | TEMPERATURE: 98.3 F | SYSTOLIC BLOOD PRESSURE: 110 MMHG

## 2025-05-01 DIAGNOSIS — C78.01 MALIGNANT NEOPLASM METASTATIC TO RIGHT LUNG (HCC): Primary | ICD-10-CM

## 2025-05-01 PROCEDURE — 99024 POSTOP FOLLOW-UP VISIT: CPT | Performed by: THORACIC SURGERY (CARDIOTHORACIC VASCULAR SURGERY)

## 2025-05-01 NOTE — LETTER
May 1, 2025     Patient: Matthieu Da Silva  YOB: 1970  Date of Visit: 5/1/2025      To Whom it May Concern:    Matthieu Da Silva is under my professional care. Matthieu was seen in my office on 5/1/2025. Matthieu may return to work on 5/19/25 without restriction .    If you have any questions or concerns, please don't hesitate to call.         Sincerely,          Mary Reid MD        CC: No Recipients

## 2025-05-01 NOTE — PROGRESS NOTES
Name: Matthieu Da Silva      : 1970      MRN: [id number]  Encounter Provider: Mary Reid MD  Encounter Date: 2025   Encounter department: [facility]  :  Assessment & Plan  Malignant neoplasm metastatic to right lung (HCC)  Matthieu has recovered well from surgery.  At this time, his care will be returned to Dr. Mijares of surgical oncology and Dr. Harrison of medical oncology.  He knows that he can call me for anything.  He can follow-up with me on an as-needed basis.  I have cleared him to return to work on 2025             Thoracic History     Oncology History   History of colon cancer, stage IV   2018 Initial Diagnosis    Malignant neoplasm of sigmoid colon (HCC)     2018 Biopsy    Large Intestine, Sigmoid Colon, Recto-sigmoid tumor bx:    - Invasive colonic adenocarcinoma, moderately differentiated     2018 - 10/16/2018 Chemotherapy    Modified FOLFOX6 x 12 cycles  Added Erbitux on 3/20/18      2018 Surgery    Segment 7 liver resection/ablation, hemicolectomy     2018 -  Cancer Staged    Staging form: Colon and Rectum, AJCC 8th Edition  - Pathologic stage from 2018: Stage WERO (ypT0, pN0, cM1a) - Signed by STEPHANIE Wills on 10/16/2023  Stage prefix: Post-therapy  Total positive nodes: 0  Sites of metastasis: Liver       10/30/2018 -  Chemotherapy    Continuation of Single agent Erbitux.  With 10/30/18 chemo, it was Cycle #14.  Plan was for 6 additional cycles of Erbitux alone.     3/2020 Genetic Testing    NEGATIVE for genes tested:    Test(s): CancerNext + RNAinsight (34 genes): APC, ETHAN, BARD1, BRCA1, BRCA2, BRIP1, BMPR1A, CDH1, CDK4, CDKN2A, CHEK2, DICER1, EPCAM, GREM1, HOXB13, MLH1, MRE11A, MSH2, MSH6, MUTYH, NBN, NF1, PALB2, PMS2, POLD1, POLE, PTEN, RAD50, RAD51C, RAD51D, SMAD4, SMARCA4, STK11, TP53      2020 Surgery    Liver, segment 3 (wedge resection):  - Metastatic adenocarcinoma, consistent with the patient's  known colon primary  - Margins negative for carcinoma      7/27/2020 - 1/11/2021 Chemotherapy    fluorouracil (ADRUCIL), 745 mg, Intravenous, Once, 6 of 6 cycles  Administration: 750 mg (7/27/2020), 750 mg (8/10/2020), 745 mg (8/24/2020), 745 mg (9/8/2020), 745 mg (9/21/2020), 745 mg (10/5/2020)  pegfilgrastim (NEULASTA), 6 mg, Subcutaneous, Once, 1 of 1 cycle  Administration: 6 mg (9/24/2020)  pegfilgrastim (NEULASTA ONPRO), 6 mg, Subcutaneous, Once, 6 of 6 cycles  Administration: 6 mg (7/29/2020), 6 mg (8/12/2020), 6 mg (8/26/2020), 6 mg (9/10/2020), 6 mg (10/7/2020)  cetuximab (ERBITUX), 930 mg, Intravenous, Once, 12 of 12 cycles  Administration: 900 mg (7/27/2020), 900 mg (8/10/2020), 900 mg (8/24/2020), 900 mg (9/8/2020), 900 mg (9/21/2020), 900 mg (10/5/2020), 900 mg (10/19/2020), 900 mg (11/2/2020), 900 mg (11/16/2020), 900 mg (11/30/2020), 900 mg (12/28/2020), 900 mg (1/11/2021)  irinotecan (CAMPTOSAR) chemo infusion, 335 mg, Intravenous, Once, 6 of 6 cycles  Administration: 340 mg (7/27/2020), 340 mg (8/10/2020), 340 mg (8/24/2020), 340 mg (9/8/2020), 340 mg (9/21/2020), 340 mg (10/5/2020)  leucovorin calcium IVPB, 744 mg, Intravenous, Once, 6 of 6 cycles  Administration: 750 mg (7/27/2020), 750 mg (8/10/2020), 750 mg (8/24/2020), 750 mg (9/8/2020), 750 mg (9/21/2020), 740 mg (10/5/2020)  fluorouracil (ADRUCIL) ambulatory infusion Soln, 1,200 mg/m2/day = 4,465 mg, Intravenous, Over 46 hours, 6 of 6 cycles  Dose modification: 1,200 mg/m2/day (original dose 1,200 mg/m2/day, Cycle 3)     9/20/2022 - 12/1/2022 Chemotherapy    palonosetron (ALOXI), 0.25 mg, Intravenous, Once, 4 of 4 cycles  Administration: 0.25 mg (10/18/2022), 0.25 mg (11/1/2022), 0.25 mg (11/15/2022), 0.25 mg (11/29/2022)  pegfilgrastim (NEULASTA ONPRO), 6 mg, Subcutaneous, Once, 6 of 6 cycles  Administration: 6 mg (9/22/2022), 6 mg (10/20/2022), 6 mg (11/3/2022), 6 mg (11/17/2022), 6 mg (12/1/2022)  Pegfilgrastim-bmez (ZIEXTENZO), 6 mg,  Subcutaneous, Once, 1 of 1 cycle  Administration: 6 mg (10/7/2022)  fosaprepitant (EMEND) IVPB, 150 mg, Intravenous, Once, 4 of 4 cycles  Administration: 150 mg (10/18/2022), 150 mg (11/1/2022), 150 mg (11/15/2022), 150 mg (11/29/2022)  fluorouracil (ADRUCIL), 400 mg/m2 = 735 mg, Intravenous, Once, 6 of 6 cycles  Administration: 735 mg (9/20/2022), 735 mg (10/4/2022), 730 mg (10/18/2022), 730 mg (11/1/2022), 730 mg (11/15/2022), 730 mg (11/29/2022)  cetuximab (ERBITUX), 920 mg, Intravenous, Once, 6 of 6 cycles  Administration: 900 mg (9/20/2022), 900 mg (10/4/2022), 900 mg (10/18/2022), 900 mg (11/1/2022), 900 mg (11/15/2022), 900 mg (11/29/2022)  irinotecan (CAMPTOSAR) chemo infusion, 331 mg, Intravenous, Once, 6 of 6 cycles  Administration: 320 mg (9/20/2022), 320 mg (10/4/2022), 320 mg (10/18/2022), 320 mg (11/1/2022), 320 mg (11/15/2022), 320 mg (11/29/2022)  leucovorin calcium IVPB, 736 mg, Intravenous, Once, 6 of 6 cycles  Administration: 700 mg (9/20/2022), 700 mg (10/4/2022), 750 mg (10/18/2022), 750 mg (11/1/2022), 700 mg (11/15/2022), 700 mg (11/29/2022)  fluorouracil (ADRUCIL) ambulatory infusion Soln, 1,200 mg/m2/day = 4,415 mg, Intravenous, Over 46 hours, 6 of 6 cycles     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 (HCC) (Resolved)   4/11/2022 Initial Diagnosis    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 (HCC)     9/20/2022 - 12/1/2022 Chemotherapy    palonosetron (ALOXI), 0.25 mg, Intravenous, Once, 4 of 4 cycles  Administration: 0.25 mg (10/18/2022), 0.25 mg (11/1/2022), 0.25 mg (11/15/2022), 0.25 mg (11/29/2022)  pegfilgrastim (NEULASTA ONPRO), 6 mg, Subcutaneous, Once, 6 of 6 cycles  Administration: 6 mg (9/22/2022), 6 mg (10/20/2022), 6 mg (11/3/2022), 6 mg (11/17/2022), 6 mg (12/1/2022)  Pegfilgrastim-bmez (ZIEXTENZO), 6 mg, Subcutaneous, Once, 1 of 1 cycle  Administration: 6 mg (10/7/2022)  fosaprepitant (EMEND) IVPB, 150 mg, Intravenous, Once, 4 of 4 cycles  Administration: 150 mg (10/18/2022), 150 mg (11/1/2022), 150 mg  (11/15/2022), 150 mg (11/29/2022)  fluorouracil (ADRUCIL), 400 mg/m2 = 735 mg, Intravenous, Once, 6 of 6 cycles  Administration: 735 mg (9/20/2022), 735 mg (10/4/2022), 730 mg (10/18/2022), 730 mg (11/1/2022), 730 mg (11/15/2022), 730 mg (11/29/2022)  cetuximab (ERBITUX), 920 mg, Intravenous, Once, 6 of 6 cycles  Administration: 900 mg (9/20/2022), 900 mg (10/4/2022), 900 mg (10/18/2022), 900 mg (11/1/2022), 900 mg (11/15/2022), 900 mg (11/29/2022)  irinotecan (CAMPTOSAR) chemo infusion, 331 mg, Intravenous, Once, 6 of 6 cycles  Administration: 320 mg (9/20/2022), 320 mg (10/4/2022), 320 mg (10/18/2022), 320 mg (11/1/2022), 320 mg (11/15/2022), 320 mg (11/29/2022)  leucovorin calcium IVPB, 736 mg, Intravenous, Once, 6 of 6 cycles  Administration: 700 mg (9/20/2022), 700 mg (10/4/2022), 750 mg (10/18/2022), 750 mg (11/1/2022), 700 mg (11/15/2022), 700 mg (11/29/2022)  fluorouracil (ADRUCIL) ambulatory infusion Soln, 1,200 mg/m2/day = 4,415 mg, Intravenous, Over 46 hours, 6 of 6 cycles     Malignant neoplasm metastatic to right lung (HCC)   8/4/2022 Initial Diagnosis    Malignant neoplasm metastatic to right lung (HCC)     8/9/2022 Surgery    Flexible bronchoscopy, right thoracoscopic therapeutic wedge resection      9/20/2022 - 12/1/2022 Chemotherapy    palonosetron (ALOXI), 0.25 mg, Intravenous, Once, 4 of 4 cycles  Administration: 0.25 mg (10/18/2022), 0.25 mg (11/1/2022), 0.25 mg (11/15/2022), 0.25 mg (11/29/2022)  pegfilgrastim (NEULASTA ONPRO), 6 mg, Subcutaneous, Once, 6 of 6 cycles  Administration: 6 mg (9/22/2022), 6 mg (10/20/2022), 6 mg (11/3/2022), 6 mg (11/17/2022), 6 mg (12/1/2022)  Pegfilgrastim-bmez (ZIEXTENZO), 6 mg, Subcutaneous, Once, 1 of 1 cycle  Administration: 6 mg (10/7/2022)  fosaprepitant (EMEND) IVPB, 150 mg, Intravenous, Once, 4 of 4 cycles  Administration: 150 mg (10/18/2022), 150 mg (11/1/2022), 150 mg (11/15/2022), 150 mg (11/29/2022)  fluorouracil (ADRUCIL), 400 mg/m2 = 735 mg,  Intravenous, Once, 6 of 6 cycles  Administration: 735 mg (9/20/2022), 735 mg (10/4/2022), 730 mg (10/18/2022), 730 mg (11/1/2022), 730 mg (11/15/2022), 730 mg (11/29/2022)  cetuximab (ERBITUX), 920 mg, Intravenous, Once, 6 of 6 cycles  Administration: 900 mg (9/20/2022), 900 mg (10/4/2022), 900 mg (10/18/2022), 900 mg (11/1/2022), 900 mg (11/15/2022), 900 mg (11/29/2022)  irinotecan (CAMPTOSAR) chemo infusion, 331 mg, Intravenous, Once, 6 of 6 cycles  Administration: 320 mg (9/20/2022), 320 mg (10/4/2022), 320 mg (10/18/2022), 320 mg (11/1/2022), 320 mg (11/15/2022), 320 mg (11/29/2022)  leucovorin calcium IVPB, 736 mg, Intravenous, Once, 6 of 6 cycles  Administration: 700 mg (9/20/2022), 700 mg (10/4/2022), 750 mg (10/18/2022), 750 mg (11/1/2022), 700 mg (11/15/2022), 700 mg (11/29/2022)  fluorouracil (ADRUCIL) ambulatory infusion Soln, 1,200 mg/m2/day = 4,415 mg, Intravenous, Over 46 hours, 6 of 6 cycles     3/25/2025 Surgery    Robotic Completion right lower lobectomy, MLND          History of Present Illness   HPI preop ECOG 0.  No readmissions in the first 30 days after surgery    Matthieu Da Silva is a 54 y.o. male who is known to me.  He underwent a robotic completion right lower lobectomy on 3/25/2025.  He presents today for his second postoperative visit.  Today in the office, he is doing very well.  He has recovered without incident from his surgery.  He still has some tenderness over his incisions but this continues to improve.  He denies any shortness of breath, increased work of breathing or cough.    I personally reviewed his chest x-ray.  He still has a small right-sided pleural effusion.  This is smaller than on his last chest x-ray.  I assured him today that this will continue to improve with time    Review of Systems   Constitutional: Negative.  Negative for activity change, chills, fatigue, fever and unexpected weight change.   HENT:  Negative for sore throat, trouble swallowing and voice change.     Eyes: Negative.  Negative for visual disturbance.   Respiratory:  Negative for cough, chest tightness, shortness of breath, wheezing and stridor.    Cardiovascular:  Negative for chest pain and leg swelling.   Gastrointestinal: Negative.    Endocrine: Negative.    Genitourinary: Negative.    Musculoskeletal: Negative.    Skin: Negative.    Allergic/Immunologic: Negative.    Neurological:  Negative for seizures, syncope, speech difficulty, weakness, light-headedness and headaches.   Hematological:  Negative for adenopathy.   Psychiatric/Behavioral: Negative.        Medical History Reviewed by provider this encounter:     .  Past Medical History   Past Medical History:   Diagnosis Date    Cancer (HCC)     Lung and stage 4 colon cancer    Colon cancer (HCC)     Dysuria     Last Assessed:8/24/17    Erectile dysfunction     GERD (gastroesophageal reflux disease)     Liver cancer (HCC)     Liver nodule     Pulmonary nodule, right     Thyroid nodule 02/16/2023    Ureteropelvic junction (UPJ) obstruction 01/29/2020    Wears glasses      Past Surgical History:   Procedure Laterality Date    ABDOMINAL SURGERY      COLON SURGERY      COLONOSCOPY N/A 01/22/2018    Procedure: COLONOSCOPY;  Surgeon: Casey Justice MD;  Location: [facility];  Service: Colorectal    DENTAL SURGERY  2016    Crown with bridge work    FLEXIBLE SIGMOIDOSCOPY N/A 06/15/2018    Procedure: SIGMOIDOSCOPY FLEXIBLE w/o sedation w/ tattoo;  Surgeon: Casey Justice MD;  Location: [facility];  Service: Colorectal    IR BIOPSY LUNG  2/4/2025    IR PORT PLACEMENT Right     LAPAROTOMY N/A 06/08/2020    Procedure: LAPAROTOMY EXPLORATORY, LYSIS OF ADHESIONS;  Surgeon: Esequiel Mijares MD;  [facility];  Service: Surgical Oncology    LIVER LOBECTOMY N/A 06/21/2018    Procedure: liver resection/ablation, intraoperative ultrasound of liver;  Surgeon: Esequiel Mijares MD;  [facility];  Service: Surgical Oncology    LIVER LOBECTOMY N/A 06/08/2020     Procedure: LIVER RESECTION SEGMENT 3 WITH  INTRAOPERATIVE U/S OF LIVER;  Surgeon: Esequiel Mijares MD;  [facility];  Service: Surgical Oncology    LUNG LOBECTOMY Right 3/25/2025    Procedure: LOBECTOMY LUNG THORACOSCOPIC W/ ROBOTICS, RIGHT LOWER LOBE WEDGE WITH COMPLETION LOBECTOMY;  Surgeon: Mary Reid MD;  [facility];  Service: Thoracic    LUNG SEGMENTECTOMY Right 8/9/2022    Procedure: RESECTION WEDGE LUNG, THERAPEUTIC;  Surgeon: Mary Reid MD;  [facility];  Service: Thoracic    AK BRNCHSC INCL FLUOR GDNCE DX W/CELL WASHG SPX N/A 8/9/2022    Procedure: BRONCHOSCOPY FLEXIBLE;  Surgeon: Mary Reid MD;  [facility];  Service: Thoracic    AK BRNCHSC INCL FLUOR GDNCE DX W/CELL WASHG SPX N/A 3/25/2025    Procedure: BRONCHOSCOPY FLEXIBLE;  Surgeon: Mary Reid MD;  [facility];  Service: Thoracic    AK COLECTOMY PARTIAL W/ANASTOMOSIS Left 06/21/2018    Procedure: hemicolectomy, low anterior resection (open) SPY fluorescence angiography;  Surgeon: Casey Justice MD;  [facility];  Service: Colorectal    AK EXPLORATORY LAPAROTOMY CELIOTOMY W/WO BIOPSY SPX N/A 06/21/2018    Procedure: LAPAROTOMY EXPLORATORY;  Surgeon: Casey Justice MD;  [facility];  Service: Colorectal    AK INSJ PRPH CTR VAD W/SUBQ PORT AGE 5 YR/> N/A 01/25/2018    Procedure: PORT-A-CATHETER PLACEMENT  Fluoroscopy for performance/interpretation;  Surgeon: Casey Justice MD;  [facility];  Service: Colorectal    AK PRCTECT PRTL RESCJ RECTUM TABDL APPR N/A 06/21/2018    Procedure: Partial proctectomy.;  Surgeon: Casey Justice MD;  [facility];  Service: Colorectal    AK SIGMOIDOSCOPY FLX DX W/COLLJ SPEC BR/WA IF PFRMD N/A 06/21/2018    Procedure: SIGMOIDOSCOPY FLEXIBLE;  Surgeon: Casey Justice MD;  [facility];  Service: Colorectal    AK THORACOSCOPY W/THERA WEDGE RESEXN INITIAL UNILAT Right 8/9/2022     Procedure: THORACOSCOPY VIDEO ASSISTED SURGERY (VATS);  Surgeon: Mary Reid MD;  [facility];  Service: Thoracic    ROOT CANAL  2015    TESTICLE SURGERY      Exploration of Undescended Testis rIght, age 10 had surgery for undescended testicle; Last Assessed:8/24/17    TOOTH EXTRACTION  2015     Family History   Problem Relation Age of Onset    Cancer Mother         unknown    No Known Problems Father     Cancer Brother         pt. reports brother was diagnosed with stage 4 throat cancer    Throat cancer Brother     Breast cancer Maternal Aunt     Cancer Brother     Anesthesia problems Neg Hx       reports that he quit smoking about 9 years ago. His smoking use included cigarettes. He started smoking about 34 years ago. He has a 12.5 pack-year smoking history. He has never used smokeless tobacco. He reports current alcohol use. He reports current drug use. Frequency: 1.00 time per week. Drug: Marijuana.  Current Outpatient Medications   Medication Instructions    gabapentin (NEURONTIN) 300 mg, Oral, 3 times daily    ibuprofen (MOTRIN) 800 mg, Oral, Every 8 hours scheduled    Multiple Vitamins-Minerals (MENS MULTIVITAMIN PO) Daily    sildenafil (VIAGRA) 50 mg, Oral, As needed   No Known Allergies   Current Outpatient Medications on File Prior to Visit   Medication Sig Dispense Refill    Multiple Vitamins-Minerals (MENS MULTIVITAMIN PO) Take by mouth in the morning      sildenafil (VIAGRA) 50 MG tablet Take 1 tablet (50 mg total) by mouth as needed for erectile dysfunction 10 tablet 0    gabapentin (Neurontin) 300 mg capsule Take 1 capsule (300 mg total) by mouth 3 (three) times a day for 21 days 63 capsule 0    ibuprofen (MOTRIN) 800 mg tablet Take 1 tablet (800 mg total) by mouth every 8 (eight) hours for 7 days 21 tablet 0     No current facility-administered medications on file prior to visit.      Social History     Tobacco Use    Smoking status: Former     Current packs/day: 0.00     Average  packs/day: 0.5 packs/day for 25.0 years (12.5 ttl pk-yrs)     Types: Cigarettes     Start date:      Quit date: 2016     Years since quittin.3    Smokeless tobacco: Never   Vaping Use    Vaping status: Every Day    Start date: 10/10/2016    Substances: Nicotine, THC (at time), CBD (at times), Flavoring   Substance and Sexual Activity    Alcohol use: Yes     Comment: socially - 2 month    Drug use: Yes     Frequency: 1.0 times per week     Types: Marijuana     Comment: E cig with THC occasional - does use flower or smoke    Sexual activity: Yes     Partners: Female     Comment: Denies any chest pain or shortness of breath with activity        Objective   /78 (BP Location: Left arm, Patient Position: Sitting, Cuff Size: Large)   Pulse 88   Temp 98.3 °F (36.8 °C) (Temporal)   Wt 73.7 kg (162 lb 7.7 oz)   SpO2 98%   BMI 26.22 kg/m²     Pain Screening:  Pain Score: 0-No pain  ECOG    Physical Exam  Vitals and nursing note reviewed.   Constitutional:       Appearance: He is well-developed. He is not diaphoretic.   HENT:      Head: Normocephalic and atraumatic.      Nose: Nose normal. No congestion.      Mouth/Throat:      Mouth: Mucous membranes are moist.      Pharynx: Oropharynx is clear.   Eyes:      Extraocular Movements: Extraocular movements intact.      Pupils: Pupils are equal, round, and reactive to light.   Neck:      Trachea: No tracheal deviation.   Cardiovascular:      Rate and Rhythm: Normal rate and regular rhythm.      Heart sounds: Normal heart sounds. No murmur heard.  Pulmonary:      Effort: Pulmonary effort is normal. No respiratory distress.      Breath sounds: Normal breath sounds. No stridor. No wheezing or rales.   Chest:      Chest wall: No tenderness.   Abdominal:      General: Bowel sounds are normal. There is no distension.      Palpations: Abdomen is soft.      Tenderness: There is no abdominal tenderness.   Musculoskeletal:         General: Normal range of motion.       Cervical back: Normal range of motion.   Lymphadenopathy:      Cervical: No cervical adenopathy.   Skin:     General: Skin is warm and dry.      Coloration: Skin is not pale.      Findings: No erythema or rash.   Neurological:      Mental Status: He is alert and oriented to person, place, and time.   Psychiatric:         Behavior: Behavior normal.         Thought Content: Thought content normal.         Judgment: Judgment normal.         Labs:   Results for orders placed or performed during the hospital encounter of 03/25/25   CBC   Result Value Ref Range    WBC 12.42 (H) 4.31 - 10.16 Thousand/uL    RBC 4.31 3.88 - 5.62 Million/uL    Hemoglobin 14.3 12.0 - 17.0 g/dL    Hematocrit 42.5 36.5 - 49.3 %    MCV 99 (H) 82 - 98 fL    MCH 33.2 26.8 - 34.3 pg    MCHC 33.6 31.4 - 37.4 g/dL    RDW 13.0 11.6 - 15.1 %    Platelets 154 149 - 390 Thousands/uL    MPV 11.4 8.9 - 12.7 fL   Basic metabolic panel   Result Value Ref Range    Sodium 139 135 - 147 mmol/L    Potassium 4.0 3.5 - 5.3 mmol/L    Chloride 108 96 - 108 mmol/L    CO2 24 21 - 32 mmol/L    ANION GAP 7 4 - 13 mmol/L    BUN 12 5 - 25 mg/dL    Creatinine 0.93 0.60 - 1.30 mg/dL    Glucose 139 65 - 140 mg/dL    Calcium 8.6 8.4 - 10.2 mg/dL    eGFR 94 ml/min/1.73sq m   Result Value Ref Range    Magnesium 1.8 (L) 1.9 - 2.7 mg/dL   CBC   Result Value Ref Range    WBC 16.62 (H) 4.31 - 10.16 Thousand/uL    RBC 3.85 (L) 3.88 - 5.62 Million/uL    Hemoglobin 12.9 12.0 - 17.0 g/dL    Hematocrit 36.5 36.5 - 49.3 %    MCV 95 82 - 98 fL    MCH 33.5 26.8 - 34.3 pg    MCHC 35.3 31.4 - 37.4 g/dL    RDW 13.0 11.6 - 15.1 %    Platelets 132 (L) 149 - 390 Thousands/uL    MPV 11.2 8.9 - 12.7 fL   Basic metabolic panel   Result Value Ref Range    Sodium 139 135 - 147 mmol/L    Potassium 4.2 3.5 - 5.3 mmol/L    Chloride 109 (H) 96 - 108 mmol/L    CO2 23 21 - 32 mmol/L    ANION GAP 7 4 - 13 mmol/L    BUN 13 5 - 25 mg/dL    Creatinine 0.87 0.60 - 1.30 mg/dL    Glucose 143 (H) 65 - 140 mg/dL     Calcium 8.3 (L) 8.4 - 10.2 mg/dL    eGFR 99 ml/min/1.73sq m   Result Value Ref Range    Magnesium 2.1 1.9 - 2.7 mg/dL   Tissue Exam   Result Value Ref Range    Case Report       Surgical Pathology Report                         Case: [id number]                                  Authorizing Provider:  Mary Reid MD    Collected:           03/25/2025 0835              Ordering Location:     [facility]      Received:            03/25/2025 [facility]                                     Hospital Operating Room                                                      Pathologist:           Kentrell Hung MD                                                         Intraop:               Fabiola Perez MD                                                                 Specimens:   A) - Lymph Node, Level 9                                                                            B) - Lung, Right Lower Lobe, wedge                                                                  C) - Lymph Node, Level 11                                                                           D) - Lymph Node, Level 11 posterior                                                                  E) - Lymph Node, Level 12                                                                           F) - Lymph Node, Level 10                                                                           G) - Lung, Right Lower Lobe, Completion right lower lobectomy                                       H) - Mediastinum, Mediastinal nodule                                                       Final Diagnosis       A.  Lymph node, level 9:     - Negative for malignancy (0/1).    B.  Lung, right lower lobe, wedge::     - Metastatic adenocarcinoma of the colon, 1.5 cm.     - Lymphatic channel invasion by tumor identified.    C.  Lymph node, level 11:     - Negative for malignancy (0/1).    D.  Lymph node, level 11, posterior:     - Negative for malignancy  (0/1).    E.  Lymph node, level 12:     - Negative for malignancy (0/1).    F.  Lymph node, level 10:     - Negative for malignancy (0/1).    G.  Lung, right lower lobe:     - No significant pathologic abnormalities.     - No malignancy identified.     - Six lymph nodes identified; all negative for malignancy (0/6).     - Bronchial and vascular margins are negative for malignancy.    H.  Mediastinal nodule:     - Portion of necrotic adipose tissue encased by dense fibrous tissue.     - No malignancy identified.        Note       IF NEEDED FOR ADDITIONAL STUDIES, TUMOR IS BEST REPRESENTED IN BLOCK B5.    On immunostaining the tumor cells are positive for CK20, CDX2, and SATB2 but negative for CK7 and TTF-1 helping support the above diagnosis.        Additional Information       All reported additional testing was performed with appropriately reactive controls.  These tests were developed and their performance characteristics determined by [facility] or appropriate performing facility, though some tests may be performed on tissues which have not been validated for performance characteristics (such as staining performed on alcohol exposed cell blocks and decalcified tissues).  Results should be interpreted with caution and in the context of the patients’ clinical condition. These tests may not be cleared or approved by the U.S. Food and Drug Administration, though the FDA has determined that such clearance or approval is not necessary. These tests are used for clinical purposes and they should not be regarded as investigational or for research. This laboratory has been approved by CLIA 88, designated as a high-complexity laboratory and is qualified to perform these tests.  .  - Interpretation performed at [facility]. [address] [place] [address]      Intraoperative Consultation       BF1. Lung, Right Lower Lobe, wedge:  - Adenocarcinoma.     Dr. Reid is notified of the findings via  "phone on 3 /25/2025 at 9:26 am.  Interpretation performed at [facility], [address] [place] [address]  Reviewed by Fabiola Dunbar M.D.      Gross Description       A. The specimen is received in formalin, labeled with the patient's name and hospital number, and is designated \" lymph node level 9\".  It consists of a 0.5 x 0.4 x 0.2 cm tan-anthracotic potential lymph node.  The specimen is submitted in toto in cassette A1.  B.  The specimen is received fresh for frozen section, labeled with the patient's name and hospital number, and is designated \" right lower lobe lung wedge\".  It consists of a 7.7 x 3.6 x 2.6 cm previously sectioned right lower lobe lung wedge weighing 24.1 g (post fixation and frozen section).  The staple line is inked black and the serosal surface is inked blue.  Sectioning reveals a 1.5 x 1.2 x 1.0 cm ill-defined white-tan anthracotic mass underlying the pleura with multiple embedded sutures and less than 0.5 cm from the parenchymal resection margin.  The presumed frozen section remnant displays multiple metallic gray sutures.  The remainder of the parenchyma is tan-brown and spongy.  Frozen section is performed.  Representative sections are submitted as follows:    B1-B2: Frozen section remnant (split due to size of tissue)  B3-B4: Mass to parenchymal resection margin and pleura, perpendicular  B5: Additional mass  B6: Uninvolved parenchyma    C. The specimen is received in formalin, labeled with the patient's name and hospital number, and is designated \" lymph node level 11\".  It consists of a 0.6 x 0.3 x 0.2 cm tan-anthracotic potential lymph node.  The specimen is submitted in toto in cassette C1.  D. The specimen is received in formalin, labeled with the patient's name and hospital number, and is designated \" lymph node level 11 posterior\".  It consists of a 0.7 x 0.5 x 0.2 cm anthracotic potential lymph node.  The specimen is submitted in toto in cassette D1.  E. The specimen is " "received in formalin, labeled with the patient's name and hospital number, and is designated \" lymph node level 12\".  It consists of a 0.5 x 0.4 x 0.2 cm anthracotic potential lymph node.  Entirely submitted in cassette E1.  F. The specimen is received in formalin, labeled with the patient's name and hospital number, and is designated \" lymph node level 10\".  It consists of 3 fragments of disrupted anthracotic potential lymph node measuring 1.6 x 0.5 x 0.2 cm in aggregate.  The specimen is submitted in toto in cassette F1.  G. The specimen is received in formalin, labeled with the patient's name and hospital number, and is designated \" completion right lower lobectomy\".  It consists of a 202.0 g, 9.5 x 8.7 x 5.9 cm right lower lung lobectomy.  The hilum displays a 2.0 cm in diameter bronchus, a 1.3 cm in diameter vein, and two 0.5 cm in diameter arteries.  7 potential 0.3 cm - 0.8 cm anthracotic lymph nodes are grossly identified at the hilum.   The pleura is tan-brown, purple glistening to wrinkled with an 8.5 cm in length previous wedge resection staple line (inked blue) which measures 3.2 cm to the bronchial and vasculature margins.  Sectioning reveals red spongy parenchyma.  No lesion is grossly identified.  No intraparenchymal lymph nodes are grossly identified.  Digital images are acquired.  Representative sections are submitted as follows:    G1: Bronchial margin, en face  G2: Vasculature margins, en face  G3: 1 potential hilar lymph node, trisected  G4: 2 potential hilar lymph nodes, intact  G5: 1 potential hilar lymph node, bisected  G6: 3 potential hilar lymph nodes, intact  G7-G8: Parenchyma to previous wedge resection staple line, perpendicular  G9-G10: Additional parenchyma adjacent to previous wedge resection staple line  G11-G12: Uninvolved parenchyma    Per the pathologist's request, additional vasculature margin is submitted in cassette G13.  H. The specimen is received in formalin, labeled with the " "patient's name and hospital number, and is designated \" mediastinal nodule\".  It consists of a 1.0 x 0.6 x 0.5 cm tan-gray glistening soft tissue nodule.  The specimen is inked black and bisected to reveal a tan-white smooth lined multiloculated cystic structure containing brown friable material. Entirely submitted in cassette H1.    Note: The estimated total formalin fixation time based upon information provided by the submitting clinician and the standard processing schedule is 56.50 hours.  [facility]          Radiology Results Review : I have reviewed images/report studies in PACS as described above (in the HPI).  Pathology: I have reviewed pathology reports described above.      "

## 2025-05-01 NOTE — ASSESSMENT & PLAN NOTE
Matthieu has recovered well from surgery.  At this time, his care will be returned to Dr. Mijares of surgical oncology and Dr. Harrison of medical oncology.  He knows that he can call me for anything.  He can follow-up with me on an as-needed basis.  I have cleared him to return to work on 5/19/2025

## 2025-05-27 ENCOUNTER — TELEPHONE (OUTPATIENT)
Dept: SURGICAL ONCOLOGY | Facility: CLINIC | Age: 55
End: 2025-05-27

## 2025-05-27 DIAGNOSIS — Z85.038 HISTORY OF COLON CANCER, STAGE IV: Primary | ICD-10-CM

## 2025-05-27 NOTE — TELEPHONE ENCOUNTER
Called , spoke to patient. Reminded patient to have blood work done prior appointment 6/11/25 with STEPHANIE Borjas.  Patient will have CEA and CMP done.

## 2025-05-31 ENCOUNTER — HOSPITAL ENCOUNTER (OUTPATIENT)
Dept: MRI IMAGING | Facility: HOSPITAL | Age: 55
Discharge: HOME | End: 2025-05-31
Attending: SURGERY
Payer: COMMERCIAL

## 2025-05-31 DIAGNOSIS — Z85.038 HISTORY OF COLON CANCER, STAGE IV: ICD-10-CM

## 2025-05-31 PROCEDURE — 74183 MRI ABD W/O CNTR FLWD CNTR: CPT

## 2025-05-31 PROCEDURE — A9585 GADOBUTROL INJECTION: HCPCS | Performed by: SURGERY

## 2025-05-31 RX ORDER — GADOBUTROL 604.72 MG/ML
7 INJECTION INTRAVENOUS
Status: COMPLETED | OUTPATIENT
Start: 2025-05-31 | End: 2025-05-31

## 2025-05-31 RX ADMIN — GADOBUTROL 7 ML: 604.72 INJECTION INTRAVENOUS at 14:53

## 2025-06-03 ENCOUNTER — TELEPHONE (OUTPATIENT)
Dept: SURGICAL ONCOLOGY | Facility: CLINIC | Age: 55
End: 2025-06-03

## 2025-06-03 NOTE — TELEPHONE ENCOUNTER
Called patient left voice message to have labs done prior to appointment 6/11/25 with STEPHANIE Borjas.

## 2025-06-04 ENCOUNTER — DOCUMENTATION (OUTPATIENT)
Dept: HEMATOLOGY ONCOLOGY | Facility: CLINIC | Age: 55
End: 2025-06-04

## 2025-06-04 NOTE — PROGRESS NOTES
Received Paperwork   What type of form Disability   Scanned blank form into patient's Epic chart Yes   Method received form  [employer]   Provider responsible for form Dr. Reid   Informed patient our office turn around time for completing patient forms is 5-7 business days. Yes, informed patient of turn around time     Comments      c

## 2025-06-09 ENCOUNTER — APPOINTMENT (OUTPATIENT)
Dept: LAB | Facility: MEDICAL CENTER | Age: 55
End: 2025-06-09
Payer: COMMERCIAL

## 2025-06-09 DIAGNOSIS — Z85.038 HISTORY OF COLON CANCER, STAGE IV: ICD-10-CM

## 2025-06-09 LAB
ALBUMIN SERPL BCG-MCNC: 4.2 G/DL (ref 3.5–5)
ALP SERPL-CCNC: 62 U/L (ref 34–104)
ALT SERPL W P-5'-P-CCNC: 12 U/L (ref 7–52)
ANION GAP SERPL CALCULATED.3IONS-SCNC: 8 MMOL/L (ref 4–13)
AST SERPL W P-5'-P-CCNC: 18 U/L (ref 13–39)
BILIRUB SERPL-MCNC: 0.32 MG/DL (ref 0.2–1)
BUN SERPL-MCNC: 15 MG/DL (ref 5–25)
CALCIUM SERPL-MCNC: 9 MG/DL (ref 8.4–10.2)
CEA SERPL-MCNC: 1 NG/ML (ref 0–3)
CHLORIDE SERPL-SCNC: 106 MMOL/L (ref 96–108)
CO2 SERPL-SCNC: 26 MMOL/L (ref 21–32)
CREAT SERPL-MCNC: 1.03 MG/DL (ref 0.6–1.3)
GFR SERPL CREATININE-BSD FRML MDRD: 83 ML/MIN/1.73SQ M
GLUCOSE SERPL-MCNC: 108 MG/DL (ref 65–140)
POTASSIUM SERPL-SCNC: 3.8 MMOL/L (ref 3.5–5.3)
PROT SERPL-MCNC: 7.6 G/DL (ref 6.4–8.4)
SODIUM SERPL-SCNC: 140 MMOL/L (ref 135–147)

## 2025-06-09 PROCEDURE — 82378 CARCINOEMBRYONIC ANTIGEN: CPT

## 2025-06-09 PROCEDURE — 80053 COMPREHEN METABOLIC PANEL: CPT

## 2025-06-09 PROCEDURE — 36415 COLL VENOUS BLD VENIPUNCTURE: CPT

## 2025-06-11 ENCOUNTER — OFFICE VISIT (OUTPATIENT)
Dept: SURGICAL ONCOLOGY | Facility: CLINIC | Age: 55
End: 2025-06-11
Payer: COMMERCIAL

## 2025-06-11 VITALS
HEART RATE: 76 BPM | WEIGHT: 161 LBS | BODY MASS INDEX: 25.88 KG/M2 | SYSTOLIC BLOOD PRESSURE: 106 MMHG | OXYGEN SATURATION: 99 % | HEIGHT: 66 IN | DIASTOLIC BLOOD PRESSURE: 64 MMHG | TEMPERATURE: 99 F

## 2025-06-11 DIAGNOSIS — Z08 ENCOUNTER FOR FOLLOW-UP EXAMINATION AFTER COMPLETED TREATMENT FOR MALIGNANT NEOPLASM: Primary | ICD-10-CM

## 2025-06-11 DIAGNOSIS — Z85.038 HISTORY OF COLON CANCER, STAGE IV: ICD-10-CM

## 2025-06-11 PROCEDURE — 99213 OFFICE O/P EST LOW 20 MIN: CPT

## 2025-06-11 NOTE — LETTER
2025     Esequiel Mijares MD  [address]  [address]nd [address]  [facility] [address]    Patient: Matthieu Da Silva   YOB: 1970   Date of Visit: 2025       Dear Dr. Esequiel Mijares MD:    Thank you for referring Matthieu Da Silva to me for evaluation. Below are my notes for this consultation.    If you have questions, please do not hesitate to call me. I look forward to following your patient along with you.         Sincerely,        STEPHANIE Wills        CC: No Recipients    STEPHANIE Wills  2025  3:24 PM  Sign when Signing Visit  Name: Matthieu Da Silva      : 1970      MRN: [id number]  Encounter Provider: STEPHANIE Wills  Encounter Date: 2025   Encounter department: [facility] [place]  :  Assessment & Plan  Encounter for follow-up examination after completed treatment for malignant neoplasm         History of colon cancer, stage IV   There is no evidence of intra-abdominal disease recurrence by symptoms or imaging, and his CEA level remains low.  We will see him again in 6 months with repeat MRI and bloodwork.  The patient is doing well following recent lung resection. MRI does show a right pleural effusion. This has been discussed with Dr Reid, and is expected to resolve on its own. The patient has been advised to call with any new SOB or chest pressure.      Orders:  •  MRI abdomen w wo contrast; Future  •  BUN; Future  •  Creatinine, serum; Future  •  CEA; Future        History of Present Illness  Chief Complaint   Patient presents with   • Follow-up     Return in about 6 months (around 2025) for Office visit with Dr Mijares, Imaging - See orders, Labs - See Treatment Plan.    Pertinent Medical History  This is a 55 y/o male who returns to the office today in follow-up for his history of metastatic colon cancer. He is 7-1/2 years out from his primary tumor resection, and has undergone two liver resections and two lung  resections since that time.  Most recently he had his right lower pulmonary lobe resected; no adjuvant treatment was indicated given negative circulating tumor DNA testing.  He reports he feels relatively well. He does report some fatigue as well as improving RUQ soreness, but denies weight loss, appetite changes, N/V or bowel changes.  He denies any headaches, dizziness, shortness of breath or chest pressure.  MRI was performed on May 31, and these results have been reviewed with Dr Mijares and Dr Reid.      Oncology History  Cancer Staging   History of colon cancer, stage IV  Staging form: Colon and Rectum, AJCC 8th Edition  - Pathologic stage from 6/21/2018: Stage WERO (ypT0, ypN0, cM1a) - Signed by STEPHANIE Wills on 10/16/2023  Stage prefix: Post-therapy  Total positive nodes: 0  Sites of metastasis: Liver  Oncology History   History of colon cancer, stage IV   1/2018 Initial Diagnosis    Malignant neoplasm of sigmoid colon (HCC)     1/22/2018 Biopsy    Large Intestine, Sigmoid Colon, Recto-sigmoid tumor bx:    - Invasive colonic adenocarcinoma, moderately differentiated     2/6/2018 - 10/16/2018 Chemotherapy    Modified FOLFOX6 x 12 cycles  Added Erbitux on 3/20/18      6/21/2018 Surgery    Segment 7 liver resection/ablation, hemicolectomy     6/21/2018 -  Cancer Staged    Staging form: Colon and Rectum, AJCC 8th Edition  - Pathologic stage from 6/21/2018: Stage WERO (ypT0, pN0, cM1a) - Signed by STEPHANIE Wills on 10/16/2023  Stage prefix: Post-therapy  Total positive nodes: 0  Sites of metastasis: Liver       10/30/2018 -  Chemotherapy    Continuation of Single agent Erbitux.  With 10/30/18 chemo, it was Cycle #14.  Plan was for 6 additional cycles of Erbitux alone.     3/2020 Genetic Testing    NEGATIVE for genes tested:    Test(s): CancerNext + RNA[employer]ight (34 genes): APC, ETHAN, BARD1, BRCA1, BRCA2, BRIP1, BMPR1A, CDH1, CDK4, CDKN2A, CHEK2, DICER1, EPCAM, GREM1, HOXB13, MLH1, MRE11A, MSH2, MSH6,  MUTYH, NBN, NF1, PALB2, PMS2, POLD1, POLE, PTEN, RAD50, RAD51C, RAD51D, SMAD4, SMARCA4, STK11, TP53      6/8/2020 Surgery    Liver, segment 3 (wedge resection):  - Metastatic adenocarcinoma, consistent with the patient's known colon primary  - Margins negative for carcinoma      7/27/2020 - 1/11/2021 Chemotherapy    fluorouracil (ADRUCIL), 745 mg, Intravenous, Once, 6 of 6 cycles  Administration: 750 mg (7/27/2020), 750 mg (8/10/2020), 745 mg (8/24/2020), 745 mg (9/8/2020), 745 mg (9/21/2020), 745 mg (10/5/2020)  pegfilgrastim (NEULASTA), 6 mg, Subcutaneous, Once, 1 of 1 cycle  Administration: 6 mg (9/24/2020)  pegfilgrastim (NEULASTA ONPRO), 6 mg, Subcutaneous, Once, 6 of 6 cycles  Administration: 6 mg (7/29/2020), 6 mg (8/12/2020), 6 mg (8/26/2020), 6 mg (9/10/2020), 6 mg (10/7/2020)  cetuximab (ERBITUX), 930 mg, Intravenous, Once, 12 of 12 cycles  Administration: 900 mg (7/27/2020), 900 mg (8/10/2020), 900 mg (8/24/2020), 900 mg (9/8/2020), 900 mg (9/21/2020), 900 mg (10/5/2020), 900 mg (10/19/2020), 900 mg (11/2/2020), 900 mg (11/16/2020), 900 mg (11/30/2020), 900 mg (12/28/2020), 900 mg (1/11/2021)  irinotecan (CAMPTOSAR) chemo infusion, 335 mg, Intravenous, Once, 6 of 6 cycles  Administration: 340 mg (7/27/2020), 340 mg (8/10/2020), 340 mg (8/24/2020), 340 mg (9/8/2020), 340 mg (9/21/2020), 340 mg (10/5/2020)  leucovorin calcium IVPB, 744 mg, Intravenous, Once, 6 of 6 cycles  Administration: 750 mg (7/27/2020), 750 mg (8/10/2020), 750 mg (8/24/2020), 750 mg (9/8/2020), 750 mg (9/21/2020), 740 mg (10/5/2020)  fluorouracil (ADRUCIL) ambulatory infusion Soln, 1,200 mg/m2/day = 4,465 mg, Intravenous, Over 46 hours, 6 of 6 cycles  Dose modification: 1,200 mg/m2/day (original dose 1,200 mg/m2/day, Cycle 3)     9/20/2022 - 12/1/2022 Chemotherapy    palonosetron (ALOXI), 0.25 mg, Intravenous, Once, 4 of 4 cycles  Administration: 0.25 mg (10/18/2022), 0.25 mg (11/1/2022), 0.25 mg (11/15/2022), 0.25 mg  (11/29/2022)  pegfilgrastim (NEULASTA ONPRO), 6 mg, Subcutaneous, Once, 6 of 6 cycles  Administration: 6 mg (9/22/2022), 6 mg (10/20/2022), 6 mg (11/3/2022), 6 mg (11/17/2022), 6 mg (12/1/2022)  Pegfilgrastim-bmez (ZIEXTENZO), 6 mg, Subcutaneous, Once, 1 of 1 cycle  Administration: 6 mg (10/7/2022)  fosaprepitant (EMEND) IVPB, 150 mg, Intravenous, Once, 4 of 4 cycles  Administration: 150 mg (10/18/2022), 150 mg (11/1/2022), 150 mg (11/15/2022), 150 mg (11/29/2022)  fluorouracil (ADRUCIL), 400 mg/m2 = 735 mg, Intravenous, Once, 6 of 6 cycles  Administration: 735 mg (9/20/2022), 735 mg (10/4/2022), 730 mg (10/18/2022), 730 mg (11/1/2022), 730 mg (11/15/2022), 730 mg (11/29/2022)  cetuximab (ERBITUX), 920 mg, Intravenous, Once, 6 of 6 cycles  Administration: 900 mg (9/20/2022), 900 mg (10/4/2022), 900 mg (10/18/2022), 900 mg (11/1/2022), 900 mg (11/15/2022), 900 mg (11/29/2022)  irinotecan (CAMPTOSAR) chemo infusion, 331 mg, Intravenous, Once, 6 of 6 cycles  Administration: 320 mg (9/20/2022), 320 mg (10/4/2022), 320 mg (10/18/2022), 320 mg (11/1/2022), 320 mg (11/15/2022), 320 mg (11/29/2022)  leucovorin calcium IVPB, 736 mg, Intravenous, Once, 6 of 6 cycles  Administration: 700 mg (9/20/2022), 700 mg (10/4/2022), 750 mg (10/18/2022), 750 mg (11/1/2022), 700 mg (11/15/2022), 700 mg (11/29/2022)  fluorouracil (ADRUCIL) ambulatory infusion Soln, 1,200 mg/m2/day = 4,415 mg, Intravenous, Over 46 hours, 6 of 6 cycles     2/4/2025 Biopsy    A.  Lung, right lower lobe nodule, biopsy:  -Metastatic adenocarcinoma compatible with colonic primary.     3/25/2025 Surgery    Robotic Completion right lower lobectomy, MLND  A.  Lymph node, level 9:     - Negative for malignancy (0/1).     B.  Lung, right lower lobe, wedge::     - Metastatic adenocarcinoma of the colon, 1.5 cm.     - Lymphatic channel invasion by tumor identified.     C.  Lymph node, level 11:     - Negative for malignancy (0/1).     D.  Lymph node, level 11,  posterior:     - Negative for malignancy (0/1).     E.  Lymph node, level 12:     - Negative for malignancy (0/1).     F.  Lymph node, level 10:     - Negative for malignancy (0/1).     G.  Lung, right lower lobe:     - No significant pathologic abnormalities.     - No malignancy identified.     - Six lymph nodes identified; all negative for malignancy (0/6).     - Bronchial and vascular margins are negative for malignancy.     H.  Mediastinal nodule:     - Portion of necrotic adipose tissue encased by dense fibrous tissue.     - No malignancy identified.     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 (HCC) (Resolved)   4/11/2022 Initial Diagnosis    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 (HCC)     9/20/2022 - 12/1/2022 Chemotherapy    palonosetron (ALOXI), 0.25 mg, Intravenous, Once, 4 of 4 cycles  Administration: 0.25 mg (10/18/2022), 0.25 mg (11/1/2022), 0.25 mg (11/15/2022), 0.25 mg (11/29/2022)  pegfilgrastim (NEULASTA ONPRO), 6 mg, Subcutaneous, Once, 6 of 6 cycles  Administration: 6 mg (9/22/2022), 6 mg (10/20/2022), 6 mg (11/3/2022), 6 mg (11/17/2022), 6 mg (12/1/2022)  Pegfilgrastim-bmez (ZIEXTENZO), 6 mg, Subcutaneous, Once, 1 of 1 cycle  Administration: 6 mg (10/7/2022)  fosaprepitant (EMEND) IVPB, 150 mg, Intravenous, Once, 4 of 4 cycles  Administration: 150 mg (10/18/2022), 150 mg (11/1/2022), 150 mg (11/15/2022), 150 mg (11/29/2022)  fluorouracil (ADRUCIL), 400 mg/m2 = 735 mg, Intravenous, Once, 6 of 6 cycles  Administration: 735 mg (9/20/2022), 735 mg (10/4/2022), 730 mg (10/18/2022), 730 mg (11/1/2022), 730 mg (11/15/2022), 730 mg (11/29/2022)  cetuximab (ERBITUX), 920 mg, Intravenous, Once, 6 of 6 cycles  Administration: 900 mg (9/20/2022), 900 mg (10/4/2022), 900 mg (10/18/2022), 900 mg (11/1/2022), 900 mg (11/15/2022), 900 mg (11/29/2022)  irinotecan (CAMPTOSAR) chemo infusion, 331 mg, Intravenous, Once, 6 of 6 cycles  Administration: 320 mg (9/20/2022), 320 mg (10/4/2022), 320 mg (10/18/2022), 320 mg (11/1/2022), 320 mg  (11/15/2022), 320 mg (11/29/2022)  leucovorin calcium IVPB, 736 mg, Intravenous, Once, 6 of 6 cycles  Administration: 700 mg (9/20/2022), 700 mg (10/4/2022), 750 mg (10/18/2022), 750 mg (11/1/2022), 700 mg (11/15/2022), 700 mg (11/29/2022)  fluorouracil (ADRUCIL) ambulatory infusion Soln, 1,200 mg/m2/day = 4,415 mg, Intravenous, Over 46 hours, 6 of 6 cycles     Malignant neoplasm metastatic to right lung (HCC)   8/4/2022 Initial Diagnosis    Malignant neoplasm metastatic to right lung (HCC)     8/9/2022 Surgery    Flexible bronchoscopy, right thoracoscopic therapeutic wedge resection      9/20/2022 - 12/1/2022 Chemotherapy    palonosetron (ALOXI), 0.25 mg, Intravenous, Once, 4 of 4 cycles  Administration: 0.25 mg (10/18/2022), 0.25 mg (11/1/2022), 0.25 mg (11/15/2022), 0.25 mg (11/29/2022)  pegfilgrastim (NEULASTA ONPRO), 6 mg, Subcutaneous, Once, 6 of 6 cycles  Administration: 6 mg (9/22/2022), 6 mg (10/20/2022), 6 mg (11/3/2022), 6 mg (11/17/2022), 6 mg (12/1/2022)  Pegfilgrastim-bmez (ZIEXTENZO), 6 mg, Subcutaneous, Once, 1 of 1 cycle  Administration: 6 mg (10/7/2022)  fosaprepitant (EMEND) IVPB, 150 mg, Intravenous, Once, 4 of 4 cycles  Administration: 150 mg (10/18/2022), 150 mg (11/1/2022), 150 mg (11/15/2022), 150 mg (11/29/2022)  fluorouracil (ADRUCIL), 400 mg/m2 = 735 mg, Intravenous, Once, 6 of 6 cycles  Administration: 735 mg (9/20/2022), 735 mg (10/4/2022), 730 mg (10/18/2022), 730 mg (11/1/2022), 730 mg (11/15/2022), 730 mg (11/29/2022)  cetuximab (ERBITUX), 920 mg, Intravenous, Once, 6 of 6 cycles  Administration: 900 mg (9/20/2022), 900 mg (10/4/2022), 900 mg (10/18/2022), 900 mg (11/1/2022), 900 mg (11/15/2022), 900 mg (11/29/2022)  irinotecan (CAMPTOSAR) chemo infusion, 331 mg, Intravenous, Once, 6 of 6 cycles  Administration: 320 mg (9/20/2022), 320 mg (10/4/2022), 320 mg (10/18/2022), 320 mg (11/1/2022), 320 mg (11/15/2022), 320 mg (11/29/2022)  leucovorin calcium IVPB, 736 mg, Intravenous, Once,  "6 of 6 cycles  Administration: 700 mg (9/20/2022), 700 mg (10/4/2022), 750 mg (10/18/2022), 750 mg (11/1/2022), 700 mg (11/15/2022), 700 mg (11/29/2022)  fluorouracil (ADRUCIL) ambulatory infusion Soln, 1,200 mg/m2/day = 4,415 mg, Intravenous, Over 46 hours, 6 of 6 cycles     2/4/2025 Biopsy    A.  Lung, right lower lobe nodule, biopsy:  -Metastatic adenocarcinoma compatible with colonic primary.     3/25/2025 Surgery    Robotic Completion right lower lobectomy, MLND  A.  Lymph node, level 9:     - Negative for malignancy (0/1).     B.  Lung, right lower lobe, wedge::     - Metastatic adenocarcinoma of the colon, 1.5 cm.     - Lymphatic channel invasion by tumor identified.     C.  Lymph node, level 11:     - Negative for malignancy (0/1).     D.  Lymph node, level 11, posterior:     - Negative for malignancy (0/1).     E.  Lymph node, level 12:     - Negative for malignancy (0/1).     F.  Lymph node, level 10:     - Negative for malignancy (0/1).     G.  Lung, right lower lobe:     - No significant pathologic abnormalities.     - No malignancy identified.     - Six lymph nodes identified; all negative for malignancy (0/6).     - Bronchial and vascular margins are negative for malignancy.     H.  Mediastinal nodule:     - Portion of necrotic adipose tissue encased by dense fibrous tissue.     - No malignancy identified.           Review of Systems A complete review of systems is negative other than that noted above in the HPI.       Current Medications[1]   Objective  /64   Pulse 76   Temp 99 °F (37.2 °C)   Ht 5' 6\" (1.676 m)   Wt 73 kg (161 lb)   SpO2 99%   BMI 25.99 kg/m²     Pain Screening:  Pain Score: 0-No pain  ECOG    Physical Exam  Vitals reviewed.   Constitutional:       General: He is not in acute distress.     Appearance: Normal appearance. He is normal weight. He is not ill-appearing or toxic-appearing.   HENT:      Head: Normocephalic and atraumatic.     Eyes:      General: No scleral " icterus.      Cardiovascular:      Rate and Rhythm: Normal rate.   Pulmonary:      Effort: Pulmonary effort is normal.   Abdominal:      General: Abdomen is flat. There is no distension.      Palpations: Abdomen is soft. There is no mass.      Tenderness: There is no abdominal tenderness. There is no guarding.     Musculoskeletal:         General: Normal range of motion.      Cervical back: Normal range of motion and neck supple.     Skin:     General: Skin is warm and dry.      Coloration: Skin is not jaundiced.     Neurological:      General: No focal deficit present.      Mental Status: He is alert and oriented to person, place, and time.     Psychiatric:         Mood and Affect: Mood normal.         Behavior: Behavior normal.         Thought Content: Thought content normal.         Judgment: Judgment normal.            Labs: I have reviewed pertinent labs. CEA Trend:   Lab Results   Component Value Date/Time    CEA 1.0 06/09/2025 02:33 PM    CEA 1.1 12/05/2024 02:24 PM        Radiology Results Review: I have reviewed radiology reports from 5/31/2025 including: MRI of the abdomen.    MRI abdomen w wo contrast    Narrative & Impression   MRI - ABDOMEN - WITH AND WITHOUT CONTRAST     INDICATION: 54 years / Male. Z85.038: Personal history of other malignant neoplasm of large intestine.     COMPARISON: MR abdomen 11/13/2024     TECHNIQUE: Multiplanar/multisequence MRI of the abdomen was performed before and after administration of contrast.     IV Contrast: 7 mL of Gadobutrol injection (SINGLE-DOSE)     FINDINGS:     LOWER CHEST: Large right pleural effusion. There appears to be a locule of proteinaceous material measuring 1.9 x 1.5 cm (series 10 image 9).     LIVER:  Normal in size and configuration.  No suspicious mass.  Stable treatment zone in hepatic segment 7 measuring 2.4 x 1.8 cm (series 11 image 16) without findings of tumor recurrence. Stable post wedge resection changes of segment 3. Subcentimeter right  lobe cyst.  Patent hepatic and portal veins.     BILE DUCTS: No intrahepatic or extrahepatic bile duct dilation.     GALLBLADDER: Cholelithiasis without findings of acute cholecystitis.     PANCREAS: Unremarkable.     ADRENAL GLANDS: Unremarkable.     SPLEEN: Normal.     KIDNEYS/PROXIMAL URETERS: No hydroureteronephrosis. No suspicious renal mass. Multiple simple left renal sinus cysts.     BOWEL: No dilated loops of bowel.     PERITONEUM/RETROPERITONEUM: No ascites.     LYMPH NODES: No abdominal lymphadenopathy.     VESSELS: No aneurysm.     ABDOMINAL WALL: Postsurgical changes of the anterior abdominal wall.     BONES: No suspicious osseous lesion.     IMPRESSION:     1. Stable treatment zone in hepatic segment 7 measuring 2.4 x 1.8 cm and segment 3 wedge resection changes without findings of tumor recurrence. No new findings of metastatic disease in the abdomen.     2. Large right pleural effusion. There appears to be a locule of proteinaceous material measuring 1.9 x 1.5 cm of unclear etiology not seen on prior studies. Recommend a follow-up contrast-enhanced chest CT.          Other Study Results Review : Other studies reviewed include: Andrey ctDNA testing             [1]  Current Outpatient Medications   Medication Sig Dispense Refill   • Multiple Vitamins-Minerals (MENS MULTIVITAMIN PO) Take by mouth in the morning     • sildenafil (VIAGRA) 50 MG tablet Take 1 tablet (50 mg total) by mouth as needed for erectile dysfunction 10 tablet 0   • gabapentin (Neurontin) 300 mg capsule Take 1 capsule (300 mg total) by mouth 3 (three) times a day for 21 days 63 capsule 0   • ibuprofen (MOTRIN) 800 mg tablet Take 1 tablet (800 mg total) by mouth every 8 (eight) hours for 7 days 21 tablet 0     No current facility-administered medications for this visit.

## 2025-06-11 NOTE — LETTER
2025     No Recipients    Patient: Matthieu Da Silva   YOB: 1970   Date of Visit: 2025       Dear Dr. Esequiel Mijares MD:    Thank you for referring Matthieu Da Silva to me for evaluation. Below are my notes for this consultation.    If you have questions, please do not hesitate to call me. I look forward to following your patient along with you.         Sincerely,        STEPHANIE Wills        CC: No Recipients    STEPHANIE Wills  2025  3:24 PM  Sign when Signing Visit  Name: Matthieu Da Silva      : 1970      MRN: [id number]  Encounter Provider: STEPHANIE Wills  Encounter Date: 2025   Encounter department: [facility]  :  Assessment & Plan  Encounter for follow-up examination after completed treatment for malignant neoplasm         History of colon cancer, stage IV  The patient is doing well following recent lung resection. MRI does show a right pleural effusion. This has been discussed with Dr Reid, and is expected to resolve on its own.  The patient has been advised to call with any new SOB or chest pressure. There is no evidence of intra-abdominal disease recurrence by symptoms or imaging, and his CEA level remains low.  We will see him again in 6 months with repeat MRI and bloodwork.      Orders:  •  MRI abdomen w wo contrast; Future  •  BUN; Future  •  Creatinine, serum; Future  •  CEA; Future        History of Present Illness{?Quick Links Encounters * My Last Note * Last Note in Specialty * Snapshot * Since Last Visit * History :98471}  Chief Complaint   Patient presents with   • Follow-up     Return in about 6 months (around 2025) for Office visit with Dr Mijares, Imaging - See orders, Labs - See Treatment Plan.    Pertinent Medical History  This is a 55 y/o male who returns to the office today in follow-up for his history of metastatic colon cancer. He is 7-1/2 years out from his primary tumor resection, and  has undergone two liver resections and two lung resections since that time.  Most recently he had his right lower pulmonary lobe resected; no adjuvant treatment was indicated given negative circulating tumor DNA testing.  He reports he feels relatively well. He does report some fatigue as well as improving RUQ soreness, but denies weight loss, appetite changes, N/V or bowel changes.  He denies any headaches, dizziness, shortness of breath or chest pressure.  MRI was performed on May 31, and these results have been reviewed with Dr Mijares and Dr Reid.      Oncology History  Cancer Staging   History of colon cancer, stage IV  Staging form: Colon and Rectum, AJCC 8th Edition  - Pathologic stage from 6/21/2018: Stage WERO (ypT0, ypN0, cM1a) - Signed by STEPHANIE Wills on 10/16/2023  Stage prefix: Post-therapy  Total positive nodes: 0  Sites of metastasis: Liver  Oncology History   History of colon cancer, stage IV   1/2018 Initial Diagnosis    Malignant neoplasm of sigmoid colon (HCC)     1/22/2018 Biopsy    Large Intestine, Sigmoid Colon, Recto-sigmoid tumor bx:    - Invasive colonic adenocarcinoma, moderately differentiated     2/6/2018 - 10/16/2018 Chemotherapy    Modified FOLFOX6 x 12 cycles  Added Erbitux on 3/20/18      6/21/2018 Surgery    Segment 7 liver resection/ablation, hemicolectomy     6/21/2018 -  Cancer Staged    Staging form: Colon and Rectum, AJCC 8th Edition  - Pathologic stage from 6/21/2018: Stage WERO (ypT0, pN0, cM1a) - Signed by STEPHANIE Wills on 10/16/2023  Stage prefix: Post-therapy  Total positive nodes: 0  Sites of metastasis: Liver       10/30/2018 -  Chemotherapy    Continuation of Single agent Erbitux.  With 10/30/18 chemo, it was Cycle #14.  Plan was for 6 additional cycles of Erbitux alone.     3/2020 Genetic Testing    NEGATIVE for genes tested:    Test(s): [employer] + [employer] (34 genes): APC, ETHAN, BARD1, BRCA1, BRCA2, BRIP1, BMPR1A, CDH1, CDK4, CDKN2A, CHEK2, DICER1,  EPCAM, GREM1, HOXB13, MLH1, MRE11A, MSH2, MSH6, MUTYH, NBN, NF1, PALB2, PMS2, POLD1, POLE, PTEN, RAD50, RAD51C, RAD51D, SMAD4, SMARCA4, STK11, TP53      6/8/2020 Surgery    Liver, segment 3 (wedge resection):  - Metastatic adenocarcinoma, consistent with the patient's known colon primary  - Margins negative for carcinoma      7/27/2020 - 1/11/2021 Chemotherapy    fluorouracil (ADRUCIL), 745 mg, Intravenous, Once, 6 of 6 cycles  Administration: 750 mg (7/27/2020), 750 mg (8/10/2020), 745 mg (8/24/2020), 745 mg (9/8/2020), 745 mg (9/21/2020), 745 mg (10/5/2020)  pegfilgrastim (NEULASTA), 6 mg, Subcutaneous, Once, 1 of 1 cycle  Administration: 6 mg (9/24/2020)  pegfilgrastim (NEULASTA ONPRO), 6 mg, Subcutaneous, Once, 6 of 6 cycles  Administration: 6 mg (7/29/2020), 6 mg (8/12/2020), 6 mg (8/26/2020), 6 mg (9/10/2020), 6 mg (10/7/2020)  cetuximab (ERBITUX), 930 mg, Intravenous, Once, 12 of 12 cycles  Administration: 900 mg (7/27/2020), 900 mg (8/10/2020), 900 mg (8/24/2020), 900 mg (9/8/2020), 900 mg (9/21/2020), 900 mg (10/5/2020), 900 mg (10/19/2020), 900 mg (11/2/2020), 900 mg (11/16/2020), 900 mg (11/30/2020), 900 mg (12/28/2020), 900 mg (1/11/2021)  irinotecan (CAMPTOSAR) chemo infusion, 335 mg, Intravenous, Once, 6 of 6 cycles  Administration: 340 mg (7/27/2020), 340 mg (8/10/2020), 340 mg (8/24/2020), 340 mg (9/8/2020), 340 mg (9/21/2020), 340 mg (10/5/2020)  leucovorin calcium IVPB, 744 mg, Intravenous, Once, 6 of 6 cycles  Administration: 750 mg (7/27/2020), 750 mg (8/10/2020), 750 mg (8/24/2020), 750 mg (9/8/2020), 750 mg (9/21/2020), 740 mg (10/5/2020)  fluorouracil (ADRUCIL) ambulatory infusion Soln, 1,200 mg/m2/day = 4,465 mg, Intravenous, Over 46 hours, 6 of 6 cycles  Dose modification: 1,200 mg/m2/day (original dose 1,200 mg/m2/day, Cycle 3)     9/20/2022 - 12/1/2022 Chemotherapy    palonosetron (ALOXI), 0.25 mg, Intravenous, Once, 4 of 4 cycles  Administration: 0.25 mg (10/18/2022), 0.25 mg (11/1/2022),  0.25 mg (11/15/2022), 0.25 mg (11/29/2022)  pegfilgrastim (NEULASTA ONPRO), 6 mg, Subcutaneous, Once, 6 of 6 cycles  Administration: 6 mg (9/22/2022), 6 mg (10/20/2022), 6 mg (11/3/2022), 6 mg (11/17/2022), 6 mg (12/1/2022)  Pegfilgrastim-bmez (ZIEXTENZO), 6 mg, Subcutaneous, Once, 1 of 1 cycle  Administration: 6 mg (10/7/2022)  fosaprepitant (EMEND) IVPB, 150 mg, Intravenous, Once, 4 of 4 cycles  Administration: 150 mg (10/18/2022), 150 mg (11/1/2022), 150 mg (11/15/2022), 150 mg (11/29/2022)  fluorouracil (ADRUCIL), 400 mg/m2 = 735 mg, Intravenous, Once, 6 of 6 cycles  Administration: 735 mg (9/20/2022), 735 mg (10/4/2022), 730 mg (10/18/2022), 730 mg (11/1/2022), 730 mg (11/15/2022), 730 mg (11/29/2022)  cetuximab (ERBITUX), 920 mg, Intravenous, Once, 6 of 6 cycles  Administration: 900 mg (9/20/2022), 900 mg (10/4/2022), 900 mg (10/18/2022), 900 mg (11/1/2022), 900 mg (11/15/2022), 900 mg (11/29/2022)  irinotecan (CAMPTOSAR) chemo infusion, 331 mg, Intravenous, Once, 6 of 6 cycles  Administration: 320 mg (9/20/2022), 320 mg (10/4/2022), 320 mg (10/18/2022), 320 mg (11/1/2022), 320 mg (11/15/2022), 320 mg (11/29/2022)  leucovorin calcium IVPB, 736 mg, Intravenous, Once, 6 of 6 cycles  Administration: 700 mg (9/20/2022), 700 mg (10/4/2022), 750 mg (10/18/2022), 750 mg (11/1/2022), 700 mg (11/15/2022), 700 mg (11/29/2022)  fluorouracil (ADRUCIL) ambulatory infusion Soln, 1,200 mg/m2/day = 4,415 mg, Intravenous, Over 46 hours, 6 of 6 cycles     2/4/2025 Biopsy    A.  Lung, right lower lobe nodule, biopsy:  -Metastatic adenocarcinoma compatible with colonic primary.     3/25/2025 Surgery    Robotic Completion right lower lobectomy, MLND  A.  Lymph node, level 9:     - Negative for malignancy (0/1).     B.  Lung, right lower lobe, wedge::     - Metastatic adenocarcinoma of the colon, 1.5 cm.     - Lymphatic channel invasion by tumor identified.     C.  Lymph node, level 11:     - Negative for malignancy (0/1).     D.   Lymph node, level 11, posterior:     - Negative for malignancy (0/1).     E.  Lymph node, level 12:     - Negative for malignancy (0/1).     F.  Lymph node, level 10:     - Negative for malignancy (0/1).     G.  Lung, right lower lobe:     - No significant pathologic abnormalities.     - No malignancy identified.     - Six lymph nodes identified; all negative for malignancy (0/6).     - Bronchial and vascular margins are negative for malignancy.     H.  Mediastinal nodule:     - Portion of necrotic adipose tissue encased by dense fibrous tissue.     - No malignancy identified.     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 (HCC) (Resolved)   4/11/2022 Initial Diagnosis    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 (HCC)     9/20/2022 - 12/1/2022 Chemotherapy    palonosetron (ALOXI), 0.25 mg, Intravenous, Once, 4 of 4 cycles  Administration: 0.25 mg (10/18/2022), 0.25 mg (11/1/2022), 0.25 mg (11/15/2022), 0.25 mg (11/29/2022)  pegfilgrastim (NEULASTA ONPRO), 6 mg, Subcutaneous, Once, 6 of 6 cycles  Administration: 6 mg (9/22/2022), 6 mg (10/20/2022), 6 mg (11/3/2022), 6 mg (11/17/2022), 6 mg (12/1/2022)  Pegfilgrastim-bmez (ZIEXTENZO), 6 mg, Subcutaneous, Once, 1 of 1 cycle  Administration: 6 mg (10/7/2022)  fosaprepitant (EMEND) IVPB, 150 mg, Intravenous, Once, 4 of 4 cycles  Administration: 150 mg (10/18/2022), 150 mg (11/1/2022), 150 mg (11/15/2022), 150 mg (11/29/2022)  fluorouracil (ADRUCIL), 400 mg/m2 = 735 mg, Intravenous, Once, 6 of 6 cycles  Administration: 735 mg (9/20/2022), 735 mg (10/4/2022), 730 mg (10/18/2022), 730 mg (11/1/2022), 730 mg (11/15/2022), 730 mg (11/29/2022)  cetuximab (ERBITUX), 920 mg, Intravenous, Once, 6 of 6 cycles  Administration: 900 mg (9/20/2022), 900 mg (10/4/2022), 900 mg (10/18/2022), 900 mg (11/1/2022), 900 mg (11/15/2022), 900 mg (11/29/2022)  irinotecan (CAMPTOSAR) chemo infusion, 331 mg, Intravenous, Once, 6 of 6 cycles  Administration: 320 mg (9/20/2022), 320 mg (10/4/2022), 320 mg (10/18/2022), 320  mg (11/1/2022), 320 mg (11/15/2022), 320 mg (11/29/2022)  leucovorin calcium IVPB, 736 mg, Intravenous, Once, 6 of 6 cycles  Administration: 700 mg (9/20/2022), 700 mg (10/4/2022), 750 mg (10/18/2022), 750 mg (11/1/2022), 700 mg (11/15/2022), 700 mg (11/29/2022)  fluorouracil (ADRUCIL) ambulatory infusion Soln, 1,200 mg/m2/day = 4,415 mg, Intravenous, Over 46 hours, 6 of 6 cycles     Malignant neoplasm metastatic to right lung (HCC)   8/4/2022 Initial Diagnosis    Malignant neoplasm metastatic to right lung (HCC)     8/9/2022 Surgery    Flexible bronchoscopy, right thoracoscopic therapeutic wedge resection      9/20/2022 - 12/1/2022 Chemotherapy    palonosetron (ALOXI), 0.25 mg, Intravenous, Once, 4 of 4 cycles  Administration: 0.25 mg (10/18/2022), 0.25 mg (11/1/2022), 0.25 mg (11/15/2022), 0.25 mg (11/29/2022)  pegfilgrastim (NEULASTA ONPRO), 6 mg, Subcutaneous, Once, 6 of 6 cycles  Administration: 6 mg (9/22/2022), 6 mg (10/20/2022), 6 mg (11/3/2022), 6 mg (11/17/2022), 6 mg (12/1/2022)  Pegfilgrastim-bmez (ZIEXTENZO), 6 mg, Subcutaneous, Once, 1 of 1 cycle  Administration: 6 mg (10/7/2022)  fosaprepitant (EMEND) IVPB, 150 mg, Intravenous, Once, 4 of 4 cycles  Administration: 150 mg (10/18/2022), 150 mg (11/1/2022), 150 mg (11/15/2022), 150 mg (11/29/2022)  fluorouracil (ADRUCIL), 400 mg/m2 = 735 mg, Intravenous, Once, 6 of 6 cycles  Administration: 735 mg (9/20/2022), 735 mg (10/4/2022), 730 mg (10/18/2022), 730 mg (11/1/2022), 730 mg (11/15/2022), 730 mg (11/29/2022)  cetuximab (ERBITUX), 920 mg, Intravenous, Once, 6 of 6 cycles  Administration: 900 mg (9/20/2022), 900 mg (10/4/2022), 900 mg (10/18/2022), 900 mg (11/1/2022), 900 mg (11/15/2022), 900 mg (11/29/2022)  irinotecan (CAMPTOSAR) chemo infusion, 331 mg, Intravenous, Once, 6 of 6 cycles  Administration: 320 mg (9/20/2022), 320 mg (10/4/2022), 320 mg (10/18/2022), 320 mg (11/1/2022), 320 mg (11/15/2022), 320 mg (11/29/2022)  leucovorin calcium IVPB, 736  "mg, Intravenous, Once, 6 of 6 cycles  Administration: 700 mg (9/20/2022), 700 mg (10/4/2022), 750 mg (10/18/2022), 750 mg (11/1/2022), 700 mg (11/15/2022), 700 mg (11/29/2022)  fluorouracil (ADRUCIL) ambulatory infusion Soln, 1,200 mg/m2/day = 4,415 mg, Intravenous, Over 46 hours, 6 of 6 cycles     2/4/2025 Biopsy    A.  Lung, right lower lobe nodule, biopsy:  -Metastatic adenocarcinoma compatible with colonic primary.     3/25/2025 Surgery    Robotic Completion right lower lobectomy, MLND  A.  Lymph node, level 9:     - Negative for malignancy (0/1).     B.  Lung, right lower lobe, wedge::     - Metastatic adenocarcinoma of the colon, 1.5 cm.     - Lymphatic channel invasion by tumor identified.     C.  Lymph node, level 11:     - Negative for malignancy (0/1).     D.  Lymph node, level 11, posterior:     - Negative for malignancy (0/1).     E.  Lymph node, level 12:     - Negative for malignancy (0/1).     F.  Lymph node, level 10:     - Negative for malignancy (0/1).     G.  Lung, right lower lobe:     - No significant pathologic abnormalities.     - No malignancy identified.     - Six lymph nodes identified; all negative for malignancy (0/6).     - Bronchial and vascular margins are negative for malignancy.     H.  Mediastinal nodule:     - Portion of necrotic adipose tissue encased by dense fibrous tissue.     - No malignancy identified.         Review of Systems A complete review of systems is negative other than that noted above in the HPI.       Current Medications[1]   Objective{?Quick Links Trend Vitals * Enter New Vitals * Results Review * Timeline (Adult) * Labs * Imaging * Cardiology * Procedures * Lung Cancer Screening * Surgical eConsent :52882}  /64   Pulse 76   Temp 99 °F (37.2 °C)   Ht 5' 6\" (1.676 m)   Wt 73 kg (161 lb)   SpO2 99%   BMI 25.99 kg/m²     Pain Screening:  Pain Score: 0-No pain  ECOG    Physical Exam  Vitals reviewed.   Constitutional:       General: He is not in acute " distress.     Appearance: Normal appearance. He is normal weight. He is not ill-appearing or toxic-appearing.   HENT:      Head: Normocephalic and atraumatic.     Eyes:      General: No scleral icterus.      Cardiovascular:      Rate and Rhythm: Normal rate.   Pulmonary:      Effort: Pulmonary effort is normal.   Abdominal:      General: Abdomen is flat. There is no distension.      Palpations: Abdomen is soft. There is no mass.      Tenderness: There is no abdominal tenderness. There is no guarding.     Musculoskeletal:         General: Normal range of motion.      Cervical back: Normal range of motion and neck supple.     Skin:     General: Skin is warm and dry.      Coloration: Skin is not jaundiced.     Neurological:      General: No focal deficit present.      Mental Status: He is alert and oriented to person, place, and time.     Psychiatric:         Mood and Affect: Mood normal.         Behavior: Behavior normal.         Thought Content: Thought content normal.         Judgment: Judgment normal.            Labs: I have reviewed pertinent labs. CEA Trend:   Lab Results   Component Value Date/Time    CEA 1.0 06/09/2025 02:33 PM    CEA 1.1 12/05/2024 02:24 PM        Radiology Results Review: I have reviewed radiology reports from 5/31/2025 including: MRI of the abdomen.    MRI abdomen w wo contrast    Narrative & Impression   MRI - ABDOMEN - WITH AND WITHOUT CONTRAST     INDICATION: 54 years / Male. Z85.038: Personal history of other malignant neoplasm of large intestine.     COMPARISON: MR abdomen 11/13/2024     TECHNIQUE: Multiplanar/multisequence MRI of the abdomen was performed before and after administration of contrast.     IV Contrast: 7 mL of Gadobutrol injection (SINGLE-DOSE)     FINDINGS:     LOWER CHEST: Large right pleural effusion. There appears to be a locule of proteinaceous material measuring 1.9 x 1.5 cm (series 10 image 9).     LIVER:  Normal in size and configuration.  No suspicious  mass.  Stable treatment zone in hepatic segment 7 measuring 2.4 x 1.8 cm (series 11 image 16) without findings of tumor recurrence. Stable post wedge resection changes of segment 3. Subcentimeter right lobe cyst.  Patent hepatic and portal veins.     BILE DUCTS: No intrahepatic or extrahepatic bile duct dilation.     GALLBLADDER: Cholelithiasis without findings of acute cholecystitis.     PANCREAS: Unremarkable.     ADRENAL GLANDS: Unremarkable.     SPLEEN: Normal.     KIDNEYS/PROXIMAL URETERS: No hydroureteronephrosis. No suspicious renal mass. Multiple simple left renal sinus cysts.     BOWEL: No dilated loops of bowel.     PERITONEUM/RETROPERITONEUM: No ascites.     LYMPH NODES: No abdominal lymphadenopathy.     VESSELS: No aneurysm.     ABDOMINAL WALL: Postsurgical changes of the anterior abdominal wall.     BONES: No suspicious osseous lesion.     IMPRESSION:     1. Stable treatment zone in hepatic segment 7 measuring 2.4 x 1.8 cm and segment 3 wedge resection changes without findings of tumor recurrence. No new findings of metastatic disease in the abdomen.     2. Large right pleural effusion. There appears to be a locule of proteinaceous material measuring 1.9 x 1.5 cm of unclear etiology not seen on prior studies. Recommend a follow-up contrast-enhanced chest CT.          Other Study Results Review : Other studies reviewed include: Andrey ctDNA testing             [1]  Current Outpatient Medications   Medication Sig Dispense Refill   • Multiple Vitamins-Minerals (MENS MULTIVITAMIN PO) Take by mouth in the morning     • sildenafil (VIAGRA) 50 MG tablet Take 1 tablet (50 mg total) by mouth as needed for erectile dysfunction 10 tablet 0   • gabapentin (Neurontin) 300 mg capsule Take 1 capsule (300 mg total) by mouth 3 (three) times a day for 21 days 63 capsule 0   • ibuprofen (MOTRIN) 800 mg tablet Take 1 tablet (800 mg total) by mouth every 8 (eight) hours for 7 days 21 tablet 0     No current  facility-administered medications for this visit.

## 2025-06-11 NOTE — ASSESSMENT & PLAN NOTE
There is no evidence of intra-abdominal disease recurrence by symptoms or imaging, and his CEA level remains low.  We will see him again in 6 months with repeat MRI and bloodwork.  The patient is doing well following recent lung resection. MRI does show a right pleural effusion. This has been discussed with Dr Reid, and is expected to resolve on its own. The patient has been advised to call with any new SOB or chest pressure.      Orders:  •  MRI abdomen w wo contrast; Future  •  BUN; Future  •  Creatinine, serum; Future  •  CEA; Future

## 2025-06-11 NOTE — PROGRESS NOTES
Name: Matthieu Da Silva      : 1970      MRN: [id number]  Encounter Provider: STEPHANIE Wills  Encounter Date: 2025   Encounter department: [facility]  :  Assessment & Plan  Encounter for follow-up examination after completed treatment for malignant neoplasm         History of colon cancer, stage IV   There is no evidence of intra-abdominal disease recurrence by symptoms or imaging, and his CEA level remains low.  We will see him again in 6 months with repeat MRI and bloodwork.  The patient is doing well following recent lung resection. MRI does show a right pleural effusion. This has been discussed with Dr Reid, and is expected to resolve on its own. The patient has been advised to call with any new SOB or chest pressure.      Orders:  •  MRI abdomen w wo contrast; Future  •  BUN; Future  •  Creatinine, serum; Future  •  CEA; Future        History of Present Illness   Chief Complaint   Patient presents with   • Follow-up     Return in about 6 months (around 2025) for Office visit with Dr Mijares, Imaging - See orders, Labs - See Treatment Plan.    Pertinent Medical History   This is a 55 y/o male who returns to the office today in follow-up for his history of metastatic colon cancer. He is 7-1/2 years out from his primary tumor resection, and has undergone two liver resections and two lung resections since that time.  Most recently he had his right lower pulmonary lobe resected; no adjuvant treatment was indicated given negative circulating tumor DNA testing.  He reports he feels relatively well. He does report some fatigue as well as improving RUQ soreness, but denies weight loss, appetite changes, N/V or bowel changes.  He denies any headaches, dizziness, shortness of breath or chest pressure.  MRI was performed on May 31, and these results have been reviewed with Dr Mijares and Dr Reid.       Oncology History   Cancer Staging   History of colon  cancer, stage IV  Staging form: Colon and Rectum, AJCC 8th Edition  - Pathologic stage from 6/21/2018: Stage WERO (ypT0, ypN0, cM1a) - Signed by STEPHANIE Wills on 10/16/2023  Stage prefix: Post-therapy  Total positive nodes: 0  Sites of metastasis: Liver  Oncology History   History of colon cancer, stage IV   1/2018 Initial Diagnosis    Malignant neoplasm of sigmoid colon (HCC)     1/22/2018 Biopsy    Large Intestine, Sigmoid Colon, Recto-sigmoid tumor bx:    - Invasive colonic adenocarcinoma, moderately differentiated     2/6/2018 - 10/16/2018 Chemotherapy    Modified FOLFOX6 x 12 cycles  Added Erbitux on 3/20/18      6/21/2018 Surgery    Segment 7 liver resection/ablation, hemicolectomy     6/21/2018 -  Cancer Staged    Staging form: Colon and Rectum, AJCC 8th Edition  - Pathologic stage from 6/21/2018: Stage WERO (ypT0, pN0, cM1a) - Signed by STEPHANIE Wills on 10/16/2023  Stage prefix: Post-therapy  Total positive nodes: 0  Sites of metastasis: Liver       10/30/2018 -  Chemotherapy    Continuation of Single agent Erbitux.  With 10/30/18 chemo, it was Cycle #14.  Plan was for 6 additional cycles of Erbitux alone.     3/2020 Genetic Testing    NEGATIVE for genes tested:    Test(s): [employer] + [employer] (34 genes): APC, ETHAN, BARD1, BRCA1, BRCA2, BRIP1, BMPR1A, CDH1, CDK4, CDKN2A, CHEK2, DICER1, EPCAM, GREM1, HOXB13, MLH1, MRE11A, MSH2, MSH6, MUTYH, NBN, NF1, PALB2, PMS2, POLD1, POLE, PTEN, RAD50, RAD51C, RAD51D, SMAD4, SMARCA4, STK11, TP53      6/8/2020 Surgery    Liver, segment 3 (wedge resection):  - Metastatic adenocarcinoma, consistent with the patient's known colon primary  - Margins negative for carcinoma      7/27/2020 - 1/11/2021 Chemotherapy    fluorouracil (ADRUCIL), 745 mg, Intravenous, Once, 6 of 6 cycles  Administration: 750 mg (7/27/2020), 750 mg (8/10/2020), 745 mg (8/24/2020), 745 mg (9/8/2020), 745 mg (9/21/2020), 745 mg (10/5/2020)  pegfilgrastim (NEULASTA), 6 mg, Subcutaneous,  Once, 1 of 1 cycle  Administration: 6 mg (9/24/2020)  pegfilgrastim (NEULASTA ONPRO), 6 mg, Subcutaneous, Once, 6 of 6 cycles  Administration: 6 mg (7/29/2020), 6 mg (8/12/2020), 6 mg (8/26/2020), 6 mg (9/10/2020), 6 mg (10/7/2020)  cetuximab (ERBITUX), 930 mg, Intravenous, Once, 12 of 12 cycles  Administration: 900 mg (7/27/2020), 900 mg (8/10/2020), 900 mg (8/24/2020), 900 mg (9/8/2020), 900 mg (9/21/2020), 900 mg (10/5/2020), 900 mg (10/19/2020), 900 mg (11/2/2020), 900 mg (11/16/2020), 900 mg (11/30/2020), 900 mg (12/28/2020), 900 mg (1/11/2021)  irinotecan (CAMPTOSAR) chemo infusion, 335 mg, Intravenous, Once, 6 of 6 cycles  Administration: 340 mg (7/27/2020), 340 mg (8/10/2020), 340 mg (8/24/2020), 340 mg (9/8/2020), 340 mg (9/21/2020), 340 mg (10/5/2020)  leucovorin calcium IVPB, 744 mg, Intravenous, Once, 6 of 6 cycles  Administration: 750 mg (7/27/2020), 750 mg (8/10/2020), 750 mg (8/24/2020), 750 mg (9/8/2020), 750 mg (9/21/2020), 740 mg (10/5/2020)  fluorouracil (ADRUCIL) ambulatory infusion Soln, 1,200 mg/m2/day = 4,465 mg, Intravenous, Over 46 hours, 6 of 6 cycles  Dose modification: 1,200 mg/m2/day (original dose 1,200 mg/m2/day, Cycle 3)     9/20/2022 - 12/1/2022 Chemotherapy    palonosetron (ALOXI), 0.25 mg, Intravenous, Once, 4 of 4 cycles  Administration: 0.25 mg (10/18/2022), 0.25 mg (11/1/2022), 0.25 mg (11/15/2022), 0.25 mg (11/29/2022)  pegfilgrastim (NEULASTA ONPRO), 6 mg, Subcutaneous, Once, 6 of 6 cycles  Administration: 6 mg (9/22/2022), 6 mg (10/20/2022), 6 mg (11/3/2022), 6 mg (11/17/2022), 6 mg (12/1/2022)  Pegfilgrastim-bmez (ZIEXTENZO), 6 mg, Subcutaneous, Once, 1 of 1 cycle  Administration: 6 mg (10/7/2022)  fosaprepitant (EMEND) IVPB, 150 mg, Intravenous, Once, 4 of 4 cycles  Administration: 150 mg (10/18/2022), 150 mg (11/1/2022), 150 mg (11/15/2022), 150 mg (11/29/2022)  fluorouracil (ADRUCIL), 400 mg/m2 = 735 mg, Intravenous, Once, 6 of 6 cycles  Administration: 735 mg  (9/20/2022), 735 mg (10/4/2022), 730 mg (10/18/2022), 730 mg (11/1/2022), 730 mg (11/15/2022), 730 mg (11/29/2022)  cetuximab (ERBITUX), 920 mg, Intravenous, Once, 6 of 6 cycles  Administration: 900 mg (9/20/2022), 900 mg (10/4/2022), 900 mg (10/18/2022), 900 mg (11/1/2022), 900 mg (11/15/2022), 900 mg (11/29/2022)  irinotecan (CAMPTOSAR) chemo infusion, 331 mg, Intravenous, Once, 6 of 6 cycles  Administration: 320 mg (9/20/2022), 320 mg (10/4/2022), 320 mg (10/18/2022), 320 mg (11/1/2022), 320 mg (11/15/2022), 320 mg (11/29/2022)  leucovorin calcium IVPB, 736 mg, Intravenous, Once, 6 of 6 cycles  Administration: 700 mg (9/20/2022), 700 mg (10/4/2022), 750 mg (10/18/2022), 750 mg (11/1/2022), 700 mg (11/15/2022), 700 mg (11/29/2022)  fluorouracil (ADRUCIL) ambulatory infusion Soln, 1,200 mg/m2/day = 4,415 mg, Intravenous, Over 46 hours, 6 of 6 cycles     2/4/2025 Biopsy    A.  Lung, right lower lobe nodule, biopsy:  -Metastatic adenocarcinoma compatible with colonic primary.     3/25/2025 Surgery    Robotic Completion right lower lobectomy, MLND  A.  Lymph node, level 9:     - Negative for malignancy (0/1).     B.  Lung, right lower lobe, wedge::     - Metastatic adenocarcinoma of the colon, 1.5 cm.     - Lymphatic channel invasion by tumor identified.     C.  Lymph node, level 11:     - Negative for malignancy (0/1).     D.  Lymph node, level 11, posterior:     - Negative for malignancy (0/1).     E.  Lymph node, level 12:     - Negative for malignancy (0/1).     F.  Lymph node, level 10:     - Negative for malignancy (0/1).     G.  Lung, right lower lobe:     - No significant pathologic abnormalities.     - No malignancy identified.     - Six lymph nodes identified; all negative for malignancy (0/6).     - Bronchial and vascular margins are negative for malignancy.     H.  Mediastinal nodule:     - Portion of necrotic adipose tissue encased by dense fibrous tissue.     - No malignancy identified.     Metastasis  from colon cancer (HCC) (Resolved)   4/11/2022 Initial Diagnosis    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 (HCC)     9/20/2022 - 12/1/2022 Chemotherapy    palonosetron (ALOXI), 0.25 mg, Intravenous, Once, 4 of 4 cycles  Administration: 0.25 mg (10/18/2022), 0.25 mg (11/1/2022), 0.25 mg (11/15/2022), 0.25 mg (11/29/2022)  pegfilgrastim (NEULASTA ONPRO), 6 mg, Subcutaneous, Once, 6 of 6 cycles  Administration: 6 mg (9/22/2022), 6 mg (10/20/2022), 6 mg (11/3/2022), 6 mg (11/17/2022), 6 mg (12/1/2022)  Pegfilgrastim-bmez (ZIEXTENZO), 6 mg, Subcutaneous, Once, 1 of 1 cycle  Administration: 6 mg (10/7/2022)  fosaprepitant (EMEND) IVPB, 150 mg, Intravenous, Once, 4 of 4 cycles  Administration: 150 mg (10/18/2022), 150 mg (11/1/2022), 150 mg (11/15/2022), 150 mg (11/29/2022)  fluorouracil (ADRUCIL), 400 mg/m2 = 735 mg, Intravenous, Once, 6 of 6 cycles  Administration: 735 mg (9/20/2022), 735 mg (10/4/2022), 730 mg (10/18/2022), 730 mg (11/1/2022), 730 mg (11/15/2022), 730 mg (11/29/2022)  cetuximab (ERBITUX), 920 mg, Intravenous, Once, 6 of 6 cycles  Administration: 900 mg (9/20/2022), 900 mg (10/4/2022), 900 mg (10/18/2022), 900 mg (11/1/2022), 900 mg (11/15/2022), 900 mg (11/29/2022)  irinotecan (CAMPTOSAR) chemo infusion, 331 mg, Intravenous, Once, 6 of 6 cycles  Administration: 320 mg (9/20/2022), 320 mg (10/4/2022), 320 mg (10/18/2022), 320 mg (11/1/2022), 320 mg (11/15/2022), 320 mg (11/29/2022)  leucovorin calcium IVPB, 736 mg, Intravenous, Once, 6 of 6 cycles  Administration: 700 mg (9/20/2022), 700 mg (10/4/2022), 750 mg (10/18/2022), 750 mg (11/1/2022), 700 mg (11/15/2022), 700 mg (11/29/2022)  fluorouracil (ADRUCIL) ambulatory infusion Soln, 1,200 mg/m2/day = 4,415 mg, Intravenous, Over 46 hours, 6 of 6 cycles     Malignant neoplasm metastatic to right lung (HCC)   8/4/2022 Initial Diagnosis    Malignant neoplasm metastatic to right lung (HCC)     8/9/2022 Surgery    Flexible bronchoscopy, right thoracoscopic therapeutic  wedge resection      9/20/2022 - 12/1/2022 Chemotherapy    palonosetron (ALOXI), 0.25 mg, Intravenous, Once, 4 of 4 cycles  Administration: 0.25 mg (10/18/2022), 0.25 mg (11/1/2022), 0.25 mg (11/15/2022), 0.25 mg (11/29/2022)  pegfilgrastim (NEULASTA ONPRO), 6 mg, Subcutaneous, Once, 6 of 6 cycles  Administration: 6 mg (9/22/2022), 6 mg (10/20/2022), 6 mg (11/3/2022), 6 mg (11/17/2022), 6 mg (12/1/2022)  Pegfilgrastim-bmez (ZIEXTENZO), 6 mg, Subcutaneous, Once, 1 of 1 cycle  Administration: 6 mg (10/7/2022)  fosaprepitant (EMEND) IVPB, 150 mg, Intravenous, Once, 4 of 4 cycles  Administration: 150 mg (10/18/2022), 150 mg (11/1/2022), 150 mg (11/15/2022), 150 mg (11/29/2022)  fluorouracil (ADRUCIL), 400 mg/m2 = 735 mg, Intravenous, Once, 6 of 6 cycles  Administration: 735 mg (9/20/2022), 735 mg (10/4/2022), 730 mg (10/18/2022), 730 mg (11/1/2022), 730 mg (11/15/2022), 730 mg (11/29/2022)  cetuximab (ERBITUX), 920 mg, Intravenous, Once, 6 of 6 cycles  Administration: 900 mg (9/20/2022), 900 mg (10/4/2022), 900 mg (10/18/2022), 900 mg (11/1/2022), 900 mg (11/15/2022), 900 mg (11/29/2022)  irinotecan (CAMPTOSAR) chemo infusion, 331 mg, Intravenous, Once, 6 of 6 cycles  Administration: 320 mg (9/20/2022), 320 mg (10/4/2022), 320 mg (10/18/2022), 320 mg (11/1/2022), 320 mg (11/15/2022), 320 mg (11/29/2022)  leucovorin calcium IVPB, 736 mg, Intravenous, Once, 6 of 6 cycles  Administration: 700 mg (9/20/2022), 700 mg (10/4/2022), 750 mg (10/18/2022), 750 mg (11/1/2022), 700 mg (11/15/2022), 700 mg (11/29/2022)  fluorouracil (ADRUCIL) ambulatory infusion Soln, 1,200 mg/m2/day = 4,415 mg, Intravenous, Over 46 hours, 6 of 6 cycles     2/4/2025 Biopsy    A.  Lung, right lower lobe nodule, biopsy:  -Metastatic adenocarcinoma compatible with colonic primary.     3/25/2025 Surgery    Robotic Completion right lower lobectomy, MLND  A.  Lymph node, level 9:     - Negative for malignancy (0/1).     B.  Lung, right lower lobe,  "wedge::     - Metastatic adenocarcinoma of the colon, 1.5 cm.     - Lymphatic channel invasion by tumor identified.     C.  Lymph node, level 11:     - Negative for malignancy (0/1).     D.  Lymph node, level 11, posterior:     - Negative for malignancy (0/1).     E.  Lymph node, level 12:     - Negative for malignancy (0/1).     F.  Lymph node, level 10:     - Negative for malignancy (0/1).     G.  Lung, right lower lobe:     - No significant pathologic abnormalities.     - No malignancy identified.     - Six lymph nodes identified; all negative for malignancy (0/6).     - Bronchial and vascular margins are negative for malignancy.     H.  Mediastinal nodule:     - Portion of necrotic adipose tissue encased by dense fibrous tissue.     - No malignancy identified.          Review of Systems A complete review of systems is negative other than that noted above in the HPI.       Current Medications[1]   Objective   /64   Pulse 76   Temp 99 °F (37.2 °C)   Ht 5' 6\" (1.676 m)   Wt 73 kg (161 lb)   SpO2 99%   BMI 25.99 kg/m²     Pain Screening:  Pain Score: 0-No pain  ECOG    Physical Exam  Vitals reviewed.   Constitutional:       General: He is not in acute distress.     Appearance: Normal appearance. He is normal weight. He is not ill-appearing or toxic-appearing.   HENT:      Head: Normocephalic and atraumatic.     Eyes:      General: No scleral icterus.      Cardiovascular:      Rate and Rhythm: Normal rate.   Pulmonary:      Effort: Pulmonary effort is normal.   Abdominal:      General: Abdomen is flat. There is no distension.      Palpations: Abdomen is soft. There is no mass.      Tenderness: There is no abdominal tenderness. There is no guarding.     Musculoskeletal:         General: Normal range of motion.      Cervical back: Normal range of motion and neck supple.     Skin:     General: Skin is warm and dry.      Coloration: Skin is not jaundiced.     Neurological:      General: No focal deficit " present.      Mental Status: He is alert and oriented to person, place, and time.     Psychiatric:         Mood and Affect: Mood normal.         Behavior: Behavior normal.         Thought Content: Thought content normal.         Judgment: Judgment normal.            Labs: I have reviewed pertinent labs. CEA Trend:   Lab Results   Component Value Date/Time    CEA 1.0 06/09/2025 02:33 PM    CEA 1.1 12/05/2024 02:24 PM        Radiology Results Review: I have reviewed radiology reports from 5/31/2025 including: MRI of the abdomen.    MRI abdomen w wo contrast    Narrative & Impression   MRI - ABDOMEN - WITH AND WITHOUT CONTRAST     INDICATION: 54 years / Male. Z85.038: Personal history of other malignant neoplasm of large intestine.     COMPARISON: MR abdomen 11/13/2024     TECHNIQUE: Multiplanar/multisequence MRI of the abdomen was performed before and after administration of contrast.     IV Contrast: 7 mL of Gadobutrol injection (SINGLE-DOSE)     FINDINGS:     LOWER CHEST: Large right pleural effusion. There appears to be a locule of proteinaceous material measuring 1.9 x 1.5 cm (series 10 image 9).     LIVER:  Normal in size and configuration.  No suspicious mass.  Stable treatment zone in hepatic segment 7 measuring 2.4 x 1.8 cm (series 11 image 16) without findings of tumor recurrence. Stable post wedge resection changes of segment 3. Subcentimeter right lobe cyst.  Patent hepatic and portal veins.     BILE DUCTS: No intrahepatic or extrahepatic bile duct dilation.     GALLBLADDER: Cholelithiasis without findings of acute cholecystitis.     PANCREAS: Unremarkable.     ADRENAL GLANDS: Unremarkable.     SPLEEN: Normal.     KIDNEYS/PROXIMAL URETERS: No hydroureteronephrosis. No suspicious renal mass. Multiple simple left renal sinus cysts.     BOWEL: No dilated loops of bowel.     PERITONEUM/RETROPERITONEUM: No ascites.     LYMPH NODES: No abdominal lymphadenopathy.     VESSELS: No aneurysm.     ABDOMINAL WALL:  Postsurgical changes of the anterior abdominal wall.     BONES: No suspicious osseous lesion.     IMPRESSION:     1. Stable treatment zone in hepatic segment 7 measuring 2.4 x 1.8 cm and segment 3 wedge resection changes without findings of tumor recurrence. No new findings of metastatic disease in the abdomen.     2. Large right pleural effusion. There appears to be a locule of proteinaceous material measuring 1.9 x 1.5 cm of unclear etiology not seen on prior studies. Recommend a follow-up contrast-enhanced chest CT.          Other Study Results Review : Other studies reviewed include: Andrey ctDNA testing             [1]  Current Outpatient Medications   Medication Sig Dispense Refill   • Multiple Vitamins-Minerals (MENS MULTIVITAMIN PO) Take by mouth in the morning     • sildenafil (VIAGRA) 50 MG tablet Take 1 tablet (50 mg total) by mouth as needed for erectile dysfunction 10 tablet 0   • gabapentin (Neurontin) 300 mg capsule Take 1 capsule (300 mg total) by mouth 3 (three) times a day for 21 days 63 capsule 0   • ibuprofen (MOTRIN) 800 mg tablet Take 1 tablet (800 mg total) by mouth every 8 (eight) hours for 7 days 21 tablet 0     No current facility-administered medications for this visit.

## 2025-06-24 ENCOUNTER — OFFICE VISIT (OUTPATIENT)
Dept: HEMATOLOGY ONCOLOGY | Facility: CLINIC | Age: 55
End: 2025-06-24
Payer: COMMERCIAL

## 2025-06-24 VITALS
WEIGHT: 159 LBS | SYSTOLIC BLOOD PRESSURE: 104 MMHG | OXYGEN SATURATION: 99 % | HEART RATE: 89 BPM | HEIGHT: 66 IN | RESPIRATION RATE: 15 BRPM | TEMPERATURE: 98.6 F | DIASTOLIC BLOOD PRESSURE: 76 MMHG | BODY MASS INDEX: 25.55 KG/M2

## 2025-06-24 DIAGNOSIS — Z85.038 HISTORY OF COLON CANCER, STAGE IV: Primary | ICD-10-CM

## 2025-06-24 PROBLEM — Z90.2 S/P LOBECTOMY OF LUNG: Status: RESOLVED | Noted: 2025-04-09 | Resolved: 2025-06-24

## 2025-06-24 PROBLEM — Z00.00 ANNUAL PHYSICAL EXAM: Status: RESOLVED | Noted: 2020-07-21 | Resolved: 2025-06-24

## 2025-06-24 PROCEDURE — 99213 OFFICE O/P EST LOW 20 MIN: CPT | Performed by: INTERNAL MEDICINE

## 2025-06-24 NOTE — ASSESSMENT & PLAN NOTE
53-year-old  male who in January 2018 diagnosed with stage IV colon cancer with liver metastasis, K-jennifer wild-type, NRAS wild-type, MSI stable, no evidence of actionable mutation, genetic test did not show any inherited mutation, status post cetuximab and FOLFOX with surgical resection as both the primary and the liver metastasis on 5/2020 status post resection treated with FOLFIRI and cetuximab     Slowly growing lung nodules on January 2022 status post resection consistent with metastatic colorectal cancer of the right lower lobe of the lung and he received also FOLFIRI and cetuximab also another liver resection by the end of 2022     Right lower lobe metastasis status post radiation therapy,    Status post also resection of the right upper lobe on March 2025 showing metastatic colon cancer, CT DNA in June 2025 is 0    Will follow-up in 6 months after the MRI that is ordered by surgical oncology with CBC, CMP and CEA  Orders:    CBC and differential; Future    Comprehensive metabolic panel; Future    CEA; Future

## 2025-06-24 NOTE — PROGRESS NOTES
Name: Matthieu Da Silva      : 1970      MRN: [id number]  Encounter Provider: Niranjan Harrison MD  Encounter Date: 2025   Encounter department: [facility]  :  Assessment & Plan  History of colon cancer, stage IV  53-year-old  male who in 2018 diagnosed with stage IV colon cancer with liver metastasis, K-jennifer wild-type, NRAS wild-type, MSI stable, no evidence of actionable mutation, genetic test did not show any inherited mutation, status post cetuximab and FOLFOX with surgical resection as both the primary and the liver metastasis on 2020 status post resection treated with FOLFIRI and cetuximab     Slowly growing lung nodules on 2022 status post resection consistent with metastatic colorectal cancer of the right lower lobe of the lung and he received also FOLFIRI and cetuximab also another liver resection by the end of      Right lower lobe metastasis status post radiation therapy,    Status post also resection of the right upper lobe on 2025 showing metastatic colon cancer, CT DNA in 2025 is 0    Will follow-up in 6 months after the MRI that is ordered by surgical oncology with CBC, CMP and CEA  Orders:    CBC and differential; Future    Comprehensive metabolic panel; Future    CEA; Future      Assessment & Plan        Return in about 6 months (around 2025) for Follow up Irena Wylie.    History of Present Illness   Chief Complaint   Patient presents with    Follow-up     History of Present Illness  Matthieu Da Silva is a 55 yo male with stage IV colon cancer. He was diagnosed in 2018.  He was treated with neoadjuvant chemotherapy, modified FOLFox 6 and Erbitux. He then went for surgery on 2018 with a nice response.  Liver resection was positive for residual malignancy.  After surgery, adjuvant treatment with 5 FU bolus, leucovorin, 5 FU pump and Erbitux was restarted on 2018 and he received 12 cycles  of chemotherapy.  He was switched to single agent Erbitux for 6 doses. This was completed in January 2019. He was placed on observation     PET CT completed 5/21/20 showed that there were hypermetabolic left lateral liver metastases with minimal activity in the hepatic dome treated metastases.  He is s/p  Resection  on June 8, 2020. Pathology revealed metastatic adenocarcinoma consistent with the patient's known colon primary.  Margins were negative for carcinoma.   He was treated with adjuvant with FOLFIRI plus Erbitux for 6 months, this was completed in early 2021.     He was again on observation until more recently. He had surveillance imaging completed in 7/2022 which showed a slowly growing lung nodule up to 7 mm concerning for a growing metastatic lesion. He underwent a right thoracoscopic therapeutic lower lobe wedge resection on 8/9/22 and pathology was consistent with a   metastatic colon adenocarcinoma with tumor necrosis immunohistochemically consistent with metastases from the patient's known colon primary measuring 0.8 x 0.5 x 0.5 cm which was completely excised.  Status post 3 months of FOLFIRI plus cetuximab from 9/22 to 11/22.     Patient had been on surveillance since November 2022, CT chest abdomen pelvis from 11/29/2024 was remarkable for new nodular soft tissue lesion along the right lower lobe suture line measuring 1.3 x 0.7 cm concerning for recurrent tumor.  PET scan from 1/6/2025 was remarkable for minimal FDG activity and soft tissue density noted along right medial lower lobe resection 9 however there was right upper lobe consolidation with increased FDG activity, patient's case was discussed and tumor board, and recommendations were made for IR guided biopsy and thoracic surgery follow-up.  S/p biopsy of right lower lobe nodule on 2//2025 is positive for metastatic colon adenocarcinoma.   Oncology History   Cancer Staging   History of colon cancer, stage IV  Staging form: Colon and Rectum,  AJCC 8th Edition  - Pathologic stage from 6/21/2018: Stage WERO (ypT0, ypN0, cM1a) - Signed by STEPHANIE Wills on 10/16/2023  Stage prefix: Post-therapy  Total positive nodes: 0  Sites of metastasis: Liver  Oncology History   History of colon cancer, stage IV   1/2018 Initial Diagnosis    Malignant neoplasm of sigmoid colon (HCC)     1/22/2018 Biopsy    Large Intestine, Sigmoid Colon, Recto-sigmoid tumor bx:    - Invasive colonic adenocarcinoma, moderately differentiated     2/6/2018 - 10/16/2018 Chemotherapy    Modified FOLFOX6 x 12 cycles  Added Erbitux on 3/20/18      6/21/2018 Surgery    Segment 7 liver resection/ablation, hemicolectomy     6/21/2018 -  Cancer Staged    Staging form: Colon and Rectum, AJCC 8th Edition  - Pathologic stage from 6/21/2018: Stage WERO (ypT0, pN0, cM1a) - Signed by STEPHANIE Wills on 10/16/2023  Stage prefix: Post-therapy  Total positive nodes: 0  Sites of metastasis: Liver       10/30/2018 -  Chemotherapy    Continuation of Single agent Erbitux.  With 10/30/18 chemo, it was Cycle #14.  Plan was for 6 additional cycles of Erbitux alone.     3/2020 Genetic Testing    NEGATIVE for genes tested:    Test(s): [employer] + [employer] (34 genes): APC, ETHAN, BARD1, BRCA1, BRCA2, BRIP1, BMPR1A, CDH1, CDK4, CDKN2A, CHEK2, DICER1, EPCAM, GREM1, HOXB13, MLH1, MRE11A, MSH2, MSH6, MUTYH, NBN, NF1, PALB2, PMS2, POLD1, POLE, PTEN, RAD50, RAD51C, RAD51D, SMAD4, SMARCA4, STK11, TP53      6/8/2020 Surgery    Liver, segment 3 (wedge resection):  - Metastatic adenocarcinoma, consistent with the patient's known colon primary  - Margins negative for carcinoma      7/27/2020 - 1/11/2021 Chemotherapy    fluorouracil (ADRUCIL), 745 mg, Intravenous, Once, 6 of 6 cycles  Administration: 750 mg (7/27/2020), 750 mg (8/10/2020), 745 mg (8/24/2020), 745 mg (9/8/2020), 745 mg (9/21/2020), 745 mg (10/5/2020)  pegfilgrastim (NEULASTA), 6 mg, Subcutaneous, Once, 1 of 1 cycle  Administration: 6 mg  (9/24/2020)  pegfilgrastim (NEULASTA ONPRO), 6 mg, Subcutaneous, Once, 6 of 6 cycles  Administration: 6 mg (7/29/2020), 6 mg (8/12/2020), 6 mg (8/26/2020), 6 mg (9/10/2020), 6 mg (10/7/2020)  cetuximab (ERBITUX), 930 mg, Intravenous, Once, 12 of 12 cycles  Administration: 900 mg (7/27/2020), 900 mg (8/10/2020), 900 mg (8/24/2020), 900 mg (9/8/2020), 900 mg (9/21/2020), 900 mg (10/5/2020), 900 mg (10/19/2020), 900 mg (11/2/2020), 900 mg (11/16/2020), 900 mg (11/30/2020), 900 mg (12/28/2020), 900 mg (1/11/2021)  irinotecan (CAMPTOSAR) chemo infusion, 335 mg, Intravenous, Once, 6 of 6 cycles  Administration: 340 mg (7/27/2020), 340 mg (8/10/2020), 340 mg (8/24/2020), 340 mg (9/8/2020), 340 mg (9/21/2020), 340 mg (10/5/2020)  leucovorin calcium IVPB, 744 mg, Intravenous, Once, 6 of 6 cycles  Administration: 750 mg (7/27/2020), 750 mg (8/10/2020), 750 mg (8/24/2020), 750 mg (9/8/2020), 750 mg (9/21/2020), 740 mg (10/5/2020)  fluorouracil (ADRUCIL) ambulatory infusion Soln, 1,200 mg/m2/day = 4,465 mg, Intravenous, Over 46 hours, 6 of 6 cycles  Dose modification: 1,200 mg/m2/day (original dose 1,200 mg/m2/day, Cycle 3)     9/20/2022 - 12/1/2022 Chemotherapy    palonosetron (ALOXI), 0.25 mg, Intravenous, Once, 4 of 4 cycles  Administration: 0.25 mg (10/18/2022), 0.25 mg (11/1/2022), 0.25 mg (11/15/2022), 0.25 mg (11/29/2022)  pegfilgrastim (NEULASTA ONPRO), 6 mg, Subcutaneous, Once, 6 of 6 cycles  Administration: 6 mg (9/22/2022), 6 mg (10/20/2022), 6 mg (11/3/2022), 6 mg (11/17/2022), 6 mg (12/1/2022)  Pegfilgrastim-bmez (ZIEXTENZO), 6 mg, Subcutaneous, Once, 1 of 1 cycle  Administration: 6 mg (10/7/2022)  fosaprepitant (EMEND) IVPB, 150 mg, Intravenous, Once, 4 of 4 cycles  Administration: 150 mg (10/18/2022), 150 mg (11/1/2022), 150 mg (11/15/2022), 150 mg (11/29/2022)  fluorouracil (ADRUCIL), 400 mg/m2 = 735 mg, Intravenous, Once, 6 of 6 cycles  Administration: 735 mg (9/20/2022), 735 mg (10/4/2022), 730 mg  (10/18/2022), 730 mg (11/1/2022), 730 mg (11/15/2022), 730 mg (11/29/2022)  cetuximab (ERBITUX), 920 mg, Intravenous, Once, 6 of 6 cycles  Administration: 900 mg (9/20/2022), 900 mg (10/4/2022), 900 mg (10/18/2022), 900 mg (11/1/2022), 900 mg (11/15/2022), 900 mg (11/29/2022)  irinotecan (CAMPTOSAR) chemo infusion, 331 mg, Intravenous, Once, 6 of 6 cycles  Administration: 320 mg (9/20/2022), 320 mg (10/4/2022), 320 mg (10/18/2022), 320 mg (11/1/2022), 320 mg (11/15/2022), 320 mg (11/29/2022)  leucovorin calcium IVPB, 736 mg, Intravenous, Once, 6 of 6 cycles  Administration: 700 mg (9/20/2022), 700 mg (10/4/2022), 750 mg (10/18/2022), 750 mg (11/1/2022), 700 mg (11/15/2022), 700 mg (11/29/2022)  fluorouracil (ADRUCIL) ambulatory infusion Soln, 1,200 mg/m2/day = 4,415 mg, Intravenous, Over 46 hours, 6 of 6 cycles     2/4/2025 Biopsy    A.  Lung, right lower lobe nodule, biopsy:  -Metastatic adenocarcinoma compatible with colonic primary.     3/25/2025 Surgery    Robotic Completion right lower lobectomy, MLND  A.  Lymph node, level 9:     - Negative for malignancy (0/1).     B.  Lung, right lower lobe, wedge::     - Metastatic adenocarcinoma of the colon, 1.5 cm.     - Lymphatic channel invasion by tumor identified.     C.  Lymph node, level 11:     - Negative for malignancy (0/1).     D.  Lymph node, level 11, posterior:     - Negative for malignancy (0/1).     E.  Lymph node, level 12:     - Negative for malignancy (0/1).     F.  Lymph node, level 10:     - Negative for malignancy (0/1).     G.  Lung, right lower lobe:     - No significant pathologic abnormalities.     - No malignancy identified.     - Six lymph nodes identified; all negative for malignancy (0/6).     - Bronchial and vascular margins are negative for malignancy.     H.  Mediastinal nodule:     - Portion of necrotic adipose tissue encased by dense fibrous tissue.     - No malignancy identified.     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 (HCC) (Resolved)    4/11/2022 Initial Diagnosis    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 (HCC)     9/20/2022 - 12/1/2022 Chemotherapy    palonosetron (ALOXI), 0.25 mg, Intravenous, Once, 4 of 4 cycles  Administration: 0.25 mg (10/18/2022), 0.25 mg (11/1/2022), 0.25 mg (11/15/2022), 0.25 mg (11/29/2022)  pegfilgrastim (NEULASTA ONPRO), 6 mg, Subcutaneous, Once, 6 of 6 cycles  Administration: 6 mg (9/22/2022), 6 mg (10/20/2022), 6 mg (11/3/2022), 6 mg (11/17/2022), 6 mg (12/1/2022)  Pegfilgrastim-bmez (ZIEXTENZO), 6 mg, Subcutaneous, Once, 1 of 1 cycle  Administration: 6 mg (10/7/2022)  fosaprepitant (EMEND) IVPB, 150 mg, Intravenous, Once, 4 of 4 cycles  Administration: 150 mg (10/18/2022), 150 mg (11/1/2022), 150 mg (11/15/2022), 150 mg (11/29/2022)  fluorouracil (ADRUCIL), 400 mg/m2 = 735 mg, Intravenous, Once, 6 of 6 cycles  Administration: 735 mg (9/20/2022), 735 mg (10/4/2022), 730 mg (10/18/2022), 730 mg (11/1/2022), 730 mg (11/15/2022), 730 mg (11/29/2022)  cetuximab (ERBITUX), 920 mg, Intravenous, Once, 6 of 6 cycles  Administration: 900 mg (9/20/2022), 900 mg (10/4/2022), 900 mg (10/18/2022), 900 mg (11/1/2022), 900 mg (11/15/2022), 900 mg (11/29/2022)  irinotecan (CAMPTOSAR) chemo infusion, 331 mg, Intravenous, Once, 6 of 6 cycles  Administration: 320 mg (9/20/2022), 320 mg (10/4/2022), 320 mg (10/18/2022), 320 mg (11/1/2022), 320 mg (11/15/2022), 320 mg (11/29/2022)  leucovorin calcium IVPB, 736 mg, Intravenous, Once, 6 of 6 cycles  Administration: 700 mg (9/20/2022), 700 mg (10/4/2022), 750 mg (10/18/2022), 750 mg (11/1/2022), 700 mg (11/15/2022), 700 mg (11/29/2022)  fluorouracil (ADRUCIL) ambulatory infusion Soln, 1,200 mg/m2/day = 4,415 mg, Intravenous, Over 46 hours, 6 of 6 cycles     Malignant neoplasm metastatic to right lung (HCC)   8/4/2022 Initial Diagnosis    Malignant neoplasm metastatic to right lung (HCC)     8/9/2022 Surgery    Flexible bronchoscopy, right thoracoscopic therapeutic wedge resection      9/20/2022 -  12/1/2022 Chemotherapy    palonosetron (ALOXI), 0.25 mg, Intravenous, Once, 4 of 4 cycles  Administration: 0.25 mg (10/18/2022), 0.25 mg (11/1/2022), 0.25 mg (11/15/2022), 0.25 mg (11/29/2022)  pegfilgrastim (NEULASTA ONPRO), 6 mg, Subcutaneous, Once, 6 of 6 cycles  Administration: 6 mg (9/22/2022), 6 mg (10/20/2022), 6 mg (11/3/2022), 6 mg (11/17/2022), 6 mg (12/1/2022)  Pegfilgrastim-bmez (ZIEXTENZO), 6 mg, Subcutaneous, Once, 1 of 1 cycle  Administration: 6 mg (10/7/2022)  fosaprepitant (EMEND) IVPB, 150 mg, Intravenous, Once, 4 of 4 cycles  Administration: 150 mg (10/18/2022), 150 mg (11/1/2022), 150 mg (11/15/2022), 150 mg (11/29/2022)  fluorouracil (ADRUCIL), 400 mg/m2 = 735 mg, Intravenous, Once, 6 of 6 cycles  Administration: 735 mg (9/20/2022), 735 mg (10/4/2022), 730 mg (10/18/2022), 730 mg (11/1/2022), 730 mg (11/15/2022), 730 mg (11/29/2022)  cetuximab (ERBITUX), 920 mg, Intravenous, Once, 6 of 6 cycles  Administration: 900 mg (9/20/2022), 900 mg (10/4/2022), 900 mg (10/18/2022), 900 mg (11/1/2022), 900 mg (11/15/2022), 900 mg (11/29/2022)  irinotecan (CAMPTOSAR) chemo infusion, 331 mg, Intravenous, Once, 6 of 6 cycles  Administration: 320 mg (9/20/2022), 320 mg (10/4/2022), 320 mg (10/18/2022), 320 mg (11/1/2022), 320 mg (11/15/2022), 320 mg (11/29/2022)  leucovorin calcium IVPB, 736 mg, Intravenous, Once, 6 of 6 cycles  Administration: 700 mg (9/20/2022), 700 mg (10/4/2022), 750 mg (10/18/2022), 750 mg (11/1/2022), 700 mg (11/15/2022), 700 mg (11/29/2022)  fluorouracil (ADRUCIL) ambulatory infusion Soln, 1,200 mg/m2/day = 4,415 mg, Intravenous, Over 46 hours, 6 of 6 cycles     2/4/2025 Biopsy    A.  Lung, right lower lobe nodule, biopsy:  -Metastatic adenocarcinoma compatible with colonic primary.     3/25/2025 Surgery    Robotic Completion right lower lobectomy, MLND  A.  Lymph node, level 9:     - Negative for malignancy (0/1).     B.  Lung, right lower lobe, wedge::     - Metastatic adenocarcinoma  "of the colon, 1.5 cm.     - Lymphatic channel invasion by tumor identified.     C.  Lymph node, level 11:     - Negative for malignancy (0/1).     D.  Lymph node, level 11, posterior:     - Negative for malignancy (0/1).     E.  Lymph node, level 12:     - Negative for malignancy (0/1).     F.  Lymph node, level 10:     - Negative for malignancy (0/1).     G.  Lung, right lower lobe:     - No significant pathologic abnormalities.     - No malignancy identified.     - Six lymph nodes identified; all negative for malignancy (0/6).     - Bronchial and vascular margins are negative for malignancy.     H.  Mediastinal nodule:     - Portion of necrotic adipose tissue encased by dense fibrous tissue.     - No malignancy identified.             Review of Systems   Constitutional:  Negative for chills and fever.   HENT:  Negative for ear pain and sore throat.    Eyes:  Negative for pain and visual disturbance.   Respiratory:  Negative for cough and shortness of breath.    Cardiovascular:  Negative for chest pain and palpitations.   Gastrointestinal:  Negative for abdominal pain and vomiting.   Genitourinary:  Negative for dysuria and hematuria.   Musculoskeletal:  Negative for arthralgias and back pain.   Skin:  Negative for color change and rash.   Neurological:  Negative for seizures and syncope.   All other systems reviewed and are negative.          Objective   /76 (BP Location: Left arm, Patient Position: Sitting, Cuff Size: Large)   Pulse 89   Temp 98.6 °F (37 °C) (Temporal)   Resp 15   Ht 5' 6\" (1.676 m)   Wt 72.1 kg (159 lb)   SpO2 99%   BMI 25.66 kg/m²     Pain Screening:  Pain Score: 0-No pain  ECOG   0  Physical Exam  Vitals reviewed.   Constitutional:       General: He is not in acute distress.     Appearance: He is well-developed. He is not diaphoretic.   HENT:      Head: Normocephalic and atraumatic.     Eyes:      Conjunctiva/sclera: Conjunctivae normal.     Neck:      Trachea: No tracheal " deviation.     Cardiovascular:      Rate and Rhythm: Normal rate and regular rhythm.      Heart sounds: No murmur heard.     No friction rub. No gallop.   Pulmonary:      Effort: Pulmonary effort is normal. No respiratory distress.      Breath sounds: Normal breath sounds. No decreased air movement. No decreased breath sounds, wheezing or rales.   Chest:      Chest wall: No tenderness.   Abdominal:      General: There is no distension.      Palpations: Abdomen is soft. There is no hepatomegaly or splenomegaly.      Tenderness: There is no abdominal tenderness.     Musculoskeletal:      Cervical back: Normal range of motion and neck supple.      Right lower leg: No edema.      Left lower leg: No edema.   Lymphadenopathy:      Head:      Right side of head: No submental or submandibular adenopathy.      Left side of head: No submental or submandibular adenopathy.      Cervical: No cervical adenopathy.      Upper Body:      Right upper body: No supraclavicular or axillary adenopathy.      Left upper body: No supraclavicular or axillary adenopathy.      Lower Body: No right inguinal adenopathy. No left inguinal adenopathy.     Skin:     General: Skin is warm and dry.      Coloration: Skin is not pale.      Findings: No erythema or rash.     Neurological:      General: No focal deficit present.      Mental Status: He is alert and oriented to person, place, and time.     Psychiatric:         Behavior: Behavior normal.         Thought Content: Thought content normal.         Judgment: Judgment normal.       Physical Exam      Results    Labs: I have reviewed the following labs:  Lab Results   Component Value Date/Time    WBC 16.62 (H) 03/26/2025 04:47 AM    RBC 3.85 (L) 03/26/2025 04:47 AM    Hemoglobin 12.9 03/26/2025 04:47 AM    Hematocrit 36.5 03/26/2025 04:47 AM    MCV 95 03/26/2025 04:47 AM    MCH 33.5 03/26/2025 04:47 AM    RDW 13.0 03/26/2025 04:47 AM    Platelets 132 (L) 03/26/2025 04:47 AM    Segmented % 55  03/17/2025 02:51 PM    Lymphocytes % 36 03/17/2025 02:51 PM    Monocytes % 8 03/17/2025 02:51 PM    Eosinophils Relative 1 03/17/2025 02:51 PM    Basophils Relative 0 03/17/2025 02:51 PM    Immature Grans % 0 03/17/2025 02:51 PM    Absolute Neutrophils 3.13 03/17/2025 02:51 PM     Lab Results   Component Value Date/Time    Potassium 3.8 06/09/2025 02:33 PM    Chloride 106 06/09/2025 02:33 PM    CO2 26 06/09/2025 02:33 PM    BUN 15 06/09/2025 02:33 PM    Creatinine 1.03 06/09/2025 02:33 PM    Glucose, Fasting 95 03/17/2025 02:51 PM    Calcium 9.0 06/09/2025 02:33 PM    AST 18 06/09/2025 02:33 PM    ALT 12 06/09/2025 02:33 PM    Alkaline Phosphatase 62 06/09/2025 02:33 PM    Total Protein 7.6 06/09/2025 02:33 PM    Albumin 4.2 06/09/2025 02:33 PM    Total Bilirubin 0.32 06/09/2025 02:33 PM    eGFR 83 06/09/2025 02:33 PM

## (undated) DEVICE — SUT VICRYL 0 UR-6 27 IN J603H

## (undated) DEVICE — STERILE ACCESS CATHETER PACK: Brand: CARDINAL HEALTH

## (undated) DEVICE — INTENDED FOR TISSUE SEPARATION, AND OTHER PROCEDURES THAT REQUIRE A SHARP SURGICAL BLADE TO PUNCTURE OR CUT.: Brand: BARD-PARKER SAFETY BLADES SIZE 15, STERILE

## (undated) DEVICE — PROXIMATE SKIN STAPLERS (35 WIDE) CONTAINS 35 STAINLESS STEEL STAPLES (FIXED HEAD): Brand: PROXIMATE

## (undated) DEVICE — ADHESIVE SKIN HIGH VISCOSITY EXOFIN 1ML

## (undated) DEVICE — ARM DRAPE

## (undated) DEVICE — GLOVE INDICATOR PI UNDERGLOVE SZ 8 BLUE

## (undated) DEVICE — GLOVE SRG BIOGEL 8

## (undated) DEVICE — DRAPE EQUIPMENT RF WAND

## (undated) DEVICE — KIT, BETHLEHEM THORACIC ROBOT: Brand: CARDINAL HEALTH

## (undated) DEVICE — BULB SYRINGE,IRRIGATION WITH PROTECTIVE CAP: Brand: DOVER

## (undated) DEVICE — THE ECHELON, ECHELON ENDOPATH™ AND ECHELON FLEX™ FAMILIES OF ENDOSCOPIC LINEAR CUTTERS AND RELOADS ARE STERILE, SINGLE PATIENT USE INSTRUMENTS THAT SIMULTANEOUSLY CUT AND STAPLE TISSUE. THERE ARE SIX STAGGERED ROWS OF STAPLES, THREE ON EITHER SIDE OF THE CUT LINE. THE 45 MM INSTRUMENTS HAVE A STAPLE LINE THATIS APPROXIMATELY 45 MM LONG AND A CUT LINE THAT IS APPROXIMATELY 42 MM LONG. THE SHAFT CAN ROTATE FREELY IN BOTH DIRECTIONS AND AN ARTICULATION MECHANISM ON ARTICULATING INSTRUMENTS ENABLES BENDING THE DISTAL PORTIONOF THE SHAFT TO FACILITATE LATERAL ACCESS OF THE OPERATIVE SITE.THE INSTRUMENTS ARE SHIPPED WITHOUT A RELOAD AND MUST BE LOADED PRIOR TO USE. A STAPLE RETAINING CAP ON THE RELOAD PROTECTS THE STAPLE LEG POINTS DURING SHIPPING AND TRANSPORTATION. THE INSTRUMENTS’ LOCK-OUT FEATURE IS DESIGNED TO PREVENT A USED RELOAD FROM BEING REFIRED.: Brand: ECHELON ENDOPATH

## (undated) DEVICE — TRAVELKIT CONTAINS FIRST STEP KIT (200ML EP-4 KIT) AND SOILED SCOPE BAG - 1 KIT: Brand: TRAVELKIT CONTAINS FIRST STEP KIT AND SOILED SCOPE BAG

## (undated) DEVICE — MEDI-VAC YANK SUCT HNDL W/TPRD BULBOUS TIP: Brand: CARDINAL HEALTH

## (undated) DEVICE — SUT VICRYL 3-0 SH 27 IN J416H

## (undated) DEVICE — PACK PBDS STERILE LAP LITHOTOMY RF

## (undated) DEVICE — SUREFORM 45 RELOAD GREEN: Brand: SUREFORM

## (undated) DEVICE — UTILITY MARKER,BLACK WITH LABELS: Brand: DEVON

## (undated) DEVICE — SUT VICRYL 0 54 IN J207G

## (undated) DEVICE — FIRST STEP BEDSIDE KIT - STAND-UP POUCH, ENDOSCOPIC CLEANING PAD - 1 POUCH: Brand: FIRST STEP BEDSIDE KIT - STAND-UP POUCH, ENDOSCOPIC CLEANING PAD

## (undated) DEVICE — SUT SILK 2-0 18 IN A185H

## (undated) DEVICE — ADAPTOR TRACH SWIVEL

## (undated) DEVICE — BLADELESS OBTURATOR: Brand: WECK VISTA

## (undated) DEVICE — CURVED-TIP STAPLER 30: Brand: ENDOWRIST

## (undated) DEVICE — METZENBAUM ADTEC SINGLE USE DISSECTING SCISSORS, SHAFT ONLY, MONOPOLAR, CURVED TO LEFT, WORKING LENGTH: 12 1/4", (310 MM), DIAM. 5 MM, INSULATED, DOUBLE ACTION, STERILE, DISPOSABLE, PACKAGE OF 10 PIECES: Brand: AESCULAP

## (undated) DEVICE — CONTOUR CURVED CUTTER STAPLER: Brand: CONTOUR

## (undated) DEVICE — LIGACLIP MCA MULTIPLE CLIP APPLIERS, 20 MEDIUM CLIPS: Brand: LIGACLIP

## (undated) DEVICE — SPONGE LAP 18 X 18 IN STRL RFD

## (undated) DEVICE — VIOLET BRAIDED (POLYGLACTIN 910), SYNTHETIC ABSORBABLE SUTURE: Brand: COATED VICRYL

## (undated) DEVICE — SUT POLYESTER TAPE D-G 8618-00

## (undated) DEVICE — PAD GROUNDING ADULT

## (undated) DEVICE — INTENDED FOR TISSUE SEPARATION, AND OTHER PROCEDURES THAT REQUIRE A SHARP SURGICAL BLADE TO PUNCTURE OR CUT.: Brand: BARD-PARKER SAFETY BLADES SIZE 11, STERILE

## (undated) DEVICE — PROGEL

## (undated) DEVICE — THE ECHELON FLEX POWERED PLUS ARTICULATING ENDOSCOPIC LINEAR CUTTERS ARE STERILE, SINGLE PATIENT USE INSTRUMENTS THAT SIMULTANEOUSLYCUT AND STAPLE TISSUE. THERE ARE SIX STAGGERED ROWS OF STAPLES, THREE ON EITHER SIDE OF THE CUT LINE. THE ECHELON FLEX 45 POWERED PLUSINSTRUMENTS HAVE A STAPLE LINE THAT IS APPROXIMATELY 45 MM LONG AND A CUT LINE THAT IS APPROXIMATELY 42 MM LONG. THE SHAFT CAN ROTATE FREELYIN BOTH DIRECTIONS AND AN ARTICULATION MECHANISM ENABLES THE DISTAL PORTION OF THE SHAFT TO PIVOT TO FACILITATE LATERAL ACCESS TO THE OPERATIVESITE.THE INSTRUMENTS ARE PACKAGED WITH A PRIMARY LITHIUM BATTERY PACK THAT MUST BE INSTALLED PRIOR TO USE. THERE ARE SPECIFIC REQUIREMENTS FORDISPOSING OF THE BATTERY PACK. REFER TO THE BATTERY PACK DISPOSAL SECTION.THE INSTRUMENTS ARE PACKAGED WITHOUT A RELOAD AND MUST BE LOADED PRIOR TO USE. A STAPLE RETAINING CAP ON THE RELOAD PROTECTS THE STAPLE LEGPOINTS DURING SHIPPING AND TRANSPORTATION. THE INSTRUMENTS’ LOCK-OUT FEATURE IS DESIGNED TO PREVENT A USED OR IMPROPERLY INSTALLED RELOADFROM BEING REFIRED OR AN INSTRUMENT FROM BEING FIRED WITHOUT A RELOAD.: Brand: ECHELON FLEX

## (undated) DEVICE — SUREFORM 45 RELOAD BLACK: Brand: SUREFORM

## (undated) DEVICE — STERILE THORACIC PACK: Brand: CARDINAL HEALTH

## (undated) DEVICE — SUT PROLENE 2-0 KS 30 IN 8623H

## (undated) DEVICE — GLOVE INDICATOR PI UNDERGLOVE SZ 8.5 BLUE

## (undated) DEVICE — REM POLYHESIVE ADULT PATIENT RETURN ELECTRODE: Brand: VALLEYLAB

## (undated) DEVICE — SUT VICRYL 2 TP-1 27 IN J849G

## (undated) DEVICE — 24 FR STRAIGHT – SOFT PVC CATHETER: Brand: PVC THORACIC CATHETERS

## (undated) DEVICE — SURGICEL 4 X 8

## (undated) DEVICE — LUBRICANT SURGILUBE TUBE 4 OZ  FLIP TOP

## (undated) DEVICE — SYRINGE 10ML SLIP TIP LF

## (undated) DEVICE — SINGLE TUBING WITH LARGE CONNECTOR FOR THORACIC SUCTION SYSTEM PUMP: Brand: THOPAZ TUBING SINGLE

## (undated) DEVICE — SUREFORM 45 RELOAD BLUE: Brand: SUREFORM

## (undated) DEVICE — ENDOSCOPIC CURVED INTRALUMINAL STAPLER (ILS) 24 TITANIUM ADJUSTABLE HEIGHT STAPLES

## (undated) DEVICE — VESSEL LOOPS X-RAY DETECTABLE: Brand: DEROYAL

## (undated) DEVICE — SINGLE USE SUCTION VALVE MAJ-209: Brand: SINGLE USE SUCTION VALVE (STERILE)

## (undated) DEVICE — FLOSEAL MATRIX IS INDICATED IN SURGICAL PROCEDURES (OTHER THAN IN OPHTHALMIC) AS AN ADJUNCT TO HEMOSTASIS WHEN CONTROL OF BLEEDING BY LIGATURE OR CONVENTIONALPROCEDURES IS INEFFECTIVE OR IMPRACTICAL.: Brand: FLOSEAL HEMOSTATIC MATRIX

## (undated) DEVICE — GAUZE SPONGES,16 PLY: Brand: CURITY

## (undated) DEVICE — CHLORAPREP HI-LITE 26ML ORANGE

## (undated) DEVICE — SUT SILK 0 30 IN A306H

## (undated) DEVICE — BIPOLAR GRASPER, LONG: Brand: ENDOWRIST

## (undated) DEVICE — SUT VICRYL 2-0 D-SPECIAL 27 IN D8114

## (undated) DEVICE — SUT MONOCRYL 4-0 PS-2 18 IN Y496G

## (undated) DEVICE — PROGEL LONG TIP 11 INCH

## (undated) DEVICE — GAUZE ROLL KITTNER

## (undated) DEVICE — BETHLEHEM MAJOR GENERAL PACK: Brand: CARDINAL HEALTH

## (undated) DEVICE — SUT SILK 3-0 SH 30 IN K832H

## (undated) DEVICE — 3M™ TEGADERM™ TRANSPARENT FILM DRESSING FRAME STYLE, 1626W, 4 IN X 4-3/4 IN (10 CM X 12 CM), 50/CT 4CT/CASE: Brand: 3M™ TEGADERM™

## (undated) DEVICE — SINGLE USE BIOPSY VALVE MAJ-210: Brand: SINGLE USE BIOPSY VALVE (STERILE)

## (undated) DEVICE — STERILE EMESIS BASIN                 070: Brand: CARDINAL HEALTH

## (undated) DEVICE — CADIERE FORCEPS: Brand: ENDOWRIST

## (undated) DEVICE — Device

## (undated) DEVICE — WOUND RETRACTOR AND PROTECTOR: Brand: ALEXIS WOUND PROTECTOR-RETRACTOR

## (undated) DEVICE — SUT SILK 0 SH 30 IN K834H

## (undated) DEVICE — SUT PROLENE 0 CT-1 30 IN 8424H

## (undated) DEVICE — REDUCER: Brand: ENDOWRIST

## (undated) DEVICE — 3M™ IOBAN™ 2 ANTIMICROBIAL INCISE DRAPE 6650EZ: Brand: IOBAN™ 2

## (undated) DEVICE — GLOVE INDICATOR PI UNDERGLOVE SZ 6.5 BLUE

## (undated) DEVICE — 3000CC GUARDIAN II: Brand: GUARDIAN

## (undated) DEVICE — BAG DECANTER

## (undated) DEVICE — SUT VICRYL 0 REEL 54 IN J287G

## (undated) DEVICE — SPY ELITE SINGLE VIAL KIT

## (undated) DEVICE — EXOFIN PRECISION PEN HIGH VISCOSITY TOPICAL SKIN ADHESIVE: Brand: EXOFIN PRECISION PEN, 1G

## (undated) DEVICE — SUT VICRYL 2-0 SH 27 IN UNDYED J417H

## (undated) DEVICE — ELECTRO LUBE IS A SINGLE PATIENT USE DEVICE THAT IS INTENDED TO BE USED ON ELECTROSURGICAL ELECTRODES TO REDUCE STICKING.: Brand: KEY SURGICAL ELECTRO LUBE

## (undated) DEVICE — TRAY FOLEY 16FR URIMETER SURESTEP

## (undated) DEVICE — GLOVE SRG BIOGEL ECLIPSE 8

## (undated) DEVICE — GLOVE SRG BIOGEL ECLIPSE 6

## (undated) DEVICE — CANISTER FOR THORACIC SUCTION SYSTEM: Brand: THOPAZ DISPOSABLE CANISTER 0.8L

## (undated) DEVICE — PLUMEPEN PRO 10FT

## (undated) DEVICE — TUBING SUCTION 5MM X 12 FT

## (undated) DEVICE — INTENDED FOR TISSUE SEPARATION, AND OTHER PROCEDURES THAT REQUIRE A SHARP SURGICAL BLADE TO PUNCTURE OR CUT.: Brand: BARD-PARKER SAFETY BLADES SIZE 10, STERILE

## (undated) DEVICE — COLUMN DRAPE

## (undated) DEVICE — SCD SEQUENTIAL COMPRESSION COMFORT SLEEVE MEDIUM KNEE LENGTH: Brand: KENDALL SCD

## (undated) DEVICE — TIP-UP FENESTRATED GRASPER: Brand: ENDOWRIST

## (undated) DEVICE — INSULATED BLADE ELECTRODE;CAUTION: FOR MANUFACTURING, PROCESSING, OR REPACKING.: Brand: EDGE

## (undated) DEVICE — CONTOUR CURVED CUTTER STAPLER RELOAD: Brand: CONTOUR

## (undated) DEVICE — STAPLER 30 RELOAD GREEN: Brand: ENDOWRIST

## (undated) DEVICE — SUT MONOCRYL 4-0 PS-2 27 IN Y426H

## (undated) DEVICE — ALL PURPOSE SPONGES,NONWOVEN, 4 PLY: Brand: CURITY

## (undated) DEVICE — ADHESIVE SKN CLSR HISTOACRYL FLEX 0.5ML LF

## (undated) DEVICE — BLANKET HYPOTHERMIA ADULT GAYMAR

## (undated) DEVICE — TROCARS: Brand: KII® OPTICAL ACCESS SYSTEM

## (undated) DEVICE — SUT PROLENE 3-0 SH 36 IN 8522H

## (undated) DEVICE — NEEDLE SPINAL 20G X 3.5 LF

## (undated) DEVICE — SEAL

## (undated) DEVICE — ELECTRODE BLADE MOD E-Z CLEAN 2.5IN 6.4CM -0012M

## (undated) DEVICE — CO2 AND WATER TUBING/CAP SET FOR OLYMPUS® SCOPES & UCR: Brand: ERBE

## (undated) DEVICE — SUT CHROMIC 0 BP-1 27 IN 47T

## (undated) DEVICE — POOLE SUCTION HANDLE: Brand: CARDINAL HEALTH

## (undated) DEVICE — VISUALIZATION SYSTEM: Brand: CLEARIFY

## (undated) DEVICE — ELECTRODE LAP L WIRE E-Z CLEAN 33CM -0100

## (undated) DEVICE — SUT PROLENE 2-0 SH 30 IN 8833H

## (undated) DEVICE — SUT PDS II 1 CTX 36 IN Z371T

## (undated) DEVICE — Device: Brand: DEFENDO AIR/WATER/SUCTION AND BIOPSY VALVE

## (undated) DEVICE — SUT VICRYL PLUS 0 UR-6 27IN VCP603H

## (undated) DEVICE — 3M™ STERI-STRIP™ REINFORCED ADHESIVE SKIN CLOSURES, R1546, 1/4 IN X 4 IN (6 MM X 100 MM), 10 STRIPS/ENVELOPE: Brand: 3M™ STERI-STRIP™

## (undated) DEVICE — SUT VICRYL 0 CT-1 27 IN J260H

## (undated) DEVICE — SUT SILK 3-0 18 IN A184H

## (undated) DEVICE — CATH FOLEY 12FR 5ML 2 WAY SILICONE ELASTIMER

## (undated) DEVICE — STAPLER 30 RELOAD: Brand: ENDOWRIST

## (undated) DEVICE — HEMOSTATIC MATRIX SURGIFLO 8ML W/THROMBIN

## (undated) DEVICE — 3M™ IOBAN™ 2 ANTIMICROBIAL INCISE DRAPE 6640EZ: Brand: IOBAN™ 2

## (undated) DEVICE — Device: Brand: TISSUE RETRIEVAL SYSTEM

## (undated) DEVICE — SURGICEL 4 X 8IN

## (undated) DEVICE — CURVED INTRALUMINAL STAPLER (ILS) 24 TITANIUM ADJUSTABLE HEIGHT STAPLES

## (undated) DEVICE — ANTIBACTERIAL UNDYED BRAIDED (POLYGLACTIN 910), SYNTHETIC ABSORBABLE SUTURE: Brand: COATED VICRYL

## (undated) DEVICE — HEAVY DUTY TABLE COVER: Brand: CONVERTORS

## (undated) DEVICE — STAPLER 30 RELOAD WHITE: Brand: ENDOWRIST

## (undated) DEVICE — 3M™ TEGADERM™ TRANSPARENT FILM DRESSING FRAME STYLE, 1628, 6 IN X 8 IN (15 CM X 20 CM), 10/CT 8CT/CASE: Brand: 3M™ TEGADERM™

## (undated) DEVICE — SUREFORM 45: Brand: SUREFORM